# Patient Record
Sex: MALE | Race: WHITE | NOT HISPANIC OR LATINO | Employment: OTHER | URBAN - METROPOLITAN AREA
[De-identification: names, ages, dates, MRNs, and addresses within clinical notes are randomized per-mention and may not be internally consistent; named-entity substitution may affect disease eponyms.]

---

## 2018-08-15 ENCOUNTER — OFFICE VISIT (OUTPATIENT)
Dept: INTERNAL MEDICINE | Facility: CLINIC | Age: 63
End: 2018-08-15
Payer: COMMERCIAL

## 2018-08-15 ENCOUNTER — LAB VISIT (OUTPATIENT)
Dept: LAB | Facility: HOSPITAL | Age: 63
End: 2018-08-15
Payer: COMMERCIAL

## 2018-08-15 VITALS
BODY MASS INDEX: 27.23 KG/M2 | HEART RATE: 51 BPM | SYSTOLIC BLOOD PRESSURE: 138 MMHG | RESPIRATION RATE: 20 BRPM | HEIGHT: 77 IN | DIASTOLIC BLOOD PRESSURE: 76 MMHG | WEIGHT: 230.63 LBS | TEMPERATURE: 98 F | OXYGEN SATURATION: 98 %

## 2018-08-15 DIAGNOSIS — Z00.00 ANNUAL PHYSICAL EXAM: Primary | ICD-10-CM

## 2018-08-15 DIAGNOSIS — R00.1 BRADYCARDIA: ICD-10-CM

## 2018-08-15 DIAGNOSIS — Z11.59 NEED FOR HEPATITIS C SCREENING TEST: ICD-10-CM

## 2018-08-15 DIAGNOSIS — Z23 IMMUNIZATION DUE: ICD-10-CM

## 2018-08-15 DIAGNOSIS — L98.9 BACK SKIN LESION: ICD-10-CM

## 2018-08-15 DIAGNOSIS — Z00.00 ANNUAL PHYSICAL EXAM: ICD-10-CM

## 2018-08-15 LAB
ALBUMIN SERPL BCP-MCNC: 4.4 G/DL
ALP SERPL-CCNC: 79 U/L
ALT SERPL W/O P-5'-P-CCNC: 20 U/L
ANION GAP SERPL CALC-SCNC: 8 MMOL/L
AST SERPL-CCNC: 30 U/L
BASOPHILS # BLD AUTO: 0.06 K/UL
BASOPHILS NFR BLD: 0.8 %
BILIRUB SERPL-MCNC: 1.2 MG/DL
BUN SERPL-MCNC: 13 MG/DL
CALCIUM SERPL-MCNC: 10.1 MG/DL
CHLORIDE SERPL-SCNC: 106 MMOL/L
CHOLEST SERPL-MCNC: 185 MG/DL
CHOLEST/HDLC SERPL: 3.6 {RATIO}
CO2 SERPL-SCNC: 27 MMOL/L
CREAT SERPL-MCNC: 1.2 MG/DL
DIFFERENTIAL METHOD: ABNORMAL
EOSINOPHIL # BLD AUTO: 0.2 K/UL
EOSINOPHIL NFR BLD: 2.3 %
ERYTHROCYTE [DISTWIDTH] IN BLOOD BY AUTOMATED COUNT: 13 %
EST. GFR  (AFRICAN AMERICAN): >60 ML/MIN/1.73 M^2
EST. GFR  (NON AFRICAN AMERICAN): >60 ML/MIN/1.73 M^2
GLUCOSE SERPL-MCNC: 96 MG/DL
HCT VFR BLD AUTO: 46.2 %
HDLC SERPL-MCNC: 52 MG/DL
HDLC SERPL: 28.1 %
HGB BLD-MCNC: 15.1 G/DL
IMM GRANULOCYTES # BLD AUTO: 0.01 K/UL
IMM GRANULOCYTES NFR BLD AUTO: 0.1 %
LDLC SERPL CALC-MCNC: 111.4 MG/DL
LYMPHOCYTES # BLD AUTO: 2.4 K/UL
LYMPHOCYTES NFR BLD: 30.7 %
MCH RBC QN AUTO: 31.6 PG
MCHC RBC AUTO-ENTMCNC: 32.7 G/DL
MCV RBC AUTO: 97 FL
MONOCYTES # BLD AUTO: 0.5 K/UL
MONOCYTES NFR BLD: 6.3 %
NEUTROPHILS # BLD AUTO: 4.6 K/UL
NEUTROPHILS NFR BLD: 59.8 %
NONHDLC SERPL-MCNC: 133 MG/DL
NRBC BLD-RTO: 0 /100 WBC
PLATELET # BLD AUTO: 219 K/UL
PMV BLD AUTO: 11.2 FL
POTASSIUM SERPL-SCNC: 4.3 MMOL/L
PROT SERPL-MCNC: 7.2 G/DL
RBC # BLD AUTO: 4.78 M/UL
SODIUM SERPL-SCNC: 141 MMOL/L
TRIGL SERPL-MCNC: 108 MG/DL
WBC # BLD AUTO: 7.72 K/UL

## 2018-08-15 PROCEDURE — 36415 COLL VENOUS BLD VENIPUNCTURE: CPT

## 2018-08-15 PROCEDURE — 80061 LIPID PANEL: CPT

## 2018-08-15 PROCEDURE — 85025 COMPLETE CBC W/AUTO DIFF WBC: CPT

## 2018-08-15 PROCEDURE — 80053 COMPREHEN METABOLIC PANEL: CPT

## 2018-08-15 PROCEDURE — 86803 HEPATITIS C AB TEST: CPT

## 2018-08-15 PROCEDURE — 99999 PR PBB SHADOW E&M-EST. PATIENT-LVL IV: CPT | Mod: PBBFAC,,, | Performed by: FAMILY MEDICINE

## 2018-08-15 PROCEDURE — 99203 OFFICE O/P NEW LOW 30 MIN: CPT | Mod: S$GLB,,, | Performed by: FAMILY MEDICINE

## 2018-08-15 PROCEDURE — 3008F BODY MASS INDEX DOCD: CPT | Mod: CPTII,S$GLB,, | Performed by: FAMILY MEDICINE

## 2018-08-15 NOTE — PROGRESS NOTES
"Subjective:       Patient ID: Myles Pride is a 63 y.o. male.    Chief Complaint: Establish Care (annual)    HPI     62 yo M    PMH:  "bad knee" from an old high school injury  Previously was on statin.    Presents for annual exam    Recently changed insurance.    Has noted a mole on his back    Non smoker  Moderate alcohol - 2-3 a week  No drugs    Retired 2014 -     No medications.    Reports his BP is slightly elevated - but only in MD office  Checks at home and is controlled.    Last Colonoscopy 2009 - due next year.   Reports normal findings    Has Had a shingles vaccine - last year.   Feels it was the one time vaccine - suspect zostavax.    Feels has had tetanus         Review of Systems   Constitutional: Negative for activity change and unexpected weight change.   HENT: Negative for hearing loss, rhinorrhea and trouble swallowing.    Eyes: Negative for discharge and visual disturbance.   Respiratory: Negative for chest tightness and wheezing.    Cardiovascular: Negative for chest pain and palpitations.   Gastrointestinal: Negative for blood in stool, constipation, diarrhea and vomiting.   Endocrine: Negative for polydipsia and polyuria.   Genitourinary: Negative for difficulty urinating, hematuria and urgency.   Musculoskeletal: Negative for arthralgias, joint swelling and neck pain.   Neurological: Negative for weakness and headaches.   Psychiatric/Behavioral: Negative for confusion and dysphoric mood.       Objective:       Vitals:    08/15/18 1335   BP: 138/76   Pulse: (!) 51   Resp: 20   Temp: 98.3 °F (36.8 °C)       Physical Exam   Constitutional: He is oriented to person, place, and time. He appears well-developed and well-nourished. No distress.   HENT:   Head: Normocephalic and atraumatic.   Right Ear: Hearing, tympanic membrane, external ear and ear canal normal.   Left Ear: Hearing, tympanic membrane, external ear and ear canal normal.   Nose: Nose normal. Right sinus exhibits no maxillary " sinus tenderness and no frontal sinus tenderness. Left sinus exhibits no maxillary sinus tenderness and no frontal sinus tenderness.   Mouth/Throat: Uvula is midline, oropharynx is clear and moist and mucous membranes are normal.   Eyes: Conjunctivae are normal. Right eye exhibits no discharge. Left eye exhibits no discharge.   Neck: Trachea normal, normal range of motion and full passive range of motion without pain.   Cardiovascular: Normal rate, regular rhythm, normal heart sounds and intact distal pulses.   Pulmonary/Chest: Effort normal and breath sounds normal. No respiratory distress. He has no decreased breath sounds. He has no wheezes.   Abdominal: Soft. Normal appearance and bowel sounds are normal. He exhibits no distension and no mass. There is no tenderness. There is no guarding. No hernia.   Musculoskeletal: Normal range of motion. He exhibits no edema or deformity.   Lymphadenopathy:     He has no cervical adenopathy.   Neurological: He is alert and oriented to person, place, and time.   Skin: Skin is warm, dry and intact. No rash noted. No erythema. No pallor.   Raised dry skin lesion.   Some scaling appreciated.  Pink  See attached photo.   Psychiatric: He has a normal mood and affect. His speech is normal and behavior is normal. Thought content normal.       Assessment:       1. Annual physical exam    2. Immunization due    3. Need for hepatitis C screening test    4. Bradycardia        Plan:   Annual physical exam  -     Lipid panel; Future; Expected date: 08/15/2018  -     CBC auto differential; Future; Expected date: 08/15/2018  -     Comprehensive metabolic panel; Future; Expected date: 08/15/2018    Immunization due  Shingles vaccine  I suspect he has had zostavax      Printing out script for shingrix for him to get.  -     (In Office Administered) Zoster Recombinant Vaccine; Future; Expected date: 08/15/2018      Need for hepatitis C screening test  -     Hepatitis C antibody; Future;  Expected date: 08/15/2018      Bradycardia  Chronic  Has had cardiologist evaluate in past  Reports he just runs low.    Skin lesion  Consult Derm      No Follow-up on file.

## 2018-08-16 LAB — HCV AB SERPL QL IA: NEGATIVE

## 2018-08-17 ENCOUNTER — TELEPHONE (OUTPATIENT)
Dept: DERMATOLOGY | Facility: CLINIC | Age: 63
End: 2018-08-17

## 2018-08-17 NOTE — TELEPHONE ENCOUNTER
Spoke to pt in reference to scheduled appt with REID Del Angel for Monday 8/20/18.  Informed pt appt will need to be canceled and reschedule with our dermatologist.  Pt verbalized understanding of information given and had no further questions.

## 2018-08-20 ENCOUNTER — TELEPHONE (OUTPATIENT)
Dept: INTERNAL MEDICINE | Facility: CLINIC | Age: 63
End: 2018-08-20

## 2018-08-20 RX ORDER — ATORVASTATIN CALCIUM 20 MG/1
20 TABLET, FILM COATED ORAL DAILY
Qty: 90 TABLET | Refills: 3 | Status: SHIPPED | OUTPATIENT
Start: 2018-08-20 | End: 2019-02-21 | Stop reason: SDUPTHER

## 2018-08-20 NOTE — TELEPHONE ENCOUNTER
----- Message from Catalino Arias MD sent at 8/20/2018  4:31 PM CDT -----  Please call and set up 6 mo f/u

## 2018-09-07 ENCOUNTER — OFFICE VISIT (OUTPATIENT)
Dept: DERMATOLOGY | Facility: CLINIC | Age: 63
End: 2018-09-07
Payer: COMMERCIAL

## 2018-09-07 DIAGNOSIS — D18.00 ANGIOMA: ICD-10-CM

## 2018-09-07 DIAGNOSIS — L82.1 SEBORRHEIC KERATOSIS: Primary | ICD-10-CM

## 2018-09-07 PROCEDURE — 99999 PR PBB SHADOW E&M-EST. PATIENT-LVL III: CPT | Mod: PBBFAC,,, | Performed by: STUDENT IN AN ORGANIZED HEALTH CARE EDUCATION/TRAINING PROGRAM

## 2018-09-07 PROCEDURE — 99202 OFFICE O/P NEW SF 15 MIN: CPT | Mod: 25,S$GLB,, | Performed by: STUDENT IN AN ORGANIZED HEALTH CARE EDUCATION/TRAINING PROGRAM

## 2018-09-07 PROCEDURE — 17110 DESTRUCTION B9 LES UP TO 14: CPT | Mod: S$GLB,,, | Performed by: STUDENT IN AN ORGANIZED HEALTH CARE EDUCATION/TRAINING PROGRAM

## 2018-09-07 NOTE — PROGRESS NOTES
Subjective:       Patient ID:  Myles Pride is a 63 y.o. male who presents for   Chief Complaint   Patient presents with    Mole     c/o mole to back x2 months , itchy, size of mole changed    Lesion     c/o lesion to left shoulder x a few years , ruff texture,      History of Present Illness: The patient presents for follow up of skin check.    Patient complains of lesion(s)  Location: left upper shoulder and back  Duration: years  Symptoms: itching  Relieving factors/Previous treatments: none    No history of skin cancer. Lesions unchanged for years, but now irritated.           Review of Systems   Constitutional: Negative for fever and chills.   Skin: Positive for activity-related sunscreen use. Negative for daily sunscreen use and recent sunburn.   Hematologic/Lymphatic: Does not bruise/bleed easily.        Objective:    Physical Exam   Constitutional: He appears well-developed and well-nourished. No distress.   Neurological: He is alert and oriented to person, place, and time. He is not disoriented.   Psychiatric: He has a normal mood and affect.   Skin:   Areas Examined (abnormalities noted in diagram):   Scalp / Hair Palpated and Inspected  Head / Face Inspection Performed  Neck Inspection Performed  Chest / Axilla Inspection Performed  Abdomen Inspection Performed  Genitals / Buttocks / Groin Inspection Performed  Back Inspection Performed  RUE Inspected  LUE Inspection Performed  RLE Inspected  LLE Inspection Performed  Nails and Digits Inspection Performed              Diagram Legend     Erythematous scaling macule/papule c/w actinic keratosis       Vascular papule c/w angioma      Pigmented verrucoid papule/plaque c/w seborrheic keratosis      Yellow umbilicated papule c/w sebaceous hyperplasia      Irregularly shaped tan macule c/w lentigo     1-2 mm smooth white papules consistent with Milia      Movable subcutaneous cyst with punctum c/w epidermal inclusion cyst      Subcutaneous movable  cyst c/w pilar cyst      Firm pink to brown papule c/w dermatofibroma      Pedunculated fleshy papule(s) c/w skin tag(s)      Evenly pigmented macule c/w junctional nevus     Mildly variegated pigmented, slightly irregular-bordered macule c/w mildly atypical nevus      Flesh colored to evenly pigmented papule c/w intradermal nevus       Pink pearly papule/plaque c/w basal cell carcinoma      Erythematous hyperkeratotic cursted plaque c/w SCC      Surgical scar with no sign of skin cancer recurrence      Open and closed comedones      Inflammatory papules and pustules      Verrucoid papule consistent consistent with wart     Erythematous eczematous patches and plaques     Dystrophic onycholytic nail with subungual debris c/w onychomycosis     Umbilicated papule    Erythematous-base heme-crusted tan verrucoid plaque consistent with inflamed seborrheic keratosis     Erythematous Silvery Scaling Plaque c/w Psoriasis     See annotation      Assessment / Plan:        Seborrheic keratosis  These are benign inherited growths without a malignant potential. Reassurance given to patient.    Cryosurgery Procedure Note    Verbal consent from the patient is obtained including, but not limited to, risk of hypopigmentation/hyperpigmentation, scar, recurrence of lesion. The patient is aware of the precancerous quality and need for treatment of these lesions. Liquid nitrogen cryosurgery is applied to the 2 inflamed SK, as detailed in the physical exam, to produce a freeze injury. The patient is aware that blisters may form and is instructed on wound care with gentle cleansing and use of vaseline ointment to keep moist until healed. The patient is supplied a handout on cryosurgery and is instructed to call if lesions do not completely resolve.      Angioma  This is a benign vascular lesion. Reassurance given. No treatment required.           Follow-up in about 1 year (around 9/7/2019), or if symptoms worsen or fail to improve.

## 2018-09-07 NOTE — PATIENT INSTRUCTIONS

## 2019-02-21 ENCOUNTER — LAB VISIT (OUTPATIENT)
Dept: LAB | Facility: HOSPITAL | Age: 64
End: 2019-02-21
Attending: FAMILY MEDICINE
Payer: COMMERCIAL

## 2019-02-21 ENCOUNTER — OFFICE VISIT (OUTPATIENT)
Dept: INTERNAL MEDICINE | Facility: CLINIC | Age: 64
End: 2019-02-21
Payer: COMMERCIAL

## 2019-02-21 VITALS
WEIGHT: 238.13 LBS | SYSTOLIC BLOOD PRESSURE: 138 MMHG | TEMPERATURE: 97 F | HEART RATE: 46 BPM | DIASTOLIC BLOOD PRESSURE: 80 MMHG | BODY MASS INDEX: 28.23 KG/M2 | OXYGEN SATURATION: 98 % | RESPIRATION RATE: 18 BRPM

## 2019-02-21 DIAGNOSIS — Z12.5 SCREENING FOR PROSTATE CANCER: ICD-10-CM

## 2019-02-21 DIAGNOSIS — R00.1 BRADYCARDIA: ICD-10-CM

## 2019-02-21 DIAGNOSIS — Z12.11 SCREENING FOR COLON CANCER: ICD-10-CM

## 2019-02-21 DIAGNOSIS — Z79.899 ENCOUNTER FOR LONG-TERM CURRENT USE OF MEDICATION: ICD-10-CM

## 2019-02-21 DIAGNOSIS — Z91.89 RISK OF MYOCARDIAL INFARCTION OR STROKE 7.5% OR GREATER IN NEXT 10 YEARS: ICD-10-CM

## 2019-02-21 DIAGNOSIS — Z91.89 RISK OF MYOCARDIAL INFARCTION OR STROKE 7.5% OR GREATER IN NEXT 10 YEARS: Primary | ICD-10-CM

## 2019-02-21 LAB
25(OH)D3+25(OH)D2 SERPL-MCNC: 22 NG/ML
ALBUMIN SERPL BCP-MCNC: 4.1 G/DL
ALP SERPL-CCNC: 75 U/L
ALT SERPL W/O P-5'-P-CCNC: 36 U/L
ANION GAP SERPL CALC-SCNC: 9 MMOL/L
AST SERPL-CCNC: 35 U/L
BILIRUB SERPL-MCNC: 0.9 MG/DL
BUN SERPL-MCNC: 13 MG/DL
CALCIUM SERPL-MCNC: 9.8 MG/DL
CHLORIDE SERPL-SCNC: 107 MMOL/L
CHOLEST SERPL-MCNC: 143 MG/DL
CHOLEST/HDLC SERPL: 3 {RATIO}
CO2 SERPL-SCNC: 27 MMOL/L
COMPLEXED PSA SERPL-MCNC: 0.8 NG/ML
CREAT SERPL-MCNC: 1.2 MG/DL
EST. GFR  (AFRICAN AMERICAN): >60 ML/MIN/1.73 M^2
EST. GFR  (NON AFRICAN AMERICAN): >60 ML/MIN/1.73 M^2
GLUCOSE SERPL-MCNC: 92 MG/DL
HDLC SERPL-MCNC: 48 MG/DL
HDLC SERPL: 33.6 %
LDLC SERPL CALC-MCNC: 74.8 MG/DL
NONHDLC SERPL-MCNC: 95 MG/DL
POTASSIUM SERPL-SCNC: 4.6 MMOL/L
PROT SERPL-MCNC: 6.8 G/DL
SODIUM SERPL-SCNC: 143 MMOL/L
TRIGL SERPL-MCNC: 101 MG/DL
TSH SERPL DL<=0.005 MIU/L-ACNC: 2.37 UIU/ML

## 2019-02-21 PROCEDURE — 82306 VITAMIN D 25 HYDROXY: CPT

## 2019-02-21 PROCEDURE — 99999 PR PBB SHADOW E&M-EST. PATIENT-LVL III: CPT | Mod: PBBFAC,,, | Performed by: FAMILY MEDICINE

## 2019-02-21 PROCEDURE — 99214 PR OFFICE/OUTPT VISIT, EST, LEVL IV, 30-39 MIN: ICD-10-PCS | Mod: S$GLB,,, | Performed by: FAMILY MEDICINE

## 2019-02-21 PROCEDURE — 99999 PR PBB SHADOW E&M-EST. PATIENT-LVL III: ICD-10-PCS | Mod: PBBFAC,,, | Performed by: FAMILY MEDICINE

## 2019-02-21 PROCEDURE — 3008F BODY MASS INDEX DOCD: CPT | Mod: CPTII,S$GLB,, | Performed by: FAMILY MEDICINE

## 2019-02-21 PROCEDURE — 84153 ASSAY OF PSA TOTAL: CPT

## 2019-02-21 PROCEDURE — 80053 COMPREHEN METABOLIC PANEL: CPT

## 2019-02-21 PROCEDURE — 36415 COLL VENOUS BLD VENIPUNCTURE: CPT

## 2019-02-21 PROCEDURE — 3008F PR BODY MASS INDEX (BMI) DOCUMENTED: ICD-10-PCS | Mod: CPTII,S$GLB,, | Performed by: FAMILY MEDICINE

## 2019-02-21 PROCEDURE — 99214 OFFICE O/P EST MOD 30 MIN: CPT | Mod: S$GLB,,, | Performed by: FAMILY MEDICINE

## 2019-02-21 PROCEDURE — 80061 LIPID PANEL: CPT

## 2019-02-21 PROCEDURE — 84443 ASSAY THYROID STIM HORMONE: CPT

## 2019-02-21 RX ORDER — SODIUM, POTASSIUM,MAG SULFATES 17.5-3.13G
1 SOLUTION, RECONSTITUTED, ORAL ORAL DAILY
Qty: 1 KIT | Refills: 0 | Status: SHIPPED | OUTPATIENT
Start: 2019-02-21 | End: 2019-02-23

## 2019-02-21 RX ORDER — ATORVASTATIN CALCIUM 20 MG/1
20 TABLET, FILM COATED ORAL DAILY
Qty: 90 TABLET | Refills: 3 | Status: SHIPPED | OUTPATIENT
Start: 2019-02-21 | End: 2020-02-28 | Stop reason: SDUPTHER

## 2019-02-21 NOTE — PROGRESS NOTES
Subjective:       Patient ID: Myles Pride is a 63 y.o. male.    Chief Complaint: No chief complaint on file.    HPI  Review of Systems    Objective:      Physical Exam    Assessment:       No diagnosis found.    Plan:   There are no diagnoses linked to this encounter.    No Follow-up on file.

## 2019-02-21 NOTE — PROGRESS NOTES
"Subjective:       Patient ID: Myles Pride is a 63 y.o. male.    Chief Complaint: Follow-up    HPI     63       PMH:    "bad Knee"  ASCVD risk of 8.2 - 10.2      Tolerating medication w/out issue. -statin  Has taken in the past  No new aches/ pains  Some present - but pre dates Statin.      Colonoscopy done in [facility] per patient  Will confirm.    Is exercising - cardio and resistance.  Tennis/ cycling is his source of cardiovascular exercise.  Heart rate does respond appropriately.    Tolerates without any issue -   No CP or trouble breathing  No dizziness    Never smoked    Review of Systems   Constitutional: Negative for activity change.   HENT: Negative for trouble swallowing.    Eyes: Negative for discharge.   Respiratory: Negative for wheezing.    Cardiovascular: Negative for chest pain and palpitations.   Gastrointestinal: Negative for constipation, diarrhea and vomiting.   Genitourinary: Negative for difficulty urinating and hematuria.   Neurological: Negative for headaches.   Psychiatric/Behavioral: Negative for dysphoric mood.       Objective:       Vitals:    02/21/19 0717   BP: 138/80   Pulse: (!) 46   Resp: 18   Temp: 97.3 °F (36.3 °C)       Physical Exam   Constitutional: He is oriented to person, place, and time. He appears well-developed and well-nourished. No distress.   HENT:   Head: Normocephalic and atraumatic.   Right Ear: Hearing, tympanic membrane, external ear and ear canal normal.   Left Ear: Hearing, tympanic membrane, external ear and ear canal normal.   Nose: Nose normal. Right sinus exhibits no maxillary sinus tenderness and no frontal sinus tenderness. Left sinus exhibits no maxillary sinus tenderness and no frontal sinus tenderness.   Mouth/Throat: Uvula is midline, oropharynx is clear and moist and mucous membranes are normal.   Eyes: Conjunctivae are normal. Right eye exhibits no discharge. Left eye exhibits no discharge.   Neck: Trachea normal, normal range of " motion and full passive range of motion without pain.   Cardiovascular: Normal rate, regular rhythm, normal heart sounds and intact distal pulses.   Pulmonary/Chest: Effort normal and breath sounds normal. No respiratory distress. He has no decreased breath sounds. He has no wheezes.   Abdominal: Normal appearance.   Musculoskeletal: Normal range of motion. He exhibits no edema or deformity.   Lymphadenopathy:     He has no cervical adenopathy.   Neurological: He is alert and oriented to person, place, and time.   Skin: Skin is warm, dry and intact. No rash noted. No erythema. No pallor.   Psychiatric: He has a normal mood and affect. His speech is normal and behavior is normal. Thought content normal.       Assessment:       1. Risk of myocardial infarction or stroke 7.5% or greater in next 10 years    2. Encounter for long-term current use of medication    3. Screening for prostate cancer    4. Screening for colon cancer    5. Bradycardia        Plan:   Risk of myocardial infarction or stroke 7.5% or greater in next 10 years  -     Lipid panel; Future; Expected date: 02/21/2019  Reviewed Epic calculation and ACC calculation online with patient.    Reviewed labs with patient.  Refilled statin today.    Encounter for long-term current use of medication  -Statin      Discussed common adverse effects - myalgias - does not feel occurring.  -     Comprehensive metabolic panel; Future; Expected date: 02/21/2019  -     Vitamin D; Future; Expected date: 02/21/2019  -     TSH; Future; Expected date: 02/21/2019    Screening for prostate cancer  Has been screened in the past.  Discussed risk of screening vs benefit of test -  Given has had tested in past - is agreeable to testing again.  -     PSA, Screening; Future; Expected date: 02/21/2019    Screening for colon cancer  Ordered.  Will confirm he is due with [facility].    Bradycardia  Monitors with wrist watch.  Stable.  No clinical symptoms  Long standing - has had  evaluation by cardiologist - he cannot remember name of that MD         No Follow-up on file.

## 2019-02-25 ENCOUNTER — TELEPHONE (OUTPATIENT)
Dept: INTERNAL MEDICINE | Facility: CLINIC | Age: 64
End: 2019-02-25

## 2019-02-25 NOTE — TELEPHONE ENCOUNTER
----- Message from Catalino Arias MD sent at 2/22/2019  2:34 PM CST -----  Please call the patient regarding his labs  Cholesterol improved  Vit D - slightly low  1000 units OTC  May help with some of achiness if it is the statin doing so.  Otherwise no concerns  PSA normal

## 2019-03-12 ENCOUNTER — TELEPHONE (OUTPATIENT)
Dept: ENDOSCOPY | Facility: HOSPITAL | Age: 64
End: 2019-03-12

## 2019-03-20 ENCOUNTER — PATIENT MESSAGE (OUTPATIENT)
Dept: INTERNAL MEDICINE | Facility: CLINIC | Age: 64
End: 2019-03-20

## 2019-03-20 NOTE — TELEPHONE ENCOUNTER
Called and spoke with patient. Patient informed of recent lab results and advised of Dr Gay recommendations. Patient verbalized understanding and will follow up as needed.

## 2019-08-28 ENCOUNTER — OFFICE VISIT (OUTPATIENT)
Dept: INTERNAL MEDICINE | Facility: CLINIC | Age: 64
End: 2019-08-28
Payer: COMMERCIAL

## 2019-08-28 VITALS
OXYGEN SATURATION: 96 % | WEIGHT: 240.75 LBS | TEMPERATURE: 96 F | DIASTOLIC BLOOD PRESSURE: 70 MMHG | BODY MASS INDEX: 28.55 KG/M2 | RESPIRATION RATE: 16 BRPM | SYSTOLIC BLOOD PRESSURE: 120 MMHG | HEART RATE: 49 BPM

## 2019-08-28 DIAGNOSIS — Z00.00 ANNUAL PHYSICAL EXAM: ICD-10-CM

## 2019-08-28 DIAGNOSIS — Z12.11 SCREENING FOR COLON CANCER: Primary | ICD-10-CM

## 2019-08-28 PROCEDURE — 99999 PR PBB SHADOW E&M-EST. PATIENT-LVL III: ICD-10-PCS | Mod: PBBFAC,,, | Performed by: FAMILY MEDICINE

## 2019-08-28 PROCEDURE — 99396 PR PREVENTIVE VISIT,EST,40-64: ICD-10-PCS | Mod: S$GLB,,, | Performed by: FAMILY MEDICINE

## 2019-08-28 PROCEDURE — 99999 PR PBB SHADOW E&M-EST. PATIENT-LVL III: CPT | Mod: PBBFAC,,, | Performed by: FAMILY MEDICINE

## 2019-08-28 PROCEDURE — 99396 PREV VISIT EST AGE 40-64: CPT | Mod: S$GLB,,, | Performed by: FAMILY MEDICINE

## 2019-08-28 NOTE — PROGRESS NOTES
"Subjective:       Patient ID: Myles Pride is a 64 y.o. male.    Chief Complaint: Follow-up    HPI     65 yo M    PMH:  "bad Knee"   ASVCD risk over 8.2    Seen 2/19  Continued statin  Did labs  Colonoscopy ordered  Discussed long standing bradycardia - asymptomatic    Only lab abnormality  22 vit d  LDL 74    Non smoker  Moderate drinker   No drugs    Saw derm  Angioma  Reassurance provided    Bad knee  Generally not an issue  No pain  Sometimes swells with tennis  Requires no medicine    Tolerated lipitor  No aches    Review of Systems   Constitutional: Negative for activity change and unexpected weight change.   HENT: Negative for hearing loss, rhinorrhea and trouble swallowing.    Eyes: Negative for discharge and visual disturbance.   Respiratory: Negative for chest tightness and wheezing.    Cardiovascular: Negative for chest pain and palpitations.   Gastrointestinal: Negative for blood in stool, constipation, diarrhea and vomiting.   Endocrine: Negative for polydipsia and polyuria.   Genitourinary: Negative for difficulty urinating, hematuria and urgency.   Musculoskeletal: Negative for arthralgias, joint swelling and neck pain.   Neurological: Negative for weakness and headaches.   Psychiatric/Behavioral: Negative for confusion and dysphoric mood.       Objective:       Vitals:    08/28/19 0841   BP: 120/70   Pulse: (!) 49   Resp: 16   Temp: 96.1 °F (35.6 °C)       Physical Exam   Constitutional: He is oriented to person, place, and time. He appears well-developed.   HENT:   Head: Normocephalic.   Eyes: Conjunctivae are normal.   Cardiovascular: Normal rate and regular rhythm.   Pulmonary/Chest: Effort normal.   Abdominal: Soft.   Musculoskeletal: Normal range of motion. He exhibits no edema.   Lymphadenopathy:     He has no cervical adenopathy.   Neurological: He is alert and oriented to person, place, and time.   Skin: No pallor.   Psychiatric: He has a normal mood and affect. His behavior is normal. "       Assessment:       1. Screening for colon cancer    2. Annual physical exam        Plan:   Screening for colon cancer  -     Case request GI: COLONOSCOPY    Annual physical exam      Annual  No active concerns  Tolerating medications  Reviewed labs  Plan on labs at next visit    Mercedes Westbrooks had the 2nd shot  Feels had a reaction  [facility] [address] [facility] [address] - will try and get records      No follow-ups on file.

## 2019-08-29 ENCOUNTER — TELEPHONE (OUTPATIENT)
Dept: ENDOSCOPY | Facility: HOSPITAL | Age: 64
End: 2019-08-29

## 2019-09-13 ENCOUNTER — PATIENT MESSAGE (OUTPATIENT)
Dept: INTERNAL MEDICINE | Facility: CLINIC | Age: 64
End: 2019-09-13

## 2019-10-22 DIAGNOSIS — Z12.11 COLON CANCER SCREENING: ICD-10-CM

## 2019-10-31 ENCOUNTER — TELEPHONE (OUTPATIENT)
Dept: PREADMISSION TESTING | Facility: HOSPITAL | Age: 64
End: 2019-10-31

## 2019-11-01 ENCOUNTER — TELEPHONE (OUTPATIENT)
Dept: PREADMISSION TESTING | Facility: HOSPITAL | Age: 64
End: 2019-11-01

## 2019-12-02 ENCOUNTER — LAB VISIT (OUTPATIENT)
Dept: LAB | Facility: HOSPITAL | Age: 64
End: 2019-12-02
Attending: FAMILY MEDICINE
Payer: COMMERCIAL

## 2019-12-02 DIAGNOSIS — Z12.11 COLON CANCER SCREENING: ICD-10-CM

## 2019-12-02 PROCEDURE — 82274 ASSAY TEST FOR BLOOD FECAL: CPT

## 2019-12-06 NOTE — PRE-PROCEDURE INSTRUCTIONS
PAT call completed and patient educated on the bowel prep, clear liquid diet and procedure instructions. Patient has rec'd instructions from MD office for Miralax prep prior and will be purchasing his OTC medications. Pt has reviewed instructions and denies any questions or concerns. Medical history discussed and pt instructed to be NPO after 2nd bowel prep. Tentative arrival and 2nd prep times discussed. Patient will be accompanied by his wife and is informed of policy to remain inhouse during entire visit. All questions and concerns addressed. Callback number provided for any future questions. Will f/u with patient at 72 hours and 1 day before procedure for final arrival time.

## 2019-12-10 LAB — HEMOCCULT STL QL IA: NEGATIVE

## 2019-12-11 ENCOUNTER — TELEPHONE (OUTPATIENT)
Dept: INTERNAL MEDICINE | Facility: CLINIC | Age: 64
End: 2019-12-11

## 2019-12-11 NOTE — TELEPHONE ENCOUNTER
----- Message from Catalino Arias MD sent at 12/11/2019  9:47 AM CST -----  Please call the patient regarding his fitkit  Negative study  Repeat 1 year

## 2019-12-11 NOTE — TELEPHONE ENCOUNTER
----- Message from Marivel Kiran sent at 12/11/2019 11:47 AM CST -----  Contact: Pt  Type:  Patient Returning Call    Who Called:Myles Pride  Who Left Message for Patient:ADEBAYO MOY  Does the patient know what this is regarding?:  Would the patient rather a call back or a response via [facility]? Call Back  Best Call Back Number:[phone] (home)   Additional Information:

## 2019-12-16 NOTE — PRE-PROCEDURE INSTRUCTIONS
Attempted 72 hour f/u call with patient, but no answer. VM left to return call for any concerns or questions.

## 2019-12-18 ENCOUNTER — ANESTHESIA EVENT (OUTPATIENT)
Dept: ENDOSCOPY | Facility: HOSPITAL | Age: 64
End: 2019-12-18
Payer: COMMERCIAL

## 2019-12-18 PROBLEM — E55.9 VITAMIN D DEFICIENCY: Status: ACTIVE | Noted: 2019-12-18

## 2019-12-18 NOTE — ANESTHESIA PREPROCEDURE EVALUATION
12/18/2019  Myles Pride is a 64 y.o., male.    Anesthesia Evaluation    I have reviewed the Patient Summary Reports.    I have reviewed the Nursing Notes.   I have reviewed the Medications.     Review of Systems  Anesthesia Hx:  History of prior surgery of interest to airway management or planning: Personal Hx of Anesthesia complications, Post-Operative Nausea/Vomiting   Social:  Non-Smoker    Cardiovascular:   no hyperlipidemia ECG has been reviewed.    Pulmonary:   Denies Recent URI.    Hepatic/GI:   Denies GERD.        Physical Exam  General:  Well nourished, Large Beard    Airway/Jaw/Neck:  Airway Findings: General Airway Assessment: Adult           Mental Status:  Mental Status Findings:  Cooperative, Alert and Oriented         Anesthesia Plan  Type of Anesthesia, risks & benefits discussed:  Anesthesia Type:  general  Patient's Preference:   Intra-op Monitoring Plan: standard ASA monitors  Intra-op Monitoring Plan Comments:   Post Op Pain Control Plan:   Post Op Pain Control Plan Comments:   Induction:   IV  Beta Blocker:  Patient is not currently on a Beta-Blocker (No further documentation required).       Informed Consent: Patient understands risks and agrees with Anesthesia plan.  Questions answered. Anesthesia consent signed with patient.  ASA Score: 1     Day of Surgery Review of History & Physical:    H&P update referred to the surgeon.         Ready For Surgery From Anesthesia Perspective.

## 2019-12-19 ENCOUNTER — ANESTHESIA (OUTPATIENT)
Dept: ENDOSCOPY | Facility: HOSPITAL | Age: 64
End: 2019-12-19
Payer: COMMERCIAL

## 2019-12-19 ENCOUNTER — HOSPITAL ENCOUNTER (OUTPATIENT)
Facility: HOSPITAL | Age: 64
Discharge: HOME | End: 2019-12-19
Attending: SURGERY | Admitting: SURGERY
Payer: COMMERCIAL

## 2019-12-19 VITALS
BODY MASS INDEX: 27.59 KG/M2 | RESPIRATION RATE: 18 BRPM | SYSTOLIC BLOOD PRESSURE: 149 MMHG | TEMPERATURE: 98 F | HEART RATE: 46 BPM | HEIGHT: 77 IN | WEIGHT: 233.69 LBS | OXYGEN SATURATION: 97 % | DIASTOLIC BLOOD PRESSURE: 82 MMHG

## 2019-12-19 DIAGNOSIS — Z12.11 COLON CANCER SCREENING: ICD-10-CM

## 2019-12-19 PROBLEM — R00.1 BRADYCARDIA: Chronic | Status: ACTIVE | Noted: 2019-02-21

## 2019-12-19 PROCEDURE — D9220A PRA ANESTHESIA: Mod: 33,CRNA,, | Performed by: NURSE ANESTHETIST, CERTIFIED REGISTERED

## 2019-12-19 PROCEDURE — 63600175 PHARM REV CODE 636 W HCPCS: Performed by: NURSE ANESTHETIST, CERTIFIED REGISTERED

## 2019-12-19 PROCEDURE — 37000009 HC ANESTHESIA EA ADD 15 MINS: Performed by: SURGERY

## 2019-12-19 PROCEDURE — 45385 PR COLONOSCOPY,REMV LESN,SNARE: ICD-10-PCS | Mod: 33,,, | Performed by: SURGERY

## 2019-12-19 PROCEDURE — D9220A PRA ANESTHESIA: ICD-10-PCS | Mod: 33,CRNA,, | Performed by: NURSE ANESTHETIST, CERTIFIED REGISTERED

## 2019-12-19 PROCEDURE — 45385 COLONOSCOPY W/LESION REMOVAL: CPT | Mod: 33,,, | Performed by: SURGERY

## 2019-12-19 PROCEDURE — D9220A PRA ANESTHESIA: Mod: 33,ANES,, | Performed by: ANESTHESIOLOGY

## 2019-12-19 PROCEDURE — 45385 COLONOSCOPY W/LESION REMOVAL: CPT | Performed by: SURGERY

## 2019-12-19 PROCEDURE — D9220A PRA ANESTHESIA: ICD-10-PCS | Mod: 33,ANES,, | Performed by: ANESTHESIOLOGY

## 2019-12-19 PROCEDURE — 88305 TISSUE EXAM BY PATHOLOGIST: CPT | Mod: 26,,, | Performed by: PATHOLOGY

## 2019-12-19 PROCEDURE — 88305 TISSUE EXAM BY PATHOLOGIST: ICD-10-PCS | Mod: 26,,, | Performed by: PATHOLOGY

## 2019-12-19 PROCEDURE — 88305 TISSUE EXAM BY PATHOLOGIST: CPT | Performed by: PATHOLOGY

## 2019-12-19 PROCEDURE — 27201089 HC SNARE, DISP (ANY): Performed by: SURGERY

## 2019-12-19 PROCEDURE — 25000003 PHARM REV CODE 250: Performed by: NURSE ANESTHETIST, CERTIFIED REGISTERED

## 2019-12-19 PROCEDURE — 37000008 HC ANESTHESIA 1ST 15 MINUTES: Performed by: SURGERY

## 2019-12-19 RX ORDER — ERGOCALCIFEROL 1.25 MG/1
50000 CAPSULE ORAL
COMMUNITY
End: 2020-02-28

## 2019-12-19 RX ORDER — SODIUM CHLORIDE, SODIUM LACTATE, POTASSIUM CHLORIDE, CALCIUM CHLORIDE 600; 310; 30; 20 MG/100ML; MG/100ML; MG/100ML; MG/100ML
INJECTION, SOLUTION INTRAVENOUS CONTINUOUS PRN
Status: DISCONTINUED | OUTPATIENT
Start: 2019-12-19 | End: 2019-12-19

## 2019-12-19 RX ORDER — SODIUM CHLORIDE, SODIUM LACTATE, POTASSIUM CHLORIDE, CALCIUM CHLORIDE 600; 310; 30; 20 MG/100ML; MG/100ML; MG/100ML; MG/100ML
INJECTION, SOLUTION INTRAVENOUS CONTINUOUS
Status: DISCONTINUED | OUTPATIENT
Start: 2019-12-19 | End: 2019-12-19 | Stop reason: HOSPADM

## 2019-12-19 RX ORDER — LIDOCAINE HCL/PF 100 MG/5ML
SYRINGE (ML) INTRAVENOUS
Status: DISCONTINUED | OUTPATIENT
Start: 2019-12-19 | End: 2019-12-19

## 2019-12-19 RX ORDER — GLYCOPYRROLATE 0.2 MG/ML
INJECTION INTRAMUSCULAR; INTRAVENOUS
Status: DISCONTINUED | OUTPATIENT
Start: 2019-12-19 | End: 2019-12-19

## 2019-12-19 RX ORDER — SODIUM CHLORIDE 0.9 % (FLUSH) 0.9 %
3 SYRINGE (ML) INJECTION
Status: DISCONTINUED | OUTPATIENT
Start: 2019-12-19 | End: 2019-12-19 | Stop reason: HOSPADM

## 2019-12-19 RX ORDER — PROPOFOL 10 MG/ML
VIAL (ML) INTRAVENOUS
Status: DISCONTINUED | OUTPATIENT
Start: 2019-12-19 | End: 2019-12-19

## 2019-12-19 RX ORDER — LIDOCAINE HYDROCHLORIDE 10 MG/ML
0.5 INJECTION, SOLUTION EPIDURAL; INFILTRATION; INTRACAUDAL; PERINEURAL ONCE
Status: DISCONTINUED | OUTPATIENT
Start: 2019-12-19 | End: 2019-12-19 | Stop reason: HOSPADM

## 2019-12-19 RX ADMIN — PROPOFOL 50 MG: 10 INJECTION, EMULSION INTRAVENOUS at 09:12

## 2019-12-19 RX ADMIN — PROPOFOL 50 MG: 10 INJECTION, EMULSION INTRAVENOUS at 08:12

## 2019-12-19 RX ADMIN — PROPOFOL 80 MG: 10 INJECTION, EMULSION INTRAVENOUS at 08:12

## 2019-12-19 RX ADMIN — SODIUM CHLORIDE, SODIUM LACTATE, POTASSIUM CHLORIDE, AND CALCIUM CHLORIDE: .6; .31; .03; .02 INJECTION, SOLUTION INTRAVENOUS at 08:12

## 2019-12-19 RX ADMIN — GLYCOPYRROLATE 0.2 MG: 0.2 INJECTION INTRAMUSCULAR; INTRAVENOUS at 08:12

## 2019-12-19 RX ADMIN — LIDOCAINE HYDROCHLORIDE 80 MG: 20 INJECTION, SOLUTION INTRAVENOUS at 08:12

## 2019-12-19 NOTE — [employer] PATIENT INSTRUCTIONS
Discharge Summary/Instructions after an Endoscopic Procedure  Patient Name: Myles Pride  Patient MRN: [id number]  Patient YOB: 1955 Thursday, December 19, 2019  Bhupendra Wilkinson MD  RESTRICTIONS:  During your procedure today, you received medications for sedation.  These   medications may affect your judgment, balance and coordination.  Therefore,   for 24 hours, you have the following restrictions:   - DO NOT drive a car, operate machinery, make legal/financial decisions,   sign important papers or drink alcohol.    ACTIVITY:  Today: no heavy lifting, straining or running due to procedural   sedation/anesthesia.  The following day: return to full activity including work.  DIET:  Eat and drink normally unless instructed otherwise.     TREATMENT FOR COMMON SIDE EFFECTS:  - Mild abdominal pain, nausea, belching, bloating or excessive gas:  rest,   eat lightly and use a heating pad.  - Sore Throat: treat with throat lozenges and/or gargle with warm salt   water.  - Because air was used during the procedure, expelling large amounts of air   from your rectum or belching is normal.  - If a bowel prep was taken, you may not have a bowel movement for 1-3 days.    This is normal.  SYMPTOMS TO WATCH FOR AND REPORT TO YOUR PHYSICIAN:  1. Abdominal pain or bloating, other than gas cramps.  2. Chest pain.  3. Back pain.  4. Signs of infection such as: chills or fever occurring within 24 hours   after the procedure.  5. Rectal bleeding, which would show as bright red, maroon, or black stools.   (A tablespoon of blood from the rectum is not serious, especially if   hemorrhoids are present.)  6. Vomiting.  7. Weakness or dizziness.  GO DIRECTLY TO THE NEAREST EMERGENCY ROOM IF YOU HAVE ANY OF THE FOLLOWING:      Difficulty breathing              Chills and/or fever over 101 F   Persistent vomiting and/or vomiting blood   Severe abdominal pain   Severe chest pain   Black, tarry stools   Bleeding- more than one  tablespoon   Any other symptom or condition that you feel may need urgent attention  Your doctor recommends these additional instructions:  If any biopsies were taken, your doctors clinic will contact you in 1 to 2   weeks with any results.  - Patient has a contact number available for emergencies.  The signs and   symptoms of potential delayed complications were discussed with the   patient.  Return to normal activities tomorrow.  Written discharge   instructions were provided to the patient.   - Resume previous diet.   - Continue present medications.   - Await pathology results.   - Repeat colonoscopy in 5 years for surveillance.   - Return to referring physician as previously scheduled.   - Discharge patient to home.  For questions, problems or results please call your physician Bhupendra Wilkinson MD at Work:  [phone]  If you have any questions about the above instructions, call the GI   department at [phone] or call the endoscopy unit at [phone]   from 7am until 3 pm.  [facility] [place], EMERGENCY ROOM PHONE NUMBER:   [phone]  IF A COMPLICATION OR EMERGENCY SITUATION ARISES AND YOU ARE UNABLE TO REACH   YOUR PHYSICIAN - GO DIRECTLY TO THE EMERGENCY ROOM.  I have read or have had read to me these discharge instructions for my   procedure and have received a written copy.  I understand these   instructions and will follow-up with my physician if I have any questions.     __________________________________       _____________________________________  Nurse Signature                                          Patient/Designated   Responsible Party Signature  Bhupendra Wilkinson MD  12/19/2019 9:18:28 AM  This report has been verified and signed electronically.  [employer]

## 2019-12-19 NOTE — ANESTHESIA POSTPROCEDURE EVALUATION
Anesthesia Post Evaluation    Patient: Myles Pride    Procedure(s) Performed: Procedure(s) (LRB):  COLONOSCOPY (N/A)    Final Anesthesia Type: general    Patient location during evaluation: PACU  Patient participation: Yes- Able to Participate  Level of consciousness: awake and alert and oriented  Post-procedure vital signs: reviewed and stable  Pain management: adequate  Airway patency: patent    PONV status at discharge: No PONV  Anesthetic complications: no      Cardiovascular status: hemodynamically stable  Respiratory status: unassisted, spontaneous ventilation and room air  Hydration status: euvolemic  Follow-up not needed.          Vitals Value Taken Time   /82 12/19/2019  9:36 AM   Temp 36.7 °C (98.1 °F) 12/19/2019  9:18 AM   Pulse 46 12/19/2019  9:43 AM   Resp 17 12/19/2019  9:43 AM   SpO2 100 % 12/19/2019  9:43 AM   Vitals shown include unvalidated device data.      No case tracking events are documented in the log.      Pain/Karey Score: Karey Score: 10 (12/19/2019  9:34 AM)

## 2019-12-19 NOTE — DISCHARGE INSTRUCTIONS

## 2019-12-19 NOTE — PLAN OF CARE
Pt prepped for procedure, spouse at bedside visiting with pt. No complaints at this time will continue to monitor.

## 2019-12-19 NOTE — TRANSFER OF CARE
"Anesthesia Transfer of Care Note    Patient: Myles Pride    Procedure(s) Performed: Procedure(s) (LRB):  COLONOSCOPY (N/A)    Patient location: PACU    Anesthesia Type: general    Transport from OR: Transported from OR on room air with adequate spontaneous ventilation    Post pain: adequate analgesia    Post assessment: no apparent anesthetic complications    Post vital signs: stable    Level of consciousness: sedated    Nausea/Vomiting: no nausea/vomiting    Complications: none    Transfer of care protocol was followed      Last vitals:   Visit Vitals  BP (!) 146/78 (BP Location: Right arm, Patient Position: Sitting)   Pulse (!) 46   Temp 36.7 °C (98.1 °F) (Temporal)   Resp 18   Ht 6' 5" (1.956 m)   Wt 106 kg (233 lb 11 oz)   SpO2 99%   BMI 27.71 kg/m²     "

## 2019-12-19 NOTE — DISCHARGE SUMMARY
[facility]  Discharge Note  Short Stay    Procedure(s) (LRB):  COLONOSCOPY (N/A)    OUTCOME: Patient tolerated treatment/procedure well without complication and is now ready for discharge.    DISPOSITION: Home or Self Care    FINAL DIAGNOSIS:  Colon cancer screening    FOLLOWUP: None

## 2020-01-03 ENCOUNTER — PATIENT MESSAGE (OUTPATIENT)
Dept: ADMINISTRATIVE | Facility: OTHER | Age: 65
End: 2020-01-03

## 2020-01-07 LAB
FINAL PATHOLOGIC DIAGNOSIS: NORMAL
GROSS: NORMAL

## 2020-01-09 ENCOUNTER — PATIENT MESSAGE (OUTPATIENT)
Dept: SURGERY | Facility: HOSPITAL | Age: 65
End: 2020-01-09

## 2020-02-03 ENCOUNTER — PATIENT MESSAGE (OUTPATIENT)
Dept: ADMINISTRATIVE | Facility: OTHER | Age: 65
End: 2020-02-03

## 2020-02-28 ENCOUNTER — OFFICE VISIT (OUTPATIENT)
Dept: INTERNAL MEDICINE | Facility: CLINIC | Age: 65
End: 2020-02-28
Payer: COMMERCIAL

## 2020-02-28 ENCOUNTER — LAB VISIT (OUTPATIENT)
Dept: LAB | Facility: HOSPITAL | Age: 65
End: 2020-02-28
Payer: COMMERCIAL

## 2020-02-28 VITALS
WEIGHT: 240.31 LBS | DIASTOLIC BLOOD PRESSURE: 80 MMHG | SYSTOLIC BLOOD PRESSURE: 122 MMHG | OXYGEN SATURATION: 99 % | HEART RATE: 46 BPM | BODY MASS INDEX: 28.5 KG/M2 | TEMPERATURE: 96 F

## 2020-02-28 DIAGNOSIS — Z79.899 ENCOUNTER FOR LONG-TERM (CURRENT) USE OF MEDICATIONS: Primary | ICD-10-CM

## 2020-02-28 DIAGNOSIS — Z12.5 SCREENING FOR PROSTATE CANCER: ICD-10-CM

## 2020-02-28 DIAGNOSIS — Z11.4 SCREENING FOR HIV (HUMAN IMMUNODEFICIENCY VIRUS): ICD-10-CM

## 2020-02-28 DIAGNOSIS — Z79.899 ENCOUNTER FOR LONG-TERM (CURRENT) USE OF MEDICATIONS: ICD-10-CM

## 2020-02-28 DIAGNOSIS — Z00.00 ANNUAL PHYSICAL EXAM: ICD-10-CM

## 2020-02-28 LAB
ALBUMIN SERPL BCP-MCNC: 4.2 G/DL (ref 3.5–5.2)
ALP SERPL-CCNC: 87 U/L (ref 55–135)
ALT SERPL W/O P-5'-P-CCNC: 35 U/L (ref 10–44)
ANION GAP SERPL CALC-SCNC: 9 MMOL/L (ref 8–16)
AST SERPL-CCNC: 28 U/L (ref 10–40)
BASOPHILS # BLD AUTO: 0.05 K/UL (ref 0–0.2)
BASOPHILS NFR BLD: 0.7 % (ref 0–1.9)
BILIRUB SERPL-MCNC: 0.7 MG/DL (ref 0.1–1)
BUN SERPL-MCNC: 15 MG/DL (ref 8–23)
CALCIUM SERPL-MCNC: 9.3 MG/DL (ref 8.7–10.5)
CHLORIDE SERPL-SCNC: 107 MMOL/L (ref 95–110)
CHOLEST SERPL-MCNC: 137 MG/DL (ref 120–199)
CHOLEST/HDLC SERPL: 2.7 {RATIO} (ref 2–5)
CO2 SERPL-SCNC: 26 MMOL/L (ref 23–29)
COMPLEXED PSA SERPL-MCNC: 2 NG/ML (ref 0–4)
CREAT SERPL-MCNC: 1.2 MG/DL (ref 0.5–1.4)
DIFFERENTIAL METHOD: ABNORMAL
EOSINOPHIL # BLD AUTO: 0.2 K/UL (ref 0–0.5)
EOSINOPHIL NFR BLD: 2.8 % (ref 0–8)
ERYTHROCYTE [DISTWIDTH] IN BLOOD BY AUTOMATED COUNT: 12.7 % (ref 11.5–14.5)
EST. GFR  (AFRICAN AMERICAN): >60 ML/MIN/1.73 M^2
EST. GFR  (NON AFRICAN AMERICAN): >60 ML/MIN/1.73 M^2
GLUCOSE SERPL-MCNC: 92 MG/DL (ref 70–110)
HCT VFR BLD AUTO: 48 % (ref 40–54)
HDLC SERPL-MCNC: 50 MG/DL (ref 40–75)
HDLC SERPL: 36.5 % (ref 20–50)
HGB BLD-MCNC: 15.4 G/DL (ref 14–18)
IMM GRANULOCYTES # BLD AUTO: 0.01 K/UL (ref 0–0.04)
IMM GRANULOCYTES NFR BLD AUTO: 0.1 % (ref 0–0.5)
LDLC SERPL CALC-MCNC: 71 MG/DL (ref 63–159)
LYMPHOCYTES # BLD AUTO: 2.1 K/UL (ref 1–4.8)
LYMPHOCYTES NFR BLD: 30.5 % (ref 18–48)
MCH RBC QN AUTO: 31.3 PG (ref 27–31)
MCHC RBC AUTO-ENTMCNC: 32.1 G/DL (ref 32–36)
MCV RBC AUTO: 98 FL (ref 82–98)
MONOCYTES # BLD AUTO: 0.5 K/UL (ref 0.3–1)
MONOCYTES NFR BLD: 7 % (ref 4–15)
NEUTROPHILS # BLD AUTO: 4.1 K/UL (ref 1.8–7.7)
NEUTROPHILS NFR BLD: 58.9 % (ref 38–73)
NONHDLC SERPL-MCNC: 87 MG/DL
NRBC BLD-RTO: 0 /100 WBC
PLATELET # BLD AUTO: 205 K/UL (ref 150–350)
PMV BLD AUTO: 11.3 FL (ref 9.2–12.9)
POTASSIUM SERPL-SCNC: 4.2 MMOL/L (ref 3.5–5.1)
PROT SERPL-MCNC: 6.9 G/DL (ref 6–8.4)
RBC # BLD AUTO: 4.92 M/UL (ref 4.6–6.2)
SODIUM SERPL-SCNC: 142 MMOL/L (ref 136–145)
TRIGL SERPL-MCNC: 80 MG/DL (ref 30–150)
TSH SERPL DL<=0.005 MIU/L-ACNC: 1.75 UIU/ML (ref 0.4–4)
WBC # BLD AUTO: 6.88 K/UL (ref 3.9–12.7)

## 2020-02-28 PROCEDURE — 85025 COMPLETE CBC W/AUTO DIFF WBC: CPT

## 2020-02-28 PROCEDURE — 99999 PR PBB SHADOW E&M-EST. PATIENT-LVL III: CPT | Mod: PBBFAC,,, | Performed by: FAMILY MEDICINE

## 2020-02-28 PROCEDURE — 84443 ASSAY THYROID STIM HORMONE: CPT

## 2020-02-28 PROCEDURE — 80061 LIPID PANEL: CPT

## 2020-02-28 PROCEDURE — 36415 COLL VENOUS BLD VENIPUNCTURE: CPT

## 2020-02-28 PROCEDURE — 99396 PR PREVENTIVE VISIT,EST,40-64: ICD-10-PCS | Mod: 25,S$GLB,, | Performed by: FAMILY MEDICINE

## 2020-02-28 PROCEDURE — 90686 FLU VACCINE (QUAD) GREATER THAN OR EQUAL TO 3YO PRESERVATIVE FREE IM: ICD-10-PCS | Mod: S$GLB,,, | Performed by: FAMILY MEDICINE

## 2020-02-28 PROCEDURE — 99999 PR PBB SHADOW E&M-EST. PATIENT-LVL III: ICD-10-PCS | Mod: PBBFAC,,, | Performed by: FAMILY MEDICINE

## 2020-02-28 PROCEDURE — 90471 IMMUNIZATION ADMIN: CPT | Mod: S$GLB,,, | Performed by: FAMILY MEDICINE

## 2020-02-28 PROCEDURE — 90686 IIV4 VACC NO PRSV 0.5 ML IM: CPT | Mod: S$GLB,,, | Performed by: FAMILY MEDICINE

## 2020-02-28 PROCEDURE — 84153 ASSAY OF PSA TOTAL: CPT

## 2020-02-28 PROCEDURE — 90471 FLU VACCINE (QUAD) GREATER THAN OR EQUAL TO 3YO PRESERVATIVE FREE IM: ICD-10-PCS | Mod: S$GLB,,, | Performed by: FAMILY MEDICINE

## 2020-02-28 PROCEDURE — 80053 COMPREHEN METABOLIC PANEL: CPT

## 2020-02-28 PROCEDURE — 99396 PREV VISIT EST AGE 40-64: CPT | Mod: 25,S$GLB,, | Performed by: FAMILY MEDICINE

## 2020-02-28 RX ORDER — ATORVASTATIN CALCIUM 20 MG/1
20 TABLET, FILM COATED ORAL DAILY
Qty: 90 TABLET | Refills: 3 | Status: SHIPPED | OUTPATIENT
Start: 2020-02-28 | End: 2021-04-27

## 2020-02-28 NOTE — PROGRESS NOTES
Subjective:       Patient ID: Myles Pride is a 64 y.o. male.    Chief Complaint: Follow-up (6 months)    HPI     65 yo     PMH:  Knee issue  HLD - ASCVD risk over 7.5%    Past Surgical History:   Procedure Laterality Date    COLONOSCOPY      COLONOSCOPY N/A 12/19/2019    Procedure: COLONOSCOPY;  Surgeon: Bhupendra Wilkinson MD;  Location: [facility];  Service: Endoscopy;  Laterality: N/A;    KNEE ARTHROSCOPY Left     VASECTOMY  1995     Family History   Problem Relation Age of Onset    Cancer Mother         anal cancer    Diabetes Mother         PRE DIABETES    No Known Problems Sister     Gallbladder disease Brother     Hepatitis Brother         Hep C       Colon polyp    Exercise -  Picking it up.    Sleeping well    Normal PO and toileting.    Review of Systems   Constitutional: Negative for activity change and unexpected weight change.   HENT: Negative for hearing loss, rhinorrhea and trouble swallowing.    Eyes: Negative for discharge and visual disturbance.   Respiratory: Negative for chest tightness and wheezing.    Cardiovascular: Negative for chest pain and palpitations.   Gastrointestinal: Negative for blood in stool, constipation, diarrhea and vomiting.   Endocrine: Negative for polydipsia and polyuria.   Genitourinary: Negative for difficulty urinating, hematuria and urgency.   Musculoskeletal: Negative for arthralgias, joint swelling and neck pain.   Neurological: Negative for weakness and headaches.   Psychiatric/Behavioral: Negative for confusion and dysphoric mood.       Objective:       Vitals:    02/28/20 0816   BP: 122/80   Pulse: (!) 46   Temp: 96 °F (35.6 °C)       Physical Exam   Constitutional: He is oriented to person, place, and time. He appears well-developed and well-nourished. No distress.   HENT:   Head: Normocephalic and atraumatic.   Eyes: Conjunctivae are normal. Right eye exhibits no discharge. Left eye exhibits no discharge.   Neck: Trachea normal and full passive range of  motion without pain.   Cardiovascular: Normal rate, regular rhythm and normal heart sounds.   Pulmonary/Chest: Effort normal and breath sounds normal. No respiratory distress. He has no decreased breath sounds. He has no wheezes.   Abdominal: Soft. Normal appearance. There is no tenderness.   Musculoskeletal: He exhibits no edema or deformity.   Lymphadenopathy:     He has no cervical adenopathy.   Neurological: He is alert and oriented to person, place, and time.   Skin: Skin is intact. No pallor.   Psychiatric: He has a normal mood and affect. His speech is normal and behavior is normal. Thought content normal.       Assessment:       1. Encounter for long-term (current) use of medications    2. Screening for prostate cancer    3. Screening for HIV (human immunodeficiency virus)        Plan:   Encounter for long-term (current) use of medications  -     CBC auto differential; Future; Expected date: 02/28/2020  -     Comprehensive metabolic panel; Future; Expected date: 02/28/2020  -     Lipid panel; Future; Expected date: 02/28/2020  -     TSH; Future; Expected date: 02/28/2020    Screening for prostate cancer  -     PSA, Screening; Future; Expected date: 02/28/2020    Other orders  -     atorvastatin (LIPITOR) 20 MG tablet; Take 1 tablet (20 mg total) by mouth once daily.  Dispense: 90 tablet; Refill: 3    Flu shot

## 2020-03-06 ENCOUNTER — TELEPHONE (OUTPATIENT)
Dept: INTERNAL MEDICINE | Facility: CLINIC | Age: 65
End: 2020-03-06

## 2020-03-06 NOTE — TELEPHONE ENCOUNTER
----- Message from Catalino Arias MD sent at 3/4/2020 11:17 AM CST -----  Please call the patient regarding his labs  Stable and good findings  No active concerns

## 2020-08-28 ENCOUNTER — OFFICE VISIT (OUTPATIENT)
Dept: INTERNAL MEDICINE | Facility: CLINIC | Age: 65
End: 2020-08-28
Payer: COMMERCIAL

## 2020-08-28 VITALS
BODY MASS INDEX: 27.5 KG/M2 | WEIGHT: 231.94 LBS | RESPIRATION RATE: 18 BRPM | DIASTOLIC BLOOD PRESSURE: 85 MMHG | SYSTOLIC BLOOD PRESSURE: 139 MMHG | OXYGEN SATURATION: 99 % | TEMPERATURE: 97 F | HEART RATE: 45 BPM

## 2020-08-28 DIAGNOSIS — R00.1 CHRONIC SINUS BRADYCARDIA: ICD-10-CM

## 2020-08-28 DIAGNOSIS — I49.9 IRREGULAR HEART BEAT: Primary | ICD-10-CM

## 2020-08-28 DIAGNOSIS — E78.5 HYPERLIPIDEMIA, UNSPECIFIED HYPERLIPIDEMIA TYPE: ICD-10-CM

## 2020-08-28 PROCEDURE — 3008F PR BODY MASS INDEX (BMI) DOCUMENTED: ICD-10-PCS | Mod: CPTII,S$GLB,, | Performed by: FAMILY MEDICINE

## 2020-08-28 PROCEDURE — 93010 EKG 12-LEAD: ICD-10-PCS | Mod: S$GLB,,, | Performed by: INTERNAL MEDICINE

## 2020-08-28 PROCEDURE — 93010 ELECTROCARDIOGRAM REPORT: CPT | Mod: S$GLB,,, | Performed by: INTERNAL MEDICINE

## 2020-08-28 PROCEDURE — 93005 ELECTROCARDIOGRAM TRACING: CPT | Mod: S$GLB,,, | Performed by: FAMILY MEDICINE

## 2020-08-28 PROCEDURE — 93005 EKG 12-LEAD: ICD-10-PCS | Mod: S$GLB,,, | Performed by: FAMILY MEDICINE

## 2020-08-28 PROCEDURE — 99213 PR OFFICE/OUTPT VISIT, EST, LEVL III, 20-29 MIN: ICD-10-PCS | Mod: S$GLB,,, | Performed by: FAMILY MEDICINE

## 2020-08-28 PROCEDURE — 99999 PR PBB SHADOW E&M-EST. PATIENT-LVL III: ICD-10-PCS | Mod: PBBFAC,,, | Performed by: FAMILY MEDICINE

## 2020-08-28 PROCEDURE — 99213 OFFICE O/P EST LOW 20 MIN: CPT | Mod: S$GLB,,, | Performed by: FAMILY MEDICINE

## 2020-08-28 PROCEDURE — 99999 PR PBB SHADOW E&M-EST. PATIENT-LVL III: CPT | Mod: PBBFAC,,, | Performed by: FAMILY MEDICINE

## 2020-08-28 PROCEDURE — 3008F BODY MASS INDEX DOCD: CPT | Mod: CPTII,S$GLB,, | Performed by: FAMILY MEDICINE

## 2020-08-28 NOTE — PROGRESS NOTES
Subjective:       Patient ID: Myles Pride is a 65 y.o. male.    Chief Complaint: Follow-up    HPI     65    Past Medical History:   Diagnosis Date    PONV (postoperative nausea and vomiting) 1990    after knee arthroscopy    Vitamin D deficiency 12/18/2019   - HLD - ascvd over 7.5%        Past Surgical History:   Procedure Laterality Date    COLONOSCOPY      COLONOSCOPY N/A 12/19/2019    Procedure: COLONOSCOPY;  Surgeon: Bhupendra Wilkinson MD;  Location: [facility];  Service: Endoscopy;  Laterality: N/A;    KNEE ARTHROSCOPY Left     VASECTOMY  1995     Social History     Tobacco Use   Smoking Status Never Smoker     Family History   Problem Relation Age of Onset    Cancer Mother         anal cancer    Diabetes Mother         PRE DIABETES    No Known Problems Sister     Gallbladder disease Brother     Hepatitis Brother         Hep C       Review of patient's allergies indicates:  No Known Allergies      Sole med Lipitor.    Tolerating lipitor  No abnormal lipitor    Exercise  Doing bike riding.    Wt Readings from Last 5 Encounters:   08/28/20 105.2 kg (231 lb 14.8 oz)   02/28/20 109 kg (240 lb 4.8 oz)   12/19/19 106 kg (233 lb 11 oz)   08/28/19 109.2 kg (240 lb 11.9 oz)   02/21/19 108 kg (238 lb 1.6 oz)     Has lost some weight since last visit    Chronic demetrice    No symptoms.      Review of Systems   Constitutional: Negative for activity change and unexpected weight change.   HENT: Negative for hearing loss, rhinorrhea and trouble swallowing.    Eyes: Negative for discharge and visual disturbance.   Respiratory: Negative for chest tightness and wheezing.    Cardiovascular: Negative for chest pain and palpitations.   Gastrointestinal: Negative for blood in stool, constipation, diarrhea and vomiting.   Endocrine: Negative for polydipsia and polyuria.   Genitourinary: Negative for difficulty urinating, hematuria and urgency.   Musculoskeletal: Negative for arthralgias, joint swelling and neck pain.    Neurological: Negative for weakness and headaches.   Psychiatric/Behavioral: Negative for confusion and dysphoric mood.       Objective:         Vitals:    08/28/20 0834   BP: 139/85   Pulse: (!) 45   Resp: 18   Temp: 96.5 °F (35.8 °C)         Physical Exam  Constitutional:       General: He is not in acute distress.     Appearance: Normal appearance. He is well-developed.   HENT:      Head: Normocephalic and atraumatic.   Eyes:      General:         Right eye: No discharge.         Left eye: No discharge.      Conjunctiva/sclera: Conjunctivae normal.   Neck:      Musculoskeletal: Full passive range of motion without pain.      Trachea: Trachea normal.   Cardiovascular:      Rate and Rhythm: Regular rhythm. Bradycardia present.      Heart sounds: Normal heart sounds.   Pulmonary:      Effort: Pulmonary effort is normal. No respiratory distress.      Breath sounds: Normal breath sounds. No decreased breath sounds or wheezing.   Abdominal:      Palpations: Abdomen is soft.      Tenderness: There is no abdominal tenderness.   Musculoskeletal:         General: No deformity.   Lymphadenopathy:      Cervical: No cervical adenopathy.   Skin:     Coloration: Skin is not pale.   Neurological:      Mental Status: He is alert and oriented to person, place, and time.   Psychiatric:         Speech: Speech normal.         Behavior: Behavior normal.         Thought Content: Thought content normal.         Assessment:       1. Irregular heart beat        Plan:   Irregular heart beat  -     IN OFFICE EKG 12-LEAD (to [place])        HLD  Well controlled  On statin      Irregular rhythm on exam  No finding outside of EKG  EKG today.    Bradycardia  Chronic  Entirely asymptomatic  will continue to monitor  If he develops any symptoms we will consult Cardiology EP    6 m

## 2020-09-03 DIAGNOSIS — R00.1 BRADYCARDIA: Primary | ICD-10-CM

## 2020-09-12 ENCOUNTER — PATIENT OUTREACH (OUTPATIENT)
Dept: ADMINISTRATIVE | Facility: OTHER | Age: 65
End: 2020-09-12

## 2020-09-12 NOTE — PROGRESS NOTES
Health Maintenance Due   Topic Date Due    HIV Screening  08/11/1970    Influenza Vaccine (1) 08/01/2020     Updates were requested from care everywhere.  Chart was reviewed for overdue Proactive [facility] Encounters (MILVIA) topics (CRS, Breast Cancer Screening, Eye exam)  Health Maintenance has been updated.  LINKS immunization registry triggered.  Immunizations were reconciled.

## 2020-09-14 ENCOUNTER — OFFICE VISIT (OUTPATIENT)
Dept: CARDIOLOGY | Facility: CLINIC | Age: 65
End: 2020-09-14
Payer: COMMERCIAL

## 2020-09-14 VITALS
HEART RATE: 71 BPM | OXYGEN SATURATION: 100 % | DIASTOLIC BLOOD PRESSURE: 80 MMHG | BODY MASS INDEX: 28.19 KG/M2 | WEIGHT: 237.75 LBS | SYSTOLIC BLOOD PRESSURE: 142 MMHG

## 2020-09-14 DIAGNOSIS — R00.1 BRADYCARDIA: Primary | ICD-10-CM

## 2020-09-14 DIAGNOSIS — E78.5 HYPERLIPIDEMIA, UNSPECIFIED HYPERLIPIDEMIA TYPE: ICD-10-CM

## 2020-09-14 DIAGNOSIS — E55.9 VITAMIN D DEFICIENCY: ICD-10-CM

## 2020-09-14 DIAGNOSIS — Z91.89 RISK OF MYOCARDIAL INFARCTION OR STROKE 7.5% OR GREATER IN NEXT 10 YEARS: ICD-10-CM

## 2020-09-14 PROCEDURE — 99204 PR OFFICE/OUTPT VISIT, NEW, LEVL IV, 45-59 MIN: ICD-10-PCS | Mod: S$GLB,,, | Performed by: INTERNAL MEDICINE

## 2020-09-14 PROCEDURE — 99999 PR PBB SHADOW E&M-EST. PATIENT-LVL IV: ICD-10-PCS | Mod: PBBFAC,,, | Performed by: INTERNAL MEDICINE

## 2020-09-14 PROCEDURE — 99204 OFFICE O/P NEW MOD 45 MIN: CPT | Mod: S$GLB,,, | Performed by: INTERNAL MEDICINE

## 2020-09-14 PROCEDURE — 3008F BODY MASS INDEX DOCD: CPT | Mod: CPTII,S$GLB,, | Performed by: INTERNAL MEDICINE

## 2020-09-14 PROCEDURE — 3008F PR BODY MASS INDEX (BMI) DOCUMENTED: ICD-10-PCS | Mod: CPTII,S$GLB,, | Performed by: INTERNAL MEDICINE

## 2020-09-14 PROCEDURE — 99999 PR PBB SHADOW E&M-EST. PATIENT-LVL IV: CPT | Mod: PBBFAC,,, | Performed by: INTERNAL MEDICINE

## 2020-09-14 NOTE — PROGRESS NOTES
Subjective:   Patient ID:  Myles Pride is a 65 y.o. male who presents for evaluation of Bradycardia (New Patient Abnormal EKG)      HPI  A 66 yo male is here referred from DR NICOLE BECAUSE OF BRADYCARDIA. HE USED TO PLAY TENNIS  THREE TIMES A WEEK DOES CARDIO NOW HE RIDES A BICYCLE FOR EXERCISE  1-2 HOUR RIDES. HAS NO SYMPTOMS OF CHEST PAIN DIZZINESS LIGHTHEADEDNESS HE GETS HR UP  HE HAS NO TIA CLAUDICATION HAS NO SYNCOPE HAS NO LEG SWELLING. HAS NO HEADACHES. HAS NO FH CAD THAT HE KNOWS OFF.   Past Medical History:   Diagnosis Date    Hyperlipidemia     PONV (postoperative nausea and vomiting) 1990    after knee arthroscopy    Vitamin D deficiency 12/18/2019       Past Surgical History:   Procedure Laterality Date    COLONOSCOPY      COLONOSCOPY N/A 12/19/2019    Procedure: COLONOSCOPY;  Surgeon: Bhupendra Wilkinson MD;  Location: [facility];  Service: Endoscopy;  Laterality: N/A;    KNEE ARTHROSCOPY Left     VASECTOMY  1995       Social History     Tobacco Use    Smoking status: Never Smoker   Substance Use Topics    Alcohol use: Yes     Alcohol/week: 2.0 standard drinks     Types: 2 Glasses of wine per week     Frequency: 2-3 times a week     Drinks per session: 1 or 2     Binge frequency: Never    Drug use: Not on file       Family History   Problem Relation Age of Onset    Cancer Mother         anal cancer    Diabetes Mother         PRE DIABETES    No Known Problems Sister     Gallbladder disease Brother     Hepatitis Brother         Hep C       Current Outpatient Medications   Medication Sig    atorvastatin (LIPITOR) 20 MG tablet Take 1 tablet (20 mg total) by mouth once daily.     No current facility-administered medications for this visit.      Current Outpatient Medications on File Prior to Visit   Medication Sig    atorvastatin (LIPITOR) 20 MG tablet Take 1 tablet (20 mg total) by mouth once daily.     No current facility-administered medications on file prior to visit.         Review of patient's allergies indicates:  No Known Allergies    Review of Systems   Constitution: Negative for malaise/fatigue.   Eyes: Negative for blurred vision.   Cardiovascular: Negative for chest pain, claudication, cyanosis, dyspnea on exertion, irregular heartbeat, leg swelling, near-syncope, orthopnea, palpitations and paroxysmal nocturnal dyspnea.   Respiratory: Negative for cough, hemoptysis and shortness of breath.    Hematologic/Lymphatic: Negative for bleeding problem. Does not bruise/bleed easily.   Skin: Negative for dry skin and itching.   Musculoskeletal: Negative for falls, muscle weakness and myalgias.   Gastrointestinal: Negative for abdominal pain, diarrhea, heartburn, hematemesis, hematochezia and melena.   Genitourinary: Negative for flank pain and hematuria.   Neurological: Negative for dizziness, focal weakness, headaches, light-headedness, numbness, paresthesias, seizures and weakness.   Psychiatric/Behavioral: Negative for altered mental status and memory loss. The patient is not nervous/anxious.    Allergic/Immunologic: Negative for hives.       Objective:   Physical Exam   Constitutional: He is oriented to person, place, and time. He appears well-developed and well-nourished. No distress.   HENT:   Head: Normocephalic and atraumatic.   Eyes: Pupils are equal, round, and reactive to light. EOM are normal. Right eye exhibits no discharge. Left eye exhibits no discharge.   Neck: Neck supple. No JVD present. No thyromegaly present.   Cardiovascular: Normal rate, regular rhythm, normal heart sounds and intact distal pulses. Exam reveals no gallop and no friction rub.   No murmur heard.  Pulses:       Carotid pulses are 2+ on the right side and 2+ on the left side.       Radial pulses are 2+ on the right side and 2+ on the left side.        Femoral pulses are 2+ on the right side and 2+ on the left side.       Popliteal pulses are 2+ on the right side and 2+ on the left side.         Dorsalis pedis pulses are 2+ on the right side and 2+ on the left side.        Posterior tibial pulses are 2+ on the right side and 2+ on the left side.   Pulmonary/Chest: Effort normal and breath sounds normal. No respiratory distress. He has no wheezes. He has no rales. He exhibits no tenderness.   Abdominal: Soft. Bowel sounds are normal. He exhibits no distension. There is no abdominal tenderness.   Musculoskeletal: Normal range of motion.         General: No edema.   Neurological: He is alert and oriented to person, place, and time. No cranial nerve deficit.   Skin: Skin is warm and dry. No rash noted. He is not diaphoretic. No erythema.   Psychiatric: He has a normal mood and affect. His behavior is normal.   Nursing note and vitals reviewed.    Vitals:    09/14/20 1440 09/14/20 1441   BP: (!) 146/78 (!) 142/80   BP Location: Left arm Right arm   Patient Position: Sitting Sitting   BP Method: Medium (Manual) Medium (Manual)   Pulse: 71    SpO2: 100%    Weight: 107.9 kg (237 lb 12.3 oz)      Lab Results   Component Value Date    CHOL 137 02/28/2020    CHOL 143 02/21/2019    CHOL 185 08/15/2018     Lab Results   Component Value Date    HDL 50 02/28/2020    HDL 48 02/21/2019    HDL 52 08/15/2018     Lab Results   Component Value Date    LDLCALC 71.0 02/28/2020    LDLCALC 74.8 02/21/2019    LDLCALC 111.4 08/15/2018     Lab Results   Component Value Date    TRIG 80 02/28/2020    TRIG 101 02/21/2019    TRIG 108 08/15/2018     Lab Results   Component Value Date    CHOLHDL 36.5 02/28/2020    CHOLHDL 33.6 02/21/2019    CHOLHDL 28.1 08/15/2018       Chemistry        Component Value Date/Time     02/28/2020 0926    K 4.2 02/28/2020 0926     02/28/2020 0926    CO2 26 02/28/2020 0926    BUN 15 02/28/2020 0926    CREATININE 1.2 02/28/2020 0926    GLU 92 02/28/2020 0926        Component Value Date/Time    CALCIUM 9.3 02/28/2020 0926    ALKPHOS 87 02/28/2020 0926    AST 28 02/28/2020 0926    ALT 35 02/28/2020 0926     BILITOT 0.7 02/28/2020 0926    ESTGFRAFRICA >60.0 02/28/2020 0926    EGFRNONAA >60.0 02/28/2020 0926          Lab Results   Component Value Date    TSH 1.754 02/28/2020     No results found for: INR, PROTIME  Lab Results   Component Value Date    WBC 6.88 02/28/2020    HGB 15.4 02/28/2020    HCT 48.0 02/28/2020    MCV 98 02/28/2020     02/28/2020     BNP  @LABRCNTIP(BNP,BNPTRIAGEBLO)@  CrCl cannot be calculated (Patient's most recent lab result is older than the maximum 7 days allowed.).  Assessment:     1. Bradycardia    2. Risk of myocardial infarction or stroke 7.5% or greater in next 10 years    3. Vitamin D deficiency    4. Hyperlipidemia, unspecified hyperlipidemia type      LIPIDS ON TARGET   HAS PROBABLY HTN WILL GET HIM TO RECORD BP WITH HOME MACHINE AND BRING HERE WITH MACHINE TO REVIEW.  BRADYCARDIA EXCELLENT EXERCISE TOLERANCE NO SYMPTOMS .PROBABLY HIGH VAGAL TONE  Plan:   ECHO   HOLTER   ETT   BP REVIEW IN 2-3 WEEKS WITH MACHINE AND READINGS.   LOW SALT DIET ADVISED.

## 2020-09-14 NOTE — LETTER
September 14, 2020      Catalino Arias MD  [id number] [place] [id number]           [facility]  [address] [place] [id number]  Phone: [phone]  Fax: [phone]          Patient: Myles Pride   MR Number: [id number]   YOB: 1955   Date of Visit: 9/14/2020       Dear Dr. Catalino Arias:    Thank you for referring Myles Pride to me for evaluation. Attached you will find relevant portions of my assessment and plan of care.    If you have questions, please do not hesitate to call me. I look forward to following Myles Pride along with you.    Sincerely,    aMrcio Rivers MD    Enclosure  CC:  No Recipients    If you would like to receive this communication electronically, please contact [email or web address] or [phone] to request more information on [employer] access.    For providers and/or their staff who would like to refer a patient to [facility], please contact us through our one-stop-shop provider referral line, [facility] [place], at [phone].    If you feel you have received this communication in error or would no longer like to receive these types of communications, please e-mail [email or web address]

## 2020-09-29 ENCOUNTER — HOSPITAL ENCOUNTER (OUTPATIENT)
Dept: CARDIOLOGY | Facility: HOSPITAL | Age: 65
Discharge: HOME | End: 2020-09-29
Attending: INTERNAL MEDICINE
Payer: COMMERCIAL

## 2020-09-29 VITALS
HEIGHT: 77 IN | BODY MASS INDEX: 27.98 KG/M2 | SYSTOLIC BLOOD PRESSURE: 144 MMHG | HEIGHT: 77 IN | SYSTOLIC BLOOD PRESSURE: 142 MMHG | HEART RATE: 45 BPM | WEIGHT: 237 LBS | DIASTOLIC BLOOD PRESSURE: 80 MMHG | BODY MASS INDEX: 27.98 KG/M2 | WEIGHT: 237 LBS | DIASTOLIC BLOOD PRESSURE: 67 MMHG

## 2020-09-29 DIAGNOSIS — Z91.89 RISK OF MYOCARDIAL INFARCTION OR STROKE 7.5% OR GREATER IN NEXT 10 YEARS: ICD-10-CM

## 2020-09-29 DIAGNOSIS — E78.5 HYPERLIPIDEMIA, UNSPECIFIED HYPERLIPIDEMIA TYPE: ICD-10-CM

## 2020-09-29 DIAGNOSIS — R00.1 BRADYCARDIA: ICD-10-CM

## 2020-09-29 LAB
ASCENDING AORTA: 2.88 CM
AV INDEX (PROSTH): 0.76
AV MEAN GRADIENT: 6 MMHG
AV PEAK GRADIENT: 11 MMHG
AV VALVE AREA: 2.79 CM2
AV VELOCITY RATIO: 0.74
BSA FOR ECHO PROCEDURE: 2.42 M2
CV ECHO LV RWT: 0.44 CM
CV STRESS BASE HR: 49 BPM
DIASTOLIC BLOOD PRESSURE: 67 MMHG
DOP CALC AO PEAK VEL: 1.69 M/S
DOP CALC AO VTI: 36.51 CM
DOP CALC LVOT AREA: 3.7 CM2
DOP CALC LVOT DIAMETER: 2.17 CM
DOP CALC LVOT PEAK VEL: 1.25 M/S
DOP CALC LVOT STROKE VOLUME: 101.99 CM3
DOP CALC RVOT PEAK VEL: 0.95 M/S
DOP CALC RVOT VTI: 22.76 CM
DOP CALCLVOT PEAK VEL VTI: 27.59 CM
E WAVE DECELERATION TIME: 280.48 MSEC
E/A RATIO: 1.55
E/E' RATIO: 10 M/S
ECHO LV POSTERIOR WALL: 1.1 CM (ref 0.6–1.1)
FRACTIONAL SHORTENING: 31 % (ref 28–44)
INTERVENTRICULAR SEPTUM: 1.17 CM (ref 0.6–1.1)
IVRT: 68.51 MSEC
LA MAJOR: 5.27 CM
LA MINOR: 4.79 CM
LA WIDTH: 4.68 CM
LEFT ATRIUM SIZE: 3.64 CM
LEFT ATRIUM VOLUME INDEX: 30.2 ML/M2
LEFT ATRIUM VOLUME: 72.67 CM3
LEFT INTERNAL DIMENSION IN SYSTOLE: 3.48 CM (ref 2.1–4)
LEFT VENTRICLE DIASTOLIC VOLUME INDEX: 50.07 ML/M2
LEFT VENTRICLE DIASTOLIC VOLUME: 120.37 ML
LEFT VENTRICLE MASS INDEX: 91 G/M2
LEFT VENTRICLE SYSTOLIC VOLUME INDEX: 20.9 ML/M2
LEFT VENTRICLE SYSTOLIC VOLUME: 50.32 ML
LEFT VENTRICULAR INTERNAL DIMENSION IN DIASTOLE: 5.04 CM (ref 3.5–6)
LEFT VENTRICULAR MASS: 219.08 G
LV LATERAL E/E' RATIO: 9 M/S
LV SEPTAL E/E' RATIO: 11.25 M/S
MV PEAK A VEL: 0.58 M/S
MV PEAK E VEL: 0.9 M/S
MV STENOSIS PRESSURE HALF TIME: 81.34 MS
MV VALVE AREA P 1/2 METHOD: 2.7 CM2
OHS CV CPX 1 MINUTE RECOVERY HEART RATE: 141 BPM
OHS CV CPX 85 PERCENT MAX PREDICTED HEART RATE MALE: 133
OHS CV CPX ESTIMATED METS: 11.3
OHS CV CPX MAX PREDICTED HEART RATE: 156
OHS CV CPX PATIENT IS FEMALE: 0
OHS CV CPX PATIENT IS MALE: 1
OHS CV CPX PEAK DIASTOLIC BLOOD PRESSURE: 106 MMHG
OHS CV CPX PEAK HEAR RATE: 162 BPM
OHS CV CPX PEAK RATE PRESSURE PRODUCT: NORMAL
OHS CV CPX PEAK SYSTOLIC BLOOD PRESSURE: 202 MMHG
OHS CV CPX PERCENT MAX PREDICTED HEART RATE ACHIEVED: 104
OHS CV CPX RATE PRESSURE PRODUCT PRESENTING: 7056
PISA TR MAX VEL: 2.58 M/S
PULM VEIN S/D RATIO: 0.78
PV MEAN GRADIENT: 2.29 MMHG
PV PEAK D VEL: 0.81 M/S
PV PEAK S VEL: 0.63 M/S
PV PEAK VELOCITY: 1.28 CM/S
RA MAJOR: 4.94 CM
RA PRESSURE: 3 MMHG
RA WIDTH: 3.08 CM
RIGHT VENTRICULAR END-DIASTOLIC DIMENSION: 3.4 CM
SINUS: 2.78 CM
STJ: 2.34 CM
STRESS ECHO POST EXERCISE DUR MIN: 9 MINUTES
STRESS ECHO POST EXERCISE DUR SEC: 22 SECONDS
STRESS ST DEPRESSION: 0.5 MM
SYSTOLIC BLOOD PRESSURE: 144 MMHG
TDI LATERAL: 0.1 M/S
TDI SEPTAL: 0.08 M/S
TDI: 0.09 M/S
TR MAX PG: 27 MMHG
TV REST PULMONARY ARTERY PRESSURE: 30 MMHG

## 2020-09-29 PROCEDURE — 93016 EXERCISE STRESS - EKG (CUPID ONLY): ICD-10-PCS | Mod: ,,, | Performed by: INTERNAL MEDICINE

## 2020-09-29 PROCEDURE — 93016 CV STRESS TEST SUPVJ ONLY: CPT | Mod: ,,, | Performed by: INTERNAL MEDICINE

## 2020-09-29 PROCEDURE — 93306 TTE W/DOPPLER COMPLETE: CPT

## 2020-09-29 PROCEDURE — 93306 ECHO (CUPID ONLY): ICD-10-PCS | Mod: 26,,, | Performed by: INTERNAL MEDICINE

## 2020-09-29 PROCEDURE — 93018 CV STRESS TEST I&R ONLY: CPT | Mod: ,,, | Performed by: INTERNAL MEDICINE

## 2020-09-29 PROCEDURE — 93227 XTRNL ECG REC<48 HR R&I: CPT | Mod: ,,, | Performed by: INTERNAL MEDICINE

## 2020-09-29 PROCEDURE — 93226 XTRNL ECG REC<48 HR SCAN A/R: CPT

## 2020-09-29 PROCEDURE — 93017 CV STRESS TEST TRACING ONLY: CPT

## 2020-09-29 PROCEDURE — 93227 HOLTER MONITOR - 48 HOUR (CUPID ONLY): ICD-10-PCS | Mod: ,,, | Performed by: INTERNAL MEDICINE

## 2020-09-29 PROCEDURE — 93306 TTE W/DOPPLER COMPLETE: CPT | Mod: 26,,, | Performed by: INTERNAL MEDICINE

## 2020-09-29 PROCEDURE — 93018 EXERCISE STRESS - EKG (CUPID ONLY): ICD-10-PCS | Mod: ,,, | Performed by: INTERNAL MEDICINE

## 2020-09-30 ENCOUNTER — TELEPHONE (OUTPATIENT)
Dept: CARDIOLOGY | Facility: CLINIC | Age: 65
End: 2020-09-30

## 2020-09-30 NOTE — TELEPHONE ENCOUNTER
Pt was contacted about results:     Stress test is normal     Pt verbalized understanding with no questions or concerns.

## 2020-09-30 NOTE — TELEPHONE ENCOUNTER
Pt contacted, lvm for pt to call back about results.          ----- Message from Marcio Rivers MD sent at 9/29/2020 10:35 PM CDT -----  Heart function is normal.

## 2020-10-01 LAB
OHS CV EVENT MONITOR DAY: 2
OHS CV HOLTER LENGTH DECIMAL HOURS: 96
OHS CV HOLTER LENGTH HOURS: 48
OHS CV HOLTER LENGTH MINUTES: 0

## 2020-10-05 ENCOUNTER — TELEPHONE (OUTPATIENT)
Dept: CARDIOLOGY | Facility: CLINIC | Age: 65
End: 2020-10-05

## 2020-10-05 NOTE — TELEPHONE ENCOUNTER
LVM for patient to call office for test results and to make an appointment.    ----- Message from Marcio Rivers MD sent at 10/5/2020  9:04 AM CDT -----  NEEDS APPOINTMENT F/U AS PER MY LAST NOTE.

## 2021-03-10 ENCOUNTER — IMMUNIZATION (OUTPATIENT)
Dept: PRIMARY CARE CLINIC | Facility: CLINIC | Age: 66
End: 2021-03-10

## 2021-03-10 DIAGNOSIS — Z23 NEED FOR VACCINATION: Primary | ICD-10-CM

## 2021-03-10 PROCEDURE — 0031A PR IMMUNIZ ADMIN, SARS-COV-2 COVID-19 VACC, 5X10VP/0.5ML: ICD-10-PCS | Mod: CV19,S$GLB,, | Performed by: INTERNAL MEDICINE

## 2021-03-10 PROCEDURE — 91303 PR SARSCOV2 VAC AD26 .5ML IM: ICD-10-PCS | Mod: S$GLB,,, | Performed by: INTERNAL MEDICINE

## 2021-03-10 PROCEDURE — 91303 PR SARSCOV2 VAC AD26 .5ML IM: CPT | Mod: S$GLB,,, | Performed by: INTERNAL MEDICINE

## 2021-03-10 PROCEDURE — 0031A PR IMMUNIZ ADMIN, SARS-COV-2 COVID-19 VACC, 5X10VP/0.5ML: CPT | Mod: CV19,S$GLB,, | Performed by: INTERNAL MEDICINE

## 2021-04-24 DIAGNOSIS — E78.5 HYPERLIPIDEMIA, UNSPECIFIED HYPERLIPIDEMIA TYPE: Primary | ICD-10-CM

## 2021-04-27 RX ORDER — ATORVASTATIN CALCIUM 20 MG/1
TABLET, FILM COATED ORAL
Qty: 90 TABLET | Refills: 0 | Status: SHIPPED | OUTPATIENT
Start: 2021-04-27 | End: 2021-07-26

## 2021-07-25 DIAGNOSIS — E78.5 HYPERLIPIDEMIA, UNSPECIFIED HYPERLIPIDEMIA TYPE: Primary | ICD-10-CM

## 2021-07-26 RX ORDER — ATORVASTATIN CALCIUM 20 MG/1
20 TABLET, FILM COATED ORAL DAILY
Qty: 90 TABLET | Refills: 0 | Status: SHIPPED | OUTPATIENT
Start: 2021-07-26 | End: 2021-09-13

## 2021-09-13 ENCOUNTER — OFFICE VISIT (OUTPATIENT)
Dept: INTERNAL MEDICINE | Facility: CLINIC | Age: 66
End: 2021-09-13
Payer: MEDICARE

## 2021-09-13 ENCOUNTER — HOSPITAL ENCOUNTER (OUTPATIENT)
Dept: RADIOLOGY | Facility: HOSPITAL | Age: 66
Discharge: HOME | End: 2021-09-13
Attending: FAMILY MEDICINE
Payer: MEDICARE

## 2021-09-13 VITALS
HEART RATE: 78 BPM | BODY MASS INDEX: 27.2 KG/M2 | HEIGHT: 77 IN | SYSTOLIC BLOOD PRESSURE: 144 MMHG | DIASTOLIC BLOOD PRESSURE: 80 MMHG | WEIGHT: 230.38 LBS | OXYGEN SATURATION: 99 % | TEMPERATURE: 97 F

## 2021-09-13 DIAGNOSIS — M54.9 DORSALGIA, UNSPECIFIED: ICD-10-CM

## 2021-09-13 DIAGNOSIS — T46.6X5A MYALGIA DUE TO STATIN: ICD-10-CM

## 2021-09-13 DIAGNOSIS — I10 HYPERTENSION, UNSPECIFIED TYPE: ICD-10-CM

## 2021-09-13 DIAGNOSIS — E78.5 HYPERLIPIDEMIA, UNSPECIFIED HYPERLIPIDEMIA TYPE: ICD-10-CM

## 2021-09-13 DIAGNOSIS — M25.551 RIGHT HIP PAIN: ICD-10-CM

## 2021-09-13 DIAGNOSIS — M25.551 RIGHT HIP PAIN: Primary | ICD-10-CM

## 2021-09-13 DIAGNOSIS — M79.10 MYALGIA DUE TO STATIN: ICD-10-CM

## 2021-09-13 PROCEDURE — 99999 PR PBB SHADOW E&M-EST. PATIENT-LVL IV: ICD-10-PCS | Mod: PBBFAC,HCNC,, | Performed by: FAMILY MEDICINE

## 2021-09-13 PROCEDURE — 99214 PR OFFICE/OUTPT VISIT, EST, LEVL IV, 30-39 MIN: ICD-10-PCS | Mod: HCNC,S$GLB,, | Performed by: FAMILY MEDICINE

## 2021-09-13 PROCEDURE — 1159F PR MEDICATION LIST DOCUMENTED IN MEDICAL RECORD: ICD-10-PCS | Mod: HCNC,CPTII,S$GLB, | Performed by: FAMILY MEDICINE

## 2021-09-13 PROCEDURE — 72110 X-RAY EXAM L-2 SPINE 4/>VWS: CPT | Mod: 26,HCNC,, | Performed by: RADIOLOGY

## 2021-09-13 PROCEDURE — 1159F MED LIST DOCD IN RCRD: CPT | Mod: HCNC,CPTII,S$GLB, | Performed by: FAMILY MEDICINE

## 2021-09-13 PROCEDURE — 3077F SYST BP >= 140 MM HG: CPT | Mod: HCNC,CPTII,S$GLB, | Performed by: FAMILY MEDICINE

## 2021-09-13 PROCEDURE — 3288F PR FALLS RISK ASSESSMENT DOCUMENTED: ICD-10-PCS | Mod: HCNC,CPTII,S$GLB, | Performed by: FAMILY MEDICINE

## 2021-09-13 PROCEDURE — 73502 X-RAY EXAM HIP UNI 2-3 VIEWS: CPT | Mod: 26,HCNC,RT, | Performed by: RADIOLOGY

## 2021-09-13 PROCEDURE — 3008F BODY MASS INDEX DOCD: CPT | Mod: HCNC,CPTII,S$GLB, | Performed by: FAMILY MEDICINE

## 2021-09-13 PROCEDURE — 3077F PR MOST RECENT SYSTOLIC BLOOD PRESSURE >= 140 MM HG: ICD-10-PCS | Mod: HCNC,CPTII,S$GLB, | Performed by: FAMILY MEDICINE

## 2021-09-13 PROCEDURE — 1101F PR PT FALLS ASSESS DOC 0-1 FALLS W/OUT INJ PAST YR: ICD-10-PCS | Mod: HCNC,CPTII,S$GLB, | Performed by: FAMILY MEDICINE

## 2021-09-13 PROCEDURE — 3079F DIAST BP 80-89 MM HG: CPT | Mod: HCNC,CPTII,S$GLB, | Performed by: FAMILY MEDICINE

## 2021-09-13 PROCEDURE — 72110 XR LUMBAR SPINE COMPLETE 5 VIEW: ICD-10-PCS | Mod: 26,HCNC,, | Performed by: RADIOLOGY

## 2021-09-13 PROCEDURE — 3079F PR MOST RECENT DIASTOLIC BLOOD PRESSURE 80-89 MM HG: ICD-10-PCS | Mod: HCNC,CPTII,S$GLB, | Performed by: FAMILY MEDICINE

## 2021-09-13 PROCEDURE — 72110 X-RAY EXAM L-2 SPINE 4/>VWS: CPT | Mod: TC,HCNC

## 2021-09-13 PROCEDURE — 99214 OFFICE O/P EST MOD 30 MIN: CPT | Mod: HCNC,S$GLB,, | Performed by: FAMILY MEDICINE

## 2021-09-13 PROCEDURE — 3288F FALL RISK ASSESSMENT DOCD: CPT | Mod: HCNC,CPTII,S$GLB, | Performed by: FAMILY MEDICINE

## 2021-09-13 PROCEDURE — 73502 XR HIP WITH PELVIS WHEN PERFORMED, 2 OR 3  VIEWS RIGHT: ICD-10-PCS | Mod: 26,HCNC,RT, | Performed by: RADIOLOGY

## 2021-09-13 PROCEDURE — 99999 PR PBB SHADOW E&M-EST. PATIENT-LVL IV: CPT | Mod: PBBFAC,HCNC,, | Performed by: FAMILY MEDICINE

## 2021-09-13 PROCEDURE — 1101F PT FALLS ASSESS-DOCD LE1/YR: CPT | Mod: HCNC,CPTII,S$GLB, | Performed by: FAMILY MEDICINE

## 2021-09-13 PROCEDURE — 99499 RISK ADDL DX/OHS AUDIT: ICD-10-PCS | Mod: HCNC,S$GLB,, | Performed by: FAMILY MEDICINE

## 2021-09-13 PROCEDURE — 3008F PR BODY MASS INDEX (BMI) DOCUMENTED: ICD-10-PCS | Mod: HCNC,CPTII,S$GLB, | Performed by: FAMILY MEDICINE

## 2021-09-13 PROCEDURE — 73502 X-RAY EXAM HIP UNI 2-3 VIEWS: CPT | Mod: TC,HCNC,RT

## 2021-09-13 PROCEDURE — 99499 UNLISTED E&M SERVICE: CPT | Mod: HCNC,S$GLB,, | Performed by: FAMILY MEDICINE

## 2021-09-13 RX ORDER — PRAVASTATIN SODIUM 40 MG/1
40 TABLET ORAL DAILY
Qty: 90 TABLET | Refills: 1 | Status: SHIPPED | OUTPATIENT
Start: 2021-09-13 | End: 2022-03-14

## 2021-09-13 RX ORDER — GABAPENTIN 300 MG/1
300 CAPSULE ORAL 3 TIMES DAILY PRN
Qty: 90 CAPSULE | Refills: 0 | Status: SHIPPED | OUTPATIENT
Start: 2021-09-13 | End: 2021-10-21

## 2021-09-13 RX ORDER — MELOXICAM 15 MG/1
15 TABLET ORAL DAILY PRN
Qty: 30 TABLET | Refills: 0 | Status: SHIPPED | OUTPATIENT
Start: 2021-09-13 | End: 2021-10-01

## 2021-09-14 ENCOUNTER — PATIENT MESSAGE (OUTPATIENT)
Dept: INTERNAL MEDICINE | Facility: CLINIC | Age: 66
End: 2021-09-14

## 2021-09-22 ENCOUNTER — PATIENT OUTREACH (OUTPATIENT)
Dept: ADMINISTRATIVE | Facility: OTHER | Age: 66
End: 2021-09-22

## 2021-09-23 ENCOUNTER — OFFICE VISIT (OUTPATIENT)
Dept: ORTHOPEDICS | Facility: CLINIC | Age: 66
End: 2021-09-23
Payer: MEDICARE

## 2021-09-23 DIAGNOSIS — M25.551 RIGHT HIP PAIN: ICD-10-CM

## 2021-09-23 PROCEDURE — 3044F PR MOST RECENT HEMOGLOBIN A1C LEVEL <7.0%: ICD-10-PCS | Mod: HCNC,CPTII,S$GLB, | Performed by: ORTHOPAEDIC SURGERY

## 2021-09-23 PROCEDURE — 1101F PT FALLS ASSESS-DOCD LE1/YR: CPT | Mod: HCNC,CPTII,S$GLB, | Performed by: ORTHOPAEDIC SURGERY

## 2021-09-23 PROCEDURE — 3288F FALL RISK ASSESSMENT DOCD: CPT | Mod: HCNC,CPTII,S$GLB, | Performed by: ORTHOPAEDIC SURGERY

## 2021-09-23 PROCEDURE — 3288F PR FALLS RISK ASSESSMENT DOCUMENTED: ICD-10-PCS | Mod: HCNC,CPTII,S$GLB, | Performed by: ORTHOPAEDIC SURGERY

## 2021-09-23 PROCEDURE — 1101F PR PT FALLS ASSESS DOC 0-1 FALLS W/OUT INJ PAST YR: ICD-10-PCS | Mod: HCNC,CPTII,S$GLB, | Performed by: ORTHOPAEDIC SURGERY

## 2021-09-23 PROCEDURE — 99203 OFFICE O/P NEW LOW 30 MIN: CPT | Mod: HCNC,S$GLB,, | Performed by: ORTHOPAEDIC SURGERY

## 2021-09-23 PROCEDURE — 99203 PR OFFICE/OUTPT VISIT, NEW, LEVL III, 30-44 MIN: ICD-10-PCS | Mod: HCNC,S$GLB,, | Performed by: ORTHOPAEDIC SURGERY

## 2021-09-23 PROCEDURE — 99999 PR PBB SHADOW E&M-EST. PATIENT-LVL III: CPT | Mod: PBBFAC,HCNC,, | Performed by: ORTHOPAEDIC SURGERY

## 2021-09-23 PROCEDURE — 99999 PR PBB SHADOW E&M-EST. PATIENT-LVL III: ICD-10-PCS | Mod: PBBFAC,HCNC,, | Performed by: ORTHOPAEDIC SURGERY

## 2021-09-23 PROCEDURE — 3044F HG A1C LEVEL LT 7.0%: CPT | Mod: HCNC,CPTII,S$GLB, | Performed by: ORTHOPAEDIC SURGERY

## 2021-09-27 ENCOUNTER — IMMUNIZATION (OUTPATIENT)
Dept: INTERNAL MEDICINE | Facility: CLINIC | Age: 66
End: 2021-09-27
Payer: MEDICARE

## 2021-09-27 PROCEDURE — 90694 FLU VACCINE - QUADRIVALENT - ADJUVANTED: ICD-10-PCS | Mod: HCNC,S$GLB,, | Performed by: FAMILY MEDICINE

## 2021-09-27 PROCEDURE — G0008 ADMIN INFLUENZA VIRUS VAC: HCPCS | Mod: HCNC,S$GLB,, | Performed by: FAMILY MEDICINE

## 2021-09-27 PROCEDURE — G0008 FLU VACCINE - QUADRIVALENT - ADJUVANTED: ICD-10-PCS | Mod: HCNC,S$GLB,, | Performed by: FAMILY MEDICINE

## 2021-09-27 PROCEDURE — 90694 VACC AIIV4 NO PRSRV 0.5ML IM: CPT | Mod: HCNC,S$GLB,, | Performed by: FAMILY MEDICINE

## 2021-09-28 ENCOUNTER — CLINICAL SUPPORT (OUTPATIENT)
Dept: REHABILITATION | Facility: HOSPITAL | Age: 66
End: 2021-09-28
Payer: MEDICARE

## 2021-09-28 DIAGNOSIS — M25.551 RIGHT HIP PAIN: ICD-10-CM

## 2021-09-28 DIAGNOSIS — R29.898 WEAKNESS OF RIGHT HIP: ICD-10-CM

## 2021-09-28 DIAGNOSIS — M25.551 PAIN IN RIGHT HIP: ICD-10-CM

## 2021-09-28 PROCEDURE — 97161 PT EVAL LOW COMPLEX 20 MIN: CPT | Mod: HCNC

## 2021-10-01 ENCOUNTER — LAB VISIT (OUTPATIENT)
Dept: LAB | Facility: HOSPITAL | Age: 66
End: 2021-10-01
Attending: FAMILY MEDICINE
Payer: MEDICARE

## 2021-10-01 ENCOUNTER — OFFICE VISIT (OUTPATIENT)
Dept: INTERNAL MEDICINE | Facility: CLINIC | Age: 66
End: 2021-10-01
Payer: MEDICARE

## 2021-10-01 VITALS
DIASTOLIC BLOOD PRESSURE: 70 MMHG | OXYGEN SATURATION: 98 % | RESPIRATION RATE: 18 BRPM | SYSTOLIC BLOOD PRESSURE: 116 MMHG | HEART RATE: 50 BPM | BODY MASS INDEX: 27.93 KG/M2 | WEIGHT: 236.56 LBS | HEIGHT: 77 IN | TEMPERATURE: 99 F

## 2021-10-01 DIAGNOSIS — R03.0 WHITE COAT SYNDROME WITHOUT HYPERTENSION: Primary | ICD-10-CM

## 2021-10-01 DIAGNOSIS — Z12.5 ENCOUNTER FOR SCREENING FOR MALIGNANT NEOPLASM OF PROSTATE: ICD-10-CM

## 2021-10-01 DIAGNOSIS — Z00.00 ANNUAL PHYSICAL EXAM: ICD-10-CM

## 2021-10-01 DIAGNOSIS — Z79.899 ENCOUNTER FOR LONG-TERM (CURRENT) USE OF MEDICATIONS: ICD-10-CM

## 2021-10-01 LAB
ALBUMIN SERPL BCP-MCNC: 4.3 G/DL (ref 3.5–5.2)
ALP SERPL-CCNC: 75 U/L (ref 55–135)
ALT SERPL W/O P-5'-P-CCNC: 33 U/L (ref 10–44)
ANION GAP SERPL CALC-SCNC: 12 MMOL/L (ref 8–16)
AST SERPL-CCNC: 51 U/L (ref 10–40)
BASOPHILS # BLD AUTO: 0.05 K/UL (ref 0–0.2)
BASOPHILS NFR BLD: 0.9 % (ref 0–1.9)
BILIRUB SERPL-MCNC: 1.1 MG/DL (ref 0.1–1)
BUN SERPL-MCNC: 15 MG/DL (ref 8–23)
CALCIUM SERPL-MCNC: 9.4 MG/DL (ref 8.7–10.5)
CHLORIDE SERPL-SCNC: 106 MMOL/L (ref 95–110)
CHOLEST SERPL-MCNC: 154 MG/DL (ref 120–199)
CHOLEST/HDLC SERPL: 3.2 {RATIO} (ref 2–5)
CO2 SERPL-SCNC: 22 MMOL/L (ref 23–29)
CREAT SERPL-MCNC: 1.2 MG/DL (ref 0.5–1.4)
DIFFERENTIAL METHOD: ABNORMAL
EOSINOPHIL # BLD AUTO: 0.2 K/UL (ref 0–0.5)
EOSINOPHIL NFR BLD: 3 % (ref 0–8)
ERYTHROCYTE [DISTWIDTH] IN BLOOD BY AUTOMATED COUNT: 13 % (ref 11.5–14.5)
EST. GFR  (AFRICAN AMERICAN): >60 ML/MIN/1.73 M^2
EST. GFR  (NON AFRICAN AMERICAN): >60 ML/MIN/1.73 M^2
GLUCOSE SERPL-MCNC: 96 MG/DL (ref 70–110)
HCT VFR BLD AUTO: 44.4 % (ref 40–54)
HDLC SERPL-MCNC: 48 MG/DL (ref 40–75)
HDLC SERPL: 31.2 % (ref 20–50)
HGB BLD-MCNC: 14.8 G/DL (ref 14–18)
IMM GRANULOCYTES # BLD AUTO: 0.01 K/UL (ref 0–0.04)
IMM GRANULOCYTES NFR BLD AUTO: 0.2 % (ref 0–0.5)
LDLC SERPL CALC-MCNC: 87.6 MG/DL (ref 63–159)
LYMPHOCYTES # BLD AUTO: 2.1 K/UL (ref 1–4.8)
LYMPHOCYTES NFR BLD: 38.5 % (ref 18–48)
MCH RBC QN AUTO: 32.1 PG (ref 27–31)
MCHC RBC AUTO-ENTMCNC: 33.3 G/DL (ref 32–36)
MCV RBC AUTO: 96 FL (ref 82–98)
MONOCYTES # BLD AUTO: 0.4 K/UL (ref 0.3–1)
MONOCYTES NFR BLD: 8.2 % (ref 4–15)
NEUTROPHILS # BLD AUTO: 2.6 K/UL (ref 1.8–7.7)
NEUTROPHILS NFR BLD: 49.2 % (ref 38–73)
NONHDLC SERPL-MCNC: 106 MG/DL
NRBC BLD-RTO: 0 /100 WBC
PLATELET # BLD AUTO: 190 K/UL (ref 150–450)
PMV BLD AUTO: 11 FL (ref 9.2–12.9)
POTASSIUM SERPL-SCNC: 4.4 MMOL/L (ref 3.5–5.1)
PROT SERPL-MCNC: 7 G/DL (ref 6–8.4)
RBC # BLD AUTO: 4.61 M/UL (ref 4.6–6.2)
SODIUM SERPL-SCNC: 140 MMOL/L (ref 136–145)
TRIGL SERPL-MCNC: 92 MG/DL (ref 30–150)
WBC # BLD AUTO: 5.37 K/UL (ref 3.9–12.7)

## 2021-10-01 PROCEDURE — 99999 PR PBB SHADOW E&M-EST. PATIENT-LVL III: CPT | Mod: PBBFAC,HCNC,, | Performed by: FAMILY MEDICINE

## 2021-10-01 PROCEDURE — 85025 COMPLETE CBC W/AUTO DIFF WBC: CPT | Mod: HCNC | Performed by: FAMILY MEDICINE

## 2021-10-01 PROCEDURE — 87389 HIV-1 AG W/HIV-1&-2 AB AG IA: CPT | Mod: HCNC | Performed by: FAMILY MEDICINE

## 2021-10-01 PROCEDURE — 99999 PR PBB SHADOW E&M-EST. PATIENT-LVL III: ICD-10-PCS | Mod: PBBFAC,HCNC,, | Performed by: FAMILY MEDICINE

## 2021-10-01 PROCEDURE — 36415 COLL VENOUS BLD VENIPUNCTURE: CPT | Mod: HCNC | Performed by: FAMILY MEDICINE

## 2021-10-01 PROCEDURE — 90732 PPSV23 VACC 2 YRS+ SUBQ/IM: CPT | Mod: HCNC,S$GLB,, | Performed by: FAMILY MEDICINE

## 2021-10-01 PROCEDURE — 3008F BODY MASS INDEX DOCD: CPT | Mod: HCNC,CPTII,S$GLB, | Performed by: FAMILY MEDICINE

## 2021-10-01 PROCEDURE — 3288F PR FALLS RISK ASSESSMENT DOCUMENTED: ICD-10-PCS | Mod: HCNC,CPTII,S$GLB, | Performed by: FAMILY MEDICINE

## 2021-10-01 PROCEDURE — 1101F PR PT FALLS ASSESS DOC 0-1 FALLS W/OUT INJ PAST YR: ICD-10-PCS | Mod: HCNC,CPTII,S$GLB, | Performed by: FAMILY MEDICINE

## 2021-10-01 PROCEDURE — 3078F DIAST BP <80 MM HG: CPT | Mod: HCNC,CPTII,S$GLB, | Performed by: FAMILY MEDICINE

## 2021-10-01 PROCEDURE — G0009 PNEUMOCOCCAL POLYSACCHARIDE VACCINE 23-VALENT =>2YO SQ IM: ICD-10-PCS | Mod: HCNC,S$GLB,, | Performed by: FAMILY MEDICINE

## 2021-10-01 PROCEDURE — 3044F PR MOST RECENT HEMOGLOBIN A1C LEVEL <7.0%: ICD-10-PCS | Mod: HCNC,CPTII,S$GLB, | Performed by: FAMILY MEDICINE

## 2021-10-01 PROCEDURE — 99397 PR PREVENTIVE VISIT,EST,65 & OVER: ICD-10-PCS | Mod: HCNC,25,S$GLB, | Performed by: FAMILY MEDICINE

## 2021-10-01 PROCEDURE — 84443 ASSAY THYROID STIM HORMONE: CPT | Mod: HCNC | Performed by: FAMILY MEDICINE

## 2021-10-01 PROCEDURE — 84153 ASSAY OF PSA TOTAL: CPT | Mod: HCNC | Performed by: FAMILY MEDICINE

## 2021-10-01 PROCEDURE — 80053 COMPREHEN METABOLIC PANEL: CPT | Mod: HCNC | Performed by: FAMILY MEDICINE

## 2021-10-01 PROCEDURE — 80061 LIPID PANEL: CPT | Mod: HCNC | Performed by: FAMILY MEDICINE

## 2021-10-01 PROCEDURE — G0009 ADMIN PNEUMOCOCCAL VACCINE: HCPCS | Mod: HCNC,S$GLB,, | Performed by: FAMILY MEDICINE

## 2021-10-01 PROCEDURE — 99397 PER PM REEVAL EST PAT 65+ YR: CPT | Mod: HCNC,25,S$GLB, | Performed by: FAMILY MEDICINE

## 2021-10-01 PROCEDURE — 1101F PT FALLS ASSESS-DOCD LE1/YR: CPT | Mod: HCNC,CPTII,S$GLB, | Performed by: FAMILY MEDICINE

## 2021-10-01 PROCEDURE — 3008F PR BODY MASS INDEX (BMI) DOCUMENTED: ICD-10-PCS | Mod: HCNC,CPTII,S$GLB, | Performed by: FAMILY MEDICINE

## 2021-10-01 PROCEDURE — 3044F HG A1C LEVEL LT 7.0%: CPT | Mod: HCNC,CPTII,S$GLB, | Performed by: FAMILY MEDICINE

## 2021-10-01 PROCEDURE — 90732 PNEUMOCOCCAL POLYSACCHARIDE VACCINE 23-VALENT =>2YO SQ IM: ICD-10-PCS | Mod: HCNC,S$GLB,, | Performed by: FAMILY MEDICINE

## 2021-10-01 PROCEDURE — 83036 HEMOGLOBIN GLYCOSYLATED A1C: CPT | Mod: HCNC | Performed by: FAMILY MEDICINE

## 2021-10-01 PROCEDURE — 3288F FALL RISK ASSESSMENT DOCD: CPT | Mod: HCNC,CPTII,S$GLB, | Performed by: FAMILY MEDICINE

## 2021-10-01 PROCEDURE — 3074F PR MOST RECENT SYSTOLIC BLOOD PRESSURE < 130 MM HG: ICD-10-PCS | Mod: HCNC,CPTII,S$GLB, | Performed by: FAMILY MEDICINE

## 2021-10-01 PROCEDURE — 3078F PR MOST RECENT DIASTOLIC BLOOD PRESSURE < 80 MM HG: ICD-10-PCS | Mod: HCNC,CPTII,S$GLB, | Performed by: FAMILY MEDICINE

## 2021-10-01 PROCEDURE — 1159F MED LIST DOCD IN RCRD: CPT | Mod: HCNC,CPTII,S$GLB, | Performed by: FAMILY MEDICINE

## 2021-10-01 PROCEDURE — 1159F PR MEDICATION LIST DOCUMENTED IN MEDICAL RECORD: ICD-10-PCS | Mod: HCNC,CPTII,S$GLB, | Performed by: FAMILY MEDICINE

## 2021-10-01 PROCEDURE — 3074F SYST BP LT 130 MM HG: CPT | Mod: HCNC,CPTII,S$GLB, | Performed by: FAMILY MEDICINE

## 2021-10-02 LAB
COMPLEXED PSA SERPL-MCNC: 1.2 NG/ML (ref 0–4)
ESTIMATED AVG GLUCOSE: 108 MG/DL (ref 68–131)
HBA1C MFR BLD: 5.4 % (ref 4–5.6)
TSH SERPL DL<=0.005 MIU/L-ACNC: 1.47 UIU/ML (ref 0.4–4)

## 2021-10-04 LAB — HIV 1+2 AB+HIV1 P24 AG SERPL QL IA: NEGATIVE

## 2021-10-05 ENCOUNTER — CLINICAL SUPPORT (OUTPATIENT)
Dept: REHABILITATION | Facility: HOSPITAL | Age: 66
End: 2021-10-05
Payer: MEDICARE

## 2021-10-05 DIAGNOSIS — M25.551 PAIN IN RIGHT HIP: ICD-10-CM

## 2021-10-05 DIAGNOSIS — R29.898 WEAKNESS OF RIGHT HIP: ICD-10-CM

## 2021-10-05 PROCEDURE — 97110 THERAPEUTIC EXERCISES: CPT | Mod: HCNC,CQ

## 2021-10-05 PROCEDURE — 97140 MANUAL THERAPY 1/> REGIONS: CPT | Mod: HCNC,CQ

## 2021-10-07 ENCOUNTER — CLINICAL SUPPORT (OUTPATIENT)
Dept: REHABILITATION | Facility: HOSPITAL | Age: 66
End: 2021-10-07
Payer: MEDICARE

## 2021-10-07 DIAGNOSIS — R29.898 WEAKNESS OF RIGHT HIP: ICD-10-CM

## 2021-10-07 DIAGNOSIS — M25.551 PAIN IN RIGHT HIP: ICD-10-CM

## 2021-10-07 PROCEDURE — 97110 THERAPEUTIC EXERCISES: CPT | Mod: HCNC,CQ

## 2021-10-07 PROCEDURE — 97140 MANUAL THERAPY 1/> REGIONS: CPT | Mod: HCNC,CQ

## 2021-10-12 ENCOUNTER — CLINICAL SUPPORT (OUTPATIENT)
Dept: REHABILITATION | Facility: HOSPITAL | Age: 66
End: 2021-10-12
Payer: MEDICARE

## 2021-10-12 DIAGNOSIS — M25.551 PAIN IN RIGHT HIP: ICD-10-CM

## 2021-10-12 DIAGNOSIS — R29.898 WEAKNESS OF RIGHT HIP: ICD-10-CM

## 2021-10-12 PROCEDURE — 97110 THERAPEUTIC EXERCISES: CPT | Mod: HCNC

## 2021-10-12 PROCEDURE — 97140 MANUAL THERAPY 1/> REGIONS: CPT | Mod: HCNC

## 2021-10-14 ENCOUNTER — CLINICAL SUPPORT (OUTPATIENT)
Dept: REHABILITATION | Facility: HOSPITAL | Age: 66
End: 2021-10-14
Payer: MEDICARE

## 2021-10-14 DIAGNOSIS — M25.551 PAIN IN RIGHT HIP: ICD-10-CM

## 2021-10-14 DIAGNOSIS — R29.898 WEAKNESS OF RIGHT HIP: ICD-10-CM

## 2021-10-14 PROCEDURE — 97140 MANUAL THERAPY 1/> REGIONS: CPT | Mod: HCNC,CQ

## 2021-10-14 PROCEDURE — 97110 THERAPEUTIC EXERCISES: CPT | Mod: HCNC,CQ

## 2021-10-19 ENCOUNTER — CLINICAL SUPPORT (OUTPATIENT)
Dept: REHABILITATION | Facility: HOSPITAL | Age: 66
End: 2021-10-19
Payer: MEDICARE

## 2021-10-19 DIAGNOSIS — M25.551 PAIN IN RIGHT HIP: ICD-10-CM

## 2021-10-19 DIAGNOSIS — R29.898 WEAKNESS OF RIGHT HIP: ICD-10-CM

## 2021-10-19 PROCEDURE — 97110 THERAPEUTIC EXERCISES: CPT | Mod: HCNC

## 2021-10-20 DIAGNOSIS — E78.5 HYPERLIPIDEMIA, UNSPECIFIED HYPERLIPIDEMIA TYPE: ICD-10-CM

## 2021-10-20 RX ORDER — ATORVASTATIN CALCIUM 20 MG/1
TABLET, FILM COATED ORAL
Qty: 90 TABLET | Refills: 0 | OUTPATIENT
Start: 2021-10-20

## 2021-10-21 ENCOUNTER — CLINICAL SUPPORT (OUTPATIENT)
Dept: REHABILITATION | Facility: HOSPITAL | Age: 66
End: 2021-10-21
Payer: MEDICARE

## 2021-10-21 ENCOUNTER — OFFICE VISIT (OUTPATIENT)
Dept: ORTHOPEDICS | Facility: CLINIC | Age: 66
End: 2021-10-21
Payer: MEDICARE

## 2021-10-21 ENCOUNTER — HOSPITAL ENCOUNTER (OUTPATIENT)
Dept: RADIOLOGY | Facility: HOSPITAL | Age: 66
Discharge: HOME | End: 2021-10-21
Attending: PHYSICIAN ASSISTANT
Payer: MEDICARE

## 2021-10-21 VITALS — WEIGHT: 236.56 LBS | BODY MASS INDEX: 27.93 KG/M2 | HEIGHT: 77 IN

## 2021-10-21 DIAGNOSIS — R29.898 WEAKNESS OF RIGHT HIP: ICD-10-CM

## 2021-10-21 DIAGNOSIS — M25.562 LEFT KNEE PAIN, UNSPECIFIED CHRONICITY: ICD-10-CM

## 2021-10-21 DIAGNOSIS — M17.12 PRIMARY OSTEOARTHRITIS OF LEFT KNEE: ICD-10-CM

## 2021-10-21 DIAGNOSIS — M16.11 PRIMARY OSTEOARTHRITIS OF RIGHT HIP: Primary | ICD-10-CM

## 2021-10-21 DIAGNOSIS — M21.70 ACQUIRED LEG LENGTH DISCREPANCY: ICD-10-CM

## 2021-10-21 DIAGNOSIS — M25.562 LEFT KNEE PAIN, UNSPECIFIED CHRONICITY: Primary | ICD-10-CM

## 2021-10-21 DIAGNOSIS — M25.551 PAIN IN RIGHT HIP: ICD-10-CM

## 2021-10-21 PROCEDURE — 3044F HG A1C LEVEL LT 7.0%: CPT | Mod: HCNC,CPTII,S$GLB, | Performed by: ORTHOPAEDIC SURGERY

## 2021-10-21 PROCEDURE — 1159F PR MEDICATION LIST DOCUMENTED IN MEDICAL RECORD: ICD-10-PCS | Mod: HCNC,CPTII,S$GLB, | Performed by: ORTHOPAEDIC SURGERY

## 2021-10-21 PROCEDURE — 1101F PR PT FALLS ASSESS DOC 0-1 FALLS W/OUT INJ PAST YR: ICD-10-PCS | Mod: HCNC,CPTII,S$GLB, | Performed by: ORTHOPAEDIC SURGERY

## 2021-10-21 PROCEDURE — 73562 XR KNEE ORTHO LEFT WITH FLEXION: ICD-10-PCS | Mod: 26,59,HCNC,RT | Performed by: RADIOLOGY

## 2021-10-21 PROCEDURE — 3008F PR BODY MASS INDEX (BMI) DOCUMENTED: ICD-10-PCS | Mod: HCNC,CPTII,S$GLB, | Performed by: ORTHOPAEDIC SURGERY

## 2021-10-21 PROCEDURE — 1101F PT FALLS ASSESS-DOCD LE1/YR: CPT | Mod: HCNC,CPTII,S$GLB, | Performed by: ORTHOPAEDIC SURGERY

## 2021-10-21 PROCEDURE — 73562 X-RAY EXAM OF KNEE 3: CPT | Mod: 26,59,HCNC,RT | Performed by: RADIOLOGY

## 2021-10-21 PROCEDURE — 99214 OFFICE O/P EST MOD 30 MIN: CPT | Mod: HCNC,S$GLB,, | Performed by: ORTHOPAEDIC SURGERY

## 2021-10-21 PROCEDURE — 73564 X-RAY EXAM KNEE 4 OR MORE: CPT | Mod: TC,HCNC,LT

## 2021-10-21 PROCEDURE — 99214 PR OFFICE/OUTPT VISIT, EST, LEVL IV, 30-39 MIN: ICD-10-PCS | Mod: HCNC,S$GLB,, | Performed by: ORTHOPAEDIC SURGERY

## 2021-10-21 PROCEDURE — 73564 X-RAY EXAM KNEE 4 OR MORE: CPT | Mod: 26,59,HCNC,LT | Performed by: RADIOLOGY

## 2021-10-21 PROCEDURE — 3044F PR MOST RECENT HEMOGLOBIN A1C LEVEL <7.0%: ICD-10-PCS | Mod: HCNC,CPTII,S$GLB, | Performed by: ORTHOPAEDIC SURGERY

## 2021-10-21 PROCEDURE — 77073 BONE LENGTH STUDIES: CPT | Mod: TC,HCNC

## 2021-10-21 PROCEDURE — 99999 PR PBB SHADOW E&M-EST. PATIENT-LVL III: CPT | Mod: PBBFAC,HCNC,, | Performed by: ORTHOPAEDIC SURGERY

## 2021-10-21 PROCEDURE — 77073 XR HIP TO ANKLE: ICD-10-PCS | Mod: 26,HCNC,, | Performed by: RADIOLOGY

## 2021-10-21 PROCEDURE — 1160F PR REVIEW ALL MEDS BY PRESCRIBER/CLIN PHARMACIST DOCUMENTED: ICD-10-PCS | Mod: HCNC,CPTII,S$GLB, | Performed by: ORTHOPAEDIC SURGERY

## 2021-10-21 PROCEDURE — 3288F FALL RISK ASSESSMENT DOCD: CPT | Mod: HCNC,CPTII,S$GLB, | Performed by: ORTHOPAEDIC SURGERY

## 2021-10-21 PROCEDURE — 77073 BONE LENGTH STUDIES: CPT | Mod: 26,HCNC,, | Performed by: RADIOLOGY

## 2021-10-21 PROCEDURE — 1160F RVW MEDS BY RX/DR IN RCRD: CPT | Mod: HCNC,CPTII,S$GLB, | Performed by: ORTHOPAEDIC SURGERY

## 2021-10-21 PROCEDURE — 97110 THERAPEUTIC EXERCISES: CPT | Mod: HCNC,CQ

## 2021-10-21 PROCEDURE — 3288F PR FALLS RISK ASSESSMENT DOCUMENTED: ICD-10-PCS | Mod: HCNC,CPTII,S$GLB, | Performed by: ORTHOPAEDIC SURGERY

## 2021-10-21 PROCEDURE — 3008F BODY MASS INDEX DOCD: CPT | Mod: HCNC,CPTII,S$GLB, | Performed by: ORTHOPAEDIC SURGERY

## 2021-10-21 PROCEDURE — 99999 PR PBB SHADOW E&M-EST. PATIENT-LVL III: ICD-10-PCS | Mod: PBBFAC,HCNC,, | Performed by: ORTHOPAEDIC SURGERY

## 2021-10-21 PROCEDURE — 73564 XR KNEE ORTHO LEFT WITH FLEXION: ICD-10-PCS | Mod: 26,59,HCNC,LT | Performed by: RADIOLOGY

## 2021-10-21 PROCEDURE — 1159F MED LIST DOCD IN RCRD: CPT | Mod: HCNC,CPTII,S$GLB, | Performed by: ORTHOPAEDIC SURGERY

## 2021-10-26 ENCOUNTER — CLINICAL SUPPORT (OUTPATIENT)
Dept: REHABILITATION | Facility: HOSPITAL | Age: 66
End: 2021-10-26
Payer: MEDICARE

## 2021-10-26 DIAGNOSIS — R29.898 WEAKNESS OF RIGHT HIP: ICD-10-CM

## 2021-10-26 DIAGNOSIS — M25.551 PAIN IN RIGHT HIP: ICD-10-CM

## 2021-10-26 PROCEDURE — 97112 NEUROMUSCULAR REEDUCATION: CPT | Mod: HCNC

## 2021-10-26 PROCEDURE — 97110 THERAPEUTIC EXERCISES: CPT | Mod: HCNC

## 2021-10-28 ENCOUNTER — CLINICAL SUPPORT (OUTPATIENT)
Dept: REHABILITATION | Facility: HOSPITAL | Age: 66
End: 2021-10-28
Payer: MEDICARE

## 2021-10-28 DIAGNOSIS — R29.898 WEAKNESS OF RIGHT HIP: ICD-10-CM

## 2021-10-28 DIAGNOSIS — M25.551 PAIN IN RIGHT HIP: ICD-10-CM

## 2021-10-28 PROCEDURE — 97530 THERAPEUTIC ACTIVITIES: CPT | Mod: HCNC

## 2021-10-28 PROCEDURE — 97110 THERAPEUTIC EXERCISES: CPT | Mod: HCNC

## 2021-11-02 ENCOUNTER — CLINICAL SUPPORT (OUTPATIENT)
Dept: REHABILITATION | Facility: HOSPITAL | Age: 66
End: 2021-11-02
Payer: MEDICARE

## 2021-11-02 DIAGNOSIS — R29.898 WEAKNESS OF RIGHT HIP: ICD-10-CM

## 2021-11-02 DIAGNOSIS — M25.551 PAIN IN RIGHT HIP: ICD-10-CM

## 2021-11-02 PROCEDURE — 97530 THERAPEUTIC ACTIVITIES: CPT | Mod: HCNC

## 2021-11-02 PROCEDURE — 97110 THERAPEUTIC EXERCISES: CPT | Mod: HCNC

## 2021-11-03 ENCOUNTER — TELEPHONE (OUTPATIENT)
Dept: ORTHOPEDICS | Facility: CLINIC | Age: 66
End: 2021-11-03
Payer: MEDICARE

## 2021-11-04 ENCOUNTER — IMMUNIZATION (OUTPATIENT)
Dept: PHARMACY | Facility: CLINIC | Age: 66
End: 2021-11-04
Payer: MEDICARE

## 2021-11-04 ENCOUNTER — CLINICAL SUPPORT (OUTPATIENT)
Dept: REHABILITATION | Facility: HOSPITAL | Age: 66
End: 2021-11-04
Payer: MEDICARE

## 2021-11-04 DIAGNOSIS — R29.898 WEAKNESS OF RIGHT HIP: ICD-10-CM

## 2021-11-04 DIAGNOSIS — Z23 NEED FOR VACCINATION: Primary | ICD-10-CM

## 2021-11-04 DIAGNOSIS — M25.551 PAIN IN RIGHT HIP: ICD-10-CM

## 2021-11-04 PROCEDURE — 97530 THERAPEUTIC ACTIVITIES: CPT | Mod: KX,HCNC

## 2021-11-04 PROCEDURE — 97110 THERAPEUTIC EXERCISES: CPT | Mod: KX,HCNC

## 2021-11-16 ENCOUNTER — CLINICAL SUPPORT (OUTPATIENT)
Dept: REHABILITATION | Facility: HOSPITAL | Age: 66
End: 2021-11-16
Payer: MEDICARE

## 2021-11-16 DIAGNOSIS — M25.551 PAIN IN RIGHT HIP: ICD-10-CM

## 2021-11-16 DIAGNOSIS — R29.898 WEAKNESS OF RIGHT HIP: ICD-10-CM

## 2021-11-16 PROCEDURE — 97110 THERAPEUTIC EXERCISES: CPT | Mod: HCNC,CQ

## 2021-11-16 PROCEDURE — 97530 THERAPEUTIC ACTIVITIES: CPT | Mod: HCNC,CQ

## 2021-11-18 ENCOUNTER — CLINICAL SUPPORT (OUTPATIENT)
Dept: REHABILITATION | Facility: HOSPITAL | Age: 66
End: 2021-11-18
Payer: MEDICARE

## 2021-11-18 DIAGNOSIS — M25.551 PAIN IN RIGHT HIP: ICD-10-CM

## 2021-11-18 DIAGNOSIS — R29.898 WEAKNESS OF RIGHT HIP: ICD-10-CM

## 2021-11-18 PROCEDURE — 97110 THERAPEUTIC EXERCISES: CPT | Mod: HCNC,CQ

## 2021-11-18 PROCEDURE — 97530 THERAPEUTIC ACTIVITIES: CPT | Mod: HCNC,CQ

## 2021-11-22 ENCOUNTER — CLINICAL SUPPORT (OUTPATIENT)
Dept: REHABILITATION | Facility: HOSPITAL | Age: 66
End: 2021-11-22
Payer: MEDICARE

## 2021-11-22 DIAGNOSIS — M25.551 PAIN IN RIGHT HIP: ICD-10-CM

## 2021-11-22 DIAGNOSIS — R29.898 WEAKNESS OF RIGHT HIP: ICD-10-CM

## 2021-11-22 PROCEDURE — 97530 THERAPEUTIC ACTIVITIES: CPT | Mod: HCNC,CQ

## 2021-11-22 PROCEDURE — 97110 THERAPEUTIC EXERCISES: CPT | Mod: HCNC,CQ

## 2021-12-02 ENCOUNTER — CLINICAL SUPPORT (OUTPATIENT)
Dept: REHABILITATION | Facility: HOSPITAL | Age: 66
End: 2021-12-02
Payer: MEDICARE

## 2021-12-02 DIAGNOSIS — M25.551 PAIN IN RIGHT HIP: ICD-10-CM

## 2021-12-02 DIAGNOSIS — R29.898 WEAKNESS OF RIGHT HIP: ICD-10-CM

## 2021-12-02 PROCEDURE — 97110 THERAPEUTIC EXERCISES: CPT | Mod: HCNC,CQ

## 2021-12-07 ENCOUNTER — CLINICAL SUPPORT (OUTPATIENT)
Dept: REHABILITATION | Facility: HOSPITAL | Age: 66
End: 2021-12-07
Payer: MEDICARE

## 2021-12-07 DIAGNOSIS — R29.898 WEAKNESS OF RIGHT HIP: ICD-10-CM

## 2021-12-07 DIAGNOSIS — M25.551 PAIN IN RIGHT HIP: ICD-10-CM

## 2021-12-07 PROCEDURE — 97110 THERAPEUTIC EXERCISES: CPT | Mod: KX,HCNC

## 2021-12-07 PROCEDURE — 97112 NEUROMUSCULAR REEDUCATION: CPT | Mod: KX,HCNC

## 2021-12-09 ENCOUNTER — CLINICAL SUPPORT (OUTPATIENT)
Dept: REHABILITATION | Facility: HOSPITAL | Age: 66
End: 2021-12-09
Payer: MEDICARE

## 2021-12-09 DIAGNOSIS — R29.898 WEAKNESS OF RIGHT HIP: ICD-10-CM

## 2021-12-09 DIAGNOSIS — M25.551 PAIN IN RIGHT HIP: ICD-10-CM

## 2021-12-09 PROCEDURE — 97112 NEUROMUSCULAR REEDUCATION: CPT | Mod: HCNC

## 2021-12-09 PROCEDURE — 97530 THERAPEUTIC ACTIVITIES: CPT | Mod: HCNC

## 2021-12-09 PROCEDURE — 97110 THERAPEUTIC EXERCISES: CPT | Mod: HCNC

## 2021-12-14 ENCOUNTER — CLINICAL SUPPORT (OUTPATIENT)
Dept: REHABILITATION | Facility: HOSPITAL | Age: 66
End: 2021-12-14
Payer: MEDICARE

## 2021-12-14 DIAGNOSIS — R29.898 WEAKNESS OF RIGHT HIP: ICD-10-CM

## 2021-12-14 DIAGNOSIS — M25.551 PAIN IN RIGHT HIP: ICD-10-CM

## 2021-12-14 PROCEDURE — 97530 THERAPEUTIC ACTIVITIES: CPT | Mod: HCNC

## 2021-12-14 PROCEDURE — 97112 NEUROMUSCULAR REEDUCATION: CPT | Mod: HCNC

## 2021-12-14 PROCEDURE — 97110 THERAPEUTIC EXERCISES: CPT | Mod: HCNC

## 2021-12-16 ENCOUNTER — TELEPHONE (OUTPATIENT)
Dept: SPORTS MEDICINE | Facility: CLINIC | Age: 66
End: 2021-12-16
Payer: MEDICARE

## 2021-12-16 ENCOUNTER — CLINICAL SUPPORT (OUTPATIENT)
Dept: REHABILITATION | Facility: HOSPITAL | Age: 66
End: 2021-12-16
Payer: MEDICARE

## 2021-12-16 ENCOUNTER — OFFICE VISIT (OUTPATIENT)
Dept: ORTHOPEDICS | Facility: CLINIC | Age: 66
End: 2021-12-16
Payer: MEDICARE

## 2021-12-16 ENCOUNTER — TELEPHONE (OUTPATIENT)
Dept: PAIN MEDICINE | Facility: CLINIC | Age: 66
End: 2021-12-16
Payer: MEDICARE

## 2021-12-16 VITALS — BODY MASS INDEX: 27.87 KG/M2 | HEIGHT: 77 IN | WEIGHT: 236 LBS

## 2021-12-16 DIAGNOSIS — M25.551 PAIN IN RIGHT HIP: ICD-10-CM

## 2021-12-16 DIAGNOSIS — M54.42 LOW BACK PAIN DUE TO BILATERAL SCIATICA: Primary | ICD-10-CM

## 2021-12-16 DIAGNOSIS — M54.41 LOW BACK PAIN DUE TO BILATERAL SCIATICA: Primary | ICD-10-CM

## 2021-12-16 DIAGNOSIS — R29.898 WEAKNESS OF RIGHT HIP: ICD-10-CM

## 2021-12-16 DIAGNOSIS — M16.11 PRIMARY OSTEOARTHRITIS OF RIGHT HIP: ICD-10-CM

## 2021-12-16 DIAGNOSIS — M17.12 PRIMARY OSTEOARTHRITIS OF LEFT KNEE: ICD-10-CM

## 2021-12-16 PROCEDURE — 97140 MANUAL THERAPY 1/> REGIONS: CPT | Mod: HCNC,CQ

## 2021-12-16 PROCEDURE — 1101F PR PT FALLS ASSESS DOC 0-1 FALLS W/OUT INJ PAST YR: ICD-10-PCS | Mod: HCNC,CPTII,S$GLB, | Performed by: PHYSICIAN ASSISTANT

## 2021-12-16 PROCEDURE — 1101F PT FALLS ASSESS-DOCD LE1/YR: CPT | Mod: HCNC,CPTII,S$GLB, | Performed by: PHYSICIAN ASSISTANT

## 2021-12-16 PROCEDURE — 99999 PR PBB SHADOW E&M-EST. PATIENT-LVL IV: CPT | Mod: PBBFAC,HCNC,, | Performed by: PHYSICIAN ASSISTANT

## 2021-12-16 PROCEDURE — 1125F AMNT PAIN NOTED PAIN PRSNT: CPT | Mod: HCNC,CPTII,S$GLB, | Performed by: PHYSICIAN ASSISTANT

## 2021-12-16 PROCEDURE — 3008F PR BODY MASS INDEX (BMI) DOCUMENTED: ICD-10-PCS | Mod: HCNC,CPTII,S$GLB, | Performed by: PHYSICIAN ASSISTANT

## 2021-12-16 PROCEDURE — 1125F PR PAIN SEVERITY QUANTIFIED, PAIN PRESENT: ICD-10-PCS | Mod: HCNC,CPTII,S$GLB, | Performed by: PHYSICIAN ASSISTANT

## 2021-12-16 PROCEDURE — 97110 THERAPEUTIC EXERCISES: CPT | Mod: HCNC,CQ

## 2021-12-16 PROCEDURE — 99214 PR OFFICE/OUTPT VISIT, EST, LEVL IV, 30-39 MIN: ICD-10-PCS | Mod: HCNC,S$GLB,, | Performed by: PHYSICIAN ASSISTANT

## 2021-12-16 PROCEDURE — 99214 OFFICE O/P EST MOD 30 MIN: CPT | Mod: HCNC,S$GLB,, | Performed by: PHYSICIAN ASSISTANT

## 2021-12-16 PROCEDURE — 97112 NEUROMUSCULAR REEDUCATION: CPT | Mod: HCNC,CQ

## 2021-12-16 PROCEDURE — 3288F FALL RISK ASSESSMENT DOCD: CPT | Mod: HCNC,CPTII,S$GLB, | Performed by: PHYSICIAN ASSISTANT

## 2021-12-16 PROCEDURE — 3008F BODY MASS INDEX DOCD: CPT | Mod: HCNC,CPTII,S$GLB, | Performed by: PHYSICIAN ASSISTANT

## 2021-12-16 PROCEDURE — 99999 PR PBB SHADOW E&M-EST. PATIENT-LVL IV: ICD-10-PCS | Mod: PBBFAC,HCNC,, | Performed by: PHYSICIAN ASSISTANT

## 2021-12-16 PROCEDURE — 3288F PR FALLS RISK ASSESSMENT DOCUMENTED: ICD-10-PCS | Mod: HCNC,CPTII,S$GLB, | Performed by: PHYSICIAN ASSISTANT

## 2021-12-16 RX ORDER — NAPROXEN 500 MG/1
500 TABLET ORAL 2 TIMES DAILY WITH MEALS
Qty: 30 TABLET | Refills: 0 | Status: SHIPPED | OUTPATIENT
Start: 2021-12-16 | End: 2022-01-05

## 2021-12-16 RX ORDER — METHYLPREDNISOLONE 4 MG/1
TABLET ORAL
Qty: 21 EACH | Refills: 0 | Status: SHIPPED | OUTPATIENT
Start: 2021-12-16 | End: 2022-01-06

## 2021-12-16 NOTE — PROGRESS NOTES
Patient ID: Myles Pride  YOB: 1955  MRN: [id number]    Chief Complaint: Pain of the Right Hip    Referred By: Dr. Catalino Arias    History of Present Illness: Myles Pride is a 66 y.o. male   Data Unavailable with a chief complaint of Pain of the Right Hip  Symptom Onset:  His pain began mid July.  There was not a specific injury.  Continues to have left knee pain, right hip pain, and symptoms of back pain which has gotten progressively worse.   Prior Treatment:   He has previously been treated by Catalino Arias MD on 9/13/2021.  Treatment included physical therapy.    Current Treatment: He was initially seen in our office on 9/23/2021.  He he has been treated for right hip  with Therapy [facility] with good improvement.    Current Symptoms: Average level of pain is 2/10.  Pain is improving. Patient reports that the right hip is doing a lot better but is having more lower back pain.    Previous History of Present Illness 10/21/2021:   Myles Pride is a RHD 66 y.o. male  retired  with a chief complaint of Pain of the Right Hip     Presents today for recheck on right hip pain and left knee pain. Patient presents to clinic for a follow-up on his R Hip. Patient c/o 3/10 Right Hip Pain but states he has improved since his last visit. Patient is currently in physical therapy at [facility] and does at home stretching that seems to be offering relief of symptoms. Patient still has stiffness and aches in the morning however. He has been noticing improvement in the pain in the hip but still has complains of pain in the left knee and right hip. Denies fevers, chills, night sweats, numbness and tingling.      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 Woods  · Leg length may be caused at least partially by arthritis, varus deformity      Past Medical  History:   Past Medical History:   Diagnosis Date    Hyperlipidemia     PONV (postoperative nausea and vomiting) 1990    after knee arthroscopy    Vitamin D deficiency 12/18/2019     Past Surgical History:   Procedure Laterality Date    COLONOSCOPY      COLONOSCOPY N/A 12/19/2019    Procedure: COLONOSCOPY;  Surgeon: Bhupendra Wilkinson MD;  Location: [facility];  Service: Endoscopy;  Laterality: N/A;    KNEE ARTHROSCOPY Left     VASECTOMY  1995     Family History   Problem Relation Age of Onset    Cancer Mother         anal cancer    Diabetes Mother         PRE DIABETES    No Known Problems Sister     Gallbladder disease Brother     Hepatitis Brother         Hep C     Social History     Socioeconomic History    Marital status:    Tobacco Use    Smoking status: Never Smoker    Smokeless tobacco: Never Used   Substance and Sexual Activity    Alcohol use: Yes     Alcohol/week: 2.0 standard drinks     Types: 2 Glasses of wine per week     Social Determinants of Health     Financial Resource Strain: Low Risk     Difficulty of Paying Living Expenses: Not hard at all   Food Insecurity: No Food Insecurity    Worried About Running Out of Food in the Last Year: Never true    Ran Out of Food in the Last Year: Never true   Transportation Needs: No Transportation Needs    Lack of Transportation (Medical): No    Lack of Transportation (Non-Medical): No   Physical Activity: Sufficiently Active    Days of Exercise per Week: 3 days    Minutes of Exercise per Session: 60 min   Stress: No Stress Concern Present    Feeling of Stress : Not at all   Social Connections: Unknown    Frequency of Communication with Friends and Family: Once a week    Frequency of Social Gatherings with Friends and Family: Once a week    Active Member of Clubs or Organizations: Yes    Attends Club or Organization Meetings: More than 4 times per year    Marital Status:    Housing Stability: Low Risk     Unable to Pay for  Housing in the Last Year: No    Number of Places Lived in the Last Year: 1    Unstable Housing in the Last Year: No     Medication List with Changes/Refills   New Medications    GABAPENTIN (NEURONTIN) 300 MG CAPSULE    Take 1 capsule (300 mg total) by mouth every evening for 3 days, THEN 1 capsule (300 mg total) 2 (two) times daily for 3 days, THEN 1 capsule (300 mg total) 3 (three) times daily for 3 days, THEN 2 capsules (600 mg total) 2 (two) times daily for 3 days, THEN 2 capsules (600 mg total) 3 (three) times daily for 18 days.    METHYLPREDNISOLONE (MEDROL DOSEPACK) 4 MG TABLET    use as directed    NAPROXEN (EC NAPROSYN) 500 MG EC TABLET    Take 1 tablet (500 mg total) by mouth 2 (two) times daily with meals.   Current Medications    PRAVASTATIN (PRAVACHOL) 40 MG TABLET    Take 1 tablet (40 mg total) by mouth once daily.   Review of patient's allergies indicates:  No Known Allergies    Physical Exam:   Body mass index is 27.99 kg/m².  GENERAL: Well appearing, appropriate for stated age, no acute distress.  CARDIOVASCULAR: Pulses regular by peripheral palpation.  PULMONARY: Respirations are even and non-labored.  NEURO: Awake, alert, and oriented x 3.  PSYCH: Mood & affect are appropriate.  HEENT: Head is normocephalic and atraumatic.    General    Nursing note and vitals reviewed.          Right Knee Exam   Right knee exam is normal.    Inspection   Effusion: absent    Tenderness   The patient is experiencing no tenderness.     Range of Motion   Extension: 0   Flexion: 120     Tests   Ligament Examination Lachman: normal (-1 to 2mm) PCL-Posterior Drawer: normal (0 to 2mm)     MCL - Valgus: normal (0 to 2mm)  LCL - Varus: normal    Other   Sensation: normal    Comments:  Genu varus     Left Knee Exam     Inspection   Effusion: present  Deformity: present    Tenderness   The patient tender to palpation of the medial joint line and lateral joint line.    Range of Motion   Extension: 10   Flexion: 120     Tests    Stability Lachman: normal (-1 to 2mm) PCL-Posterior Drawer: normal (0 to 2mm)  MCL - Valgus: normal (0 to 2mm)  LCL - Varus: abnormal    Other   Sensation: normal    Comments:  Genu varus    Right Hip Exam     Range of Motion   Extension: 0   Flexion: 90   Right hip external rotation: 5.   Internal rotation: 30     Tests   Log Roll: positive    Other   Sensation: normal  Left Hip Exam     Other   Sensation: normal      Back (L-Spine & T-Spine) / Neck (C-Spine) Exam     Comments:  No bony tenderness   +SLR      Muscle Strength   Right Lower Extremity   Hip Abduction: 5/5   Hip Adduction: 5/5   Hip Flexion: 5/5   Hip Extensors: 5/5  Quadriceps:  5/5   Hamstrin/5   Ankle Dorsiflexion:  5/5   Anterior tibial:  5/5   Gastrocsoleus:  5/5   EHL:  5/5  Left Lower Extremity   Hip Abduction: 5/5   Hip Flexion: 5/5   Hip Extensors: 5/5  Quadriceps:  5/5   Hamstrin/5   Anterior tibial:  5/5   Gastrocsoleus:  5/5   EHL:  5/5    Vascular Exam     Right Pulses  Dorsalis Pedis:      2+  Posterior Tibial:      2+        Left Pulses  Dorsalis Pedis:      2+  Posterior Tibial:      2+            Neurovascular: Intact EHL, FHL, gastrocsoleus, and tibialis anterior. Sensation intact to light touch in superficial peroneal, deep peroneal, tibial, sural, and saphenous nerve distributions. Foot warm and well perfused with capillary refill of less than 2 seconds and palpable pedal pulses.     Imaging:  X-ray Knee Ortho Left with Flexion  Narrative: EXAMINATION:  XR KNEE ORTHO LEFT WITH FLEXION    CLINICAL HISTORY:  . Pain in left knee    TECHNIQUE:  AP standing of both knees, PA flexion standing views of both knees, and Merchant views of both knees were performed. A lateral of the left knee was also performed.    COMPARISON:  None    FINDINGS:  There is a small joint effusion present on the left.  No acute fractures or dislocations visualized.  There is tricompartmental osteoarthritis bilaterally with severe joint space loss in  the left medial compartment.Visualized osseous structures appear diffusely osteopenic.  Impression: As above.    Electronically signed by: Zak Castro MD  Date:    10/21/2021  Time:    10:40  X-Ray Hip to Ankle  Narrative: EXAMINATION:  XR HIP TO ANKLE    CLINICAL HISTORY:  Pain in left knee    TECHNIQUE:  AP views of the bilateral lower extremities were obtained from the levels of the hips to the ankles.    COMPARISON:  None    FINDINGS:  No acute fractures or dislocations visualized.  There are degenerative findings present at the bilateral hips and knees as well as the visualized lower lumbar spine.  No suspicious lytic or blastic osseous lesions demonstrated.  Leg length measurements to be performed by orthopedic surgery.  Visualized osseous structures appear diffusely osteopenic.  Impression: As above    Electronically signed by: Zak Castro MD  Date:    10/21/2021  Time:    10:38    Relevant imaging results reviewed and interpreted by me, discussed with the patient and / or family today.    Other Tests:   No other tests performed today.    Patient Instructions     Assessment:  Myles Pride is a 66 y.o. male   Data Unavailable with a chief complaint of Pain of the Right Hip  Presents also to discuss symptoms of back pain, left knee pain and right hip pain.     Encounter Diagnoses   Name Primary?    Low back pain due to bilateral sciatica Yes    Primary osteoarthritis of left knee     Primary osteoarthritis of right hip       Plan:   Medrol dose juan c   Naproxen   Continue physical therapy   Referral to back and spine   Referral to Dr. Regan to discuss TKA    Follow-up:  or sooner if there are any problems between now and then.    Thank you for choosing [facility] and Dr. Ruslan Burk for your orthopedic & sports medicine care. It is our goal to provide you with exceptional care that will help keep you healthy, active, and get you back in the game.    If you felt  that you received exemplary care today, please consider leaving us feedback on [employer] at [email or web address].     Please do not hesitate to reach out to us via email, phone, or MyChart with any questions, concerns, or feedback.    If you are experiencing pain/discomfort ,or have questions after 5pm and would like to be connected to the [facility] on-call team, please call this number and specify which Sports Medicine provider is treating you: [phone]         Provider Note/Medical Decision Making:   At this time will give the patient medication for symptomatic relief at this time. Discussed the benefits of possible knee replacement and consultation with Dr. Regan (joint replacement specialist). Will have the patient follow up with spine and back for further recommendations on back pain.      I discussed worrisome and red flag signs and symptoms with the patient. The patient expressed understanding and agreed to alert me immediately or to go to the emergency room if they experience any of these.    Treatment plan was developed with input from the patient/family, and they expressed understanding and agreement with the plan. All questions were answered today.    Coding According to 2021 CPT Guidelines; 2/3 MDM Elements or Time as Indicated  EST Total Minutes Spent by Physician or Non Physician QHP # Problems Addressed MDM Patient Management MDM TIME   10084 10-19 1 Minor Min/none Min Risk      28455 20-29 2 Minor, 1 chronic stable or 1 acute uncomplicated Rev/order 2 ext notes/tests Low Risk      72686 30-39 1 chronic with progression, or 1 new diagnosis or acute complicated inj Rev/order 3 ext notes/tests; or int of test performed by another QHP; or disc of management or test with external QHP Discussion of minor surg w risk factors, major surgery x     33808 40-54 1+ chronic with severe progression, or 1 inj that poses threat  to function 2/3: Rev/order 3 ext notes/tests; or int of test performed by another QHP; or disc of management or test with external QHP Discussed elec major surg w patient risk factors, emerg eber surgery, hospitalization      NEW           00763 15-29 1 Minor Min/none Min Risk      20426 30-44 2 Minor, 1 chronic stable or 1 acute uncomplicated Rev 2 ext notes, order or int 2 tests Low Risk      04079 45-59 1 chronic with progression, or 1 new diagnosis or acute complicated inj Rev/order 3 ext notes/tests;or int of test performed by another QHP; or disc of management or test with external QHP Discussion of minor surg w risk factors, major surgery       22753 60-74 1+ chronic with severe progression, or 1 inj that poses threat to function 2/3: Rev/order 3 ext notes/tests; or int of test performed by another QHP; or disc of management or test with external QHP Discussed elec major surg w patient risk factors, emerg eber surgery, hospitalization      PHONE            5-10         59829 11-20         42862 21-30             Disclaimer: This note was prepared using a voice recognition system and is likely to have sound alike errors within the text.

## 2021-12-21 ENCOUNTER — OFFICE VISIT (OUTPATIENT)
Dept: PAIN MEDICINE | Facility: CLINIC | Age: 66
End: 2021-12-21
Payer: MEDICARE

## 2021-12-21 VITALS
RESPIRATION RATE: 18 BRPM | SYSTOLIC BLOOD PRESSURE: 147 MMHG | WEIGHT: 230.38 LBS | BODY MASS INDEX: 27.2 KG/M2 | DIASTOLIC BLOOD PRESSURE: 92 MMHG | HEART RATE: 57 BPM | HEIGHT: 77 IN

## 2021-12-21 DIAGNOSIS — M21.70 LEG LENGTH DISCREPANCY: ICD-10-CM

## 2021-12-21 DIAGNOSIS — M54.41 LOW BACK PAIN DUE TO BILATERAL SCIATICA: ICD-10-CM

## 2021-12-21 DIAGNOSIS — M47.816 LUMBAR FACET ARTHROPATHY: Primary | ICD-10-CM

## 2021-12-21 DIAGNOSIS — M51.36 DDD (DEGENERATIVE DISC DISEASE), LUMBAR: ICD-10-CM

## 2021-12-21 DIAGNOSIS — M54.16 LUMBAR RADICULOPATHY: ICD-10-CM

## 2021-12-21 DIAGNOSIS — M54.9 DORSALGIA, UNSPECIFIED: ICD-10-CM

## 2021-12-21 DIAGNOSIS — M54.42 LOW BACK PAIN DUE TO BILATERAL SCIATICA: ICD-10-CM

## 2021-12-21 PROCEDURE — 99204 OFFICE O/P NEW MOD 45 MIN: CPT | Mod: HCNC,S$GLB,, | Performed by: ANESTHESIOLOGY

## 2021-12-21 PROCEDURE — 99999 PR PBB SHADOW E&M-EST. PATIENT-LVL IV: ICD-10-PCS | Mod: PBBFAC,HCNC,, | Performed by: ANESTHESIOLOGY

## 2021-12-21 PROCEDURE — 99204 PR OFFICE/OUTPT VISIT, NEW, LEVL IV, 45-59 MIN: ICD-10-PCS | Mod: HCNC,S$GLB,, | Performed by: ANESTHESIOLOGY

## 2021-12-21 PROCEDURE — 99999 PR PBB SHADOW E&M-EST. PATIENT-LVL IV: CPT | Mod: PBBFAC,HCNC,, | Performed by: ANESTHESIOLOGY

## 2021-12-21 RX ORDER — GABAPENTIN 300 MG/1
CAPSULE ORAL
Qty: 138 CAPSULE | Refills: 0 | Status: SHIPPED | OUTPATIENT
Start: 2021-12-21 | End: 2022-01-18

## 2021-12-23 ENCOUNTER — CLINICAL SUPPORT (OUTPATIENT)
Dept: REHABILITATION | Facility: HOSPITAL | Age: 66
End: 2021-12-23
Payer: MEDICARE

## 2021-12-23 DIAGNOSIS — R29.898 WEAKNESS OF RIGHT HIP: ICD-10-CM

## 2021-12-23 DIAGNOSIS — M25.551 PAIN IN RIGHT HIP: ICD-10-CM

## 2021-12-23 PROCEDURE — 97113 AQUATIC THERAPY/EXERCISES: CPT | Mod: HCNC,CQ

## 2021-12-30 ENCOUNTER — TELEPHONE (OUTPATIENT)
Dept: RADIOLOGY | Facility: HOSPITAL | Age: 66
End: 2021-12-30
Payer: MEDICARE

## 2021-12-31 ENCOUNTER — PATIENT MESSAGE (OUTPATIENT)
Dept: ORTHOPEDICS | Facility: CLINIC | Age: 66
End: 2021-12-31
Payer: MEDICARE

## 2022-01-02 NOTE — PATIENT INSTRUCTIONS
Assessment:  Myles Pride is a 66 y.o. male   Data Unavailable with a chief complaint of Pain of the Right Hip  Presents also to discuss symptoms of back pain, left knee pain and right hip pain.     Encounter Diagnoses   Name Primary?    Low back pain due to bilateral sciatica Yes    Primary osteoarthritis of left knee     Primary osteoarthritis of right hip       Plan:   Medrol dose juan c   Naproxen   Continue physical therapy   Referral to back and spine   Referral to Dr. Regan to discuss TKA    Follow-up:  or sooner if there are any problems between now and then.    Thank you for choosing [facility] and Dr. Ruslan Burk for your orthopedic & sports medicine care. It is our goal to provide you with exceptional care that will help keep you healthy, active, and get you back in the game.    If you felt that you received exemplary care today, please consider leaving us feedback on [employer] at [email or web address].     Please do not hesitate to reach out to us via email, phone, or MyChart with any questions, concerns, or feedback.    If you are experiencing pain/discomfort ,or have questions after 5pm and would like to be connected to the [facility] on-call team, please call this number and specify which Sports Medicine provider is treating you: [phone]

## 2022-01-05 RX ORDER — NAPROXEN 500 MG/1
500 TABLET ORAL 2 TIMES DAILY
Qty: 60 TABLET | Refills: 0 | Status: SHIPPED | OUTPATIENT
Start: 2022-01-05 | End: 2022-02-02

## 2022-01-05 RX ORDER — NAPROXEN 500 MG/1
500 TABLET ORAL 2 TIMES DAILY WITH MEALS
OUTPATIENT
Start: 2022-01-05

## 2022-01-06 ENCOUNTER — CLINICAL SUPPORT (OUTPATIENT)
Dept: REHABILITATION | Facility: HOSPITAL | Age: 67
End: 2022-01-06
Payer: MEDICARE

## 2022-01-06 DIAGNOSIS — M54.16 LUMBAR RADICULOPATHY: ICD-10-CM

## 2022-01-06 DIAGNOSIS — R29.898 WEAKNESS OF RIGHT HIP: ICD-10-CM

## 2022-01-06 DIAGNOSIS — M25.551 PAIN IN RIGHT HIP: ICD-10-CM

## 2022-01-06 DIAGNOSIS — M47.816 LUMBAR FACET ARTHROPATHY: ICD-10-CM

## 2022-01-06 DIAGNOSIS — M21.70 LEG LENGTH DISCREPANCY: ICD-10-CM

## 2022-01-06 PROCEDURE — 97113 AQUATIC THERAPY/EXERCISES: CPT | Mod: HCNC | Performed by: PHYSICAL THERAPIST

## 2022-01-06 NOTE — PROGRESS NOTES
[facility]  Physical Therapy Daily Treatment Note       Name: Myles Pride  Clinic Number: [id number]    Therapy Diagnosis:   Encounter Diagnoses   Name Primary?    Lumbar facet arthropathy     Lumbar radiculopathy     Leg length discrepancy     Pain in right hip     Weakness of right hip      Physician: Emelia Riddle PA-C    Visit Date: 1/6/2022    Physician Orders: PT Eval and Treat   Medical Diagnosis from Referral: M25.551 (ICD-10-CM) - Right hip pain  Evaluation Date: 9/28/2021  Authorization Period Expiration: 04/05/2022  Plan of Care Expiration: 2/30/2022                  Progress Note Due: 1/23/2021   Visit # / Visits authorized: 20/26 (+1 eval)  FOTO:2/3(5th visit)     Precautions: Standard    Time In: 3:19 pm  Time Out: 4:00 pm  Total Billable Time: 41 minutes    SUBJECTIVE     Pt reports: he feels better than he was a few weeks ago.  He took an oral steroid a few weeks ago that gave him immediate relief.  Currently the pain is more in the right side but it does change and go to the left at times.  Right now it's tightness in the front of the right leg.      Compliance with Hep: Daily  Response to previous treatment: no adverse reactions to treatment/updated HEP  Functional change: No Change    Pain: 3/10   Worst: 2/10  Location: Right hip  Pain Control: rest  Aggravating factors: Standing, Walking and Morning    OBJECTIVE     Objective Measures updated at progress report unless specified otherwise.      Treatment     Gym/Equipment Access: none noted  Time to Complete Exercises:  41 minutes    Myles received aquatic therapy with the use of water to decrease the pull of gravity and weightbearing forces on the body to increase tolerance to resisted therex for 41 minutes including the following exercise as well as stepping in and out of the pool with use of handrail and step-to pattern:     Exercise Performed Today  1/6/2022   Treadmill, forward, 1.3 mph x 3  minutes   Treadmill, side-stepping, 0.7 mph x 3 minutes   Treadmill, backwards, 0.9 mph x 3 minutes   Marching, alternating x 3 minutes   Hamstring curls, alternating  x 3 minutes   Hip 3-way, B lower extremity x 3 minutes each         Bicycle forward/backward   minutes each    Flutter kick   minutes    Scissor kicks   minutes         Quad stretch on step      Hamstring stretch on step  x 2 x 1 minute each   Gastroc stretch  at wall x 1 minute each   Heel/toe raises x 3 minutes   1/4 squat  x 3 minutes   Standing hip circles clockwise/counter clockwise   minutes each    Standing figure 8   10x each         UE alt flexion  paddles     UE alt abduction  paddles     UE alt hugs  paddles     UE internal rotation/external rotation  paddles     UE alt rows  paddles                         x = exercise details same as prior session        Myles received therapeutic exercises to develop strength, endurance, ROM and posture for (0) minutes including:     TherEx Today     Upright Bike for endurance  Level 5 5 minutes   Kneeling hip flexor stretch  3 x 30 seconds each bilateral   Prone quad stretch  1 minute each each bilateral   Seated Piriformis stretch   2 x 30 seconds Bilateral    Figure 4 stretch      Hamstring stretch  3x10s 3 way Bilateral    Straight leg raise  3 x 10 each bilateral 3#   Reverse clam  3x10 with ball between knees 3#   clamshells   2 x 10 each bilateral red   LONG ARC QUAD        Sidelying hip aDduction/aBduction  3 x 10 each bilateral 3#   Butterfly stretch  2 x 1 minute   Prone hip extension knee bent  3 x 10   Prone hamstring curls  3x10 2#        TKEs  Purple cook band 3x10 each   Prone lying with forearm pushup  5 minutes   Lumbar Ball Roll Outs  10s 10x 3 way   Lower Trunk Rotations  3 minutes   Planks  3 x 20s   Piriformis stretch  1 minute each   Single knee to chest stretch  1 minute each          Plan for Next Visit: practice core stabilization     Pt participated in neuromuscular  "re-education activities to improve: Coordination, Proprioception and Posture for (0) minutes. The following activities were included:    Neuro Re-Ed 1/6/2022     Pelvic tilt with TRANSVERS ABDOMINUS   3x10 with 3s hold   TRANSVERS ABDOMINUS contractions  3x10   Ball squeeze with TRANSVERS ABDOMINUS  3x10 with 3s hold   Trunk rotation with cables high>low  3x10 50#       Pt participated in dynamic functional therapeutic activities to improve functional performance for (0) minutes, including:  Box squats  3x10 green 24 inch box bilateral   Wall clamshells  Green 3x10 each     Monster walks forward/backward  Green 3 big mirror laps at ankle   Standing heel taps  3x10 Bilateral 2" step   Side stepping  Green 3 big mirror laps at ankle   Standing hip 3 way  Green 3x10 each bilateral standing on airex                 MANUAL THERAPY TECHNIQUES including Myofascial release and Soft tissue Mobilization were applied to right hip for 0 minutes.    Manual Intervention Performed Today    Soft Tissue Mobilization  Right hip, glute, IT band, quad and surrounding musculature with use of the stick   Manual Lumbar distraction     Long axis distraction  Right    Muscle Energy Technique     Functional Dry Needling       Plan for Next Visit:        Home Exercises and Patient Education Provided     Education provided:   · Patient educated on the impairments noted above and the effects of physical therapy intervention to improve overall condition and QOL.   · Patient was educated on all the above exercise prior/during/after for proper posture, positioning, and execution for safe performance with home exercise program.     Written Home Exercises Provided: Patient instructed to cont prior HEP.  Exercises were reviewed and Myles was able to demonstrate them prior to the end of the session.  Myles demonstrated good understanding of the education provided.      See EMR under Patient Instructions for exercises provided " 9/28/2021.    ASSESSMENT     Myles Pride tolerated session well with no increased pain.  Patient given cues for form with good response.      Myles is progressing well towards his goals.   Pt prognosis is Good.     Pt will continue to benefit from skilled outpatient physical therapy to address the deficits listed in the problem list box on initial evaluation, provide pt/family education and to maximize pt's level of independence in the home and community environment.     Pt's spiritual, cultural and educational needs considered and pt agreeable to plan of care and goals.    Anticipated barriers to physical therapy: none    GOALS:  SHORT TERM GOALS: 6 weeks     1. Recent signs and systems trend is improving in order to progress towards LTG's.  Met   2. Patient will be independent with HEP in order to further progress and return to maximal function. Met    3. Pain rating at Worst: 5/10 in order to progress towards increased independence with activity. Met    4. Patient will be able to correct postural deviations in sitting and standing, to decrease pain and promote postural awareness for injury prevention.  PC       LONG TERM GOALS: 12 weeks     1. Patient will return to normal ADL, recreational, and work related activities with less pain and limitation.  PC    2. Patient will improve AROM to stated goals in order to return to maximal functional potential.   PC   3. Patient will improve Strength to stated goals of appropriate musculature in order to improve functional independence.   PC   4. Pain Rating at Best: 1/10 to improve Quality of Life.      PC   5. Patient will meet predicted functional outcome (FOTO) score: 33% to increase self-worth & perceived functional ability.     PC        6. Patient will have met/partially met personal goal of: Patient reports being a  and going on long walks but is unable to participate lately.     PC         PM=Partially Met     PC=Progressing/Continued      M=Met    PLAN   Plan of care Certification: 9/28/2021 to 12/21/2021.     Continue Plan of Care (POC) and progress per patient tolerance. See Treatment section for exercise progression.    Jennie Gordon, PT

## 2022-01-10 ENCOUNTER — HOSPITAL ENCOUNTER (OUTPATIENT)
Dept: RADIOLOGY | Facility: HOSPITAL | Age: 67
Discharge: HOME | End: 2022-01-10
Attending: ANESTHESIOLOGY
Payer: MEDICARE

## 2022-01-10 DIAGNOSIS — M54.16 LUMBAR RADICULOPATHY: ICD-10-CM

## 2022-01-10 PROCEDURE — 72148 MRI LUMBAR SPINE W/O DYE: CPT | Mod: 26,HCNC,, | Performed by: RADIOLOGY

## 2022-01-10 PROCEDURE — 72148 MRI LUMBAR SPINE WITHOUT CONTRAST: ICD-10-PCS | Mod: 26,HCNC,, | Performed by: RADIOLOGY

## 2022-01-10 PROCEDURE — 72148 MRI LUMBAR SPINE W/O DYE: CPT | Mod: TC,HCNC,PO

## 2022-01-11 ENCOUNTER — CLINICAL SUPPORT (OUTPATIENT)
Dept: REHABILITATION | Facility: HOSPITAL | Age: 67
End: 2022-01-11
Payer: MEDICARE

## 2022-01-11 DIAGNOSIS — M25.551 PAIN IN RIGHT HIP: ICD-10-CM

## 2022-01-11 DIAGNOSIS — R29.898 WEAKNESS OF RIGHT HIP: ICD-10-CM

## 2022-01-11 PROCEDURE — 97113 AQUATIC THERAPY/EXERCISES: CPT | Mod: HCNC | Performed by: PHYSICAL THERAPIST

## 2022-01-11 NOTE — PROGRESS NOTES
[facility]  Physical Therapy Daily Treatment Note       Name: Myles Pride  Clinic Number: [id number]    Therapy Diagnosis:   Encounter Diagnoses   Name Primary?    Pain in right hip     Weakness of right hip      Physician: Emelia Riddle PA-C    Visit Date: 1/11/2022    Physician Orders: PT Eval and Treat   Medical Diagnosis from Referral: M25.551 (ICD-10-CM) - Right hip pain  Evaluation Date: 9/28/2021  Authorization Period Expiration: 04/05/2022  Plan of Care Expiration: 2/30/2022                  Progress Note Due: 1/23/2021   Visit # / Visits authorized: 20/26 (+1 eval)  FOTO:2/3(5th visit)     Precautions: Standard    Time In: 4:07 pm  Time Out: 4:49 pm  Total Billable Time: 42 minutes    SUBJECTIVE     Pt reports: he did more walking and stretching this morning with a little more fatigue noted.       Compliance with Hep: Daily  Response to previous treatment: no adverse reactions to treatment/updated HEP  Functional change: No Change    Pain: 2/10   Worst: 2/10  Location: Right hip  Pain Control: rest  Aggravating factors: Standing, Walking and Morning    OBJECTIVE     Objective Measures updated at progress report unless specified otherwise.      Treatment     Gym/Equipment Access: none noted  Time to Complete Exercises:  42 minutes    Myles received aquatic therapy with the use of water to decrease the pull of gravity and weightbearing forces on the body to increase tolerance to resisted therex for 41 minutes including the following exercise as well as stepping in and out of the pool with use of handrail and step-to pattern:     Exercise Performed Today  1/11/2022   Treadmill, forward, 1.3 mph x 3 minutes   Treadmill, side-stepping, 0.7 mph x 3 minutes   Treadmill, backwards, 0.9 mph x 3 minutes   Marching, alternating x 3 minutes   Hamstring curls, alternating  x 3 minutes   Hip 3-way, B lower extremity x 3 minutes each         Bicycle forward/backward   minutes  each    Flutter kick   minutes    Scissor kicks   minutes         Quad stretch on step      Hamstring stretch on step  x 2 x 1 minute each   Gastroc stretch  at wall x 1 minute each   Heel/toe raises x 3 minutes   1/4 squat  x 3 minutes   Standing hip circles clockwise/counter clockwise   minutes each    Standing figure 8   10x each         UE alt flexion  paddles     UE alt abduction  paddles     UE alt hugs  paddles     UE internal rotation/external rotation  paddles     UE alt rows  paddles                         x = exercise details same as prior session        Myles received therapeutic exercises to develop strength, endurance, ROM and posture for (0) minutes including:     TherEx Today     Upright Bike for endurance  Level 5 5 minutes   Kneeling hip flexor stretch  3 x 30 seconds each bilateral   Prone quad stretch  1 minute each each bilateral   Seated Piriformis stretch   2 x 30 seconds Bilateral    Figure 4 stretch      Hamstring stretch  3x10s 3 way Bilateral    Straight leg raise  3 x 10 each bilateral 3#   Reverse clam  3x10 with ball between knees 3#   clamshells   2 x 10 each bilateral red   LONG ARC QUAD        Sidelying hip aDduction/aBduction  3 x 10 each bilateral 3#   Butterfly stretch  2 x 1 minute   Prone hip extension knee bent  3 x 10   Prone hamstring curls  3x10 2#        TKEs  Purple cook band 3x10 each   Prone lying with forearm pushup  5 minutes   Lumbar Ball Roll Outs  10s 10x 3 way   Lower Trunk Rotations  3 minutes   Planks  3 x 20s   Piriformis stretch  1 minute each   Single knee to chest stretch  1 minute each          Plan for Next Visit: practice core stabilization     Pt participated in neuromuscular re-education activities to improve: Coordination, Proprioception and Posture for (0) minutes. The following activities were included:    Neuro Re-Ed 1/11/2022     Pelvic tilt with TRANSVERS ABDOMINUS   3x10 with 3s hold   TRANSVERS ABDOMINUS contractions  3x10   Ball squeeze with  "TRANSVERS ABDOMINUS  3x10 with 3s hold   Trunk rotation with cables high>low  3x10 50#       Pt participated in dynamic functional therapeutic activities to improve functional performance for (0) minutes, including:  Box squats  3x10 green 24 inch box bilateral   Wall clamshells  Green 3x10 each     Monster walks forward/backward  Green 3 big mirror laps at ankle   Standing heel taps  3x10 Bilateral 2" step   Side stepping  Green 3 big mirror laps at ankle   Standing hip 3 way  Green 3x10 each bilateral standing on airex                 MANUAL THERAPY TECHNIQUES including Myofascial release and Soft tissue Mobilization were applied to right hip for 0 minutes.    Manual Intervention Performed Today    Soft Tissue Mobilization  Right hip, glute, IT band, quad and surrounding musculature with use of the stick   Manual Lumbar distraction     Long axis distraction  Right    Muscle Energy Technique     Functional Dry Needling       Plan for Next Visit:        Home Exercises and Patient Education Provided     Education provided:   · Patient educated on the impairments noted above and the effects of physical therapy intervention to improve overall condition and QOL.   · Patient was educated on all the above exercise prior/during/after for proper posture, positioning, and execution for safe performance with home exercise program.     Written Home Exercises Provided: Patient instructed to cont prior HEP.  Exercises were reviewed and Myles was able to demonstrate them prior to the end of the session.  Myles demonstrated good understanding of the education provided.      See EMR under Patient Instructions for exercises provided 9/28/2021.    ASSESSMENT     Myles Pride tolerated session well with good response to cues for posture and form. Patient with cues to decrease range on hip motion to maintain upright posture.       Myles is progressing well towards his goals.   Pt prognosis is Good.     Pt will continue to " benefit from skilled outpatient physical therapy to address the deficits listed in the problem list box on initial evaluation, provide pt/family education and to maximize pt's level of independence in the home and community environment.     Pt's spiritual, cultural and educational needs considered and pt agreeable to plan of care and goals.    Anticipated barriers to physical therapy: none    GOALS:  SHORT TERM GOALS: 6 weeks     1. Recent signs and systems trend is improving in order to progress towards LTG's.  Met   2. Patient will be independent with HEP in order to further progress and return to maximal function. Met    3. Pain rating at Worst: 5/10 in order to progress towards increased independence with activity. Met    4. Patient will be able to correct postural deviations in sitting and standing, to decrease pain and promote postural awareness for injury prevention.  PC       LONG TERM GOALS: 12 weeks     1. Patient will return to normal ADL, recreational, and work related activities with less pain and limitation.  PC    2. Patient will improve AROM to stated goals in order to return to maximal functional potential.   PC   3. Patient will improve Strength to stated goals of appropriate musculature in order to improve functional independence.   PC   4. Pain Rating at Best: 1/10 to improve Quality of Life.      PC   5. Patient will meet predicted functional outcome (FOTO) score: 33% to increase self-worth & perceived functional ability.     PC        6. Patient will have met/partially met personal goal of: Patient reports being a  and going on long walks but is unable to participate lately.     PC         PM=Partially Met     PC=Progressing/Continued     M=Met    PLAN   Plan of care Certification: 9/28/2021 to 12/21/2021.     Continue Plan of Care (POC) and progress per patient tolerance. See Treatment section for exercise progression.    Jennie Gordon, PT

## 2022-01-14 ENCOUNTER — TELEPHONE (OUTPATIENT)
Dept: PAIN MEDICINE | Facility: CLINIC | Age: 67
End: 2022-01-14
Payer: MEDICARE

## 2022-01-14 ENCOUNTER — CLINICAL SUPPORT (OUTPATIENT)
Dept: REHABILITATION | Facility: HOSPITAL | Age: 67
End: 2022-01-14
Payer: MEDICARE

## 2022-01-14 DIAGNOSIS — M25.551 PAIN IN RIGHT HIP: ICD-10-CM

## 2022-01-14 DIAGNOSIS — R29.898 WEAKNESS OF RIGHT HIP: ICD-10-CM

## 2022-01-14 PROCEDURE — 97113 AQUATIC THERAPY/EXERCISES: CPT | Mod: HCNC,CQ

## 2022-01-14 NOTE — PROGRESS NOTES
[facility]  Physical Therapy Daily Treatment Note       Name: Myles Pride  Clinic Number: [id number]    Therapy Diagnosis:   Encounter Diagnoses   Name Primary?    Pain in right hip     Weakness of right hip      Physician: Emelia Riddle PA-C    Visit Date: 1/14/2022    Physician Orders: PT Eval and Treat   Medical Diagnosis from Referral: M25.551 (ICD-10-CM) - Right hip pain  Evaluation Date: 9/28/2021  Authorization Period Expiration: 04/05/2022  Plan of Care Expiration: 2/30/2022                  Progress Note Due: 1/23/2021   Visit # / Visits authorized: 21/26 (+1 eval)  FOTO:2/3(5th visit)     Precautions: Standard    Time In: 9:50 am ANKLE WEIGHTS ADDED  Time Out: 10:37am  Total Billable Time: 45 minutes    SUBJECTIVE     Pt reports: he had a great day yesterday and has been feeling good today. He has a little right sided groin pull feeling today. He reports only slight pain today to say that the pain is there, but it is not bad at all.      Compliance with Hep: Daily  Response to previous treatment: no adverse reactions to treatment/updated HEP  Functional change: No Change    Pain: 2/10   Worst: 2/10  Location: Right hip  Pain Control: rest  Aggravating factors: Standing, Walking and Morning    OBJECTIVE     Objective Measures updated at progress report unless specified otherwise.      Treatment     Gym/Equipment Access: none noted  Time to Complete Exercises:  45 minutes    Myles received aquatic therapy with the use of water to decrease the pull of gravity and weightbearing forces on the body to increase tolerance to resisted therex for 45 minutes including the following exercise as well as stepping in and out of the pool with use of handrail and step-to pattern:     ANKLE WEIGHTS TODAY!    Exercise Performed Today  1/14/2022   Treadmill, forward, 1.4 mph no weights x 3 minutes   Treadmill, side-stepping, 1.0 mph no weights x 3 minutes   Treadmill, backwards, 1.2  mph no weights x 3 minutes   Marching, alternating  x 3 minutes   Hamstring curls, alternating  x 3 minutes   Hip 3-way, B lower extremity x 3 minutes each         Bicycle forward/backward   minutes each    Flutter kick   minutes    Scissor kicks   minutes    Step ups x 2x10 each bilateral   Quad stretch on step      Hamstring stretch on step  x 2 x 1 minute each   Gastroc stretch  at wall x 1 minute each   Heel/toe raises x 3 minutes   1/4 squat  x 3 minutes   Standing hip circles clockwise/counter clockwise   minutes each    Standing figure 8   10x each         UE alt flexion  paddles     UE alt abduction  paddles     UE alt hugs  paddles     UE internal rotation/external rotation  paddles     UE alt rows  paddles                         x = exercise details same as prior session        Myles received therapeutic exercises to develop strength, endurance, ROM and posture for (0) minutes including:     TherEx Today     Upright Bike for endurance  Level 5 5 minutes   Kneeling hip flexor stretch  3 x 30 seconds each bilateral   Prone quad stretch  1 minute each each bilateral   Seated Piriformis stretch   2 x 30 seconds Bilateral    Figure 4 stretch      Hamstring stretch  3x10s 3 way Bilateral    Straight leg raise  3 x 10 each bilateral 3#   Reverse clam  3x10 with ball between knees 3#   clamshells   2 x 10 each bilateral red   LONG ARC QUAD        Sidelying hip aDduction/aBduction  3 x 10 each bilateral 3#   Butterfly stretch  2 x 1 minute   Prone hip extension knee bent  3 x 10   Prone hamstring curls  3x10 2#        TKEs  Purple cook band 3x10 each   Prone lying with forearm pushup  5 minutes   Lumbar Ball Roll Outs  10s 10x 3 way   Lower Trunk Rotations  3 minutes   Planks  3 x 20s   Piriformis stretch  1 minute each   Single knee to chest stretch  1 minute each          Plan for Next Visit: practice core stabilization     Pt participated in neuromuscular re-education activities to improve: Coordination,  "Proprioception and Posture for (0) minutes. The following activities were included:    Neuro Re-Ed 1/14/2022     Pelvic tilt with TRANSVERS ABDOMINUS   3x10 with 3s hold   TRANSVERS ABDOMINUS contractions  3x10   Ball squeeze with TRANSVERS ABDOMINUS  3x10 with 3s hold   Trunk rotation with cables high>low  3x10 50#       Pt participated in dynamic functional therapeutic activities to improve functional performance for (0) minutes, including:  Box squats  3x10 green 24 inch box bilateral   Wall clamshells  Green 3x10 each     Monster walks forward/backward  Green 3 big mirror laps at ankle   Standing heel taps  3x10 Bilateral 2" step   Side stepping  Green 3 big mirror laps at ankle   Standing hip 3 way  Green 3x10 each bilateral standing on airex                 MANUAL THERAPY TECHNIQUES including Myofascial release and Soft tissue Mobilization were applied to right hip for 0 minutes.    Manual Intervention Performed Today    Soft Tissue Mobilization  Right hip, glute, IT band, quad and surrounding musculature with use of the stick   Manual Lumbar distraction     Long axis distraction  Right    Muscle Energy Technique     Functional Dry Needling       Plan for Next Visit:        Home Exercises and Patient Education Provided     Education provided:   · Patient educated on the impairments noted above and the effects of physical therapy intervention to improve overall condition and QOL.   · Patient was educated on all the above exercise prior/during/after for proper posture, positioning, and execution for safe performance with home exercise program.     Written Home Exercises Provided: Patient instructed to cont prior HEP.  Exercises were reviewed and Myles was able to demonstrate them prior to the end of the session.  Myles demonstrated good understanding of the education provided.      See EMR under Patient Instructions for exercises provided 9/28/2021.    ASSESSMENT     Myles Pride did well with session " today with no complaints of discomfort. Treadmill speed increased, patient with good upright posture and good performance of walking in all 4 ways. Ankle weights added after treadmill activity, patient with good fatigue noted. Step ups added today, patient needing verbal cues for knee extension and proper performance, no pain associated. Patient left session with no reports of increased pain, expecting soreness.     Myles is progressing well towards his goals.   Pt prognosis is Good.     Pt will continue to benefit from skilled outpatient physical therapy to address the deficits listed in the problem list box on initial evaluation, provide pt/family education and to maximize pt's level of independence in the home and community environment.     Pt's spiritual, cultural and educational needs considered and pt agreeable to plan of care and goals.    Anticipated barriers to physical therapy: none    GOALS:  SHORT TERM GOALS: 6 weeks     1. Recent signs and systems trend is improving in order to progress towards LTG's.  Met   2. Patient will be independent with HEP in order to further progress and return to maximal function. Met    3. Pain rating at Worst: 5/10 in order to progress towards increased independence with activity. Met    4. Patient will be able to correct postural deviations in sitting and standing, to decrease pain and promote postural awareness for injury prevention.  PC       LONG TERM GOALS: 12 weeks     1. Patient will return to normal ADL, recreational, and work related activities with less pain and limitation.  PC    2. Patient will improve AROM to stated goals in order to return to maximal functional potential.   PC   3. Patient will improve Strength to stated goals of appropriate musculature in order to improve functional independence.   PC   4. Pain Rating at Best: 1/10 to improve Quality of Life.      PC   5. Patient will meet predicted functional outcome (FOTO) score: 33% to increase self-worth  & perceived functional ability.     PC        6. Patient will have met/partially met personal goal of: Patient reports being a  and going on long walks but is unable to participate lately.     PC         PM=Partially Met     PC=Progressing/Continued     M=Met    PLAN   Plan of care Certification: 2/30/2022      Continue Plan of Care (POC) and progress per patient tolerance. See Treatment section for exercise progression.    Kristi Norris, PTA

## 2022-01-17 NOTE — H&P (VIEW-ONLY)
"Established Patient Chronic Pain Note     Referring Physician: No ref. provider found    PCP: Catalino Arias MD    Chief Complaint:   Chief Complaint   Patient presents with    Low-back Pain        SUBJECTIVE:  Interval history 01/18/2022  Patient presents for MRI review.  Today patient again reports lower back pain which radiates down the anterior aspects of bilateral lower extremities in L3-4 distribution to the knee.  Today patient reports pain is a 4/10.  Patient has continued gabapentin titration and has reached 600 mg 3 times daily.  Patient denies any side effects from this medication.  Patient reports diminishing return and benefit from aqua therapy secondary to his symptoms.  He denies significant lower extremity weakness or bowel or bladder incontinence.  Patient is interested in pursuing intervention.    HPI 12/21/2021  Myles Pride is a 66 y.o. male with past medical history significant for hyperlipidemia who presents to the clinic for the evaluation of lower back and leg pain.  Patient reports pain began with treatment (physical therapy) of right hip pain, approximately 2 months prior.  Today patient reports lower back pain which presents in a bandlike distribution in his lower back and radiates down the anterior aspects of bilateral lower extremities in L3-4 distribution to the knee.  Pain is intermittent and described as 4/10.  Pain is described as a aching sensation in the back and a tight sensation in bilateral lower extremities.  Pain is exacerbated when moving from sitting to standing and with ambulation.  Patient reports he was able to ambulate "6/10th of a mile" in the past and now is only able to ambulate approximately 1-2 blocks before requiring rest.  Patient does report associated subjective weakness in bilateral lower extremities.  Pain is improved with heat, sitting, reclining, prior prescription for Medrol Dosepak from his primary care PA, Ms. Riddle.     Patient reports " significant motor weakness   Patient denies night fever/night sweats, urinary incontinence, bowel incontinence and significant weight loss and loss of sensations.    Pain Disability Index Review:     Last 3 PDI Scores 1/18/2022 12/21/2021   Pain Disability Index (PDI) 28 39       Non-Pharmacologic Treatments:  Physical Therapy/Home Exercise: yes  Ice/Heat:yes  TENS: no  Acupuncture: no  Massage: no  Chiropractic: no    Other: no      Pain Medications:  - Adjuvant Medications: Alleve (Naproxen) and Neurontin (Gabapentin)    Pain Procedures:   None    Past Medical History:   Diagnosis Date    Hyperlipidemia     PONV (postoperative nausea and vomiting) 1990    after knee arthroscopy    Vitamin D deficiency 12/18/2019     Past Surgical History:   Procedure Laterality Date    COLONOSCOPY      COLONOSCOPY N/A 12/19/2019    Procedure: COLONOSCOPY;  Surgeon: Bhupendra Wilkinson MD;  Location: [facility];  Service: Endoscopy;  Laterality: N/A;    KNEE ARTHROSCOPY Left     VASECTOMY  1995     Review of patient's allergies indicates:  No Known Allergies    Current Outpatient Medications   Medication Sig    ergocalciferol, vitamin D2, (VITAMIN D ORAL) Take by mouth once daily.    naproxen (NAPROSYN) 500 MG tablet Take 1 tablet (500 mg total) by mouth 2 (two) times daily.    pravastatin (PRAVACHOL) 40 MG tablet Take 1 tablet (40 mg total) by mouth once daily.    gabapentin (NEURONTIN) 600 MG tablet Take 1 tablet (600 mg total) by mouth 3 (three) times daily.     No current facility-administered medications for this visit.       Review of Systems     GENERAL:  No weight loss, malaise or fevers.  HEENT:   No recent changes in vision or hearing  NECK:  Negative for lumps, no difficulty with swallowing.  RESPIRATORY:  Negative for cough, wheezing or shortness of breath, patient denies any recent URI.  CARDIOVASCULAR:  Negative for chest pain, leg swelling or palpitations.  GI:  Negative for abdominal discomfort, blood in stools  "or black stools or change in bowel habits.  MUSCULOSKELETAL:  See HPI.  SKIN:  Negative for lesions, rash, and itching.  PSYCH:  No mood disorder or recent psychosocial stressors.   HEMATOLOGY/LYMPHOLOGY:  Negative for prolonged bleeding, bruising easily or swollen nodes.    NEURO:   No history of headaches, syncope, paralysis, seizures or tremors.  All other reviewed and negative other than HPI.    OBJECTIVE:    BP (!) 148/89   Pulse (!) 54   Resp 17   Ht 6' 5" (1.956 m)   Wt 108 kg (238 lb 3.3 oz)   BMI 28.25 kg/m²       Physical Exam    GENERAL: Well appearing, in no acute distress, alert and oriented x3.  PSYCH:  Mood and affect appropriate.  SKIN: Skin color, texture, turgor normal, no rashes or lesions.  HEAD/FACE:  Normocephalic, atraumatic. Cranial nerves grossly intact.    CV: RRR with palpation of the radial artery.  PULM: No evidence of respiratory difficulty, symmetric chest rise.  GI:  Soft and non-tender.    BACK: genu varus b/l. Straight leg raising in the sitting and supine positions is negative to radicular pain. No pain to palpation over the facet joints of the lumbar spine or spinous processes. Normal range of motion without pain reproduction.  EXTREMITIES: Peripheral joint ROM is full and pain free without obvious instability or laxity in all four extremities. No deformities, edema, or skin discoloration. Good capillary refill.  MUSCULOSKELETAL: Able to stand on heels & toes.  Right lower extremity, approximately half to 1 in longer than left lower extremity.  hip, and knee provocative maneuvers are negative.  There is no pain with palpation over the sacroiliac joints bilaterally.  FABERs test is negative.  FADIR test is negative bilaterally.   Bilateral upper and lower extremity strength is normal and symmetric.  No atrophy or tone abnormalities are noted.  RIGHT Lower extremity: Hip flexion 5/5, Hip Abduction 5/5, Hip Adduction 5/5, Knee extension 5/5, Knee flexion 5/5, Ankle " dorsiflexion5/5, Extensor hallucis longus 5/5, Ankle plantarflexion 5/5  LEFT Lower extremity:  Hip flexion 5/5, Hip Abduction 5/5,Hip Adduction 5/5, Knee extension 5/5, Knee flexion 5/5, Ankle dorsiflexion 5/5, Extensor hallucis longus 5/5, Ankle plantarflexion 5/5  -Normal testing knee (patellar) jerk and ankle (achilles) jerk    NEURO: Bilateral upper and lower extremity coordination and muscle stretch reflexes are physiologic and symmetric. No loss of sensation is noted.  GAIT: normal.    Imaging  MRI lumbar spine 12/21/2021  FINDINGS:  Vertebral body heights with upper lumbar and lower thoracic spine Schmorl node endplate changes.  No spondylolisthesis.  Mild Modic 1 endplate changes noted at L4-5.  Multilevel disc desiccation present with height loss most noted at L4-5.     Conus terminates normally.  Visualized intra-abdominal/pelvic structures are unremarkable.     L1-L2: No spinal canal stenosis or neural foraminal narrowing.  Facet arthropathy findings.     L2-L3: No significant spinal canal stenosis or neural foraminal narrowing.  Moderate to severe facet arthropathy.     L3-L4: Circumferential disc bulging present with suspected right paracentral small disc protrusion.  This in conjunction with congenitally short pedicles and prominent facet/ligamentum flavum degenerative hypertrophy findings causes severe spinal canal stenosis.  Small right facet joint effusion present with 4 mm right facet synovial cyst deep to the ligamentum flavum.  Left foraminal extraforaminal annular fissure.  Mild bilateral inferior neural foraminal narrowing.     L4-L5: Circumferential disc bulging in conjunction with congenitally short pedicles and prominent facet degenerative hypertrophy findings causes severe spinal canal stenosis.  Severe bilateral neural foraminal narrowing present with likely impingement of the exiting nerve roots.     L5-S1: No significant posterior disc bulge.  Prominent facet arthropathy noted with mild  left and moderate right neural foraminal narrowing.       09/13/21  X-Ray Lumbar Spine 5 View  FINDINGS:  There is minimal lumbar levocurvature.  No fracture or malalignment.  No pars defect.  There is mild loss of disc height at L4-5.  Severe multilevel facet arthropathy is present.    ASSESSMENT: 66 y.o. year old male with lower back and leg pain, consistent with     1. Lumbar radiculopathy  IR Epidural Transfor 1st Vert Lumbar Lawrence    Case Request-RAD/Other Procedure Area: Bilateral L4/5 TF LOVE with RN IV sedation   2. Leg length discrepancy     3. Lumbar facet arthropathy     4. Spinal stenosis of lumbar region without neurogenic claudication           PLAN:   - Interventions:  Schedule for bilateral L4-5 transforaminal epidural steroid injection to see if this helps with radicular symptoms.  Explained the risks and benefits of the procedure in detail with the patient today in clinic along with alternative treatment options.  Patient has elected to pursue this procedure.    - Anticoagulation use: no no anticoagulation     report:  Reviewed and consistent with medication use as prescribed.    - Medications:  --Continue gabapentin .  We have reviewed potential side effects of this medication including daytime somnolence, weight gain and peripheral edema  Gabapentin 600mg TID  Refill x2    - Therapy:   We discussed continuing physical therapy to help manage the patient/s painful condition. The patient was counseled that muscle strengthening will improve the long term prognosis in regards to pain and may also help increase range of motion and mobility.     -Consults/Referrals: HME : heel insert/fitting for leg length discrepancy.    - Imaging: Reviewed available imaging with patient and answered any questions they had regarding study    - Follow up visit: return to clinic in 4-6 weeks    The above plan and management options were discussed at length with patient. Patient is in agreement with the above and  verbalized understanding.    - I discussed the goals of interventional chronic pain management with the patient on today's visit. We discussed a multimodal and systematic approach to pain.  This includes diagnostic and therapeutic injections, adjuvant pharmacologic treatment, physical therapy, and at times psychiatry.  I emphasized the importance of regular exercise, core strengthening and stretching, diet and weight loss as a cornerstone of long-term pain management.    - This condition does not require this patient to take time off of work, and the primary goal of our Pain Management services is to improve the patient's functional capacity.  - Patient Questions: Answered all of the patient's questions regarding diagnoses, therapy, treatment and next steps        Ben Eddy MD  Interventional Pain Management  [facility]    Disclaimer:  This note was prepared using voice recognition system and is likely to have sound alike errors that may have been overlooked even after proof reading.  Please call me with any questions

## 2022-01-17 NOTE — PROGRESS NOTES
"Established Patient Chronic Pain Note     Referring Physician: No ref. provider found    PCP: Catalino Arias MD    Chief Complaint:   Chief Complaint   Patient presents with    Low-back Pain        SUBJECTIVE:  Interval history 01/18/2022  Patient presents for MRI review.  Today patient again reports lower back pain which radiates down the anterior aspects of bilateral lower extremities in L3-4 distribution to the knee.  Today patient reports pain is a 4/10.  Patient has continued gabapentin titration and has reached 600 mg 3 times daily.  Patient denies any side effects from this medication.  Patient reports diminishing return and benefit from aqua therapy secondary to his symptoms.  He denies significant lower extremity weakness or bowel or bladder incontinence.  Patient is interested in pursuing intervention.    HPI 12/21/2021  Myles Pride is a 66 y.o. male with past medical history significant for hyperlipidemia who presents to the clinic for the evaluation of lower back and leg pain.  Patient reports pain began with treatment (physical therapy) of right hip pain, approximately 2 months prior.  Today patient reports lower back pain which presents in a bandlike distribution in his lower back and radiates down the anterior aspects of bilateral lower extremities in L3-4 distribution to the knee.  Pain is intermittent and described as 4/10.  Pain is described as a aching sensation in the back and a tight sensation in bilateral lower extremities.  Pain is exacerbated when moving from sitting to standing and with ambulation.  Patient reports he was able to ambulate "6/10th of a mile" in the past and now is only able to ambulate approximately 1-2 blocks before requiring rest.  Patient does report associated subjective weakness in bilateral lower extremities.  Pain is improved with heat, sitting, reclining, prior prescription for Medrol Dosepak from his primary care PA, Ms. Riddle.     Patient reports " significant motor weakness   Patient denies night fever/night sweats, urinary incontinence, bowel incontinence and significant weight loss and loss of sensations.    Pain Disability Index Review:     Last 3 PDI Scores 1/18/2022 12/21/2021   Pain Disability Index (PDI) 28 39       Non-Pharmacologic Treatments:  Physical Therapy/Home Exercise: yes  Ice/Heat:yes  TENS: no  Acupuncture: no  Massage: no  Chiropractic: no    Other: no      Pain Medications:  - Adjuvant Medications: Alleve (Naproxen) and Neurontin (Gabapentin)    Pain Procedures:   None    Past Medical History:   Diagnosis Date    Hyperlipidemia     PONV (postoperative nausea and vomiting) 1990    after knee arthroscopy    Vitamin D deficiency 12/18/2019     Past Surgical History:   Procedure Laterality Date    COLONOSCOPY      COLONOSCOPY N/A 12/19/2019    Procedure: COLONOSCOPY;  Surgeon: Bhupendra Wilkinson MD;  Location: [facility];  Service: Endoscopy;  Laterality: N/A;    KNEE ARTHROSCOPY Left     VASECTOMY  1995     Review of patient's allergies indicates:  No Known Allergies    Current Outpatient Medications   Medication Sig    ergocalciferol, vitamin D2, (VITAMIN D ORAL) Take by mouth once daily.    naproxen (NAPROSYN) 500 MG tablet Take 1 tablet (500 mg total) by mouth 2 (two) times daily.    pravastatin (PRAVACHOL) 40 MG tablet Take 1 tablet (40 mg total) by mouth once daily.    gabapentin (NEURONTIN) 600 MG tablet Take 1 tablet (600 mg total) by mouth 3 (three) times daily.     No current facility-administered medications for this visit.       Review of Systems     GENERAL:  No weight loss, malaise or fevers.  HEENT:   No recent changes in vision or hearing  NECK:  Negative for lumps, no difficulty with swallowing.  RESPIRATORY:  Negative for cough, wheezing or shortness of breath, patient denies any recent URI.  CARDIOVASCULAR:  Negative for chest pain, leg swelling or palpitations.  GI:  Negative for abdominal discomfort, blood in stools  "or black stools or change in bowel habits.  MUSCULOSKELETAL:  See HPI.  SKIN:  Negative for lesions, rash, and itching.  PSYCH:  No mood disorder or recent psychosocial stressors.   HEMATOLOGY/LYMPHOLOGY:  Negative for prolonged bleeding, bruising easily or swollen nodes.    NEURO:   No history of headaches, syncope, paralysis, seizures or tremors.  All other reviewed and negative other than HPI.    OBJECTIVE:    BP (!) 148/89   Pulse (!) 54   Resp 17   Ht 6' 5" (1.956 m)   Wt 108 kg (238 lb 3.3 oz)   BMI 28.25 kg/m²       Physical Exam    GENERAL: Well appearing, in no acute distress, alert and oriented x3.  PSYCH:  Mood and affect appropriate.  SKIN: Skin color, texture, turgor normal, no rashes or lesions.  HEAD/FACE:  Normocephalic, atraumatic. Cranial nerves grossly intact.    CV: RRR with palpation of the radial artery.  PULM: No evidence of respiratory difficulty, symmetric chest rise.  GI:  Soft and non-tender.    BACK: genu varus b/l. Straight leg raising in the sitting and supine positions is negative to radicular pain. No pain to palpation over the facet joints of the lumbar spine or spinous processes. Normal range of motion without pain reproduction.  EXTREMITIES: Peripheral joint ROM is full and pain free without obvious instability or laxity in all four extremities. No deformities, edema, or skin discoloration. Good capillary refill.  MUSCULOSKELETAL: Able to stand on heels & toes.  Right lower extremity, approximately half to 1 in longer than left lower extremity.  hip, and knee provocative maneuvers are negative.  There is no pain with palpation over the sacroiliac joints bilaterally.  FABERs test is negative.  FADIR test is negative bilaterally.   Bilateral upper and lower extremity strength is normal and symmetric.  No atrophy or tone abnormalities are noted.  RIGHT Lower extremity: Hip flexion 5/5, Hip Abduction 5/5, Hip Adduction 5/5, Knee extension 5/5, Knee flexion 5/5, Ankle " dorsiflexion5/5, Extensor hallucis longus 5/5, Ankle plantarflexion 5/5  LEFT Lower extremity:  Hip flexion 5/5, Hip Abduction 5/5,Hip Adduction 5/5, Knee extension 5/5, Knee flexion 5/5, Ankle dorsiflexion 5/5, Extensor hallucis longus 5/5, Ankle plantarflexion 5/5  -Normal testing knee (patellar) jerk and ankle (achilles) jerk    NEURO: Bilateral upper and lower extremity coordination and muscle stretch reflexes are physiologic and symmetric. No loss of sensation is noted.  GAIT: normal.    Imaging  MRI lumbar spine 12/21/2021  FINDINGS:  Vertebral body heights with upper lumbar and lower thoracic spine Schmorl node endplate changes.  No spondylolisthesis.  Mild Modic 1 endplate changes noted at L4-5.  Multilevel disc desiccation present with height loss most noted at L4-5.     Conus terminates normally.  Visualized intra-abdominal/pelvic structures are unremarkable.     L1-L2: No spinal canal stenosis or neural foraminal narrowing.  Facet arthropathy findings.     L2-L3: No significant spinal canal stenosis or neural foraminal narrowing.  Moderate to severe facet arthropathy.     L3-L4: Circumferential disc bulging present with suspected right paracentral small disc protrusion.  This in conjunction with congenitally short pedicles and prominent facet/ligamentum flavum degenerative hypertrophy findings causes severe spinal canal stenosis.  Small right facet joint effusion present with 4 mm right facet synovial cyst deep to the ligamentum flavum.  Left foraminal extraforaminal annular fissure.  Mild bilateral inferior neural foraminal narrowing.     L4-L5: Circumferential disc bulging in conjunction with congenitally short pedicles and prominent facet degenerative hypertrophy findings causes severe spinal canal stenosis.  Severe bilateral neural foraminal narrowing present with likely impingement of the exiting nerve roots.     L5-S1: No significant posterior disc bulge.  Prominent facet arthropathy noted with mild  left and moderate right neural foraminal narrowing.       09/13/21  X-Ray Lumbar Spine 5 View  FINDINGS:  There is minimal lumbar levocurvature.  No fracture or malalignment.  No pars defect.  There is mild loss of disc height at L4-5.  Severe multilevel facet arthropathy is present.    ASSESSMENT: 66 y.o. year old male with lower back and leg pain, consistent with     1. Lumbar radiculopathy  IR Epidural Transfor 1st Vert Lumbar Lawrence    Case Request-RAD/Other Procedure Area: Bilateral L4/5 TF LOVE with RN IV sedation   2. Leg length discrepancy     3. Lumbar facet arthropathy     4. Spinal stenosis of lumbar region without neurogenic claudication           PLAN:   - Interventions:  Schedule for bilateral L4-5 transforaminal epidural steroid injection to see if this helps with radicular symptoms.  Explained the risks and benefits of the procedure in detail with the patient today in clinic along with alternative treatment options.  Patient has elected to pursue this procedure.    - Anticoagulation use: no no anticoagulation     report:  Reviewed and consistent with medication use as prescribed.    - Medications:  --Continue gabapentin .  We have reviewed potential side effects of this medication including daytime somnolence, weight gain and peripheral edema  Gabapentin 600mg TID  Refill x2    - Therapy:   We discussed continuing physical therapy to help manage the patient/s painful condition. The patient was counseled that muscle strengthening will improve the long term prognosis in regards to pain and may also help increase range of motion and mobility.     -Consults/Referrals: HME : heel insert/fitting for leg length discrepancy.    - Imaging: Reviewed available imaging with patient and answered any questions they had regarding study    - Follow up visit: return to clinic in 4-6 weeks    The above plan and management options were discussed at length with patient. Patient is in agreement with the above and  verbalized understanding.    - I discussed the goals of interventional chronic pain management with the patient on today's visit. We discussed a multimodal and systematic approach to pain.  This includes diagnostic and therapeutic injections, adjuvant pharmacologic treatment, physical therapy, and at times psychiatry.  I emphasized the importance of regular exercise, core strengthening and stretching, diet and weight loss as a cornerstone of long-term pain management.    - This condition does not require this patient to take time off of work, and the primary goal of our Pain Management services is to improve the patient's functional capacity.  - Patient Questions: Answered all of the patient's questions regarding diagnoses, therapy, treatment and next steps        Ben Eddy MD  Interventional Pain Management  [facility]    Disclaimer:  This note was prepared using voice recognition system and is likely to have sound alike errors that may have been overlooked even after proof reading.  Please call me with any questions

## 2022-01-18 ENCOUNTER — OFFICE VISIT (OUTPATIENT)
Dept: PAIN MEDICINE | Facility: CLINIC | Age: 67
End: 2022-01-18
Payer: MEDICARE

## 2022-01-18 VITALS
HEART RATE: 54 BPM | HEIGHT: 77 IN | RESPIRATION RATE: 17 BRPM | DIASTOLIC BLOOD PRESSURE: 89 MMHG | SYSTOLIC BLOOD PRESSURE: 148 MMHG | WEIGHT: 238.19 LBS | BODY MASS INDEX: 28.12 KG/M2

## 2022-01-18 DIAGNOSIS — M54.16 LUMBAR RADICULOPATHY: Primary | ICD-10-CM

## 2022-01-18 DIAGNOSIS — M47.816 LUMBAR FACET ARTHROPATHY: ICD-10-CM

## 2022-01-18 DIAGNOSIS — M48.061 SPINAL STENOSIS OF LUMBAR REGION WITHOUT NEUROGENIC CLAUDICATION: ICD-10-CM

## 2022-01-18 DIAGNOSIS — M21.70 LEG LENGTH DISCREPANCY: ICD-10-CM

## 2022-01-18 PROCEDURE — 3288F PR FALLS RISK ASSESSMENT DOCUMENTED: ICD-10-PCS | Mod: HCNC,CPTII,S$GLB, | Performed by: ANESTHESIOLOGY

## 2022-01-18 PROCEDURE — 1159F MED LIST DOCD IN RCRD: CPT | Mod: HCNC,CPTII,S$GLB, | Performed by: ANESTHESIOLOGY

## 2022-01-18 PROCEDURE — 3077F SYST BP >= 140 MM HG: CPT | Mod: HCNC,CPTII,S$GLB, | Performed by: ANESTHESIOLOGY

## 2022-01-18 PROCEDURE — 3079F PR MOST RECENT DIASTOLIC BLOOD PRESSURE 80-89 MM HG: ICD-10-PCS | Mod: HCNC,CPTII,S$GLB, | Performed by: ANESTHESIOLOGY

## 2022-01-18 PROCEDURE — 99999 PR PBB SHADOW E&M-EST. PATIENT-LVL III: ICD-10-PCS | Mod: PBBFAC,HCNC,, | Performed by: ANESTHESIOLOGY

## 2022-01-18 PROCEDURE — 3008F BODY MASS INDEX DOCD: CPT | Mod: HCNC,CPTII,S$GLB, | Performed by: ANESTHESIOLOGY

## 2022-01-18 PROCEDURE — 1159F PR MEDICATION LIST DOCUMENTED IN MEDICAL RECORD: ICD-10-PCS | Mod: HCNC,CPTII,S$GLB, | Performed by: ANESTHESIOLOGY

## 2022-01-18 PROCEDURE — 99999 PR PBB SHADOW E&M-EST. PATIENT-LVL III: CPT | Mod: PBBFAC,HCNC,, | Performed by: ANESTHESIOLOGY

## 2022-01-18 PROCEDURE — 1101F PT FALLS ASSESS-DOCD LE1/YR: CPT | Mod: HCNC,CPTII,S$GLB, | Performed by: ANESTHESIOLOGY

## 2022-01-18 PROCEDURE — 3008F PR BODY MASS INDEX (BMI) DOCUMENTED: ICD-10-PCS | Mod: HCNC,CPTII,S$GLB, | Performed by: ANESTHESIOLOGY

## 2022-01-18 PROCEDURE — 3079F DIAST BP 80-89 MM HG: CPT | Mod: HCNC,CPTII,S$GLB, | Performed by: ANESTHESIOLOGY

## 2022-01-18 PROCEDURE — 1125F AMNT PAIN NOTED PAIN PRSNT: CPT | Mod: HCNC,CPTII,S$GLB, | Performed by: ANESTHESIOLOGY

## 2022-01-18 PROCEDURE — 99214 PR OFFICE/OUTPT VISIT, EST, LEVL IV, 30-39 MIN: ICD-10-PCS | Mod: HCNC,S$GLB,, | Performed by: ANESTHESIOLOGY

## 2022-01-18 PROCEDURE — 1125F PR PAIN SEVERITY QUANTIFIED, PAIN PRESENT: ICD-10-PCS | Mod: HCNC,CPTII,S$GLB, | Performed by: ANESTHESIOLOGY

## 2022-01-18 PROCEDURE — 3288F FALL RISK ASSESSMENT DOCD: CPT | Mod: HCNC,CPTII,S$GLB, | Performed by: ANESTHESIOLOGY

## 2022-01-18 PROCEDURE — 99214 OFFICE O/P EST MOD 30 MIN: CPT | Mod: HCNC,S$GLB,, | Performed by: ANESTHESIOLOGY

## 2022-01-18 PROCEDURE — 3077F PR MOST RECENT SYSTOLIC BLOOD PRESSURE >= 140 MM HG: ICD-10-PCS | Mod: HCNC,CPTII,S$GLB, | Performed by: ANESTHESIOLOGY

## 2022-01-18 PROCEDURE — 1101F PR PT FALLS ASSESS DOC 0-1 FALLS W/OUT INJ PAST YR: ICD-10-PCS | Mod: HCNC,CPTII,S$GLB, | Performed by: ANESTHESIOLOGY

## 2022-01-18 RX ORDER — GABAPENTIN 600 MG/1
600 TABLET ORAL 3 TIMES DAILY
Qty: 90 TABLET | Refills: 2 | Status: SHIPPED | OUTPATIENT
Start: 2022-01-18 | End: 2022-03-30

## 2022-01-18 NOTE — PATIENT INSTRUCTIONS
General Neck and Back Pain    Both neck and back pain are usually caused by injury to the muscles or ligaments of the spine. Sometimes the disks that separate each bone of the spine may cause pain by pressing on a nearby nerve. Back and neck pain may appear after a sudden twisting or bending force (such as in a car accident), or sometimes after a simple awkward movement. In either case, muscle spasm is often present and adds to the pain.  Acute neck and back pain usually gets better in 1 to 2 weeks. Pain related to disk disease, arthritis in the spinal joints or spinal stenosis (narrowing of the spinal canal) can become chronic and last for months or years.  Back and neck pain are common problems. Most people feel better in 1 or 2 weeks, and most of the rest in 1 to 2 months. Most people can remain active.  People experience and describe pain differently.  · Pain can be sharp, stabbing, shooting, aching, cramping, or burning  · Movement, standing, bending, lifting, sitting, or walking may worsen the pain  · Pain can be localized to one spot or area, or it can be more generalized  · Pain can spread or radiate upwards, downwards, to the front, or go down your arms  · Muscle spasm may occur.  Most of the time mechanical problems with the muscles or spine cause the pain. it is usually caused by an injury, whether known or not, to the muscles or ligaments. While illnesses can cause back pain, it is usually not caused by a serious illness. Pain is usually related to physical activity, whether sports, exercise, work, or normal activity. Sometimes it can occur without an identifiable cause. This can happen simply by stretching or moving wrong, without noting pain at the time. Other causes include:  · Overexertion, lifting, pushing, pulling incorrectly or too aggressively.  · Sudden twisting, bending or stretching from an accident (car or fall), or accidental movement.  · Poor posture  · Poor conditioning, lack of regular  exercise  · Spinal disc disease or arthritis  · Stress  · Pregnancy, or illness like appendicitis, bladder or kidney infection, pelvic infections   Home care  · For neck pain: Use a comfortable pillow that supports the head and keeps the spine in a neutral position. The position of the head should not be tilted forward or backward.  · When in bed, try to find a position of comfort. A firm mattress is best. Try lying flat on your back with pillows under your knees. You can also try lying on your side with your knees bent up towards your chest and a pillow between your knees.  · At first, do not try to stretch out the sore spots. If there is a strain, it is not like the good soreness you get after exercising without an injury. In this case, stretching may make it worse.  · Avoid prolonged sitting, long car rides or travel. This puts more stress on the lower back than standing or walking.  · During the first 24 to 72 hours after an injury, apply an ice pack to the painful area for 20 minutes and then remove it for 20 minutes over a period of 60 to 90 minutes or several times a day.   · You can alternate ice and heat therapies. Talk with your healthcare provider about the best treatment for your back or neck pain. As a safety precaution, do not use a heating pad at bedtime. Sleeping with a heating pad can lead to skin burns or tissue damage.  · Therapeutic massage can help relax the back and neck muscles without stretching them.  · Be aware of safe lifting methods and do not lift anything over 15 pounds until all the pain is gone.  Medications  Talk to your healthcare provider before using medicine, especially if you have other medical problems or are taking other medicines.  · You may use over-the-counter medicine to control pain, unless another pain medicine was prescribed. If you have chronic conditions like diabetes, liver or kidney disease, stomach ulcers,  gastrointestinal bleeding, or are taking blood thinner  medicines.  · Be careful if you are given pain medicines, narcotics, or medicine for muscle spasm. They can cause drowsiness, and can affect your coordination, reflexes, and judgment. Do not drive or operate heavy machinery.  Follow-up care  Follow up with your healthcare provider, or as advised. Physical therapy or further tests may be needed.  If X-rays were taken, you will be notified of any new findings that may affect your care.  Call 911  Seek emergency medical care if any of the following occur:  · Trouble breathing  · Confusion  · Very drowsy or trouble awakening  · Fainting or loss of consciousness  · Rapid or very slow heart rate  · Loss of bowel or bladder control  When to seek medical advice  Call your healthcare provider right away if any of these occur:  · Pain becomes worse or spreads into your arms or legs  · Weakness, numbness or pain in one or both arms or legs  · Numbness in the groin area  · Difficulty walking  · Fever of 100.4ºF (38ºC) or higher, or as directed by your healthcare provider  Date Last Reviewed: 7/1/2016 © 2000-2017 [employer]. [address] [place] [address]. All rights reserved. This information is not intended as a substitute for professional medical care. Always follow your healthcare professional's instructions.          Understanding Lumbar Radiculopathy    Lumbar radiculopathy is irritation or inflammation of a nerve root in the low back. It causes symptoms that spread out from the back down one or both legs. To understand this condition, it helps to understand the parts of the spine:  · Vertebrae. These are bones that stack to form the spine. The lumbar spine contains the 5 bottom vertebrae.  · Disks. These are soft pads of tissue between the vertebrae. They act as shock absorbers for the spine.  · Spinal canal. This is a tunnel formed within the stacked vertebrae. In the lumbar spine, nerves run through this canal.  · Nerves. These branch off and  leave the spinal canal, traveling out to parts of the body. As they leave the spinal canal, nerves pass through openings between the vertebrae. The nerve root is the part of the nerve that is closest to the spinal canal.  · Sciatic nerve. This is a large nerve formed from several nerve roots in the low back. This nerve extends down the back of the leg to the foot.  With lumbar radiculopathy, nerve roots in the low back become irritated. This leads to pain and symptoms. The sciatic nerve is commonly involved, so the condition is often called sciatica.  What causes lumbar radiculopathy?  Aging, injury, poor posture, extra body weight, and other issues can lead to problems in the low back. These problems may then irritate nerve roots. They include:  · Damage to a disk in the lumbar spine. The damaged disk may then press on nearby nerve roots.  · Degeneration from wear and tear, and aging. This can lead to narrowing (stenosis) of the openings between the vertebrae. The narrowed openings press on nerve roots as they leave the spinal canal.  · Unstable spine. This is when a vertebra slips forward. It can then press on a nerve root.  Other, less common things can put pressure on nerves in the low back. These include diabetes, infection, or a tumor.  Symptoms of lumbar radiculopathy  These include:  · Pain in the low back  · Pain, numbness, tingling, or weakness that travels into the buttocks, hip, groin, or leg  · Muscle spasms  Treatment for lumbar radiculopathy  In most cases, your healthcare provider will first try treatments that help relieve symptoms. These may include:  · Prescription and over-the-counter pain medicines. These help relieve pain, swelling, and irritation.  · Limits on positions and activities that increase pain. But lying in bed or avoiding all movement is only recommended for a short period of time.  · Physical therapy, including exercises and stretches. This helps decrease pain and increase movement  and function.  · Steroid shots into the lower back. This may help relieve symptoms for a time.  · Weight-loss program. If you are overweight, losing extra pounds may help relieve symptoms.  In some cases, you may need surgery to fix the underlying problem. This depends on the cause, the symptoms, and how long the pain has lasted.  Possible complications  Over time, an irritated and inflamed nerve may become damaged. This may lead to long-lasting (permanent) numbness or weakness in your legs and feet. If symptoms change suddenly or get worse, be sure to let your healthcare provider know.  When to call your healthcare provider  Call your healthcare provider right away if you have any of these:  · New pain or pain that gets worse  · New or increasing weakness, tingling, or numbness in your leg or foot  · Problems controlling your bladder or bowel   Date Last Reviewed: 3/10/2016  © 9077-6039 [employer]. [address] [place] [address]. All rights reserved. This information is not intended as a substitute for professional medical care. Always follow your healthcare professional's instructions.          Exercises to Strengthen Your Lower Back  Strong lower back and abdominal muscles work together to support your spine. The exercises below will help strengthen the lower back. It is important that you begin exercising slowly and increase levels gradually.  Always begin any exercise program with stretching. If you feel pain while doing any of these exercises, stop and talk to your doctor about a more specific exercise program that better suits your condition.   Low back stretch  The point of stretching is to make you more flexible and increase your range of motion. Stretch only as much as you are able. Stretch slowly. Do not push your stretch to the limit. If at any point you feel pain while stretching, this is your (temporary) limit.  · Lie on your back with your knees bent and both feet on the  ground.  · Slowly raise your left knee to your chest as you flatten your lower back against the floor. Hold for 5 seconds.  · Relax and repeat the exercise with your right knee.  · Do 10 of these exercises for each leg.  · Repeat hugging both knees to your chest at the same time.  Building lower back strength  Start your exercise routine with 10 to 30 minutes a day, 1 to 3 times a day.  Initial exercises  Lying on your back:  1. Ankle pumps: Move your foot up and down, towards your head, and then away. Repeat 10 times with each foot.  2. Heel slides: Slowly bend your knee, drawing the heel of your foot towards you. Then slide your heel/foot from you, straightening your knee. Do not lift your foot off the floor (this is not a leg lift).  3. Abdominal contraction: Bend your knees and put your hands on your stomach. Tighten your stomach muscles. Hold for 5 seconds, then relax. Repeat 10 times.  4. Straight leg raise: Bend one leg at the knee and keep the other leg straight. Tighten your stomach muscles. Slowly lift your straight leg 6 to 12 inches off the floor and hold for up to 5 seconds. Repeat 10 times on each side.  Standin. Wall squats: Stand with your back against the wall. Move your feet about 12 inches away from the wall. Tighten your stomach muscles, and slowly bend your knees until they are at about a 45 degree angle. Do not go down too far. Hold about 5 seconds. Then slowly return to your starting position. Repeat 10 times.  2. Heel raises: Stand facing the wall. Slowly raise the heels of your feet up and down, while keeping your toes on the floor. If you have trouble balancing, you can touch the wall with your hands. Repeat 10 times.  More advanced exercises  When you feel comfortable enough, try these exercises.  1. Kneeling lumbar extension: Begin on your hands and knees. At the same time, raise and straighten your right arm and left leg until they are parallel to the ground. Hold for 2 seconds and  come back slowly to a starting position. Repeat with left arm and right leg, alternating 10 times.  2. Prone lumbar extension: Lie face down, arms extended overhead, palms on the floor. At the same time, raise your right arm and left leg as high as comfortably possible. Hold for 10 seconds and slowly return to start. Repeat with left arm and right leg, alternating 10 times. Gradually build up to 20 times. (Advanced: Repeat this exercise raising both arms and both legs a few inches off the floor at the same time. Hold for 5 seconds and release.)  3. Pelvic tilt: Lie on the floor on your back with your knees bent at 90 degrees. Your feet should be flat on the floor. Inhale, exhale, then slowly contract your abdominal muscles bringing your navel toward your spine. Let your pelvis rock back until your lower back is flat on the floor. Hold for 10 seconds while breathing smoothly.  4. Abdominal crunch: Perform a pelvic tilt (above) flattening your lower back against the floor. Holding the tension in your abdominal muscles, take another breath and raise your shoulder blades off the ground (this is not a full sit-up). Keep your head in line with your body (dont bend your neck forward). Hold for 2 seconds, then slowly lower.  Date Last Reviewed: 6/1/2016  © 5508-9588 [employer]. [address] [place] [address], [place] [address]. All rights reserved. This information is not intended as a substitute for professional medical care. Always follow your healthcare professional's instructions.

## 2022-01-19 ENCOUNTER — CLINICAL SUPPORT (OUTPATIENT)
Dept: REHABILITATION | Facility: HOSPITAL | Age: 67
End: 2022-01-19
Payer: MEDICARE

## 2022-01-19 DIAGNOSIS — R29.898 WEAKNESS OF RIGHT HIP: ICD-10-CM

## 2022-01-19 DIAGNOSIS — M25.551 PAIN IN RIGHT HIP: ICD-10-CM

## 2022-01-19 PROCEDURE — 97113 AQUATIC THERAPY/EXERCISES: CPT | Mod: HCNC,CQ

## 2022-01-19 NOTE — PROGRESS NOTES
[facility]  Physical Therapy Daily Treatment Note       Name: Myles Pride  Clinic Number: [id number]    Therapy Diagnosis:   Encounter Diagnoses   Name Primary?    Pain in right hip     Weakness of right hip      Physician: Emelia Riddle PA-C    Visit Date: 1/19/2022    Physician Orders: PT Eval and Treat   Medical Diagnosis from Referral: M25.551 (ICD-10-CM) - Right hip pain  Evaluation Date: 9/28/2021  Authorization Period Expiration: 04/05/2022  Plan of Care Expiration: 2/30/2022                  Progress Note Due: 1/23/2021   Visit # / Visits authorized: 22/26 (+1 eval)  FOTO:2/3(5th visit)     Precautions: Standard    Time In: 3:18 pm ANKLE WEIGHTS ADDED  Time Out: 4:00 pm  Total Billable Time: 40 minutes    SUBJECTIVE     Pt reports: he continues to feel pretty good each day. His pain is still present, but it is not bad, just enough to know it is there.       Compliance with Hep: Daily  Response to previous treatment: no adverse reactions to treatment/updated HEP  Functional change: No Change    Pain: 2/10   Worst: 2/10  Location: Right hip  Pain Control: rest  Aggravating factors: Standing, Walking and Morning    OBJECTIVE     Objective Measures updated at progress report unless specified otherwise.      Treatment     Gym/Equipment Access: none noted  Time to Complete Exercises:  40 minutes    Myles received aquatic therapy with the use of water to decrease the pull of gravity and weightbearing forces on the body to increase tolerance to resisted therex for 40 minutes including the following exercise as well as stepping in and out of the pool with use of handrail and step-to pattern:     ANKLE WEIGHTS TODAY!    Exercise Performed Today  1/19/2022   Treadmill, forward, 1.2 mph  x 3 minutes   Treadmill, side-stepping, 1.0 mph  x 3 minutes   Treadmill, backwards, 1.2 mph  x 3 minutes   Marching, alternating  x 3 minutes   Hamstring curls, alternating  x 3 minutes   Hip  3-way, B lower extremity x 3 minutes each         Bicycle forward/backward   minutes each    Flutter kick   minutes    Scissor kicks   minutes    Step ups x 2x10 each bilateral   Quad stretch on step      Hamstring stretch on step  x 2 x 1 minute each   Gastroc stretch  at wall  1 minute each   Heel/toe raises x 3 minutes   1/4 squat  x 3 minutes   Standing hip circles clockwise/counter clockwise   minutes each    Standing figure 8   10x each         UE alt flexion  paddles     UE alt abduction  paddles     UE alt hugs  paddles     UE internal rotation/external rotation  paddles     UE alt rows  paddles                         x = exercise details same as prior session        Myles received therapeutic exercises to develop strength, endurance, ROM and posture for (0) minutes including:     TherEx Today     Upright Bike for endurance  Level 5 5 minutes   Kneeling hip flexor stretch  3 x 30 seconds each bilateral   Prone quad stretch  1 minute each each bilateral   Seated Piriformis stretch   2 x 30 seconds Bilateral    Figure 4 stretch      Hamstring stretch  3x10s 3 way Bilateral    Straight leg raise  3 x 10 each bilateral 3#   Reverse clam  3x10 with ball between knees 3#   clamshells   2 x 10 each bilateral red   LONG ARC QUAD        Sidelying hip aDduction/aBduction  3 x 10 each bilateral 3#   Butterfly stretch  2 x 1 minute   Prone hip extension knee bent  3 x 10   Prone hamstring curls  3x10 2#        TKEs  Purple cook band 3x10 each   Prone lying with forearm pushup  5 minutes   Lumbar Ball Roll Outs  10s 10x 3 way   Lower Trunk Rotations  3 minutes   Planks  3 x 20s   Piriformis stretch  1 minute each   Single knee to chest stretch  1 minute each          Plan for Next Visit: practice core stabilization     Pt participated in neuromuscular re-education activities to improve: Coordination, Proprioception and Posture for (0) minutes. The following activities were included:    Neuro Re-Ed 1/19/2022     Pelvic  "tilt with TRANSVERS ABDOMINUS   3x10 with 3s hold   TRANSVERS ABDOMINUS contractions  3x10   Ball squeeze with TRANSVERS ABDOMINUS  3x10 with 3s hold   Trunk rotation with cables high>low  3x10 50#       Pt participated in dynamic functional therapeutic activities to improve functional performance for (0) minutes, including:  Box squats  3x10 green 24 inch box bilateral   Wall clamshells  Green 3x10 each     Monster walks forward/backward  Green 3 big mirror laps at ankle   Standing heel taps  3x10 Bilateral 2" step   Side stepping  Green 3 big mirror laps at ankle   Standing hip 3 way  Green 3x10 each bilateral standing on airex                 MANUAL THERAPY TECHNIQUES including Myofascial release and Soft tissue Mobilization were applied to right hip for 0 minutes.    Manual Intervention Performed Today    Soft Tissue Mobilization  Right hip, glute, IT band, quad and surrounding musculature with use of the stick   Manual Lumbar distraction     Long axis distraction  Right    Muscle Energy Technique     Functional Dry Needling       Plan for Next Visit:        Home Exercises and Patient Education Provided     Education provided:   · Patient educated on the impairments noted above and the effects of physical therapy intervention to improve overall condition and QOL.   · Patient was educated on all the above exercise prior/during/after for proper posture, positioning, and execution for safe performance with home exercise program.     Written Home Exercises Provided: Patient instructed to cont prior HEP.  Exercises were reviewed and Myles was able to demonstrate them prior to the end of the session.  Myles demonstrated good understanding of the education provided.      See EMR under Patient Instructions for exercises provided 9/28/2021.    ASSESSMENT     Myles Pride did well with session today with minimal complaints of discomfort. Patient with left low back pain after performing standing hip 3 way with " left leg as stability leg. Pain improved with stretching. Improved performance with step up activity today. Noted patient with improved knee extension on left side with gait today. Patient left session with reports of feeling good.     Myles is progressing well towards his goals.   Pt prognosis is Good.     Pt will continue to benefit from skilled outpatient physical therapy to address the deficits listed in the problem list box on initial evaluation, provide pt/family education and to maximize pt's level of independence in the home and community environment.     Pt's spiritual, cultural and educational needs considered and pt agreeable to plan of care and goals.    Anticipated barriers to physical therapy: none    GOALS:  SHORT TERM GOALS: 6 weeks     1. Recent signs and systems trend is improving in order to progress towards LTG's.  Met   2. Patient will be independent with HEP in order to further progress and return to maximal function. Met    3. Pain rating at Worst: 5/10 in order to progress towards increased independence with activity. Met    4. Patient will be able to correct postural deviations in sitting and standing, to decrease pain and promote postural awareness for injury prevention.  PC       LONG TERM GOALS: 12 weeks     1. Patient will return to normal ADL, recreational, and work related activities with less pain and limitation.  PC    2. Patient will improve AROM to stated goals in order to return to maximal functional potential.   PC   3. Patient will improve Strength to stated goals of appropriate musculature in order to improve functional independence.   PC   4. Pain Rating at Best: 1/10 to improve Quality of Life.      PC   5. Patient will meet predicted functional outcome (FOTO) score: 33% to increase self-worth & perceived functional ability.     PC        6. Patient will have met/partially met personal goal of: Patient reports being a  and going on long walks but is unable to  participate lately.     PC         PM=Partially Met     PC=Progressing/Continued     M=Met    PLAN   Plan of care Certification: 2/30/2022      Continue Plan of Care (POC) and progress per patient tolerance. See Treatment section for exercise progression.    Kristi Norris, PTA

## 2022-01-21 ENCOUNTER — HOSPITAL ENCOUNTER (OUTPATIENT)
Facility: HOSPITAL | Age: 67
Discharge: HOME | End: 2022-01-21
Attending: ANESTHESIOLOGY | Admitting: ANESTHESIOLOGY
Payer: MEDICARE

## 2022-01-21 VITALS
RESPIRATION RATE: 15 BRPM | TEMPERATURE: 98 F | DIASTOLIC BLOOD PRESSURE: 82 MMHG | BODY MASS INDEX: 28.05 KG/M2 | SYSTOLIC BLOOD PRESSURE: 123 MMHG | HEART RATE: 63 BPM | WEIGHT: 237.56 LBS | HEIGHT: 77 IN | OXYGEN SATURATION: 97 %

## 2022-01-21 DIAGNOSIS — M54.16 LUMBAR RADICULOPATHY, CHRONIC: ICD-10-CM

## 2022-01-21 PROCEDURE — 64483 NJX AA&/STRD TFRM EPI L/S 1: CPT | Mod: 50,HCNC,, | Performed by: ANESTHESIOLOGY

## 2022-01-21 PROCEDURE — 64483 NJX AA&/STRD TFRM EPI L/S 1: CPT | Mod: 50,HCNC | Performed by: ANESTHESIOLOGY

## 2022-01-21 PROCEDURE — 25500020 PHARM REV CODE 255: Mod: HCNC | Performed by: ANESTHESIOLOGY

## 2022-01-21 PROCEDURE — 63600175 PHARM REV CODE 636 W HCPCS: Mod: HCNC | Performed by: ANESTHESIOLOGY

## 2022-01-21 PROCEDURE — 25000003 PHARM REV CODE 250: Mod: HCNC | Performed by: ANESTHESIOLOGY

## 2022-01-21 PROCEDURE — 64483 PR EPIDURAL INJ, ANES/STEROID, TRANSFORAMINAL, LUMB/SACR, SNGL LEVL: ICD-10-PCS | Mod: 50,HCNC,, | Performed by: ANESTHESIOLOGY

## 2022-01-21 RX ORDER — DEXAMETHASONE SODIUM PHOSPHATE 10 MG/ML
INJECTION INTRAMUSCULAR; INTRAVENOUS
Status: DISCONTINUED | OUTPATIENT
Start: 2022-01-21 | End: 2022-01-21 | Stop reason: HOSPADM

## 2022-01-21 RX ORDER — INDOMETHACIN 25 MG/1
CAPSULE ORAL
Status: DISCONTINUED | OUTPATIENT
Start: 2022-01-21 | End: 2022-01-21 | Stop reason: HOSPADM

## 2022-01-21 RX ORDER — MIDAZOLAM HYDROCHLORIDE 1 MG/ML
INJECTION, SOLUTION INTRAMUSCULAR; INTRAVENOUS
Status: DISCONTINUED | OUTPATIENT
Start: 2022-01-21 | End: 2022-01-21 | Stop reason: HOSPADM

## 2022-01-21 RX ORDER — BUPIVACAINE HYDROCHLORIDE 2.5 MG/ML
INJECTION, SOLUTION EPIDURAL; INFILTRATION; INTRACAUDAL
Status: DISCONTINUED | OUTPATIENT
Start: 2022-01-21 | End: 2022-01-21 | Stop reason: HOSPADM

## 2022-01-21 RX ORDER — FENTANYL CITRATE 50 UG/ML
INJECTION, SOLUTION INTRAMUSCULAR; INTRAVENOUS
Status: DISCONTINUED | OUTPATIENT
Start: 2022-01-21 | End: 2022-01-21 | Stop reason: HOSPADM

## 2022-01-21 NOTE — OP NOTE
Myles Pride  66 y.o. male      Vitals:    01/21/22 0940   BP: 124/67   Pulse: (!) 47   Resp: 12   Temp:      Procedure Date:1/21/22      INFORMED CONSENT: The procedure, risks, benefits and options were discussed with patient. There are no contraindications to the procedure. The patient expressed understanding and agreed to proceed. The personnel performing the procedure was discussed. I verify that I personally obtained consent prior to the start of the procedure and the signed consent can be found on the patient's chart.       Anesthesia:   Conscious sedation provided by M.D    The patient was monitored with continuous pulse oximetry, EKG, and intermittent blood pressure monitors.  The patient was hemodynamically stable throughout the entire process was responsive to voice, and breathing spontaneously.  Supplemental O2 was provided at 2L/min via nasal cannula.  Patient was comfortable for the duration of the procedure. (See nurse documentation and case log for sedation time)    There was a total of 2mg IV Midazolam and 50mcg Fentanyl titrated for the procedure    Pre Procedure diagnosis: Lumbar radiculopathy [M54.16]  Post-Procedure diagnosis: SAME     Complications: None    Specimens: None      DESCRIPTION OF PROCEDURE: The patient was brought to the procedure room. IV access was obtained prior to the procedure. The patient was positioned prone on the fluoroscopy table. Continuous hemodynamic monitoring was initiated including blood pressure, EKG, and pulse oximetry. . The skin was prepped with chlorhexidine and draped in a sterile fashion. Skin anesthesia was achieved using a total of 10mL of lidocaine, 5mL over each respective injection site.     The  L4/5 transforaminal spaces were identified with fluoroscopy in the  AP, oblique, and lateral views.  A 22 gauge spinal quinke needle was then advanced into the area of the trans foraminal spaces bilateral with confirmation of proper needle position using  AP, oblique, and lateral fluoroscopic views. Once the needle tip was in the area of the transforaminal space, and there was no blood, CSF or paraesthesias,  1.5 mL of Omnipaque 300mg/ml was injected on bilateral for a total of 3mL.  Fluoroscopic imaging in the AP and lateral views revealed a clear outline of the spinal nerve with proximal spread of agent through the neural foramen into the epidural space. A total combination of 2 mL of Bupivicaine 0.25% and 10 mg dexamethasone was injected on each side for a total of 6 mL of injected medications with displacement of the contrast dye confirming that the medication went into the area of the transforaminal spaces bilateral. A sterile dressing was applied.   Patient tolerated the procedure well.    Patient was taken back to the recovery room for further observation.     The patient was discharged to home in stable condition

## 2022-01-21 NOTE — DISCHARGE SUMMARY
[facility] 1st Fl  Discharge Note  Short Stay    Procedure(s) (LRB):  Bilateral L4/5 TF LOVE with RN IV sedation (Bilateral)    OUTCOME: Patient tolerated treatment/procedure well without complication and is now ready for discharge.    DISPOSITION: Home or Self Care    FINAL DIAGNOSIS:  <principal problem not specified>    FOLLOWUP: In clinic    DISCHARGE INSTRUCTIONS:  No discharge procedures on file.     TIME SPENT ON DISCHARGE: 15 minutes

## 2022-01-21 NOTE — DISCHARGE INSTRUCTIONS
1. Side effects may include: headache, restlessness at night, weakness to upper or lower extremities (arms or legs), flushing of the face and/ or neck. None are life threatening and will subside within 2-3 days.   2. If you have SEVERE headache with nausea and extreme pain, please call office ([phone]). Unless you usually have this type of migraine headache.   3. If you develop fever (greater than 101 F) or have any redness, swelling, or drainage at the injection site(s), please call off ([phone]). This may be a sign of infection.   4. If a rash or whelps (like hives) occur, please call the office ([phone]).  5. If you are a heart patient, please watch for symptoms such as swelling in your hands and feet and shortness of breath. If any of these symptoms occur, please notify your primary care/ heart doctor and go to the nearest emergency room.  6. If you have high blood pressure, you may experience an increase in your blood pressure over the next two weeks. Please continue to monitor your blood pressure and contact your primary care provider if your blood pressure does not return to baseline.    7. If you are a diabetic or you monitor your blood sugar, you may have an increase in your blood sugar over the next two weeks. If your blood sugar does not return to your baseline, please contact your primary care provider.  8. NO HEAT TO THE INJECTION SITE for the next 24 hours including: bath or shower, heating pad, moist heat, and / or hot tubs.   9. May use ice pack to injection site for any pain or discomfort. Apply ice pack for 20 minutes then remove for 20 minutes before re- applying to site. (recipe for homemade frozen gel pack below)  10. DO NOT DRIVE OR OPERATE HEAVY MACHINERY UNTIL TOMORROW MORNING.   11. OK to resume all medications unless otherwise directed by your doctor.  12. Injection(s) may take up to two weeks until full effects are noted.  13. You may return to normal activity/ work the  following day.  14. Please do not receive a flu or pneumonia vaccine until one week after your procedure.  .  Homemade Frozen Gel Ice Pack:  1 bottle of rubbing alcohol  3 bottles of water (use rubbing alcohol bottle to measure)  2 large zip lock bags    Instructions:  1. Pour alcohol and water into zip lock bag. Make sure bag is large enough to allow for expansion.  2. Try to get as much air out of the freezer bag before sealing it shut.   3. Place the bag and its contents inside a second freezer bag to contain any leakage.   4. Leave the bag in the freezer for at least one hour.  5. When it's ready, place a towel between the gel pack and bare skin to avoid burning the skin.

## 2022-01-24 ENCOUNTER — CLINICAL SUPPORT (OUTPATIENT)
Dept: REHABILITATION | Facility: HOSPITAL | Age: 67
End: 2022-01-24
Payer: MEDICARE

## 2022-01-24 DIAGNOSIS — M25.551 PAIN IN RIGHT HIP: ICD-10-CM

## 2022-01-24 DIAGNOSIS — R29.898 WEAKNESS OF RIGHT HIP: ICD-10-CM

## 2022-01-24 PROCEDURE — 97110 THERAPEUTIC EXERCISES: CPT | Mod: HCNC,CQ

## 2022-01-24 PROCEDURE — 97530 THERAPEUTIC ACTIVITIES: CPT | Mod: HCNC,CQ

## 2022-01-24 NOTE — PROGRESS NOTES
[facility]  Physical Therapy Daily Treatment Note + Progress Note       Name: Myles Pride  Clinic Number: [id number]    Therapy Diagnosis:   Encounter Diagnoses   Name Primary?    Pain in right hip     Weakness of right hip      Physician: Emelia Riddle PA-C    Visit Date: 1/24/2022    Physician Orders: PT Eval and Treat   Medical Diagnosis from Referral: M25.551 (ICD-10-CM) - Right hip pain  Evaluation Date: 9/28/2021  Authorization Period Expiration: 04/05/2022  Plan of Care Expiration: 2/30/2022                  Progress Note Due: 2/24/2021   Visit # / Visits authorized: 23/26 (+1 eval)  FOTO:2/3(5th visit)     Precautions: Standard    Time In: 9:37am  Time Out: 10:17 am  Total Billable Time: 38 minutes    SUBJECTIVE     Pt reports: he got injections in his back and he noticed relief that evening. He is having some increased tightness and acheyness in his right hip/groin area.      Compliance with Hep: Daily  Response to previous treatment: no adverse reactions to treatment/updated HEP  Functional change: No Change    Pain: 1/10   Worst: 2/10  Location: Right hip  Pain Control: rest  Aggravating factors: Standing, Walking and Morning    OBJECTIVE     Objective Measures updated at progress report unless specified otherwise.    STRENGTH:     Hip/Knee MMT Right    Goal   Hip Flexion  4+/5 5/5 B    Hip Extension  5/5 5/5 B   Hip Abduction  5/5 5/5 B   Hip IR 5/5 5/5 B   Hip ER 5/5 5/5 B   Knee Flexion 5/5 5/5 B   Knee Extension 5/5 5/5 B      Hip/Knee MMT Left    Goal   Hip Flexion  5/5 5/5 B    Hip Extension  5/5 5/5 B   Hip Abduction  5/5 5/5 B   Hip IR 5/5 5/5 B   Hip ER 5/5 5/5 B   Knee Flexion 5/5 5/5 B   Knee Extension 5/5 5/5 B            Treatment     Gym/Equipment Access: none noted  Time to Complete Exercises:  38 minutes    Myles received aquatic therapy with the use of water to decrease the pull of gravity and weightbearing forces on the body to increase  tolerance to resisted therex for 0 minutes including the following exercise as well as stepping in and out of the pool with use of handrail and step-to pattern:     ANKLE WEIGHTS TODAY!    Exercise Performed Today  1/24/2022   Treadmill, forward, 1.2 mph   3 minutes   Treadmill, side-stepping, 1.0 mph   3 minutes   Treadmill, backwards, 1.2 mph   3 minutes   Marching, alternating   3 minutes   Hamstring curls, alternating   3 minutes   Hip 3-way, B lower extremity  3 minutes each         Bicycle forward/backward   minutes each    Flutter kick   minutes    Scissor kicks   minutes    Step ups  2x10 each bilateral   Quad stretch on step      Hamstring stretch on step   2 x 1 minute each   Gastroc stretch  at wall  1 minute each   Heel/toe raises  3 minutes   1/4 squat   3 minutes   Standing hip circles clockwise/counter clockwise   minutes each    Standing figure 8   10x each         UE alt flexion  paddles     UE alt abduction  paddles     UE alt hugs  paddles     UE internal rotation/external rotation  paddles     UE alt rows  paddles                         x = exercise details same as prior session    Myles received therapeutic exercises to develop strength, endurance, ROM and posture for (15) minutes including:     TherEx Today     Upright Bike for endurance x Level 5 5 minutes   Kneeling hip flexor stretch  3 x 30 seconds each bilateral   Prone quad stretch  1 minute each each bilateral   Seated Piriformis stretch   2 x 30 seconds Bilateral    Figure 4 stretch      Hamstring stretch  3x10s 3 way Bilateral    Straight leg raise  3 x 10 each bilateral 3#   Reverse clam  3x10 with ball between knees 3#   clamshells   2 x 10 each bilateral red   LONG ARC QUAD        Sidelying hip aDduction x 3 x 10 each bilateral    Butterfly stretch  2 x 1 minute   Prone hip extension knee bent  3 x 10   Prone hamstring curls  3x10 2#   Supine adductor stretch  x 3x30s right   TKEs  Purple cook band 3x10 each   Prone lying with  "forearm pushup  5 minutes   Lumbar Ball Roll Outs  10s 10x 3 way   Lower Trunk Rotations  3 minutes   Planks  3 x 20s   Piriformis stretch x 3x30s right   Single knee to chest stretch  1 minute each   Single Leg bridges x 2x10 each bilateral     Plan for Next Visit: practice core stabilization     Pt participated in neuromuscular re-education activities to improve: Coordination, Proprioception and Posture for (0) minutes. The following activities were included:    Neuro Re-Ed 1/24/2022     Pelvic tilt with TRANSVERS ABDOMINUS   3x10 with 3s hold   TRANSVERS ABDOMINUS contractions  3x10   Ball squeeze with TRANSVERS ABDOMINUS  3x10 with 3s hold   Trunk rotation with cables high>low  3x10 50#       Pt participated in dynamic functional therapeutic activities to improve functional performance for (23) minutes, including:  Box squats x 3x7 green chair   Wall clamshells x Green 3x10 each     Monster walks forward/backward x Green 5 big mirror laps at ankle   Standing heel taps  3x10 Bilateral 2" step   Side stepping x Green 5 big mirror laps at ankle   Standing hip 3 way  Green 3x10 each bilateral standing on airex   Quadruped bird dogs x 3x5             MANUAL THERAPY TECHNIQUES including Myofascial release and Soft tissue Mobilization were applied to right hip for 0 minutes.    Manual Intervention Performed Today    Soft Tissue Mobilization  Right hip, glute, IT band, quad and surrounding musculature with use of the stick   Manual Lumbar distraction     Long axis distraction  Right    Muscle Energy Technique     Functional Dry Needling       Plan for Next Visit:        Home Exercises and Patient Education Provided     Education provided:   · Patient educated on the impairments noted above and the effects of physical therapy intervention to improve overall condition and QOL.   · Patient was educated on all the above exercise prior/during/after for proper posture, positioning, and execution for safe performance with home " exercise program.     Written Home Exercises Provided: Patient instructed to cont prior HEP.  Exercises were reviewed and Mlyes was able to demonstrate them prior to the end of the session.  Myles demonstrated good understanding of the education provided.      See EMR under Patient Instructions for exercises provided 9/28/2021.    ASSESSMENT     Myles Pride participated in land therapy due to pool temperature being too low. Patient did well with session with minimal complaints of discomfort. Patient challenged with hip activities today with good fatigue noted. Verbal cues for core activation throughout session. Patient left session with soreness and no reports of increased pain.     Manual Muscle Testing assessed today. Patient exhibits increased lower extremity strength per measurements taken today. Patient with increased limitation status percentage per FOTO results, last survey taken in October. Patient will continue to benefit from physical therapy to address remaining limitations in strength, range of motion, flexibility, and stability.     Myles is progressing well towards his goals.   Pt prognosis is Good.     Pt will continue to benefit from skilled outpatient physical therapy to address the deficits listed in the problem list box on initial evaluation, provide pt/family education and to maximize pt's level of independence in the home and community environment.     Pt's spiritual, cultural and educational needs considered and pt agreeable to plan of care and goals.    Anticipated barriers to physical therapy: none    GOALS:  SHORT TERM GOALS: 6 weeks  1/24/2022   1. Recent signs and systems trend is improving in order to progress towards LTG's.  Met   2. Patient will be independent with HEP in order to further progress and return to maximal function. Met    3. Pain rating at Worst: 5/10 in order to progress towards increased independence with activity. Met    4. Patient will be able to correct  postural deviations in sitting and standing, to decrease pain and promote postural awareness for injury prevention.  PC       LONG TERM GOALS: 12 weeks  1/24/2022   1. Patient will return to normal ADL, recreational, and work related activities with less pain and limitation.  PC    2. Patient will improve AROM to stated goals in order to return to maximal functional potential.   PC   3. Patient will improve Strength to stated goals of appropriate musculature in order to improve functional independence.   Met   4. Pain Rating at Best: 1/10 to improve Quality of Life.      Met   5. Patient will meet predicted functional outcome (FOTO) score: 33% to increase self-worth & perceived functional ability.     PC        6. Patient will have met/partially met personal goal of: Patient reports being a  and going on long walks but is unable to participate lately.     PC         PM=Partially Met     PC=Progressing/Continued     M=Met    PLAN   Plan of care Certification: 2/30/2022      Continue Plan of Care (POC) and progress per patient tolerance. See Treatment section for exercise progression.    Kristi Norris, PTA

## 2022-01-28 ENCOUNTER — CLINICAL SUPPORT (OUTPATIENT)
Dept: REHABILITATION | Facility: HOSPITAL | Age: 67
End: 2022-01-28
Payer: MEDICARE

## 2022-01-28 DIAGNOSIS — R29.898 WEAKNESS OF RIGHT HIP: ICD-10-CM

## 2022-01-28 DIAGNOSIS — M25.551 PAIN IN RIGHT HIP: ICD-10-CM

## 2022-01-28 PROCEDURE — 97113 AQUATIC THERAPY/EXERCISES: CPT | Mod: KX,HCNC

## 2022-01-28 NOTE — PROGRESS NOTES
[facility]  Physical Therapy Daily Treatment Note       Name: Myles Pride  Clinic Number: [id number]    Therapy Diagnosis:   Encounter Diagnoses   Name Primary?    Pain in right hip     Weakness of right hip      Physician: Emelia Riddle PA-C    Visit Date: 1/28/2022    Physician Orders: PT Eval and Treat   Medical Diagnosis from Referral: M25.551 (ICD-10-CM) - Right hip pain  Evaluation Date: 9/28/2021  Authorization Period Expiration: 04/05/2022  Plan of Care Expiration: 2/30/2022                  Progress Note Due: 2/24/2021   Visit # / Visits authorized: 25/26 (+1 eval)  FOTO:2/3(5th visit)     Precautions: Standard    Time In: 3:00 pm  Time Out: 3:45 pm  Total Billable Time: 38 minutes    SUBJECTIVE     Pt reports: he got a steroid injection in his back last week and he just has some soreness in his low back and hips.  He is only able to walk 200 yards before he gets aching in his right hip which makes him stop.  He has been trying to get in some walking and stretching as well.      Compliance with Hep: Daily  Response to previous treatment:Patient had soreness after last visit.   Functional change: No Change    Pain: 2/10   Worst: 2/10  Location: Right hip  Pain Control: rest  Aggravating factors: Standing, Walking and Morning    OBJECTIVE     Objective Measures updated at progress report unless specified otherwise.      Treatment     Gym/Equipment Access: none noted  Time to Complete Exercises:  38 minutes    Myles received aquatic therapy with the use of water to decrease the pull of gravity and weightbearing forces on the body to increase tolerance to resisted therex for 38 minutes including the following exercise as well as stepping in and out of the pool with use of handrail and step-to pattern:       Exercise Performed Today  1/28/2022   Treadmill, forward, 1.2 mph  X 3 minutes   Treadmill, side-stepping, 1.0 mph  X 3 minutes, each side   Treadmill, backwards,  1.2 mph  X 3 minutes   Marching, alternating  X 3 minutes   Hamstring curls, alternating  X 3 minutes   Hip 3-way, B lower extremity X 3 minutes each         Bicycle forward/backward   minutes each    Flutter kick   minutes    Scissor kicks   minutes    Step ups X 3x10 each bilateral   Quad stretch on step      Hamstring stretch on step  X 1 minute each (due to time)   Gastroc stretch  at wall  1 minute each   Heel/toe raises X 3 minutes   1/4 squat  X 3 minutes   Standing hip circles clockwise/counter clockwise   minutes each    Standing figure 8   10x each         UE alt flexion  paddles     UE alt abduction  paddles     UE alt hugs  paddles     UE internal rotation/external rotation  paddles     UE alt rows  paddles                         x = exercise details same as prior session    Myles received therapeutic exercises to develop strength, endurance, ROM and posture for (0) minutes including:     TherEx Today     Upright Bike for endurance  Level 5 5 minutes   Kneeling hip flexor stretch  3 x 30 seconds each bilateral   Prone quad stretch  1 minute each each bilateral   Seated Piriformis stretch   2 x 30 seconds Bilateral    Figure 4 stretch      Hamstring stretch  3x10s 3 way Bilateral    Straight leg raise  3 x 10 each bilateral 3#   Reverse clam  3x10 with ball between knees 3#   clamshells   2 x 10 each bilateral red   LONG ARC QUAD        Sidelying hip aDduction  3 x 10 each bilateral    Butterfly stretch  2 x 1 minute   Prone hip extension knee bent  3 x 10   Prone hamstring curls  3x10 2#   Supine adductor stretch   3x30s right   TKEs  Purple cook band 3x10 each   Prone lying with forearm pushup  5 minutes   Lumbar Ball Roll Outs  10s 10x 3 way   Lower Trunk Rotations  3 minutes   Planks  3 x 20s   Piriformis stretch  3x30s right   Single knee to chest stretch  1 minute each   Single Leg bridges  2x10 each bilateral     Plan for Next Visit: practice core stabilization     Pt participated in  "neuromuscular re-education activities to improve: Coordination, Proprioception and Posture for (0) minutes. The following activities were included:    Neuro Re-Ed 1/28/2022     Pelvic tilt with TRANSVERS ABDOMINUS   3x10 with 3s hold   TRANSVERS ABDOMINUS contractions  3x10   Ball squeeze with TRANSVERS ABDOMINUS  3x10 with 3s hold   Trunk rotation with cables high>low  3x10 50#       Pt participated in dynamic functional therapeutic activities to improve functional performance for (0) minutes, including:  Box squats  3x7 green chair   Wall clamshells  Green 3x10 each     Monster walks forward/backward  Green 5 big mirror laps at ankle   Standing heel taps  3x10 Bilateral 2" step   Side stepping  Green 5 big mirror laps at ankle   Standing hip 3 way  Green 3x10 each bilateral standing on airex   Quadruped bird dogs  3x5             MANUAL THERAPY TECHNIQUES including Myofascial release and Soft tissue Mobilization were applied to right hip for 0 minutes.    Manual Intervention Performed Today    Soft Tissue Mobilization  Right hip, glute, IT band, quad and surrounding musculature with use of the stick   Manual Lumbar distraction     Long axis distraction  Right    Muscle Energy Technique     Functional Dry Needling       Plan for Next Visit:        Home Exercises and Patient Education Provided     Education provided:   · Patient educated on the impairments noted above and the effects of physical therapy intervention to improve overall condition and QOL.   · Patient was educated on all the above exercise prior/during/after for proper posture, positioning, and execution for safe performance with home exercise program.     Written Home Exercises Provided: Patient instructed to cont prior HEP.  Exercises were reviewed and Myles was able to demonstrate them prior to the end of the session.  Myles demonstrated good understanding of the education provided.      See EMR under Patient Instructions for exercises provided " 9/28/2021.    ASSESSMENT     Myles Pride would benefit from ankle weights again with aquatic therapy.  Patient could feel some discomfort or pulling in right groin with marches and 1/4 squats today. Overall patient tolerated aquatic therapy exercises well and was glad to be back into the pool for this visit.     Myles is progressing well towards his goals.   Pt prognosis is Good.     Pt will continue to benefit from skilled outpatient physical therapy to address the deficits listed in the problem list box on initial evaluation, provide pt/family education and to maximize pt's level of independence in the home and community environment.     Pt's spiritual, cultural and educational needs considered and pt agreeable to plan of care and goals.    Anticipated barriers to physical therapy: none    GOALS:  SHORT TERM GOALS: 6 weeks  1/24/2022   1. Recent signs and systems trend is improving in order to progress towards LTG's.  Met   2. Patient will be independent with HEP in order to further progress and return to maximal function. Met    3. Pain rating at Worst: 5/10 in order to progress towards increased independence with activity. Met    4. Patient will be able to correct postural deviations in sitting and standing, to decrease pain and promote postural awareness for injury prevention.  PC       LONG TERM GOALS: 12 weeks  1/24/2022   1. Patient will return to normal ADL, recreational, and work related activities with less pain and limitation.  PC    2. Patient will improve AROM to stated goals in order to return to maximal functional potential.   PC   3. Patient will improve Strength to stated goals of appropriate musculature in order to improve functional independence.   Met   4. Pain Rating at Best: 1/10 to improve Quality of Life.      Met   5. Patient will meet predicted functional outcome (FOTO) score: 33% to increase self-worth & perceived functional ability.     PC        6. Patient will have  met/partially met personal goal of: Patient reports being a  and going on long walks but is unable to participate lately.     PC         PM=Partially Met     PC=Progressing/Continued     M=Met    PLAN   Plan of care Certification: 2/30/2022      Continue Plan of Care (POC) and progress per patient tolerance. See Treatment section for exercise progression.    Nicole Gar, PT, DPT

## 2022-01-31 ENCOUNTER — CLINICAL SUPPORT (OUTPATIENT)
Dept: REHABILITATION | Facility: HOSPITAL | Age: 67
End: 2022-01-31
Payer: MEDICARE

## 2022-01-31 DIAGNOSIS — M25.551 PAIN IN RIGHT HIP: ICD-10-CM

## 2022-01-31 DIAGNOSIS — R29.898 WEAKNESS OF RIGHT HIP: ICD-10-CM

## 2022-01-31 PROCEDURE — 97113 AQUATIC THERAPY/EXERCISES: CPT | Mod: KX,HCNC

## 2022-01-31 NOTE — PROGRESS NOTES
[facility]  Physical Therapy Daily Treatment Note       Name: Myles Pride  Clinic Number: [id number]    Therapy Diagnosis:   Encounter Diagnoses   Name Primary?    Pain in right hip     Weakness of right hip      Physician: Emelia Riddle PA-C    Visit Date: 1/31/2022    Physician Orders: PT Eval and Treat   Medical Diagnosis from Referral: M25.551 (ICD-10-CM) - Right hip pain  Evaluation Date: 9/28/2021  Authorization Period Expiration: 04/05/2022  Plan of Care Expiration: 2/30/2022                  Progress Note Due: 2/24/2021   Visit # / Visits authorized: 25/26 (+1 eval)  FOTO:2/3(5th visit)     Precautions: Standard    Time In: 2:45 pm  Time Out: 3:30 pm  Total Billable Time: 40 minutes    SUBJECTIVE     Pt reports: he reports a lot of relief from his steroid shot last week rates his pain at 2/10 at worst, and with sitting to stand.  He reports that he was able to walk about 1/2 mile this weekend.     Compliance with Hep: Daily  Response to previous treatment:Patient had soreness after last visit.   Functional change: No Change    Pain: 2/10   Worst: 2/10  Location: Right hip  Pain Control: rest  Aggravating factors: Standing, Walking and Morning    OBJECTIVE     Objective Measures updated at progress report unless specified otherwise.    Treatment     Gym/Equipment Access: none noted  Time to Complete Exercises:  38 minutes    Myles received aquatic therapy with the use of water to decrease the pull of gravity and weightbearing forces on the body to increase tolerance to resisted therex for 40 minutes including the following exercise as well as stepping in and out of the pool with use of handrail and step-to pattern:     Exercise 1/31/2022 parameters   Treadmill, forward, 1.2 mph  x 3 minutes   Treadmill, side-stepping, 0.9 mph  x 3 minutes, each side   Treadmill, backwards, 1.1 mph  x 3 minutes   Marching, alternating  x 3 minutes   Hamstring curls, alternating  x 3  minutes   Hip 3-way, B lower extremity x 3 minutes each         Bicycle forward/backward   minutes each    Flutter kick   minutes    Scissor kicks   minutes    Step ups x 3x10 each bilateral   Quad stretch on step      Hamstring stretch on step  x 1 minute/2 sets each (due to time)   Gastroc stretch  at wall  1 minute each   Heel/toe raises x 3 minutes   1/4 squat  x 3 minutes   Standing hip circles clockwise/counter clockwise   minutes each    Standing figure 8   10x each         UE alt flexion  paddles     UE alt abduction  paddles     UE alt hugs  paddles     UE internal rotation/external rotation  paddles     UE alt rows  paddles                         x = exercise details same as prior session    Myles received therapeutic exercises to develop strength, endurance, ROM and posture for (0) minutes including:     TherEx Today     Upright Bike for endurance  Level 5 5 minutes   Kneeling hip flexor stretch  3 x 30 seconds each bilateral   Prone quad stretch  1 minute each each bilateral   Seated Piriformis stretch   2 x 30 seconds Bilateral    Figure 4 stretch      Hamstring stretch  3x10s 3 way Bilateral    Straight leg raise  3 x 10 each bilateral 3#   Reverse clam  3x10 with ball between knees 3#   clamshells   2 x 10 each bilateral red   LONG ARC QUAD        Sidelying hip aDduction  3 x 10 each bilateral    Butterfly stretch  2 x 1 minute   Prone hip extension knee bent  3 x 10   Prone hamstring curls  3x10 2#   Supine adductor stretch   3x30s right   TKEs  Purple cook band 3x10 each   Prone lying with forearm pushup  5 minutes   Lumbar Ball Roll Outs  10s 10x 3 way   Lower Trunk Rotations  3 minutes   Planks  3 x 20s   Piriformis stretch  3x30s right   Single knee to chest stretch  1 minute each   Single Leg bridges  2x10 each bilateral     Plan for Next Visit: practice core stabilization     Pt participated in neuromuscular re-education activities to improve: Coordination, Proprioception and Posture for (0)  "minutes. The following activities were included:    Neuro Re-Ed 1/31/2022     Pelvic tilt with TRANSVERS ABDOMINUS   3x10 with 3s hold   TRANSVERS ABDOMINUS contractions  3x10   Ball squeeze with TRANSVERS ABDOMINUS  3x10 with 3s hold   Trunk rotation with cables high>low  3x10 50#       Pt participated in dynamic functional therapeutic activities to improve functional performance for (0) minutes, including:  Box squats  3x7 green chair   Wall clamshells  Green 3x10 each     Monster walks forward/backward  Green 5 big mirror laps at ankle   Standing heel taps  3x10 Bilateral 2" step   Side stepping  Green 5 big mirror laps at ankle   Standing hip 3 way  Green 3x10 each bilateral standing on airex   Quadruped bird dogs  3x5             MANUAL THERAPY TECHNIQUES including Myofascial release and Soft tissue Mobilization were applied to right hip for 0 minutes.    Manual Intervention Performed Today    Soft Tissue Mobilization  Right hip, glute, IT band, quad and surrounding musculature with use of the stick   Manual Lumbar distraction     Long axis distraction  Right    Muscle Energy Technique     Functional Dry Needling       Plan for Next Visit:        Home Exercises and Patient Education Provided     Education provided:   · Patient educated on the impairments noted above and the effects of physical therapy intervention to improve overall condition and QOL.   · Patient was educated on all the above exercise prior/during/after for proper posture, positioning, and execution for safe performance with home exercise program.     Written Home Exercises Provided: Patient instructed to cont prior HEP.  Exercises were reviewed and Myles was able to demonstrate them prior to the end of the session.  Myles demonstrated good understanding of the education provided.      See EMR under Patient Instructions for exercises provided 9/28/2021.    ASSESSMENT         Myles is progressing well towards his goals.   Pt prognosis is " Good.     Pt will continue to benefit from skilled outpatient physical therapy to address the deficits listed in the problem list box on initial evaluation, provide pt/family education and to maximize pt's level of independence in the home and community environment.     Pt's spiritual, cultural and educational needs considered and pt agreeable to plan of care and goals.    Anticipated barriers to physical therapy: none    GOALS:  SHORT TERM GOALS: 6 weeks  1/24/2022   1. Recent signs and systems trend is improving in order to progress towards LTG's.  Met   2. Patient will be independent with HEP in order to further progress and return to maximal function. Met    3. Pain rating at Worst: 5/10 in order to progress towards increased independence with activity. Met    4. Patient will be able to correct postural deviations in sitting and standing, to decrease pain and promote postural awareness for injury prevention.  PC       LONG TERM GOALS: 12 weeks  1/24/2022   1. Patient will return to normal ADL, recreational, and work related activities with less pain and limitation.  PC    2. Patient will improve AROM to stated goals in order to return to maximal functional potential.   PC   3. Patient will improve Strength to stated goals of appropriate musculature in order to improve functional independence.   Met   4. Pain Rating at Best: 1/10 to improve Quality of Life.      Met   5. Patient will meet predicted functional outcome (FOTO) score: 33% to increase self-worth & perceived functional ability.     PC        6. Patient will have met/partially met personal goal of: Patient reports being a  and going on long walks but is unable to participate lately.     PC         PM=Partially Met     PC=Progressing/Continued     M=Met    PLAN   Plan of care Certification: 2/30/2022      Continue Plan of Care (POC) and progress per patient tolerance. See Treatment section for exercise progression.    Glenis Ghosh, PT

## 2022-02-04 ENCOUNTER — CLINICAL SUPPORT (OUTPATIENT)
Dept: REHABILITATION | Facility: HOSPITAL | Age: 67
End: 2022-02-04
Payer: MEDICARE

## 2022-02-04 DIAGNOSIS — R29.898 WEAKNESS OF RIGHT HIP: ICD-10-CM

## 2022-02-04 DIAGNOSIS — M25.551 PAIN IN RIGHT HIP: ICD-10-CM

## 2022-02-04 PROCEDURE — 97113 AQUATIC THERAPY/EXERCISES: CPT | Mod: KX,HCNC

## 2022-02-04 NOTE — PROGRESS NOTES
[facility]  Physical Therapy Daily Treatment Note       Name: Myles Pride  Clinic Number: [id number]    Therapy Diagnosis:   Encounter Diagnoses   Name Primary?    Pain in right hip     Weakness of right hip      Physician: Emelia Riddle PA-C    Visit Date: 2/4/2022    Physician Orders: PT Eval and Treat   Medical Diagnosis from Referral: M25.551 (ICD-10-CM) - Right hip pain  Evaluation Date: 9/28/2021  Authorization Period Expiration: 2/28/2022  Plan of Care Expiration: 2/30/2022                  Progress Note Due: 2/24/2021   Visit # / Visits authorized: 1/12 (+27 eval)  FOTO: 2/3(5th visit)     Precautions: Standard and [place]    Time In: 2:45 pm (add ankle weights)  Time Out: 3:30 pm  Total Billable Time: 40 minutes    SUBJECTIVE     Pt reports: he reports continued relief from shot. He reports that he was able to walk about 1/2 mile total a few times this past week, he reports he is walking about 1/4 mile, stretching, and then another 1/4 mile back to car. He reports that 1/4 mile is about his limit.     Compliance with Hep: Daily  Response to previous treatment:Patient had soreness after last visit.   Functional change: No Change    Pain: 2/10   Worst: 4/10  Location: Right hip  Pain Control: rest  Aggravating factors: Standing, Walking and Morning    OBJECTIVE     Objective Measures updated at progress report unless specified otherwise.    Treatment     Myles received aquatic therapy with the use of water to decrease the pull of gravity and weightbearing forces on the body to increase tolerance to resisted therex for 40 minutes including the following exercise as well as stepping in and out of the pool with use of handrail and step-to pattern:     Exercise 2/4/2022 Parameters   Treadmill, forward, 1.2 mph  x 3 minutes   Treadmill, side-stepping, 0.9 mph  x 3 minutes, each side   Treadmill, backwards, 1.1 mph  x 3 minutes   Marching, alternating (single arm support) x 3  minutes   Hamstring curls, alternating  x 3 minutes   Hip 3-way, B lower extremity (single arm support) x 3 minutes each         Bicycle forward/backward   minutes each    Flutter kick   minutes    Scissor kicks (add next visit)   minutes         Step ups x 3 min each bilateral   Quad stretch on step      Hamstring stretch on step  x 1 minute/2 sets each (due to time)   Gastroc stretch  at wall  1 minute each   Heel/toe raises x 3 minutes   1/4 squat  x 3 minutes   Standing hip circles clockwise/counter clockwise   minutes each    Standing figure 8   10x each         UE alt flexion  paddles     UE alt abduction  paddles     UE alt hugs  paddles     UE internal rotation/external rotation  paddles     UE alt rows  paddles                         x = exercise details same as prior session    Myles received therapeutic exercises to develop strength, endurance, ROM and posture for (0) minutes including:     TherEx Today     Upright Bike for endurance  Level 5 5 minutes   Kneeling hip flexor stretch  3 x 30 seconds each bilateral   Prone quad stretch  1 minute each each bilateral   Seated Piriformis stretch   2 x 30 seconds Bilateral    Figure 4 stretch      Hamstring stretch  3x10s 3 way Bilateral    Straight leg raise  3 x 10 each bilateral 3#   Reverse clam  3x10 with ball between knees 3#   clamshells   2 x 10 each bilateral red   LONG ARC QUAD        Sidelying hip aDduction  3 x 10 each bilateral    Butterfly stretch  2 x 1 minute   Prone hip extension knee bent  3 x 10   Prone hamstring curls  3x10 2#   Supine adductor stretch   3x30s right   TKEs  Purple cook band 3x10 each   Prone lying with forearm pushup  5 minutes   Lumbar Ball Roll Outs  10s 10x 3 way   Lower Trunk Rotations  3 minutes   Planks  3 x 20s   Piriformis stretch  3x30s right   Single knee to chest stretch  1 minute each   Single Leg bridges  2x10 each bilateral     Plan for Next Visit: practice core stabilization     Pt participated in  "neuromuscular re-education activities to improve: Coordination, Proprioception and Posture for (0) minutes. The following activities were included:    Neuro Re-Ed 2/4/2022     Pelvic tilt with TRANSVERS ABDOMINUS   3x10 with 3s hold   TRANSVERS ABDOMINUS contractions  3x10   Ball squeeze with TRANSVERS ABDOMINUS  3x10 with 3s hold   Trunk rotation with cables high>low  3x10 50#       Pt participated in dynamic functional therapeutic activities to improve functional performance for (0) minutes, including:  Box squats  3x7 green chair   Wall clamshells  Green 3x10 each     Monster walks forward/backward  Green 5 big mirror laps at ankle   Standing heel taps  3x10 Bilateral 2" step   Side stepping  Green 5 big mirror laps at ankle   Standing hip 3 way  Green 3x10 each bilateral standing on airex   Quadruped bird dogs  3x5             MANUAL THERAPY TECHNIQUES including Myofascial release and Soft tissue Mobilization were applied to right hip for 0 minutes.    Manual Intervention Performed Today    Soft Tissue Mobilization  Right hip, glute, IT band, quad and surrounding musculature with use of the stick   Manual Lumbar distraction     Long axis distraction  Right    Muscle Energy Technique     Functional Dry Needling       Plan for Next Visit:        Home Exercises and Patient Education Provided     Education provided:   · Patient educated on the impairments noted above and the effects of physical therapy intervention to improve overall condition and QOL.   · Patient was educated on all the above exercise prior/during/after for proper posture, positioning, and execution for safe performance with home exercise program.     Written Home Exercises Provided: Patient instructed to cont prior HEP.  Exercises were reviewed and Myles was able to demonstrate them prior to the end of the session.  Myles demonstrated good understanding of the education provided.      See EMR under Patient Instructions for exercises provided " 9/28/2021.    ASSESSMENT         Myles is progressing well towards his goals.   Pt prognosis is Good.     Pt will continue to benefit from skilled outpatient physical therapy to address the deficits listed in the problem list box on initial evaluation, provide pt/family education and to maximize pt's level of independence in the home and community environment.     Pt's spiritual, cultural and educational needs considered and pt agreeable to plan of care and goals.    Anticipated barriers to physical therapy: none    GOALS:  SHORT TERM GOALS: 6 weeks  1/24/2022   1. Recent signs and systems trend is improving in order to progress towards LTG's.  Met   2. Patient will be independent with HEP in order to further progress and return to maximal function. Met    3. Pain rating at Worst: 5/10 in order to progress towards increased independence with activity. Met    4. Patient will be able to correct postural deviations in sitting and standing, to decrease pain and promote postural awareness for injury prevention.  PC       LONG TERM GOALS: 12 weeks  1/24/2022   1. Patient will return to normal ADL, recreational, and work related activities with less pain and limitation.  PC    2. Patient will improve AROM to stated goals in order to return to maximal functional potential.   PC   3. Patient will improve Strength to stated goals of appropriate musculature in order to improve functional independence.   Met   4. Pain Rating at Best: 1/10 to improve Quality of Life.      Met   5. Patient will meet predicted functional outcome (FOTO) score: 33% to increase self-worth & perceived functional ability.     PC        6. Patient will have met/partially met personal goal of: Patient reports being a  and going on long walks but is unable to participate lately.     PC         PM=Partially Met     PC=Progressing/Continued     M=Met    PLAN   Plan of care Certification: 2/30/2022      Continue Plan of Care (POC) and progress  per patient tolerance. See Treatment section for exercise progression.    Glenis Ghosh, PT

## 2022-02-07 ENCOUNTER — CLINICAL SUPPORT (OUTPATIENT)
Dept: REHABILITATION | Facility: HOSPITAL | Age: 67
End: 2022-02-07
Payer: MEDICARE

## 2022-02-07 DIAGNOSIS — R29.898 WEAKNESS OF RIGHT HIP: ICD-10-CM

## 2022-02-07 DIAGNOSIS — M25.551 PAIN IN RIGHT HIP: ICD-10-CM

## 2022-02-07 PROCEDURE — 97113 AQUATIC THERAPY/EXERCISES: CPT | Mod: HCNC,CQ

## 2022-02-07 NOTE — PROGRESS NOTES
[facility]  Physical Therapy Daily Treatment Note       Name: Myles Pride  Clinic Number: [id number]    Therapy Diagnosis:   Encounter Diagnoses   Name Primary?    Pain in right hip     Weakness of right hip      Physician: Emelia Riddle PA-C    Visit Date: 2/7/2022    Physician Orders: PT Eval and Treat   Medical Diagnosis from Referral: M25.551 (ICD-10-CM) - Right hip pain  Evaluation Date: 9/28/2021  Authorization Period Expiration: 2/28/2022  Plan of Care Expiration: 2/30/2022                  Progress Note Due: 2/24/2021   Visit # / Visits authorized: 2/12 (+27 eval)  FOTO: 2/3(5th visit)     Precautions: Standard and [place]    Time In: 2:32 pm (ankle weights used today)  Time Out: 3:15 pm  Total Billable Time: 40 minutes    SUBJECTIVE     Pt reports: he had a pretty good weekend and did not do too much activity. He just has his chronic hip and groin pain, reporting his pain being at a level 2 today.      Compliance with Hep: Daily  Response to previous treatment:Patient had soreness after last visit.   Functional change: No Change    Pain: 2/10   Worst: 4/10  Location: Right hip  Pain Control: rest  Aggravating factors: Standing, Walking and Morning    OBJECTIVE     Objective Measures updated at progress report unless specified otherwise.    Treatment     Myles received aquatic therapy with the use of water to decrease the pull of gravity and weightbearing forces on the body to increase tolerance to resisted therex for 40 minutes including the following exercise as well as stepping in and out of the pool with use of handrail and step-to pattern:     Exercise 2/7/2022 Parameters   Treadmill, forward, 1.3 mph  x 3 minutes   Treadmill, side-stepping, 0.9 mph  x 3 minutes, each side   Treadmill, backwards, 1.1 mph  x 3 minutes   Marching, alternating no arm support x 3 minutes   Hamstring curls, alternating no arm support x 3 minutes   Hip 3-way, B lower extremity (single  arm support) x 3 minutes each         Bicycle forward/backward   minutes each    Flutter kick   minutes    Scissor kicks (add next visit)   minutes         Step ups x 20x each bilateral   Quad stretch on step      Hamstring stretch on step  x 1 minute each bilateral   Gastroc stretch at step x 1 minute each bilateral   Heel/toe raises x 3 minutes   1/4 squat  x 3 minutes   Standing hip circles clockwise/counter clockwise   minutes each    Standing figure 8   10x each         UE alt flexion  paddles     UE alt abduction  paddles     UE alt hugs  paddles     UE internal rotation/external rotation  paddles     UE alt rows  paddles                         x = exercise details same as prior session    Myles received therapeutic exercises to develop strength, endurance, ROM and posture for (0) minutes including:     TherEx Today     Upright Bike for endurance  Level 5 5 minutes   Kneeling hip flexor stretch  3 x 30 seconds each bilateral   Prone quad stretch  1 minute each each bilateral   Seated Piriformis stretch   2 x 30 seconds Bilateral    Figure 4 stretch      Hamstring stretch  3x10s 3 way Bilateral    Straight leg raise  3 x 10 each bilateral 3#   Reverse clam  3x10 with ball between knees 3#   clamshells   2 x 10 each bilateral red   LONG ARC QUAD        Sidelying hip aDduction  3 x 10 each bilateral    Butterfly stretch  2 x 1 minute   Prone hip extension knee bent  3 x 10   Prone hamstring curls  3x10 2#   Supine adductor stretch   3x30s right   TKEs  Purple cook band 3x10 each   Prone lying with forearm pushup  5 minutes   Lumbar Ball Roll Outs  10s 10x 3 way   Lower Trunk Rotations  3 minutes   Planks  3 x 20s   Piriformis stretch  3x30s right   Single knee to chest stretch  1 minute each   Single Leg bridges  2x10 each bilateral     Plan for Next Visit: practice core stabilization     Pt participated in neuromuscular re-education activities to improve: Coordination, Proprioception and Posture for (0)  "minutes. The following activities were included:    Neuro Re-Ed 2/7/2022     Pelvic tilt with TRANSVERS ABDOMINUS   3x10 with 3s hold   TRANSVERS ABDOMINUS contractions  3x10   Ball squeeze with TRANSVERS ABDOMINUS  3x10 with 3s hold   Trunk rotation with cables high>low  3x10 50#       Pt participated in dynamic functional therapeutic activities to improve functional performance for (0) minutes, including:  Box squats  3x7 green chair   Wall clamshells  Green 3x10 each     Monster walks forward/backward  Green 5 big mirror laps at ankle   Standing heel taps  3x10 Bilateral 2" step   Side stepping  Green 5 big mirror laps at ankle   Standing hip 3 way  Green 3x10 each bilateral standing on airex   Quadruped bird dogs  3x5             MANUAL THERAPY TECHNIQUES including Myofascial release and Soft tissue Mobilization were applied to right hip for 0 minutes.    Manual Intervention Performed Today    Soft Tissue Mobilization  Right hip, glute, IT band, quad and surrounding musculature with use of the stick   Manual Lumbar distraction     Long axis distraction  Right    Muscle Energy Technique     Functional Dry Needling       Plan for Next Visit:        Home Exercises and Patient Education Provided     Education provided:   · Patient educated on the impairments noted above and the effects of physical therapy intervention to improve overall condition and QOL.   · Patient was educated on all the above exercise prior/during/after for proper posture, positioning, and execution for safe performance with home exercise program.     Written Home Exercises Provided: Patient instructed to cont prior HEP.  Exercises were reviewed and Myles was able to demonstrate them prior to the end of the session.  Myles demonstrated good understanding of the education provided.      See EMR under Patient Instructions for exercises provided 9/28/2021.    ASSESSMENT     Patient did well with session today with no complaints of discomfort. " Ankle weights used today. Performed marching, hamstring curls, and hip 3 way with very little upper extremity assist for balance, good core activation noted. Slight increase in treadmill speed today, patient with no issues. Patient left session with no reports of increased pain.     Mlyes is progressing well towards his goals.   Pt prognosis is Good.     Pt will continue to benefit from skilled outpatient physical therapy to address the deficits listed in the problem list box on initial evaluation, provide pt/family education and to maximize pt's level of independence in the home and community environment.     Pt's spiritual, cultural and educational needs considered and pt agreeable to plan of care and goals.    Anticipated barriers to physical therapy: none    GOALS:  SHORT TERM GOALS: 6 weeks  1/24/2022   1. Recent signs and systems trend is improving in order to progress towards LTG's.  Met   2. Patient will be independent with HEP in order to further progress and return to maximal function. Met    3. Pain rating at Worst: 5/10 in order to progress towards increased independence with activity. Met    4. Patient will be able to correct postural deviations in sitting and standing, to decrease pain and promote postural awareness for injury prevention.  PC       LONG TERM GOALS: 12 weeks  1/24/2022   1. Patient will return to normal ADL, recreational, and work related activities with less pain and limitation.  PC    2. Patient will improve AROM to stated goals in order to return to maximal functional potential.   PC   3. Patient will improve Strength to stated goals of appropriate musculature in order to improve functional independence.   Met   4. Pain Rating at Best: 1/10 to improve Quality of Life.      Met   5. Patient will meet predicted functional outcome (FOTO) score: 33% to increase self-worth & perceived functional ability.     PC        6. Patient will have met/partially met personal goal of: Patient  reports being a  and going on long walks but is unable to participate lately.     PC         PM=Partially Met     PC=Progressing/Continued     M=Met    PLAN   Plan of care Certification: 2/30/2022      Continue Plan of Care (POC) and progress per patient tolerance. See Treatment section for exercise progression.    Kristi Norris, PTA

## 2022-02-08 ENCOUNTER — TELEPHONE (OUTPATIENT)
Dept: ORTHOPEDICS | Facility: CLINIC | Age: 67
End: 2022-02-08
Payer: MEDICARE

## 2022-02-11 ENCOUNTER — CLINICAL SUPPORT (OUTPATIENT)
Dept: REHABILITATION | Facility: HOSPITAL | Age: 67
End: 2022-02-11
Payer: MEDICARE

## 2022-02-11 DIAGNOSIS — M25.551 PAIN IN RIGHT HIP: ICD-10-CM

## 2022-02-11 DIAGNOSIS — R29.898 WEAKNESS OF RIGHT HIP: ICD-10-CM

## 2022-02-11 PROCEDURE — 97113 AQUATIC THERAPY/EXERCISES: CPT | Mod: HCNC

## 2022-02-11 NOTE — PROGRESS NOTES
[facility]  Physical Therapy Daily Treatment Note       Name: Myles Pride  Clinic Number: [id number]    Therapy Diagnosis:   Encounter Diagnoses   Name Primary?    Pain in right hip     Weakness of right hip      Physician: Emelia Riddle PA-C    Visit Date: 2/11/2022    Physician Orders: PT Eval and Treat   Medical Diagnosis from Referral: M25.551 (ICD-10-CM) - Right hip pain  Evaluation Date: 9/28/2021  Authorization Period Expiration: 2/28/2022  Plan of Care Expiration: 2/30/2022                  Progress Note Due: 2/24/2021   Visit # / Visits authorized: 2/12 (+27 eval)  FOTO: 2/3(5th visit)     Precautions: Standard and [place]    Time In: 1:20 pm (ankle weights used today)  Time Out: 2:08 pm  Total Billable Time: 40 minutes    SUBJECTIVE     Pt reports: he has been feeling pretty much the same as earlier this week - rates his pain at 2/10.  Achy in hips/LB      Compliance with Hep: Daily  Response to previous treatment:Patient had soreness after last visit.   Functional change: No Change    Pain: 2/10   Worst: 4/10  Location: Right hip  Pain Control: rest  Aggravating factors: Standing, Walking and Morning    OBJECTIVE     Objective Measures updated at progress report unless specified otherwise.    Treatment     Myles received aquatic therapy with the use of water to decrease the pull of gravity and weightbearing forces on the body to increase tolerance to resisted therex for 40 minutes including the following exercise as well as stepping in and out of the pool with use of handrail and step-to pattern:     Exercise 2/11/2022 Parameters   Treadmill, forward, 1.3 mph   no arm support x 3 minutes   Treadmill, side-stepping, 0.9 mph no arm support x 3 minutes, each side   Treadmill, backwards, 1.1-1.2 mph  no arm support x 3 minutes   Marching, alternating no arm support x 3 minutes   Hamstring curls, alternating no arm support x 3 minutes   Hip 3-way, B lower extremity   single arm support x 3 minutes each         Bicycle forward/backward   minutes each    Flutter kick   minutes    Scissor kicks (add next visit)   minutes         Step ups x 20x each bilateral   Quad stretch on step      Hamstring stretch on step  x 30s/2x each bilateral   Gastroc stretch at step x 30s/2x each bilateral   Heel/toe raises x 3 minutes   1/4 squat  x 3 minutes   Standing hip circles clockwise/counter clockwise   minutes each    Standing figure 8   10x each         UE alt flexion  paddles     UE alt abduction  paddles     UE alt hugs  paddles     UE internal rotation/external rotation  paddles     UE alt rows  paddles                         x = exercise details same as prior session    Myles received therapeutic exercises to develop strength, endurance, ROM and posture for (0) minutes including:     TherEx Today     Upright Bike for endurance  Level 5 5 minutes   Kneeling hip flexor stretch  3 x 30 seconds each bilateral   Prone quad stretch  1 minute each each bilateral   Seated Piriformis stretch   2 x 30 seconds Bilateral    Figure 4 stretch      Hamstring stretch  3x10s 3 way Bilateral    Straight leg raise  3 x 10 each bilateral 3#   Reverse clam  3x10 with ball between knees 3#   clamshells   2 x 10 each bilateral red   LONG ARC QUAD        Sidelying hip aDduction  3 x 10 each bilateral    Butterfly stretch  2 x 1 minute   Prone hip extension knee bent  3 x 10   Prone hamstring curls  3x10 2#   Supine adductor stretch   3x30s right   TKEs  Purple cook band 3x10 each   Prone lying with forearm pushup  5 minutes   Lumbar Ball Roll Outs  10s 10x 3 way   Lower Trunk Rotations  3 minutes   Planks  3 x 20s   Piriformis stretch  3x30s right   Single knee to chest stretch  1 minute each   Single Leg bridges  2x10 each bilateral     Plan for Next Visit: practice core stabilization     Pt participated in neuromuscular re-education activities to improve: Coordination, Proprioception and Posture for (0)  "minutes. The following activities were included:    Neuro Re-Ed 2/11/2022     Pelvic tilt with TRANSVERS ABDOMINUS   3x10 with 3s hold   TRANSVERS ABDOMINUS contractions  3x10   Ball squeeze with TRANSVERS ABDOMINUS  3x10 with 3s hold   Trunk rotation with cables high>low  3x10 50#       Pt participated in dynamic functional therapeutic activities to improve functional performance for (0) minutes, including:  Box squats  3x7 green chair   Wall clamshells  Green 3x10 each     Monster walks forward/backward  Green 5 big mirror laps at ankle   Standing heel taps  3x10 Bilateral 2" step   Side stepping  Green 5 big mirror laps at ankle   Standing hip 3 way  Green 3x10 each bilateral standing on airex   Quadruped bird dogs  3x5             MANUAL THERAPY TECHNIQUES including Myofascial release and Soft tissue Mobilization were applied to right hip for 0 minutes.    Manual Intervention Performed Today    Soft Tissue Mobilization  Right hip, glute, IT band, quad and surrounding musculature with use of the stick   Manual Lumbar distraction     Long axis distraction  Right    Muscle Energy Technique     Functional Dry Needling       Plan for Next Visit:        Home Exercises and Patient Education Provided     Education provided:   · Patient educated on the impairments noted above and the effects of physical therapy intervention to improve overall condition and QOL.   · Patient was educated on all the above exercise prior/during/after for proper posture, positioning, and execution for safe performance with home exercise program.     Written Home Exercises Provided: Patient instructed to cont prior HEP.  Exercises were reviewed and Myles was able to demonstrate them prior to the end of the session.  Myles demonstrated good understanding of the education provided.      See EMR under Patient Instructions for exercises provided 9/28/2021.    ASSESSMENT     Patient did well with session today with no complaints of discomfort. " He was able to tolerate all therapeutic exercise without incident and minimal verbal cues - he was able to tolerate walking without hand support but is complaining of increased LB discomfort today - he will monitor and let us know how it does and was advised that we may need to increase hand support due to leg length discrepancy and addition of weights on LE's.  Patient agreeable.    Myles is progressing well towards his goals.   Pt prognosis is Good.     Pt will continue to benefit from skilled outpatient physical therapy to address the deficits listed in the problem list box on initial evaluation, provide pt/family education and to maximize pt's level of independence in the home and community environment.     Pt's spiritual, cultural and educational needs considered and pt agreeable to plan of care and goals.    Anticipated barriers to physical therapy: none    GOALS:  SHORT TERM GOALS: 6 weeks  1/24/2022   1. Recent signs and systems trend is improving in order to progress towards LTG's.  Met   2. Patient will be independent with HEP in order to further progress and return to maximal function. Met    3. Pain rating at Worst: 5/10 in order to progress towards increased independence with activity. Met    4. Patient will be able to correct postural deviations in sitting and standing, to decrease pain and promote postural awareness for injury prevention.  PC       LONG TERM GOALS: 12 weeks  1/24/2022   1. Patient will return to normal ADL, recreational, and work related activities with less pain and limitation.  PC    2. Patient will improve AROM to stated goals in order to return to maximal functional potential.   PC   3. Patient will improve Strength to stated goals of appropriate musculature in order to improve functional independence.   Met   4. Pain Rating at Best: 1/10 to improve Quality of Life.      Met   5. Patient will meet predicted functional outcome (FOTO) score: 33% to increase self-worth & perceived  functional ability.     PC        6. Patient will have met/partially met personal goal of: Patient reports being a  and going on long walks but is unable to participate lately.     PC         PM=Partially Met     PC=Progressing/Continued     M=Met    PLAN   Plan of care Certification: 2/30/2022      Continue Plan of Care (POC) and progress per patient tolerance. See Treatment section for exercise progression.    Glenis Ghosh, PT

## 2022-02-17 ENCOUNTER — TELEPHONE (OUTPATIENT)
Dept: ADMINISTRATIVE | Facility: OTHER | Age: 67
End: 2022-02-17
Payer: MEDICARE

## 2022-02-21 ENCOUNTER — CLINICAL SUPPORT (OUTPATIENT)
Dept: REHABILITATION | Facility: HOSPITAL | Age: 67
End: 2022-02-21
Payer: MEDICARE

## 2022-02-21 DIAGNOSIS — M25.551 PAIN IN RIGHT HIP: Primary | ICD-10-CM

## 2022-02-21 DIAGNOSIS — R29.898 WEAKNESS OF RIGHT HIP: ICD-10-CM

## 2022-02-21 PROCEDURE — 97113 AQUATIC THERAPY/EXERCISES: CPT | Mod: HCNC

## 2022-02-21 NOTE — PROGRESS NOTES
[facility]   Physical Therapy Treatment Note     Name: Myles Pride  Clinic Number: [id number]    Therapy Diagnosis:   Encounter Diagnoses   Name Primary?    Pain in right hip Yes    Weakness of right hip      Physician: Emelia Riddle PA-C    Visit Date: 2/21/2022    Physician Orders: PT Eval and Treat   Medical Diagnosis from Referral: M25.551 (ICD-10-CM) - Right hip pain  Evaluation Date: 9/28/2021  Authorization Period Expiration: 2/28/2022  Plan of Care Expiration: 2/30/2022                  Progress Note Due: 2/24/2021   Visit # / Visits authorized: 4/12 (+27 eval)  FOTO: 2/3(5th visit)     Precautions: Standard and [place]    Time In: 1:20 pm (ankle weights used today)  Time Out: 2:08 pm  Total Billable Time: 40 minutes    SUBJECTIVE     Pt reports: he has been feeling pretty much the same as earlier this week - rates his pain at 2/10.  Achy in hips/LB      Compliance with Hep: Daily  Response to previous treatment:Patient had soreness after last visit.   Functional change: No Change    Pain: 2/10   Worst: 4/10  Location: Right hip  Pain Control: rest  Aggravating factors: Standing, Walking and Morning    OBJECTIVE     Objective Measures updated at progress report unless specified otherwise.    Treatment     Myles received aquatic therapy with the use of water to decrease the pull of gravity and weightbearing forces on the body to increase tolerance to resisted therex for 40 minutes including the following exercise as well as stepping in and out of the pool with use of handrail and step-to pattern:     Exercise 2/21/2022 Parameters   Treadmill, forward, 1.3 mph   no arm support x 3 minutes   Treadmill, side-stepping, 0.9 mph no arm support x 3 minutes, each side   Treadmill, backwards, 1.1-1.2 mph  no arm support x 3 minutes   Marching, alternating no arm support x 3 minutes   Hamstring curls, alternating no arm support x 3 minutes   Hip 3-way, B lower extremity  single  arm support x 3 minutes each         Bicycle forward/backward   minutes each    Flutter kick   minutes    Scissor kicks (add next visit)   minutes         Step ups x 20x each bilateral   Quad stretch on step      Hamstring stretch on step   30s/2x each bilateral   Gastroc stretch at step x 30s/2x each bilateral   Heel/toe raises x 3 minutes   1/4 squat  x 3 minutes   Standing hip circles clockwise/counter clockwise   minutes each    Standing figure 8   10x each         UE alt flexion  paddles     UE alt abduction  paddles     UE alt hugs  paddles     UE internal rotation/external rotation  paddles     UE alt rows  paddles                         x = exercise details same as prior session    Myles received therapeutic exercises to develop strength, endurance, ROM and posture for (0) minutes including:     TherEx Today     Upright Bike for endurance  Level 5 5 minutes   Kneeling hip flexor stretch  3 x 30 seconds each bilateral   Prone quad stretch  1 minute each each bilateral   Seated Piriformis stretch   2 x 30 seconds Bilateral    Figure 4 stretch      Hamstring stretch  3x10s 3 way Bilateral    Straight leg raise  3 x 10 each bilateral 3#   Reverse clam  3x10 with ball between knees 3#   clamshells   2 x 10 each bilateral red   LONG ARC QUAD        Sidelying hip aDduction  3 x 10 each bilateral    Butterfly stretch  2 x 1 minute   Prone hip extension knee bent  3 x 10   Prone hamstring curls  3x10 2#   Supine adductor stretch   3x30s right   TKEs  Purple cook band 3x10 each   Prone lying with forearm pushup  5 minutes   Lumbar Ball Roll Outs  10s 10x 3 way   Lower Trunk Rotations  3 minutes   Planks  3 x 20s   Piriformis stretch  3x30s right   Single knee to chest stretch  1 minute each   Single Leg bridges  2x10 each bilateral     Plan for Next Visit: practice core stabilization     Pt participated in neuromuscular re-education activities to improve: Coordination, Proprioception and Posture for (0) minutes.  "The following activities were included:    Neuro Re-Ed 2/21/2022     Pelvic tilt with TRANSVERS ABDOMINUS   3x10 with 3s hold   TRANSVERS ABDOMINUS contractions  3x10   Ball squeeze with TRANSVERS ABDOMINUS  3x10 with 3s hold   Trunk rotation with cables high>low  3x10 50#       Pt participated in dynamic functional therapeutic activities to improve functional performance for (0) minutes, including:  Box squats  3x7 green chair   Wall clamshells  Green 3x10 each     Monster walks forward/backward  Green 5 big mirror laps at ankle   Standing heel taps  3x10 Bilateral 2" step   Side stepping  Green 5 big mirror laps at ankle   Standing hip 3 way  Green 3x10 each bilateral standing on airex   Quadruped bird dogs  3x5             MANUAL THERAPY TECHNIQUES including Myofascial release and Soft tissue Mobilization were applied to right hip for 0 minutes.    Manual Intervention Performed Today    Soft Tissue Mobilization  Right hip, glute, IT band, quad and surrounding musculature with use of the stick   Manual Lumbar distraction     Long axis distraction  Right    Muscle Energy Technique     Functional Dry Needling       Plan for Next Visit:        Home Exercises and Patient Education Provided     Education provided:   · Patient educated on the impairments noted above and the effects of physical therapy intervention to improve overall condition and QOL.   · Patient was educated on all the above exercise prior/during/after for proper posture, positioning, and execution for safe performance with home exercise program.     Written Home Exercises Provided: Patient instructed to cont prior HEP.  Exercises were reviewed and Myles was able to demonstrate them prior to the end of the session.  Myles demonstrated good understanding of the education provided.      See EMR under Patient Instructions for exercises provided 9/28/2021.    ASSESSMENT     Patient did well with session today with no complaints of discomfort. He " reports that he did well after last few treatments and has not noted any increase in LBP, although he will monitor.  He requires minimal cues for therapeutic exercise today.  Increased LB motion noted with decreased hand support.    Myles is progressing well towards his goals.   Pt prognosis is Good.     Pt will continue to benefit from skilled outpatient physical therapy to address the deficits listed in the problem list box on initial evaluation, provide pt/family education and to maximize pt's level of independence in the home and community environment.     Pt's spiritual, cultural and educational needs considered and pt agreeable to plan of care and goals.    Anticipated barriers to physical therapy: none    GOALS:  SHORT TERM GOALS: 6 weeks  1/24/2022   1. Recent signs and systems trend is improving in order to progress towards LTG's.  Met   2. Patient will be independent with HEP in order to further progress and return to maximal function. Met    3. Pain rating at Worst: 5/10 in order to progress towards increased independence with activity. Met    4. Patient will be able to correct postural deviations in sitting and standing, to decrease pain and promote postural awareness for injury prevention.  PC       LONG TERM GOALS: 12 weeks  1/24/2022   1. Patient will return to normal ADL, recreational, and work related activities with less pain and limitation.  PC    2. Patient will improve AROM to stated goals in order to return to maximal functional potential.   PC   3. Patient will improve Strength to stated goals of appropriate musculature in order to improve functional independence.   Met   4. Pain Rating at Best: 1/10 to improve Quality of Life.      Met   5. Patient will meet predicted functional outcome (FOTO) score: 33% to increase self-worth & perceived functional ability.     PC        6. Patient will have met/partially met personal goal of: Patient reports being a  and going on long walks  but is unable to participate lately.     PC         PM=Partially Met     PC=Progressing/Continued     M=Met    PLAN   Plan of care Certification: 2/30/2022      Continue Plan of Care (POC) and progress per patient tolerance. See Treatment section for exercise progression.    Glenis Ghosh, PT

## 2022-02-23 ENCOUNTER — OFFICE VISIT (OUTPATIENT)
Dept: PAIN MEDICINE | Facility: CLINIC | Age: 67
End: 2022-02-23
Payer: MEDICARE

## 2022-02-23 DIAGNOSIS — M48.061 SPINAL STENOSIS OF LUMBAR REGION WITHOUT NEUROGENIC CLAUDICATION: ICD-10-CM

## 2022-02-23 DIAGNOSIS — M47.816 LUMBAR FACET ARTHROPATHY: ICD-10-CM

## 2022-02-23 DIAGNOSIS — M54.16 LUMBAR RADICULOPATHY, CHRONIC: Primary | ICD-10-CM

## 2022-02-23 DIAGNOSIS — M51.36 DDD (DEGENERATIVE DISC DISEASE), LUMBAR: ICD-10-CM

## 2022-02-23 PROCEDURE — 99213 PR OFFICE/OUTPT VISIT, EST, LEVL III, 20-29 MIN: ICD-10-PCS | Mod: 95,,, | Performed by: ANESTHESIOLOGY

## 2022-02-23 PROCEDURE — 99213 OFFICE O/P EST LOW 20 MIN: CPT | Mod: 95,,, | Performed by: ANESTHESIOLOGY

## 2022-02-23 NOTE — PATIENT INSTRUCTIONS
General Neck and Back Pain    Both neck and back pain are usually caused by injury to the muscles or ligaments of the spine. Sometimes the disks that separate each bone of the spine may cause pain by pressing on a nearby nerve. Back and neck pain may appear after a sudden twisting or bending force (such as in a car accident), or sometimes after a simple awkward movement. In either case, muscle spasm is often present and adds to the pain.  Acute neck and back pain usually gets better in 1 to 2 weeks. Pain related to disk disease, arthritis in the spinal joints or spinal stenosis (narrowing of the spinal canal) can become chronic and last for months or years.  Back and neck pain are common problems. Most people feel better in 1 or 2 weeks, and most of the rest in 1 to 2 months. Most people can remain active.  People experience and describe pain differently.  Pain can be sharp, stabbing, shooting, aching, cramping, or burning  Movement, standing, bending, lifting, sitting, or walking may worsen the pain  Pain can be localized to one spot or area, or it can be more generalized  Pain can spread or radiate upwards, downwards, to the front, or go down your arms  Muscle spasm may occur.  Most of the time mechanical problems with the muscles or spine cause the pain. it is usually caused by an injury, whether known or not, to the muscles or ligaments. While illnesses can cause back pain, it is usually not caused by a serious illness. Pain is usually related to physical activity, whether sports, exercise, work, or normal activity. Sometimes it can occur without an identifiable cause. This can happen simply by stretching or moving wrong, without noting pain at the time. Other causes include:  Overexertion, lifting, pushing, pulling incorrectly or too aggressively.  Sudden twisting, bending or stretching from an accident (car or fall), or accidental movement.  Poor posture  Poor conditioning, lack of regular exercise  Spinal  disc disease or arthritis  Stress  Pregnancy, or illness like appendicitis, bladder or kidney infection, pelvic infections   Home care  For neck pain: Use a comfortable pillow that supports the head and keeps the spine in a neutral position. The position of the head should not be tilted forward or backward.  When in bed, try to find a position of comfort. A firm mattress is best. Try lying flat on your back with pillows under your knees. You can also try lying on your side with your knees bent up towards your chest and a pillow between your knees.  At first, do not try to stretch out the sore spots. If there is a strain, it is not like the good soreness you get after exercising without an injury. In this case, stretching may make it worse.  Avoid prolonged sitting, long car rides or travel. This puts more stress on the lower back than standing or walking.  During the first 24 to 72 hours after an injury, apply an ice pack to the painful area for 20 minutes and then remove it for 20 minutes over a period of 60 to 90 minutes or several times a day.   You can alternate ice and heat therapies. Talk with your healthcare provider about the best treatment for your back or neck pain. As a safety precaution, do not use a heating pad at bedtime. Sleeping with a heating pad can lead to skin burns or tissue damage.  Therapeutic massage can help relax the back and neck muscles without stretching them.  Be aware of safe lifting methods and do not lift anything over 15 pounds until all the pain is gone.  Medications  Talk to your healthcare provider before using medicine, especially if you have other medical problems or are taking other medicines.  You may use over-the-counter medicine to control pain, unless another pain medicine was prescribed. If you have chronic conditions like diabetes, liver or kidney disease, stomach ulcers,  gastrointestinal bleeding, or are taking blood thinner medicines.  Be careful if you are given pain  medicines, narcotics, or medicine for muscle spasm. They can cause drowsiness, and can affect your coordination, reflexes, and judgment. Do not drive or operate heavy machinery.  Follow-up care  Follow up with your healthcare provider, or as advised. Physical therapy or further tests may be needed.  If X-rays were taken, you will be notified of any new findings that may affect your care.  Call 911  Seek emergency medical care if any of the following occur:  Trouble breathing  Confusion  Very drowsy or trouble awakening  Fainting or loss of consciousness  Rapid or very slow heart rate  Loss of bowel or bladder control  When to seek medical advice  Call your healthcare provider right away if any of these occur:  Pain becomes worse or spreads into your arms or legs  Weakness, numbness or pain in one or both arms or legs  Numbness in the groin area  Difficulty walking  Fever of 100.4ºF (38ºC) or higher, or as directed by your healthcare provider  Date Last Reviewed: 7/1/2016  © 2886-0167 [employer]. [address] [place] [address]. All rights reserved. This information is not intended as a substitute for professional medical care. Always follow your healthcare professional's instructions.       Exercises to Strengthen Your Lower Back  Strong lower back and abdominal muscles work together to support your spine. The exercises below will help strengthen the lower back. It is important that you begin exercising slowly and increase levels gradually.  Always begin any exercise program with stretching. If you feel pain while doing any of these exercises, stop and talk to your doctor about a more specific exercise program that better suits your condition.   Low back stretch  The point of stretching is to make you more flexible and increase your range of motion. Stretch only as much as you are able. Stretch slowly. Do not push your stretch to the limit. If at any point you feel pain while stretching,  this is your (temporary) limit.  Lie on your back with your knees bent and both feet on the ground.  Slowly raise your left knee to your chest as you flatten your lower back against the floor. Hold for 5 seconds.  Relax and repeat the exercise with your right knee.  Do 10 of these exercises for each leg.  Repeat hugging both knees to your chest at the same time.  Building lower back strength  Start your exercise routine with 10 to 30 minutes a day, 1 to 3 times a day.  Initial exercises  Lying on your back:  Ankle pumps: Move your foot up and down, towards your head, and then away. Repeat 10 times with each foot.  Heel slides: Slowly bend your knee, drawing the heel of your foot towards you. Then slide your heel/foot from you, straightening your knee. Do not lift your foot off the floor (this is not a leg lift).  Abdominal contraction: Bend your knees and put your hands on your stomach. Tighten your stomach muscles. Hold for 5 seconds, then relax. Repeat 10 times.  Straight leg raise: Bend one leg at the knee and keep the other leg straight. Tighten your stomach muscles. Slowly lift your straight leg 6 to 12 inches off the floor and hold for up to 5 seconds. Repeat 10 times on each side.  Standing:  Wall squats: Stand with your back against the wall. Move your feet about 12 inches away from the wall. Tighten your stomach muscles, and slowly bend your knees until they are at about a 45 degree angle. Do not go down too far. Hold about 5 seconds. Then slowly return to your starting position. Repeat 10 times.  Heel raises: Stand facing the wall. Slowly raise the heels of your feet up and down, while keeping your toes on the floor. If you have trouble balancing, you can touch the wall with your hands. Repeat 10 times.  More advanced exercises  When you feel comfortable enough, try these exercises.  Kneeling lumbar extension: Begin on your hands and knees. At the same time, raise and straighten your right arm and left leg  until they are parallel to the ground. Hold for 2 seconds and come back slowly to a starting position. Repeat with left arm and right leg, alternating 10 times.  Prone lumbar extension: Lie face down, arms extended overhead, palms on the floor. At the same time, raise your right arm and left leg as high as comfortably possible. Hold for 10 seconds and slowly return to start. Repeat with left arm and right leg, alternating 10 times. Gradually build up to 20 times. (Advanced: Repeat this exercise raising both arms and both legs a few inches off the floor at the same time. Hold for 5 seconds and release.)  Pelvic tilt: Lie on the floor on your back with your knees bent at 90 degrees. Your feet should be flat on the floor. Inhale, exhale, then slowly contract your abdominal muscles bringing your navel toward your spine. Let your pelvis rock back until your lower back is flat on the floor. Hold for 10 seconds while breathing smoothly.  Abdominal crunch: Perform a pelvic tilt (above) flattening your lower back against the floor. Holding the tension in your abdominal muscles, take another breath and raise your shoulder blades off the ground (this is not a full sit-up). Keep your head in line with your body (dont bend your neck forward). Hold for 2 seconds, then slowly lower.  Date Last Reviewed: 6/1/2016  © 2034-3000 [employer]. [address] [place] [address], [place] [address]. All rights reserved. This information is not intended as a substitute for professional medical care. Always follow your healthcare professional's instructions.

## 2022-02-24 ENCOUNTER — OFFICE VISIT (OUTPATIENT)
Dept: ORTHOPEDICS | Facility: CLINIC | Age: 67
End: 2022-02-24
Payer: MEDICARE

## 2022-02-24 ENCOUNTER — CLINICAL SUPPORT (OUTPATIENT)
Dept: REHABILITATION | Facility: HOSPITAL | Age: 67
End: 2022-02-24
Payer: MEDICARE

## 2022-02-24 VITALS — WEIGHT: 237 LBS | BODY MASS INDEX: 27.98 KG/M2 | HEIGHT: 77 IN

## 2022-02-24 DIAGNOSIS — M16.11 PRIMARY OSTEOARTHRITIS OF RIGHT HIP: ICD-10-CM

## 2022-02-24 DIAGNOSIS — R20.2 NUMBNESS AND TINGLING: Primary | ICD-10-CM

## 2022-02-24 DIAGNOSIS — M17.12 PRIMARY OSTEOARTHRITIS OF LEFT KNEE: ICD-10-CM

## 2022-02-24 DIAGNOSIS — R20.0 NUMBNESS AND TINGLING: Primary | ICD-10-CM

## 2022-02-24 DIAGNOSIS — M16.11 ARTHRITIS OF RIGHT HIP: ICD-10-CM

## 2022-02-24 DIAGNOSIS — M48.061 SPINAL STENOSIS OF LUMBAR REGION, UNSPECIFIED WHETHER NEUROGENIC CLAUDICATION PRESENT: ICD-10-CM

## 2022-02-24 DIAGNOSIS — R29.898 WEAKNESS OF RIGHT HIP: ICD-10-CM

## 2022-02-24 DIAGNOSIS — M25.551 PAIN IN RIGHT HIP: Primary | ICD-10-CM

## 2022-02-24 DIAGNOSIS — M21.162 ACQUIRED VARUS DEFORMITY KNEE, LEFT: ICD-10-CM

## 2022-02-24 DIAGNOSIS — M17.12 ARTHRITIS OF KNEE, LEFT: Primary | ICD-10-CM

## 2022-02-24 DIAGNOSIS — M16.12 ARTHRITIS OF LEFT HIP: ICD-10-CM

## 2022-02-24 PROCEDURE — 99499 NO LOS: ICD-10-PCS | Mod: HCNC,S$GLB,, | Performed by: ORTHOPAEDIC SURGERY

## 2022-02-24 PROCEDURE — 20610 DRAIN/INJ JOINT/BURSA W/O US: CPT | Mod: HCNC,LT,S$GLB, | Performed by: ORTHOPAEDIC SURGERY

## 2022-02-24 PROCEDURE — 99999 PR PBB SHADOW E&M-EST. PATIENT-LVL III: ICD-10-PCS | Mod: PBBFAC,HCNC,, | Performed by: ORTHOPAEDIC SURGERY

## 2022-02-24 PROCEDURE — 3288F FALL RISK ASSESSMENT DOCD: CPT | Mod: CPTII,S$GLB,, | Performed by: ORTHOPAEDIC SURGERY

## 2022-02-24 PROCEDURE — 97113 AQUATIC THERAPY/EXERCISES: CPT | Performed by: PHYSICAL THERAPIST

## 2022-02-24 PROCEDURE — 1159F MED LIST DOCD IN RCRD: CPT | Mod: CPTII,S$GLB,, | Performed by: ORTHOPAEDIC SURGERY

## 2022-02-24 PROCEDURE — 1160F PR REVIEW ALL MEDS BY PRESCRIBER/CLIN PHARMACIST DOCUMENTED: ICD-10-PCS | Mod: CPTII,S$GLB,, | Performed by: ORTHOPAEDIC SURGERY

## 2022-02-24 PROCEDURE — 1159F PR MEDICATION LIST DOCUMENTED IN MEDICAL RECORD: ICD-10-PCS | Mod: CPTII,S$GLB,, | Performed by: ORTHOPAEDIC SURGERY

## 2022-02-24 PROCEDURE — 1160F RVW MEDS BY RX/DR IN RCRD: CPT | Mod: CPTII,S$GLB,, | Performed by: ORTHOPAEDIC SURGERY

## 2022-02-24 PROCEDURE — 1101F PT FALLS ASSESS-DOCD LE1/YR: CPT | Mod: CPTII,S$GLB,, | Performed by: ORTHOPAEDIC SURGERY

## 2022-02-24 PROCEDURE — 20610 LARGE JOINT ASPIRATION/INJECTION: L KNEE: ICD-10-PCS | Mod: HCNC,LT,S$GLB, | Performed by: ORTHOPAEDIC SURGERY

## 2022-02-24 PROCEDURE — 99499 UNLISTED E&M SERVICE: CPT | Mod: HCNC,S$GLB,, | Performed by: ORTHOPAEDIC SURGERY

## 2022-02-24 PROCEDURE — 99999 PR PBB SHADOW E&M-EST. PATIENT-LVL III: CPT | Mod: PBBFAC,HCNC,, | Performed by: ORTHOPAEDIC SURGERY

## 2022-02-24 PROCEDURE — 3008F PR BODY MASS INDEX (BMI) DOCUMENTED: ICD-10-PCS | Mod: CPTII,S$GLB,, | Performed by: ORTHOPAEDIC SURGERY

## 2022-02-24 PROCEDURE — 1125F PR PAIN SEVERITY QUANTIFIED, PAIN PRESENT: ICD-10-PCS | Mod: CPTII,S$GLB,, | Performed by: ORTHOPAEDIC SURGERY

## 2022-02-24 PROCEDURE — 1101F PR PT FALLS ASSESS DOC 0-1 FALLS W/OUT INJ PAST YR: ICD-10-PCS | Mod: CPTII,S$GLB,, | Performed by: ORTHOPAEDIC SURGERY

## 2022-02-24 PROCEDURE — 1125F AMNT PAIN NOTED PAIN PRSNT: CPT | Mod: CPTII,S$GLB,, | Performed by: ORTHOPAEDIC SURGERY

## 2022-02-24 PROCEDURE — 3008F BODY MASS INDEX DOCD: CPT | Mod: CPTII,S$GLB,, | Performed by: ORTHOPAEDIC SURGERY

## 2022-02-24 PROCEDURE — 3288F PR FALLS RISK ASSESSMENT DOCUMENTED: ICD-10-PCS | Mod: CPTII,S$GLB,, | Performed by: ORTHOPAEDIC SURGERY

## 2022-02-24 RX ADMIN — METHYLPREDNISOLONE ACETATE 80 MG: 80 INJECTION, SUSPENSION INTRA-ARTICULAR; INTRALESIONAL; INTRAMUSCULAR; SOFT TISSUE at 09:02

## 2022-02-24 NOTE — PROCEDURES
Large Joint Aspiration/Injection: L knee    Date/Time: 2/24/2022 9:00 AM  Performed by: Ryan Woods MD  Authorized by: Ryan Woods MD     Consent Done?:  Yes (Verbal)  Site marked: the procedure site was marked    Timeout: prior to procedure the correct patient, procedure, and site was verified      Local anesthesia used?: Yes    Local anesthetic:  Lidocaine 1% without epinephrine    Details:  Needle Size:  22 G  Ultrasonic Guidance for needle placement?: No    Approach:  Anterolateral  Location:  Knee  Site:  L knee  Medications:  80 mg methylPREDNISolone acetate 80 mg/mL  Patient tolerance:  Patient tolerated the procedure well with no immediate complications

## 2022-02-24 NOTE — PROGRESS NOTES
[facility]   Physical Therapy Treatment Note     Name: Myles Pride  Clinic Number: [id number]    Therapy Diagnosis:   Encounter Diagnoses   Name Primary?    Pain in right hip Yes    Weakness of right hip      Physician: Emelia Riddle PA-C    Visit Date: 2/24/2022    Physician Orders: PT Eval and Treat   Medical Diagnosis from Referral: M25.551 (ICD-10-CM) - Right hip pain  Evaluation Date: 9/28/2021  Authorization Period Expiration: 2/28/2022  Plan of Care Expiration: 2/30/2022                  Progress Note Due: 2/24/2021   Visit # / Visits authorized: 4/12 (+27 eval)  FOTO: 2/3(5th visit)     Precautions: Standard and [facility]    Time In: 2:37 pm (ankle weights used today)  Time Out:  3:20 pm   Total Billable Time: 40 minutes    SUBJECTIVE     Pt reports: he has been noticing an improvement.  Patient has increased his walking with 4/10 of a mile, stopping at a park to do some exercises, then walks back.  Patient saw Dr. Woods today and he has ordered an EMG for the patient and states that MD recommended he continue with therapy for now.  He had an injection in the left knee this morning which patients mentions after being in the water for 20 minutes.        Compliance with Hep: Daily  Response to previous treatment:Patient had soreness after last visit.   Functional change: No Change    Pain: 1.5/10   Worst: 4/10  Location: Right hip  Pain Control: rest  Aggravating factors: Standing, Walking and Morning    OBJECTIVE     Objective Measures updated at progress report unless specified otherwise.    Treatment     Myles received aquatic therapy with the use of water to decrease the pull of gravity and weightbearing forces on the body to increase tolerance to resisted therex for 40 minutes including the following exercise as well as stepping in and out of the pool with use of handrail and step-to pattern:     Exercise 2/24/2022 Parameters   Treadmill, forward, 1.3 mph   no arm  support x 3 minutes   Treadmill, side-stepping, 0.9 mph no arm support x 3 minutes, each side   Treadmill, backwards, 1.1-1.2 mph  no arm support x 3 minutes   Marching, alternating no arm support x 3 minutes   Hamstring curls, alternating no arm support x 3 minutes   Hip 3-way, B lower extremity  single arm support x 3 minutes each         Bicycle forward x 3 minutes each    Flutter kick   minutes    Scissor kicks x 2 minutes         Step ups x 20x each bilateral   Quad stretch on step      Hamstring stretch on step   30s/2x each bilateral   Gastroc stretch at step  30s/2x each bilateral   Heel/toe raises  3 minutes   1/4 squat  x 3 minutes   Standing hip circles clockwise/counter clockwise   minutes each    Standing figure 8   10x each         UE alt flexion  paddles     UE alt abduction  paddles     UE alt hugs  paddles     UE internal rotation/external rotation  paddles     UE alt rows  paddles                         x = exercise details same as prior session    Myles received therapeutic exercises to develop strength, endurance, ROM and posture for (0) minutes including:     TherEx Today     Upright Bike for endurance  Level 5 5 minutes   Kneeling hip flexor stretch  3 x 30 seconds each bilateral   Prone quad stretch  1 minute each each bilateral   Seated Piriformis stretch   2 x 30 seconds Bilateral    Figure 4 stretch      Hamstring stretch  3x10s 3 way Bilateral    Straight leg raise  3 x 10 each bilateral 3#   Reverse clam  3x10 with ball between knees 3#   clamshells   2 x 10 each bilateral red   LONG ARC QUAD        Sidelying hip aDduction  3 x 10 each bilateral    Butterfly stretch  2 x 1 minute   Prone hip extension knee bent  3 x 10   Prone hamstring curls  3x10 2#   Supine adductor stretch   3x30s right   TKEs  Purple cook band 3x10 each   Prone lying with forearm pushup  5 minutes   Lumbar Ball Roll Outs  10s 10x 3 way   Lower Trunk Rotations  3 minutes   Planks  3 x 20s   Piriformis stretch   "3x30s right   Single knee to chest stretch  1 minute each   Single Leg bridges  2x10 each bilateral     Plan for Next Visit: practice core stabilization     Pt participated in neuromuscular re-education activities to improve: Coordination, Proprioception and Posture for (0) minutes. The following activities were included:    Neuro Re-Ed 2/24/2022     Pelvic tilt with TRANSVERS ABDOMINUS   3x10 with 3s hold   TRANSVERS ABDOMINUS contractions  3x10   Ball squeeze with TRANSVERS ABDOMINUS  3x10 with 3s hold   Trunk rotation with cables high>low  3x10 50#       Pt participated in dynamic functional therapeutic activities to improve functional performance for (0) minutes, including:  Box squats  3x7 green chair   Wall clamshells  Green 3x10 each     Monster walks forward/backward  Green 5 big mirror laps at ankle   Standing heel taps  3x10 Bilateral 2" step   Side stepping  Green 5 big mirror laps at ankle   Standing hip 3 way  Green 3x10 each bilateral standing on airex   Quadruped bird dogs  3x5             MANUAL THERAPY TECHNIQUES including Myofascial release and Soft tissue Mobilization were applied to right hip for 0 minutes.    Manual Intervention Performed Today    Soft Tissue Mobilization  Right hip, glute, IT band, quad and surrounding musculature with use of the stick   Manual Lumbar distraction     Long axis distraction  Right    Muscle Energy Technique     Functional Dry Needling       Plan for Next Visit:        Home Exercises and Patient Education Provided     Education provided:   · Patient educated on the impairments noted above and the effects of physical therapy intervention to improve overall condition and QOL.   · Patient was educated on all the above exercise prior/during/after for proper posture, positioning, and execution for safe performance with home exercise program.     Written Home Exercises Provided: Patient instructed to cont prior HEP.  Exercises were reviewed and Myles was able to " demonstrate them prior to the end of the session.  Myles demonstrated good understanding of the education provided.      See EMR under Patient Instructions for exercises provided 9/28/2021.    ASSESSMENT     Patient did well with session today with no complaints of discomfort. He reports that he has been doing well with increasing his activity but that he also still does have pain outside of the water at times.  Patient will discuss plan going forward with PT next session.      Myles is progressing well towards his goals.   Pt prognosis is Good.     Pt will continue to benefit from skilled outpatient physical therapy to address the deficits listed in the problem list box on initial evaluation, provide pt/family education and to maximize pt's level of independence in the home and community environment.     Pt's spiritual, cultural and educational needs considered and pt agreeable to plan of care and goals.    Anticipated barriers to physical therapy: none    GOALS:  SHORT TERM GOALS: 6 weeks  1/24/2022   1. Recent signs and systems trend is improving in order to progress towards LTG's.  Met   2. Patient will be independent with HEP in order to further progress and return to maximal function. Met    3. Pain rating at Worst: 5/10 in order to progress towards increased independence with activity. Met    4. Patient will be able to correct postural deviations in sitting and standing, to decrease pain and promote postural awareness for injury prevention.  PC       LONG TERM GOALS: 12 weeks  1/24/2022   1. Patient will return to normal ADL, recreational, and work related activities with less pain and limitation.  PC    2. Patient will improve AROM to stated goals in order to return to maximal functional potential.   PC   3. Patient will improve Strength to stated goals of appropriate musculature in order to improve functional independence.   Met   4. Pain Rating at Best: 1/10 to improve Quality of Life.      Met    5. Patient will meet predicted functional outcome (FOTO) score: 33% to increase self-worth & perceived functional ability.     PC        6. Patient will have met/partially met personal goal of: Patient reports being a  and going on long walks but is unable to participate lately.     PC         PM=Partially Met     PC=Progressing/Continued     M=Met    PLAN   Plan of care Certification: 2/30/2022      Continue Plan of Care (POC) and progress per patient tolerance. See Treatment section for exercise progression.    Jennie Gordon, PT

## 2022-02-24 NOTE — PATIENT INSTRUCTIONS
X-ray reviewed showing severe arthritis of the left knee with bowleggedness and complete loss of joint space as far as the right knee is considered you have mild to moderate arthritis  X-ray of her hips showing severe arthritis on the right hip and moderately severe on the left  X-ray of your lumbar spine showing multilevel facet arthritis and degenerative disc disease  MRI of your spine showing lumbar stenosis severe between L3, L4-L5  You are maintaining very active lifestyle with walking and exercising  You stop playing tennis but you really need to maintain range of motion I recommend stationary bicycle, elliptical, swimming/low-impact exercise  I recommend left knee injection of steroid since you never had any injections in you knee to help with the inflammation  You would need also eventually left total knee replacement which we will straighten her leg out for you and realign it  Right hip at this time will hold off on any injection in it you are taking Naprosyn 500 mg twice a day  You can supplement that with Tylenol 650 mg twice a day on top of the Naprosyn if needed  You stop taking the gabapentin which will help with the nerve pain.  You may go back on it if you feeling burning and numbness and tingling in the extremities  I need to obtain nerve conduction studies to both legs to make sure which nerve root is affected and that could give you pain in the legs also  Continue physical therapy at the [facility]   I will give you a brochure about total knee replacement just to have an idea.  Total knee replacements now are done as outpatient surgery under general anesthetic or spinal.  You will have you surgery you go home the same day will arrange for you to have home health for 2 weeks and then therapy after that.  It will take roughly 3-6 months for complete healing to occur

## 2022-02-28 ENCOUNTER — CLINICAL SUPPORT (OUTPATIENT)
Dept: REHABILITATION | Facility: HOSPITAL | Age: 67
End: 2022-02-28
Payer: MEDICARE

## 2022-02-28 DIAGNOSIS — R29.898 WEAKNESS OF RIGHT HIP: ICD-10-CM

## 2022-02-28 DIAGNOSIS — M25.551 PAIN IN RIGHT HIP: Primary | ICD-10-CM

## 2022-02-28 PROCEDURE — 97113 AQUATIC THERAPY/EXERCISES: CPT

## 2022-02-28 RX ORDER — METHYLPREDNISOLONE ACETATE 80 MG/ML
80 INJECTION, SUSPENSION INTRA-ARTICULAR; INTRALESIONAL; INTRAMUSCULAR; SOFT TISSUE
Status: DISCONTINUED | OUTPATIENT
Start: 2022-02-24 | End: 2022-02-28 | Stop reason: HOSPADM

## 2022-02-28 NOTE — PROGRESS NOTES
[facility]   Physical Therapy Treatment Note     Name: Myles Pride  Clinic Number: [id number]    Therapy Diagnosis:   Encounter Diagnoses   Name Primary?    Pain in right hip Yes    Weakness of right hip      Physician: Emelia Riddle PA-C    Visit Date: 2/28/2022    Physician Orders: PT Eval and Treat   Medical Diagnosis from Referral: M25.551 (ICD-10-CM) - Right hip pain  Evaluation Date: 9/28/2021  Authorization Period Expiration:5/1/22  Plan of Care Expiration: 2/30/2022                  Progress Note Due: 2/24/2021   Visit # / Visits authorized: 6/32 (+27 eval)  FOTO: 2/3(5th visit)     Precautions: Standard and [place]    Time In: 2:35 pm   Time Out: 3:20 pm  Total Billable Time: 40 minutes    SUBJECTIVE     Pt reports: he feels like he is doing better with overall decreased pain levels and continued improved function.  He does report access to a pool at his tennis club which will open next week.  He does not follow up with Dr Woods until June.  After discussion he does feel that he is ready to be DC and do pool exercise's on his own.      Compliance with Hep: Daily  Response to previous treatment:Patient had soreness after last visit.   Functional change: No Change    Pain: 1.5/10   Worst: 4/10  Location: Right hip  Pain Control: rest  Aggravating factors: Standing, Walking and Morning    OBJECTIVE     Objective Measures updated at progress report unless specified otherwise.    Treatment     Myles received aquatic therapy with the use of water to decrease the pull of gravity and weightbearing forces on the body to increase tolerance to resisted therex for 40 minutes including the following exercise as well as stepping in and out of the pool with use of handrail and step-to pattern and objective measurements:     Exercise 2/28/2022 Parameters   Treadmill, forward, 1.3 mph   no arm support x 3 minutes   Treadmill, side-stepping, 0.9 mph no arm support x 3 minutes, each  side   Treadmill, backwards, 1.1-1.2 mph  no arm support x 3 minutes   Marching, alternating no arm support x 3 minutes   Hamstring curls, alternating no arm support x 3 minutes   Hip 3-way, B lower extremity  single arm support x 3 minutes each         Bicycle forward x 2 minutes each    Flutter kick x 30 seconds   Scissor kicks (add next visit) x 30 seconds         Step ups x 10x each bilateral   Quad stretch on step      Hamstring stretch on step   30s/2x each bilateral   Gastroc stretch at step  30s/2x each bilateral   Heel/toe raises  3 minutes   1/4 squat   3 minutes   Standing hip circles clockwise/counter clockwise   minutes each    Standing figure 8   10x each         UE alt flexion  paddles     UE alt abduction  paddles     UE alt hugs  paddles     UE internal rotation/external rotation  paddles     UE alt rows  paddles                         x = exercise details same as prior session    Myles received therapeutic exercises to develop strength, endurance, ROM and posture for (0) minutes including:     TherEx Today     Upright Bike for endurance  Level 5 5 minutes   Kneeling hip flexor stretch  3 x 30 seconds each bilateral   Prone quad stretch  1 minute each each bilateral   Seated Piriformis stretch   2 x 30 seconds Bilateral    Figure 4 stretch      Hamstring stretch  3x10s 3 way Bilateral    Straight leg raise  3 x 10 each bilateral 3#   Reverse clam  3x10 with ball between knees 3#   clamshells   2 x 10 each bilateral red   LONG ARC QUAD        Sidelying hip aDduction  3 x 10 each bilateral    Butterfly stretch  2 x 1 minute   Prone hip extension knee bent  3 x 10   Prone hamstring curls  3x10 2#   Supine adductor stretch   3x30s right   TKEs  Purple cook band 3x10 each   Prone lying with forearm pushup  5 minutes   Lumbar Ball Roll Outs  10s 10x 3 way   Lower Trunk Rotations  3 minutes   Planks  3 x 20s   Piriformis stretch  3x30s right   Single knee to chest stretch  1 minute each   Single Leg  "bridges  2x10 each bilateral     Plan for Next Visit: practice core stabilization     Pt participated in neuromuscular re-education activities to improve: Coordination, Proprioception and Posture for (0) minutes. The following activities were included:    Neuro Re-Ed 2/28/2022     Pelvic tilt with TRANSVERS ABDOMINUS   3x10 with 3s hold   TRANSVERS ABDOMINUS contractions  3x10   Ball squeeze with TRANSVERS ABDOMINUS  3x10 with 3s hold   Trunk rotation with cables high>low  3x10 50#       Pt participated in dynamic functional therapeutic activities to improve functional performance for (0) minutes, including:  Box squats  3x7 green chair   Wall clamshells  Green 3x10 each     Monster walks forward/backward  Green 5 big mirror laps at ankle   Standing heel taps  3x10 Bilateral 2" step   Side stepping  Green 5 big mirror laps at ankle   Standing hip 3 way  Green 3x10 each bilateral standing on airex   Quadruped bird dogs  3x5             MANUAL THERAPY TECHNIQUES including Myofascial release and Soft tissue Mobilization were applied to right hip for 0 minutes.    Manual Intervention Performed Today    Soft Tissue Mobilization  Right hip, glute, IT band, quad and surrounding musculature with use of the stick   Manual Lumbar distraction     Long axis distraction  Right    Muscle Energy Technique     Functional Dry Needling       Plan for Next Visit:        Home Exercises and Patient Education Provided     Education provided:   · Patient educated on the impairments noted above and the effects of physical therapy intervention to improve overall condition and QOL.   · Patient was educated on all the above exercise prior/during/after for proper posture, positioning, and execution for safe performance with home exercise program.     Written Home Exercises Provided: yes. Exercises were reviewed and Myles was able to demonstrate them prior to the end of the session.  Myles demonstrated good  understanding of the education " provided. See EMR under Patient Instructions for exercises provided during therapy sessions.    ASSESSMENT     See D/C summary    Myles is progressing well towards his goals.   Pt prognosis is Good.     Pt will continue to benefit from skilled outpatient physical therapy to address the deficits listed in the problem list box on initial evaluation, provide pt/family education and to maximize pt's level of independence in the home and community environment.     Pt's spiritual, cultural and educational needs considered and pt agreeable to plan of care and goals.    Anticipated barriers to physical therapy: none    GOALS:  See D/C summary    PLAN      See D/C summary.    Glenis Ghosh, PT

## 2022-02-28 NOTE — PLAN OF CARE
[facility]  Physical Therapy Discharge Note    Name: Myles Pride  [facility] Number: [id number]    Therapy Diagnosis:   Encounter Diagnoses   Name Primary?    Pain in right hip Yes    Weakness of right hip      Physician: Emelia Riddle PA-C    Physician Orders: PT Eval and Treat   Medical Diagnosis from Referral: M25.551 (ICD-10-CM) - Right hip pain  Evaluation Date: 9/28/2021      Date of Last visit: 2/28/22  Total Visits Received: 33    ASSESSMENT      No pain in knee only pain he is having is in right buttocks and groin. Feels like he is walking better and having less pain. Has increased walking distance without increased pain, still no tennis per MD orders.  He does report access to a pool at his tennis club which will open next week.  He does not follow up with Dr Woods until June.  After discussion he does feel that he is ready to be D/C'd and do pool exercise's on his own.     AROM:  Full knee extension right, -2 left.  Hip flexion 115 in sitting, with knee to chest bilaterally    STRENGTH:     Hip/Knee MMT Right  2/28/22  Goal   Hip Flexion  5/5 5/5 B    Hip Extension  NT 5/5 B   Hip Abduction  NT 5/5 B   Hip IR 5/5 5/5 B   Hip ER 5/5 5/5 B   Knee Flexion 5/5 5/5 B   Knee Extension 5/5 5/5 B      Hip/Knee MMT Left  2/28/22  Goal   Hip Flexion  5/5 5/5 B    Hip Extension  NT 5/5 B   Hip Abduction  NT 5/5 B   Hip IR 5/5 5/5 B   Hip ER 5/5 5/5 B   Knee Flexion 5/5 5/5 B   Knee Extension 5/5 5/5 B        FOTO:        Discharge reason: Patient has reached the maximum rehab potential for the present time, and is ready for D/C to self care.    Discharge FOTO Score: 44%    Goals:     SHORT TERM GOALS: 6 weeks  1/24/2022 2/28/22   1. Recent signs and systems trend is improving in order to progress towards LTG's.  Met -   2. Patient will be independent with HEP in order to further progress and return to maximal function. Met  -   3. Pain rating at Worst: 5/10 in order to  progress towards increased independence with activity. Met  -   4. Patient will be able to correct postural deviations in sitting and standing, to decrease pain and promote postural awareness for injury prevention.  PC  PC      LONG TERM GOALS: 12 weeks  1/24/2022 2/28/22   1. Patient will return to normal ADL, recreational, and work related activities with less pain and limitation.  PC  PC   2. Patient will improve AROM to stated goals in order to return to maximal functional potential.   PC    3. Patient will improve Strength to stated goals of appropriate musculature in order to improve functional independence.   Met -   4. Pain Rating at Best: 1/10 to improve Quality of Life.      Met -   5. Patient will meet predicted functional outcome (FOTO) score: 33% to increase self-worth & perceived functional ability.     PC       PC   6. 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     PC    PC      PM=Partially Met     PC=Progressing/Continued     M=Met       PLAN   This patient is discharged from Physical Therapy      Glenis Ghosh PT

## 2022-03-09 NOTE — TELEPHONE ENCOUNTER
No new care gaps identified.  Powered by [employer]. Reference number: [id number].   3/09/2022 1:23:07 AM CST

## 2022-03-14 RX ORDER — PRAVASTATIN SODIUM 40 MG/1
TABLET ORAL
Qty: 90 TABLET | Refills: 1 | Status: SHIPPED | OUTPATIENT
Start: 2022-03-14 | End: 2022-09-08

## 2022-03-14 NOTE — TELEPHONE ENCOUNTER

## 2022-03-30 ENCOUNTER — OFFICE VISIT (OUTPATIENT)
Dept: PHYSICAL MEDICINE AND REHAB | Facility: CLINIC | Age: 67
End: 2022-03-30
Payer: MEDICARE

## 2022-03-30 VITALS
HEART RATE: 51 BPM | BODY MASS INDEX: 27.98 KG/M2 | WEIGHT: 237 LBS | DIASTOLIC BLOOD PRESSURE: 98 MMHG | SYSTOLIC BLOOD PRESSURE: 173 MMHG | HEIGHT: 77 IN | RESPIRATION RATE: 14 BRPM

## 2022-03-30 DIAGNOSIS — M54.16 LUMBAR RADICULOPATHY: ICD-10-CM

## 2022-03-30 PROCEDURE — 3008F PR BODY MASS INDEX (BMI) DOCUMENTED: ICD-10-PCS | Mod: CPTII,S$GLB,, | Performed by: PHYSICAL MEDICINE & REHABILITATION

## 2022-03-30 PROCEDURE — 99204 PR OFFICE/OUTPT VISIT, NEW, LEVL IV, 45-59 MIN: ICD-10-PCS | Mod: 25,S$GLB,, | Performed by: PHYSICAL MEDICINE & REHABILITATION

## 2022-03-30 PROCEDURE — 3077F SYST BP >= 140 MM HG: CPT | Mod: CPTII,S$GLB,, | Performed by: PHYSICAL MEDICINE & REHABILITATION

## 2022-03-30 PROCEDURE — 95886 MUSC TEST DONE W/N TEST COMP: CPT | Mod: S$GLB,,, | Performed by: PHYSICAL MEDICINE & REHABILITATION

## 2022-03-30 PROCEDURE — 1125F PR PAIN SEVERITY QUANTIFIED, PAIN PRESENT: ICD-10-PCS | Mod: CPTII,S$GLB,, | Performed by: PHYSICAL MEDICINE & REHABILITATION

## 2022-03-30 PROCEDURE — 1159F PR MEDICATION LIST DOCUMENTED IN MEDICAL RECORD: ICD-10-PCS | Mod: CPTII,S$GLB,, | Performed by: PHYSICAL MEDICINE & REHABILITATION

## 2022-03-30 PROCEDURE — 1125F AMNT PAIN NOTED PAIN PRSNT: CPT | Mod: CPTII,S$GLB,, | Performed by: PHYSICAL MEDICINE & REHABILITATION

## 2022-03-30 PROCEDURE — 1160F RVW MEDS BY RX/DR IN RCRD: CPT | Mod: CPTII,S$GLB,, | Performed by: PHYSICAL MEDICINE & REHABILITATION

## 2022-03-30 PROCEDURE — 3080F DIAST BP >= 90 MM HG: CPT | Mod: CPTII,S$GLB,, | Performed by: PHYSICAL MEDICINE & REHABILITATION

## 2022-03-30 PROCEDURE — 3080F PR MOST RECENT DIASTOLIC BLOOD PRESSURE >= 90 MM HG: ICD-10-PCS | Mod: CPTII,S$GLB,, | Performed by: PHYSICAL MEDICINE & REHABILITATION

## 2022-03-30 PROCEDURE — 99999 PR PBB SHADOW E&M-EST. PATIENT-LVL III: ICD-10-PCS | Mod: PBBFAC,,, | Performed by: PHYSICAL MEDICINE & REHABILITATION

## 2022-03-30 PROCEDURE — 95886 PR EMG COMPLETE, W/ NERVE CONDUCTION STUDIES, 5+ MUSCLES: ICD-10-PCS | Mod: S$GLB,,, | Performed by: PHYSICAL MEDICINE & REHABILITATION

## 2022-03-30 PROCEDURE — 99204 OFFICE O/P NEW MOD 45 MIN: CPT | Mod: 25,S$GLB,, | Performed by: PHYSICAL MEDICINE & REHABILITATION

## 2022-03-30 PROCEDURE — 1160F PR REVIEW ALL MEDS BY PRESCRIBER/CLIN PHARMACIST DOCUMENTED: ICD-10-PCS | Mod: CPTII,S$GLB,, | Performed by: PHYSICAL MEDICINE & REHABILITATION

## 2022-03-30 PROCEDURE — 1159F MED LIST DOCD IN RCRD: CPT | Mod: CPTII,S$GLB,, | Performed by: PHYSICAL MEDICINE & REHABILITATION

## 2022-03-30 PROCEDURE — 3008F BODY MASS INDEX DOCD: CPT | Mod: CPTII,S$GLB,, | Performed by: PHYSICAL MEDICINE & REHABILITATION

## 2022-03-30 PROCEDURE — 3077F PR MOST RECENT SYSTOLIC BLOOD PRESSURE >= 140 MM HG: ICD-10-PCS | Mod: CPTII,S$GLB,, | Performed by: PHYSICAL MEDICINE & REHABILITATION

## 2022-03-30 PROCEDURE — 95909 PR NERVE CONDUCTION STUDY; 5-6 STUDIES: ICD-10-PCS | Mod: S$GLB,,, | Performed by: PHYSICAL MEDICINE & REHABILITATION

## 2022-03-30 PROCEDURE — 99999 PR PBB SHADOW E&M-EST. PATIENT-LVL III: CPT | Mod: PBBFAC,,, | Performed by: PHYSICAL MEDICINE & REHABILITATION

## 2022-03-30 PROCEDURE — 95909 NRV CNDJ TST 5-6 STUDIES: CPT | Mod: S$GLB,,, | Performed by: PHYSICAL MEDICINE & REHABILITATION

## 2022-03-30 NOTE — PROGRESS NOTES
[facility]   [address] [place] [address]  Phone: [phone]        Full Name: Myles Pride YOB: 1955  Patient ID: [id number]      Visit Date: 3/30/2022 09:56  Age: 66 Years 7 Months Old  Examining Physician: Mary Tamayo M.D.  Referring Physician:   Reason for Referral: leg pain        Chief Complaint   Patient presents with    Back Pain     Into legs       HPI: This is a 66 y.o.  male being seen in clinic today for evaluation of chronic low back and hip/knee achy pain that comes/goes depending on activity.  He had a recent increase in right hip pain and weakness a few months ago while lifting furniture.  He feels this caused overcompensation starla other areas.  He has chronic left leg weakness compared the the right and has worse knee issues in the left.  Recent LOVE with Dr Eddy at L4-L5 improved some symptoms.    History obtained from patient    Past family, medical, social, and surgical history reviewed in chart    Review of Systems:     General- denies lethargy, weight change, fever, chills  Head/neck- denies swallowing difficulties  ENT- denies hearing changes  Cardiovascular-denies chest pain  Pulmonary- denies shortness of breath  GI- denies constipation or bowel incontinence  - denies bladder incontinence  Skin- denies wounds or rashes  Musculoskeletal- +weakness, +pain  Neurologic- +numbness and tingling  Psychiatric- denies depressive or psychotic features, denies anxiety  Lymphatic-denies swelling  Endocrine- denies hypoglycemic symptoms/DM history  All other pertinent systems negative     Physical Examination:  General: Well developed, well nourished male, NAD  HEENT:NCAT EOMI bilaterally   Pulmonary:Normal respirations    Spinal Examination: CERVICAL  Active ROM is within normal limits.  Inspection: No deformity of spinal alignment.  Palpation: No vertebral tenderness to percussion.      Spinal Examination: LUMBAR or THORACIC  Active ROM is  limited at endranges  Inspection: No deformity of spinal alignment.    Palpation: No vertebral tenderness to percussion.         Bilateral Upper and Lower Extremities:  Pulses are 2+ at radial bilaterally.  Shoulder/Elbow/Wrist/Hand ROM   Hip/Knee/Ankle ROM wnl, genu varus on left, leg length discrepancy  Bilateral Extremities show normal capillary refill.  No signs of cyanosis, rubor, edema, skin changes, or dysvascular changes of appendages.  Nails appear intact.    Neurological Exam:  Cranial Nerves:  II-XII grossly intact    Manual Muscle Testing: (Motor 5=normal)  5/5 strength bilateral lower extremities    No focal atrophy is noted of either lower extremity.    Bilateral Reflexes:  No clonus at knee or ankle.    Sensation: tested to light touch  - intact in legs  Gait: short stride, genu varus on left    Entire procedure explained to patient prior to proceeding.  Verbal consent obtained        SNC      Nerve / Sites Rec. Site Onset Lat Peak Lat Amp Segments Distance Velocity     ms ms µV  mm m/s   R Sural - Ankle (Calf)      Calf Ankle NR NR NR Calf - Ankle 140 NR   L Sural - Ankle (Calf)      Calf Ankle NR NR NR Calf - Ankle 140 NR   R Superficial peroneal - Ankle      1  3.2 4.3 12.2 1 - G1 140 44   L Superficial peroneal - Ankle      1  3.0 3.8 2.1 1 - G1 140 47       MNC      Nerve / Sites Muscle Latency Amplitude Duration Rel Amp Segments Distance Lat Diff Velocity     ms mV ms %  mm ms m/s   R Peroneal - EDB      Ankle EDB 4.4 4.1 7.1 100 Ankle - EDB 80        Fib head EDB 14.4 3.6 7.9 87.3 Fib head - Ankle 360 9.9 36      Pop fossa EDB 16.6 3.6 6.9 98.9 Pop fossa - Fib head 80 2.2 36         Pop fossa - Ankle  12.2    L Peroneal - EDB      Ankle EDB 4.9 3.3 6.1 100 Ankle - EDB 80        Fib head EDB 15.2 2.2 6.3 66.2 Fib head - Ankle 370 10.3 36      Pop fossa EDB 17.0 2.1 6.3 93.6 Pop fossa - Fib head 70 1.9 37         Pop fossa - Ankle  12.1    R Tibial - AH      Ankle AH 6.3 0.1 7.2 100 Ankle - AH 80         Pop fossa AH 23.4 0.2 3.9 167 Pop fossa - Ankle 500 17.1 29   L Tibial - AH      Ankle AH 6.5 0.5 4.3 100 Ankle - AH 80        Pop fossa AH 20.3 0.5 5.2 118 Pop fossa - Ankle 500 13.9 36       EMG            EMG Summary Table     Spontaneous MUAP Recruitment   Muscle IA Fib PSW Fasc Other Dur. Dur Amp Dur Polys Pattern Effort   R. Rectus femoris Incr None None None .   N N 1+ sl red Max   R. Vastus lateralis Incr None None None .   N Sl Incr 1+ sl red Max   R. Biceps femoris (short head) N None None None .   N N 1+ sl red Max   R. Tibialis anterior Incr 2+ 2+ None .   N N 1+ Reduced Max   R. Gastrocnemius (Medial head) Incr 1+ 3+ None .   N Sl Incr 1+ Reduced Max   R. Peroneus longus Incr None 1+ None .   N N 1+ sl red Max   R. Extensor digitorum brevis Incr 1+ 1+ None .   Incr Sl Incr 1+ Reduced Max   L. Vastus lateralis Incr None None None .   N N 1+ sl red Max   L. Extensor digitorum brevis Incr 1+ 2+ None .   N N 1+ Reduced Max   L. Gastrocnemius (Medial head) Incr 1+ 2+ None .   Sl Incr N 1+ sl red Max                                    INTERPRETATION  -Bilateral superficial peroneal sensory nerve conduction study showed prolonged peak latency on the right and dec amplitude on the left  -Bilateral sural sensory nerve conduction study showed no response to stimulation  -Bilateral peroneal motor nerve conduction study showed normal latency, amplitude, and dec conduction velocity  -Bilateral tibial motor nerve conduction study showed prolonged latency, dec amplitude, and dec conduction velocity  -Needle EMG examination performed to above mentioned muscles       IMPRESSION  1. ABNORMAL study  2. There is electrodiagnostic evidence of an acute on chronic radiculopathy of the bilateral L5 and S1 nerve roots-worse at S1. There is an acute on chronic radiculopathy of the bilateral L2-L4 nerve roots    PLAN  Discussed in detail for greater than 40 minutes about diagnosis and treatment plan    1. Follow up with  referring provider: Dr. Ryan Woods  2. Handouts on lumbar radic provided. Cont PT. F/u with Dr Eddy for repeat ESIs and various levels for relief.  3. This study is good for one year. If symptoms worsen or do not improve, please re-consult.    Mary Tamayo M.D.  Physical Medicine and Rehab

## 2022-04-01 ENCOUNTER — OFFICE VISIT (OUTPATIENT)
Dept: INTERNAL MEDICINE | Facility: CLINIC | Age: 67
End: 2022-04-01
Payer: MEDICARE

## 2022-04-01 ENCOUNTER — LAB VISIT (OUTPATIENT)
Dept: LAB | Facility: HOSPITAL | Age: 67
End: 2022-04-01
Attending: FAMILY MEDICINE
Payer: MEDICARE

## 2022-04-01 VITALS
OXYGEN SATURATION: 98 % | HEART RATE: 68 BPM | WEIGHT: 237.88 LBS | RESPIRATION RATE: 18 BRPM | TEMPERATURE: 97 F | SYSTOLIC BLOOD PRESSURE: 126 MMHG | BODY MASS INDEX: 28.21 KG/M2 | DIASTOLIC BLOOD PRESSURE: 78 MMHG

## 2022-04-01 DIAGNOSIS — Z91.89 RISK OF MYOCARDIAL INFARCTION OR STROKE 7.5% OR GREATER IN NEXT 10 YEARS: ICD-10-CM

## 2022-04-01 DIAGNOSIS — E78.5 HYPERLIPIDEMIA, UNSPECIFIED HYPERLIPIDEMIA TYPE: ICD-10-CM

## 2022-04-01 DIAGNOSIS — Z79.1 NSAID LONG-TERM USE: ICD-10-CM

## 2022-04-01 DIAGNOSIS — R79.89 ELEVATED LIVER FUNCTION TESTS: ICD-10-CM

## 2022-04-01 DIAGNOSIS — N40.0 BENIGN PROSTATIC HYPERPLASIA, UNSPECIFIED WHETHER LOWER URINARY TRACT SYMPTOMS PRESENT: ICD-10-CM

## 2022-04-01 DIAGNOSIS — R79.89 ELEVATED LIVER FUNCTION TESTS: Primary | ICD-10-CM

## 2022-04-01 LAB
ALBUMIN SERPL BCP-MCNC: 4.2 G/DL (ref 3.5–5.2)
ALP SERPL-CCNC: 76 U/L (ref 55–135)
ALT SERPL W/O P-5'-P-CCNC: 32 U/L (ref 10–44)
ANION GAP SERPL CALC-SCNC: 10 MMOL/L (ref 8–16)
AST SERPL-CCNC: 39 U/L (ref 10–40)
BILIRUB SERPL-MCNC: 0.8 MG/DL (ref 0.1–1)
BUN SERPL-MCNC: 17 MG/DL (ref 8–23)
CALCIUM SERPL-MCNC: 9.3 MG/DL (ref 8.7–10.5)
CHLORIDE SERPL-SCNC: 107 MMOL/L (ref 95–110)
CO2 SERPL-SCNC: 24 MMOL/L (ref 23–29)
CREAT SERPL-MCNC: 0.9 MG/DL (ref 0.5–1.4)
EST. GFR  (AFRICAN AMERICAN): >60 ML/MIN/1.73 M^2
EST. GFR  (NON AFRICAN AMERICAN): >60 ML/MIN/1.73 M^2
GLUCOSE SERPL-MCNC: 80 MG/DL (ref 70–110)
POTASSIUM SERPL-SCNC: 4.6 MMOL/L (ref 3.5–5.1)
PROT SERPL-MCNC: 6.7 G/DL (ref 6–8.4)
SODIUM SERPL-SCNC: 141 MMOL/L (ref 136–145)

## 2022-04-01 PROCEDURE — 3078F DIAST BP <80 MM HG: CPT | Mod: CPTII,S$GLB,, | Performed by: FAMILY MEDICINE

## 2022-04-01 PROCEDURE — 99999 PR PBB SHADOW E&M-EST. PATIENT-LVL III: CPT | Mod: PBBFAC,,, | Performed by: FAMILY MEDICINE

## 2022-04-01 PROCEDURE — 1159F PR MEDICATION LIST DOCUMENTED IN MEDICAL RECORD: ICD-10-PCS | Mod: CPTII,S$GLB,, | Performed by: FAMILY MEDICINE

## 2022-04-01 PROCEDURE — 80053 COMPREHEN METABOLIC PANEL: CPT | Performed by: FAMILY MEDICINE

## 2022-04-01 PROCEDURE — 1159F MED LIST DOCD IN RCRD: CPT | Mod: CPTII,S$GLB,, | Performed by: FAMILY MEDICINE

## 2022-04-01 PROCEDURE — 3074F PR MOST RECENT SYSTOLIC BLOOD PRESSURE < 130 MM HG: ICD-10-PCS | Mod: CPTII,S$GLB,, | Performed by: FAMILY MEDICINE

## 2022-04-01 PROCEDURE — 1126F AMNT PAIN NOTED NONE PRSNT: CPT | Mod: CPTII,S$GLB,, | Performed by: FAMILY MEDICINE

## 2022-04-01 PROCEDURE — 3078F PR MOST RECENT DIASTOLIC BLOOD PRESSURE < 80 MM HG: ICD-10-PCS | Mod: CPTII,S$GLB,, | Performed by: FAMILY MEDICINE

## 2022-04-01 PROCEDURE — 36415 COLL VENOUS BLD VENIPUNCTURE: CPT | Performed by: FAMILY MEDICINE

## 2022-04-01 PROCEDURE — 1126F PR PAIN SEVERITY QUANTIFIED, NO PAIN PRESENT: ICD-10-PCS | Mod: CPTII,S$GLB,, | Performed by: FAMILY MEDICINE

## 2022-04-01 PROCEDURE — 3008F BODY MASS INDEX DOCD: CPT | Mod: CPTII,S$GLB,, | Performed by: FAMILY MEDICINE

## 2022-04-01 PROCEDURE — 99214 OFFICE O/P EST MOD 30 MIN: CPT | Mod: S$GLB,,, | Performed by: FAMILY MEDICINE

## 2022-04-01 PROCEDURE — 99214 PR OFFICE/OUTPT VISIT, EST, LEVL IV, 30-39 MIN: ICD-10-PCS | Mod: S$GLB,,, | Performed by: FAMILY MEDICINE

## 2022-04-01 PROCEDURE — 3074F SYST BP LT 130 MM HG: CPT | Mod: CPTII,S$GLB,, | Performed by: FAMILY MEDICINE

## 2022-04-01 PROCEDURE — 99999 PR PBB SHADOW E&M-EST. PATIENT-LVL III: ICD-10-PCS | Mod: PBBFAC,,, | Performed by: FAMILY MEDICINE

## 2022-04-01 PROCEDURE — 3008F PR BODY MASS INDEX (BMI) DOCUMENTED: ICD-10-PCS | Mod: CPTII,S$GLB,, | Performed by: FAMILY MEDICINE

## 2022-04-01 RX ORDER — TAMSULOSIN HYDROCHLORIDE 0.4 MG/1
0.4 CAPSULE ORAL DAILY
Qty: 90 CAPSULE | Refills: 1 | Status: SHIPPED | OUTPATIENT
Start: 2022-04-01 | End: 2022-09-11

## 2022-04-01 NOTE — PROGRESS NOTES
Subjective:       Patient ID: Myles Pride is a 66 y.o. male.    Chief Complaint: Follow-up    HPI    Patient Active Problem List   Diagnosis    Bradycardia    Risk of myocardial infarction or stroke 7.5% or greater in next 10 years    Vitamin D deficiency    Colon cancer screening    Hyperlipidemia    Lumbar radiculopathy       Past Medical History:   Diagnosis Date    Hyperlipidemia     PONV (postoperative nausea and vomiting) 1990    after knee arthroscopy    Vitamin D deficiency 12/18/2019       Past Surgical History:   Procedure Laterality Date    COLONOSCOPY      COLONOSCOPY N/A 12/19/2019    Procedure: COLONOSCOPY;  Surgeon: Bhupendra Wilkinson MD;  Location: [facility] ENDO;  Service: Endoscopy;  Laterality: N/A;    KNEE ARTHROSCOPY Left     SELECTIVE INJECTION OF ANESTHETIC AGENT AROUND LUMBAR SPINAL NERVE ROOT BY TRANSFORAMINAL APPROACH Bilateral 1/21/2022    Procedure: Bilateral L4/5 TF LOVE with RN IV sedation;  Surgeon: Ben Eddy MD;  Location: [facility] PAIN MGT;  Service: Pain Management;  Laterality: Bilateral;    VASECTOMY  1995       Family History   Problem Relation Age of Onset    Cancer Mother         anal cancer    Diabetes Mother         PRE DIABETES    No Known Problems Sister     Gallbladder disease Brother     Hepatitis Brother         Hep C       Social History     Tobacco Use   Smoking Status Never Smoker   Smokeless Tobacco Never Used       Wt Readings from Last 5 Encounters:   04/01/22 107.9 kg (237 lb 14 oz)   03/30/22 107.5 kg (237 lb)   02/24/22 107.5 kg (237 lb)   01/21/22 107.7 kg (237 lb 8.7 oz)   01/18/22 108 kg (238 lb 3.3 oz)       For further HPI details, see assessment and plan.    Review of Systems    Objective:      Vitals:    04/01/22 1050   BP: 126/78   Pulse: 68   Resp: 18   Temp: 96.8 °F (36 °C)       Physical Exam  Constitutional:       General: He is not in acute distress.     Appearance: He is not ill-appearing.   Pulmonary:      Effort: Pulmonary effort  is normal. No respiratory distress.   Neurological:      General: No focal deficit present.      Mental Status: He is alert.   Psychiatric:         Mood and Affect: Mood normal.         Behavior: Behavior normal.         Assessment:       1. Elevated liver function tests    2. NSAID long-term use    3. Hyperlipidemia, unspecified hyperlipidemia type    4. Risk of myocardial infarction or stroke 7.5% or greater in next 10 years    5. Benign prostatic hyperplasia, unspecified whether lower urinary tract symptoms present        Plan:       Weaker stream  Some leakage right before he needs to go.  Not chronic for him - but we did discuss in October  Nocturia - lately 1x night - sometimes 2x/night.  2-3 am  Suspect BPH  Trial flomax    Back pain  S/p PT and intervention  Seeing improvement  Taking naproxen BID   Has been doing this for several months.  Will be checking his renal function  No abdominal complaints.    Frequent nsaid  I would advise he back of such consistent use of naproxen given renal and GI issues  Acetaminophen ok  If cannot get off nsaid consider addition of a PPI  Consider use of duloxetine.    HLD  The 10-year ASCVD risk score (Bladimir BOYD Jr., et al., 2013) is: 10.6%    Values used to calculate the score:      Age: 66 years      Sex: Male      Is Non- : No      Diabetic: No      Tobacco smoker: No      Systolic Blood Pressure: 126 mmHg      Is BP treated: No      HDL Cholesterol: 48 mg/dL      Total Cholesterol: 154 mg/dL  He is on a statin  Will continue  Lab Results   Component Value Date    LDLCALC 87.6 10/01/2021     Controlled.      Mild elevation in AST at last visit  will recheck today      6 m f/u

## 2022-04-25 NOTE — H&P (VIEW-ONLY)
Established Patient Chronic Pain Note     The patient location is: home  The chief complaint leading to consultation is: leg pain  Visit type: Virtual visit with synchronous audio and video  Total time spent with patient: 15m  Each patient to whom he or she provides medical services by telemedicine is: (1) informed of the relationship between the physician and patient and the respective role of any other health care provider with respect to management of the patient; and (2) notified that he or she may decline to receive medical services by telemedicine and may withdraw from such care at any time.    Referring Physician: No ref. provider found    PCP: Catalino Arias MD    Chief Complaint:   LBP + leg pain     SUBJECTIVE:  Interval history 04/26/2022  Patient presents for 2 month follow-up.  He reports insidious return of lower extremity radiculopathy.  Patient again reports pain which radiates down the posterior lateral aspects of bilateral lower extremities to the plantar aspect of the feet in L4-5 distribution.  Patient reports pain is significantly worse on the left than on the right.  Pain today is rated a 6/10.  Pain is described as tingling and numbness in nature.  Patient reports he has continued ibuprofen which has marginally helped with this pain but has been encouraged by his primary care provider to discontinue this medication daily.  Patient has discontinued gabapentin since our last clinic visit.  Patient denies significant lower extremity weakness, bowel or bladder incontinence or saddle anesthesia.  Patient is interested in pursuing repeat injection.      Interval history 02/23/2022  Patient presents status post bilateral L4/5 transforaminal epidural steroid injection 01/21/2022.  Patient reports 80% sustained relief in lower extremity radicular symptoms following epidural steroid injection.  Patient has continued physical therapy.  He notices incremental improvement in range of motion, ambulation  "daily.  Patient reports prior to the injection he was ambulating approximately 2500 steps daily.  Patient reports yesterday he ambulated over 5000 steps.  Patient has discontinued gabapentin secondary to superior pain relief.  Patient is enquiring on the safety of an inversion table.  He denies any new onset lower extremity weakness, bowel or bladder incontinence or saddle anesthesia.    Interval history 01/18/2022  Patient presents for MRI review.  Today patient again reports lower back pain which radiates down the anterior aspects of bilateral lower extremities in L3-4 distribution to the knee.  Today patient reports pain is a 4/10.  Patient has continued gabapentin titration and has reached 600 mg 3 times daily.  Patient denies any side effects from this medication.  Patient reports diminishing return and benefit from aqua therapy secondary to his symptoms.  He denies significant lower extremity weakness or bowel or bladder incontinence.  Patient is interested in pursuing intervention.    HPI 12/21/2021  Myles Pride is a 66 y.o. male with past medical history significant for hyperlipidemia who presents to the clinic for the evaluation of lower back and leg pain.  Patient reports pain began with treatment (physical therapy) of right hip pain, approximately 2 months prior.  Today patient reports lower back pain which presents in a bandlike distribution in his lower back and radiates down the anterior aspects of bilateral lower extremities in L3-4 distribution to the knee.  Pain is intermittent and described as 4/10.  Pain is described as a aching sensation in the back and a tight sensation in bilateral lower extremities.  Pain is exacerbated when moving from sitting to standing and with ambulation.  Patient reports he was able to ambulate "6/10th of a mile" in the past and now is only able to ambulate approximately 1-2 blocks before requiring rest.  Patient does report associated subjective weakness in " bilateral lower extremities.  Pain is improved with heat, sitting, reclining, prior prescription for Medrol Dosepak from his primary care PA, Ms. Riddle.     Patient reports significant motor weakness   Patient denies night fever/night sweats, urinary incontinence, bowel incontinence and significant weight loss and loss of sensations.    Pain Disability Index Review:     Last 3 PDI Scores 1/18/2022 12/21/2021   Pain Disability Index (PDI) 28 39       Non-Pharmacologic Treatments:  Physical Therapy/Home Exercise: yes  Ice/Heat:yes  TENS: no  Acupuncture: no  Massage: no  Chiropractic: no    Other: no      Pain Medications:  - Adjuvant Medications: Alleve (Naproxen) and Neurontin (Gabapentin)    Pain Procedures:   -01/21/2022:  Bilateral L4/5 transforaminal epidural steroid injection    Past Medical History:   Diagnosis Date    Hyperlipidemia     PONV (postoperative nausea and vomiting) 1990    after knee arthroscopy    Vitamin D deficiency 12/18/2019     Past Surgical History:   Procedure Laterality Date    COLONOSCOPY      COLONOSCOPY N/A 12/19/2019    Procedure: COLONOSCOPY;  Surgeon: Bhupendra Wilkinson MD;  Location: [facility] ENDO;  Service: Endoscopy;  Laterality: N/A;    KNEE ARTHROSCOPY Left     SELECTIVE INJECTION OF ANESTHETIC AGENT AROUND LUMBAR SPINAL NERVE ROOT BY TRANSFORAMINAL APPROACH Bilateral 1/21/2022    Procedure: Bilateral L4/5 TF LOVE with RN IV sedation;  Surgeon: Ben Eddy MD;  Location: [facility] PAIN T;  Service: Pain Management;  Laterality: Bilateral;    VASECTOMY  1995     Review of patient's allergies indicates:  No Known Allergies    Current Outpatient Medications   Medication Sig    ergocalciferol, vitamin D2, (VITAMIN D ORAL) Take by mouth once daily.    gabapentin (NEURONTIN) 300 MG capsule Take 1 capsule (300 mg total) by mouth every evening for 7 days, THEN 2 capsules (600 mg total) every evening for 7 days, THEN 3 capsules (900 mg total) every evening for 16 days.    naproxen  (NAPROSYN) 500 MG tablet TAKE 1 TABLET BY MOUTH TWICE A DAY    pravastatin (PRAVACHOL) 40 MG tablet TAKE 1 TABLET BY MOUTH EVERY DAY    tamsulosin (FLOMAX) 0.4 mg Cap Take 1 capsule (0.4 mg total) by mouth once daily.     No current facility-administered medications for this visit.       Review of Systems     GENERAL:  No weight loss, malaise or fevers.  HEENT:   No recent changes in vision or hearing  NECK:  Negative for lumps, no difficulty with swallowing.  RESPIRATORY:  Negative for cough, wheezing or shortness of breath, patient denies any recent URI.  CARDIOVASCULAR:  Negative for chest pain, leg swelling or palpitations.  GI:  Negative for abdominal discomfort, blood in stools or black stools or change in bowel habits.  MUSCULOSKELETAL:  See HPI.  SKIN:  Negative for lesions, rash, and itching.  PSYCH:  No mood disorder or recent psychosocial stressors.   HEMATOLOGY/LYMPHOLOGY:  Negative for prolonged bleeding, bruising easily or swollen nodes.    NEURO:   No history of headaches, syncope, paralysis, seizures or tremors.  All other reviewed and negative other than HPI.    OBJECTIVE:    There were no vitals taken for this visit.      Physical Exam  1/18/22  GENERAL: Well appearing, in no acute distress, alert and oriented x3.  PSYCH:  Mood and affect appropriate.  SKIN: Skin color, texture, turgor normal, no rashes or lesions.  HEAD/FACE:  Normocephalic, atraumatic. Cranial nerves grossly intact.    CV: RRR with palpation of the radial artery.  PULM: No evidence of respiratory difficulty, symmetric chest rise.  GI:  Soft and non-tender.    BACK: genu varus b/l. Straight leg raising in the sitting and supine positions is negative to radicular pain. No pain to palpation over the facet joints of the lumbar spine or spinous processes. Normal range of motion without pain reproduction.  EXTREMITIES: Peripheral joint ROM is full and pain free without obvious instability or laxity in all four extremities. No  deformities, edema, or skin discoloration. Good capillary refill.  MUSCULOSKELETAL: Able to stand on heels & toes.  Right lower extremity, approximately half to 1 in longer than left lower extremity.  hip, and knee provocative maneuvers are negative.  There is no pain with palpation over the sacroiliac joints bilaterally.  FABERs test is negative.  FADIR test is negative bilaterally.   Bilateral upper and lower extremity strength is normal and symmetric.  No atrophy or tone abnormalities are noted.  RIGHT Lower extremity: Hip flexion 5/5, Hip Abduction 5/5, Hip Adduction 5/5, Knee extension 5/5, Knee flexion 5/5, Ankle dorsiflexion5/5, Extensor hallucis longus 5/5, Ankle plantarflexion 5/5  LEFT Lower extremity:  Hip flexion 5/5, Hip Abduction 5/5,Hip Adduction 5/5, Knee extension 5/5, Knee flexion 5/5, Ankle dorsiflexion 5/5, Extensor hallucis longus 5/5, Ankle plantarflexion 5/5  -Normal testing knee (patellar) jerk and ankle (achilles) jerk    NEURO: Bilateral upper and lower extremity coordination and muscle stretch reflexes are physiologic and symmetric. No loss of sensation is noted.  GAIT: normal.    Imaging  MRI lumbar spine 12/21/2021  FINDINGS:  Vertebral body heights with upper lumbar and lower thoracic spine Schmorl node endplate changes.  No spondylolisthesis.  Mild Modic 1 endplate changes noted at L4-5.  Multilevel disc desiccation present with height loss most noted at L4-5.     Conus terminates normally.  Visualized intra-abdominal/pelvic structures are unremarkable.     L1-L2: No spinal canal stenosis or neural foraminal narrowing.  Facet arthropathy findings.     L2-L3: No significant spinal canal stenosis or neural foraminal narrowing.  Moderate to severe facet arthropathy.     L3-L4: Circumferential disc bulging present with suspected right paracentral small disc protrusion.  This in conjunction with congenitally short pedicles and prominent facet/ligamentum flavum degenerative hypertrophy  findings causes severe spinal canal stenosis.  Small right facet joint effusion present with 4 mm right facet synovial cyst deep to the ligamentum flavum.  Left foraminal extraforaminal annular fissure.  Mild bilateral inferior neural foraminal narrowing.     L4-L5: Circumferential disc bulging in conjunction with congenitally short pedicles and prominent facet degenerative hypertrophy findings causes severe spinal canal stenosis.  Severe bilateral neural foraminal narrowing present with likely impingement of the exiting nerve roots.     L5-S1: No significant posterior disc bulge.  Prominent facet arthropathy noted with mild left and moderate right neural foraminal narrowing.       09/13/21  X-Ray Lumbar Spine 5 View  FINDINGS:  There is minimal lumbar levocurvature.  No fracture or malalignment.  No pars defect.  There is mild loss of disc height at L4-5.  Severe multilevel facet arthropathy is present.    ASSESSMENT: 66 y.o. year old male with lower back and leg pain, consistent with     1. Lumbar radiculopathy, chronic  IR Epidural Transfor 1st Vert Lumbar Lawrence    Case Request-RAD/Other Procedure Area: Bilateral L4/5 TF LOVE with RN IV sedation   2. Lumbar facet arthropathy     3. Spinal stenosis of lumbar region without neurogenic claudication     4. DDD (degenerative disc disease), lumbar     5. Leg length discrepancy           PLAN:   - Interventions:  Schedule for bilateral L4/5 transforaminal epidural steroid injection as patient has significant sustained relief exceeding 3 months following his prior injection..  Explained the risks and benefits of the procedure in detail with the patient today in clinic along with alternative treatment options.  Patient has elected to pursue this procedure.    - Anticoagulation use: no no anticoagulation     report:  Reviewed and consistent with medication use as prescribed.    - Medications:  ---  We have discussed REstarting the patient on gabapentin.  Patient will  increase their medication according to the following algorithm.  We have reviewed potential side effects of this medication including daytime somnolence, weight gain and peripheral edema    Gabapentin Titration:  Week 1: 300 mg QHS  Week 2: 600 mg QHS  Week 3: 900 mg QHS    - Therapy:   We discussed continuing physical therapy to help manage the patient/s painful condition. The patient was counseled that muscle strengthening will improve the long term prognosis in regards to pain and may also help increase range of motion and mobility.     -Consults/Referrals: HME : continue heel insert/fitting for leg length discrepancy.    - Imaging: Reviewed available imaging with patient and answered any questions they had regarding study    - Follow up visit: return to clinic in  4-6 weeks    The above plan and management options were discussed at length with patient. Patient is in agreement with the above and verbalized understanding.    - I discussed the goals of interventional chronic pain management with the patient on today's visit. We discussed a multimodal and systematic approach to pain.  This includes diagnostic and therapeutic injections, adjuvant pharmacologic treatment, physical therapy, and at times psychiatry.  I emphasized the importance of regular exercise, core strengthening and stretching, diet and weight loss as a cornerstone of long-term pain management.    - This condition does not require this patient to take time off of work, and the primary goal of our Pain Management services is to improve the patient's functional capacity.  - Patient Questions: Answered all of the patient's questions regarding diagnoses, therapy, treatment and next steps        Ben Eddy MD  Interventional Pain Management  [facility]    Disclaimer:  This note was prepared using voice recognition system and is likely to have sound alike errors that may have been overlooked even after proof reading.  Please call me with any  questions

## 2022-04-25 NOTE — PROGRESS NOTES
Established Patient Chronic Pain Note     The patient location is: home  The chief complaint leading to consultation is: leg pain  Visit type: Virtual visit with synchronous audio and video  Total time spent with patient: 15m  Each patient to whom he or she provides medical services by telemedicine is: (1) informed of the relationship between the physician and patient and the respective role of any other health care provider with respect to management of the patient; and (2) notified that he or she may decline to receive medical services by telemedicine and may withdraw from such care at any time.    Referring Physician: No ref. provider found    PCP: Catalino Arias MD    Chief Complaint:   LBP + leg pain     SUBJECTIVE:  Interval history 04/26/2022  Patient presents for 2 month follow-up.  He reports insidious return of lower extremity radiculopathy.  Patient again reports pain which radiates down the posterior lateral aspects of bilateral lower extremities to the plantar aspect of the feet in L4-5 distribution.  Patient reports pain is significantly worse on the left than on the right.  Pain today is rated a 6/10.  Pain is described as tingling and numbness in nature.  Patient reports he has continued ibuprofen which has marginally helped with this pain but has been encouraged by his primary care provider to discontinue this medication daily.  Patient has discontinued gabapentin since our last clinic visit.  Patient denies significant lower extremity weakness, bowel or bladder incontinence or saddle anesthesia.  Patient is interested in pursuing repeat injection.      Interval history 02/23/2022  Patient presents status post bilateral L4/5 transforaminal epidural steroid injection 01/21/2022.  Patient reports 80% sustained relief in lower extremity radicular symptoms following epidural steroid injection.  Patient has continued physical therapy.  He notices incremental improvement in range of motion, ambulation  "daily.  Patient reports prior to the injection he was ambulating approximately 2500 steps daily.  Patient reports yesterday he ambulated over 5000 steps.  Patient has discontinued gabapentin secondary to superior pain relief.  Patient is enquiring on the safety of an inversion table.  He denies any new onset lower extremity weakness, bowel or bladder incontinence or saddle anesthesia.    Interval history 01/18/2022  Patient presents for MRI review.  Today patient again reports lower back pain which radiates down the anterior aspects of bilateral lower extremities in L3-4 distribution to the knee.  Today patient reports pain is a 4/10.  Patient has continued gabapentin titration and has reached 600 mg 3 times daily.  Patient denies any side effects from this medication.  Patient reports diminishing return and benefit from aqua therapy secondary to his symptoms.  He denies significant lower extremity weakness or bowel or bladder incontinence.  Patient is interested in pursuing intervention.    HPI 12/21/2021  Myles Pride is a 66 y.o. male with past medical history significant for hyperlipidemia who presents to the clinic for the evaluation of lower back and leg pain.  Patient reports pain began with treatment (physical therapy) of right hip pain, approximately 2 months prior.  Today patient reports lower back pain which presents in a bandlike distribution in his lower back and radiates down the anterior aspects of bilateral lower extremities in L3-4 distribution to the knee.  Pain is intermittent and described as 4/10.  Pain is described as a aching sensation in the back and a tight sensation in bilateral lower extremities.  Pain is exacerbated when moving from sitting to standing and with ambulation.  Patient reports he was able to ambulate "6/10th of a mile" in the past and now is only able to ambulate approximately 1-2 blocks before requiring rest.  Patient does report associated subjective weakness in " bilateral lower extremities.  Pain is improved with heat, sitting, reclining, prior prescription for Medrol Dosepak from his primary care PA, Ms. Riddle.     Patient reports significant motor weakness   Patient denies night fever/night sweats, urinary incontinence, bowel incontinence and significant weight loss and loss of sensations.    Pain Disability Index Review:     Last 3 PDI Scores 1/18/2022 12/21/2021   Pain Disability Index (PDI) 28 39       Non-Pharmacologic Treatments:  Physical Therapy/Home Exercise: yes  Ice/Heat:yes  TENS: no  Acupuncture: no  Massage: no  Chiropractic: no    Other: no      Pain Medications:  - Adjuvant Medications: Alleve (Naproxen) and Neurontin (Gabapentin)    Pain Procedures:   -01/21/2022:  Bilateral L4/5 transforaminal epidural steroid injection    Past Medical History:   Diagnosis Date    Hyperlipidemia     PONV (postoperative nausea and vomiting) 1990    after knee arthroscopy    Vitamin D deficiency 12/18/2019     Past Surgical History:   Procedure Laterality Date    COLONOSCOPY      COLONOSCOPY N/A 12/19/2019    Procedure: COLONOSCOPY;  Surgeon: Bhupendra Wilkinson MD;  Location: [facility] ENDO;  Service: Endoscopy;  Laterality: N/A;    KNEE ARTHROSCOPY Left     SELECTIVE INJECTION OF ANESTHETIC AGENT AROUND LUMBAR SPINAL NERVE ROOT BY TRANSFORAMINAL APPROACH Bilateral 1/21/2022    Procedure: Bilateral L4/5 TF LOVE with RN IV sedation;  Surgeon: Ben Eddy MD;  Location: [facility] PAIN T;  Service: Pain Management;  Laterality: Bilateral;    VASECTOMY  1995     Review of patient's allergies indicates:  No Known Allergies    Current Outpatient Medications   Medication Sig    ergocalciferol, vitamin D2, (VITAMIN D ORAL) Take by mouth once daily.    gabapentin (NEURONTIN) 300 MG capsule Take 1 capsule (300 mg total) by mouth every evening for 7 days, THEN 2 capsules (600 mg total) every evening for 7 days, THEN 3 capsules (900 mg total) every evening for 16 days.    naproxen  (NAPROSYN) 500 MG tablet TAKE 1 TABLET BY MOUTH TWICE A DAY    pravastatin (PRAVACHOL) 40 MG tablet TAKE 1 TABLET BY MOUTH EVERY DAY    tamsulosin (FLOMAX) 0.4 mg Cap Take 1 capsule (0.4 mg total) by mouth once daily.     No current facility-administered medications for this visit.       Review of Systems     GENERAL:  No weight loss, malaise or fevers.  HEENT:   No recent changes in vision or hearing  NECK:  Negative for lumps, no difficulty with swallowing.  RESPIRATORY:  Negative for cough, wheezing or shortness of breath, patient denies any recent URI.  CARDIOVASCULAR:  Negative for chest pain, leg swelling or palpitations.  GI:  Negative for abdominal discomfort, blood in stools or black stools or change in bowel habits.  MUSCULOSKELETAL:  See HPI.  SKIN:  Negative for lesions, rash, and itching.  PSYCH:  No mood disorder or recent psychosocial stressors.   HEMATOLOGY/LYMPHOLOGY:  Negative for prolonged bleeding, bruising easily or swollen nodes.    NEURO:   No history of headaches, syncope, paralysis, seizures or tremors.  All other reviewed and negative other than HPI.    OBJECTIVE:    There were no vitals taken for this visit.      Physical Exam  1/18/22  GENERAL: Well appearing, in no acute distress, alert and oriented x3.  PSYCH:  Mood and affect appropriate.  SKIN: Skin color, texture, turgor normal, no rashes or lesions.  HEAD/FACE:  Normocephalic, atraumatic. Cranial nerves grossly intact.    CV: RRR with palpation of the radial artery.  PULM: No evidence of respiratory difficulty, symmetric chest rise.  GI:  Soft and non-tender.    BACK: genu varus b/l. Straight leg raising in the sitting and supine positions is negative to radicular pain. No pain to palpation over the facet joints of the lumbar spine or spinous processes. Normal range of motion without pain reproduction.  EXTREMITIES: Peripheral joint ROM is full and pain free without obvious instability or laxity in all four extremities. No  deformities, edema, or skin discoloration. Good capillary refill.  MUSCULOSKELETAL: Able to stand on heels & toes.  Right lower extremity, approximately half to 1 in longer than left lower extremity.  hip, and knee provocative maneuvers are negative.  There is no pain with palpation over the sacroiliac joints bilaterally.  FABERs test is negative.  FADIR test is negative bilaterally.   Bilateral upper and lower extremity strength is normal and symmetric.  No atrophy or tone abnormalities are noted.  RIGHT Lower extremity: Hip flexion 5/5, Hip Abduction 5/5, Hip Adduction 5/5, Knee extension 5/5, Knee flexion 5/5, Ankle dorsiflexion5/5, Extensor hallucis longus 5/5, Ankle plantarflexion 5/5  LEFT Lower extremity:  Hip flexion 5/5, Hip Abduction 5/5,Hip Adduction 5/5, Knee extension 5/5, Knee flexion 5/5, Ankle dorsiflexion 5/5, Extensor hallucis longus 5/5, Ankle plantarflexion 5/5  -Normal testing knee (patellar) jerk and ankle (achilles) jerk    NEURO: Bilateral upper and lower extremity coordination and muscle stretch reflexes are physiologic and symmetric. No loss of sensation is noted.  GAIT: normal.    Imaging  MRI lumbar spine 12/21/2021  FINDINGS:  Vertebral body heights with upper lumbar and lower thoracic spine Schmorl node endplate changes.  No spondylolisthesis.  Mild Modic 1 endplate changes noted at L4-5.  Multilevel disc desiccation present with height loss most noted at L4-5.     Conus terminates normally.  Visualized intra-abdominal/pelvic structures are unremarkable.     L1-L2: No spinal canal stenosis or neural foraminal narrowing.  Facet arthropathy findings.     L2-L3: No significant spinal canal stenosis or neural foraminal narrowing.  Moderate to severe facet arthropathy.     L3-L4: Circumferential disc bulging present with suspected right paracentral small disc protrusion.  This in conjunction with congenitally short pedicles and prominent facet/ligamentum flavum degenerative hypertrophy  findings causes severe spinal canal stenosis.  Small right facet joint effusion present with 4 mm right facet synovial cyst deep to the ligamentum flavum.  Left foraminal extraforaminal annular fissure.  Mild bilateral inferior neural foraminal narrowing.     L4-L5: Circumferential disc bulging in conjunction with congenitally short pedicles and prominent facet degenerative hypertrophy findings causes severe spinal canal stenosis.  Severe bilateral neural foraminal narrowing present with likely impingement of the exiting nerve roots.     L5-S1: No significant posterior disc bulge.  Prominent facet arthropathy noted with mild left and moderate right neural foraminal narrowing.       09/13/21  X-Ray Lumbar Spine 5 View  FINDINGS:  There is minimal lumbar levocurvature.  No fracture or malalignment.  No pars defect.  There is mild loss of disc height at L4-5.  Severe multilevel facet arthropathy is present.    ASSESSMENT: 66 y.o. year old male with lower back and leg pain, consistent with     1. Lumbar radiculopathy, chronic  IR Epidural Transfor 1st Vert Lumbar [facility]    Case Request-RAD/Other Procedure Area: Bilateral L4/5 TF LOVE with RN IV sedation   2. Lumbar facet arthropathy     3. Spinal stenosis of lumbar region without neurogenic claudication     4. DDD (degenerative disc disease), lumbar     5. Leg length discrepancy           PLAN:   - Interventions:  Schedule for bilateral L4/5 transforaminal epidural steroid injection as patient has significant sustained relief exceeding 3 months following his prior injection..  Explained the risks and benefits of the procedure in detail with the patient today in clinic along with alternative treatment options.  Patient has elected to pursue this procedure.    - Anticoagulation use: no no anticoagulation     report:  Reviewed and consistent with medication use as prescribed.    - Medications:  ---  We have discussed REstarting the patient on gabapentin.  Patient will  increase their medication according to the following algorithm.  We have reviewed potential side effects of this medication including daytime somnolence, weight gain and peripheral edema    Gabapentin Titration:  Week 1: 300 mg QHS  Week 2: 600 mg QHS  Week 3: 900 mg QHS    - Therapy:   We discussed continuing physical therapy to help manage the patient/s painful condition. The patient was counseled that muscle strengthening will improve the long term prognosis in regards to pain and may also help increase range of motion and mobility.     -Consults/Referrals: HME : continue heel insert/fitting for leg length discrepancy.    - Imaging: Reviewed available imaging with patient and answered any questions they had regarding study    - Follow up visit: return to clinic in  4-6 weeks    The above plan and management options were discussed at length with patient. Patient is in agreement with the above and verbalized understanding.    - I discussed the goals of interventional chronic pain management with the patient on today's visit. We discussed a multimodal and systematic approach to pain.  This includes diagnostic and therapeutic injections, adjuvant pharmacologic treatment, physical therapy, and at times psychiatry.  I emphasized the importance of regular exercise, core strengthening and stretching, diet and weight loss as a cornerstone of long-term pain management.    - This condition does not require this patient to take time off of work, and the primary goal of our Pain Management services is to improve the patient's functional capacity.  - Patient Questions: Answered all of the patient's questions regarding diagnoses, therapy, treatment and next steps        Ben Eddy MD  Interventional Pain Management  [facility]    Disclaimer:  This note was prepared using voice recognition system and is likely to have sound alike errors that may have been overlooked even after proof reading.  Please call me with any  questions

## 2022-04-26 ENCOUNTER — TELEPHONE (OUTPATIENT)
Dept: PAIN MEDICINE | Facility: CLINIC | Age: 67
End: 2022-04-26

## 2022-04-26 ENCOUNTER — OFFICE VISIT (OUTPATIENT)
Dept: PAIN MEDICINE | Facility: CLINIC | Age: 67
End: 2022-04-26
Payer: MEDICARE

## 2022-04-26 DIAGNOSIS — M21.70 LEG LENGTH DISCREPANCY: ICD-10-CM

## 2022-04-26 DIAGNOSIS — M51.36 DDD (DEGENERATIVE DISC DISEASE), LUMBAR: ICD-10-CM

## 2022-04-26 DIAGNOSIS — M54.16 LUMBAR RADICULOPATHY, CHRONIC: Primary | ICD-10-CM

## 2022-04-26 DIAGNOSIS — M47.816 LUMBAR FACET ARTHROPATHY: ICD-10-CM

## 2022-04-26 DIAGNOSIS — M48.061 SPINAL STENOSIS OF LUMBAR REGION WITHOUT NEUROGENIC CLAUDICATION: ICD-10-CM

## 2022-04-26 PROCEDURE — 1160F PR REVIEW ALL MEDS BY PRESCRIBER/CLIN PHARMACIST DOCUMENTED: ICD-10-PCS | Mod: CPTII,95,, | Performed by: ANESTHESIOLOGY

## 2022-04-26 PROCEDURE — 99214 PR OFFICE/OUTPT VISIT, EST, LEVL IV, 30-39 MIN: ICD-10-PCS | Mod: 95,,, | Performed by: ANESTHESIOLOGY

## 2022-04-26 PROCEDURE — 1159F MED LIST DOCD IN RCRD: CPT | Mod: CPTII,95,, | Performed by: ANESTHESIOLOGY

## 2022-04-26 PROCEDURE — 1159F PR MEDICATION LIST DOCUMENTED IN MEDICAL RECORD: ICD-10-PCS | Mod: CPTII,95,, | Performed by: ANESTHESIOLOGY

## 2022-04-26 PROCEDURE — 1160F RVW MEDS BY RX/DR IN RCRD: CPT | Mod: CPTII,95,, | Performed by: ANESTHESIOLOGY

## 2022-04-26 PROCEDURE — 99214 OFFICE O/P EST MOD 30 MIN: CPT | Mod: 95,,, | Performed by: ANESTHESIOLOGY

## 2022-04-26 RX ORDER — GABAPENTIN 300 MG/1
CAPSULE ORAL
Qty: 69 CAPSULE | Refills: 0 | Status: SHIPPED | OUTPATIENT
Start: 2022-04-26 | End: 2022-06-16 | Stop reason: SDUPTHER

## 2022-04-26 NOTE — TELEPHONE ENCOUNTER
----- Message from Barbara Myers sent at 4/26/2022  3:14 PM CDT -----  Contact: self/[phone]  Patient is returning your call please call him back at [phone]

## 2022-04-26 NOTE — PATIENT INSTRUCTIONS
General Neck and Back Pain    Both neck and back pain are usually caused by injury to the muscles or ligaments of the spine. Sometimes the disks that separate each bone of the spine may cause pain by pressing on a nearby nerve. Back and neck pain may appear after a sudden twisting or bending force (such as in a car accident), or sometimes after a simple awkward movement. In either case, muscle spasm is often present and adds to the pain.  Acute neck and back pain usually gets better in 1 to 2 weeks. Pain related to disk disease, arthritis in the spinal joints or spinal stenosis (narrowing of the spinal canal) can become chronic and last for months or years.  Back and neck pain are common problems. Most people feel better in 1 or 2 weeks, and most of the rest in 1 to 2 months. Most people can remain active.  People experience and describe pain differently.  Pain can be sharp, stabbing, shooting, aching, cramping, or burning  Movement, standing, bending, lifting, sitting, or walking may worsen the pain  Pain can be localized to one spot or area, or it can be more generalized  Pain can spread or radiate upwards, downwards, to the front, or go down your arms  Muscle spasm may occur.  Most of the time mechanical problems with the muscles or spine cause the pain. it is usually caused by an injury, whether known or not, to the muscles or ligaments. While illnesses can cause back pain, it is usually not caused by a serious illness. Pain is usually related to physical activity, whether sports, exercise, work, or normal activity. Sometimes it can occur without an identifiable cause. This can happen simply by stretching or moving wrong, without noting pain at the time. Other causes include:  Overexertion, lifting, pushing, pulling incorrectly or too aggressively.  Sudden twisting, bending or stretching from an accident (car or fall), or accidental movement.  Poor posture  Poor conditioning, lack of regular exercise  Spinal  disc disease or arthritis  Stress  Pregnancy, or illness like appendicitis, bladder or kidney infection, pelvic infections   Home care  For neck pain: Use a comfortable pillow that supports the head and keeps the spine in a neutral position. The position of the head should not be tilted forward or backward.  When in bed, try to find a position of comfort. A firm mattress is best. Try lying flat on your back with pillows under your knees. You can also try lying on your side with your knees bent up towards your chest and a pillow between your knees.  At first, do not try to stretch out the sore spots. If there is a strain, it is not like the good soreness you get after exercising without an injury. In this case, stretching may make it worse.  Avoid prolonged sitting, long car rides or travel. This puts more stress on the lower back than standing or walking.  During the first 24 to 72 hours after an injury, apply an ice pack to the painful area for 20 minutes and then remove it for 20 minutes over a period of 60 to 90 minutes or several times a day.   You can alternate ice and heat therapies. Talk with your healthcare provider about the best treatment for your back or neck pain. As a safety precaution, do not use a heating pad at bedtime. Sleeping with a heating pad can lead to skin burns or tissue damage.  Therapeutic massage can help relax the back and neck muscles without stretching them.  Be aware of safe lifting methods and do not lift anything over 15 pounds until all the pain is gone.  Medications  Talk to your healthcare provider before using medicine, especially if you have other medical problems or are taking other medicines.  You may use over-the-counter medicine to control pain, unless another pain medicine was prescribed. If you have chronic conditions like diabetes, liver or kidney disease, stomach ulcers,  gastrointestinal bleeding, or are taking blood thinner medicines.  Be careful if you are given pain  medicines, narcotics, or medicine for muscle spasm. They can cause drowsiness, and can affect your coordination, reflexes, and judgment. Do not drive or operate heavy machinery.  Follow-up care  Follow up with your healthcare provider, or as advised. Physical therapy or further tests may be needed.  If X-rays were taken, you will be notified of any new findings that may affect your care.  Call 911  Seek emergency medical care if any of the following occur:  Trouble breathing  Confusion  Very drowsy or trouble awakening  Fainting or loss of consciousness  Rapid or very slow heart rate  Loss of bowel or bladder control  When to seek medical advice  Call your healthcare provider right away if any of these occur:  Pain becomes worse or spreads into your arms or legs  Weakness, numbness or pain in one or both arms or legs  Numbness in the groin area  Difficulty walking  Fever of 100.4ºF (38ºC) or higher, or as directed by your healthcare provider  Date Last Reviewed: 7/1/2016  © 0976-7646 [employer]. [address] [place] [address]. All rights reserved. This information is not intended as a substitute for professional medical care. Always follow your healthcare professional's instructions.       Understanding Lumbar Radiculopathy    Lumbar radiculopathy is irritation or inflammation of a nerve root in the low back. It causes symptoms that spread out from the back down one or both legs. To understand this condition, it helps to understand the parts of the spine:  Vertebrae. These are bones that stack to form the spine. The lumbar spine contains the 5 bottom vertebrae.  Disks. These are soft pads of tissue between the vertebrae. They act as shock absorbers for the spine.  Spinal canal. This is a tunnel formed within the stacked vertebrae. In the lumbar spine, nerves run through this canal.  Nerves. These branch off and leave the spinal canal, traveling out to parts of the body. As they leave the  spinal canal, nerves pass through openings between the vertebrae. The nerve root is the part of the nerve that is closest to the spinal canal.  Sciatic nerve. This is a large nerve formed from several nerve roots in the low back. This nerve extends down the back of the leg to the foot.  With lumbar radiculopathy, nerve roots in the low back become irritated. This leads to pain and symptoms. The sciatic nerve is commonly involved, so the condition is often called sciatica.  What causes lumbar radiculopathy?  Aging, injury, poor posture, extra body weight, and other issues can lead to problems in the low back. These problems may then irritate nerve roots. They include:  Damage to a disk in the lumbar spine. The damaged disk may then press on nearby nerve roots.  Degeneration from wear and tear, and aging. This can lead to narrowing (stenosis) of the openings between the vertebrae. The narrowed openings press on nerve roots as they leave the spinal canal.  Unstable spine. This is when a vertebra slips forward. It can then press on a nerve root.  Other, less common things can put pressure on nerves in the low back. These include diabetes, infection, or a tumor.  Symptoms of lumbar radiculopathy  These include:  Pain in the low back  Pain, numbness, tingling, or weakness that travels into the buttocks, hip, groin, or leg  Muscle spasms  Treatment for lumbar radiculopathy  In most cases, your healthcare provider will first try treatments that help relieve symptoms. These may include:  Prescription and over-the-counter pain medicines. These help relieve pain, swelling, and irritation.  Limits on positions and activities that increase pain. But lying in bed or avoiding all movement is only recommended for a short period of time.  Physical therapy, including exercises and stretches. This helps decrease pain and increase movement and function.  Steroid shots into the lower back. This may help relieve symptoms for a  time.  Weight-loss program. If you are overweight, losing extra pounds may help relieve symptoms.  In some cases, you may need surgery to fix the underlying problem. This depends on the cause, the symptoms, and how long the pain has lasted.  Possible complications  Over time, an irritated and inflamed nerve may become damaged. This may lead to long-lasting (permanent) numbness or weakness in your legs and feet. If symptoms change suddenly or get worse, be sure to let your healthcare provider know.  When to call your healthcare provider  Call your healthcare provider right away if you have any of these:  New pain or pain that gets worse  New or increasing weakness, tingling, or numbness in your leg or foot  Problems controlling your bladder or bowel   Date Last Reviewed: 3/10/2016  © 8170-4448 [employer]. [address] [place] [address], [place] [address]. All rights reserved. This information is not intended as a substitute for professional medical care. Always follow your healthcare professional's instructions.       Exercises to Strengthen Your Lower Back  Strong lower back and abdominal muscles work together to support your spine. The exercises below will help strengthen the lower back. It is important that you begin exercising slowly and increase levels gradually.  Always begin any exercise program with stretching. If you feel pain while doing any of these exercises, stop and talk to your doctor about a more specific exercise program that better suits your condition.   Low back stretch  The point of stretching is to make you more flexible and increase your range of motion. Stretch only as much as you are able. Stretch slowly. Do not push your stretch to the limit. If at any point you feel pain while stretching, this is your (temporary) limit.  Lie on your back with your knees bent and both feet on the ground.  Slowly raise your left knee to your chest as you flatten your lower back against the floor. Hold  for 5 seconds.  Relax and repeat the exercise with your right knee.  Do 10 of these exercises for each leg.  Repeat hugging both knees to your chest at the same time.  Building lower back strength  Start your exercise routine with 10 to 30 minutes a day, 1 to 3 times a day.  Initial exercises  Lying on your back:  Ankle pumps: Move your foot up and down, towards your head, and then away. Repeat 10 times with each foot.  Heel slides: Slowly bend your knee, drawing the heel of your foot towards you. Then slide your heel/foot from you, straightening your knee. Do not lift your foot off the floor (this is not a leg lift).  Abdominal contraction: Bend your knees and put your hands on your stomach. Tighten your stomach muscles. Hold for 5 seconds, then relax. Repeat 10 times.  Straight leg raise: Bend one leg at the knee and keep the other leg straight. Tighten your stomach muscles. Slowly lift your straight leg 6 to 12 inches off the floor and hold for up to 5 seconds. Repeat 10 times on each side.  Standing:  Wall squats: Stand with your back against the wall. Move your feet about 12 inches away from the wall. Tighten your stomach muscles, and slowly bend your knees until they are at about a 45 degree angle. Do not go down too far. Hold about 5 seconds. Then slowly return to your starting position. Repeat 10 times.  Heel raises: Stand facing the wall. Slowly raise the heels of your feet up and down, while keeping your toes on the floor. If you have trouble balancing, you can touch the wall with your hands. Repeat 10 times.  More advanced exercises  When you feel comfortable enough, try these exercises.  Kneeling lumbar extension: Begin on your hands and knees. At the same time, raise and straighten your right arm and left leg until they are parallel to the ground. Hold for 2 seconds and come back slowly to a starting position. Repeat with left arm and right leg, alternating 10 times.  Prone lumbar extension: Lie face  down, arms extended overhead, palms on the floor. At the same time, raise your right arm and left leg as high as comfortably possible. Hold for 10 seconds and slowly return to start. Repeat with left arm and right leg, alternating 10 times. Gradually build up to 20 times. (Advanced: Repeat this exercise raising both arms and both legs a few inches off the floor at the same time. Hold for 5 seconds and release.)  Pelvic tilt: Lie on the floor on your back with your knees bent at 90 degrees. Your feet should be flat on the floor. Inhale, exhale, then slowly contract your abdominal muscles bringing your navel toward your spine. Let your pelvis rock back until your lower back is flat on the floor. Hold for 10 seconds while breathing smoothly.  Abdominal crunch: Perform a pelvic tilt (above) flattening your lower back against the floor. Holding the tension in your abdominal muscles, take another breath and raise your shoulder blades off the ground (this is not a full sit-up). Keep your head in line with your body (dont bend your neck forward). Hold for 2 seconds, then slowly lower.  Date Last Reviewed: 6/1/2016  © 2613-6352 [employer]. [address] [place] [address], [place] [address]. All rights reserved. This information is not intended as a substitute for professional medical care. Always follow your healthcare professional's instructions.

## 2022-04-26 NOTE — TELEPHONE ENCOUNTER
Attempt to reach patent to schedule appointment for his procedure. Patient did not answer. Left message on patients voice mail to call back at earliest convenience to schedule apt.   Russell Caceres  Medical Assistant

## 2022-04-27 ENCOUNTER — PATIENT MESSAGE (OUTPATIENT)
Dept: PAIN MEDICINE | Facility: CLINIC | Age: 67
End: 2022-04-27
Payer: MEDICARE

## 2022-04-27 ENCOUNTER — TELEPHONE (OUTPATIENT)
Dept: PAIN MEDICINE | Facility: CLINIC | Age: 67
End: 2022-04-27
Payer: MEDICARE

## 2022-04-27 NOTE — TELEPHONE ENCOUNTER
Looks like you are trying to reach the patient to schedule procedure.  Aiyana Gilman said to forward this to you and have you call him to schedule.    Thanks,  Marika

## 2022-04-27 NOTE — TELEPHONE ENCOUNTER
----- Message from Luda Yeung sent at 4/27/2022  2:24 PM CDT -----  Contact: pt  Who Called: PT  Regarding: The pt is calling to schedule his appointment for an injection and is requesting a call for scheduling.   Would the patient rather a call back or a response via [facility]? Call back  Best Call Back Number: [phone]  Additional Information:

## 2022-04-27 NOTE — TELEPHONE ENCOUNTER
Ben Eddy MD  You 20 minutes ago (1:35 PM)     SD    Can we tell the patient that waiting to start is fine.       TY    Message text

## 2022-04-27 NOTE — TELEPHONE ENCOUNTER
Duplicate message:  Patient is scheduled for 5/6 for procedure and 6/16 for a virtual visit with Dr. Eddy.  Patient verbalized understanding of both and advised that these appointments are on his my chart.

## 2022-04-28 ENCOUNTER — TELEPHONE (OUTPATIENT)
Dept: PAIN MEDICINE | Facility: CLINIC | Age: 67
End: 2022-04-28
Payer: MEDICARE

## 2022-04-28 NOTE — TELEPHONE ENCOUNTER
Called pt to set up their procedure. Pt answered and procedure has been made. Inform pt on the procedure instruction. Pt  does not need to stop any bloodthiners. Pt understood and all question answered.     Russell Caceres   Medical Assistant

## 2022-04-29 NOTE — PRE-PROCEDURE INSTRUCTIONS
Spoke with patient regarding procedure scheduled on 5.6    Arrival time 0640    Has patient been sick with fever or on antibiotics within the last 7 days? No    Does the patient have any open wounds, sores or rashes? No    Does the patient have any recent fractures? no    Has patient received a vaccination within the last 7 days? No    Received the COVID vaccination? yes    Has the patient stopped all medications as directed? Na    Does patient have a pacemaker and or defibrillator? no    Does the patient have a ride to and from procedure and someone reliable to remain with patient? Wife kristal    Is the patient diabetic? no    Does the patient have sleep apnea? Or use O2 at home? No and no     Is the patient receiving sedation? yes    Is the patient instructed to remain NPO beginning at midnight the night before their procedure? yes    Procedure location confirmed with patient? Yes    Covid- Denies signs/symptoms. Instructed to notify PAT/MD if any changes.

## 2022-05-06 ENCOUNTER — HOSPITAL ENCOUNTER (OUTPATIENT)
Facility: HOSPITAL | Age: 67
Discharge: HOME | End: 2022-05-06
Attending: ANESTHESIOLOGY | Admitting: ANESTHESIOLOGY
Payer: MEDICARE

## 2022-05-06 VITALS
RESPIRATION RATE: 20 BRPM | TEMPERATURE: 98 F | OXYGEN SATURATION: 98 % | SYSTOLIC BLOOD PRESSURE: 134 MMHG | WEIGHT: 233.69 LBS | DIASTOLIC BLOOD PRESSURE: 71 MMHG | BODY MASS INDEX: 27.59 KG/M2 | HEART RATE: 55 BPM | HEIGHT: 77 IN

## 2022-05-06 DIAGNOSIS — M54.16 LUMBAR RADICULOPATHY, CHRONIC: ICD-10-CM

## 2022-05-06 PROCEDURE — 64483 NJX AA&/STRD TFRM EPI L/S 1: CPT | Mod: 50,,, | Performed by: ANESTHESIOLOGY

## 2022-05-06 PROCEDURE — 63600175 PHARM REV CODE 636 W HCPCS: Performed by: ANESTHESIOLOGY

## 2022-05-06 PROCEDURE — 64483 NJX AA&/STRD TFRM EPI L/S 1: CPT | Mod: 50 | Performed by: ANESTHESIOLOGY

## 2022-05-06 PROCEDURE — 25500020 PHARM REV CODE 255: Performed by: ANESTHESIOLOGY

## 2022-05-06 PROCEDURE — 64483 PR EPIDURAL INJ, ANES/STEROID, TRANSFORAMINAL, LUMB/SACR, SNGL LEVL: ICD-10-PCS | Mod: 50,,, | Performed by: ANESTHESIOLOGY

## 2022-05-06 PROCEDURE — 25000003 PHARM REV CODE 250: Performed by: ANESTHESIOLOGY

## 2022-05-06 RX ORDER — INDOMETHACIN 25 MG/1
CAPSULE ORAL
Status: DISCONTINUED | OUTPATIENT
Start: 2022-05-06 | End: 2022-05-06 | Stop reason: HOSPADM

## 2022-05-06 RX ORDER — BUPIVACAINE HYDROCHLORIDE 2.5 MG/ML
INJECTION, SOLUTION EPIDURAL; INFILTRATION; INTRACAUDAL
Status: DISCONTINUED | OUTPATIENT
Start: 2022-05-06 | End: 2022-05-06 | Stop reason: HOSPADM

## 2022-05-06 RX ORDER — FENTANYL CITRATE 50 UG/ML
INJECTION, SOLUTION INTRAMUSCULAR; INTRAVENOUS
Status: DISCONTINUED | OUTPATIENT
Start: 2022-05-06 | End: 2022-05-06 | Stop reason: HOSPADM

## 2022-05-06 RX ORDER — DEXAMETHASONE SODIUM PHOSPHATE 10 MG/ML
INJECTION INTRAMUSCULAR; INTRAVENOUS
Status: DISCONTINUED | OUTPATIENT
Start: 2022-05-06 | End: 2022-05-06 | Stop reason: HOSPADM

## 2022-05-06 RX ORDER — MIDAZOLAM HYDROCHLORIDE 1 MG/ML
INJECTION, SOLUTION INTRAMUSCULAR; INTRAVENOUS
Status: DISCONTINUED | OUTPATIENT
Start: 2022-05-06 | End: 2022-05-06 | Stop reason: HOSPADM

## 2022-05-06 NOTE — DISCHARGE INSTRUCTIONS

## 2022-05-06 NOTE — OP NOTE
Myles Pride  66 y.o. male      Vitals:    05/06/22 0735   BP: 138/71   Pulse: (!) 39   Resp: 11   Temp:      Procedure Date:5/6/22      INFORMED CONSENT: The procedure, risks, benefits and options were discussed with patient. There are no contraindications to the procedure. The patient expressed understanding and agreed to proceed. The personnel performing the procedure was discussed. I verify that I personally obtained consent prior to the start of the procedure and the signed consent can be found on the patient's chart.       Anesthesia:   Conscious sedation provided by M.D    The patient was monitored with continuous pulse oximetry, EKG, and intermittent blood pressure monitors.  The patient was hemodynamically stable throughout the entire process was responsive to voice, and breathing spontaneously.  Supplemental O2 was provided at 2L/min via nasal cannula.  Patient was comfortable for the duration of the procedure. (See nurse documentation and case log for sedation time)    There was a total of 1mg IV Midazolam and 75mcg Fentanyl titrated for the procedure    Pre Procedure diagnosis: Lumbar radiculopathy, chronic [M54.16]  Post-Procedure diagnosis: SAME     Complications: None    Specimens: None      DESCRIPTION OF PROCEDURE: The patient was brought to the procedure room. IV access was obtained prior to the procedure. The patient was positioned prone on the fluoroscopy table. Continuous hemodynamic monitoring was initiated including blood pressure, EKG, and pulse oximetry. . The skin was prepped with chlorhexidine and draped in a sterile fashion. Skin anesthesia was achieved using a total of 10mL of lidocaine, 5mL over each respective injection site.     The  L4/5 transforaminal spaces were identified with fluoroscopy in the  AP, oblique, and lateral views.  A 22 gauge spinal quinke needle was then advanced into the area of the trans foraminal spaces bilateral with confirmation of proper needle position  using AP, oblique, and lateral fluoroscopic views. Once the needle tip was in the area of the transforaminal space, and there was no blood, CSF or paraesthesias,  1.5 mL of Omnipaque 300mg/ml was injected on bilateral for a total of 3mL.  Fluoroscopic imaging in the AP and lateral views revealed a clear outline of the spinal nerve with proximal spread of agent through the neural foramen into the epidural space. A total combination of 2 mL of Bupivicaine 0.25% and 10 mg dexamethasone was injected on each side for a total of 6 mL of injected medications with displacement of the contrast dye confirming that the medication went into the area of the transforaminal spaces bilateral. A sterile dressing was applied.   Patient tolerated the procedure well.    Patient was taken back to the recovery room for further observation.     The patient was discharged to home in stable condition

## 2022-05-06 NOTE — DISCHARGE SUMMARY
[facility] - Pain Mg[facility] 1st Fl  Discharge Note  Short Stay    Procedure(s) (LRB):  Bilateral L4/5 TF LOVE with RN IV sedation (Bilateral)    OUTCOME: Patient tolerated treatment/procedure well without complication and is now ready for discharge.    DISPOSITION: Home or Self Care    FINAL DIAGNOSIS:  <principal problem not specified>    FOLLOWUP: In clinic    DISCHARGE INSTRUCTIONS:  No discharge procedures on file.     TIME SPENT ON DISCHARGE: 15 minutes

## 2022-06-01 ENCOUNTER — IMMUNIZATION (OUTPATIENT)
Dept: PHARMACY | Facility: CLINIC | Age: 67
End: 2022-06-01
Payer: MEDICARE

## 2022-06-01 DIAGNOSIS — Z23 NEED FOR VACCINATION: Primary | ICD-10-CM

## 2022-06-02 ENCOUNTER — OFFICE VISIT (OUTPATIENT)
Dept: ORTHOPEDICS | Facility: CLINIC | Age: 67
End: 2022-06-02
Payer: MEDICARE

## 2022-06-02 VITALS — HEIGHT: 77 IN | BODY MASS INDEX: 27.51 KG/M2 | WEIGHT: 233 LBS

## 2022-06-02 DIAGNOSIS — M48.061 SPINAL STENOSIS OF LUMBAR REGION, UNSPECIFIED WHETHER NEUROGENIC CLAUDICATION PRESENT: ICD-10-CM

## 2022-06-02 DIAGNOSIS — M54.17 LUMBOSACRAL RADICULOPATHY AT L4: ICD-10-CM

## 2022-06-02 DIAGNOSIS — M16.11 ARTHRITIS OF RIGHT HIP: ICD-10-CM

## 2022-06-02 DIAGNOSIS — M54.17 LUMBOSACRAL RADICULOPATHY AT L5: Primary | ICD-10-CM

## 2022-06-02 DIAGNOSIS — M54.17 LUMBOSACRAL RADICULOPATHY AT S1: ICD-10-CM

## 2022-06-02 DIAGNOSIS — M17.11 ARTHRITIS OF KNEE, RIGHT: ICD-10-CM

## 2022-06-02 DIAGNOSIS — M54.17 LUMBOSACRAL RADICULOPATHY AT L2: ICD-10-CM

## 2022-06-02 DIAGNOSIS — M17.12 ARTHRITIS OF KNEE, LEFT: ICD-10-CM

## 2022-06-02 DIAGNOSIS — M21.162 ACQUIRED VARUS DEFORMITY KNEE, LEFT: ICD-10-CM

## 2022-06-02 PROCEDURE — 1101F PR PT FALLS ASSESS DOC 0-1 FALLS W/OUT INJ PAST YR: ICD-10-PCS | Mod: CPTII,S$GLB,, | Performed by: ORTHOPAEDIC SURGERY

## 2022-06-02 PROCEDURE — 1125F PR PAIN SEVERITY QUANTIFIED, PAIN PRESENT: ICD-10-PCS | Mod: CPTII,S$GLB,, | Performed by: ORTHOPAEDIC SURGERY

## 2022-06-02 PROCEDURE — 1101F PT FALLS ASSESS-DOCD LE1/YR: CPT | Mod: CPTII,S$GLB,, | Performed by: ORTHOPAEDIC SURGERY

## 2022-06-02 PROCEDURE — 3008F PR BODY MASS INDEX (BMI) DOCUMENTED: ICD-10-PCS | Mod: CPTII,S$GLB,, | Performed by: ORTHOPAEDIC SURGERY

## 2022-06-02 PROCEDURE — 3288F PR FALLS RISK ASSESSMENT DOCUMENTED: ICD-10-PCS | Mod: CPTII,S$GLB,, | Performed by: ORTHOPAEDIC SURGERY

## 2022-06-02 PROCEDURE — 99999 PR PBB SHADOW E&M-EST. PATIENT-LVL III: ICD-10-PCS | Mod: PBBFAC,,, | Performed by: ORTHOPAEDIC SURGERY

## 2022-06-02 PROCEDURE — 1159F MED LIST DOCD IN RCRD: CPT | Mod: CPTII,S$GLB,, | Performed by: ORTHOPAEDIC SURGERY

## 2022-06-02 PROCEDURE — 3008F BODY MASS INDEX DOCD: CPT | Mod: CPTII,S$GLB,, | Performed by: ORTHOPAEDIC SURGERY

## 2022-06-02 PROCEDURE — 1125F AMNT PAIN NOTED PAIN PRSNT: CPT | Mod: CPTII,S$GLB,, | Performed by: ORTHOPAEDIC SURGERY

## 2022-06-02 PROCEDURE — 99214 OFFICE O/P EST MOD 30 MIN: CPT | Mod: S$GLB,,, | Performed by: ORTHOPAEDIC SURGERY

## 2022-06-02 PROCEDURE — 1159F PR MEDICATION LIST DOCUMENTED IN MEDICAL RECORD: ICD-10-PCS | Mod: CPTII,S$GLB,, | Performed by: ORTHOPAEDIC SURGERY

## 2022-06-02 PROCEDURE — 99214 PR OFFICE/OUTPT VISIT, EST, LEVL IV, 30-39 MIN: ICD-10-PCS | Mod: S$GLB,,, | Performed by: ORTHOPAEDIC SURGERY

## 2022-06-02 PROCEDURE — 99999 PR PBB SHADOW E&M-EST. PATIENT-LVL III: CPT | Mod: PBBFAC,,, | Performed by: ORTHOPAEDIC SURGERY

## 2022-06-02 PROCEDURE — 3288F FALL RISK ASSESSMENT DOCD: CPT | Mod: CPTII,S$GLB,, | Performed by: ORTHOPAEDIC SURGERY

## 2022-06-02 NOTE — PROGRESS NOTES
Subjective:     Patient ID: Myles Pride is a 66 y.o. male.    Chief Complaint: Pain and Swelling of the Left Knee    HPI:  06/02/2022  Patient with left knee severe pain today is total complaining also of the right hip.  He had previous x-rays in the electronic records.  He feels the right hip when he abducts his hip and try to get out of the car is seems to be very painful in the groin.  His knee on the left side does not bother him as much.  He does have severe back issues and we did order EMG and nerve conduction studies that show multilevel radiculopathy from L2, L4-L5 and S1 bilaterally.  He states he does get thigh anterior thigh pain.  I did tell him a could be from pinched nerves in his back.  He does see pain management for his back and injection seems to have helped a little bit.  He tries to maintain activities however now he said he cannot even hike 2 miles.  One mildly can walk but now is knee gives out on him on the left and his hip hurts on the right.  He did know if the injection given to him in February in the left knee helped or not because also received injections in his back.  No loss of bowel bladder control blurry vision double vision or loss sense smell or taste or fever or chills    As far as the gabapentin we discussed that in detail today he does not take it as much bit I did tell him for it to work he needs to be on it constantly.  He was prescribed that by pain management to take 300 mg at night for we can go up to 600 mg at night after that and a for a week and then go up to 900 at night.  I did tell him I will probably stop at 600 if you doing well with that.  Will take roughly 3 months for the gabapentin to work well and if he does not take it constantly there is no help for it.  If he decides to do surgery afterwards he needs to be on gabapentin to help with nerve pain  Past Medical History:   Diagnosis Date    Hyperlipidemia     PONV (postoperative nausea and vomiting) 1990     after knee arthroscopy    Vitamin D deficiency 12/18/2019     Past Surgical History:   Procedure Laterality Date    COLONOSCOPY      COLONOSCOPY N/A 12/19/2019    Procedure: COLONOSCOPY;  Surgeon: Bhupendra Wilkinson MD;  Location: [facility] ENDO;  Service: Endoscopy;  Laterality: N/A;    KNEE ARTHROSCOPY Left     SELECTIVE INJECTION OF ANESTHETIC AGENT AROUND LUMBAR SPINAL NERVE ROOT BY TRANSFORAMINAL APPROACH Bilateral 1/21/2022    Procedure: Bilateral L4/5 TF LOVE with RN IV sedation;  Surgeon: Ben Eddy MD;  Location: [facility] PAIN MGT;  Service: Pain Management;  Laterality: Bilateral;    SELECTIVE INJECTION OF ANESTHETIC AGENT AROUND LUMBAR SPINAL NERVE ROOT BY TRANSFORAMINAL APPROACH Bilateral 5/6/2022    Procedure: Bilateral L4/5 TF LOVE with RN IV sedation;  Surgeon: Ben Eddy MD;  Location: [facility] PAIN MGT;  Service: Pain Management;  Laterality: Bilateral;    VASECTOMY  1995     Family History   Problem Relation Age of Onset    Cancer Mother         anal cancer    Diabetes Mother         PRE DIABETES    No Known Problems Sister     Gallbladder disease Brother     Hepatitis Brother         Hep C     Social History     Socioeconomic History    Marital status:    Tobacco Use    Smoking status: Never Smoker    Smokeless tobacco: Never Used   Substance and Sexual Activity    Alcohol use: Yes     Alcohol/week: 2.0 standard drinks     Types: 2 Glasses of wine per week    Drug use: Never     Social Determinants of Health     Financial Resource Strain: Low Risk     Difficulty of Paying Living Expenses: Not hard at all   Food Insecurity: No Food Insecurity    Worried About Running Out of Food in the Last Year: Never true    Ran Out of Food in the Last Year: Never true   Transportation Needs: No Transportation Needs    Lack of Transportation (Medical): No    Lack of Transportation (Non-Medical): No   Physical Activity: Sufficiently Active    Days of Exercise per Week: 5 days    Minutes of  Exercise per Session: 60 min   Stress: No Stress Concern Present    Feeling of Stress : Not at all   Social Connections: Unknown    Frequency of Communication with Friends and Family: Once a week    Frequency of Social Gatherings with Friends and Family: Once a week    Active Member of Clubs or Organizations: Yes    Attends Club or Organization Meetings: More than 4 times per year    Marital Status:    Housing Stability: Low Risk     Unable to Pay for Housing in the Last Year: No    Number of Places Lived in the Last Year: 1    Unstable Housing in the Last Year: No     Medication List with Changes/Refills   Current Medications    ERGOCALCIFEROL, VITAMIN D2, (VITAMIN D ORAL)    Take by mouth once daily.    GABAPENTIN (NEURONTIN) 300 MG CAPSULE    Take 1 capsule (300 mg total) by mouth every evening for 7 days, THEN 2 capsules (600 mg total) every evening for 7 days, THEN 3 capsules (900 mg total) every evening for 16 days.    NAPROXEN (NAPROSYN) 500 MG TABLET    TAKE 1 TABLET BY MOUTH TWICE A DAY    PRAVASTATIN (PRAVACHOL) 40 MG TABLET    TAKE 1 TABLET BY MOUTH EVERY DAY    SARS-COV-2, COVID-19, (MODERNA COVID-19) 50 MCG/0.25 ML INJECTION (BOOSTER)    Inject 0.25 mLs into the muscle once. for 1 dose    TAMSULOSIN (FLOMAX) 0.4 MG CAP    Take 1 capsule (0.4 mg total) by mouth once daily.     Review of patient's allergies indicates:  No Known Allergies  Review of Systems   Constitutional: Negative for decreased appetite.   HENT: Negative for tinnitus.    Eyes: Negative for double vision.   Cardiovascular: Negative for chest pain.   Respiratory: Negative for wheezing.    Hematologic/Lymphatic: Negative for bleeding problem.   Skin: Negative for dry skin.   Musculoskeletal: Positive for arthritis, back pain, joint pain and stiffness. Negative for gout and neck pain.   Gastrointestinal: Negative for abdominal pain.   Genitourinary: Negative for bladder incontinence.   Neurological: Negative for numbness,  paresthesias and sensory change.   Psychiatric/Behavioral: Negative for altered mental status.       Objective:   Body mass index is 27.63 kg/m².  There were no vitals filed for this visit.       General    Constitutional: He is oriented to person, place, and time. He appears well-developed.   HENT:   Head: Atraumatic.   Eyes: EOM are normal.   Pulmonary/Chest: Effort normal.   Neurological: He is alert and oriented to person, place, and time.   Psychiatric: Judgment normal.           Ambulating without any assistive devices  Lumbar without true deformity.  Negative straight leg raising bilaterally.  Pelvis is level  Right hip internal rotation 15° external rotation 20° at 90° of flexion.  Abduction of the hip to 30° and internal rotation yet severe pain in the groin.  No flexion contracture.  Right hip flexors and abductors are 5/5 however he has quad atrophy  Left hip internal rotation 15° external rotation 30° at 90° of flexion.  Abduction to 30° and internal rotation with very mild pain in the groin.  No flexion contractures.  Hip flexors and abductors are 5/5 however is also he has quad atrophy  Right knee range of motion 0-120 degrees.  Very mild crepitus to compression on the patella.  No swelling no defect in the patella tendon or quadriceps tendon.  There is definitely quadriceps atrophy  Left knee with severe varus deformity.  There is quite a bit of loosening lateral collateral.  Range of motion is 5-100°.  No defect in the patella or quadriceps tendon.  There is quad atrophy also.  There is crepitus to compression on the patella as well as pain medially.  Calves are soft nontender  Ankle motion intact  Skin is warm to touch no obvious lesions  Capillary refill less than 2 seconds    Relevant imaging results reviewed and interpreted by me, discussed with the patient and / or family today   X-rays in the electronic records reviewed today in details  X-ray of the pelvis showing right hip with marginal  osteophytic changes on the acetabulum with loss of joint space consistent with arthritis.  Left hip has some marginal osteophytes on the acetabulum and the hip joint also consistent with mild arthritis  X-ray of the left knee with complete loss of medial joint space.  Marginal osteophytic changes severe varus deformity.  Radiculopathy  EMG and nerve conduction study showing L2, L4, L5 and S1 radiculopathy  Assessment:     Encounter Diagnoses   Name Primary?    Lumbosacral radiculopathy at L5 Yes    Lumbosacral radiculopathy at S1     Lumbosacral radiculopathy at L2     Lumbosacral radiculopathy at L4     Arthritis of knee, left     Acquired varus deformity knee, left     Spinal stenosis of lumbar region, unspecified whether neurogenic claudication present     Arthritis of knee, right     Arthritis of right hip         Plan:   Lumbosacral radiculopathy at L5    Lumbosacral radiculopathy at S1    Lumbosacral radiculopathy at L2    Lumbosacral radiculopathy at L4    Arthritis of knee, left    Acquired varus deformity knee, left    Spinal stenosis of lumbar region, unspecified whether neurogenic claudication present    Arthritis of knee, right    Arthritis of right hip         Patient Instructions   X-ray showing complete loss of medial joint space with severe bowleggedness  You doing extensive exercise program and low-impact sports like water exercise and bicycling to keep your muscles strong  Your nerve conduction study showed that you have L2, L4, L5 and S1 radiculopathy from her back  You received injections in the back twice and that seems to have helped  Thigh pain could be from the L2 nerve root  You could benefit from left total knee replacement if you having severe pain and instability in the knee  Total knee replacement is approximately 2 our surgery done under general anesthesia or spinal.  It is considered outpatient surgery that means you have you surgery go home the same day.  We will arrange for  home health for 2 weeks as well as therapy.  After 2 weeks you will go for outpatient physical therapy.  The success is how much therapy you do an exercise you due to achieve motion.  It takes usually 3-6 months for it to heal.  It is 80% successful in decreasing pain and increasing function.  Will last on the average 15 years and that is because the plastic inserts wear out.  It is designed for you to bring her back your activities within limits.  High impact activities and sports are not recommended.  However hiking and walking and swimming and bicycling is recommended and allowed.  Kneeling on the knee might be a little difficult after total knee replacement due to pain and instability that could occur  Will give you a brochure about total knee replacement  I will see you back in 6 months      We discussed total knee replacement in details with him.  I did tell him also right total hip replacement if that hip is bother him a lot needed to be done.  We did tell him the same story that the total knee replacement compared to the total hip replacement.  The only difference is that total hip replacement is 90% successful in decreasing pain increasing function compared to total knee which is 80%.  Brochures about both with pictures given.  We did show him x-rays of total knee replacement, total knee revisions and total hip replacement.  A lot of time spent with him explaining everything in details.  Him and his wife are there is a lot of questions asked and answered.  I definitely discussed with him that he can be more active after surgery however he should avoid high impact activities.  He likes to hike and do water exercises and walk and that is acceptable after joint replacement.  The pros and cons we did go into details with him at this time because he wants to think about it.  I did tell him as long as he can function sometimes you do not need to proceed with total joint replacement as low till the pain is  intolerable.  He states about the right hip is more painful than the left knee however the left knee is more unstable due to the severe varus deformity.  I did tell him we can go either way do the right total hip 1st or left total knee 1st depending on what bothers him the most.  I discussed his x-rays with him in details also    Disclaimer: This note was prepared using a voice recognition system and is likely to have sound alike errors within the text.

## 2022-06-02 NOTE — PATIENT INSTRUCTIONS
X-ray showing complete loss of medial joint space with severe bowleggedness  You doing extensive exercise program and low-impact sports like water exercise and bicycling to keep your muscles strong  Your nerve conduction study showed that you have L2, L4, L5 and S1 radiculopathy from her back  You received injections in the back twice and that seems to have helped  Thigh pain could be from the L2 nerve root  You could benefit from left total knee replacement if you having severe pain and instability in the knee  Total knee replacement is approximately 2 our surgery done under general anesthesia or spinal.  It is considered outpatient surgery that means you have you surgery go home the same day.  We will arrange for home health for 2 weeks as well as therapy.  After 2 weeks you will go for outpatient physical therapy.  The success is how much therapy you do an exercise you due to achieve motion.  It takes usually 3-6 months for it to heal.  It is 80% successful in decreasing pain and increasing function.  Will last on the average 15 years and that is because the plastic inserts wear out.  It is designed for you to bring her back your activities within limits.  High impact activities and sports are not recommended.  However hiking and walking and swimming and bicycling is recommended and allowed.  Kneeling on the knee might be a little difficult after total knee replacement due to pain and instability that could occur  Will give you a brochure about total knee replacement  I will see you back in 6 months

## 2022-06-14 NOTE — PROGRESS NOTES
Established Patient Chronic Pain Note     The patient location is: home  The chief complaint leading to consultation is: leg pain  Visit type: Virtual visit with synchronous audio and video  Total time spent with patient: 15m  Each patient to whom he or she provides medical services by telemedicine is: (1) informed of the relationship between the physician and patient and the respective role of any other health care provider with respect to management of the patient; and (2) notified that he or she may decline to receive medical services by telemedicine and may withdraw from such care at any time.    Referring Physician: No ref. provider found    PCP: Catalino Arias MD    Chief Complaint:   LBP + leg pain     SUBJECTIVE:    Interval history 06/16/2022  Patient presents status post bilateral L4/5 transforaminal epidural steroid injection 05/06/2022.  Patient reports 85% sustained relief in bilateral lower extremity radiculopathy following transforaminal epidural steroid injection.  Patient reports improved mobility.  He reports he has been doing exercises in the water and biking.  He is reported significant improved functionality.  Patient questions whether he should restart gabapentin and reports following up with Dr. Woods, with recommendation to reinitiate gabapentin at 600 mg in the evening.  Patient is requesting a refill for this medication.  Patient denies significant lower extremity weakness, bowel or bladder incontinence or saddle anesthesia.    Interval history 04/26/2022  Patient presents for 2 month follow-up.  He reports insidious return of lower extremity radiculopathy.  Patient again reports pain which radiates down the posterior lateral aspects of bilateral lower extremities to the plantar aspect of the feet in L4-5 distribution.  Patient reports pain is significantly worse on the left than on the right.  Pain today is rated a 6/10.  Pain is described as tingling and numbness in nature.  Patient  reports he has continued ibuprofen which has marginally helped with this pain but has been encouraged by his primary care provider to discontinue this medication daily.  Patient has discontinued gabapentin since our last clinic visit.  Patient denies significant lower extremity weakness, bowel or bladder incontinence or saddle anesthesia.  Patient is interested in pursuing repeat injection.      Interval history 02/23/2022  Patient presents status post bilateral L4/5 transforaminal epidural steroid injection 01/21/2022.  Patient reports 80% sustained relief in lower extremity radicular symptoms following epidural steroid injection.  Patient has continued physical therapy.  He notices incremental improvement in range of motion, ambulation daily.  Patient reports prior to the injection he was ambulating approximately 2500 steps daily.  Patient reports yesterday he ambulated over 5000 steps.  Patient has discontinued gabapentin secondary to superior pain relief.  Patient is enquiring on the safety of an inversion table.  He denies any new onset lower extremity weakness, bowel or bladder incontinence or saddle anesthesia.    Interval history 01/18/2022  Patient presents for MRI review.  Today patient again reports lower back pain which radiates down the anterior aspects of bilateral lower extremities in L3-4 distribution to the knee.  Today patient reports pain is a 4/10.  Patient has continued gabapentin titration and has reached 600 mg 3 times daily.  Patient denies any side effects from this medication.  Patient reports diminishing return and benefit from aqua therapy secondary to his symptoms.  He denies significant lower extremity weakness or bowel or bladder incontinence.  Patient is interested in pursuing intervention.    HPI 12/21/2021  Myles Pride is a 66 y.o. male with past medical history significant for hyperlipidemia who presents to the clinic for the evaluation of lower back and leg pain.  Patient  "reports pain began with treatment (physical therapy) of right hip pain, approximately 2 months prior.  Today patient reports lower back pain which presents in a bandlike distribution in his lower back and radiates down the anterior aspects of bilateral lower extremities in L3-4 distribution to the knee.  Pain is intermittent and described as 4/10.  Pain is described as a aching sensation in the back and a tight sensation in bilateral lower extremities.  Pain is exacerbated when moving from sitting to standing and with ambulation.  Patient reports he was able to ambulate "6/10th of a mile" in the past and now is only able to ambulate approximately 1-2 blocks before requiring rest.  Patient does report associated subjective weakness in bilateral lower extremities.  Pain is improved with heat, sitting, reclining, prior prescription for Medrol Dosepak from his primary care PA, Ms. Riddle.     Patient reports significant motor weakness   Patient denies night fever/night sweats, urinary incontinence, bowel incontinence and significant weight loss and loss of sensations.    Pain Disability Index Review:     Last 3 PDI Scores 1/18/2022 12/21/2021   Pain Disability Index (PDI) 28 39       Non-Pharmacologic Treatments:  Physical Therapy/Home Exercise: yes  Ice/Heat:yes  TENS: no  Acupuncture: no  Massage: no  Chiropractic: no    Other: no      Pain Medications:  - Adjuvant Medications: Alleve (Naproxen) and Neurontin (Gabapentin)    Pain Procedures:   -05/06/2022:  Bilateral L4/5 transforaminal epidural steroid injection  -01/21/2022:  Bilateral L4/5 transforaminal epidural steroid injection    Past Medical History:   Diagnosis Date    Hyperlipidemia     PONV (postoperative nausea and vomiting) 1990    after knee arthroscopy    Vitamin D deficiency 12/18/2019     Past Surgical History:   Procedure Laterality Date    COLONOSCOPY      COLONOSCOPY N/A 12/19/2019    Procedure: COLONOSCOPY;  Surgeon: Bhupendra Wilkinson MD;  " Location: [facility] ENDO;  Service: Endoscopy;  Laterality: N/A;    KNEE ARTHROSCOPY Left     SELECTIVE INJECTION OF ANESTHETIC AGENT AROUND LUMBAR SPINAL NERVE ROOT BY TRANSFORAMINAL APPROACH Bilateral 1/21/2022    Procedure: Bilateral L4/5 TF LOVE with RN IV sedation;  Surgeon: Ben Eddy MD;  Location: [facility] PAIN MGT;  Service: Pain Management;  Laterality: Bilateral;    SELECTIVE INJECTION OF ANESTHETIC AGENT AROUND LUMBAR SPINAL NERVE ROOT BY TRANSFORAMINAL APPROACH Bilateral 5/6/2022    Procedure: Bilateral L4/5 TF LOVE with RN IV sedation;  Surgeon: Ben Eddy MD;  Location: [facility] PAIN MGT;  Service: Pain Management;  Laterality: Bilateral;    VASECTOMY  1995     Review of patient's allergies indicates:  No Known Allergies    Current Outpatient Medications   Medication Sig    ergocalciferol, vitamin D2, (VITAMIN D ORAL) Take by mouth once daily.    gabapentin (NEURONTIN) 300 MG capsule Take 1 capsule (300 mg total) by mouth every evening for 7 days, THEN 2 capsules (600 mg total) every evening for 7 days, THEN 3 capsules (900 mg total) every evening for 16 days.    pravastatin (PRAVACHOL) 40 MG tablet TAKE 1 TABLET BY MOUTH EVERY DAY    tamsulosin (FLOMAX) 0.4 mg Cap Take 1 capsule (0.4 mg total) by mouth once daily.     No current facility-administered medications for this visit.       Review of Systems     GENERAL:  No weight loss, malaise or fevers.  HEENT:   No recent changes in vision or hearing  NECK:  Negative for lumps, no difficulty with swallowing.  RESPIRATORY:  Negative for cough, wheezing or shortness of breath, patient denies any recent URI.  CARDIOVASCULAR:  Negative for chest pain, leg swelling or palpitations.  GI:  Negative for abdominal discomfort, blood in stools or black stools or change in bowel habits.  MUSCULOSKELETAL:  See HPI.  SKIN:  Negative for lesions, rash, and itching.  PSYCH:  No mood disorder or recent psychosocial stressors.   HEMATOLOGY/LYMPHOLOGY:  Negative for  prolonged bleeding, bruising easily or swollen nodes.    NEURO:   No history of headaches, syncope, paralysis, seizures or tremors.  All other reviewed and negative other than HPI.    OBJECTIVE:    There were no vitals taken for this visit.      Physical Exam  1/18/22  GENERAL: Well appearing, in no acute distress, alert and oriented x3.  PSYCH:  Mood and affect appropriate.  SKIN: Skin color, texture, turgor normal, no rashes or lesions.  HEAD/FACE:  Normocephalic, atraumatic. Cranial nerves grossly intact.    CV: RRR with palpation of the radial artery.  PULM: No evidence of respiratory difficulty, symmetric chest rise.  GI:  Soft and non-tender.    BACK: genu varus b/l. Straight leg raising in the sitting and supine positions is negative to radicular pain. No pain to palpation over the facet joints of the lumbar spine or spinous processes. Normal range of motion without pain reproduction.  EXTREMITIES: Peripheral joint ROM is full and pain free without obvious instability or laxity in all four extremities. No deformities, edema, or skin discoloration. Good capillary refill.  MUSCULOSKELETAL: Able to stand on heels & toes.  Right lower extremity, approximately half to 1 in longer than left lower extremity.  hip, and knee provocative maneuvers are negative.  There is no pain with palpation over the sacroiliac joints bilaterally.  FABERs test is negative.  FADIR test is negative bilaterally.   Bilateral upper and lower extremity strength is normal and symmetric.  No atrophy or tone abnormalities are noted.  RIGHT Lower extremity: Hip flexion 5/5, Hip Abduction 5/5, Hip Adduction 5/5, Knee extension 5/5, Knee flexion 5/5, Ankle dorsiflexion5/5, Extensor hallucis longus 5/5, Ankle plantarflexion 5/5  LEFT Lower extremity:  Hip flexion 5/5, Hip Abduction 5/5,Hip Adduction 5/5, Knee extension 5/5, Knee flexion 5/5, Ankle dorsiflexion 5/5, Extensor hallucis longus 5/5, Ankle plantarflexion 5/5  -Normal testing knee  (patellar) jerk and ankle (achilles) jerk    NEURO: Bilateral upper and lower extremity coordination and muscle stretch reflexes are physiologic and symmetric. No loss of sensation is noted.  GAIT: normal.    Imaging  MRI lumbar spine 12/21/2021  FINDINGS:  Vertebral body heights with upper lumbar and lower thoracic spine Schmorl node endplate changes.  No spondylolisthesis.  Mild Modic 1 endplate changes noted at L4-5.  Multilevel disc desiccation present with height loss most noted at L4-5.     Conus terminates normally.  Visualized intra-abdominal/pelvic structures are unremarkable.     L1-L2: No spinal canal stenosis or neural foraminal narrowing.  Facet arthropathy findings.     L2-L3: No significant spinal canal stenosis or neural foraminal narrowing.  Moderate to severe facet arthropathy.     L3-L4: Circumferential disc bulging present with suspected right paracentral small disc protrusion.  This in conjunction with congenitally short pedicles and prominent facet/ligamentum flavum degenerative hypertrophy findings causes severe spinal canal stenosis.  Small right facet joint effusion present with 4 mm right facet synovial cyst deep to the ligamentum flavum.  Left foraminal extraforaminal annular fissure.  Mild bilateral inferior neural foraminal narrowing.     L4-L5: Circumferential disc bulging in conjunction with congenitally short pedicles and prominent facet degenerative hypertrophy findings causes severe spinal canal stenosis.  Severe bilateral neural foraminal narrowing present with likely impingement of the exiting nerve roots.     L5-S1: No significant posterior disc bulge.  Prominent facet arthropathy noted with mild left and moderate right neural foraminal narrowing.       09/13/21  X-Ray Lumbar Spine 5 View  FINDINGS:  There is minimal lumbar levocurvature.  No fracture or malalignment.  No pars defect.  There is mild loss of disc height at L4-5.  Severe multilevel facet arthropathy is  present.    ASSESSMENT: 66 y.o. year old male with lower back and leg pain, consistent with     1. Lumbar radiculopathy, chronic     2. Lumbar facet arthropathy     3. Spinal stenosis of lumbar region without neurogenic claudication     4. DDD (degenerative disc disease), lumbar     5. Leg length discrepancy           PLAN:   - Interventions:  Patient has sustained 85% relief following bilateral L4/5 transforaminal epidural steroid injection.  We have discussed repeating this injection in the future should symptoms exacerbate..Explained the risks and benefits of the procedure in detail with the patient today in clinic along with alternative treatment options.     - Anticoagulation use: no no anticoagulation     report:  Reviewed and consistent with medication use as prescribed.    - Medications:  ---  We have discussed REstarting the patient on gabapentin.  Patient will increase their medication according to the following algorithm.  We have reviewed potential side effects of this medication including daytime somnolence, weight gain and peripheral edema    Gabapentin Titration:  Week 1: 300 mg QHS  Week 2: 600 mg QHS  Week 3: 900 mg QHS    - Therapy:   We discussed continuing physical therapy to help manage the patient/s painful condition. The patient was counseled that muscle strengthening will improve the long term prognosis in regards to pain and may also help increase range of motion and mobility.     -Consults/Referrals: HME : continue heel insert/fitting for leg length discrepancy.    - Imaging: Reviewed available imaging with patient and answered any questions they had regarding study    - Follow up visit: return to clinic in  3 months    The above plan and management options were discussed at length with patient. Patient is in agreement with the above and verbalized understanding.    - I discussed the goals of interventional chronic pain management with the patient on today's visit. We discussed a  multimodal and systematic approach to pain.  This includes diagnostic and therapeutic injections, adjuvant pharmacologic treatment, physical therapy, and at times psychiatry.  I emphasized the importance of regular exercise, core strengthening and stretching, diet and weight loss as a cornerstone of long-term pain management.    - This condition does not require this patient to take time off of work, and the primary goal of our Pain Management services is to improve the patient's functional capacity.  - Patient Questions: Answered all of the patient's questions regarding diagnoses, therapy, treatment and next steps        Ben Eddy MD  Interventional Pain Management  [facility]    Disclaimer:  This note was prepared using voice recognition system and is likely to have sound alike errors that may have been overlooked even after proof reading.  Please call me with any questions

## 2022-06-16 ENCOUNTER — OFFICE VISIT (OUTPATIENT)
Dept: PAIN MEDICINE | Facility: CLINIC | Age: 67
End: 2022-06-16
Payer: MEDICARE

## 2022-06-16 ENCOUNTER — PATIENT MESSAGE (OUTPATIENT)
Dept: PAIN MEDICINE | Facility: CLINIC | Age: 67
End: 2022-06-16

## 2022-06-16 DIAGNOSIS — M54.16 LUMBAR RADICULOPATHY, CHRONIC: Primary | ICD-10-CM

## 2022-06-16 DIAGNOSIS — M21.70 LEG LENGTH DISCREPANCY: ICD-10-CM

## 2022-06-16 DIAGNOSIS — M48.061 SPINAL STENOSIS OF LUMBAR REGION WITHOUT NEUROGENIC CLAUDICATION: ICD-10-CM

## 2022-06-16 DIAGNOSIS — M47.816 LUMBAR FACET ARTHROPATHY: ICD-10-CM

## 2022-06-16 DIAGNOSIS — M51.36 DDD (DEGENERATIVE DISC DISEASE), LUMBAR: ICD-10-CM

## 2022-06-16 PROCEDURE — 99214 PR OFFICE/OUTPT VISIT, EST, LEVL IV, 30-39 MIN: ICD-10-PCS | Mod: 95,,, | Performed by: ANESTHESIOLOGY

## 2022-06-16 PROCEDURE — 1159F PR MEDICATION LIST DOCUMENTED IN MEDICAL RECORD: ICD-10-PCS | Mod: CPTII,95,, | Performed by: ANESTHESIOLOGY

## 2022-06-16 PROCEDURE — 1160F PR REVIEW ALL MEDS BY PRESCRIBER/CLIN PHARMACIST DOCUMENTED: ICD-10-PCS | Mod: CPTII,95,, | Performed by: ANESTHESIOLOGY

## 2022-06-16 PROCEDURE — 1160F RVW MEDS BY RX/DR IN RCRD: CPT | Mod: CPTII,95,, | Performed by: ANESTHESIOLOGY

## 2022-06-16 PROCEDURE — 99214 OFFICE O/P EST MOD 30 MIN: CPT | Mod: 95,,, | Performed by: ANESTHESIOLOGY

## 2022-06-16 PROCEDURE — 1159F MED LIST DOCD IN RCRD: CPT | Mod: CPTII,95,, | Performed by: ANESTHESIOLOGY

## 2022-06-16 RX ORDER — GABAPENTIN 300 MG/1
CAPSULE ORAL
Qty: 69 CAPSULE | Refills: 2 | Status: SHIPPED | OUTPATIENT
Start: 2022-06-16 | End: 2022-09-19

## 2022-08-01 ENCOUNTER — PATIENT MESSAGE (OUTPATIENT)
Dept: INTERNAL MEDICINE | Facility: CLINIC | Age: 67
End: 2022-08-01
Payer: MEDICARE

## 2022-08-01 ENCOUNTER — OFFICE VISIT (OUTPATIENT)
Dept: OTOLARYNGOLOGY | Facility: CLINIC | Age: 67
End: 2022-08-01
Payer: MEDICARE

## 2022-08-01 VITALS — WEIGHT: 243.19 LBS | BODY MASS INDEX: 28.84 KG/M2

## 2022-08-01 DIAGNOSIS — T16.2XXA FB EAR, LEFT, INITIAL ENCOUNTER: Primary | ICD-10-CM

## 2022-08-01 PROCEDURE — 1101F PR PT FALLS ASSESS DOC 0-1 FALLS W/OUT INJ PAST YR: ICD-10-PCS | Mod: CPTII,S$GLB,, | Performed by: PHYSICIAN ASSISTANT

## 2022-08-01 PROCEDURE — 1101F PT FALLS ASSESS-DOCD LE1/YR: CPT | Mod: CPTII,S$GLB,, | Performed by: PHYSICIAN ASSISTANT

## 2022-08-01 PROCEDURE — 99999 PR PBB SHADOW E&M-EST. PATIENT-LVL II: CPT | Mod: PBBFAC,,, | Performed by: PHYSICIAN ASSISTANT

## 2022-08-01 PROCEDURE — 1126F PR PAIN SEVERITY QUANTIFIED, NO PAIN PRESENT: ICD-10-PCS | Mod: CPTII,S$GLB,, | Performed by: PHYSICIAN ASSISTANT

## 2022-08-01 PROCEDURE — 3288F PR FALLS RISK ASSESSMENT DOCUMENTED: ICD-10-PCS | Mod: CPTII,S$GLB,, | Performed by: PHYSICIAN ASSISTANT

## 2022-08-01 PROCEDURE — 69200 CLEAR OUTER EAR CANAL: CPT | Mod: LT,S$GLB,, | Performed by: PHYSICIAN ASSISTANT

## 2022-08-01 PROCEDURE — 99203 PR OFFICE/OUTPT VISIT, NEW, LEVL III, 30-44 MIN: ICD-10-PCS | Mod: 25,S$GLB,, | Performed by: PHYSICIAN ASSISTANT

## 2022-08-01 PROCEDURE — 1159F PR MEDICATION LIST DOCUMENTED IN MEDICAL RECORD: ICD-10-PCS | Mod: CPTII,S$GLB,, | Performed by: PHYSICIAN ASSISTANT

## 2022-08-01 PROCEDURE — 3008F BODY MASS INDEX DOCD: CPT | Mod: CPTII,S$GLB,, | Performed by: PHYSICIAN ASSISTANT

## 2022-08-01 PROCEDURE — 69200 PR REMV EXT CANAL FOREIGN BODY: ICD-10-PCS | Mod: LT,S$GLB,, | Performed by: PHYSICIAN ASSISTANT

## 2022-08-01 PROCEDURE — 1159F MED LIST DOCD IN RCRD: CPT | Mod: CPTII,S$GLB,, | Performed by: PHYSICIAN ASSISTANT

## 2022-08-01 PROCEDURE — 99999 PR PBB SHADOW E&M-EST. PATIENT-LVL II: ICD-10-PCS | Mod: PBBFAC,,, | Performed by: PHYSICIAN ASSISTANT

## 2022-08-01 PROCEDURE — 1126F AMNT PAIN NOTED NONE PRSNT: CPT | Mod: CPTII,S$GLB,, | Performed by: PHYSICIAN ASSISTANT

## 2022-08-01 PROCEDURE — 99203 OFFICE O/P NEW LOW 30 MIN: CPT | Mod: 25,S$GLB,, | Performed by: PHYSICIAN ASSISTANT

## 2022-08-01 PROCEDURE — 3288F FALL RISK ASSESSMENT DOCD: CPT | Mod: CPTII,S$GLB,, | Performed by: PHYSICIAN ASSISTANT

## 2022-08-01 PROCEDURE — 3008F PR BODY MASS INDEX (BMI) DOCUMENTED: ICD-10-PCS | Mod: CPTII,S$GLB,, | Performed by: PHYSICIAN ASSISTANT

## 2022-08-01 NOTE — PROGRESS NOTES
Subjective:   Patient ID: Myles Pride is a 66 y.o. male.    Chief Complaint: Foreign Body in Ear (Pt states dome from hearing aid embedded in left ear x2 weeks. Decreased hearing left ear)    67 yo male here to see me for FB in left ear.     Review of patient's allergies indicates:  No Known Allergies        Review of Systems   Constitutional: Negative.    HENT: Positive for hearing loss.    Eyes: Negative.    Respiratory: Negative.    Cardiovascular: Negative.    Gastrointestinal: Negative.    Endocrine: Negative.    Genitourinary: Negative.    Musculoskeletal: Positive for back pain.   Skin: Negative.    Allergic/Immunologic: Negative.    Neurological: Negative.    Hematological: Negative.    Psychiatric/Behavioral: Negative.          Objective:   Wt 110.3 kg (243 lb 2.7 oz)   BMI 28.84 kg/m²     Physical Exam  Constitutional:       General: He is not in acute distress.     Appearance: He is well-developed.   HENT:      Head: Normocephalic and atraumatic.      Jaw: No trismus.      Right Ear: Hearing, tympanic membrane, ear canal and external ear normal.      Left Ear: Hearing, tympanic membrane and external ear normal. A foreign body (clear HA dome) is present.      Nose: Nose normal. No nasal deformity, septal deviation, mucosal edema or rhinorrhea.      Mouth/Throat:      Dentition: Normal dentition.      Pharynx: Uvula midline. No oropharyngeal exudate or uvula swelling.   Eyes:      General: No scleral icterus.     Conjunctiva/sclera: Conjunctivae normal.      Right eye: Right conjunctiva is not injected. No chemosis.     Left eye: Left conjunctiva is not injected. No chemosis.     Pupils: Pupils are equal, round, and reactive to light.   Neck:      Thyroid: No thyroid mass or thyromegaly.      Trachea: Trachea and phonation normal. No tracheal tenderness or tracheal deviation.   Pulmonary:      Effort: Pulmonary effort is normal. No accessory muscle usage or respiratory distress.      Breath sounds:  No stridor.   Lymphadenopathy:      Head:      Right side of head: No submental, submandibular, preauricular or posterior auricular adenopathy.      Left side of head: No submental, submandibular, preauricular or posterior auricular adenopathy.      Cervical: No cervical adenopathy.      Right cervical: No superficial or deep cervical adenopathy.     Left cervical: No superficial or deep cervical adenopathy.   Skin:     General: Skin is warm and dry.      Findings: No erythema or rash.   Neurological:      Mental Status: He is alert and oriented to person, place, and time.      Cranial Nerves: No cranial nerve deficit.   Psychiatric:         Behavior: Behavior normal.         Thought Content: Thought content normal.          PROCEDURE NOTE:  Removal of foreign body from ear canal  Preprocedure diagnosis:  Foreign body ear canal  Postprocedure diangosis:  Same    Procedure in detail:  After verbal consent was obtained, the binocular operating microscope was used to visualize the foreign body and ear canal.  The object was carefully grasped using an alligator forcep as well as a curette as necessary.  It was removed from the ear canal without difficulty.  The canal and tympanic membrane were then inspected, and were found to be intact with no abrasions or lacerations. The patient tolerated the procedure well, and there were no significant complications.      Assessment:     1. FB ear, left, initial encounter        Plan:     FB ear, left, initial encounter      Removed easily. Follow up with ENT as needed.

## 2022-09-08 ENCOUNTER — PES CALL (OUTPATIENT)
Dept: ADMINISTRATIVE | Facility: CLINIC | Age: 67
End: 2022-09-08
Payer: MEDICARE

## 2022-09-11 RX ORDER — TAMSULOSIN HYDROCHLORIDE 0.4 MG/1
CAPSULE ORAL
Qty: 90 CAPSULE | Refills: 2 | Status: SHIPPED | OUTPATIENT
Start: 2022-09-11 | End: 2023-06-05

## 2022-09-11 NOTE — TELEPHONE ENCOUNTER
No new care gaps identified.  [facility] Embedded Care Gaps. Reference number: [id number]. 9/11/2022   10:11:49 AM SRIKANTHT

## 2022-09-11 NOTE — TELEPHONE ENCOUNTER
Refill Decision Note   Myles Pride  is requesting a refill authorization.  Brief Assessment and Rationale for Refill:  Approve     Medication Therapy Plan:       Medication Reconciliation Completed: No   Comments:     No Care Gaps recommended.     Note composed:10:29 AM 09/11/2022

## 2022-09-15 ENCOUNTER — TELEPHONE (OUTPATIENT)
Dept: PAIN MEDICINE | Facility: CLINIC | Age: 67
End: 2022-09-15
Payer: MEDICARE

## 2022-09-15 NOTE — TELEPHONE ENCOUNTER
Reached out to patient to reschedule appointment because the provider will be out of clinic. No answer. Left message on patients voice mail to call back at earliest convenience to schedule jonathan Caceres  Medical

## 2022-09-19 ENCOUNTER — OFFICE VISIT (OUTPATIENT)
Dept: PAIN MEDICINE | Facility: CLINIC | Age: 67
End: 2022-09-19
Payer: MEDICARE

## 2022-09-19 DIAGNOSIS — M47.816 LUMBAR FACET ARTHROPATHY: ICD-10-CM

## 2022-09-19 DIAGNOSIS — M51.36 DDD (DEGENERATIVE DISC DISEASE), LUMBAR: ICD-10-CM

## 2022-09-19 DIAGNOSIS — M54.16 LUMBAR RADICULOPATHY, CHRONIC: Primary | ICD-10-CM

## 2022-09-19 DIAGNOSIS — M48.061 SPINAL STENOSIS OF LUMBAR REGION WITHOUT NEUROGENIC CLAUDICATION: ICD-10-CM

## 2022-09-19 PROCEDURE — 1160F RVW MEDS BY RX/DR IN RCRD: CPT | Mod: CPTII,95,, | Performed by: ANESTHESIOLOGY

## 2022-09-19 PROCEDURE — 1159F PR MEDICATION LIST DOCUMENTED IN MEDICAL RECORD: ICD-10-PCS | Mod: CPTII,95,, | Performed by: ANESTHESIOLOGY

## 2022-09-19 PROCEDURE — 99214 OFFICE O/P EST MOD 30 MIN: CPT | Mod: 95,,, | Performed by: ANESTHESIOLOGY

## 2022-09-19 PROCEDURE — 99214 PR OFFICE/OUTPT VISIT, EST, LEVL IV, 30-39 MIN: ICD-10-PCS | Mod: 95,,, | Performed by: ANESTHESIOLOGY

## 2022-09-19 PROCEDURE — 1160F PR REVIEW ALL MEDS BY PRESCRIBER/CLIN PHARMACIST DOCUMENTED: ICD-10-PCS | Mod: CPTII,95,, | Performed by: ANESTHESIOLOGY

## 2022-09-19 PROCEDURE — 1159F MED LIST DOCD IN RCRD: CPT | Mod: CPTII,95,, | Performed by: ANESTHESIOLOGY

## 2022-09-19 RX ORDER — GABAPENTIN 600 MG/1
900 TABLET ORAL NIGHTLY
Qty: 135 TABLET | Refills: 0 | Status: SHIPPED | OUTPATIENT
Start: 2022-09-19 | End: 2022-11-22 | Stop reason: SDUPTHER

## 2022-09-19 NOTE — PROGRESS NOTES
Established Patient Chronic Pain Note     The patient location is: home  The chief complaint leading to consultation is: leg pain  Visit type: Virtual visit with synchronous audio and video  Total time spent with patient: 15m  Each patient to whom he or she provides medical services by telemedicine is: (1) informed of the relationship between the physician and patient and the respective role of any other health care provider with respect to management of the patient; and (2) notified that he or she may decline to receive medical services by telemedicine and may withdraw from such care at any time.    Referring Physician: No ref. provider found    PCP: Catalino Arias MD    Chief Complaint:   LBP + leg pain     SUBJECTIVE:  Interval Hx: 9/19/22  Patient presents for 3 month follow-up.  He continues to report relief in the lower extremities following prior transforaminal epidural steroid injection which is slowly dwindling.  Pain today is rated a 6/10.  Patient reports he has been participating in aquatic physical therapy and aquatic sports.  Patient does report it has become difficulty standing or ambulating for long periods.  Patient is interested in repeating transforaminal epidural steroid injection.  Patient also reports needing a refill for gabapentin.  Patient did reach 900 mg titration.  Patient denies any side effects from this medication.  Patient denies any significant lower extremity weakness, bowel or bladder incontinence or saddle anesthesia.      Interval history 06/16/2022  Patient presents status post bilateral L4/5 transforaminal epidural steroid injection 05/06/2022.  Patient reports 85% sustained relief in bilateral lower extremity radiculopathy following transforaminal epidural steroid injection.  Patient reports improved mobility.  He reports he has been doing exercises in the water and biking.  He is reported significant improved functionality.  Patient questions whether he should restart  gabapentin and reports following up with Dr. Woods, with recommendation to reinitiate gabapentin at 600 mg in the evening.  Patient is requesting a refill for this medication.  Patient denies significant lower extremity weakness, bowel or bladder incontinence or saddle anesthesia.    Interval history 04/26/2022  Patient presents for 2 month follow-up.  He reports insidious return of lower extremity radiculopathy.  Patient again reports pain which radiates down the posterior lateral aspects of bilateral lower extremities to the plantar aspect of the feet in L4-5 distribution.  Patient reports pain is significantly worse on the left than on the right.  Pain today is rated a 6/10.  Pain is described as tingling and numbness in nature.  Patient reports he has continued ibuprofen which has marginally helped with this pain but has been encouraged by his primary care provider to discontinue this medication daily.  Patient has discontinued gabapentin since our last clinic visit.  Patient denies significant lower extremity weakness, bowel or bladder incontinence or saddle anesthesia.  Patient is interested in pursuing repeat injection.      Interval history 02/23/2022  Patient presents status post bilateral L4/5 transforaminal epidural steroid injection 01/21/2022.  Patient reports 80% sustained relief in lower extremity radicular symptoms following epidural steroid injection.  Patient has continued physical therapy.  He notices incremental improvement in range of motion, ambulation daily.  Patient reports prior to the injection he was ambulating approximately 2500 steps daily.  Patient reports yesterday he ambulated over 5000 steps.  Patient has discontinued gabapentin secondary to superior pain relief.  Patient is enquiring on the safety of an inversion table.  He denies any new onset lower extremity weakness, bowel or bladder incontinence or saddle anesthesia.    Interval history 01/18/2022  Patient presents for MRI  "review.  Today patient again reports lower back pain which radiates down the anterior aspects of bilateral lower extremities in L3-4 distribution to the knee.  Today patient reports pain is a 4/10.  Patient has continued gabapentin titration and has reached 600 mg 3 times daily.  Patient denies any side effects from this medication.  Patient reports diminishing return and benefit from aqua therapy secondary to his symptoms.  He denies significant lower extremity weakness or bowel or bladder incontinence.  Patient is interested in pursuing intervention.    HPI 12/21/2021  Myles Pride is a 67 y.o. male with past medical history significant for hyperlipidemia who presents to the clinic for the evaluation of lower back and leg pain.  Patient reports pain began with treatment (physical therapy) of right hip pain, approximately 2 months prior.  Today patient reports lower back pain which presents in a bandlike distribution in his lower back and radiates down the anterior aspects of bilateral lower extremities in L3-4 distribution to the knee.  Pain is intermittent and described as 4/10.  Pain is described as a aching sensation in the back and a tight sensation in bilateral lower extremities.  Pain is exacerbated when moving from sitting to standing and with ambulation.  Patient reports he was able to ambulate "6/10th of a mile" in the past and now is only able to ambulate approximately 1-2 blocks before requiring rest.  Patient does report associated subjective weakness in bilateral lower extremities.  Pain is improved with heat, sitting, reclining, prior prescription for Medrol Dosepak from his primary care PA, Ms. Riddle.     Patient reports significant motor weakness   Patient denies night fever/night sweats, urinary incontinence, bowel incontinence and significant weight loss and loss of sensations.    Pain Disability Index Review:     Last 3 PDI Scores 1/18/2022 12/21/2021   Pain Disability Index (PDI) 28 39 "       Non-Pharmacologic Treatments:  Physical Therapy/Home Exercise: yes  Ice/Heat:yes  TENS: no  Acupuncture: no  Massage: no  Chiropractic: no    Other: no      Pain Medications:  - Adjuvant Medications: Alleve (Naproxen) and Neurontin (Gabapentin)    Pain Procedures:   -05/06/2022:  Bilateral L4/5 transforaminal epidural steroid injection  -01/21/2022:  Bilateral L4/5 transforaminal epidural steroid injection    Past Medical History:   Diagnosis Date    Hyperlipidemia     PONV (postoperative nausea and vomiting) 1990    after knee arthroscopy    Vitamin D deficiency 12/18/2019     Past Surgical History:   Procedure Laterality Date    COLONOSCOPY      COLONOSCOPY N/A 12/19/2019    Procedure: COLONOSCOPY;  Surgeon: Bhupendra Wilkinson MD;  Location: [facility] ENDO;  Service: Endoscopy;  Laterality: N/A;    KNEE ARTHROSCOPY Left     SELECTIVE INJECTION OF ANESTHETIC AGENT AROUND LUMBAR SPINAL NERVE ROOT BY TRANSFORAMINAL APPROACH Bilateral 1/21/2022    Procedure: Bilateral L4/5 TF LOVE with RN IV sedation;  Surgeon: Ben Eddy MD;  Location: [facility] PAIN MGT;  Service: Pain Management;  Laterality: Bilateral;    SELECTIVE INJECTION OF ANESTHETIC AGENT AROUND LUMBAR SPINAL NERVE ROOT BY TRANSFORAMINAL APPROACH Bilateral 5/6/2022    Procedure: Bilateral L4/5 TF LOVE with RN IV sedation;  Surgeon: Ben Eddy MD;  Location: [facility] PAIN MGT;  Service: Pain Management;  Laterality: Bilateral;    VASECTOMY  1995     Review of patient's allergies indicates:  No Known Allergies    Current Outpatient Medications   Medication Sig    ergocalciferol, vitamin D2, (VITAMIN D ORAL) Take by mouth once daily.    gabapentin (NEURONTIN) 600 MG tablet Take 1.5 tablets (900 mg total) by mouth every evening.    pravastatin (PRAVACHOL) 40 MG tablet TAKE 1 TABLET BY MOUTH EVERY DAY    tamsulosin (FLOMAX) 0.4 mg Cap TAKE 1 CAPSULE BY MOUTH EVERY DAY     No current facility-administered medications for this visit.       Review of Systems     GENERAL:   No weight loss, malaise or fevers.  HEENT:   No recent changes in vision or hearing  NECK:  Negative for lumps, no difficulty with swallowing.  RESPIRATORY:  Negative for cough, wheezing or shortness of breath, patient denies any recent URI.  CARDIOVASCULAR:  Negative for chest pain, leg swelling or palpitations.  GI:  Negative for abdominal discomfort, blood in stools or black stools or change in bowel habits.  MUSCULOSKELETAL:  See HPI.  SKIN:  Negative for lesions, rash, and itching.  PSYCH:  No mood disorder or recent psychosocial stressors.   HEMATOLOGY/LYMPHOLOGY:  Negative for prolonged bleeding, bruising easily or swollen nodes.    NEURO:   No history of headaches, syncope, paralysis, seizures or tremors.  All other reviewed and negative other than HPI.    OBJECTIVE:    There were no vitals taken for this visit.      Physical Exam  1/18/22  GENERAL: Well appearing, in no acute distress, alert and oriented x3.  PSYCH:  Mood and affect appropriate.  SKIN: Skin color, texture, turgor normal, no rashes or lesions.  HEAD/FACE:  Normocephalic, atraumatic. Cranial nerves grossly intact.    CV: RRR with palpation of the radial artery.  PULM: No evidence of respiratory difficulty, symmetric chest rise.  GI:  Soft and non-tender.    BACK: genu varus b/l. Straight leg raising in the sitting and supine positions is negative to radicular pain. No pain to palpation over the facet joints of the lumbar spine or spinous processes. Normal range of motion without pain reproduction.  EXTREMITIES: Peripheral joint ROM is full and pain free without obvious instability or laxity in all four extremities. No deformities, edema, or skin discoloration. Good capillary refill.  MUSCULOSKELETAL: Able to stand on heels & toes.  Right lower extremity, approximately half to 1 in longer than left lower extremity.  hip, and knee provocative maneuvers are negative.  There is no pain with palpation over the sacroiliac joints bilaterally.   FABERs test is negative.  FADIR test is negative bilaterally.   Bilateral upper and lower extremity strength is normal and symmetric.  No atrophy or tone abnormalities are noted.  RIGHT Lower extremity: Hip flexion 5/5, Hip Abduction 5/5, Hip Adduction 5/5, Knee extension 5/5, Knee flexion 5/5, Ankle dorsiflexion5/5, Extensor hallucis longus 5/5, Ankle plantarflexion 5/5  LEFT Lower extremity:  Hip flexion 5/5, Hip Abduction 5/5,Hip Adduction 5/5, Knee extension 5/5, Knee flexion 5/5, Ankle dorsiflexion 5/5, Extensor hallucis longus 5/5, Ankle plantarflexion 5/5  -Normal testing knee (patellar) jerk and ankle (achilles) jerk    NEURO: Bilateral upper and lower extremity coordination and muscle stretch reflexes are physiologic and symmetric. No loss of sensation is noted.  GAIT: normal.    Imaging  MRI lumbar spine 12/21/2021  FINDINGS:  Vertebral body heights with upper lumbar and lower thoracic spine Schmorl node endplate changes.  No spondylolisthesis.  Mild Modic 1 endplate changes noted at L4-5.  Multilevel disc desiccation present with height loss most noted at L4-5.     Conus terminates normally.  Visualized intra-abdominal/pelvic structures are unremarkable.     L1-L2: No spinal canal stenosis or neural foraminal narrowing.  Facet arthropathy findings.     L2-L3: No significant spinal canal stenosis or neural foraminal narrowing.  Moderate to severe facet arthropathy.     L3-L4: Circumferential disc bulging present with suspected right paracentral small disc protrusion.  This in conjunction with congenitally short pedicles and prominent facet/ligamentum flavum degenerative hypertrophy findings causes severe spinal canal stenosis.  Small right facet joint effusion present with 4 mm right facet synovial cyst deep to the ligamentum flavum.  Left foraminal extraforaminal annular fissure.  Mild bilateral inferior neural foraminal narrowing.     L4-L5: Circumferential disc bulging in conjunction with congenitally  short pedicles and prominent facet degenerative hypertrophy findings causes severe spinal canal stenosis.  Severe bilateral neural foraminal narrowing present with likely impingement of the exiting nerve roots.     L5-S1: No significant posterior disc bulge.  Prominent facet arthropathy noted with mild left and moderate right neural foraminal narrowing.       09/13/21  X-Ray Lumbar Spine 5 View  FINDINGS:  There is minimal lumbar levocurvature.  No fracture or malalignment.  No pars defect.  There is mild loss of disc height at L4-5.  Severe multilevel facet arthropathy is present.    ASSESSMENT: 67 y.o. year old male with lower back and leg pain, consistent with     1. Lumbar radiculopathy, chronic  IR Epidural Transfor 1st Vert Lumbar Lawrence    Case Request-RAD/Other Procedure Area: Bilateral L4/5 TF LOVE with RN IV sedation      2. Spinal stenosis of lumbar region without neurogenic claudication        3. Lumbar facet arthropathy        4. DDD (degenerative disc disease), lumbar                PLAN:   - Interventions:  Schedule for bilateral L4/5 transforaminal epidural steroid injection as he has obtained greater than 80% relief for over 3 months which is now dissipating..Explained the risks and benefits of the procedure in detail with the patient today in clinic along with alternative treatment options.  Patient has elected to pursue this procedure.    - Anticoagulation use: no no anticoagulation     report:  Reviewed and consistent with medication use as prescribed.      - Medications:  Continue gabapentin.  We have reviewed potential side effects of this medication including daytime somnolence, weight gain and peripheral edema    Gabapentin : 900 mg QHS  Ninety day supply given    - Therapy:   We discussed continuing physical therapy to help manage the patient/s painful condition. The patient was counseled that muscle strengthening will improve the long term prognosis in regards to pain and may also help  increase range of motion and mobility.     -Consults/Referrals: HME : continue heel insert/fitting for leg length discrepancy.    - Imaging: Reviewed available imaging with patient and answered any questions they had regarding study    - Follow up visit: return to clinic in  4-6 weeks post injection    The above plan and management options were discussed at length with patient. Patient is in agreement with the above and verbalized understanding.    - I discussed the goals of interventional chronic pain management with the patient on today's visit. We discussed a multimodal and systematic approach to pain.  This includes diagnostic and therapeutic injections, adjuvant pharmacologic treatment, physical therapy, and at times psychiatry.  I emphasized the importance of regular exercise, core strengthening and stretching, diet and weight loss as a cornerstone of long-term pain management.    - This condition does not require this patient to take time off of work, and the primary goal of our Pain Management services is to improve the patient's functional capacity.  - Patient Questions: Answered all of the patient's questions regarding diagnoses, therapy, treatment and next steps        Ben Eddy MD  Interventional Pain Management  [facility]    Disclaimer:  This note was prepared using voice recognition system and is likely to have sound alike errors that may have been overlooked even after proof reading.  Please call me with any questions

## 2022-09-19 NOTE — PATIENT INSTRUCTIONS
General Neck and Back Pain    Both neck and back pain are usually caused by injury to the muscles or ligaments of the spine. Sometimes the disks that separate each bone of the spine may cause pain by pressing on a nearby nerve. Back and neck pain may appear after a sudden twisting or bending force (such as in a car accident), or sometimes after a simple awkward movement. In either case, muscle spasm is often present and adds to the pain.  Acute neck and back pain usually gets better in 1 to 2 weeks. Pain related to disk disease, arthritis in the spinal joints or spinal stenosis (narrowing of the spinal canal) can become chronic and last for months or years.  Back and neck pain are common problems. Most people feel better in 1 or 2 weeks, and most of the rest in 1 to 2 months. Most people can remain active.  People experience and describe pain differently.  Pain can be sharp, stabbing, shooting, aching, cramping, or burning  Movement, standing, bending, lifting, sitting, or walking may worsen the pain  Pain can be localized to one spot or area, or it can be more generalized  Pain can spread or radiate upwards, downwards, to the front, or go down your arms  Muscle spasm may occur.  Most of the time mechanical problems with the muscles or spine cause the pain. it is usually caused by an injury, whether known or not, to the muscles or ligaments. While illnesses can cause back pain, it is usually not caused by a serious illness. Pain is usually related to physical activity, whether sports, exercise, work, or normal activity. Sometimes it can occur without an identifiable cause. This can happen simply by stretching or moving wrong, without noting pain at the time. Other causes include:  Overexertion, lifting, pushing, pulling incorrectly or too aggressively.  Sudden twisting, bending or stretching from an accident (car or fall), or accidental movement.  Poor posture  Poor conditioning, lack of regular exercise  Spinal  disc disease or arthritis  Stress  Pregnancy, or illness like appendicitis, bladder or kidney infection, pelvic infections   Home care  For neck pain: Use a comfortable pillow that supports the head and keeps the spine in a neutral position. The position of the head should not be tilted forward or backward.  When in bed, try to find a position of comfort. A firm mattress is best. Try lying flat on your back with pillows under your knees. You can also try lying on your side with your knees bent up towards your chest and a pillow between your knees.  At first, do not try to stretch out the sore spots. If there is a strain, it is not like the good soreness you get after exercising without an injury. In this case, stretching may make it worse.  Avoid prolonged sitting, long car rides or travel. This puts more stress on the lower back than standing or walking.  During the first 24 to 72 hours after an injury, apply an ice pack to the painful area for 20 minutes and then remove it for 20 minutes over a period of 60 to 90 minutes or several times a day.   You can alternate ice and heat therapies. Talk with your healthcare provider about the best treatment for your back or neck pain. As a safety precaution, do not use a heating pad at bedtime. Sleeping with a heating pad can lead to skin burns or tissue damage.  Therapeutic massage can help relax the back and neck muscles without stretching them.  Be aware of safe lifting methods and do not lift anything over 15 pounds until all the pain is gone.  Medications  Talk to your healthcare provider before using medicine, especially if you have other medical problems or are taking other medicines.  You may use over-the-counter medicine to control pain, unless another pain medicine was prescribed. If you have chronic conditions like diabetes, liver or kidney disease, stomach ulcers,  gastrointestinal bleeding, or are taking blood thinner medicines.  Be careful if you are given pain  medicines, narcotics, or medicine for muscle spasm. They can cause drowsiness, and can affect your coordination, reflexes, and judgment. Do not drive or operate heavy machinery.  Follow-up care  Follow up with your healthcare provider, or as advised. Physical therapy or further tests may be needed.  If X-rays were taken, you will be notified of any new findings that may affect your care.  Call 911  Seek emergency medical care if any of the following occur:  Trouble breathing  Confusion  Very drowsy or trouble awakening  Fainting or loss of consciousness  Rapid or very slow heart rate  Loss of bowel or bladder control  When to seek medical advice  Call your healthcare provider right away if any of these occur:  Pain becomes worse or spreads into your arms or legs  Weakness, numbness or pain in one or both arms or legs  Numbness in the groin area  Difficulty walking  Fever of 100.4ºF (38ºC) or higher, or as directed by your healthcare provider  Date Last Reviewed: 7/1/2016  © 3308-1486 [employer]. [address] [place] [address]. All rights reserved. This information is not intended as a substitute for professional medical care. Always follow your healthcare professional's instructions.       Understanding Lumbar Radiculopathy    Lumbar radiculopathy is irritation or inflammation of a nerve root in the low back. It causes symptoms that spread out from the back down one or both legs. To understand this condition, it helps to understand the parts of the spine:  Vertebrae. These are bones that stack to form the spine. The lumbar spine contains the 5 bottom vertebrae.  Disks. These are soft pads of tissue between the vertebrae. They act as shock absorbers for the spine.  Spinal canal. This is a tunnel formed within the stacked vertebrae. In the lumbar spine, nerves run through this canal.  Nerves. These branch off and leave the spinal canal, traveling out to parts of the body. As they leave the  spinal canal, nerves pass through openings between the vertebrae. The nerve root is the part of the nerve that is closest to the spinal canal.  Sciatic nerve. This is a large nerve formed from several nerve roots in the low back. This nerve extends down the back of the leg to the foot.  With lumbar radiculopathy, nerve roots in the low back become irritated. This leads to pain and symptoms. The sciatic nerve is commonly involved, so the condition is often called sciatica.  What causes lumbar radiculopathy?  Aging, injury, poor posture, extra body weight, and other issues can lead to problems in the low back. These problems may then irritate nerve roots. They include:  Damage to a disk in the lumbar spine. The damaged disk may then press on nearby nerve roots.  Degeneration from wear and tear, and aging. This can lead to narrowing (stenosis) of the openings between the vertebrae. The narrowed openings press on nerve roots as they leave the spinal canal.  Unstable spine. This is when a vertebra slips forward. It can then press on a nerve root.  Other, less common things can put pressure on nerves in the low back. These include diabetes, infection, or a tumor.  Symptoms of lumbar radiculopathy  These include:  Pain in the low back  Pain, numbness, tingling, or weakness that travels into the buttocks, hip, groin, or leg  Muscle spasms  Treatment for lumbar radiculopathy  In most cases, your healthcare provider will first try treatments that help relieve symptoms. These may include:  Prescription and over-the-counter pain medicines. These help relieve pain, swelling, and irritation.  Limits on positions and activities that increase pain. But lying in bed or avoiding all movement is only recommended for a short period of time.  Physical therapy, including exercises and stretches. This helps decrease pain and increase movement and function.  Steroid shots into the lower back. This may help relieve symptoms for a  time.  Weight-loss program. If you are overweight, losing extra pounds may help relieve symptoms.  In some cases, you may need surgery to fix the underlying problem. This depends on the cause, the symptoms, and how long the pain has lasted.  Possible complications  Over time, an irritated and inflamed nerve may become damaged. This may lead to long-lasting (permanent) numbness or weakness in your legs and feet. If symptoms change suddenly or get worse, be sure to let your healthcare provider know.  When to call your healthcare provider  Call your healthcare provider right away if you have any of these:  New pain or pain that gets worse  New or increasing weakness, tingling, or numbness in your leg or foot  Problems controlling your bladder or bowel   Date Last Reviewed: 3/10/2016  © 8003-7305 [employer]. [address] [place] [address], [place] [address]. All rights reserved. This information is not intended as a substitute for professional medical care. Always follow your healthcare professional's instructions.       Exercises to Strengthen Your Lower Back  Strong lower back and abdominal muscles work together to support your spine. The exercises below will help strengthen the lower back. It is important that you begin exercising slowly and increase levels gradually.  Always begin any exercise program with stretching. If you feel pain while doing any of these exercises, stop and talk to your doctor about a more specific exercise program that better suits your condition.   Low back stretch  The point of stretching is to make you more flexible and increase your range of motion. Stretch only as much as you are able. Stretch slowly. Do not push your stretch to the limit. If at any point you feel pain while stretching, this is your (temporary) limit.  Lie on your back with your knees bent and both feet on the ground.  Slowly raise your left knee to your chest as you flatten your lower back against the floor. Hold  for 5 seconds.  Relax and repeat the exercise with your right knee.  Do 10 of these exercises for each leg.  Repeat hugging both knees to your chest at the same time.  Building lower back strength  Start your exercise routine with 10 to 30 minutes a day, 1 to 3 times a day.  Initial exercises  Lying on your back:  Ankle pumps: Move your foot up and down, towards your head, and then away. Repeat 10 times with each foot.  Heel slides: Slowly bend your knee, drawing the heel of your foot towards you. Then slide your heel/foot from you, straightening your knee. Do not lift your foot off the floor (this is not a leg lift).  Abdominal contraction: Bend your knees and put your hands on your stomach. Tighten your stomach muscles. Hold for 5 seconds, then relax. Repeat 10 times.  Straight leg raise: Bend one leg at the knee and keep the other leg straight. Tighten your stomach muscles. Slowly lift your straight leg 6 to 12 inches off the floor and hold for up to 5 seconds. Repeat 10 times on each side.  Standing:  Wall squats: Stand with your back against the wall. Move your feet about 12 inches away from the wall. Tighten your stomach muscles, and slowly bend your knees until they are at about a 45 degree angle. Do not go down too far. Hold about 5 seconds. Then slowly return to your starting position. Repeat 10 times.  Heel raises: Stand facing the wall. Slowly raise the heels of your feet up and down, while keeping your toes on the floor. If you have trouble balancing, you can touch the wall with your hands. Repeat 10 times.  More advanced exercises  When you feel comfortable enough, try these exercises.  Kneeling lumbar extension: Begin on your hands and knees. At the same time, raise and straighten your right arm and left leg until they are parallel to the ground. Hold for 2 seconds and come back slowly to a starting position. Repeat with left arm and right leg, alternating 10 times.  Prone lumbar extension: Lie face  down, arms extended overhead, palms on the floor. At the same time, raise your right arm and left leg as high as comfortably possible. Hold for 10 seconds and slowly return to start. Repeat with left arm and right leg, alternating 10 times. Gradually build up to 20 times. (Advanced: Repeat this exercise raising both arms and both legs a few inches off the floor at the same time. Hold for 5 seconds and release.)  Pelvic tilt: Lie on the floor on your back with your knees bent at 90 degrees. Your feet should be flat on the floor. Inhale, exhale, then slowly contract your abdominal muscles bringing your navel toward your spine. Let your pelvis rock back until your lower back is flat on the floor. Hold for 10 seconds while breathing smoothly.  Abdominal crunch: Perform a pelvic tilt (above) flattening your lower back against the floor. Holding the tension in your abdominal muscles, take another breath and raise your shoulder blades off the ground (this is not a full sit-up). Keep your head in line with your body (dont bend your neck forward). Hold for 2 seconds, then slowly lower.  Date Last Reviewed: 6/1/2016  © 7710-2617 [employer]. [address] [place] [address], [place] [address]. All rights reserved. This information is not intended as a substitute for professional medical care. Always follow your healthcare professional's instructions.

## 2022-09-22 ENCOUNTER — PATIENT MESSAGE (OUTPATIENT)
Dept: PAIN MEDICINE | Facility: CLINIC | Age: 67
End: 2022-09-22
Payer: MEDICARE

## 2022-10-03 ENCOUNTER — LAB VISIT (OUTPATIENT)
Dept: LAB | Facility: HOSPITAL | Age: 67
End: 2022-10-03
Attending: FAMILY MEDICINE
Payer: MEDICARE

## 2022-10-03 ENCOUNTER — OFFICE VISIT (OUTPATIENT)
Dept: INTERNAL MEDICINE | Facility: CLINIC | Age: 67
End: 2022-10-03
Payer: MEDICARE

## 2022-10-03 VITALS
TEMPERATURE: 97 F | HEIGHT: 77 IN | OXYGEN SATURATION: 98 % | SYSTOLIC BLOOD PRESSURE: 124 MMHG | HEART RATE: 54 BPM | DIASTOLIC BLOOD PRESSURE: 70 MMHG | RESPIRATION RATE: 17 BRPM | WEIGHT: 243.81 LBS | BODY MASS INDEX: 28.79 KG/M2

## 2022-10-03 DIAGNOSIS — E78.5 HYPERLIPIDEMIA, UNSPECIFIED HYPERLIPIDEMIA TYPE: Primary | ICD-10-CM

## 2022-10-03 DIAGNOSIS — Z79.899 ENCOUNTER FOR LONG-TERM (CURRENT) USE OF MEDICATIONS: ICD-10-CM

## 2022-10-03 DIAGNOSIS — N40.0 BENIGN PROSTATIC HYPERPLASIA, UNSPECIFIED WHETHER LOWER URINARY TRACT SYMPTOMS PRESENT: ICD-10-CM

## 2022-10-03 DIAGNOSIS — M54.16 LUMBAR RADICULOPATHY, CHRONIC: ICD-10-CM

## 2022-10-03 DIAGNOSIS — Z12.5 SCREENING FOR PROSTATE CANCER: ICD-10-CM

## 2022-10-03 DIAGNOSIS — E78.5 HYPERLIPIDEMIA, UNSPECIFIED HYPERLIPIDEMIA TYPE: ICD-10-CM

## 2022-10-03 LAB
ALBUMIN SERPL BCP-MCNC: 4.2 G/DL (ref 3.5–5.2)
ALP SERPL-CCNC: 72 U/L (ref 55–135)
ALT SERPL W/O P-5'-P-CCNC: 37 U/L (ref 10–44)
ANION GAP SERPL CALC-SCNC: 12 MMOL/L (ref 8–16)
AST SERPL-CCNC: 46 U/L (ref 10–40)
BASOPHILS # BLD AUTO: 0.06 K/UL (ref 0–0.2)
BASOPHILS NFR BLD: 1 % (ref 0–1.9)
BILIRUB SERPL-MCNC: 1.3 MG/DL (ref 0.1–1)
BUN SERPL-MCNC: 14 MG/DL (ref 8–23)
CALCIUM SERPL-MCNC: 9.5 MG/DL (ref 8.7–10.5)
CHLORIDE SERPL-SCNC: 108 MMOL/L (ref 95–110)
CHOLEST SERPL-MCNC: 180 MG/DL (ref 120–199)
CHOLEST/HDLC SERPL: 4.2 {RATIO} (ref 2–5)
CO2 SERPL-SCNC: 21 MMOL/L (ref 23–29)
COMPLEXED PSA SERPL-MCNC: 1.3 NG/ML (ref 0–4)
CREAT SERPL-MCNC: 1.2 MG/DL (ref 0.5–1.4)
DIFFERENTIAL METHOD: ABNORMAL
EOSINOPHIL # BLD AUTO: 0.3 K/UL (ref 0–0.5)
EOSINOPHIL NFR BLD: 5.7 % (ref 0–8)
ERYTHROCYTE [DISTWIDTH] IN BLOOD BY AUTOMATED COUNT: 13.1 % (ref 11.5–14.5)
EST. GFR  (NO RACE VARIABLE): >60 ML/MIN/1.73 M^2
ESTIMATED AVG GLUCOSE: 111 MG/DL (ref 68–131)
GLUCOSE SERPL-MCNC: 87 MG/DL (ref 70–110)
HBA1C MFR BLD: 5.5 % (ref 4–5.6)
HCT VFR BLD AUTO: 45.1 % (ref 40–54)
HDLC SERPL-MCNC: 43 MG/DL (ref 40–75)
HDLC SERPL: 23.9 % (ref 20–50)
HGB BLD-MCNC: 14.7 G/DL (ref 14–18)
IMM GRANULOCYTES # BLD AUTO: 0.01 K/UL (ref 0–0.04)
IMM GRANULOCYTES NFR BLD AUTO: 0.2 % (ref 0–0.5)
LDLC SERPL CALC-MCNC: 104 MG/DL (ref 63–159)
LYMPHOCYTES # BLD AUTO: 2.5 K/UL (ref 1–4.8)
LYMPHOCYTES NFR BLD: 42.9 % (ref 18–48)
MCH RBC QN AUTO: 31.1 PG (ref 27–31)
MCHC RBC AUTO-ENTMCNC: 32.6 G/DL (ref 32–36)
MCV RBC AUTO: 96 FL (ref 82–98)
MONOCYTES # BLD AUTO: 0.5 K/UL (ref 0.3–1)
MONOCYTES NFR BLD: 8.1 % (ref 4–15)
NEUTROPHILS # BLD AUTO: 2.4 K/UL (ref 1.8–7.7)
NEUTROPHILS NFR BLD: 42.1 % (ref 38–73)
NONHDLC SERPL-MCNC: 137 MG/DL
NRBC BLD-RTO: 0 /100 WBC
PLATELET # BLD AUTO: 184 K/UL (ref 150–450)
PMV BLD AUTO: 11.1 FL (ref 9.2–12.9)
POTASSIUM SERPL-SCNC: 4.3 MMOL/L (ref 3.5–5.1)
PROT SERPL-MCNC: 6.4 G/DL (ref 6–8.4)
RBC # BLD AUTO: 4.72 M/UL (ref 4.6–6.2)
SODIUM SERPL-SCNC: 141 MMOL/L (ref 136–145)
TRIGL SERPL-MCNC: 165 MG/DL (ref 30–150)
TSH SERPL DL<=0.005 MIU/L-ACNC: 2.41 UIU/ML (ref 0.4–4)
WBC # BLD AUTO: 5.8 K/UL (ref 3.9–12.7)

## 2022-10-03 PROCEDURE — 99999 PR PBB SHADOW E&M-EST. PATIENT-LVL III: CPT | Mod: PBBFAC,,, | Performed by: FAMILY MEDICINE

## 2022-10-03 PROCEDURE — 80053 COMPREHEN METABOLIC PANEL: CPT | Performed by: FAMILY MEDICINE

## 2022-10-03 PROCEDURE — 1101F PR PT FALLS ASSESS DOC 0-1 FALLS W/OUT INJ PAST YR: ICD-10-PCS | Mod: CPTII,S$GLB,, | Performed by: FAMILY MEDICINE

## 2022-10-03 PROCEDURE — 3074F SYST BP LT 130 MM HG: CPT | Mod: CPTII,S$GLB,, | Performed by: FAMILY MEDICINE

## 2022-10-03 PROCEDURE — 3078F PR MOST RECENT DIASTOLIC BLOOD PRESSURE < 80 MM HG: ICD-10-PCS | Mod: CPTII,S$GLB,, | Performed by: FAMILY MEDICINE

## 2022-10-03 PROCEDURE — 36415 COLL VENOUS BLD VENIPUNCTURE: CPT | Performed by: FAMILY MEDICINE

## 2022-10-03 PROCEDURE — 1126F PR PAIN SEVERITY QUANTIFIED, NO PAIN PRESENT: ICD-10-PCS | Mod: CPTII,S$GLB,, | Performed by: FAMILY MEDICINE

## 2022-10-03 PROCEDURE — 99214 OFFICE O/P EST MOD 30 MIN: CPT | Mod: 25,S$GLB,, | Performed by: FAMILY MEDICINE

## 2022-10-03 PROCEDURE — 90694 VACC AIIV4 NO PRSRV 0.5ML IM: CPT | Mod: S$GLB,,, | Performed by: FAMILY MEDICINE

## 2022-10-03 PROCEDURE — 1126F AMNT PAIN NOTED NONE PRSNT: CPT | Mod: CPTII,S$GLB,, | Performed by: FAMILY MEDICINE

## 2022-10-03 PROCEDURE — 1159F PR MEDICATION LIST DOCUMENTED IN MEDICAL RECORD: ICD-10-PCS | Mod: CPTII,S$GLB,, | Performed by: FAMILY MEDICINE

## 2022-10-03 PROCEDURE — G0008 FLU VACCINE - QUADRIVALENT - ADJUVANTED: ICD-10-PCS | Mod: S$GLB,,, | Performed by: FAMILY MEDICINE

## 2022-10-03 PROCEDURE — 99214 PR OFFICE/OUTPT VISIT, EST, LEVL IV, 30-39 MIN: ICD-10-PCS | Mod: 25,S$GLB,, | Performed by: FAMILY MEDICINE

## 2022-10-03 PROCEDURE — 3008F BODY MASS INDEX DOCD: CPT | Mod: CPTII,S$GLB,, | Performed by: FAMILY MEDICINE

## 2022-10-03 PROCEDURE — 3074F PR MOST RECENT SYSTOLIC BLOOD PRESSURE < 130 MM HG: ICD-10-PCS | Mod: CPTII,S$GLB,, | Performed by: FAMILY MEDICINE

## 2022-10-03 PROCEDURE — G0008 ADMIN INFLUENZA VIRUS VAC: HCPCS | Mod: S$GLB,,, | Performed by: FAMILY MEDICINE

## 2022-10-03 PROCEDURE — 80061 LIPID PANEL: CPT | Performed by: FAMILY MEDICINE

## 2022-10-03 PROCEDURE — 3288F PR FALLS RISK ASSESSMENT DOCUMENTED: ICD-10-PCS | Mod: CPTII,S$GLB,, | Performed by: FAMILY MEDICINE

## 2022-10-03 PROCEDURE — 99999 PR PBB SHADOW E&M-EST. PATIENT-LVL III: ICD-10-PCS | Mod: PBBFAC,,, | Performed by: FAMILY MEDICINE

## 2022-10-03 PROCEDURE — 3078F DIAST BP <80 MM HG: CPT | Mod: CPTII,S$GLB,, | Performed by: FAMILY MEDICINE

## 2022-10-03 PROCEDURE — 84153 ASSAY OF PSA TOTAL: CPT | Performed by: FAMILY MEDICINE

## 2022-10-03 PROCEDURE — 83036 HEMOGLOBIN GLYCOSYLATED A1C: CPT | Performed by: FAMILY MEDICINE

## 2022-10-03 PROCEDURE — 84443 ASSAY THYROID STIM HORMONE: CPT | Performed by: FAMILY MEDICINE

## 2022-10-03 PROCEDURE — 85025 COMPLETE CBC W/AUTO DIFF WBC: CPT | Performed by: FAMILY MEDICINE

## 2022-10-03 PROCEDURE — 1159F MED LIST DOCD IN RCRD: CPT | Mod: CPTII,S$GLB,, | Performed by: FAMILY MEDICINE

## 2022-10-03 PROCEDURE — 1101F PT FALLS ASSESS-DOCD LE1/YR: CPT | Mod: CPTII,S$GLB,, | Performed by: FAMILY MEDICINE

## 2022-10-03 PROCEDURE — 3288F FALL RISK ASSESSMENT DOCD: CPT | Mod: CPTII,S$GLB,, | Performed by: FAMILY MEDICINE

## 2022-10-03 PROCEDURE — 90694 FLU VACCINE - QUADRIVALENT - ADJUVANTED: ICD-10-PCS | Mod: S$GLB,,, | Performed by: FAMILY MEDICINE

## 2022-10-03 PROCEDURE — 3008F PR BODY MASS INDEX (BMI) DOCUMENTED: ICD-10-PCS | Mod: CPTII,S$GLB,, | Performed by: FAMILY MEDICINE

## 2022-10-03 NOTE — PROGRESS NOTES
Subjective:       Patient ID: Myles Pride is a 67 y.o. male.    Chief Complaint: Follow-up    Follow-up  Pertinent negatives include no chest pain, chills or fever.     Patient Active Problem List   Diagnosis    Bradycardia    Risk of myocardial infarction or stroke 7.5% or greater in next 10 years    Vitamin D deficiency    Colon cancer screening    Hyperlipidemia    Lumbar radiculopathy, chronic       Past Medical History:   Diagnosis Date    Hyperlipidemia     PONV (postoperative nausea and vomiting) 1990    after knee arthroscopy    Vitamin D deficiency 12/18/2019       Past Surgical History:   Procedure Laterality Date    COLONOSCOPY      COLONOSCOPY N/A 12/19/2019    Procedure: COLONOSCOPY;  Surgeon: Bhupendra Wilkinson MD;  Location: [facility] ENDO;  Service: Endoscopy;  Laterality: N/A;    KNEE ARTHROSCOPY Left     SELECTIVE INJECTION OF ANESTHETIC AGENT AROUND LUMBAR SPINAL NERVE ROOT BY TRANSFORAMINAL APPROACH Bilateral 1/21/2022    Procedure: Bilateral L4/5 TF LOVE with RN IV sedation;  Surgeon: Ben Eddy MD;  Location: [facility] PAIN MGT;  Service: Pain Management;  Laterality: Bilateral;    SELECTIVE INJECTION OF ANESTHETIC AGENT AROUND LUMBAR SPINAL NERVE ROOT BY TRANSFORAMINAL APPROACH Bilateral 5/6/2022    Procedure: Bilateral L4/5 TF LOVE with RN IV sedation;  Surgeon: Ben Eddy MD;  Location: [facility] PAIN MGT;  Service: Pain Management;  Laterality: Bilateral;    VASECTOMY  1995       Family History   Problem Relation Age of Onset    Cancer Mother         anal cancer    Diabetes Mother         PRE DIABETES    No Known Problems Sister     Gallbladder disease Brother     Hepatitis Brother         Hep C       Social History     Tobacco Use   Smoking Status Never   Smokeless Tobacco Never       Wt Readings from Last 5 Encounters:   10/03/22 110.6 kg (243 lb 13.3 oz)   08/01/22 110.3 kg (243 lb 2.7 oz)   06/02/22 105.7 kg (233 lb)   05/06/22 106 kg (233 lb 11 oz)   04/01/22 107.9 kg (237 lb 14 oz)        For further HPI details, see assessment and plan.    Review of Systems   Constitutional:  Negative for chills and fever.   Respiratory:  Negative for shortness of breath.    Cardiovascular:  Negative for chest pain.   Neurological:  Negative for dizziness.     Objective:      Vitals:    10/03/22 0745   BP: 124/70   Pulse: (!) 54   Resp: 17   Temp: 97.3 °F (36.3 °C)       Physical Exam  Constitutional:       General: He is not in acute distress.     Appearance: Normal appearance. He is well-developed.   Neck:      Trachea: Trachea normal.   Cardiovascular:      Rate and Rhythm: Normal rate and regular rhythm.      Heart sounds: Normal heart sounds.   Pulmonary:      Effort: Pulmonary effort is normal. No respiratory distress.      Breath sounds: Normal breath sounds. No decreased breath sounds.   Abdominal:      Palpations: Abdomen is soft.   Musculoskeletal:      Cervical back: Full passive range of motion without pain.   Neurological:      Mental Status: He is alert and oriented to person, place, and time.   Psychiatric:         Speech: Speech normal.         Behavior: Behavior normal.         Thought Content: Thought content normal.       Assessment:       1. Hyperlipidemia, unspecified hyperlipidemia type    2. Encounter for long-term (current) use of medications    3. Screening for prostate cancer    4. Benign prostatic hyperplasia, unspecified whether lower urinary tract symptoms present    5. Lumbar radiculopathy, chronic        Plan:   Hyperlipidemia, unspecified hyperlipidemia type  -     Lipid Panel; Future; Expected date: 10/03/2022  Still on pravastatin  Labs  Tolerates well  Previously took lipitor - this seems better.      Encounter for long-term (current) use of medications  -     CBC Auto Differential; Future; Expected date: 10/03/2022  -     Comprehensive Metabolic Panel; Future; Expected date: 10/03/2022  -     Hemoglobin A1C; Future; Expected date: 10/03/2022  -     TSH; Future; Expected  date: 10/03/2022    Screening for prostate cancer  -     PSA, Screening; Future; Expected date: 10/03/2022    Lumbar radiculopathy  Working w pain mngt  Doing water work in pool  Has further injection  Taking gabapentin  Hard to say if helps pain but does help him sleep.  still using OTC nsaid/acetaminophen 3-4 days a week.  Discussed risk of nsaid  Hopefully we will be able to get away from such use.      BPH  Not urinating at night as much  During the day he has less urgency.  Has no side effects.      Bradycardia  Chronic  Has had evaluation in past w cards  Consider in future again - - if becomes symptomatic or lowers.    6m onth f/u    Flu shot  Covid shot

## 2022-10-07 ENCOUNTER — IMMUNIZATION (OUTPATIENT)
Dept: PHARMACY | Facility: CLINIC | Age: 67
End: 2022-10-07
Payer: MEDICARE

## 2022-10-07 DIAGNOSIS — Z23 NEED FOR VACCINATION: Primary | ICD-10-CM

## 2022-10-12 DIAGNOSIS — R79.89 ELEVATED LFTS: Primary | ICD-10-CM

## 2022-10-18 NOTE — PRE-PROCEDURE INSTRUCTIONS
Spoke with patient regarding procedure scheduled on 10.25     Arrival time 0700     Has patient been sick with fever or on antibiotics within the last 7 days? No     Does the patient have any open wounds, sores or rashes? No     Does the patient have any recent fractures? no     Has patient received a vaccination within the last 7 days? No     Received the COVID vaccination?      Has the patient stopped all medications as directed? na     Does patient have a pacemaker and or defibrillator? no     Does the patient have a ride to and from procedure and someone reliable to remain with patient? wife      Is the patient diabetic? no     Does the patient have sleep apnea? Or use O2 at home? No and no      Is the patient receiving sedation? yes     Is the patient instructed to remain NPO beginning at midnight the night before their procedure? yes     Procedure location confirmed with patient? Yes     Covid- Denies signs/symptoms. Instructed to notify PAT/MD if any changes.

## 2022-10-25 ENCOUNTER — HOSPITAL ENCOUNTER (OUTPATIENT)
Facility: HOSPITAL | Age: 67
Discharge: HOME | End: 2022-10-25
Attending: ANESTHESIOLOGY | Admitting: ANESTHESIOLOGY
Payer: MEDICARE

## 2022-10-25 VITALS
RESPIRATION RATE: 17 BRPM | OXYGEN SATURATION: 97 % | BODY MASS INDEX: 28.27 KG/M2 | TEMPERATURE: 98 F | HEIGHT: 77 IN | DIASTOLIC BLOOD PRESSURE: 71 MMHG | SYSTOLIC BLOOD PRESSURE: 136 MMHG | HEART RATE: 45 BPM | WEIGHT: 239.44 LBS

## 2022-10-25 DIAGNOSIS — M54.16 LUMBAR RADICULOPATHY: ICD-10-CM

## 2022-10-25 PROCEDURE — 25500020 PHARM REV CODE 255: Performed by: ANESTHESIOLOGY

## 2022-10-25 PROCEDURE — 64483 NJX AA&/STRD TFRM EPI L/S 1: CPT | Mod: 50,,, | Performed by: ANESTHESIOLOGY

## 2022-10-25 PROCEDURE — 64483 NJX AA&/STRD TFRM EPI L/S 1: CPT | Mod: 50 | Performed by: ANESTHESIOLOGY

## 2022-10-25 PROCEDURE — 25000003 PHARM REV CODE 250: Performed by: ANESTHESIOLOGY

## 2022-10-25 PROCEDURE — 99152 MOD SED SAME PHYS/QHP 5/>YRS: CPT | Performed by: ANESTHESIOLOGY

## 2022-10-25 PROCEDURE — 63600175 PHARM REV CODE 636 W HCPCS: Performed by: ANESTHESIOLOGY

## 2022-10-25 PROCEDURE — 64483 PR EPIDURAL INJ, ANES/STEROID, TRANSFORAMINAL, LUMB/SACR, SNGL LEVL: ICD-10-PCS | Mod: 50,,, | Performed by: ANESTHESIOLOGY

## 2022-10-25 RX ORDER — FENTANYL CITRATE 50 UG/ML
INJECTION, SOLUTION INTRAMUSCULAR; INTRAVENOUS
Status: DISCONTINUED | OUTPATIENT
Start: 2022-10-25 | End: 2022-10-25 | Stop reason: HOSPADM

## 2022-10-25 RX ORDER — MIDAZOLAM HYDROCHLORIDE 1 MG/ML
INJECTION, SOLUTION INTRAMUSCULAR; INTRAVENOUS
Status: DISCONTINUED | OUTPATIENT
Start: 2022-10-25 | End: 2022-10-25 | Stop reason: HOSPADM

## 2022-10-25 RX ORDER — BUPIVACAINE HYDROCHLORIDE 2.5 MG/ML
INJECTION, SOLUTION EPIDURAL; INFILTRATION; INTRACAUDAL
Status: DISCONTINUED | OUTPATIENT
Start: 2022-10-25 | End: 2022-10-25 | Stop reason: HOSPADM

## 2022-10-25 RX ORDER — INDOMETHACIN 25 MG/1
CAPSULE ORAL
Status: DISCONTINUED | OUTPATIENT
Start: 2022-10-25 | End: 2022-10-25 | Stop reason: HOSPADM

## 2022-10-25 RX ORDER — DEXAMETHASONE SODIUM PHOSPHATE 100 MG/10ML
INJECTION INTRAMUSCULAR; INTRAVENOUS
Status: DISCONTINUED | OUTPATIENT
Start: 2022-10-25 | End: 2022-10-25 | Stop reason: HOSPADM

## 2022-10-25 NOTE — DISCHARGE INSTRUCTIONS

## 2022-10-25 NOTE — OP NOTE
Myles Pride  67 y.o. male      Vitals:    10/25/22 0810   BP: 131/67   Pulse: (!) 40   Resp: 14   Temp:      Procedure Date: 10/25/22      INFORMED CONSENT: The procedure, risks, benefits and options were discussed with patient. There are no contraindications to the procedure. The patient expressed understanding and agreed to proceed. The personnel performing the procedure was discussed. I verify that I personally obtained consent prior to the start of the procedure and the signed consent can be found on the patient's chart.       Anesthesia:   Conscious sedation provided by M.D    The patient was monitored with continuous pulse oximetry, EKG, and intermittent blood pressure monitors.  The patient was hemodynamically stable throughout the entire process was responsive to voice, and breathing spontaneously.  Supplemental O2 was provided at 2L/min via nasal cannula.  Patient was comfortable for the duration of the procedure. (See nurse documentation and case log for sedation time)    There was a total of 1mg IV Midazolam and 50mcg Fentanyl titrated for the procedure    Pre Procedure diagnosis: Lumbar radiculopathy, chronic [M54.16]  Post-Procedure diagnosis: SAME     Complications: None    Specimens: None      DESCRIPTION OF PROCEDURE: The patient was brought to the procedure room. IV access was obtained prior to the procedure. The patient was positioned prone on the fluoroscopy table. Continuous hemodynamic monitoring was initiated including blood pressure, EKG, and pulse oximetry. . The skin was prepped with chlorhexidine and draped in a sterile fashion. Skin anesthesia was achieved using a total of 10mL of lidocaine, 5mL over each respective injection site.     The  L4/5 transforaminal spaces were identified with fluoroscopy in the  AP, oblique, and lateral views.  A 22 gauge spinal quinke needle was then advanced into the area of the trans foraminal spaces bilateral with confirmation of proper needle  position using AP, oblique, and lateral fluoroscopic views. Once the needle tip was in the area of the transforaminal space, and there was no blood, CSF or paraesthesias,  1.5 mL of Omnipaque 300mg/ml was injected on bilateral for a total of 3mL.  Fluoroscopic imaging in the AP and lateral views revealed a clear outline of the spinal nerve with proximal spread of agent through the neural foramen into the epidural space. A total combination of 2 mL of Bupivicaine 0.25% and 10 mg dexamethasone was injected on each side for a total of 6 mL of injected medications with displacement of the contrast dye confirming that the medication went into the area of the transforaminal spaces bilateral. A sterile dressing was applied.   Patient tolerated the procedure well.    Patient was taken back to the recovery room for further observation.     The patient was discharged to home in stable condition

## 2022-10-25 NOTE — H&P
HPI  Patient presenting for Procedure(s) (LRB):  Bilateral L4/5 TF LOVE with RN IV sedation (Bilateral)     Patient on Anti-coagulation No    No health changes since previous encounter    Past Medical History:   Diagnosis Date    Hyperlipidemia     PONV (postoperative nausea and vomiting) 1990    after knee arthroscopy    Vitamin D deficiency 12/18/2019     Past Surgical History:   Procedure Laterality Date    COLONOSCOPY      COLONOSCOPY N/A 12/19/2019    Procedure: COLONOSCOPY;  Surgeon: Bhupendra Wilkinson MD;  Location: [facility];  Service: Endoscopy;  Laterality: N/A;    KNEE ARTHROSCOPY Left     SELECTIVE INJECTION OF ANESTHETIC AGENT AROUND LUMBAR SPINAL NERVE ROOT BY TRANSFORAMINAL APPROACH Bilateral 1/21/2022    Procedure: Bilateral L4/5 TF LOVE with RN IV sedation;  Surgeon: Ben Eddy MD;  Location: [facility];  Service: Pain Management;  Laterality: Bilateral;    SELECTIVE INJECTION OF ANESTHETIC AGENT AROUND LUMBAR SPINAL NERVE ROOT BY TRANSFORAMINAL APPROACH Bilateral 5/6/2022    Procedure: Bilateral L4/5 TF LOVE with RN IV sedation;  Surgeon: Ben Eddy MD;  Location: [facility];  Service: Pain Management;  Laterality: Bilateral;    VASECTOMY  1995     Review of patient's allergies indicates:  No Known Allergies     No current facility-administered medications on file prior to encounter.     Current Outpatient Medications on File Prior to Encounter   Medication Sig Dispense Refill    ergocalciferol, vitamin D2, (VITAMIN D ORAL) Take by mouth once daily.      gabapentin (NEURONTIN) 600 MG tablet Take 1.5 tablets (900 mg total) by mouth every evening. 135 tablet 0    pravastatin (PRAVACHOL) 40 MG tablet TAKE 1 TABLET BY MOUTH EVERY DAY 90 tablet 0    tamsulosin (FLOMAX) 0.4 mg Cap TAKE 1 CAPSULE BY MOUTH EVERY DAY 90 capsule 2        PMHx, PSHx, Allergies, Medications reviewed in epic    ROS negative except pain complaints in HPI    OBJECTIVE:    There were no vitals taken for this  visit.    PHYSICAL EXAMINATION:    GENERAL: Well appearing, in no acute distress, alert and oriented x3.  PSYCH:  Mood and affect appropriate.  SKIN: Skin color, texture, turgor normal, no rashes or lesions which will impact the procedure.  CV: RRR with palpation of the radial artery.  PULM: No evidence of respiratory difficulty, symmetric chest rise. Clear to auscultation.  NEURO: Cranial nerves grossly intact.    Plan:    Proceed with procedure as planned Procedure(s) (LRB):  Bilateral L4/5 TF LOVE with RN IV sedation (Bilateral)    Ben Eddy MD  10/25/2022

## 2022-10-25 NOTE — DISCHARGE SUMMARY
Discharge Note  Short Stay      SUMMARY     Admit Date: 10/25/2022    Attending Physician: Ben Eddy MD        Discharge Physician: Ben Eddy MD        Discharge Date: 10/25/2022 8:14 AM    Procedure(s) (LRB):  Bilateral L4/5 TF LOVE with RN IV sedation (Bilateral)    Final Diagnosis: Lumbar radiculopathy, chronic [M54.16]    Disposition: Home or self care    Patient Instructions:   Current Discharge Medication List        CONTINUE these medications which have NOT CHANGED    Details   ergocalciferol, vitamin D2, (VITAMIN D ORAL) Take by mouth once daily.      gabapentin (NEURONTIN) 600 MG tablet Take 1.5 tablets (900 mg total) by mouth every evening.  Qty: 135 tablet, Refills: 0      pravastatin (PRAVACHOL) 40 MG tablet TAKE 1 TABLET BY MOUTH EVERY DAY  Qty: 90 tablet, Refills: 0      tamsulosin (FLOMAX) 0.4 mg Cap TAKE 1 CAPSULE BY MOUTH EVERY DAY  Qty: 90 capsule, Refills: 2                 Discharge Diagnosis: Lumbar radiculopathy, chronic [M54.16]  Condition on Discharge: Stable with no complications to procedure   Diet on Discharge: Same as before.  Activity: as per instruction sheet.  Discharge to: Home with a responsible adult.  Follow up: 2-4 weeks       Please call the office at [phone] if you experience any weakness or loss of sensation, fever > 101.5, pain uncontrolled with oral medications, persistent nausea/vomiting/or diarrhea, redness or drainage from the incisions, or any other worrisome concerns. If physician on call was not reached or could not communicate with our office for any reason please go to the nearest emergency department

## 2022-11-21 ENCOUNTER — TELEPHONE (OUTPATIENT)
Dept: PAIN MEDICINE | Facility: CLINIC | Age: 67
End: 2022-11-21
Payer: MEDICARE

## 2022-11-21 NOTE — H&P (VIEW-ONLY)
Established Patient Interventional Pain Clinic Note     The patient location is: home  The chief complaint leading to consultation is: leg pain  Visit type: Virtual visit with synchronous audio and video  Total time spent with patient: 15m  Each patient to whom he or she provides medical services by telemedicine is: (1) informed of the relationship between the physician and patient and the respective role of any other health care provider with respect to management of the patient; and (2) notified that he or she may decline to receive medical services by telemedicine and may withdraw from such care at any time.    Referring Physician: No ref. provider found    PCP: Catalino Arias MD    Chief Complaint:   LBP + leg pain     SUBJECTIVE:  Interval history 11/22/2022  Patient presents status post bilateral L4/5 transforaminal epidural steroid injection 10/25/22.  Patient reports 85% relief in bilateral lower extremity radiculopathy for at least 2 weeks following his injection.  Patient reports insidious return primarily on the left in L3-4 distribution from the groin to the knee.  Pain today is rated a 4/10.  Patient reports after standing or ambulating for 15-20 minutes significant pain which inhibits activities of daily living.  Patient reports recently going on a trip to [place] unable to even ambulate a mi secondary to the pain.  Patient has continued gabapentin 900 mg nightly and is requesting a refill.  Patient continues to report significant relief and right lower extremity radicular pain.  He denies significant lower extremity weakness, saddle anesthesia.      Interval Hx: 9/19/22  Patient presents for 3 month follow-up.  He continues to report relief in the lower extremities following prior transforaminal epidural steroid injection which is slowly dwindling.  Pain today is rated a 6/10.  Patient reports he has been participating in aquatic physical therapy and aquatic sports.  Patient does report it has  become difficulty standing or ambulating for long periods.  Patient is interested in repeating transforaminal epidural steroid injection.  Patient also reports needing a refill for gabapentin.  Patient did reach 900 mg titration.  Patient denies any side effects from this medication.  Patient denies any significant lower extremity weakness, bowel or bladder incontinence or saddle anesthesia.      Interval history 06/16/2022  Patient presents status post bilateral L4/5 transforaminal epidural steroid injection 05/06/2022.  Patient reports 85% sustained relief in bilateral lower extremity radiculopathy following transforaminal epidural steroid injection.  Patient reports improved mobility.  He reports he has been doing exercises in the water and biking.  He is reported significant improved functionality.  Patient questions whether he should restart gabapentin and reports following up with Dr. Woods, with recommendation to reinitiate gabapentin at 600 mg in the evening.  Patient is requesting a refill for this medication.  Patient denies significant lower extremity weakness, bowel or bladder incontinence or saddle anesthesia.    Interval history 04/26/2022  Patient presents for 2 month follow-up.  He reports insidious return of lower extremity radiculopathy.  Patient again reports pain which radiates down the posterior lateral aspects of bilateral lower extremities to the plantar aspect of the feet in L4-5 distribution.  Patient reports pain is significantly worse on the left than on the right.  Pain today is rated a 6/10.  Pain is described as tingling and numbness in nature.  Patient reports he has continued ibuprofen which has marginally helped with this pain but has been encouraged by his primary care provider to discontinue this medication daily.  Patient has discontinued gabapentin since our last clinic visit.  Patient denies significant lower extremity weakness, bowel or bladder incontinence or saddle  anesthesia.  Patient is interested in pursuing repeat injection.      Interval history 02/23/2022  Patient presents status post bilateral L4/5 transforaminal epidural steroid injection 01/21/2022.  Patient reports 80% sustained relief in lower extremity radicular symptoms following epidural steroid injection.  Patient has continued physical therapy.  He notices incremental improvement in range of motion, ambulation daily.  Patient reports prior to the injection he was ambulating approximately 2500 steps daily.  Patient reports yesterday he ambulated over 5000 steps.  Patient has discontinued gabapentin secondary to superior pain relief.  Patient is enquiring on the safety of an inversion table.  He denies any new onset lower extremity weakness, bowel or bladder incontinence or saddle anesthesia.    Interval history 01/18/2022  Patient presents for MRI review.  Today patient again reports lower back pain which radiates down the anterior aspects of bilateral lower extremities in L3-4 distribution to the knee.  Today patient reports pain is a 4/10.  Patient has continued gabapentin titration and has reached 600 mg 3 times daily.  Patient denies any side effects from this medication.  Patient reports diminishing return and benefit from aqua therapy secondary to his symptoms.  He denies significant lower extremity weakness or bowel or bladder incontinence.  Patient is interested in pursuing intervention.    HPI 12/21/2021  Myles Pride is a 67 y.o. male with past medical history significant for hyperlipidemia who presents to the clinic for the evaluation of lower back and leg pain.  Patient reports pain began with treatment (physical therapy) of right hip pain, approximately 2 months prior.  Today patient reports lower back pain which presents in a bandlike distribution in his lower back and radiates down the anterior aspects of bilateral lower extremities in L3-4 distribution to the knee.  Pain is intermittent  "and described as 4/10.  Pain is described as a aching sensation in the back and a tight sensation in bilateral lower extremities.  Pain is exacerbated when moving from sitting to standing and with ambulation.  Patient reports he was able to ambulate "6/10th of a mile" in the past and now is only able to ambulate approximately 1-2 blocks before requiring rest.  Patient does report associated subjective weakness in bilateral lower extremities.  Pain is improved with heat, sitting, reclining, prior prescription for Medrol Dosepak from his primary care PA, Ms. Riddle.     Patient reports significant motor weakness   Patient denies night fever/night sweats, urinary incontinence, bowel incontinence and significant weight loss and loss of sensations.    Pain Disability Index Review:     Last 3 PDI Scores 11/22/2022 1/18/2022 12/21/2021   Pain Disability Index (PDI) 24 28 39       Non-Pharmacologic Treatments:  Physical Therapy/Home Exercise: yes  Ice/Heat:yes  TENS: no  Acupuncture: no  Massage: no  Chiropractic: no    Other: no      Pain Medications:  - Adjuvant Medications: Alleve (Naproxen) and Neurontin (Gabapentin)    Pain Procedures:   -10/25/2022: Bilateral L4/5 transforaminal epidural steroid injection  -05/06/2022:  Bilateral L4/5 transforaminal epidural steroid injection  -01/21/2022:  Bilateral L4/5 transforaminal epidural steroid injection    Past Medical History:   Diagnosis Date    Hyperlipidemia     PONV (postoperative nausea and vomiting) 1990    after knee arthroscopy    Vitamin D deficiency 12/18/2019     Past Surgical History:   Procedure Laterality Date    COLONOSCOPY      COLONOSCOPY N/A 12/19/2019    Procedure: COLONOSCOPY;  Surgeon: Bhupendra Wilkinson MD;  Location: [facility];  Service: Endoscopy;  Laterality: N/A;    KNEE ARTHROSCOPY Left     SELECTIVE INJECTION OF ANESTHETIC AGENT AROUND LUMBAR SPINAL NERVE ROOT BY TRANSFORAMINAL APPROACH Bilateral 1/21/2022    Procedure: Bilateral L4/5 TF LOVE with RN " IV sedation;  Surgeon: Ben Eddy MD;  Location: [facility];  Service: Pain Management;  Laterality: Bilateral;    SELECTIVE INJECTION OF ANESTHETIC AGENT AROUND LUMBAR SPINAL NERVE ROOT BY TRANSFORAMINAL APPROACH Bilateral 5/6/2022    Procedure: Bilateral L4/5 TF LOVE with RN IV sedation;  Surgeon: Ben Eddy MD;  Location: [facility];  Service: Pain Management;  Laterality: Bilateral;    SELECTIVE INJECTION OF ANESTHETIC AGENT AROUND LUMBAR SPINAL NERVE ROOT BY TRANSFORAMINAL APPROACH Bilateral 10/25/2022    Procedure: Bilateral L4/5 TF LOVE with RN IV sedation;  Surgeon: Ben Eddy MD;  Location: [facility];  Service: Pain Management;  Laterality: Bilateral;    VASECTOMY  1995     Review of patient's allergies indicates:  No Known Allergies    Current Outpatient Medications   Medication Sig    ergocalciferol, vitamin D2, (VITAMIN D ORAL) Take by mouth once daily.    pravastatin (PRAVACHOL) 40 MG tablet TAKE 1 TABLET BY MOUTH EVERY DAY    tamsulosin (FLOMAX) 0.4 mg Cap TAKE 1 CAPSULE BY MOUTH EVERY DAY    gabapentin (NEURONTIN) 600 MG tablet Take 1.5 tablets (900 mg total) by mouth every evening.     No current facility-administered medications for this visit.       Review of Systems     GENERAL:  No weight loss, malaise or fevers.  HEENT:   No recent changes in vision or hearing  NECK:  Negative for lumps, no difficulty with swallowing.  RESPIRATORY:  Negative for cough, wheezing or shortness of breath, patient denies any recent URI.  CARDIOVASCULAR:  Negative for chest pain, leg swelling or palpitations.  GI:  Negative for abdominal discomfort, blood in stools or black stools or change in bowel habits.  MUSCULOSKELETAL:  See HPI.  SKIN:  Negative for lesions, rash, and itching.  PSYCH:  No mood disorder or recent psychosocial stressors.   HEMATOLOGY/LYMPHOLOGY:  Negative for prolonged bleeding, bruising easily or swollen nodes.    NEURO:   No history of headaches, syncope, paralysis, seizures  "or tremors.  All other reviewed and negative other than HPI.    OBJECTIVE:    Resp 17   Ht 6' 5" (1.956 m)   Wt 109 kg (240 lb 4.8 oz)   BMI 28.50 kg/m²       Physical Exam  1/18/22  GENERAL: Well appearing, in no acute distress, alert and oriented x3.  PSYCH:  Mood and affect appropriate.  SKIN: Skin color, texture, turgor normal, no rashes or lesions.  HEAD/FACE:  Normocephalic, atraumatic. Cranial nerves grossly intact.    CV: RRR with palpation of the radial artery.  PULM: No evidence of respiratory difficulty, symmetric chest rise.  GI:  Soft and non-tender.    BACK: genu varus b/l. Straight leg raising in the sitting and supine positions is negative to radicular pain. No pain to palpation over the facet joints of the lumbar spine or spinous processes. Normal range of motion without pain reproduction.  EXTREMITIES: Peripheral joint ROM is full and pain free without obvious instability or laxity in all four extremities. No deformities, edema, or skin discoloration. Good capillary refill.  MUSCULOSKELETAL: Able to stand on heels & toes.  Right lower extremity, approximately half to 1 in longer than left lower extremity.  hip, and knee provocative maneuvers are negative.  There is no pain with palpation over the sacroiliac joints bilaterally.  FABERs test is negative.  FADIR test is negative bilaterally.   Bilateral upper and lower extremity strength is normal and symmetric.  No atrophy or tone abnormalities are noted.  RIGHT Lower extremity: Hip flexion 5/5, Hip Abduction 5/5, Hip Adduction 5/5, Knee extension 5/5, Knee flexion 5/5, Ankle dorsiflexion5/5, Extensor hallucis longus 5/5, Ankle plantarflexion 5/5  LEFT Lower extremity:  Hip flexion 5/5, Hip Abduction 5/5,Hip Adduction 5/5, Knee extension 5/5, Knee flexion 5/5, Ankle dorsiflexion 5/5, Extensor hallucis longus 5/5, Ankle plantarflexion 5/5  -Normal testing knee (patellar) jerk and ankle (achilles) jerk    NEURO: Bilateral upper and lower extremity " coordination and muscle stretch reflexes are physiologic and symmetric. No loss of sensation is noted.  GAIT: normal.    Imaging  MRI lumbar spine 12/21/2021  FINDINGS:  Vertebral body heights with upper lumbar and lower thoracic spine Schmorl node endplate changes.  No spondylolisthesis.  Mild Modic 1 endplate changes noted at L4-5.  Multilevel disc desiccation present with height loss most noted at L4-5.     Conus terminates normally.  Visualized intra-abdominal/pelvic structures are unremarkable.     L1-L2: No spinal canal stenosis or neural foraminal narrowing.  Facet arthropathy findings.     L2-L3: No significant spinal canal stenosis or neural foraminal narrowing.  Moderate to severe facet arthropathy.     L3-L4: Circumferential disc bulging present with suspected right paracentral small disc protrusion.  This in conjunction with congenitally short pedicles and prominent facet/ligamentum flavum degenerative hypertrophy findings causes severe spinal canal stenosis.  Small right facet joint effusion present with 4 mm right facet synovial cyst deep to the ligamentum flavum.  Left foraminal extraforaminal annular fissure.  Mild bilateral inferior neural foraminal narrowing.     L4-L5: Circumferential disc bulging in conjunction with congenitally short pedicles and prominent facet degenerative hypertrophy findings causes severe spinal canal stenosis.  Severe bilateral neural foraminal narrowing present with likely impingement of the exiting nerve roots.     L5-S1: No significant posterior disc bulge.  Prominent facet arthropathy noted with mild left and moderate right neural foraminal narrowing.       09/13/21  X-Ray Lumbar Spine 5 View  FINDINGS:  There is minimal lumbar levocurvature.  No fracture or malalignment.  No pars defect.  There is mild loss of disc height at L4-5.  Severe multilevel facet arthropathy is present.    ASSESSMENT: 67 y.o. year old male with lower back and leg pain, consistent with     1.  Lumbar radiculopathy, chronic  IR LOVE Lumbar w/ Img    Case Request-RAD/Other Procedure Area: L4-5 intralaminar epidural steroid injection with left paramedian approach      2. Spinal stenosis of lumbar region without neurogenic claudication        3. Lumbar facet arthropathy        4. DDD (degenerative disc disease), lumbar        5. Leg length discrepancy                  PLAN:   - Interventions:  Schedule for L4-5 intralaminar epidural steroid injection with left paramedian approach see if this confers more significant sustained relief and left lower extremity radicular pain...Explained the risks and benefits of the procedure in detail with the patient today in clinic along with alternative treatment options.  Patient has elected to pursue this procedure.    - Anticoagulation use: no no anticoagulation    - Patient  may be candidate for spinal cord stimulation device secondary to chronic pain syndrome and multiple failed attempts of other interventions and medical management. Explained the risks and benefits of the procedure in detail with the patient today in clinic along with alternative treatment options  -patient has been given educational booklets on dorsal column stimulation to discuss at our next clinic visit pending his response to epidural injection #4.        report:  Reviewed and consistent with medication use as prescribed.     - Medications:  Continue gabapentin.  We have reviewed potential side effects of this medication including daytime somnolence, weight gain and peripheral edema    Gabapentin : 900 mg QHS  Ninety day supply given    - Therapy:   We discussed continuing physical therapy to help manage the patient/s painful condition. The patient was counseled that muscle strengthening will improve the long term prognosis in regards to pain and may also help increase range of motion and mobility.     -Consults/Referrals: HME : continue heel insert/fitting for leg length discrepancy.    -  Imaging: Reviewed available imaging with patient and answered any questions they had regarding study    - Follow up visit: return to clinic in  4-6 weeks post injection    The above plan and management options were discussed at length with patient. Patient is in agreement with the above and verbalized understanding.    - I discussed the goals of interventional chronic pain management with the patient on today's visit. We discussed a multimodal and systematic approach to pain.  This includes diagnostic and therapeutic injections, adjuvant pharmacologic treatment, physical therapy, and at times psychiatry.  I emphasized the importance of regular exercise, core strengthening and stretching, diet and weight loss as a cornerstone of long-term pain management.    - This condition does not require this patient to take time off of work, and the primary goal of our Pain Management services is to improve the patient's functional capacity.  - Patient Questions: Answered all of the patient's questions regarding diagnoses, therapy, treatment and next steps        Ben Eddy MD  Interventional Pain Management  [facility]    Disclaimer:  This note was prepared using voice recognition system and is likely to have sound alike errors that may have been overlooked even after proof reading.  Please call me with any questions

## 2022-11-21 NOTE — TELEPHONE ENCOUNTER
Attempt to call patient to confirm appointment. Patent did not answer, Left message on patients voice mail to call back at earliest convenience to confirm or reschedule p.t apt.     Russell Caceres  Medical Assistant

## 2022-11-21 NOTE — PROGRESS NOTES
Established Patient Interventional Pain Clinic Note     The patient location is: home  The chief complaint leading to consultation is: leg pain  Visit type: Virtual visit with synchronous audio and video  Total time spent with patient: 15m  Each patient to whom he or she provides medical services by telemedicine is: (1) informed of the relationship between the physician and patient and the respective role of any other health care provider with respect to management of the patient; and (2) notified that he or she may decline to receive medical services by telemedicine and may withdraw from such care at any time.    Referring Physician: No ref. provider found    PCP: Catalino Arias MD    Chief Complaint:   LBP + leg pain     SUBJECTIVE:  Interval history 11/22/2022  Patient presents status post bilateral L4/5 transforaminal epidural steroid injection 10/25/22.  Patient reports 85% relief in bilateral lower extremity radiculopathy for at least 2 weeks following his injection.  Patient reports insidious return primarily on the left in L3-4 distribution from the groin to the knee.  Pain today is rated a 4/10.  Patient reports after standing or ambulating for 15-20 minutes significant pain which inhibits activities of daily living.  Patient reports recently going on a trip to [place] unable to even ambulate a mi secondary to the pain.  Patient has continued gabapentin 900 mg nightly and is requesting a refill.  Patient continues to report significant relief and right lower extremity radicular pain.  He denies significant lower extremity weakness, saddle anesthesia.      Interval Hx: 9/19/22  Patient presents for 3 month follow-up.  He continues to report relief in the lower extremities following prior transforaminal epidural steroid injection which is slowly dwindling.  Pain today is rated a 6/10.  Patient reports he has been participating in aquatic physical therapy and aquatic sports.  Patient does report it has  become difficulty standing or ambulating for long periods.  Patient is interested in repeating transforaminal epidural steroid injection.  Patient also reports needing a refill for gabapentin.  Patient did reach 900 mg titration.  Patient denies any side effects from this medication.  Patient denies any significant lower extremity weakness, bowel or bladder incontinence or saddle anesthesia.      Interval history 06/16/2022  Patient presents status post bilateral L4/5 transforaminal epidural steroid injection 05/06/2022.  Patient reports 85% sustained relief in bilateral lower extremity radiculopathy following transforaminal epidural steroid injection.  Patient reports improved mobility.  He reports he has been doing exercises in the water and biking.  He is reported significant improved functionality.  Patient questions whether he should restart gabapentin and reports following up with Dr. Woods, with recommendation to reinitiate gabapentin at 600 mg in the evening.  Patient is requesting a refill for this medication.  Patient denies significant lower extremity weakness, bowel or bladder incontinence or saddle anesthesia.    Interval history 04/26/2022  Patient presents for 2 month follow-up.  He reports insidious return of lower extremity radiculopathy.  Patient again reports pain which radiates down the posterior lateral aspects of bilateral lower extremities to the plantar aspect of the feet in L4-5 distribution.  Patient reports pain is significantly worse on the left than on the right.  Pain today is rated a 6/10.  Pain is described as tingling and numbness in nature.  Patient reports he has continued ibuprofen which has marginally helped with this pain but has been encouraged by his primary care provider to discontinue this medication daily.  Patient has discontinued gabapentin since our last clinic visit.  Patient denies significant lower extremity weakness, bowel or bladder incontinence or saddle  anesthesia.  Patient is interested in pursuing repeat injection.      Interval history 02/23/2022  Patient presents status post bilateral L4/5 transforaminal epidural steroid injection 01/21/2022.  Patient reports 80% sustained relief in lower extremity radicular symptoms following epidural steroid injection.  Patient has continued physical therapy.  He notices incremental improvement in range of motion, ambulation daily.  Patient reports prior to the injection he was ambulating approximately 2500 steps daily.  Patient reports yesterday he ambulated over 5000 steps.  Patient has discontinued gabapentin secondary to superior pain relief.  Patient is enquiring on the safety of an inversion table.  He denies any new onset lower extremity weakness, bowel or bladder incontinence or saddle anesthesia.    Interval history 01/18/2022  Patient presents for MRI review.  Today patient again reports lower back pain which radiates down the anterior aspects of bilateral lower extremities in L3-4 distribution to the knee.  Today patient reports pain is a 4/10.  Patient has continued gabapentin titration and has reached 600 mg 3 times daily.  Patient denies any side effects from this medication.  Patient reports diminishing return and benefit from aqua therapy secondary to his symptoms.  He denies significant lower extremity weakness or bowel or bladder incontinence.  Patient is interested in pursuing intervention.    HPI 12/21/2021  Myles Pride is a 67 y.o. male with past medical history significant for hyperlipidemia who presents to the clinic for the evaluation of lower back and leg pain.  Patient reports pain began with treatment (physical therapy) of right hip pain, approximately 2 months prior.  Today patient reports lower back pain which presents in a bandlike distribution in his lower back and radiates down the anterior aspects of bilateral lower extremities in L3-4 distribution to the knee.  Pain is intermittent  "and described as 4/10.  Pain is described as a aching sensation in the back and a tight sensation in bilateral lower extremities.  Pain is exacerbated when moving from sitting to standing and with ambulation.  Patient reports he was able to ambulate "6/10th of a mile" in the past and now is only able to ambulate approximately 1-2 blocks before requiring rest.  Patient does report associated subjective weakness in bilateral lower extremities.  Pain is improved with heat, sitting, reclining, prior prescription for Medrol Dosepak from his primary care PA, Ms. Riddle.     Patient reports significant motor weakness   Patient denies night fever/night sweats, urinary incontinence, bowel incontinence and significant weight loss and loss of sensations.    Pain Disability Index Review:     Last 3 PDI Scores 11/22/2022 1/18/2022 12/21/2021   Pain Disability Index (PDI) 24 28 39       Non-Pharmacologic Treatments:  Physical Therapy/Home Exercise: yes  Ice/Heat:yes  TENS: no  Acupuncture: no  Massage: no  Chiropractic: no    Other: no      Pain Medications:  - Adjuvant Medications: Alleve (Naproxen) and Neurontin (Gabapentin)    Pain Procedures:   -10/25/2022: Bilateral L4/5 transforaminal epidural steroid injection  -05/06/2022:  Bilateral L4/5 transforaminal epidural steroid injection  -01/21/2022:  Bilateral L4/5 transforaminal epidural steroid injection    Past Medical History:   Diagnosis Date    Hyperlipidemia     PONV (postoperative nausea and vomiting) 1990    after knee arthroscopy    Vitamin D deficiency 12/18/2019     Past Surgical History:   Procedure Laterality Date    COLONOSCOPY      COLONOSCOPY N/A 12/19/2019    Procedure: COLONOSCOPY;  Surgeon: Bhupendra Wilkinson MD;  Location: [facility];  Service: Endoscopy;  Laterality: N/A;    KNEE ARTHROSCOPY Left     SELECTIVE INJECTION OF ANESTHETIC AGENT AROUND LUMBAR SPINAL NERVE ROOT BY TRANSFORAMINAL APPROACH Bilateral 1/21/2022    Procedure: Bilateral L4/5 TF LOVE with RN " IV sedation;  Surgeon: Ben Eddy MD;  Location: [facility];  Service: Pain Management;  Laterality: Bilateral;    SELECTIVE INJECTION OF ANESTHETIC AGENT AROUND LUMBAR SPINAL NERVE ROOT BY TRANSFORAMINAL APPROACH Bilateral 5/6/2022    Procedure: Bilateral L4/5 TF LOVE with RN IV sedation;  Surgeon: Ben Eddy MD;  Location: [facility];  Service: Pain Management;  Laterality: Bilateral;    SELECTIVE INJECTION OF ANESTHETIC AGENT AROUND LUMBAR SPINAL NERVE ROOT BY TRANSFORAMINAL APPROACH Bilateral 10/25/2022    Procedure: Bilateral L4/5 TF LOVE with RN IV sedation;  Surgeon: Ben Eddy MD;  Location: [facility];  Service: Pain Management;  Laterality: Bilateral;    VASECTOMY  1995     Review of patient's allergies indicates:  No Known Allergies    Current Outpatient Medications   Medication Sig    ergocalciferol, vitamin D2, (VITAMIN D ORAL) Take by mouth once daily.    pravastatin (PRAVACHOL) 40 MG tablet TAKE 1 TABLET BY MOUTH EVERY DAY    tamsulosin (FLOMAX) 0.4 mg Cap TAKE 1 CAPSULE BY MOUTH EVERY DAY    gabapentin (NEURONTIN) 600 MG tablet Take 1.5 tablets (900 mg total) by mouth every evening.     No current facility-administered medications for this visit.       Review of Systems     GENERAL:  No weight loss, malaise or fevers.  HEENT:   No recent changes in vision or hearing  NECK:  Negative for lumps, no difficulty with swallowing.  RESPIRATORY:  Negative for cough, wheezing or shortness of breath, patient denies any recent URI.  CARDIOVASCULAR:  Negative for chest pain, leg swelling or palpitations.  GI:  Negative for abdominal discomfort, blood in stools or black stools or change in bowel habits.  MUSCULOSKELETAL:  See HPI.  SKIN:  Negative for lesions, rash, and itching.  PSYCH:  No mood disorder or recent psychosocial stressors.   HEMATOLOGY/LYMPHOLOGY:  Negative for prolonged bleeding, bruising easily or swollen nodes.    NEURO:   No history of headaches, syncope, paralysis, seizures  "or tremors.  All other reviewed and negative other than HPI.    OBJECTIVE:    Resp 17   Ht 6' 5" (1.956 m)   Wt 109 kg (240 lb 4.8 oz)   BMI 28.50 kg/m²       Physical Exam  1/18/22  GENERAL: Well appearing, in no acute distress, alert and oriented x3.  PSYCH:  Mood and affect appropriate.  SKIN: Skin color, texture, turgor normal, no rashes or lesions.  HEAD/FACE:  Normocephalic, atraumatic. Cranial nerves grossly intact.    CV: RRR with palpation of the radial artery.  PULM: No evidence of respiratory difficulty, symmetric chest rise.  GI:  Soft and non-tender.    BACK: genu varus b/l. Straight leg raising in the sitting and supine positions is negative to radicular pain. No pain to palpation over the facet joints of the lumbar spine or spinous processes. Normal range of motion without pain reproduction.  EXTREMITIES: Peripheral joint ROM is full and pain free without obvious instability or laxity in all four extremities. No deformities, edema, or skin discoloration. Good capillary refill.  MUSCULOSKELETAL: Able to stand on heels & toes.  Right lower extremity, approximately half to 1 in longer than left lower extremity.  hip, and knee provocative maneuvers are negative.  There is no pain with palpation over the sacroiliac joints bilaterally.  FABERs test is negative.  FADIR test is negative bilaterally.   Bilateral upper and lower extremity strength is normal and symmetric.  No atrophy or tone abnormalities are noted.  RIGHT Lower extremity: Hip flexion 5/5, Hip Abduction 5/5, Hip Adduction 5/5, Knee extension 5/5, Knee flexion 5/5, Ankle dorsiflexion5/5, Extensor hallucis longus 5/5, Ankle plantarflexion 5/5  LEFT Lower extremity:  Hip flexion 5/5, Hip Abduction 5/5,Hip Adduction 5/5, Knee extension 5/5, Knee flexion 5/5, Ankle dorsiflexion 5/5, Extensor hallucis longus 5/5, Ankle plantarflexion 5/5  -Normal testing knee (patellar) jerk and ankle (achilles) jerk    NEURO: Bilateral upper and lower extremity " coordination and muscle stretch reflexes are physiologic and symmetric. No loss of sensation is noted.  GAIT: normal.    Imaging  MRI lumbar spine 12/21/2021  FINDINGS:  Vertebral body heights with upper lumbar and lower thoracic spine Schmorl node endplate changes.  No spondylolisthesis.  Mild Modic 1 endplate changes noted at L4-5.  Multilevel disc desiccation present with height loss most noted at L4-5.     Conus terminates normally.  Visualized intra-abdominal/pelvic structures are unremarkable.     L1-L2: No spinal canal stenosis or neural foraminal narrowing.  Facet arthropathy findings.     L2-L3: No significant spinal canal stenosis or neural foraminal narrowing.  Moderate to severe facet arthropathy.     L3-L4: Circumferential disc bulging present with suspected right paracentral small disc protrusion.  This in conjunction with congenitally short pedicles and prominent facet/ligamentum flavum degenerative hypertrophy findings causes severe spinal canal stenosis.  Small right facet joint effusion present with 4 mm right facet synovial cyst deep to the ligamentum flavum.  Left foraminal extraforaminal annular fissure.  Mild bilateral inferior neural foraminal narrowing.     L4-L5: Circumferential disc bulging in conjunction with congenitally short pedicles and prominent facet degenerative hypertrophy findings causes severe spinal canal stenosis.  Severe bilateral neural foraminal narrowing present with likely impingement of the exiting nerve roots.     L5-S1: No significant posterior disc bulge.  Prominent facet arthropathy noted with mild left and moderate right neural foraminal narrowing.       09/13/21  X-Ray Lumbar Spine 5 View  FINDINGS:  There is minimal lumbar levocurvature.  No fracture or malalignment.  No pars defect.  There is mild loss of disc height at L4-5.  Severe multilevel facet arthropathy is present.    ASSESSMENT: 67 y.o. year old male with lower back and leg pain, consistent with     1.  Lumbar radiculopathy, chronic  IR LOVE Lumbar w/ Img    Case Request-RAD/Other Procedure Area: L4-5 intralaminar epidural steroid injection with left paramedian approach      2. Spinal stenosis of lumbar region without neurogenic claudication        3. Lumbar facet arthropathy        4. DDD (degenerative disc disease), lumbar        5. Leg length discrepancy                  PLAN:   - Interventions:  Schedule for L4-5 intralaminar epidural steroid injection with left paramedian approach see if this confers more significant sustained relief and left lower extremity radicular pain...Explained the risks and benefits of the procedure in detail with the patient today in clinic along with alternative treatment options.  Patient has elected to pursue this procedure.    - Anticoagulation use: no no anticoagulation    - Patient  may be candidate for spinal cord stimulation device secondary to chronic pain syndrome and multiple failed attempts of other interventions and medical management. Explained the risks and benefits of the procedure in detail with the patient today in clinic along with alternative treatment options  -patient has been given educational booklets on dorsal column stimulation to discuss at our next clinic visit pending his response to epidural injection #4.        report:  Reviewed and consistent with medication use as prescribed.     - Medications:  Continue gabapentin.  We have reviewed potential side effects of this medication including daytime somnolence, weight gain and peripheral edema    Gabapentin : 900 mg QHS  Ninety day supply given    - Therapy:   We discussed continuing physical therapy to help manage the patient/s painful condition. The patient was counseled that muscle strengthening will improve the long term prognosis in regards to pain and may also help increase range of motion and mobility.     -Consults/Referrals: HME : continue heel insert/fitting for leg length discrepancy.    -  Imaging: Reviewed available imaging with patient and answered any questions they had regarding study    - Follow up visit: return to clinic in  4-6 weeks post injection    The above plan and management options were discussed at length with patient. Patient is in agreement with the above and verbalized understanding.    - I discussed the goals of interventional chronic pain management with the patient on today's visit. We discussed a multimodal and systematic approach to pain.  This includes diagnostic and therapeutic injections, adjuvant pharmacologic treatment, physical therapy, and at times psychiatry.  I emphasized the importance of regular exercise, core strengthening and stretching, diet and weight loss as a cornerstone of long-term pain management.    - This condition does not require this patient to take time off of work, and the primary goal of our Pain Management services is to improve the patient's functional capacity.  - Patient Questions: Answered all of the patient's questions regarding diagnoses, therapy, treatment and next steps        Ben Eddy MD  Interventional Pain Management  [facility]    Disclaimer:  This note was prepared using voice recognition system and is likely to have sound alike errors that may have been overlooked even after proof reading.  Please call me with any questions

## 2022-11-22 ENCOUNTER — OFFICE VISIT (OUTPATIENT)
Dept: PAIN MEDICINE | Facility: CLINIC | Age: 67
End: 2022-11-22
Payer: MEDICARE

## 2022-11-22 VITALS — RESPIRATION RATE: 17 BRPM | WEIGHT: 240.31 LBS | BODY MASS INDEX: 28.37 KG/M2 | HEIGHT: 77 IN

## 2022-11-22 DIAGNOSIS — M47.816 LUMBAR FACET ARTHROPATHY: ICD-10-CM

## 2022-11-22 DIAGNOSIS — M54.16 LUMBAR RADICULOPATHY, CHRONIC: Primary | ICD-10-CM

## 2022-11-22 DIAGNOSIS — M21.70 LEG LENGTH DISCREPANCY: ICD-10-CM

## 2022-11-22 DIAGNOSIS — M51.36 DDD (DEGENERATIVE DISC DISEASE), LUMBAR: ICD-10-CM

## 2022-11-22 DIAGNOSIS — M48.061 SPINAL STENOSIS OF LUMBAR REGION WITHOUT NEUROGENIC CLAUDICATION: ICD-10-CM

## 2022-11-22 PROCEDURE — 99214 PR OFFICE/OUTPT VISIT, EST, LEVL IV, 30-39 MIN: ICD-10-PCS | Mod: S$GLB,,, | Performed by: ANESTHESIOLOGY

## 2022-11-22 PROCEDURE — 3044F HG A1C LEVEL LT 7.0%: CPT | Mod: CPTII,S$GLB,, | Performed by: ANESTHESIOLOGY

## 2022-11-22 PROCEDURE — 3008F BODY MASS INDEX DOCD: CPT | Mod: CPTII,S$GLB,, | Performed by: ANESTHESIOLOGY

## 2022-11-22 PROCEDURE — 1159F PR MEDICATION LIST DOCUMENTED IN MEDICAL RECORD: ICD-10-PCS | Mod: CPTII,S$GLB,, | Performed by: ANESTHESIOLOGY

## 2022-11-22 PROCEDURE — 1125F PR PAIN SEVERITY QUANTIFIED, PAIN PRESENT: ICD-10-PCS | Mod: CPTII,S$GLB,, | Performed by: ANESTHESIOLOGY

## 2022-11-22 PROCEDURE — 1125F AMNT PAIN NOTED PAIN PRSNT: CPT | Mod: CPTII,S$GLB,, | Performed by: ANESTHESIOLOGY

## 2022-11-22 PROCEDURE — 1160F PR REVIEW ALL MEDS BY PRESCRIBER/CLIN PHARMACIST DOCUMENTED: ICD-10-PCS | Mod: CPTII,S$GLB,, | Performed by: ANESTHESIOLOGY

## 2022-11-22 PROCEDURE — 1160F RVW MEDS BY RX/DR IN RCRD: CPT | Mod: CPTII,S$GLB,, | Performed by: ANESTHESIOLOGY

## 2022-11-22 PROCEDURE — 99214 OFFICE O/P EST MOD 30 MIN: CPT | Mod: S$GLB,,, | Performed by: ANESTHESIOLOGY

## 2022-11-22 PROCEDURE — 99999 PR PBB SHADOW E&M-EST. PATIENT-LVL III: ICD-10-PCS | Mod: PBBFAC,,, | Performed by: ANESTHESIOLOGY

## 2022-11-22 PROCEDURE — 3044F PR MOST RECENT HEMOGLOBIN A1C LEVEL <7.0%: ICD-10-PCS | Mod: CPTII,S$GLB,, | Performed by: ANESTHESIOLOGY

## 2022-11-22 PROCEDURE — 99999 PR PBB SHADOW E&M-EST. PATIENT-LVL III: CPT | Mod: PBBFAC,,, | Performed by: ANESTHESIOLOGY

## 2022-11-22 PROCEDURE — 1159F MED LIST DOCD IN RCRD: CPT | Mod: CPTII,S$GLB,, | Performed by: ANESTHESIOLOGY

## 2022-11-22 PROCEDURE — 3008F PR BODY MASS INDEX (BMI) DOCUMENTED: ICD-10-PCS | Mod: CPTII,S$GLB,, | Performed by: ANESTHESIOLOGY

## 2022-11-22 RX ORDER — GABAPENTIN 600 MG/1
900 TABLET ORAL NIGHTLY
Qty: 135 TABLET | Refills: 0 | Status: SHIPPED | OUTPATIENT
Start: 2022-11-22 | End: 2023-06-08

## 2022-11-23 DIAGNOSIS — M16.11 ARTHRITIS OF RIGHT HIP: ICD-10-CM

## 2022-11-23 DIAGNOSIS — M17.11 ARTHRITIS OF KNEE, RIGHT: ICD-10-CM

## 2022-11-23 DIAGNOSIS — M17.12 ARTHRITIS OF KNEE, LEFT: Primary | ICD-10-CM

## 2022-12-01 ENCOUNTER — HOSPITAL ENCOUNTER (OUTPATIENT)
Dept: RADIOLOGY | Facility: HOSPITAL | Age: 67
Discharge: HOME | End: 2022-12-01
Attending: ORTHOPAEDIC SURGERY
Payer: MEDICARE

## 2022-12-01 ENCOUNTER — OFFICE VISIT (OUTPATIENT)
Dept: ORTHOPEDICS | Facility: CLINIC | Age: 67
End: 2022-12-01
Payer: MEDICARE

## 2022-12-01 VITALS — WEIGHT: 244.25 LBS | HEIGHT: 77 IN | BODY MASS INDEX: 28.84 KG/M2

## 2022-12-01 DIAGNOSIS — M17.12 ARTHRITIS OF KNEE, LEFT: ICD-10-CM

## 2022-12-01 DIAGNOSIS — M17.12 ARTHRITIS OF KNEE, LEFT: Primary | ICD-10-CM

## 2022-12-01 DIAGNOSIS — M16.11 ARTHRITIS OF RIGHT HIP: ICD-10-CM

## 2022-12-01 DIAGNOSIS — M48.061 SPINAL STENOSIS OF LUMBAR REGION, UNSPECIFIED WHETHER NEUROGENIC CLAUDICATION PRESENT: ICD-10-CM

## 2022-12-01 DIAGNOSIS — M54.17 LUMBOSACRAL RADICULOPATHY AT L2: ICD-10-CM

## 2022-12-01 DIAGNOSIS — M17.11 ARTHRITIS OF KNEE, RIGHT: ICD-10-CM

## 2022-12-01 DIAGNOSIS — M54.17 LUMBOSACRAL RADICULOPATHY AT L5: ICD-10-CM

## 2022-12-01 DIAGNOSIS — M21.162 ACQUIRED VARUS DEFORMITY KNEE, LEFT: ICD-10-CM

## 2022-12-01 DIAGNOSIS — M16.12 ARTHRITIS OF LEFT HIP: ICD-10-CM

## 2022-12-01 DIAGNOSIS — M54.17 LUMBOSACRAL RADICULOPATHY AT S1: ICD-10-CM

## 2022-12-01 DIAGNOSIS — M54.17 LUMBOSACRAL RADICULOPATHY AT L4: ICD-10-CM

## 2022-12-01 PROCEDURE — 1159F PR MEDICATION LIST DOCUMENTED IN MEDICAL RECORD: ICD-10-PCS | Mod: CPTII,S$GLB,, | Performed by: ORTHOPAEDIC SURGERY

## 2022-12-01 PROCEDURE — 73521 X-RAY EXAM HIPS BI 2 VIEWS: CPT | Mod: TC

## 2022-12-01 PROCEDURE — 1101F PT FALLS ASSESS-DOCD LE1/YR: CPT | Mod: CPTII,S$GLB,, | Performed by: ORTHOPAEDIC SURGERY

## 2022-12-01 PROCEDURE — 1160F PR REVIEW ALL MEDS BY PRESCRIBER/CLIN PHARMACIST DOCUMENTED: ICD-10-PCS | Mod: CPTII,S$GLB,, | Performed by: ORTHOPAEDIC SURGERY

## 2022-12-01 PROCEDURE — 99999 PR PBB SHADOW E&M-EST. PATIENT-LVL III: ICD-10-PCS | Mod: PBBFAC,,, | Performed by: ORTHOPAEDIC SURGERY

## 2022-12-01 PROCEDURE — 3008F BODY MASS INDEX DOCD: CPT | Mod: CPTII,S$GLB,, | Performed by: ORTHOPAEDIC SURGERY

## 2022-12-01 PROCEDURE — 1159F MED LIST DOCD IN RCRD: CPT | Mod: CPTII,S$GLB,, | Performed by: ORTHOPAEDIC SURGERY

## 2022-12-01 PROCEDURE — 73521 X-RAY EXAM HIPS BI 2 VIEWS: CPT | Mod: 26,,, | Performed by: RADIOLOGY

## 2022-12-01 PROCEDURE — 1160F RVW MEDS BY RX/DR IN RCRD: CPT | Mod: CPTII,S$GLB,, | Performed by: ORTHOPAEDIC SURGERY

## 2022-12-01 PROCEDURE — 73564 XR KNEE ORTHO BILAT WITH FLEXION: ICD-10-PCS | Mod: 26,50,, | Performed by: RADIOLOGY

## 2022-12-01 PROCEDURE — 1101F PR PT FALLS ASSESS DOC 0-1 FALLS W/OUT INJ PAST YR: ICD-10-PCS | Mod: CPTII,S$GLB,, | Performed by: ORTHOPAEDIC SURGERY

## 2022-12-01 PROCEDURE — 73521 XR HIPS BILATERAL 2 VIEW INCL AP PELVIS: ICD-10-PCS | Mod: 26,,, | Performed by: RADIOLOGY

## 2022-12-01 PROCEDURE — 3008F PR BODY MASS INDEX (BMI) DOCUMENTED: ICD-10-PCS | Mod: CPTII,S$GLB,, | Performed by: ORTHOPAEDIC SURGERY

## 2022-12-01 PROCEDURE — 73564 X-RAY EXAM KNEE 4 OR MORE: CPT | Mod: TC,50

## 2022-12-01 PROCEDURE — 1125F AMNT PAIN NOTED PAIN PRSNT: CPT | Mod: CPTII,S$GLB,, | Performed by: ORTHOPAEDIC SURGERY

## 2022-12-01 PROCEDURE — 3044F PR MOST RECENT HEMOGLOBIN A1C LEVEL <7.0%: ICD-10-PCS | Mod: CPTII,S$GLB,, | Performed by: ORTHOPAEDIC SURGERY

## 2022-12-01 PROCEDURE — 3288F FALL RISK ASSESSMENT DOCD: CPT | Mod: CPTII,S$GLB,, | Performed by: ORTHOPAEDIC SURGERY

## 2022-12-01 PROCEDURE — 1125F PR PAIN SEVERITY QUANTIFIED, PAIN PRESENT: ICD-10-PCS | Mod: CPTII,S$GLB,, | Performed by: ORTHOPAEDIC SURGERY

## 2022-12-01 PROCEDURE — 73564 X-RAY EXAM KNEE 4 OR MORE: CPT | Mod: 26,50,, | Performed by: RADIOLOGY

## 2022-12-01 PROCEDURE — 3044F HG A1C LEVEL LT 7.0%: CPT | Mod: CPTII,S$GLB,, | Performed by: ORTHOPAEDIC SURGERY

## 2022-12-01 PROCEDURE — 99999 PR PBB SHADOW E&M-EST. PATIENT-LVL III: CPT | Mod: PBBFAC,,, | Performed by: ORTHOPAEDIC SURGERY

## 2022-12-01 PROCEDURE — 99214 OFFICE O/P EST MOD 30 MIN: CPT | Mod: S$GLB,,, | Performed by: ORTHOPAEDIC SURGERY

## 2022-12-01 PROCEDURE — 3288F PR FALLS RISK ASSESSMENT DOCUMENTED: ICD-10-PCS | Mod: CPTII,S$GLB,, | Performed by: ORTHOPAEDIC SURGERY

## 2022-12-01 PROCEDURE — 99214 PR OFFICE/OUTPT VISIT, EST, LEVL IV, 30-39 MIN: ICD-10-PCS | Mod: S$GLB,,, | Performed by: ORTHOPAEDIC SURGERY

## 2022-12-01 NOTE — PROGRESS NOTES
Subjective:     Patient ID: Myles Pride is a 67 y.o. male.    Chief Complaint: Pain of the Left Knee    HPI:  06/02/2022  Patient with left knee severe pain today is total complaining also of the right hip.  He had previous x-rays in the electronic records.  He feels the right hip when he abducts his hip and try to get out of the car is seems to be very painful in the groin.  His knee on the left side does not bother him as much.  He does have severe back issues and we did order EMG and nerve conduction studies that show multilevel radiculopathy from L2, L4-L5 and S1 bilaterally.  He states he does get thigh anterior thigh pain.  I did tell him a could be from pinched nerves in his back.  He does see pain management for his back and injection seems to have helped a little bit.  He tries to maintain activities however now he said he cannot even hike 2 miles.  One mildly can walk but now is knee gives out on him on the left and his hip hurts on the right.  He did know if the injection given to him in February in the left knee helped or not because also received injections in his back.  No loss of bowel bladder control blurry vision double vision or loss sense smell or taste or fever or chills    As far as the gabapentin we discussed that in detail today he does not take it as much bit I did tell him for it to work he needs to be on it constantly.  He was prescribed that by pain management to take 300 mg at night for we can go up to 600 mg at night after that and a for a week and then go up to 900 at night.  I did tell him I will probably stop at 600 if you doing well with that.  Will take roughly 3 months for the gabapentin to work well and if he does not take it constantly there is no help for it.  If he decides to do surgery afterwards he needs to be on gabapentin to help with nerve pain    12/01/2022   X-rays obtained today of his hips and the knees.  He is scheduled to receive injections in his back in a  week with pain management Dr. Eddy  .  He said his right hip seems to be constantly aching him.  Last time we saw him we discussed that usually we do hip surgery 1st and then knee surgery after that in joint replacement.  However he does have quite a bit of range of motion left in his hips and the knee is the 1 that is concerning him on specially on the left side.  His pain level is 7/10 sometimes.  He is able to manage in trying to be very active he does water exercise programs.  He did have a lumbar injection around 5 weeks ago but now he is going for a 2nd injection.  He feels the right hip is the 1 that aches the most of both hips and his left knee.  He knows he has arthritis in both knees and both hips.  Also has issues in his back and stenosis.    No fever no chills no shortness of breath or difficulty with chewing swallowing loss of bowel bladder control blurry vision vision or double vision loss sense smell or taste  Past Medical History:   Diagnosis Date    Hyperlipidemia     PONV (postoperative nausea and vomiting) 1990    after knee arthroscopy    Vitamin D deficiency 12/18/2019     Past Surgical History:   Procedure Laterality Date    COLONOSCOPY      COLONOSCOPY N/A 12/19/2019    Procedure: COLONOSCOPY;  Surgeon: Bhupendra Wilkinson MD;  Location: [facility];  Service: Endoscopy;  Laterality: N/A;    KNEE ARTHROSCOPY Left     SELECTIVE INJECTION OF ANESTHETIC AGENT AROUND LUMBAR SPINAL NERVE ROOT BY TRANSFORAMINAL APPROACH Bilateral 1/21/2022    Procedure: Bilateral L4/5 TF LOVE with RN IV sedation;  Surgeon: Ben Eddy MD;  [facility];  Service: Pain Management;  Laterality: Bilateral;    SELECTIVE INJECTION OF ANESTHETIC AGENT AROUND LUMBAR SPINAL NERVE ROOT BY TRANSFORAMINAL APPROACH Bilateral 5/6/2022    Procedure: Bilateral L4/5 TF LOVE with RN IV sedation;  Surgeon: Ben Eddy MD;  Location: [facility];  Service: Pain Management;  Laterality: Bilateral;    SELECTIVE INJECTION OF  ANESTHETIC AGENT AROUND LUMBAR SPINAL NERVE ROOT BY TRANSFORAMINAL APPROACH Bilateral 10/25/2022    Procedure: Bilateral L4/5 TF LOVE with RN IV sedation;  Surgeon: Ben Eddy MD;  Location: [facility];  Service: Pain Management;  Laterality: Bilateral;    VASECTOMY  1995     Family History   Problem Relation Age of Onset    Cancer Mother         anal cancer    Diabetes Mother         PRE DIABETES    No Known Problems Sister     Gallbladder disease Brother     Hepatitis Brother         Hep C     Social History     Socioeconomic History    Marital status:    Tobacco Use    Smoking status: Never    Smokeless tobacco: Never   Substance and Sexual Activity    Alcohol use: Yes     Alcohol/week: 2.0 standard drinks     Types: 2 Glasses of wine per week    Drug use: Never     Social Determinants of Health     Financial Resource Strain: Low Risk     Difficulty of Paying Living Expenses: Not hard at all   Food Insecurity: No Food Insecurity    Worried About Running Out of Food in the Last Year: Never true    Ran Out of Food in the Last Year: Never true   Transportation Needs: No Transportation Needs    Lack of Transportation (Medical): No    Lack of Transportation (Non-Medical): No   Physical Activity: Sufficiently Active    Days of Exercise per Week: 4 days    Minutes of Exercise per Session: 50 min   Stress: No Stress Concern Present    Feeling of Stress : Not at all   Social Connections: Unknown    Frequency of Communication with Friends and Family: Twice a week    Frequency of Social Gatherings with Friends and Family: Once a week    Active Member of Clubs or Organizations: Yes    Attends Club or Organization Meetings: 1 to 4 times per year    Marital Status:    Housing Stability: Low Risk     Unable to Pay for Housing in the Last Year: No    Number of Places Lived in the Last Year: 1    Unstable Housing in the Last Year: No     Medication List with Changes/Refills   Current Medications     ERGOCALCIFEROL, VITAMIN D2, (VITAMIN D ORAL)    Take by mouth once daily.    GABAPENTIN (NEURONTIN) 600 MG TABLET    Take 1.5 tablets (900 mg total) by mouth every evening.    PRAVASTATIN (PRAVACHOL) 40 MG TABLET    TAKE 1 TABLET BY MOUTH EVERY DAY    TAMSULOSIN (FLOMAX) 0.4 MG CAP    TAKE 1 CAPSULE BY MOUTH EVERY DAY     Review of patient's allergies indicates:  No Known Allergies  Review of Systems   Constitutional: Negative for decreased appetite.   HENT:  Negative for tinnitus.    Eyes:  Negative for double vision.   Cardiovascular:  Negative for chest pain.   Respiratory:  Negative for wheezing.    Hematologic/Lymphatic: Negative for bleeding problem.   Skin:  Negative for dry skin.   Musculoskeletal:  Positive for arthritis, back pain, joint pain and stiffness. Negative for gout and neck pain.   Gastrointestinal:  Negative for abdominal pain.   Genitourinary:  Negative for bladder incontinence.   Neurological:  Negative for numbness, paresthesias and sensory change.   Psychiatric/Behavioral:  Negative for altered mental status.      Objective:   Body mass index is 28.97 kg/m².  There were no vitals filed for this visit.       General    Constitutional: He is oriented to person, place, and time. He appears well-developed.   HENT:   Head: Atraumatic.   Eyes: EOM are normal.   Pulmonary/Chest: Effort normal.   Neurological: He is alert and oriented to person, place, and time.   Psychiatric: Judgment normal.         Ambulating without any assistive devices  Lumbar without true deformity.  Negative straight leg raising bilaterally.  Pelvis is level  Right hip internal rotation 15° external rotation 20° at 90° of flexion.  Abduction of the hip to 30° and internal rotation yet severe pain in the groin.  No flexion contracture.  Right hip flexors and abductors are 5/5 however he has quad atrophy  Left hip internal rotation 15° external rotation 30° at 90° of flexion.  Abduction to 30° and internal rotation with very  mild pain in the groin.  No flexion contractures.  Hip flexors and abductors are 5/5 however is also he has quad atrophy  Right knee range of motion 0-120 degrees.  Very mild crepitus to compression on the patella.  No swelling no defect in the patella tendon or quadriceps tendon.  There is definitely quadriceps atrophy  Left knee with severe varus deformity.  There is quite a bit of loosening lateral collateral.  Range of motion is 5-100°.  No defect in the patella or quadriceps tendon.  There is quad atrophy also.  There is crepitus to compression on the patella as well as pain medially.  Calves are soft nontender  Ankle motion intact  Skin is warm to touch no obvious lesions  Capillary refill less than 2 seconds    Relevant imaging results reviewed and interpreted by me, discussed with the patient and / or family today     X-ray 12/01/2022 of the pelvis showing right hip with loss of joint space on the lateral aspect with large osteophytes of the acetabulum and superiorly and inferiorly with a small osteophyte on the femoral heads bilaterally.  Same findings on the left hip   X-ray of both knees showing left knee with complete loss of medial joint space with marginal osteophytic changes and severe varus deformity.  The right knee has moderately severe varus deformity and loss of medial joint space and marginal osteophytic changes  X-rays in the electronic records reviewed today in details  X-ray of the pelvis showing right hip with marginal osteophytic changes on the acetabulum with loss of joint space consistent with arthritis.  Left hip has some marginal osteophytes on the acetabulum and the hip joint also consistent with mild arthritis  X-ray of the left knee with complete loss of medial joint space.  Marginal osteophytic changes severe varus deformity.  Radiculopathy  EMG and nerve conduction study showing L2, L4, L5 and S1 radiculopathy  Assessment:     Encounter Diagnoses   Name Primary?    Arthritis of  knee, left Yes    Acquired varus deformity knee, left     Arthritis of knee, right     Arthritis of right hip     Lumbosacral radiculopathy at L5     Lumbosacral radiculopathy at S1     Lumbosacral radiculopathy at L2     Lumbosacral radiculopathy at L4     Spinal stenosis of lumbar region, unspecified whether neurogenic claudication present     Arthritis of left hip         Plan:   Arthritis of knee, left    Acquired varus deformity knee, left    Arthritis of knee, right    Arthritis of right hip    Lumbosacral radiculopathy at L5    Lumbosacral radiculopathy at S1    Lumbosacral radiculopathy at L2    Lumbosacral radiculopathy at L4    Spinal stenosis of lumbar region, unspecified whether neurogenic claudication present    Arthritis of left hip       Patient Instructions   X-ray today showing both hips with severe arthritis   The right hip is bothering her more than the left  X-ray today of both of her knees showing severe arthritis of the left knee with limited motion as well as moderately severe on the right knee   You having injection in you back in a week I will have the nurse call Dr. Covarrubias office maybe she can inject the right hip under fluoroscopy while she is doing her back  As far as if she can not inject her hips I will schedule you 1st part of January to inject both hips and the left knee under fluoroscopic guidance in the operating room    Surgery wise you would definitely need the left knee replaced when time comes in both of her hips if does bother you a lot.  Ideally we do hip replacement 1st and then knee replacement    Total knee replacement is considered outpatient surgery by the government.  That means you have you surgery go home the same day.  We will arrange for home health to come in to her house as well as home physical therapy for 2 weeks.  After 2 weeks you will go for outpatient physical therapy.  Will take roughly 3-6 months for complete healing to occur.  It will require a lot of  physical therapy on the knee to achieve good motion and good results.  Surgeries roughly an hour and half to do done under spinal or general anesthetic.  We will get you up in recovery room get you walking with the therapist before we send you home  I will give you a brochure about total knee replacement you also can look it up on you tube    We discussed total knee replacement in details with him.  I did tell him also right total hip replacement if that hip is bother him a lot needed to be done.  We did tell him the same story that the total knee replacement compared to the total hip replacement.  The only difference is that total hip replacement is 90% successful in decreasing pain increasing function compared to total knee which is 80%.  Brochures about both with pictures given.  We did show him x-rays of total knee replacement, total knee revisions and total hip replacement.  A lot of time spent with him explaining everything in details.  Him and his wife are there is a lot of questions asked and answered.  I definitely discussed with him that he can be more active after surgery however he should avoid high impact activities.  He likes to hike and do water exercises and walk and that is acceptable after joint replacement.  The pros and cons we did go into details with him at this time because he wants to think about it.  I did tell him as long as he can function sometimes you do not need to proceed with total joint replacement as low till the pain is intolerable.  He states about the right hip is more painful than the left knee however the left knee is more unstable due to the severe varus deformity.  I did tell him we can go either way do the right total hip 1st or left total knee 1st depending on what bothers him the most.  I discussed his x-rays with him in details also  We did discuss total knee replacement could be performed separately since he has good range of motion in the hip on the left side.  He would  like to have the knee done despite having arthritic changes in the hip despite the fact that he has good motion I do not see any reason why we can not do it.  He wants to wait till sometime in June or July.    I did discuss injections of both of his hips under fluoroscopy as well as his left knee if needed.  Will get in touch with his pain management to see if they can do it next week while they do his back  Disclaimer: This note was prepared using a voice recognition system and is likely to have sound alike errors within the text.

## 2022-12-01 NOTE — PATIENT INSTRUCTIONS
X-ray today showing both hips with severe arthritis   The right hip is bothering her more than the left  X-ray today of both of her knees showing severe arthritis of the left knee with limited motion as well as moderately severe on the right knee   You having injection in you back in a week I will have the nurse call Dr. Covarrubias office maybe she can inject the right hip under fluoroscopy while she is doing her back  As far as if she can not inject her hips I will schedule you 1st part of January to inject both hips and the left knee under fluoroscopic guidance in the operating room    Surgery wise you would definitely need the left knee replaced when time comes in both of her hips if does bother you a lot.  Ideally we do hip replacement 1st and then knee replacement    Total knee replacement is considered outpatient surgery by the government.  That means you have you surgery go home the same day.  We will arrange for home health to come in to her house as well as home physical therapy for 2 weeks.  After 2 weeks you will go for outpatient physical therapy.  Will take roughly 3-6 months for complete healing to occur.  It will require a lot of physical therapy on the knee to achieve good motion and good results.  Surgeries roughly an hour and half to do done under spinal or general anesthetic.  We will get you up in recovery room get you walking with the therapist before we send you home  I will give you a brochure about total knee replacement you also can look it up on you tube

## 2022-12-02 NOTE — PRE-PROCEDURE INSTRUCTIONS
Spoke with patient regarding procedure scheduled on 12.9    Arrival time 0715    Has patient been sick with fever or on antibiotics within the last 7 days? No    Does the patient have any open wounds, sores or rashes? No    Does the patient have any recent fractures? no    Has patient received a vaccination within the last 7 days? No    Received the COVID vaccination? yes    Has the patient stopped all medications as directed? NA    Does patient have a pacemaker and or defibrillator? no    Does the patient have a ride to and from procedure and someone reliable to remain with patient? wife    Is the patient diabetic? no    Does the patient have sleep apnea? Or use O2 at home? No and no     Is the patient receiving sedation? yes    Is the patient instructed to remain NPO beginning at midnight the night before their procedure? yes    Procedure location confirmed with patient? Yes    Covid- Denies signs/symptoms. Instructed to notify PAT/MD if any changes.

## 2022-12-02 NOTE — PRE-PROCEDURE INSTRUCTIONS
Attempted to PAT patient for procedure on 12.9 with Dr. lopez. No answer. LVM with return phone number. No return call at this time.

## 2022-12-05 ENCOUNTER — LAB VISIT (OUTPATIENT)
Dept: LAB | Facility: HOSPITAL | Age: 67
End: 2022-12-05
Attending: FAMILY MEDICINE
Payer: MEDICARE

## 2022-12-05 DIAGNOSIS — R79.89 ELEVATED LFTS: ICD-10-CM

## 2022-12-05 PROCEDURE — 80053 COMPREHEN METABOLIC PANEL: CPT | Performed by: FAMILY MEDICINE

## 2022-12-05 PROCEDURE — 36415 COLL VENOUS BLD VENIPUNCTURE: CPT | Performed by: FAMILY MEDICINE

## 2022-12-06 ENCOUNTER — TELEPHONE (OUTPATIENT)
Dept: INTERNAL MEDICINE | Facility: CLINIC | Age: 67
End: 2022-12-06
Payer: MEDICARE

## 2022-12-06 LAB
ALBUMIN SERPL BCP-MCNC: 4.2 G/DL (ref 3.5–5.2)
ALP SERPL-CCNC: 75 U/L (ref 55–135)
ALT SERPL W/O P-5'-P-CCNC: 33 U/L (ref 10–44)
ANION GAP SERPL CALC-SCNC: 9 MMOL/L (ref 8–16)
AST SERPL-CCNC: 27 U/L (ref 10–40)
BILIRUB SERPL-MCNC: 1 MG/DL (ref 0.1–1)
BUN SERPL-MCNC: 13 MG/DL (ref 8–23)
CALCIUM SERPL-MCNC: 9.4 MG/DL (ref 8.7–10.5)
CHLORIDE SERPL-SCNC: 108 MMOL/L (ref 95–110)
CO2 SERPL-SCNC: 25 MMOL/L (ref 23–29)
CREAT SERPL-MCNC: 1 MG/DL (ref 0.5–1.4)
EST. GFR  (NO RACE VARIABLE): >60 ML/MIN/1.73 M^2
GLUCOSE SERPL-MCNC: 88 MG/DL (ref 70–110)
POTASSIUM SERPL-SCNC: 4.4 MMOL/L (ref 3.5–5.1)
PROT SERPL-MCNC: 6.8 G/DL (ref 6–8.4)
SODIUM SERPL-SCNC: 142 MMOL/L (ref 136–145)

## 2022-12-06 NOTE — TELEPHONE ENCOUNTER
----- Message from Catalino Arias MD sent at 12/6/2022  7:08 AM CST -----  LFT normalized. Lab fine

## 2022-12-09 ENCOUNTER — HOSPITAL ENCOUNTER (OUTPATIENT)
Facility: HOSPITAL | Age: 67
Discharge: HOME | End: 2022-12-09
Attending: ANESTHESIOLOGY | Admitting: ANESTHESIOLOGY
Payer: MEDICARE

## 2022-12-09 VITALS
HEIGHT: 77 IN | WEIGHT: 241.19 LBS | BODY MASS INDEX: 28.48 KG/M2 | DIASTOLIC BLOOD PRESSURE: 70 MMHG | OXYGEN SATURATION: 98 % | HEART RATE: 44 BPM | TEMPERATURE: 98 F | SYSTOLIC BLOOD PRESSURE: 122 MMHG | RESPIRATION RATE: 20 BRPM

## 2022-12-09 DIAGNOSIS — M54.16 LUMBAR RADICULOPATHY: ICD-10-CM

## 2022-12-09 PROCEDURE — 25000003 PHARM REV CODE 250: Performed by: ANESTHESIOLOGY

## 2022-12-09 PROCEDURE — 25500020 PHARM REV CODE 255: Performed by: ANESTHESIOLOGY

## 2022-12-09 PROCEDURE — 62323 NJX INTERLAMINAR LMBR/SAC: CPT | Performed by: ANESTHESIOLOGY

## 2022-12-09 PROCEDURE — 63600175 PHARM REV CODE 636 W HCPCS: Performed by: ANESTHESIOLOGY

## 2022-12-09 PROCEDURE — 62323 PR INJ LUMBAR/SACRAL, W/IMAGING GUIDANCE: ICD-10-PCS | Mod: ,,, | Performed by: ANESTHESIOLOGY

## 2022-12-09 PROCEDURE — 62323 NJX INTERLAMINAR LMBR/SAC: CPT | Mod: ,,, | Performed by: ANESTHESIOLOGY

## 2022-12-09 RX ORDER — FENTANYL CITRATE 50 UG/ML
INJECTION, SOLUTION INTRAMUSCULAR; INTRAVENOUS
Status: DISCONTINUED | OUTPATIENT
Start: 2022-12-09 | End: 2022-12-09 | Stop reason: HOSPADM

## 2022-12-09 RX ORDER — MIDAZOLAM HYDROCHLORIDE 1 MG/ML
INJECTION, SOLUTION INTRAMUSCULAR; INTRAVENOUS
Status: DISCONTINUED | OUTPATIENT
Start: 2022-12-09 | End: 2022-12-09 | Stop reason: HOSPADM

## 2022-12-09 RX ORDER — BUPIVACAINE HYDROCHLORIDE 2.5 MG/ML
INJECTION, SOLUTION EPIDURAL; INFILTRATION; INTRACAUDAL
Status: DISCONTINUED | OUTPATIENT
Start: 2022-12-09 | End: 2022-12-09 | Stop reason: HOSPADM

## 2022-12-09 RX ORDER — METHYLPREDNISOLONE ACETATE 80 MG/ML
INJECTION, SUSPENSION INTRA-ARTICULAR; INTRALESIONAL; INTRAMUSCULAR; SOFT TISSUE
Status: DISCONTINUED | OUTPATIENT
Start: 2022-12-09 | End: 2022-12-09 | Stop reason: HOSPADM

## 2022-12-09 RX ORDER — INDOMETHACIN 25 MG/1
CAPSULE ORAL
Status: DISCONTINUED | OUTPATIENT
Start: 2022-12-09 | End: 2022-12-09 | Stop reason: HOSPADM

## 2022-12-09 NOTE — OP NOTE
Lumbar Interlaminar Epidural Steroid Injection under Fluoroscopic Guidance.   Time-out taken to identify patient and procedure prior to starting the procedure.     12/09/2022    PROCEDURE: Interlaminar epidural steroid injection under fluoroscopic guidance.     Pre-Op diagnosis: Lumbar radiculopathy, chronic [M54.16]    Post-Op diagnosis: Lumbar radiculopathy, chronic [M54.16]    PHYSICIAN: Ben Eddy MD    ASSISTANTS: None     ESTIMATED BLOOD LOSS: none.     COMPLICATIONS: none.     SPECIMENS: none    TECHNIQUE: With the patient laying in a prone position, the area was prepped and draped in the usual sterile fashion using ChloraPrep and a fenestrated drape. 1% lidocaine was given using a 27-gauge needle by raising a wheal and going down to the hub of the needle over the L4/5 interlaminar space.  The interlaminar space was then approached with a 3.5 inch 18-gauge Touhy needle was introduced under fluoroscopic guidance in the AP and Lateral view. Once the Ligamentum flavum was encountered loss of resistance to saline was used to enter the epidural space. With positive loss of resistance and negative CSF or Blood, 3mL contrast dye Omnipaque (300mg/ml) was injected to confirm placement and there was no vascular runoff. Then 1ml 80mg/ml Depomedrol + 5 mL 0.25% Bupivicaine  was injected slowly. Displacement of the radio opaque contrast after injection of the medication confirmed that the medication went into the area of the epidural space.  The patient tolerated the procedure well.     Conscious sedation provided by M.D    The patient was monitored with continuous pulse oximetry, EKG, and intermittent blood pressure monitors.  The patient was hemodynamically stable throughout the entire process was responsive to voice, and breathing spontaneously.  Supplemental O2 was provided at 2L/min via nasal cannula.  Patient was comfortable for the duration of the procedure. (See nurse documentation and case log for sedation  time)    There was a total of 1mg IV Midazolam and 50mcg Fentanyl titrated for the procedure      The patient was monitored after the procedure.   They were given post-procedure and discharge instructions to follow at home.  The patient was discharged in a stable condition.

## 2022-12-09 NOTE — DISCHARGE SUMMARY
Discharge Note  Short Stay      SUMMARY     Admit Date: 12/9/2022    Attending Physician: Ben Eddy MD        Discharge Physician: Ben Eddy MD        Discharge Date: 12/9/2022 8:30 AM    Procedure(s) (LRB):  L4-5 intralaminar epidural steroid injection with left paramedian approach (N/A)    Final Diagnosis: Lumbar radiculopathy, chronic [M54.16]    Disposition: Home or self care    Patient Instructions:   Current Discharge Medication List        CONTINUE these medications which have NOT CHANGED    Details   ergocalciferol, vitamin D2, (VITAMIN D ORAL) Take by mouth once daily.      gabapentin (NEURONTIN) 600 MG tablet Take 1.5 tablets (900 mg total) by mouth every evening.  Qty: 135 tablet, Refills: 0      pravastatin (PRAVACHOL) 40 MG tablet TAKE 1 TABLET BY MOUTH EVERY DAY  Qty: 90 tablet, Refills: 3      tamsulosin (FLOMAX) 0.4 mg Cap TAKE 1 CAPSULE BY MOUTH EVERY DAY  Qty: 90 capsule, Refills: 2                 Discharge Diagnosis: Lumbar radiculopathy, chronic [M54.16]  Condition on Discharge: Stable with no complications to procedure   Diet on Discharge: Same as before.  Activity: as per instruction sheet.  Discharge to: Home with a responsible adult.  Follow up: 2-4 weeks       Please call the office at [phone] if you experience any weakness or loss of sensation, fever > 101.5, pain uncontrolled with oral medications, persistent nausea/vomiting/or diarrhea, redness or drainage from the incisions, or any other worrisome concerns. If physician on call was not reached or could not communicate with our office for any reason please go to the nearest emergency department

## 2022-12-09 NOTE — DISCHARGE INSTRUCTIONS

## 2023-01-09 NOTE — PROGRESS NOTES
Established Patient Interventional Pain Clinic Note     The patient location is: home  The chief complaint leading to consultation is: leg pain  Visit type: Virtual visit with synchronous audio and video  Total time spent with patient: 15m  Each patient to whom he or she provides medical services by telemedicine is: (1) informed of the relationship between the physician and patient and the respective role of any other health care provider with respect to management of the patient; and (2) notified that he or she may decline to receive medical services by telemedicine and may withdraw from such care at any time.    Referring Physician: No ref. provider found    PCP: Catalino Arias MD    Chief Complaint:   LBP + leg pain     SUBJECTIVE:  Interval Hx: 1/10/2023  Patient presents status post L4-5 interlaminar epidural steroid injection 12/09/2022.  Today patient reports meaningful improvement in lower extremity radicular pain, exceeding 50% lasting approximately 2 weeks.  Patient reports this pain relief has now dissipated.  Pain today is rated a 7/10.  Patient again reports pain in bilateral L3-4 distribution down bilateral lower extremities from the groin to the knee.  Pain is exacerbated with lumbar extension, standing and with ambulation.  Patient reports he is unable to even ambulate 300 yd without requiring rest.  Patient has continued gabapentin 900 mg in the evening without meaningful relief.      Interval history 11/22/2022  Patient presents status post bilateral L4/5 transforaminal epidural steroid injection 10/25/22.  Patient reports 85% relief in bilateral lower extremity radiculopathy for at least 2 weeks following his injection.  Patient reports insidious return primarily on the left in L3-4 distribution from the groin to the knee.  Pain today is rated a 4/10.  Patient reports after standing or ambulating for 15-20 minutes significant pain which inhibits activities of daily living.  Patient reports  recently going on a trip to [place] unable to even ambulate a mi secondary to the pain.  Patient has continued gabapentin 900 mg nightly and is requesting a refill.  Patient continues to report significant relief and right lower extremity radicular pain.  He denies significant lower extremity weakness, saddle anesthesia.      Interval Hx: 9/19/22  Patient presents for 3 month follow-up.  He continues to report relief in the lower extremities following prior transforaminal epidural steroid injection which is slowly dwindling.  Pain today is rated a 6/10.  Patient reports he has been participating in aquatic physical therapy and aquatic sports.  Patient does report it has become difficulty standing or ambulating for long periods.  Patient is interested in repeating transforaminal epidural steroid injection.  Patient also reports needing a refill for gabapentin.  Patient did reach 900 mg titration.  Patient denies any side effects from this medication.  Patient denies any significant lower extremity weakness, bowel or bladder incontinence or saddle anesthesia.      Interval history 06/16/2022  Patient presents status post bilateral L4/5 transforaminal epidural steroid injection 05/06/2022.  Patient reports 85% sustained relief in bilateral lower extremity radiculopathy following transforaminal epidural steroid injection.  Patient reports improved mobility.  He reports he has been doing exercises in the water and biking.  He is reported significant improved functionality.  Patient questions whether he should restart gabapentin and reports following up with Dr. Woods, with recommendation to reinitiate gabapentin at 600 mg in the evening.  Patient is requesting a refill for this medication.  Patient denies significant lower extremity weakness, bowel or bladder incontinence or saddle anesthesia.    Interval history 04/26/2022  Patient presents for 2 month follow-up.  He reports insidious return of lower extremity  radiculopathy.  Patient again reports pain which radiates down the posterior lateral aspects of bilateral lower extremities to the plantar aspect of the feet in L4-5 distribution.  Patient reports pain is significantly worse on the left than on the right.  Pain today is rated a 6/10.  Pain is described as tingling and numbness in nature.  Patient reports he has continued ibuprofen which has marginally helped with this pain but has been encouraged by his primary care provider to discontinue this medication daily.  Patient has discontinued gabapentin since our last clinic visit.  Patient denies significant lower extremity weakness, bowel or bladder incontinence or saddle anesthesia.  Patient is interested in pursuing repeat injection.      Interval history 02/23/2022  Patient presents status post bilateral L4/5 transforaminal epidural steroid injection 01/21/2022.  Patient reports 80% sustained relief in lower extremity radicular symptoms following epidural steroid injection.  Patient has continued physical therapy.  He notices incremental improvement in range of motion, ambulation daily.  Patient reports prior to the injection he was ambulating approximately 2500 steps daily.  Patient reports yesterday he ambulated over 5000 steps.  Patient has discontinued gabapentin secondary to superior pain relief.  Patient is enquiring on the safety of an inversion table.  He denies any new onset lower extremity weakness, bowel or bladder incontinence or saddle anesthesia.    Interval history 01/18/2022  Patient presents for MRI review.  Today patient again reports lower back pain which radiates down the anterior aspects of bilateral lower extremities in L3-4 distribution to the knee.  Today patient reports pain is a 4/10.  Patient has continued gabapentin titration and has reached 600 mg 3 times daily.  Patient denies any side effects from this medication.  Patient reports diminishing return and benefit from aqua therapy  "secondary to his symptoms.  He denies significant lower extremity weakness or bowel or bladder incontinence.  Patient is interested in pursuing intervention.    HPI 12/21/2021  Myles Pride is a 67 y.o. male with past medical history significant for hyperlipidemia who presents to the clinic for the evaluation of lower back and leg pain.  Patient reports pain began with treatment (physical therapy) of right hip pain, approximately 2 months prior.  Today patient reports lower back pain which presents in a bandlike distribution in his lower back and radiates down the anterior aspects of bilateral lower extremities in L3-4 distribution to the knee.  Pain is intermittent and described as 4/10.  Pain is described as a aching sensation in the back and a tight sensation in bilateral lower extremities.  Pain is exacerbated when moving from sitting to standing and with ambulation.  Patient reports he was able to ambulate "6/10th of a mile" in the past and now is only able to ambulate approximately 1-2 blocks before requiring rest.  Patient does report associated subjective weakness in bilateral lower extremities.  Pain is improved with heat, sitting, reclining, prior prescription for Medrol Dosepak from his primary care PA, Ms. Riddle.     Patient reports significant motor weakness   Patient denies night fever/night sweats, urinary incontinence, bowel incontinence and significant weight loss and loss of sensations.    Pain Disability Index Review:     Last 3 PDI Scores 11/22/2022 1/18/2022 12/21/2021   Pain Disability Index (PDI) 24 28 39       Non-Pharmacologic Treatments:  Physical Therapy/Home Exercise: yes  Ice/Heat:yes  TENS: no  Acupuncture: no  Massage: no  Chiropractic: no    Other: no      Pain Medications:  - Adjuvant Medications: Alleve (Naproxen) and Neurontin (Gabapentin)    Pain Procedures:   -12/09/2022: L4-5 interlaminar epidural steroid injection  -10/25/2022: Bilateral L4/5 transforaminal epidural " steroid injection  -05/06/2022:  Bilateral L4/5 transforaminal epidural steroid injection  -01/21/2022:  Bilateral L4/5 transforaminal epidural steroid injection    Past Medical History:   Diagnosis Date    Hyperlipidemia     PONV (postoperative nausea and vomiting) 1990    after knee arthroscopy    Vitamin D deficiency 12/18/2019     Past Surgical History:   Procedure Laterality Date    COLONOSCOPY      COLONOSCOPY N/A 12/19/2019    Procedure: COLONOSCOPY;  Surgeon: Bhupendra Wilkinson MD;  Location: [facility];  Service: Endoscopy;  Laterality: N/A;    EPIDURAL STEROID INJECTION N/A 12/9/2022    Procedure: L4-5 intralaminar epidural steroid injection with left paramedian approach;  Surgeon: Ben Eddy MD;  Location: [facility];  Service: Pain Management;  Laterality: N/A;    KNEE ARTHROSCOPY Left     SELECTIVE INJECTION OF ANESTHETIC AGENT AROUND LUMBAR SPINAL NERVE ROOT BY TRANSFORAMINAL APPROACH Bilateral 1/21/2022    Procedure: Bilateral L4/5 TF LOVE with RN IV sedation;  Surgeon: Ben Eddy MD;  Location: [facility];  Service: Pain Management;  Laterality: Bilateral;    SELECTIVE INJECTION OF ANESTHETIC AGENT AROUND LUMBAR SPINAL NERVE ROOT BY TRANSFORAMINAL APPROACH Bilateral 5/6/2022    Procedure: Bilateral L4/5 TF LOVE with RN IV sedation;  Surgeon: Ben Eddy MD;  Location: [facility];  Service: Pain Management;  Laterality: Bilateral;    SELECTIVE INJECTION OF ANESTHETIC AGENT AROUND LUMBAR SPINAL NERVE ROOT BY TRANSFORAMINAL APPROACH Bilateral 10/25/2022    Procedure: Bilateral L4/5 TF LOVE with RN IV sedation;  Surgeon: Ben Eddy MD;  Location: [facility];  Service: Pain Management;  Laterality: Bilateral;    VASECTOMY  1995     Review of patient's allergies indicates:  No Known Allergies    Current Outpatient Medications   Medication Sig    ergocalciferol, vitamin D2, (VITAMIN D ORAL) Take by mouth once daily.    gabapentin (NEURONTIN) 600 MG tablet Take 1.5 tablets (900 mg total) by  mouth every evening.    pravastatin (PRAVACHOL) 40 MG tablet TAKE 1 TABLET BY MOUTH EVERY DAY    tamsulosin (FLOMAX) 0.4 mg Cap TAKE 1 CAPSULE BY MOUTH EVERY DAY     No current facility-administered medications for this visit.       Review of Systems     GENERAL:  No weight loss, malaise or fevers.  HEENT:   No recent changes in vision or hearing  NECK:  Negative for lumps, no difficulty with swallowing.  RESPIRATORY:  Negative for cough, wheezing or shortness of breath, patient denies any recent URI.  CARDIOVASCULAR:  Negative for chest pain, leg swelling or palpitations.  GI:  Negative for abdominal discomfort, blood in stools or black stools or change in bowel habits.  MUSCULOSKELETAL:  See HPI.  SKIN:  Negative for lesions, rash, and itching.  PSYCH:  No mood disorder or recent psychosocial stressors.   HEMATOLOGY/LYMPHOLOGY:  Negative for prolonged bleeding, bruising easily or swollen nodes.    NEURO:   No history of headaches, syncope, paralysis, seizures or tremors.  All other reviewed and negative other than HPI.    OBJECTIVE:    There were no vitals taken for this visit.      Physical Exam  1/18/22  GENERAL: Well appearing, in no acute distress, alert and oriented x3.  PSYCH:  Mood and affect appropriate.  SKIN: Skin color, texture, turgor normal, no rashes or lesions.  HEAD/FACE:  Normocephalic, atraumatic. Cranial nerves grossly intact.    CV: RRR with palpation of the radial artery.  PULM: No evidence of respiratory difficulty, symmetric chest rise.  GI:  Soft and non-tender.    BACK: genu varus b/l. Straight leg raising in the sitting and supine positions is negative to radicular pain. No pain to palpation over the facet joints of the lumbar spine or spinous processes. Normal range of motion without pain reproduction.  EXTREMITIES: Peripheral joint ROM is full and pain free without obvious instability or laxity in all four extremities. No deformities, edema, or skin discoloration. Good capillary  refill.  MUSCULOSKELETAL: Able to stand on heels & toes.  Right lower extremity, approximately half to 1 in longer than left lower extremity.  hip, and knee provocative maneuvers are negative.  There is no pain with palpation over the sacroiliac joints bilaterally.  FABERs test is negative.  FADIR test is negative bilaterally.   Bilateral upper and lower extremity strength is normal and symmetric.  No atrophy or tone abnormalities are noted.  RIGHT Lower extremity: Hip flexion 5/5, Hip Abduction 5/5, Hip Adduction 5/5, Knee extension 5/5, Knee flexion 5/5, Ankle dorsiflexion5/5, Extensor hallucis longus 5/5, Ankle plantarflexion 5/5  LEFT Lower extremity:  Hip flexion 5/5, Hip Abduction 5/5,Hip Adduction 5/5, Knee extension 5/5, Knee flexion 5/5, Ankle dorsiflexion 5/5, Extensor hallucis longus 5/5, Ankle plantarflexion 5/5  -Normal testing knee (patellar) jerk and ankle (achilles) jerk    NEURO: Bilateral upper and lower extremity coordination and muscle stretch reflexes are physiologic and symmetric. No loss of sensation is noted.  GAIT: normal.    Imaging  MRI lumbar spine 12/21/2021  FINDINGS:  Vertebral body heights with upper lumbar and lower thoracic spine Schmorl node endplate changes.  No spondylolisthesis.  Mild Modic 1 endplate changes noted at L4-5.  Multilevel disc desiccation present with height loss most noted at L4-5.     Conus terminates normally.  Visualized intra-abdominal/pelvic structures are unremarkable.     L1-L2: No spinal canal stenosis or neural foraminal narrowing.  Facet arthropathy findings.     L2-L3: No significant spinal canal stenosis or neural foraminal narrowing.  Moderate to severe facet arthropathy.     L3-L4: Circumferential disc bulging present with suspected right paracentral small disc protrusion.  This in conjunction with congenitally short pedicles and prominent facet/ligamentum flavum degenerative hypertrophy findings causes severe spinal canal stenosis.  Small right  facet joint effusion present with 4 mm right facet synovial cyst deep to the ligamentum flavum.  Left foraminal extraforaminal annular fissure.  Mild bilateral inferior neural foraminal narrowing.     L4-L5: Circumferential disc bulging in conjunction with congenitally short pedicles and prominent facet degenerative hypertrophy findings causes severe spinal canal stenosis.  Severe bilateral neural foraminal narrowing present with likely impingement of the exiting nerve roots.     L5-S1: No significant posterior disc bulge.  Prominent facet arthropathy noted with mild left and moderate right neural foraminal narrowing.       09/13/21  X-Ray Lumbar Spine 5 View  FINDINGS:  There is minimal lumbar levocurvature.  No fracture or malalignment.  No pars defect.  There is mild loss of disc height at L4-5.  Severe multilevel facet arthropathy is present.    ASSESSMENT: 67 y.o. year old male with lower back and leg pain, consistent with     1. Lumbar radiculopathy  Ambulatory referral/consult to Psychiatry      2. Spinal stenosis of lumbar region without neurogenic claudication  Ambulatory referral/consult to Psychiatry    Ambulatory referral/consult to Neurosurgery      3. Lumbar facet arthropathy        4. DDD (degenerative disc disease), lumbar        5. Causalgia of lower extremity, unspecified laterality  MRI Thoracic Spine Without Contrast    Ambulatory referral/consult to Psychiatry    Ambulatory referral/consult to Neurosurgery                  PLAN:   - Interventions:  - Interventions: Patient is a candidate for spinal cord stimulation device secondary to chronic pain syndrome and multiple failed attempts of other interventions and medical management. Explained the risks and benefits of the procedure in detail with the patient today in clinic along with alternative treatment options, and the patient elected to pursue the intervention at this time.    -Will initiate SCS protocol:  -Neurophys evaluation needs to be  scheduled  -Will FU on MRI thoracic spine  -CMP: 12/05/22  -CBC: 10/3/22  -Will contact [employer] [facility] representative to discuss trial dates/reach out to patient with patient ambassador  -When checklist is complete, will schedule patient for [employer] SCS trial     - Anticoagulation use: no no anticoagulation       report:  Reviewed and consistent with medication use as prescribed.    - Medications:  Continue gabapentin.  We have reviewed potential side effects of this medication including daytime somnolence, weight gain and peripheral edema  Gabapentin : 900 mg QHS  Ninety day supply given    - Therapy:   We discussed continuing physical therapy to help manage the patient/s painful condition. The patient was counseled that muscle strengthening will improve the long term prognosis in regards to pain and may also help increase range of motion and mobility.     -Consults/Referrals:   -HME : continue heel insert/fitting for leg length discrepancy  -Neurosurgery; Dr. Lobato for surgical evaluation and treatment recommendations    - Imaging: Reviewed available imaging with patient and answered any questions they had regarding study; MRI thoracic spine to better evaluate pain and weakness    - Follow up visit: return to clinic in  2 weeks     The above plan and management options were discussed at length with patient. Patient is in agreement with the above and verbalized understanding.    - I discussed the goals of interventional chronic pain management with the patient on today's visit. We discussed a multimodal and systematic approach to pain.  This includes diagnostic and therapeutic injections, adjuvant pharmacologic treatment, physical therapy, and at times psychiatry.  I emphasized the importance of regular exercise, core strengthening and stretching, diet and weight loss as a cornerstone of long-term pain management.    - This condition does not require this patient to take time off of work, and the primary goal of our  Pain Management services is to improve the patient's functional capacity.  - Patient Questions: Answered all of the patient's questions regarding diagnoses, therapy, treatment and next steps        Ben Eddy MD  Interventional Pain Management  [facility]    Disclaimer:  This note was prepared using voice recognition system and is likely to have sound alike errors that may have been overlooked even after proof reading.  Please call me with any questions

## 2023-01-10 ENCOUNTER — OFFICE VISIT (OUTPATIENT)
Dept: PAIN MEDICINE | Facility: CLINIC | Age: 68
End: 2023-01-10
Payer: MEDICARE

## 2023-01-10 ENCOUNTER — TELEPHONE (OUTPATIENT)
Dept: NEUROSURGERY | Facility: CLINIC | Age: 68
End: 2023-01-10
Payer: MEDICARE

## 2023-01-10 DIAGNOSIS — M48.061 SPINAL STENOSIS OF LUMBAR REGION WITHOUT NEUROGENIC CLAUDICATION: ICD-10-CM

## 2023-01-10 DIAGNOSIS — G57.70 CAUSALGIA OF LOWER EXTREMITY, UNSPECIFIED LATERALITY: ICD-10-CM

## 2023-01-10 DIAGNOSIS — M51.36 DDD (DEGENERATIVE DISC DISEASE), LUMBAR: ICD-10-CM

## 2023-01-10 DIAGNOSIS — M54.16 LUMBAR RADICULOPATHY: Primary | ICD-10-CM

## 2023-01-10 DIAGNOSIS — M47.816 LUMBAR FACET ARTHROPATHY: ICD-10-CM

## 2023-01-10 PROCEDURE — 1160F PR REVIEW ALL MEDS BY PRESCRIBER/CLIN PHARMACIST DOCUMENTED: ICD-10-PCS | Mod: CPTII,95,, | Performed by: ANESTHESIOLOGY

## 2023-01-10 PROCEDURE — 1159F MED LIST DOCD IN RCRD: CPT | Mod: CPTII,95,, | Performed by: ANESTHESIOLOGY

## 2023-01-10 PROCEDURE — 1160F RVW MEDS BY RX/DR IN RCRD: CPT | Mod: CPTII,95,, | Performed by: ANESTHESIOLOGY

## 2023-01-10 PROCEDURE — 99214 OFFICE O/P EST MOD 30 MIN: CPT | Mod: 95,,, | Performed by: ANESTHESIOLOGY

## 2023-01-10 PROCEDURE — 99214 PR OFFICE/OUTPT VISIT, EST, LEVL IV, 30-39 MIN: ICD-10-PCS | Mod: 95,,, | Performed by: ANESTHESIOLOGY

## 2023-01-10 PROCEDURE — 1159F PR MEDICATION LIST DOCUMENTED IN MEDICAL RECORD: ICD-10-PCS | Mod: CPTII,95,, | Performed by: ANESTHESIOLOGY

## 2023-01-13 ENCOUNTER — HOSPITAL ENCOUNTER (OUTPATIENT)
Dept: RADIOLOGY | Facility: HOSPITAL | Age: 68
Discharge: HOME | End: 2023-01-13
Attending: ANESTHESIOLOGY
Payer: MEDICARE

## 2023-01-13 DIAGNOSIS — G57.70 CAUSALGIA OF LOWER EXTREMITY, UNSPECIFIED LATERALITY: ICD-10-CM

## 2023-01-13 PROCEDURE — 72146 MRI CHEST SPINE W/O DYE: CPT | Mod: 26,,, | Performed by: RADIOLOGY

## 2023-01-13 PROCEDURE — 72146 MRI THORACIC SPINE WITHOUT CONTRAST: ICD-10-PCS | Mod: 26,,, | Performed by: RADIOLOGY

## 2023-01-13 PROCEDURE — 72146 MRI CHEST SPINE W/O DYE: CPT | Mod: TC,PN

## 2023-01-16 ENCOUNTER — PATIENT MESSAGE (OUTPATIENT)
Dept: PSYCHIATRY | Facility: CLINIC | Age: 68
End: 2023-01-16
Payer: MEDICARE

## 2023-01-19 ENCOUNTER — OFFICE VISIT (OUTPATIENT)
Dept: NEUROSURGERY | Facility: CLINIC | Age: 68
End: 2023-01-19
Payer: MEDICARE

## 2023-01-19 VITALS
RESPIRATION RATE: 18 BRPM | DIASTOLIC BLOOD PRESSURE: 85 MMHG | HEIGHT: 77 IN | HEART RATE: 48 BPM | BODY MASS INDEX: 28.46 KG/M2 | SYSTOLIC BLOOD PRESSURE: 159 MMHG | WEIGHT: 241 LBS

## 2023-01-19 DIAGNOSIS — M48.061 SPINAL STENOSIS OF LUMBAR REGION WITHOUT NEUROGENIC CLAUDICATION: ICD-10-CM

## 2023-01-19 DIAGNOSIS — M48.07 SPINAL STENOSIS, LUMBOSACRAL REGION: Primary | ICD-10-CM

## 2023-01-19 DIAGNOSIS — G57.70 CAUSALGIA OF LOWER EXTREMITY, UNSPECIFIED LATERALITY: ICD-10-CM

## 2023-01-19 PROCEDURE — 99999 PR PBB SHADOW E&M-EST. PATIENT-LVL IV: CPT | Mod: PBBFAC,HCNC,, | Performed by: PHYSICIAN ASSISTANT

## 2023-01-19 PROCEDURE — 3077F PR MOST RECENT SYSTOLIC BLOOD PRESSURE >= 140 MM HG: ICD-10-PCS | Mod: HCNC,CPTII,S$GLB, | Performed by: PHYSICIAN ASSISTANT

## 2023-01-19 PROCEDURE — 99204 PR OFFICE/OUTPT VISIT, NEW, LEVL IV, 45-59 MIN: ICD-10-PCS | Mod: HCNC,S$GLB,, | Performed by: PHYSICIAN ASSISTANT

## 2023-01-19 PROCEDURE — 99999 PR PBB SHADOW E&M-EST. PATIENT-LVL IV: ICD-10-PCS | Mod: PBBFAC,HCNC,, | Performed by: PHYSICIAN ASSISTANT

## 2023-01-19 PROCEDURE — 3077F SYST BP >= 140 MM HG: CPT | Mod: HCNC,CPTII,S$GLB, | Performed by: PHYSICIAN ASSISTANT

## 2023-01-19 PROCEDURE — 3008F PR BODY MASS INDEX (BMI) DOCUMENTED: ICD-10-PCS | Mod: HCNC,CPTII,S$GLB, | Performed by: PHYSICIAN ASSISTANT

## 2023-01-19 PROCEDURE — 3288F PR FALLS RISK ASSESSMENT DOCUMENTED: ICD-10-PCS | Mod: HCNC,CPTII,S$GLB, | Performed by: PHYSICIAN ASSISTANT

## 2023-01-19 PROCEDURE — 1125F AMNT PAIN NOTED PAIN PRSNT: CPT | Mod: HCNC,CPTII,S$GLB, | Performed by: PHYSICIAN ASSISTANT

## 2023-01-19 PROCEDURE — 1125F PR PAIN SEVERITY QUANTIFIED, PAIN PRESENT: ICD-10-PCS | Mod: HCNC,CPTII,S$GLB, | Performed by: PHYSICIAN ASSISTANT

## 2023-01-19 PROCEDURE — 1101F PT FALLS ASSESS-DOCD LE1/YR: CPT | Mod: HCNC,CPTII,S$GLB, | Performed by: PHYSICIAN ASSISTANT

## 2023-01-19 PROCEDURE — 3288F FALL RISK ASSESSMENT DOCD: CPT | Mod: HCNC,CPTII,S$GLB, | Performed by: PHYSICIAN ASSISTANT

## 2023-01-19 PROCEDURE — 1101F PR PT FALLS ASSESS DOC 0-1 FALLS W/OUT INJ PAST YR: ICD-10-PCS | Mod: HCNC,CPTII,S$GLB, | Performed by: PHYSICIAN ASSISTANT

## 2023-01-19 PROCEDURE — 3079F DIAST BP 80-89 MM HG: CPT | Mod: HCNC,CPTII,S$GLB, | Performed by: PHYSICIAN ASSISTANT

## 2023-01-19 PROCEDURE — 99204 OFFICE O/P NEW MOD 45 MIN: CPT | Mod: HCNC,S$GLB,, | Performed by: PHYSICIAN ASSISTANT

## 2023-01-19 PROCEDURE — 3008F BODY MASS INDEX DOCD: CPT | Mod: HCNC,CPTII,S$GLB, | Performed by: PHYSICIAN ASSISTANT

## 2023-01-19 PROCEDURE — 3079F PR MOST RECENT DIASTOLIC BLOOD PRESSURE 80-89 MM HG: ICD-10-PCS | Mod: HCNC,CPTII,S$GLB, | Performed by: PHYSICIAN ASSISTANT

## 2023-01-19 NOTE — PROGRESS NOTES
Myles Pride is a 66 yo male that presents today with aching lumbar back pain that radiates down the left leg. This pain stops at the knee. On rare occasion he has numbness and tingling in foot. Sometimes the right leg is affected too.  Exacerbated by walking upright.  Denies weakness, BB changes outside of prostate issues, or saddle anesthesia. Denies neck pain.  Referred by Dr. Eddy. She recommended SCS.  Tried  gabapentin not sure if it is helping, tylenol provides some relief  4 injections. The last injection was not providing the same relief.    Is able to ambulate unassisted  Has chronic hip pain.   In 2021 after lifting heavy furniture he was unable to lift the right hip. He went to PT for it and while in PT he started noticing back pain. (Went to PT from oct to march and water aerobics) feels like the pt helped his hip pain

## 2023-01-19 NOTE — PROGRESS NOTES
Subjective:      Patient ID: Myles Pride is a 67 y.o. male.    HPI  The patient is here today for evaluation of back and leg pain.  He is referred to our clinic by Dr. Eddy.  Patient started having issues with these areas in 2021.  Pain radiates down both legs, worse on LLE.  Pain does not radiate beyond the knee.  Also numbness/tingling.  Has tightness in quad and hamstring area.  Today he feels tight in hamstring-left.  Patient has felt these symptoms with RLE.    In 2021 he was experiencing severe hip pain- R hip.  He was told his pain was arthritic derived.  At times he has this but it is mild cecil in the morning and putting his shoes on.  He denies pain above his abdomen - no neck or upper back pain.    No previous spine surgery.  Pain Procedures:   -12/09/2022: L4-5 interlaminar epidural steroid injection  -10/25/2022: Bilateral L4/5 transforaminal epidural steroid injection  -05/06/2022:  Bilateral L4/5 transforaminal epidural steroid injection  -01/21/2022:  Bilateral L4/5 transforaminal epidural steroid injection    Ambulates unassisted.  Takes Gabapentin but unsure if it's truly effective.  No acute bladder/bowel changes. Takes FLomax for BPH.  Denies recent falls, injuries.    Objective:     Body mass index is 28.58 kg/m².  Vitals:    01/19/23 1435   BP: (!) 159/85   Pulse: (!) 48   Resp: 18        Back:  None  Paraspinal muscle spasms   None  Pain with flexion and extention   WNL  Range of motion    Neg  Straight leg raise     Motor   Right Right Left Left  Level Group   5  5  L2 Hip flexor (Psoas)   5  5  L3 Leg extension (Quads)   5  5  L4 Dorsiflexion & foot inversion (Tibialis Anterior)   5  5  L5 Great toe extension ( EHL)   5  5  S1 Foot eversion (Gastroc, PL & PB)     Sensation  NL Decreased (R/L/BL) Level Sensation    X  L2 Anterio-medial thigh   X  L3 Medial thigh around knee   X  L4 Medial foot   X  L5 Dorsum foot   X  S1 Lateral foot     Reflex  2+  Patellar tendon (L4)   2+  Achilles  tendon (S1)       Results for orders placed during the hospital encounter of 01/10/22    MRI Lumbar Spine Without Contrast    Narrative  EXAMINATION:  MRI LUMBAR SPINE WITHOUT CONTRAST    CLINICAL HISTORY:  Lumbar radiculopathy, no red flags, no prior management; Radiculopathy, lumbar region    TECHNIQUE:  Multiplanar, multisequence MR images were acquired from the thoracolumbar junction to the sacrum without the administration of contrast.    COMPARISON:  Prior radiograph    FINDINGS:  Vertebral body heights with upper lumbar and lower thoracic spine Schmorl node endplate changes.  No spondylolisthesis.  Mild Modic 1 endplate changes noted at L4-5.  Multilevel disc desiccation present with height loss most noted at L4-5.    Conus terminates normally.  Visualized intra-abdominal/pelvic structures are unremarkable.    L1-L2: No spinal canal stenosis or neural foraminal narrowing.  Facet arthropathy findings.    L2-L3: No significant spinal canal stenosis or neural foraminal narrowing.  Moderate to severe facet arthropathy.    L3-L4: Circumferential disc bulging present with suspected right paracentral small disc protrusion.  This in conjunction with congenitally short pedicles and prominent facet/ligamentum flavum degenerative hypertrophy findings causes severe spinal canal stenosis.  Small right facet joint effusion present with 4 mm right facet synovial cyst deep to the ligamentum flavum.  Left foraminal extraforaminal annular fissure.  Mild bilateral inferior neural foraminal narrowing.    L4-L5: Circumferential disc bulging in conjunction with congenitally short pedicles and prominent facet degenerative hypertrophy findings causes severe spinal canal stenosis.  Severe bilateral neural foraminal narrowing present with likely impingement of the exiting nerve roots.    L5-S1: No significant posterior disc bulge.  Prominent facet arthropathy noted with mild left and moderate right neural foraminal  narrowing.    Impression  Multilevel degenerative findings, most severe at L4-5 as above.     Lab Results   Component Value Date    WBC 5.80 10/03/2022    HCT 45.1 10/03/2022     INDEPENDENT INTERPRETATION OF TEST:  Relevant imaging results reviewed and interpreted by me, discussed with the patient and / or family today.  Assessment:     1. Spinal stenosis, lumbosacral region    2. Spinal stenosis of lumbar region without neurogenic claudication    3. Causalgia of lower extremity, unspecified laterality      Plan:     Spinal stenosis, lumbosacral region  -     X-Ray Lumbar Spine Flexion And Extension Only; Future; Expected date: 01/19/2023  -     CT Lumbar Spine Without Contrast; Future; Expected date: 01/19/2023    Spinal stenosis of lumbar region without neurogenic claudication  -     Ambulatory referral/consult to Neurosurgery  -     X-Ray Lumbar Spine Flexion And Extension Only; Future; Expected date: 01/19/2023    Causalgia of lower extremity, unspecified laterality  -     Ambulatory referral/consult to Neurosurgery    Discussed recent MRI of lumbar spine, including pathology and treatment recommendations.   He was made aware of degen findings, worse at L3/4 and 4/5.   Will order CT of lumbar spine + flex/ext x-rays for evaluation of bony anatomy and instability.  He will follow-up with MD afterwards for preop discussion.  Please reach out with any changes.      Dara Alonzo PA-C  [facility]

## 2023-01-20 ENCOUNTER — OFFICE VISIT (OUTPATIENT)
Dept: PSYCHIATRY | Facility: CLINIC | Age: 68
End: 2023-01-20
Payer: MEDICARE

## 2023-01-20 DIAGNOSIS — M54.16 LUMBAR RADICULOPATHY: ICD-10-CM

## 2023-01-20 DIAGNOSIS — G57.70 CAUSALGIA OF LOWER EXTREMITY, UNSPECIFIED LATERALITY: ICD-10-CM

## 2023-01-20 DIAGNOSIS — M48.061 SPINAL STENOSIS OF LUMBAR REGION WITHOUT NEUROGENIC CLAUDICATION: ICD-10-CM

## 2023-01-20 PROCEDURE — 99999 PR PBB SHADOW E&M-EST. PATIENT-LVL I: CPT | Mod: PBBFAC,HCNC,, | Performed by: SOCIAL WORKER

## 2023-01-20 PROCEDURE — 90791 PSYCH DIAGNOSTIC EVALUATION: CPT | Mod: HCNC,S$GLB,, | Performed by: SOCIAL WORKER

## 2023-01-20 PROCEDURE — 90791 PR PSYCHIATRIC DIAGNOSTIC EVALUATION: ICD-10-PCS | Mod: HCNC,S$GLB,, | Performed by: SOCIAL WORKER

## 2023-01-20 PROCEDURE — 99999 PR PBB SHADOW E&M-EST. PATIENT-LVL I: ICD-10-PCS | Mod: PBBFAC,HCNC,, | Performed by: SOCIAL WORKER

## 2023-01-20 NOTE — LETTER
January 25, 2023        Ben Eddy MD  [address]  [place] [address]             [facility]  [address]  [place] [address]  Phone: [phone]  Fax: [phone]   Patient: Myles Pride   MR Number: [id number]   YOB: 1955   Date of Visit: 1/20/2023       Dear Dr. Eddy:    Thank you for referring Myles Pride to me for evaluation. Below are the relevant portions of my assessment and plan of care.    If you have questions, please do not hesitate to call me. I look forward to following Myles along with you.    Sincerely,      Gordon Benavides, KALYN           CC    No Recipients

## 2023-01-20 NOTE — PROGRESS NOTES
Psychiatry Initial Visit (PhD/LCSW)  Diagnostic Interview - CPT 13915    Date: 1/20/2023    Site: [facility]    Referral source: Ben Eddy MD    Clinical status of patient: Outpatient    Myles Pride, a 67 y.o. male, for initial evaluation visit.  Met with patient.    Chief complaint/reason for encounter: Psychiatric Evaluation prior to surgical implantation of a spinal cord stimulator    History of present illness: He started having lower back problems in July 2021.  He was preparing for a visit with his children.  He was moving furniture from upstairs to downstairs.  He felt a twinge in his right hip.  He could not lift his right leg.  They took x rays.  They diagnosed arthritis of the hip.  He went to physical therapy for his hip.  He did physical therapy weekly for about six months.  [place] through, he started developing back pain in November 2021,  His hip was beginning to feel better.  He started seeing Dr. Eddy in January 2022.  She has done four spinal injections.  The first injection provided immediate relief.  He had some relief from the second.  He had a declining curve.  He did not feel any different on the fourth shot.  He saw Dr. Eddy a couple of weeks ago.  She told the patient about the spinal cord stimulator.  He had not heard of it before.  He got two pamphlets.  He will also see a neurosurgeon for his spine.  He saw a neurosurgery PA yesterday.  He has severe stenosis in the lumbar region.  She downplayed getting the SCS.  She was worried it might not be effective.  He will see the neurosurgeon Tuesday. He might need a minor scope to carve out tissue to relieve pressure on the nerve.  He plans to wait to see what the neurosurgeon says on Tuesday.  He will meet with Dr. Eddy on Thursday.  [place].  They will travel to [place] in April.  Both trips are for the birth of his grandchildren.  He was excited to find out about grand kids.       Pain: He has pain in his lower leg that radiates down his left leg predominantly.  It will hurt if he is on his feet.  It will range between a 5-7.  The pain wants him to bend over.      Symptoms:   Mood: fatigue  Anxiety: denied  Substance abuse: denied  Cognitive functioning: denied  Health behaviors:  lumbar radiculopathy    Psychiatric history: He has not been in individual counseling.      Medical history: He had a left knee scope in .  They cleaned cartilage.  He had broken bones in high school due to playing football.    Family history of psychiatric illness: He had two paternal uncles who were alcoholics.    Social history (marriage, employment, etc.): Patient's mother, Siddhartha Merino, lives in [place].  She was a .  Patient's father, Dread,  in  at the age of 61.  He was in a tractor accident.  He was crushed.  He was by himself.  His father was in the boby industry.  He owned several trucks.  His parents were  about forty years.  It was a good marriage in their later years.  They had a short period of separation when he was [age] and in high school.  He is the oldest of three children.  His brother, Nima, 65, lives in [place].  He is retired.  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  He is  with one child.  His sister, Quin, 57, lives in [place].  She is  with one child.  She is a .  She helps companies integrated accounting packages.  He has a good relationship with his siblings.  [place]  He reports a happy childhood.  He denies any abuse.  He graduated from [employer] in .  He was in baseball and football.  He went to [employer].  He has a bachelor's in accounting in .  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  He started working for [employer].  He did revenue accounting.  He was with them for a year and nine months.  He worked for [employer].  He was  with them a year and nine months.  He worked for [employer] in 1982.  It was acquired by [employer] then [employer] then [employer] then [employer].  He retired from the final company in 2014.  He did mainly accounting work till 1993.  In 1992, he was transferred to [place].  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  He was a  when he retired.  He had three different offices.  He has been doing travelling since he retired.  [place].  He is  to Monica Tolentino, for 36 years.  He reports a good marriage.  They met at [facility].  They have three children.  They are:  Jennie, 34, lives in [place], [place].  She has not been effected by the [employer].  She is about to have her first child.  She is .  She does not work.  Maribell Evans, 32, lives in [place].  She is  with her first child on the way.  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  Philipmiguel, 30, lives in [place].  He is single with no children.  He is a  for a tech firm.  He is close to his children.  He was raised Religious.  He converted to Catholicism.  He is not going to Religious.  They were going to [employer].  He believes in God and Elijah.  He was playing a lot of tennis.  He likes to read both fiction and non fiction.  He likes history.  He is doing some light exercise.  He was doing some physical therapy.  He has a bike he rides through the neighborhood.  His oldest daughter has struggled with endometriosis.      Substance use:   Alcohol:  He will have a beer or a glass of wine twice a week.   Drugs: none   Tobacco: none   Caffeine: He drinks two cups of coffee per day.    Current medications and drug reactions (include OTC, herbal): see medication list   He denies ever taking any antidepressants.    Strengths and liabilities:  Strength: Patient accepts guidance/feedback, Strength: Patient is expressive/articulate., Strength: Patient is intelligent., Strength: Patient is motivated for change., Strength: Patient has positive support network., Strength: Patient has reasonable judgment., Strength: Patient is stable., Liability: Patient has poor health.    Current Evaluation:     Mental Status Exam:  General Appearance:  age appropriate, casually dressed, neatly groomed   Speech: normal tone, normal rate, normal pitch, normal volume      Level of Cooperation: cooperative      Thought Processes: normal and logical   Mood: steady      Thought Content: normal, no suicidality, no homicidality, delusions, or paranoia   Affect: congruent and appropriate   Orientation: Oriented x3   Memory: recent >  intact, remote >  intact   Attention Span & Concentration: intact   Fund of General Knowledge: intact and appropriate to age and level of education   Abstract Reasoning:    Judgment & Insight: good     Language  intact     Diagnostic Impression - Plan:       ICD-10-CM ICD-9-CM   1. Lumbar radiculopathy  M54.16 724.4   2. Spinal stenosis of lumbar region without neurogenic claudication  M48.061 724.02   3. Causalgia of lower extremity, unspecified laterality  G57.70 355.71   4.      Preoperative assessment    Plan:   Patient does not exhibit or report any significant psychiatric symptoms which would prohibit spinal cord stimulator placement.  Patient acknowledges basic function and purpose of SCS placement.     Return to Clinic: as needed    Length of Service (minutes): 45

## 2023-01-24 ENCOUNTER — OFFICE VISIT (OUTPATIENT)
Dept: NEUROSURGERY | Facility: CLINIC | Age: 68
End: 2023-01-24
Payer: MEDICARE

## 2023-01-24 ENCOUNTER — HOSPITAL ENCOUNTER (OUTPATIENT)
Dept: RADIOLOGY | Facility: HOSPITAL | Age: 68
Discharge: HOME | End: 2023-01-24
Attending: PHYSICIAN ASSISTANT
Payer: MEDICARE

## 2023-01-24 VITALS
SYSTOLIC BLOOD PRESSURE: 150 MMHG | WEIGHT: 241 LBS | HEIGHT: 77 IN | DIASTOLIC BLOOD PRESSURE: 78 MMHG | RESPIRATION RATE: 18 BRPM | BODY MASS INDEX: 28.46 KG/M2 | HEART RATE: 43 BPM

## 2023-01-24 DIAGNOSIS — M48.061 SPINAL STENOSIS OF LUMBAR REGION WITHOUT NEUROGENIC CLAUDICATION: ICD-10-CM

## 2023-01-24 DIAGNOSIS — M48.07 SPINAL STENOSIS, LUMBOSACRAL REGION: ICD-10-CM

## 2023-01-24 DIAGNOSIS — M48.061 SPINAL STENOSIS OF LUMBAR REGION WITHOUT NEUROGENIC CLAUDICATION: Primary | ICD-10-CM

## 2023-01-24 DIAGNOSIS — Z01.818 PRE-OP TESTING: ICD-10-CM

## 2023-01-24 DIAGNOSIS — M51.36 DEGENERATIVE DISC DISEASE, LUMBAR: ICD-10-CM

## 2023-01-24 DIAGNOSIS — M48.062 LUMBAR STENOSIS WITH NEUROGENIC CLAUDICATION: ICD-10-CM

## 2023-01-24 DIAGNOSIS — D69.9 BLEEDING DISORDER: ICD-10-CM

## 2023-01-24 DIAGNOSIS — M54.16 LUMBAR RADICULOPATHY: ICD-10-CM

## 2023-01-24 PROCEDURE — 1125F AMNT PAIN NOTED PAIN PRSNT: CPT | Mod: HCNC,CPTII,S$GLB, | Performed by: NEUROLOGICAL SURGERY

## 2023-01-24 PROCEDURE — 3008F BODY MASS INDEX DOCD: CPT | Mod: HCNC,CPTII,S$GLB, | Performed by: NEUROLOGICAL SURGERY

## 2023-01-24 PROCEDURE — 3288F PR FALLS RISK ASSESSMENT DOCUMENTED: ICD-10-PCS | Mod: HCNC,CPTII,S$GLB, | Performed by: NEUROLOGICAL SURGERY

## 2023-01-24 PROCEDURE — 72120 X-RAY BEND ONLY L-S SPINE: CPT | Mod: 26,HCNC,, | Performed by: RADIOLOGY

## 2023-01-24 PROCEDURE — 72131 CT LUMBAR SPINE W/O DYE: CPT | Mod: TC,HCNC

## 2023-01-24 PROCEDURE — 72120 XR LUMBAR SPINE FLEXION AND EXTENSION ONLY: ICD-10-PCS | Mod: 26,HCNC,, | Performed by: RADIOLOGY

## 2023-01-24 PROCEDURE — 3077F PR MOST RECENT SYSTOLIC BLOOD PRESSURE >= 140 MM HG: ICD-10-PCS | Mod: HCNC,CPTII,S$GLB, | Performed by: NEUROLOGICAL SURGERY

## 2023-01-24 PROCEDURE — 99213 OFFICE O/P EST LOW 20 MIN: CPT | Mod: HCNC,S$GLB,, | Performed by: NEUROLOGICAL SURGERY

## 2023-01-24 PROCEDURE — 3078F PR MOST RECENT DIASTOLIC BLOOD PRESSURE < 80 MM HG: ICD-10-PCS | Mod: HCNC,CPTII,S$GLB, | Performed by: NEUROLOGICAL SURGERY

## 2023-01-24 PROCEDURE — 72131 CT LUMBAR SPINE WITHOUT CONTRAST: ICD-10-PCS | Mod: 26,HCNC,, | Performed by: RADIOLOGY

## 2023-01-24 PROCEDURE — 99999 PR PBB SHADOW E&M-EST. PATIENT-LVL III: ICD-10-PCS | Mod: PBBFAC,HCNC,, | Performed by: NEUROLOGICAL SURGERY

## 2023-01-24 PROCEDURE — 99999 PR PBB SHADOW E&M-EST. PATIENT-LVL III: CPT | Mod: PBBFAC,HCNC,, | Performed by: NEUROLOGICAL SURGERY

## 2023-01-24 PROCEDURE — 3078F DIAST BP <80 MM HG: CPT | Mod: HCNC,CPTII,S$GLB, | Performed by: NEUROLOGICAL SURGERY

## 2023-01-24 PROCEDURE — 1125F PR PAIN SEVERITY QUANTIFIED, PAIN PRESENT: ICD-10-PCS | Mod: HCNC,CPTII,S$GLB, | Performed by: NEUROLOGICAL SURGERY

## 2023-01-24 PROCEDURE — 99213 PR OFFICE/OUTPT VISIT, EST, LEVL III, 20-29 MIN: ICD-10-PCS | Mod: HCNC,S$GLB,, | Performed by: NEUROLOGICAL SURGERY

## 2023-01-24 PROCEDURE — 1101F PT FALLS ASSESS-DOCD LE1/YR: CPT | Mod: HCNC,CPTII,S$GLB, | Performed by: NEUROLOGICAL SURGERY

## 2023-01-24 PROCEDURE — 3008F PR BODY MASS INDEX (BMI) DOCUMENTED: ICD-10-PCS | Mod: HCNC,CPTII,S$GLB, | Performed by: NEUROLOGICAL SURGERY

## 2023-01-24 PROCEDURE — 1101F PR PT FALLS ASSESS DOC 0-1 FALLS W/OUT INJ PAST YR: ICD-10-PCS | Mod: HCNC,CPTII,S$GLB, | Performed by: NEUROLOGICAL SURGERY

## 2023-01-24 PROCEDURE — 72131 CT LUMBAR SPINE W/O DYE: CPT | Mod: 26,HCNC,, | Performed by: RADIOLOGY

## 2023-01-24 PROCEDURE — 72120 X-RAY BEND ONLY L-S SPINE: CPT | Mod: TC,HCNC

## 2023-01-24 PROCEDURE — 3077F SYST BP >= 140 MM HG: CPT | Mod: HCNC,CPTII,S$GLB, | Performed by: NEUROLOGICAL SURGERY

## 2023-01-24 PROCEDURE — 3288F FALL RISK ASSESSMENT DOCD: CPT | Mod: HCNC,CPTII,S$GLB, | Performed by: NEUROLOGICAL SURGERY

## 2023-01-24 NOTE — PROGRESS NOTES
Subjective:      Patient ID: Myles Pride is a 67 y.o. male.    HPI  Patient here for follow up back and LE symptoms   Bilateral with the left being worse than the right   Ambulates unassisted   Multiple injections with PM in the past without lasting relief     MR lumbar report    L3-L4: Circumferential disc bulging present with suspected right paracentral small disc protrusion.  This in conjunction with congenitally short pedicles and prominent facet/ligamentum flavum degenerative hypertrophy findings causes severe spinal canal stenosis.  Small right facet joint effusion present with 4 mm right facet synovial cyst deep to the ligamentum flavum.  Left foraminal extraforaminal annular fissure.  Mild bilateral inferior neural foraminal narrowing.     L4-L5: Circumferential disc bulging in conjunction with congenitally short pedicles and prominent facet degenerative hypertrophy findings causes severe spinal canal stenosis.  Severe bilateral neural foraminal narrowing present with likely impingement of the exiting nerve roots.    CT lumbar      Severe multilevel multifactorial degenerative changes are present greatest at L3-L4 and L4-L5 with high-grade neural foraminal narrowing and spinal canal stenosis.     Vacuum phenomenon at the L3-L4 facets on the left extending into the canal.    FLEX/EX lumbar   IMPRESSION:  Multilevel degenerative changes of the lumbar spine without significant listhesis on flexion or extension    PRIOR NOTE  The patient is here today for evaluation  of back and leg pain.  He is referred to our clinic by Dr. Eddy.  Patient started having issues with these areas in 2021.  Pain radiates down both legs, worse on LLE.  Pain does not radiate beyond the knee.  Also numbness/tingling.  Has tightness in quad and hamstring area.  Today he feels tight in hamstring-left.  Patient has felt these symptoms with RLE.    In 2021 he was experiencing severe hip pain- R hip.  He was told his pain was  arthritic derived.  At times he has this but it is mild cecil in the morning and putting his shoes on.    He denies pain above his abdomen - no neck or upper back pain.    No previous spine surgery.  Pain Procedures:   -12/09/2022: L4-5 interlaminar epidural steroid injection  -10/25/2022: Bilateral L4/5 transforaminal epidural steroid injection  -05/06/2022:  Bilateral L4/5 transforaminal epidural steroid injection  -01/21/2022:  Bilateral L4/5 transforaminal epidural steroid injection    Ambulates unassisted.  Takes Gabapentin but unsure if it's truly effective.  No acute bladder/bowel changes. Takes FLomax for BPH.  Denies recent falls, injuries.    Objective:     Body mass index is 28.58 kg/m².  Vitals:    01/24/23 1110   BP: (!) 150/78   Pulse: (!) 43   Resp: 18        Back:  None  Paraspinal muscle spasms   None  Pain with flexion and extention   WNL  Range of motion    Neg  Straight leg raise     Motor   Right Right Left Left  Level Group   5  5  L2 Hip flexor (Psoas)   5  5  L3 Leg extension (Quads)   5  5  L4 Dorsiflexion & foot inversion (Tibialis Anterior)   5  5  L5 Great toe extension ( EHL)   5  5  S1 Foot eversion (Gastroc, PL & PB)     Sensation  NL Decreased (R/L/BL) Level Sensation    X  L2 Anterio-medial thigh   X  L3 Medial thigh around knee   X  L4 Medial foot   X  L5 Dorsum foot   X  S1 Lateral foot     Reflex  2+  Patellar tendon (L4)   2+  Achilles tendon (S1)              Lab Results   Component Value Date    WBC 6.76 05/29/2023    HCT 45.8 05/29/2023      MRI reviewed patient has mild levoscoliosis throughout the lumbar spine there is severe facet disease bilaterally at L3-4 with small synovial cyst causing stenosis at this level there is severe degenerative changes with stenosis in the lumbar region at L4-5 as well     Movement is seen on lateral  dynamic imaging        Assessment:     1. Spinal stenosis of lumbar region without neurogenic claudication    2. Pre-op testing    3. Bleeding  disorder    4. Degenerative disc disease, lumbar    5. Lumbar radiculopathy    6. Lumbar stenosis with neurogenic claudication      Plan:     Spinal stenosis of lumbar region without neurogenic claudication  -     Back/Cervical Brace For Home Use    Pre-op testing  -     CBC Auto Differential; Future; Expected date: 01/24/2023  -     Comprehensive Metabolic Panel; Future; Expected date: 01/24/2023  -     URINALYSIS; Future; Expected date: 01/24/2023  -     SCHEDULED EKG 12-LEAD (to Muse); Future  -     X-Ray Chest PA And Lateral Pre-OP; Future; Expected date: 01/24/2023  -     TYPE AND SCREEN PREOP; Future; Expected date: 01/24/2023    Bleeding disorder  -     APTT; Future; Expected date: 01/24/2023  -     PROTIME-INR; Future; Expected date: 01/24/2023    Degenerative disc disease, lumbar    Lumbar radiculopathy    Lumbar stenosis with neurogenic claudication       Based on the patient's current symptoms he has severe stenosis likely contributing to his current symptoms worse at L3-4 and L4-5   There does not appear to be any instability at this time we discussed potentially performing posterior lumbar decompression     The patient was informed of all benefits and potential risk of the operation including but not limited to:  Pain, infection, bleeding, coma, paralysis, death.  Cerebrospinal fluid leak, failure of any instrumentation, the need for additional procedures in the future. No guarantee was given that this procedure would alleviate all of the symptoms.    Consents signed   He will continue with pain management in the interim and schedule surgical date in the near future     Thank you for the referral   Please call with any questions    Lyle Lobato MD  Neurosurgery     Disclaimer: This note was prepared using a voice recognition system and is likely to have sound alike errors within the text.

## 2023-01-30 NOTE — PRE-PROCEDURE INSTRUCTIONS
Spoke with patient regarding procedure scheduled on 1.21    Arrival time 0930    Has patient been sick with fever or on antibiotics within the last 7 days? No    Does the patient have any open wounds, sores or rashes? No    Does the patient have any recent fractures? no    Has patient received a vaccination within the last 7 days? No    Received the COVID vaccination? yes    Has the patient stopped all medications as directed? Na    Does patient have a pacemaker and or defibrillator? no    Does the patient have a ride to and from procedure and someone reliable to remain with patient? Wife facundo    Is the patient diabetic? no    Does the patient have sleep apnea? Or use O2 at home? No and no     Is the patient receiving sedation? yes    Is the patient instructed to remain NPO beginning at midnight the night before their procedure? yes    Procedure location confirmed with patient? Yes    Covid- Denies signs/symptoms. Instructed to notify PAT/MD if any changes.     170.18

## 2023-02-07 DIAGNOSIS — Z00.00 ENCOUNTER FOR MEDICARE ANNUAL WELLNESS EXAM: ICD-10-CM

## 2023-04-07 ENCOUNTER — PATIENT MESSAGE (OUTPATIENT)
Dept: NEUROSURGERY | Facility: CLINIC | Age: 68
End: 2023-04-07
Payer: MEDICARE

## 2023-05-22 ENCOUNTER — PES CALL (OUTPATIENT)
Dept: ADMINISTRATIVE | Facility: CLINIC | Age: 68
End: 2023-05-22
Payer: MEDICARE

## 2023-05-29 ENCOUNTER — HOSPITAL ENCOUNTER (OUTPATIENT)
Dept: CARDIOLOGY | Facility: HOSPITAL | Age: 68
Discharge: HOME | End: 2023-05-29
Attending: NEUROLOGICAL SURGERY
Payer: MEDICARE

## 2023-05-29 ENCOUNTER — HOSPITAL ENCOUNTER (OUTPATIENT)
Dept: RADIOLOGY | Facility: HOSPITAL | Age: 68
Discharge: HOME | End: 2023-05-29
Attending: NEUROLOGICAL SURGERY
Payer: MEDICARE

## 2023-05-29 DIAGNOSIS — Z01.818 PRE-OP TESTING: ICD-10-CM

## 2023-05-29 PROCEDURE — 93010 ELECTROCARDIOGRAM REPORT: CPT | Mod: HCNC,,, | Performed by: INTERNAL MEDICINE

## 2023-05-29 PROCEDURE — 71046 X-RAY EXAM CHEST 2 VIEWS: CPT | Mod: 26,HCNC,, | Performed by: RADIOLOGY

## 2023-05-29 PROCEDURE — 71046 XR CHEST PA AND LATERAL PRE-OP: ICD-10-PCS | Mod: 26,HCNC,, | Performed by: RADIOLOGY

## 2023-05-29 PROCEDURE — 71046 X-RAY EXAM CHEST 2 VIEWS: CPT | Mod: TC,HCNC

## 2023-05-29 PROCEDURE — 93005 ELECTROCARDIOGRAM TRACING: CPT | Mod: HCNC

## 2023-05-29 PROCEDURE — 93010 EKG 12-LEAD: ICD-10-PCS | Mod: HCNC,,, | Performed by: INTERNAL MEDICINE

## 2023-06-05 ENCOUNTER — OFFICE VISIT (OUTPATIENT)
Dept: INTERNAL MEDICINE | Facility: CLINIC | Age: 68
End: 2023-06-05
Payer: MEDICARE

## 2023-06-05 ENCOUNTER — PATIENT MESSAGE (OUTPATIENT)
Dept: NEUROSURGERY | Facility: CLINIC | Age: 68
End: 2023-06-05
Payer: MEDICARE

## 2023-06-05 VITALS
HEART RATE: 56 BPM | DIASTOLIC BLOOD PRESSURE: 72 MMHG | RESPIRATION RATE: 18 BRPM | WEIGHT: 242.5 LBS | TEMPERATURE: 98 F | BODY MASS INDEX: 28.76 KG/M2 | OXYGEN SATURATION: 98 % | SYSTOLIC BLOOD PRESSURE: 136 MMHG

## 2023-06-05 DIAGNOSIS — Z79.899 ENCOUNTER FOR LONG-TERM (CURRENT) USE OF MEDICATIONS: ICD-10-CM

## 2023-06-05 DIAGNOSIS — E78.5 HYPERLIPIDEMIA, UNSPECIFIED HYPERLIPIDEMIA TYPE: ICD-10-CM

## 2023-06-05 DIAGNOSIS — Z12.5 SCREENING FOR PROSTATE CANCER: ICD-10-CM

## 2023-06-05 DIAGNOSIS — I49.9 CARDIAC ARRHYTHMIA, UNSPECIFIED CARDIAC ARRHYTHMIA TYPE: Primary | ICD-10-CM

## 2023-06-05 DIAGNOSIS — M54.16 LUMBAR RADICULOPATHY, CHRONIC: ICD-10-CM

## 2023-06-05 DIAGNOSIS — R79.89 ELEVATED LFTS: ICD-10-CM

## 2023-06-05 DIAGNOSIS — N40.0 BENIGN PROSTATIC HYPERPLASIA, UNSPECIFIED WHETHER LOWER URINARY TRACT SYMPTOMS PRESENT: ICD-10-CM

## 2023-06-05 PROCEDURE — 1101F PT FALLS ASSESS-DOCD LE1/YR: CPT | Mod: HCNC,CPTII,S$GLB, | Performed by: FAMILY MEDICINE

## 2023-06-05 PROCEDURE — 3008F BODY MASS INDEX DOCD: CPT | Mod: HCNC,CPTII,S$GLB, | Performed by: FAMILY MEDICINE

## 2023-06-05 PROCEDURE — 3078F DIAST BP <80 MM HG: CPT | Mod: HCNC,CPTII,S$GLB, | Performed by: FAMILY MEDICINE

## 2023-06-05 PROCEDURE — 99999 PR PBB SHADOW E&M-EST. PATIENT-LVL III: ICD-10-PCS | Mod: PBBFAC,HCNC,, | Performed by: FAMILY MEDICINE

## 2023-06-05 PROCEDURE — 3008F PR BODY MASS INDEX (BMI) DOCUMENTED: ICD-10-PCS | Mod: HCNC,CPTII,S$GLB, | Performed by: FAMILY MEDICINE

## 2023-06-05 PROCEDURE — 99999 PR PBB SHADOW E&M-EST. PATIENT-LVL III: CPT | Mod: PBBFAC,HCNC,, | Performed by: FAMILY MEDICINE

## 2023-06-05 PROCEDURE — 3288F PR FALLS RISK ASSESSMENT DOCUMENTED: ICD-10-PCS | Mod: HCNC,CPTII,S$GLB, | Performed by: FAMILY MEDICINE

## 2023-06-05 PROCEDURE — 1159F MED LIST DOCD IN RCRD: CPT | Mod: HCNC,CPTII,S$GLB, | Performed by: FAMILY MEDICINE

## 2023-06-05 PROCEDURE — 1101F PR PT FALLS ASSESS DOC 0-1 FALLS W/OUT INJ PAST YR: ICD-10-PCS | Mod: HCNC,CPTII,S$GLB, | Performed by: FAMILY MEDICINE

## 2023-06-05 PROCEDURE — 99214 OFFICE O/P EST MOD 30 MIN: CPT | Mod: HCNC,S$GLB,, | Performed by: FAMILY MEDICINE

## 2023-06-05 PROCEDURE — 1125F AMNT PAIN NOTED PAIN PRSNT: CPT | Mod: HCNC,CPTII,S$GLB, | Performed by: FAMILY MEDICINE

## 2023-06-05 PROCEDURE — 3075F PR MOST RECENT SYSTOLIC BLOOD PRESS GE 130-139MM HG: ICD-10-PCS | Mod: HCNC,CPTII,S$GLB, | Performed by: FAMILY MEDICINE

## 2023-06-05 PROCEDURE — 1159F PR MEDICATION LIST DOCUMENTED IN MEDICAL RECORD: ICD-10-PCS | Mod: HCNC,CPTII,S$GLB, | Performed by: FAMILY MEDICINE

## 2023-06-05 PROCEDURE — 3075F SYST BP GE 130 - 139MM HG: CPT | Mod: HCNC,CPTII,S$GLB, | Performed by: FAMILY MEDICINE

## 2023-06-05 PROCEDURE — 3078F PR MOST RECENT DIASTOLIC BLOOD PRESSURE < 80 MM HG: ICD-10-PCS | Mod: HCNC,CPTII,S$GLB, | Performed by: FAMILY MEDICINE

## 2023-06-05 PROCEDURE — 3288F FALL RISK ASSESSMENT DOCD: CPT | Mod: HCNC,CPTII,S$GLB, | Performed by: FAMILY MEDICINE

## 2023-06-05 PROCEDURE — 99214 PR OFFICE/OUTPT VISIT, EST, LEVL IV, 30-39 MIN: ICD-10-PCS | Mod: HCNC,S$GLB,, | Performed by: FAMILY MEDICINE

## 2023-06-05 PROCEDURE — 1125F PR PAIN SEVERITY QUANTIFIED, PAIN PRESENT: ICD-10-PCS | Mod: HCNC,CPTII,S$GLB, | Performed by: FAMILY MEDICINE

## 2023-06-05 NOTE — PROGRESS NOTES
Subjective:       Patient ID: Myles Pride is a 67 y.o. male.    Chief Complaint: Follow-up (6M F/U - patient is due to have surgery on back next week with Dr. Lobato. )    Follow-up  Pertinent negatives include no chest pain, chills or fever.     Patient Active Problem List   Diagnosis    Bradycardia    Risk of myocardial infarction or stroke 7.5% or greater in next 10 years    Vitamin D deficiency    Colon cancer screening    Hyperlipidemia    Lumbar radiculopathy, chronic       Past Medical History:   Diagnosis Date    Hyperlipidemia     PONV (postoperative nausea and vomiting) 1990    after knee arthroscopy    Vitamin D deficiency 12/18/2019       Past Surgical History:   Procedure Laterality Date    COLONOSCOPY      COLONOSCOPY N/A 12/19/2019    Procedure: COLONOSCOPY;  Surgeon: Bhupendra Wilkinson MD;  Location: [facility] ENDO;  Service: Endoscopy;  Laterality: N/A;    EPIDURAL STEROID INJECTION N/A 12/09/2022    Procedure: L4-5 intralaminar epidural steroid injection with left paramedian approach;  Surgeon: Ben Eddy MD;  Location: [facility] PAIN MGT;  Service: Pain Management;  Laterality: N/A;    KNEE ARTHROSCOPY Left     SELECTIVE INJECTION OF ANESTHETIC AGENT AROUND LUMBAR SPINAL NERVE ROOT BY TRANSFORAMINAL APPROACH Bilateral 01/21/2022    Procedure: Bilateral L4/5 TF LOVE with RN IV sedation;  Surgeon: Ben Eddy MD;  Location: [facility] PAIN [facility]GT;  Service: Pain Management;  Laterality: Bilateral;    SELECTIVE INJECTION OF ANESTHETIC AGENT AROUND LUMBAR SPINAL NERVE ROOT BY TRANSFORAMINAL APPROACH Bilateral 05/06/2022    Procedure: Bilateral L4/5 TF LOVE with RN IV sedation;  Surgeon: Ben Eddy MD;  Location: [facility] PAIN MGT;  Service: Pain Management;  Laterality: Bilateral;    SELECTIVE INJECTION OF ANESTHETIC AGENT AROUND LUMBAR SPINAL NERVE ROOT BY TRANSFORAMINAL APPROACH Bilateral 10/25/2022    Procedure: Bilateral L4/5 TF LOVE with RN IV sedation;  Surgeon: Ben Eddy MD;  Location: [facility] PAIN MGT;   Service: Pain Management;  Laterality: Bilateral;    VASECTOMY  1995       Family History   Problem Relation Age of Onset    Cancer Mother         anal cancer    Diabetes Mother         PRE DIABETES    Arthritis Mother     Hearing loss Mother     No Known Problems Sister     Gallbladder disease Brother     Hepatitis Brother         Hep C       Social History     Tobacco Use   Smoking Status Never    Passive exposure: Never   Smokeless Tobacco Never       Wt Readings from Last 5 Encounters:   06/05/23 110 kg (242 lb 8 oz)   01/24/23 109.3 kg (241 lb)   01/19/23 109.3 kg (241 lb)   12/09/22 109.4 kg (241 lb 2.9 oz)   12/01/22 110.8 kg (244 lb 4.3 oz)       For further HPI details, see assessment and plan.    Review of Systems   Constitutional:  Negative for chills and fever.   Respiratory:  Negative for shortness of breath.    Cardiovascular:  Negative for chest pain.   Neurological:  Negative for dizziness.     Objective:      Vitals:    06/05/23 1022   BP: 136/72   Pulse: (!) 56   Resp: 18   Temp: 97.8 °F (36.6 °C)       Physical Exam  Constitutional:       General: He is not in acute distress.     Appearance: Normal appearance. He is well-developed.   Neck:      Trachea: Trachea normal.   Cardiovascular:      Rate and Rhythm: Normal rate and regular rhythm.      Heart sounds: Normal heart sounds.   Pulmonary:      Effort: Pulmonary effort is normal. No respiratory distress.      Breath sounds: Normal breath sounds. No decreased breath sounds.   Abdominal:      Palpations: Abdomen is soft.   Musculoskeletal:      Cervical back: Full passive range of motion without pain.   Neurological:      Mental Status: He is alert and oriented to person, place, and time.   Psychiatric:         Speech: Speech normal.         Behavior: Behavior normal.         Thought Content: Thought content normal.       Assessment:       1. Cardiac arrhythmia, unspecified cardiac arrhythmia type    2. Hyperlipidemia, unspecified hyperlipidemia  type    3. Benign prostatic hyperplasia, unspecified whether lower urinary tract symptoms present    4. Lumbar radiculopathy, chronic    5. Elevated LFTs        Plan:   Cardiac arrhythmia, unspecified cardiac arrhythmia type  -     Ambulatory referral/consult to Cardiology; Future; Expected date: 06/12/2023    Hyperlipidemia, unspecified hyperlipidemia type    Benign prostatic hyperplasia, unspecified whether lower urinary tract symptoms present    Lumbar radiculopathy, chronic    Elevated LFTs        Hyperlipidemia   He is on pravastatin still.  He is tolerating it.  He had issues with Lipitor but not this medication.  Control at his last check.  His last LDL was 8 months ago and was 104.  We will monitor this in roughly 4-5 months    Benign prostatic hyperplasia   Patient was taking Flomax.  He discontinued it a few weeks ago in preparation for his surgery.  He is tolerated it is absence without issue.  No substantial change in urination.  Will discontinue the medication, remove it from his chart, and if issues return we will returned to its use  PSA, Screen (ng/mL)   Date Value   10/03/2022 1.3     Degenerative Disease   Patient has multilevel degenerative findings most severe at L4-L5.  He is working with the Neurosurgery Department and surgical intervention is planned.      In preparation for surgery I can see his Neurosurgery ordered lab work.  He would a very mild elevation in AST.  This has been up and down in the past.  We will continue to monitor it going forward.  Advise limit alcohol and acetaminophen use.  He could also lose some weight which could be beneficial as well.    1 week prior to the encounter  Irregular EKG  Patient is entirely asymptomatic   Noted recent EKG with sinus bradycardia and occasional PVCs.  Repeated again today and irregularity continued.  Prior to proceeding with surgical intervention would recommend Cardiology evaluation.  Asking staff    defers vaccine    Will update  pneumococcal at next encounter.    Plan a follow-up visit with me in 6 months.  I am ordering lab work that I would like to be drawn 1 week prior to our encounter so we can review results together in person  This note was verbally dictated, please excuse any type errors.

## 2023-06-08 ENCOUNTER — HOSPITAL ENCOUNTER (OUTPATIENT)
Dept: CARDIOLOGY | Facility: HOSPITAL | Age: 68
Discharge: HOME | End: 2023-06-08
Payer: MEDICARE

## 2023-06-08 ENCOUNTER — OFFICE VISIT (OUTPATIENT)
Dept: CARDIOLOGY | Facility: CLINIC | Age: 68
End: 2023-06-08
Payer: MEDICARE

## 2023-06-08 VITALS
SYSTOLIC BLOOD PRESSURE: 138 MMHG | RESPIRATION RATE: 16 BRPM | DIASTOLIC BLOOD PRESSURE: 80 MMHG | BODY MASS INDEX: 29.05 KG/M2 | HEIGHT: 77 IN | HEART RATE: 93 BPM | OXYGEN SATURATION: 96 % | WEIGHT: 246.06 LBS

## 2023-06-08 DIAGNOSIS — E78.2 MIXED HYPERLIPIDEMIA: ICD-10-CM

## 2023-06-08 DIAGNOSIS — I49.9 CARDIAC ARRHYTHMIA, UNSPECIFIED CARDIAC ARRHYTHMIA TYPE: ICD-10-CM

## 2023-06-08 DIAGNOSIS — R00.1 BRADYCARDIA: Primary | Chronic | ICD-10-CM

## 2023-06-08 DIAGNOSIS — I49.3 FREQUENT PVCS: ICD-10-CM

## 2023-06-08 DIAGNOSIS — R07.89 CHEST DISCOMFORT: ICD-10-CM

## 2023-06-08 DIAGNOSIS — R00.1 BRADYCARDIA: Chronic | ICD-10-CM

## 2023-06-08 PROCEDURE — 3079F DIAST BP 80-89 MM HG: CPT | Mod: CPTII,S$GLB,, | Performed by: NURSE PRACTITIONER

## 2023-06-08 PROCEDURE — 99999 PR PBB SHADOW E&M-EST. PATIENT-LVL III: CPT | Mod: PBBFAC,HCNC,, | Performed by: NURSE PRACTITIONER

## 2023-06-08 PROCEDURE — 1159F MED LIST DOCD IN RCRD: CPT | Mod: CPTII,S$GLB,, | Performed by: NURSE PRACTITIONER

## 2023-06-08 PROCEDURE — 3008F BODY MASS INDEX DOCD: CPT | Mod: CPTII,S$GLB,, | Performed by: NURSE PRACTITIONER

## 2023-06-08 PROCEDURE — 99214 PR OFFICE/OUTPT VISIT, EST, LEVL IV, 30-39 MIN: ICD-10-PCS | Mod: S$GLB,,, | Performed by: NURSE PRACTITIONER

## 2023-06-08 PROCEDURE — 3079F PR MOST RECENT DIASTOLIC BLOOD PRESSURE 80-89 MM HG: ICD-10-PCS | Mod: CPTII,S$GLB,, | Performed by: NURSE PRACTITIONER

## 2023-06-08 PROCEDURE — 93010 EKG 12-LEAD: ICD-10-PCS | Mod: HCNC,,, | Performed by: INTERNAL MEDICINE

## 2023-06-08 PROCEDURE — 3075F SYST BP GE 130 - 139MM HG: CPT | Mod: CPTII,S$GLB,, | Performed by: NURSE PRACTITIONER

## 2023-06-08 PROCEDURE — 93005 ELECTROCARDIOGRAM TRACING: CPT | Mod: HCNC

## 2023-06-08 PROCEDURE — 3075F PR MOST RECENT SYSTOLIC BLOOD PRESS GE 130-139MM HG: ICD-10-PCS | Mod: CPTII,S$GLB,, | Performed by: NURSE PRACTITIONER

## 2023-06-08 PROCEDURE — 99214 OFFICE O/P EST MOD 30 MIN: CPT | Mod: S$GLB,,, | Performed by: NURSE PRACTITIONER

## 2023-06-08 PROCEDURE — 3008F PR BODY MASS INDEX (BMI) DOCUMENTED: ICD-10-PCS | Mod: CPTII,S$GLB,, | Performed by: NURSE PRACTITIONER

## 2023-06-08 PROCEDURE — 93010 ELECTROCARDIOGRAM REPORT: CPT | Mod: HCNC,,, | Performed by: INTERNAL MEDICINE

## 2023-06-08 PROCEDURE — 1159F PR MEDICATION LIST DOCUMENTED IN MEDICAL RECORD: ICD-10-PCS | Mod: CPTII,S$GLB,, | Performed by: NURSE PRACTITIONER

## 2023-06-08 PROCEDURE — 99999 PR PBB SHADOW E&M-EST. PATIENT-LVL III: ICD-10-PCS | Mod: PBBFAC,HCNC,, | Performed by: NURSE PRACTITIONER

## 2023-06-08 NOTE — PROGRESS NOTES
Subjective:   Patient ID:  Myles Pride is a 67 y.o. male who presents for evaluation of Irregular Heart Beat      HPI    Myles Pride is a 67 year old male who presents to clinic for follow up.     He was seen by PCP earlier in week and found to have SB with Frequent PVCs.     He was previously seen in cards clinic due to bradycardia but found to be benign and had normal stress test, echo and holter.     He needs holter and ETT prior to decision regarding preop risk stratification for back surgery.     He has been traveling for the previous 4 months and has not been exercising or adhering to any regular schedule. Prior to traveling, he was exercising with water therapy and denies any cardiac complaints.     Denies chest pain or anginal equvialents. No shortness of breath, AVALOS or palpitations. No leg swelling or claudications. BP well controlled today in office. Denies dizziness, LH, syncope or near syncopal events.       Past Medical History:   Diagnosis Date    Hyperlipidemia     PONV (postoperative nausea and vomiting) 1990    after knee arthroscopy    Vitamin D deficiency 12/18/2019       Past Surgical History:   Procedure Laterality Date    COLONOSCOPY      COLONOSCOPY N/A 12/19/2019    Procedure: COLONOSCOPY;  Surgeon: Bhupendra Wilkinson MD;  Location: [facility];  Service: Endoscopy;  Laterality: N/A;    EPIDURAL STEROID INJECTION N/A 12/09/2022    Procedure: L4-5 intralaminar epidural steroid injection with left paramedian approach;  Surgeon: Ben Eddy MD;  Location: [facility];  Service: Pain Management;  Laterality: N/A;    KNEE ARTHROSCOPY Left     SELECTIVE INJECTION OF ANESTHETIC AGENT AROUND LUMBAR SPINAL NERVE ROOT BY TRANSFORAMINAL APPROACH Bilateral 01/21/2022    Procedure: Bilateral L4/5 TF LOVE with RN IV sedation;  Surgeon: Ben Eddy MD;  Location: [facility];  Service: Pain Management;  Laterality: Bilateral;    SELECTIVE INJECTION OF ANESTHETIC AGENT AROUND LUMBAR SPINAL  NERVE ROOT BY TRANSFORAMINAL APPROACH Bilateral 05/06/2022    Procedure: Bilateral L4/5 TF LOVE with RN IV sedation;  Surgeon: Ben Eddy MD;  Location: [facility] PAIN MGT;  Service: Pain Management;  Laterality: Bilateral;    SELECTIVE INJECTION OF ANESTHETIC AGENT AROUND LUMBAR SPINAL NERVE ROOT BY TRANSFORAMINAL APPROACH Bilateral 10/25/2022    Procedure: Bilateral L4/5 TF LOVE with RN IV sedation;  Surgeon: Ben Eddy MD;  Location: [facility] PAIN MGT;  Service: Pain Management;  Laterality: Bilateral;    VASECTOMY  1995       Social History     Tobacco Use    Smoking status: Never     Passive exposure: Never    Smokeless tobacco: Never   Substance Use Topics    Alcohol use: Yes     Alcohol/week: 3.0 standard drinks     Types: 3 Glasses of wine per week    Drug use: Never       Family History   Problem Relation Age of Onset    Cancer Mother         anal cancer    Diabetes Mother         PRE DIABETES    Arthritis Mother     Hearing loss Mother     No Known Problems Sister     Gallbladder disease Brother     Hepatitis Brother         Hep C       Wt Readings from Last 3 Encounters:   06/08/23 111.6 kg (246 lb 0.5 oz)   06/05/23 110 kg (242 lb 8 oz)   01/24/23 109.3 kg (241 lb)     Temp Readings from Last 3 Encounters:   06/05/23 97.8 °F (36.6 °C)   12/09/22 97.7 °F (36.5 °C)   10/25/22 97.9 °F (36.6 °C) (Temporal)     BP Readings from Last 3 Encounters:   06/08/23 138/80   06/05/23 136/72   01/24/23 (!) 150/78     Pulse Readings from Last 3 Encounters:   06/08/23 93   06/05/23 (!) 56   01/24/23 (!) 43       Current Outpatient Medications on File Prior to Visit   Medication Sig Dispense Refill    ergocalciferol, vitamin D2, (VITAMIN D ORAL) Take by mouth once daily.      pravastatin (PRAVACHOL) 40 MG tablet TAKE 1 TABLET BY MOUTH EVERY DAY 90 tablet 3    [DISCONTINUED] gabapentin (NEURONTIN) 600 MG tablet Take 1.5 tablets (900 mg total) by mouth every evening. 135 tablet 0     No current facility-administered  medications on file prior to visit.       No cardiac monitor results found for the past 12 months    Results for orders placed during the hospital encounter of 09/29/20    Echo Color Flow Doppler? Yes    Interpretation Summary  · The left ventricle is normal in size with normal systolic function. The estimated ejection fraction is 60%.  · Normal left ventricular diastolic function.  · Normal right ventricular systolic function.    Results for orders placed during the hospital encounter of 09/29/20    Exercise Stress - EKG    Interpretation Summary    The EKG portion of this study is negative for ischemia.    The patient reported no chest pain during the stress test.    Arrhythmias during stress: occasional PVCs.    The exercise capacity was normal.    There was hypertensive blood pressure response with stress.        Results for orders placed or performed during the hospital encounter of 06/08/23   SCHEDULED EKG 12-LEAD (to Muse)    Collection Time: 06/08/23  2:39 PM    Narrative    Test Reason : R00.1,    Vent. Rate : 065 BPM     Atrial Rate : 065 BPM     P-R Int : 180 ms          QRS Dur : 064 ms      QT Int : 396 ms       P-R-T Axes : 021 -06 -33 degrees     QTc Int : 411 ms    Sinus rhythm with frequent Premature ventricular complexes  Low voltage QRS  Possible Inferior infarct (cited on or before 05-JUN-2023)  Abnormal ECG  When compared with ECG of 05-JUN-2023 10:39,  Premature ventricular complexes are now Present  Aberrant conduction is no longer Present  Questionable change in QRS duration    Referred By: ABDIRIZAK VILLALOBOS           Confirmed By:          Review of Systems   Constitutional: Positive for malaise/fatigue.   HENT:  Negative for hearing loss and hoarse voice.    Eyes:  Negative for visual disturbance.   Cardiovascular:  Negative for chest pain, claudication, dyspnea on exertion, irregular heartbeat, leg swelling, near-syncope, orthopnea, palpitations, paroxysmal nocturnal dyspnea and syncope.  "  Respiratory:  Negative for cough, hemoptysis, shortness of breath, sleep disturbances due to breathing, snoring and wheezing.    Endocrine: Negative for cold intolerance and heat intolerance.   Hematologic/Lymphatic: Does not bruise/bleed easily.   Skin:  Negative for color change, dry skin and nail changes.   Musculoskeletal:  Positive for arthritis and back pain. Negative for joint pain and myalgias.   Gastrointestinal:  Negative for bloating, abdominal pain, constipation, nausea and vomiting.   Genitourinary:  Negative for dysuria, flank pain, hematuria and hesitancy.   Neurological:  Negative for headaches, light-headedness, loss of balance, numbness, paresthesias and weakness.   Psychiatric/Behavioral:  Negative for altered mental status.    Allergic/Immunologic: Negative for environmental allergies.       Objective:/80   Pulse 93   Resp 16   Ht 6' 5" (1.956 m)   Wt 111.6 kg (246 lb 0.5 oz)   SpO2 96%   BMI 29.18 kg/m²      Physical Exam  Vitals and nursing note reviewed.   Constitutional:       General: He is not in acute distress.     Appearance: Normal appearance. He is well-developed. He is not ill-appearing.   HENT:      Head: Normocephalic and atraumatic.      Nose: Nose normal.      Mouth/Throat:      Mouth: Mucous membranes are moist.   Eyes:      Pupils: Pupils are equal, round, and reactive to light.   Neck:      Thyroid: No thyromegaly.      Vascular: No JVD.      Trachea: No tracheal deviation.   Cardiovascular:      Rate and Rhythm: Regular rhythm. Bradycardia present. FrequentExtrasystoles are present.     Chest Wall: PMI is not displaced.      Pulses: Intact distal pulses.           Radial pulses are 2+ on the right side and 2+ on the left side.        Dorsalis pedis pulses are 2+ on the right side and 2+ on the left side.      Heart sounds: S1 normal and S2 normal. Heart sounds not distant. No murmur heard.  Pulmonary:      Effort: Pulmonary effort is normal. No respiratory " distress.      Breath sounds: Normal breath sounds. No wheezing.   Abdominal:      General: Bowel sounds are normal. There is no distension.      Palpations: Abdomen is soft.      Tenderness: There is no abdominal tenderness.   Musculoskeletal:         General: No swelling. Normal range of motion.      Cervical back: Full passive range of motion without pain, normal range of motion and neck supple.      Right lower leg: No edema.      Left lower leg: No edema.      Right ankle: No swelling.      Left ankle: No swelling.   Skin:     General: Skin is warm and dry.      Capillary Refill: Capillary refill takes less than 2 seconds.      Nails: There is no clubbing.   Neurological:      General: No focal deficit present.      Mental Status: He is alert and oriented to person, place, and time.   Psychiatric:         Speech: Speech normal.         Behavior: Behavior normal.         Thought Content: Thought content normal.         Judgment: Judgment normal.       Lab Results   Component Value Date    CHOL 180 10/03/2022    CHOL 154 10/01/2021    CHOL 137 02/28/2020     Lab Results   Component Value Date    HDL 43 10/03/2022    HDL 48 10/01/2021    HDL 50 02/28/2020     Lab Results   Component Value Date    LDLCALC 104.0 10/03/2022    LDLCALC 87.6 10/01/2021    LDLCALC 71.0 02/28/2020     Lab Results   Component Value Date    TRIG 165 (H) 10/03/2022    TRIG 92 10/01/2021    TRIG 80 02/28/2020     Lab Results   Component Value Date    CHOLHDL 23.9 10/03/2022    CHOLHDL 31.2 10/01/2021    CHOLHDL 36.5 02/28/2020       Chemistry        Component Value Date/Time     05/29/2023 0858    K 4.5 05/29/2023 0858     05/29/2023 0858    CO2 24 05/29/2023 0858    BUN 14 05/29/2023 0858    CREATININE 1.2 05/29/2023 0858     05/29/2023 0858        Component Value Date/Time    CALCIUM 9.5 05/29/2023 0858    ALKPHOS 78 05/29/2023 0858    AST 42 (H) 05/29/2023 0858    ALT 39 05/29/2023 0858    BILITOT 0.8 05/29/2023 0858     ESTGFRAFRICA >60.0 04/01/2022 1123    EGFRNONAA >60.0 04/01/2022 1123          Lab Results   Component Value Date    TSH 2.406 10/03/2022     Lab Results   Component Value Date    INR 1.0 05/29/2023     Lab Results   Component Value Date    WBC 6.76 05/29/2023    HGB 15.2 05/29/2023    HCT 45.8 05/29/2023    MCV 94 05/29/2023     05/29/2023        Assessment:      1. Bradycardia    2. Cardiac arrhythmia, unspecified cardiac arrhythmia type    3. Frequent PVCs    4. Chest discomfort    5. Mixed hyperlipidemia        Plan:     Chronic bradycardia- asymptomatic  Given frequent PVCs- ETT and 48 hour holter monitor  Review tests and PREV to discuss next steps    Nicole May, FNP-C [facility]

## 2023-06-21 ENCOUNTER — TELEPHONE (OUTPATIENT)
Dept: CARDIOLOGY | Facility: CLINIC | Age: 68
End: 2023-06-21
Payer: MEDICARE

## 2023-06-21 ENCOUNTER — HOSPITAL ENCOUNTER (OUTPATIENT)
Dept: CARDIOLOGY | Facility: HOSPITAL | Age: 68
Discharge: HOME | End: 2023-06-21
Attending: NURSE PRACTITIONER
Payer: MEDICARE

## 2023-06-21 VITALS
BODY MASS INDEX: 29.05 KG/M2 | HEART RATE: 62 BPM | HEIGHT: 77 IN | WEIGHT: 246 LBS | DIASTOLIC BLOOD PRESSURE: 98 MMHG | SYSTOLIC BLOOD PRESSURE: 145 MMHG

## 2023-06-21 DIAGNOSIS — R07.89 CHEST DISCOMFORT: ICD-10-CM

## 2023-06-21 DIAGNOSIS — I49.3 FREQUENT PVCS: ICD-10-CM

## 2023-06-21 DIAGNOSIS — R00.1 BRADYCARDIA: Chronic | ICD-10-CM

## 2023-06-21 DIAGNOSIS — R00.1 BRADYCARDIA: ICD-10-CM

## 2023-06-21 LAB
CV STRESS BASE HR: 66 BPM
DIASTOLIC BLOOD PRESSURE: 98 MMHG
OHS CV CPX 1 MINUTE RECOVERY HEART RATE: 142 BPM
OHS CV CPX 85 PERCENT MAX PREDICTED HEART RATE MALE: 131
OHS CV CPX ESTIMATED METS: 10
OHS CV CPX MAX PREDICTED HEART RATE: 154
OHS CV CPX PATIENT IS FEMALE: 0
OHS CV CPX PATIENT IS MALE: 1
OHS CV CPX PEAK DIASTOLIC BLOOD PRESSURE: 71 MMHG
OHS CV CPX PEAK HEAR RATE: 164 BPM
OHS CV CPX PEAK RATE PRESSURE PRODUCT: NORMAL
OHS CV CPX PEAK SYSTOLIC BLOOD PRESSURE: 180 MMHG
OHS CV CPX PERCENT MAX PREDICTED HEART RATE ACHIEVED: 106
OHS CV CPX RATE PRESSURE PRODUCT PRESENTING: 9570
STRESS ECHO POST EXERCISE DUR MIN: 7 MINUTES
STRESS ECHO POST EXERCISE DUR SEC: 1 SECONDS
SYSTOLIC BLOOD PRESSURE: 145 MMHG

## 2023-06-21 PROCEDURE — 93227 XTRNL ECG REC<48 HR R&I: CPT | Mod: ,,, | Performed by: INTERNAL MEDICINE

## 2023-06-21 PROCEDURE — 93018 CV STRESS TEST I&R ONLY: CPT | Mod: ,,, | Performed by: INTERNAL MEDICINE

## 2023-06-21 PROCEDURE — 93018 EXERCISE STRESS - EKG (CUPID ONLY): ICD-10-PCS | Mod: ,,, | Performed by: INTERNAL MEDICINE

## 2023-06-21 PROCEDURE — 93017 CV STRESS TEST TRACING ONLY: CPT

## 2023-06-21 PROCEDURE — 93227 HOLTER MONITOR - 48 HOUR (CUPID ONLY): ICD-10-PCS | Mod: ,,, | Performed by: INTERNAL MEDICINE

## 2023-06-21 PROCEDURE — 93225 XTRNL ECG REC<48 HRS REC: CPT

## 2023-06-21 PROCEDURE — 93016 EXERCISE STRESS - EKG (CUPID ONLY): ICD-10-PCS | Mod: ,,, | Performed by: INTERNAL MEDICINE

## 2023-06-21 PROCEDURE — 93016 CV STRESS TEST SUPVJ ONLY: CPT | Mod: ,,, | Performed by: INTERNAL MEDICINE

## 2023-06-21 NOTE — TELEPHONE ENCOUNTER
Notified the patient.            Stress test negative for ischemia   Heart rate achieved target   Await holter monitor

## 2023-06-26 LAB
OHS CV EVENT MONITOR DAY: 0
OHS CV HOLTER LENGTH DECIMAL HOURS: 48
OHS CV HOLTER LENGTH HOURS: 48
OHS CV HOLTER LENGTH MINUTES: 0
OHS CV HOLTER SINUS AVERAGE HR: 55
OHS CV HOLTER SINUS MAX HR: 129
OHS CV HOLTER SINUS MIN HR: 34

## 2023-06-27 ENCOUNTER — PATIENT MESSAGE (OUTPATIENT)
Dept: CARDIOLOGY | Facility: CLINIC | Age: 68
End: 2023-06-27
Payer: MEDICARE

## 2023-06-27 ENCOUNTER — PATIENT MESSAGE (OUTPATIENT)
Dept: NEUROSURGERY | Facility: CLINIC | Age: 68
End: 2023-06-27
Payer: MEDICARE

## 2023-06-28 ENCOUNTER — TELEPHONE (OUTPATIENT)
Dept: CARDIOLOGY | Facility: CLINIC | Age: 68
End: 2023-06-28
Payer: MEDICARE

## 2023-06-28 NOTE — TELEPHONE ENCOUNTER
Attempted to reach the patient, lvm. Forwarded the message to Dr Lobato              He can proceed with surgery at moderate CV risk     Please forward to Dr Lobato's office     Full preop risk stratification to follow at next visit

## 2023-06-28 NOTE — TELEPHONE ENCOUNTER
Notified the patient                  He can proceed with surgery at moderate CV risk     Please forward to Dr Lobato's office     Full preop risk stratification to follow at next visit

## 2023-07-05 ENCOUNTER — OFFICE VISIT (OUTPATIENT)
Dept: CARDIOLOGY | Facility: CLINIC | Age: 68
End: 2023-07-05
Payer: MEDICARE

## 2023-07-05 VITALS
BODY MASS INDEX: 29.26 KG/M2 | OXYGEN SATURATION: 98 % | HEART RATE: 47 BPM | SYSTOLIC BLOOD PRESSURE: 134 MMHG | HEIGHT: 77 IN | DIASTOLIC BLOOD PRESSURE: 79 MMHG | RESPIRATION RATE: 16 BRPM | WEIGHT: 247.81 LBS

## 2023-07-05 DIAGNOSIS — Z91.89 RISK OF MYOCARDIAL INFARCTION OR STROKE 7.5% OR GREATER IN NEXT 10 YEARS: Primary | ICD-10-CM

## 2023-07-05 DIAGNOSIS — E78.2 MIXED HYPERLIPIDEMIA: ICD-10-CM

## 2023-07-05 DIAGNOSIS — R00.1 BRADYCARDIA: Chronic | ICD-10-CM

## 2023-07-05 DIAGNOSIS — I49.3 FREQUENT PVCS: ICD-10-CM

## 2023-07-05 PROCEDURE — 3075F PR MOST RECENT SYSTOLIC BLOOD PRESS GE 130-139MM HG: ICD-10-PCS | Mod: HCNC,CPTII,S$GLB, | Performed by: NURSE PRACTITIONER

## 2023-07-05 PROCEDURE — 3008F BODY MASS INDEX DOCD: CPT | Mod: HCNC,CPTII,S$GLB, | Performed by: NURSE PRACTITIONER

## 2023-07-05 PROCEDURE — 3008F PR BODY MASS INDEX (BMI) DOCUMENTED: ICD-10-PCS | Mod: HCNC,CPTII,S$GLB, | Performed by: NURSE PRACTITIONER

## 2023-07-05 PROCEDURE — 99999 PR PBB SHADOW E&M-EST. PATIENT-LVL III: ICD-10-PCS | Mod: PBBFAC,HCNC,, | Performed by: NURSE PRACTITIONER

## 2023-07-05 PROCEDURE — 1159F PR MEDICATION LIST DOCUMENTED IN MEDICAL RECORD: ICD-10-PCS | Mod: HCNC,CPTII,S$GLB, | Performed by: NURSE PRACTITIONER

## 2023-07-05 PROCEDURE — 3075F SYST BP GE 130 - 139MM HG: CPT | Mod: HCNC,CPTII,S$GLB, | Performed by: NURSE PRACTITIONER

## 2023-07-05 PROCEDURE — 99215 PR OFFICE/OUTPT VISIT, EST, LEVL V, 40-54 MIN: ICD-10-PCS | Mod: HCNC,S$GLB,, | Performed by: NURSE PRACTITIONER

## 2023-07-05 PROCEDURE — 1159F MED LIST DOCD IN RCRD: CPT | Mod: HCNC,CPTII,S$GLB, | Performed by: NURSE PRACTITIONER

## 2023-07-05 PROCEDURE — 3078F DIAST BP <80 MM HG: CPT | Mod: HCNC,CPTII,S$GLB, | Performed by: NURSE PRACTITIONER

## 2023-07-05 PROCEDURE — 99215 OFFICE O/P EST HI 40 MIN: CPT | Mod: HCNC,S$GLB,, | Performed by: NURSE PRACTITIONER

## 2023-07-05 PROCEDURE — 3078F PR MOST RECENT DIASTOLIC BLOOD PRESSURE < 80 MM HG: ICD-10-PCS | Mod: HCNC,CPTII,S$GLB, | Performed by: NURSE PRACTITIONER

## 2023-07-05 PROCEDURE — 99999 PR PBB SHADOW E&M-EST. PATIENT-LVL III: CPT | Mod: PBBFAC,HCNC,, | Performed by: NURSE PRACTITIONER

## 2023-07-05 NOTE — Clinical Note
Can proceed with surgery at low CV risk. Please assist patient with obtaining surgery date asap.  Thanks Marika

## 2023-07-05 NOTE — PROGRESS NOTES
Subjective:   Patient ID:  Myles Pride is a 67 y.o. male who presents for evaluation of Bradycardia      HPI    Myles Pride is a 67 year old male who presents to clinic for follow up.     He was seen by PCP earlier in week and found to have SB with Frequent PVCs.     He was previously seen in cards clinic due to bradycardia but found to be benign and had normal stress test, echo and holter.     He needs holter and ETT prior to decision regarding preop risk stratification for back surgery.     He has been traveling for the previous 4 months and has not been exercising or adhering to any regular schedule. Prior to traveling, he was exercising with water therapy and denies any cardiac complaints.     Denies chest pain or anginal equvialents. No shortness of breath, AVALOS or palpitations. No leg swelling or claudications. BP well controlled today in office. Denies dizziness, LH, syncope or near syncopal events.     7/5/2023 update    Myles Pride returns for follow up.     Reviewed recent cardiac testing with patient and wife in clinic.     Pseudo bradycardia due to frequent pvcs noted. BP well controlled today.     Denies any symptoms related to frequent pvcs.     Denies chest pain or anginal equivalents. No shortness of breath, AVALOS or palpitations. Denies orthopnea, PND or abdominal bloating. Reports regular walking without any issues lately. NO leg swelling or claudications. No recent falls, syncope or near syncopal events. Reports compliance with medications and dietary restrictions. NO CNS complaints to suggest TIA or CVA today. No signs of abnormal bleeding.    Can proceed with surgery at low CV risk.     Past Medical History:   Diagnosis Date    Hyperlipidemia     PONV (postoperative nausea and vomiting) 1990    after knee arthroscopy    Vitamin D deficiency 12/18/2019       Past Surgical History:   Procedure Laterality Date    COLONOSCOPY      COLONOSCOPY N/A 12/19/2019    Procedure:  COLONOSCOPY;  Surgeon: Bhupendra Wilkinson MD;  Location: [facility] ENDO;  Service: Endoscopy;  Laterality: N/A;    EPIDURAL STEROID INJECTION N/A 12/09/2022    Procedure: L4-5 intralaminar epidural steroid injection with left paramedian approach;  Surgeon: Ben Eddy MD;  Location: [facility] PAIN MGT;  Service: Pain Management;  Laterality: N/A;    KNEE ARTHROSCOPY Left     SELECTIVE INJECTION OF ANESTHETIC AGENT AROUND LUMBAR SPINAL NERVE ROOT BY TRANSFORAMINAL APPROACH Bilateral 01/21/2022    Procedure: Bilateral L4/5 TF LOVE with RN IV sedation;  Surgeon: Ben Eddy MD;  Location: [facility] PAIN MGT;  Service: Pain Management;  Laterality: Bilateral;    SELECTIVE INJECTION OF ANESTHETIC AGENT AROUND LUMBAR SPINAL NERVE ROOT BY TRANSFORAMINAL APPROACH Bilateral 05/06/2022    Procedure: Bilateral L4/5 TF LOVE with RN IV sedation;  Surgeon: Ben Eddy MD;  Location: [facility] PAIN MGT;  Service: Pain Management;  Laterality: Bilateral;    SELECTIVE INJECTION OF ANESTHETIC AGENT AROUND LUMBAR SPINAL NERVE ROOT BY TRANSFORAMINAL APPROACH Bilateral 10/25/2022    Procedure: Bilateral L4/5 TF LOVE with RN IV sedation;  Surgeon: Ben Eddy MD;  Location: [facility] PAIN MGT;  Service: Pain Management;  Laterality: Bilateral;    VASECTOMY  1995       Social History     Tobacco Use    Smoking status: Never     Passive exposure: Never    Smokeless tobacco: Never   Substance Use Topics    Alcohol use: Yes     Alcohol/week: 3.0 standard drinks     Types: 3 Glasses of wine per week    Drug use: Never       Family History   Problem Relation Age of Onset    Cancer Mother         anal cancer    Diabetes Mother         PRE DIABETES    Arthritis Mother     Hearing loss Mother     No Known Problems Sister     Gallbladder disease Brother     Hepatitis Brother         Hep C       Wt Readings from Last 3 Encounters:   07/05/23 112.4 kg (247 lb 12.8 oz)   06/21/23 111.6 kg (246 lb)   06/08/23 111.6 kg (246 lb 0.5 oz)     Temp Readings from Last 3  Encounters:   06/05/23 97.8 °F (36.6 °C)   12/09/22 97.7 °F (36.5 °C)   10/25/22 97.9 °F (36.6 °C) (Temporal)     BP Readings from Last 3 Encounters:   07/05/23 134/79   06/21/23 (!) 145/98   06/08/23 138/80     Pulse Readings from Last 3 Encounters:   07/05/23 (!) 47   06/21/23 62   06/08/23 93       Current Outpatient Medications on File Prior to Visit   Medication Sig Dispense Refill    ergocalciferol, vitamin D2, (VITAMIN D ORAL) Take by mouth once daily.      pravastatin (PRAVACHOL) 40 MG tablet TAKE 1 TABLET BY MOUTH EVERY DAY 90 tablet 3     No current facility-administered medications on file prior to visit.       Holter monitor - 48 hour    Result Date: 6/26/2023  · Sinus rhythm with heart rates varying between 34 and 129 BPM with an   average of 55BPM.  · There were very frequent PVCs totalling 82919 and averaging 380.85 per   hour. The ventricular arrhythmias percentage was 11.6.  · There were 14 runs of non-sustained monomorphic ventricular tachycardia   and lasting to 16 beats.  · There were very frequent PACs totalling 5393 and averaging 112.35 per   hour.          Results for orders placed during the hospital encounter of 09/29/20    Echo Color Flow Doppler? Yes    Interpretation Summary  · The left ventricle is normal in size with normal systolic function. The estimated ejection fraction is 60%.  · Normal left ventricular diastolic function.  · Normal right ventricular systolic function.    Results for orders placed during the hospital encounter of 09/29/20    Exercise Stress - EKG    Interpretation Summary    The EKG portion of this study is negative for ischemia.    The patient reported no chest pain during the stress test.    Arrhythmias during stress: occasional PVCs.    The exercise capacity was normal.    There was hypertensive blood pressure response with stress.        Results for orders placed or performed during the hospital encounter of 06/08/23   SCHEDULED EKG 12-LEAD (to Muse)     Collection Time: 06/08/23  2:39 PM    Narrative    Test Reason : R00.1,    Vent. Rate : 065 BPM     Atrial Rate : 065 BPM     P-R Int : 180 ms          QRS Dur : 064 ms      QT Int : 396 ms       P-R-T Axes : 021 -06 -33 degrees     QTc Int : 411 ms    Sinus rhythm with frequent Premature ventricular complexes  Low voltage QRS  Possible Inferior infarct (cited on or before 05-JUN-2023)  Abnormal ECG  When compared with ECG of 05-JUN-2023 10:39,  Premature ventricular complexes are now Present  Aberrant conduction is no longer Present  Questionable change in QRS duration  Confirmed by IZZY KINGSLEY, GILSON ISAACS ([id number]) on 6/8/2023 3:42:29 PM    Referred By: ABDIRIZAK VILLALOBOS           Confirmed By:GILSON MAGANA MD         Review of Systems   Constitutional: Positive for malaise/fatigue.   HENT:  Negative for hearing loss and hoarse voice.    Eyes:  Negative for visual disturbance.   Cardiovascular:  Negative for chest pain, claudication, dyspnea on exertion, irregular heartbeat, leg swelling, near-syncope, orthopnea, palpitations, paroxysmal nocturnal dyspnea and syncope.   Respiratory:  Negative for cough, hemoptysis, shortness of breath, sleep disturbances due to breathing, snoring and wheezing.    Endocrine: Negative for cold intolerance and heat intolerance.   Hematologic/Lymphatic: Does not bruise/bleed easily.   Skin:  Negative for color change, dry skin and nail changes.   Musculoskeletal:  Positive for arthritis and back pain. Negative for joint pain and myalgias.   Gastrointestinal:  Negative for bloating, abdominal pain, constipation, nausea and vomiting.   Genitourinary:  Negative for dysuria, flank pain, hematuria and hesitancy.   Neurological:  Negative for headaches, light-headedness, loss of balance, numbness, paresthesias and weakness.   Psychiatric/Behavioral:  Negative for altered mental status.    Allergic/Immunologic: Negative for environmental allergies.       Objective:/79   Pulse (!) 47   Resp  "16   Ht 6' 5" (1.956 m)   Wt 112.4 kg (247 lb 12.8 oz)   SpO2 98%   BMI 29.38 kg/m²      Physical Exam  Vitals and nursing note reviewed.   Constitutional:       General: He is not in acute distress.     Appearance: Normal appearance. He is well-developed. He is not ill-appearing.   HENT:      Head: Normocephalic and atraumatic.      Nose: Nose normal.      Mouth/Throat:      Mouth: Mucous membranes are moist.   Eyes:      Pupils: Pupils are equal, round, and reactive to light.   Neck:      Thyroid: No thyromegaly.      Vascular: No JVD.      Trachea: No tracheal deviation.   Cardiovascular:      Rate and Rhythm: Regular rhythm. Bradycardia present. FrequentExtrasystoles are present.     Chest Wall: PMI is not displaced.      Pulses: Intact distal pulses.           Radial pulses are 2+ on the right side and 2+ on the left side.        Dorsalis pedis pulses are 2+ on the right side and 2+ on the left side.      Heart sounds: S1 normal and S2 normal. Heart sounds not distant. No murmur heard.  Pulmonary:      Effort: Pulmonary effort is normal. No respiratory distress.      Breath sounds: Normal breath sounds. No wheezing.   Abdominal:      General: Bowel sounds are normal. There is no distension.      Palpations: Abdomen is soft.      Tenderness: There is no abdominal tenderness.   Musculoskeletal:         General: No swelling. Normal range of motion.      Cervical back: Full passive range of motion without pain, normal range of motion and neck supple.      Right lower leg: No edema.      Left lower leg: No edema.      Right ankle: No swelling.      Left ankle: No swelling.   Skin:     General: Skin is warm and dry.      Capillary Refill: Capillary refill takes less than 2 seconds.      Nails: There is no clubbing.   Neurological:      General: No focal deficit present.      Mental Status: He is alert and oriented to person, place, and time.   Psychiatric:         Speech: Speech normal.         Behavior: Behavior " normal.         Thought Content: Thought content normal.         Judgment: Judgment normal.       Lab Results   Component Value Date    CHOL 180 10/03/2022    CHOL 154 10/01/2021    CHOL 137 02/28/2020     Lab Results   Component Value Date    HDL 43 10/03/2022    HDL 48 10/01/2021    HDL 50 02/28/2020     Lab Results   Component Value Date    LDLCALC 104.0 10/03/2022    LDLCALC 87.6 10/01/2021    LDLCALC 71.0 02/28/2020     Lab Results   Component Value Date    TRIG 165 (H) 10/03/2022    TRIG 92 10/01/2021    TRIG 80 02/28/2020     Lab Results   Component Value Date    CHOLHDL 23.9 10/03/2022    CHOLHDL 31.2 10/01/2021    CHOLHDL 36.5 02/28/2020       Chemistry        Component Value Date/Time     05/29/2023 0858    K 4.5 05/29/2023 0858     05/29/2023 0858    CO2 24 05/29/2023 0858    BUN 14 05/29/2023 0858    CREATININE 1.2 05/29/2023 0858     05/29/2023 0858        Component Value Date/Time    CALCIUM 9.5 05/29/2023 0858    ALKPHOS 78 05/29/2023 0858    AST 42 (H) 05/29/2023 0858    ALT 39 05/29/2023 0858    BILITOT 0.8 05/29/2023 0858    ESTGFRAFRICA >60.0 04/01/2022 1123    EGFRNONAA >60.0 04/01/2022 1123          Lab Results   Component Value Date    TSH 2.406 10/03/2022     Lab Results   Component Value Date    INR 1.0 05/29/2023     Lab Results   Component Value Date    WBC 6.76 05/29/2023    HGB 15.2 05/29/2023    HCT 45.8 05/29/2023    MCV 94 05/29/2023     05/29/2023        Assessment:      1. Risk of myocardial infarction or stroke 7.5% or greater in next 10 years    2. Frequent PVCs    3. Bradycardia    4. Mixed hyperlipidemia          Plan:     Chronic bradycardia- asymptomatic  Can proceed with surgery at low CV risk  RTC PRN  Discussed warning signs of frequent pvcs and caution of when to return to clinic.       Nicole May, FNP-C [facility]

## 2023-07-11 ENCOUNTER — PATIENT MESSAGE (OUTPATIENT)
Dept: NEUROSURGERY | Facility: CLINIC | Age: 68
End: 2023-07-11
Payer: MEDICARE

## 2023-07-13 ENCOUNTER — OFFICE VISIT (OUTPATIENT)
Dept: NEUROSURGERY | Facility: CLINIC | Age: 68
End: 2023-07-13
Payer: MEDICARE

## 2023-07-13 VITALS
BODY MASS INDEX: 29.12 KG/M2 | DIASTOLIC BLOOD PRESSURE: 81 MMHG | SYSTOLIC BLOOD PRESSURE: 138 MMHG | WEIGHT: 245.56 LBS

## 2023-07-13 DIAGNOSIS — M51.36 DEGENERATIVE DISC DISEASE, LUMBAR: ICD-10-CM

## 2023-07-13 DIAGNOSIS — M54.16 LUMBAR RADICULOPATHY: Primary | ICD-10-CM

## 2023-07-13 DIAGNOSIS — M48.062 LUMBAR STENOSIS WITH NEUROGENIC CLAUDICATION: ICD-10-CM

## 2023-07-13 PROCEDURE — 99499 NO LOS: ICD-10-PCS | Mod: HCNC,S$GLB,, | Performed by: PHYSICIAN ASSISTANT

## 2023-07-13 PROCEDURE — 99499 UNLISTED E&M SERVICE: CPT | Mod: HCNC,S$GLB,, | Performed by: PHYSICIAN ASSISTANT

## 2023-07-13 PROCEDURE — 99999 PR PBB SHADOW E&M-EST. PATIENT-LVL I: CPT | Mod: PBBFAC,HCNC,, | Performed by: PHYSICIAN ASSISTANT

## 2023-07-13 PROCEDURE — 99999 PR PBB SHADOW E&M-EST. PATIENT-LVL I: ICD-10-PCS | Mod: PBBFAC,HCNC,, | Performed by: PHYSICIAN ASSISTANT

## 2023-07-13 NOTE — PROGRESS NOTES
Please note this visit was just to discuss a surgery date.   Therefore no LOS is being charged to the patient.    He is tentatively scheduled for 8/16/23. If anything changes we will update the patient.

## 2023-07-27 ENCOUNTER — PATIENT MESSAGE (OUTPATIENT)
Dept: NEUROSURGERY | Facility: CLINIC | Age: 68
End: 2023-07-27
Payer: MEDICARE

## 2023-08-09 DIAGNOSIS — Z01.818 PRE-OP TESTING: Primary | ICD-10-CM

## 2023-08-18 ENCOUNTER — LAB VISIT (OUTPATIENT)
Dept: LAB | Facility: HOSPITAL | Age: 68
End: 2023-08-18
Attending: NEUROLOGICAL SURGERY
Payer: MEDICARE

## 2023-08-18 DIAGNOSIS — Z01.818 PRE-OP TESTING: ICD-10-CM

## 2023-08-18 LAB
ABO + RH BLD: NORMAL
BLD GP AB SCN CELLS X3 SERPL QL: NORMAL

## 2023-08-18 PROCEDURE — 86901 BLOOD TYPING SEROLOGIC RH(D): CPT | Mod: HCNC | Performed by: NEUROLOGICAL SURGERY

## 2023-08-18 PROCEDURE — 36415 COLL VENOUS BLD VENIPUNCTURE: CPT | Mod: HCNC | Performed by: NEUROLOGICAL SURGERY

## 2023-08-21 ENCOUNTER — TELEPHONE (OUTPATIENT)
Dept: NEUROSURGERY | Facility: CLINIC | Age: 68
End: 2023-08-21
Payer: MEDICARE

## 2023-08-28 DIAGNOSIS — M51.36 DEGENERATIVE DISC DISEASE, LUMBAR: ICD-10-CM

## 2023-08-28 DIAGNOSIS — M48.062 LUMBAR STENOSIS WITH NEUROGENIC CLAUDICATION: Primary | ICD-10-CM

## 2023-08-28 DIAGNOSIS — M54.16 LUMBAR RADICULOPATHY: ICD-10-CM

## 2023-08-30 ENCOUNTER — LAB VISIT (OUTPATIENT)
Dept: LAB | Facility: HOSPITAL | Age: 68
End: 2023-08-30
Attending: NURSE PRACTITIONER
Payer: MEDICARE

## 2023-08-30 ENCOUNTER — OFFICE VISIT (OUTPATIENT)
Dept: INTERNAL MEDICINE | Facility: CLINIC | Age: 68
End: 2023-08-30
Payer: MEDICARE

## 2023-08-30 ENCOUNTER — TELEPHONE (OUTPATIENT)
Dept: NEUROSURGERY | Facility: CLINIC | Age: 68
End: 2023-08-30
Payer: MEDICARE

## 2023-08-30 VITALS
SYSTOLIC BLOOD PRESSURE: 172 MMHG | DIASTOLIC BLOOD PRESSURE: 93 MMHG | TEMPERATURE: 98 F | HEART RATE: 44 BPM | OXYGEN SATURATION: 94 %

## 2023-08-30 DIAGNOSIS — Z01.818 PRE-OP TESTING: Primary | ICD-10-CM

## 2023-08-30 DIAGNOSIS — Z01.818 PRE-OP TESTING: ICD-10-CM

## 2023-08-30 DIAGNOSIS — R00.1 BRADYCARDIA: Chronic | ICD-10-CM

## 2023-08-30 DIAGNOSIS — E78.2 MIXED HYPERLIPIDEMIA: ICD-10-CM

## 2023-08-30 DIAGNOSIS — M54.16 LUMBAR RADICULOPATHY, CHRONIC: ICD-10-CM

## 2023-08-30 LAB
ALBUMIN SERPL BCP-MCNC: 4.2 G/DL (ref 3.5–5.2)
ALP SERPL-CCNC: 72 U/L (ref 55–135)
ALT SERPL W/O P-5'-P-CCNC: 33 U/L (ref 10–44)
ANION GAP SERPL CALC-SCNC: 14 MMOL/L (ref 8–16)
AST SERPL-CCNC: 36 U/L (ref 10–40)
BASOPHILS # BLD AUTO: 0.07 K/UL (ref 0–0.2)
BASOPHILS NFR BLD: 0.9 % (ref 0–1.9)
BILIRUB SERPL-MCNC: 0.5 MG/DL (ref 0.1–1)
BILIRUB UR QL STRIP: NEGATIVE
BUN SERPL-MCNC: 13 MG/DL (ref 8–23)
CALCIUM SERPL-MCNC: 9.5 MG/DL (ref 8.7–10.5)
CHLORIDE SERPL-SCNC: 107 MMOL/L (ref 95–110)
CLARITY UR REFRACT.AUTO: CLEAR
CO2 SERPL-SCNC: 21 MMOL/L (ref 23–29)
COLOR UR AUTO: YELLOW
CREAT SERPL-MCNC: 1.1 MG/DL (ref 0.5–1.4)
DIFFERENTIAL METHOD: ABNORMAL
EOSINOPHIL # BLD AUTO: 0.3 K/UL (ref 0–0.5)
EOSINOPHIL NFR BLD: 4 % (ref 0–8)
ERYTHROCYTE [DISTWIDTH] IN BLOOD BY AUTOMATED COUNT: 13 % (ref 11.5–14.5)
EST. GFR  (NO RACE VARIABLE): >60 ML/MIN/1.73 M^2
GLUCOSE SERPL-MCNC: 82 MG/DL (ref 70–110)
GLUCOSE UR QL STRIP: NEGATIVE
HCT VFR BLD AUTO: 48.3 % (ref 40–54)
HGB BLD-MCNC: 15.2 G/DL (ref 14–18)
HGB UR QL STRIP: NEGATIVE
IMM GRANULOCYTES # BLD AUTO: 0.02 K/UL (ref 0–0.04)
IMM GRANULOCYTES NFR BLD AUTO: 0.3 % (ref 0–0.5)
KETONES UR QL STRIP: NEGATIVE
LEUKOCYTE ESTERASE UR QL STRIP: NEGATIVE
LYMPHOCYTES # BLD AUTO: 3.2 K/UL (ref 1–4.8)
LYMPHOCYTES NFR BLD: 42.1 % (ref 18–48)
MCH RBC QN AUTO: 31 PG (ref 27–31)
MCHC RBC AUTO-ENTMCNC: 31.5 G/DL (ref 32–36)
MCV RBC AUTO: 98 FL (ref 82–98)
MONOCYTES # BLD AUTO: 0.7 K/UL (ref 0.3–1)
MONOCYTES NFR BLD: 8.7 % (ref 4–15)
NEUTROPHILS # BLD AUTO: 3.3 K/UL (ref 1.8–7.7)
NEUTROPHILS NFR BLD: 44 % (ref 38–73)
NITRITE UR QL STRIP: NEGATIVE
NRBC BLD-RTO: 0 /100 WBC
PH UR STRIP: 5 [PH] (ref 5–8)
PLATELET # BLD AUTO: 224 K/UL (ref 150–450)
PMV BLD AUTO: 11.1 FL (ref 9.2–12.9)
POTASSIUM SERPL-SCNC: 4.7 MMOL/L (ref 3.5–5.1)
PROT SERPL-MCNC: 7 G/DL (ref 6–8.4)
PROT UR QL STRIP: NEGATIVE
RBC # BLD AUTO: 4.91 M/UL (ref 4.6–6.2)
SODIUM SERPL-SCNC: 142 MMOL/L (ref 136–145)
SP GR UR STRIP: 1.02 (ref 1–1.03)
URN SPEC COLLECT METH UR: NORMAL
WBC # BLD AUTO: 7.51 K/UL (ref 3.9–12.7)

## 2023-08-30 PROCEDURE — 85025 COMPLETE CBC W/AUTO DIFF WBC: CPT | Mod: HCNC | Performed by: NURSE PRACTITIONER

## 2023-08-30 PROCEDURE — 80053 COMPREHEN METABOLIC PANEL: CPT | Mod: HCNC | Performed by: NURSE PRACTITIONER

## 2023-08-30 PROCEDURE — 99999 PR PBB SHADOW E&M-EST. PATIENT-LVL II: ICD-10-PCS | Mod: PBBFAC,HCNC,,

## 2023-08-30 PROCEDURE — 36415 COLL VENOUS BLD VENIPUNCTURE: CPT | Mod: HCNC | Performed by: NURSE PRACTITIONER

## 2023-08-30 PROCEDURE — 81003 URINALYSIS AUTO W/O SCOPE: CPT | Mod: HCNC | Performed by: NURSE PRACTITIONER

## 2023-08-30 PROCEDURE — 99999 PR PBB SHADOW E&M-EST. PATIENT-LVL II: CPT | Mod: PBBFAC,HCNC,,

## 2023-08-30 NOTE — ASSESSMENT & PLAN NOTE
- follows with Dr. Lobato, planning on thoracic/lumbar fusion 9/6      Known risk factors for perioperative complications: none   Difficulty with intubation is not anticipated.    Cardiac Risk Estimation: Based on the Revised Cardiac Risk index, patient is a Class I risk with a 3.9 % risk of a major cardiac event in a moderate risk procedure.    Cardiology eval: 7/2023 Can proceed with surgery at low CV risk      1.) Preoperative workup as follows: ECG, hemoglobin, hematocrit, electrolytes, creatinine, glucose.  2.) Change in medication regimen before surgery: Hold OTC supplements week prior to procedure; is not currently taking any NSAIDs/ASA  3.) Prophylaxis for cardiac events with perioperative beta-blockers: not indicated.  4.) Invasive hemodynamic monitoring perioperatively: at the discretion of anesthesiologist.  5.) Deep vein thrombosis prophylaxis postoperatively: intermittent pneumatic compression boots and regimen to be chosen by surgical team.  6.) Current medications which may produce withdrawal symptoms if withheld perioperatively: none  8.) Other measures: Postoperative incentive spirometry to prevent pneumonia.   Consider consultation with hospital medicine for post op medical mgt while hospitalized

## 2023-08-30 NOTE — DISCHARGE INSTRUCTIONS
Pre op instructions reviewed with patient during Clinic Visit with Provider, verbalized understanding.    To confirm, Surgery is scheduled on 9/06/23. We will call you late afternoon the business day prior to surgery with your arrival time.    *Please report to the [facility] (1st Floor) located off of [facility] (2nd Entrance/Building on the left, in front of the flag pole).  Address: [address] [place] [address] [place]. [id number]        INSTRUCTIONS IMPORTANT!!!  Do Not Eat, Drink, or Smoke after 12 midnight unless instructed otherwise by your Surgeon. OK to brush teeth, no gum, candy or mints!    *MEDICATION INSTRUCTIONS as instructed by Alyx Montejo NP: Morning of Surgery, please ONLY take:  -None      Diabetic Patients: If you take diabetic or weight loss medication, Do NOT take morning of surgery unless instructed by Doctor. Metformin to be stopped 24 hrs prior to surgery. Ozempic/ Mounjaro/ Wegovy or any weight loss injections to be stopped 7 days prior to surgery. DO NOT take long-acting insulin the evening before surgery. Blood sugars will be checked in pre-op by Nurse.    *Patients should HOLD all vitamins, herbal supplements, weight loss medication, aspirin products & NSAIDS 7 days prior to surgery, as these can thin the blood. Ok to take Tylenol.    ____  Avoid Alcoholic beverages 3 days prior to surgery, as it can thin the blood.  ____  NO Acrylic/fake nails or nail polish worn day of surgery (specifically hand/arm & foot surgeries).  ____  NO powder, lotions, deodorants, oils or cream on body.  ____  Remove all jewelry, piercings, & foreign objects prior to arrival and leave at home.  ____  Remove Dentures, Hearing Aids & Contact Lens prior to surgery.  ____  Bring photo ID and insurance information to hospital (Leave Valuables at Home).  ____  If going home the same day, arrange for a ride home. You will not be able to drive for 24 hrs if Anesthesia was used.   ____  Females (ages  11-60): may need to give a urine sample the morning of surgery; please see Pre op Nurse prior to using the restroom.  ____  Males: Stop ED medications (Viagra, Cialis) 24 hrs prior to surgery.  ____  Wear clean, loose fitting clothing to allow for dressings/ bandages.      Bathing Instructions:    -Shower with anti-bacterial Soap (Hibiclens or Dial) the night before surgery and the morning of.   -Do not use Hibiclens on your face or genitals.   -Apply clean clothes after shower.  -Do not shave your face or body 2 days prior to surgery unless instructed otherwise by your Surgeon.  -Do not shave pubic hair 7 days prior to surgery (gyn pt's).    [facility]/Ride Policy:  Only 2 adults allowed in pre op/recovery area during your procedure. You MUST HAVE A RIDE HOME from a responsible adult that you know and trust. Medical Transport, Uber or Lyft can ONLY be used if patient has a responsible adult to accompany them during ride home.    Discharge Instructions: You will receive Post-op/Discharge instructions by your Discharge Nurse prior to going home.   *Prevention of surgical site infections:   -Keep incisions clean and dry.   -Do not soak/submerge incisions in water until completely healed.   -Do not apply lotions, powders, creams, or deodorants to site.   -Always make sure hands are cleaned with antibacterial soap/ alcohol-based  prior to touching the surgical site.        *Signs and symptoms of Infection:               -Redness and pain around the area where you had surgery               -Drainage of cloudy fluid from your surgical wound               -Fever, chills or any flu-like symptoms     >>>Call Surgeon office/on-call Surgeon if you experience any of these signs & symptoms post-surgery @ [phone]<<<       *If you are running late day of surgery, please call the Surgery Dept @ [phone].       *Billing question, please call  [phone]       Thank you,  -[facility] Pre  Admit Dept.  [phone] or [phone]  M-F 7:30 am-4:00 pm (Closed Major Holidays)    Additional Tests Scheduled Today:  Labs (1st Floor) Check in at the !

## 2023-08-30 NOTE — ASSESSMENT & PLAN NOTE
- follows with cardiology:  - chronic, asymptomatic   - per chart review :Pseudo bradycardia due to frequent pvcs noted

## 2023-08-30 NOTE — PROGRESS NOTES
Preoperative History and Physical                                                                 Chief Complaint: Preoperative evaluation     History of Present Illness:      Myles Pride is a 68 y.o. male with a history of bradycardia and HLD who presents to the office today for a preoperative consultation at the request of Dr. Lobato who plans on performing lumbar fusion on September 6.     Functional Status:      The patient is able to climb a flight of stairs. The patient is able to ambulate without difficulty. The patient's functional status is affected by the surgical problem. The patient's functional status is not affected by shortness of breath, chest pain, dyspnea on exertion and fatigue.    MET score greater than 4    Past Medical History:      Past Medical History:   Diagnosis Date    Hyperlipidemia     PONV (postoperative nausea and vomiting) 1990    after knee arthroscopy    Vitamin D deficiency 12/18/2019        Past Surgical History:      Past Surgical History:   Procedure Laterality Date    COLONOSCOPY      COLONOSCOPY N/A 12/19/2019    Procedure: COLONOSCOPY;  Surgeon: hBupendra Wilkinson MD;  Location: [facility];  Service: Endoscopy;  Laterality: N/A;    EPIDURAL STEROID INJECTION N/A 12/09/2022    Procedure: L4-5 intralaminar epidural steroid injection with left paramedian approach;  Surgeon: Ben Eddy MD;  Location: [facility];  Service: Pain Management;  Laterality: N/A;    KNEE ARTHROSCOPY Left     SELECTIVE INJECTION OF ANESTHETIC AGENT AROUND LUMBAR SPINAL NERVE ROOT BY TRANSFORAMINAL APPROACH Bilateral 01/21/2022    Procedure: Bilateral L4/5 TF LOVE with RN IV sedation;  Surgeon: Ben Eddy MD;  Location: [facility];  Service: Pain Management;  Laterality: Bilateral;    SELECTIVE INJECTION OF ANESTHETIC AGENT AROUND LUMBAR SPINAL NERVE ROOT BY TRANSFORAMINAL APPROACH Bilateral 05/06/2022    Procedure: Bilateral L4/5 TF LOVE with  RN IV sedation;  Surgeon: Ben Eddy MD;  Location: [facility];  Service: Pain Management;  Laterality: Bilateral;    SELECTIVE INJECTION OF ANESTHETIC AGENT AROUND LUMBAR SPINAL NERVE ROOT BY TRANSFORAMINAL APPROACH Bilateral 10/25/2022    Procedure: Bilateral L4/5 TF LOVE with RN IV sedation;  Surgeon: Ben Eddy MD;  Location: [facility];  Service: Pain Management;  Laterality: Bilateral;    VASECTOMY  1995        Social History:      Social History     Socioeconomic History    Marital status:    Tobacco Use    Smoking status: Never     Passive exposure: Never    Smokeless tobacco: Never   Substance and Sexual Activity    Alcohol use: Yes     Alcohol/week: 3.0 standard drinks of alcohol     Types: 3 Glasses of wine per week    Drug use: Never    Sexual activity: Yes     Partners: Female     Birth control/protection: Partner-Vasectomy, None     Social Determinants of Health     Financial Resource Strain: Low Risk  (7/3/2023)    Overall Financial Resource Strain (CARDIA)     Difficulty of Paying Living Expenses: Not hard at all   Food Insecurity: No Food Insecurity (7/3/2023)    Hunger Vital Sign     Worried About Running Out of Food in the Last Year: Never true     Ran Out of Food in the Last Year: Never true   Transportation Needs: No Transportation Needs (7/3/2023)    PRAPARE - Transportation     Lack of Transportation (Medical): No     Lack of Transportation (Non-Medical): No   Physical Activity: Insufficiently Active (7/3/2023)    Exercise Vital Sign     Days of Exercise per Week: 4 days     Minutes of Exercise per Session: 30 min   Stress: No Stress Concern Present (7/3/2023)    [employer] - Occupational Stress Questionnaire     Feeling of Stress : Not at all   Social Connections: Unknown (7/3/2023)    Social Connection and Isolation Panel [NHANES]     Frequency of Communication with Friends and Family: Once a week     Frequency of Social Gatherings with  Friends and Family: Once a week     Active Member of Clubs or Organizations: Yes     Attends Club or Organization Meetings: More than 4 times per year     Marital Status:    Housing Stability: Low Risk  (7/3/2023)    Housing Stability Vital Sign     Unable to Pay for Housing in the Last Year: No     Number of Places Lived in the Last Year: 1     Unstable Housing in the Last Year: No        Family History:      Family History   Problem Relation Age of Onset    Cancer Mother         anal cancer    Diabetes Mother         PRE DIABETES    Arthritis Mother     Hearing loss Mother     No Known Problems Sister     Gallbladder disease Brother     Hepatitis Brother         Hep C       Allergies:      Review of patient's allergies indicates:  No Known Allergies    Medications:      Current Outpatient Medications   Medication Sig    ergocalciferol, vitamin D2, (VITAMIN D ORAL) Take by mouth once daily.    pravastatin (PRAVACHOL) 40 MG tablet TAKE 1 TABLET BY MOUTH EVERY DAY     No current facility-administered medications for this visit.       Vitals:      Vitals:    08/30/23 1323   BP: (!) 172/93   Pulse: (!) 44   Temp: 97.8 °F (36.6 °C)       Review of Systems:        Constitutional: Negative for fever, chills, weight loss, malaise/fatigue and diaphoresis.   HENT: +head of hearing Negative for ear pain, nosebleeds, congestion, sore throat, neck pain, tinnitus and ear discharge.    Eyes: Negative for blurred vision, double vision, photophobia, pain, discharge and redness.   Respiratory: Negative for cough, hemoptysis, sputum production, shortness of breath, wheezing and stridor.    Cardiovascular: Negative for chest pain, palpitations, orthopnea, claudication, leg swelling and PND.   Gastrointestinal: Negative for heartburn, nausea, vomiting, abdominal pain, diarrhea, constipation, blood in stool and melena.   Genitourinary: Negative for dysuria, urgency, frequency, hematuria and flank pain.   Musculoskeletal: +chronic  lower back pain Negative for myalgias, back pain, joint pain and falls.   Skin: Negative for itching and rash.   Neurological: Negative for dizziness, tingling, tremors, sensory change, speech change, focal weakness, seizures, loss of consciousness, weakness and headaches.   Endo/Heme/Allergies: Negative for environmental allergies and polydipsia. Does not bruise/bleed easily.   Psychiatric/Behavioral: Negative for depression, suicidal ideas, hallucinations, memory loss and substance abuse. The patient is not nervous/anxious and does not have insomnia.    All 14 systems reviewed and negative except as noted above.    Physical Exam:      Constitutional: hearing aids in place Appears well-developed, well-nourished and in no acute distress.  Patient is oriented to person, place, and time.   Head: Normocephalic and atraumatic. Mucous membranes moist.  Neck: Neck supple no mass.   Cardiovascular: Normal rate and bradycardia  S1 S2 appreciated by ascultation.  Pulmonary/Chest: Effort normal and clear to auscultation bilaterally. No respiratory distress.   Abdomen: Soft. Non-tender and non-distended. Bowel sounds are normal.   Neurological: Patient is alert and oriented to person, place and time. Moves all extremities.  Skin: Warm and dry. No lesions.  Extremities: No clubbing, cyanosis or edema.    Laboratory data:      Reviewed and noted in plan where applicable. Please see chart for full laboratory data.    Lab Results   Component Value Date    WBC 7.51 08/30/2023    HGB 15.2 08/30/2023    HCT 48.3 08/30/2023    MCV 98 08/30/2023     08/30/2023     Sodium   Date Value Ref Range Status   08/30/2023 142 136 - 145 mmol/L Final     Chloride   Date Value Ref Range Status   08/30/2023 107 95 - 110 mmol/L Final     CO2   Date Value Ref Range Status   08/30/2023 21 (L) 23 - 29 mmol/L Final     Glucose   Date Value Ref Range Status   08/30/2023 82 70 - 110 mg/dL Final     BUN   Date Value Ref Range Status   08/30/2023 13  8 - 23 mg/dL Final     Creatinine   Date Value Ref Range Status   08/30/2023 1.1 0.5 - 1.4 mg/dL Final     Calcium   Date Value Ref Range Status   08/30/2023 9.5 8.7 - 10.5 mg/dL Final     Total Protein   Date Value Ref Range Status   08/30/2023 7.0 6.0 - 8.4 g/dL Final     Albumin   Date Value Ref Range Status   08/30/2023 4.2 3.5 - 5.2 g/dL Final     Total Bilirubin   Date Value Ref Range Status   08/30/2023 0.5 0.1 - 1.0 mg/dL Final     Comment:     For infants and newborns, interpretation of results should be based  on gestational age, weight and in agreement with clinical  observations.    Premature Infant recommended reference ranges:  Up to 24 hours.............<8.0 mg/dL  Up to 48 hours............<12.0 mg/dL  3-5 days..................<15.0 mg/dL  6-29 days.................<15.0 mg/dL       Alkaline Phosphatase   Date Value Ref Range Status   08/30/2023 72 55 - 135 U/L Final     AST   Date Value Ref Range Status   08/30/2023 36 10 - 40 U/L Final     ALT   Date Value Ref Range Status   08/30/2023 33 10 - 44 U/L Final     Anion Gap   Date Value Ref Range Status   08/30/2023 14 8 - 16 mmol/L Final     eGFR   Date Value Ref Range Status   08/30/2023 >60.0 >60 mL/min/1.73 m^2 Final           Predictors of intubation difficulty:       Morbid obesity? no   Anatomically abnormal facies? no   Prominent incisors? no   Receding mandible? no   Short, thick neck? no   Neck range of motion: normal   Dentition: No chipped, loose, or missing teeth.  Based on the Modified Mallampati, patient is a mallampati score: II (hard and soft palate, upper portion of tonsils anduvula visible)    Cardiographics:      ECG: SB with PACs    Holter Monitor in 6/2023: Sinus rhythm with heart rates varying between 34 and 129 BPM with an average of 55 BPM.    Exercise Stress Test: 6/2023   The ECG portion of the study is negative for ischemia.    The patient reported no chest pain during the stress test.    The blood pressure response to  stress was normal.    During stress, rare PVCs are noted.    The exercise capacity was normal.    The patient exercised for 7 minutes 1 seconds on a Doyle protocol, corresponding to a functional capacity of 10 METS, achieving a peak heart rate of 164 bpm, which is 106 % of the age predicted maximum heart rate. The patient experienced no angina during the test. Their exercise capacity was normal.       Imaging:      Chest x-ray: not indicated    Assessment and Plan:      Lumbar radiculopathy, chronic  - follows with Dr. Lobato, planning on thoracic/lumbar fusion 9/6      Known risk factors for perioperative complications: none   Difficulty with intubation is not anticipated.    Cardiac Risk Estimation: Based on the Revised Cardiac Risk index, patient is a Class I risk with a 3.9 % risk of a major cardiac event in a moderate risk procedure.    Cardiology eval: 7/2023 Can proceed with surgery at low CV risk      1.) Preoperative workup as follows: ECG, hemoglobin, hematocrit, electrolytes, creatinine, glucose.  2.) Change in medication regimen before surgery: Hold OTC supplements week prior to procedure; is not currently taking any NSAIDs/ASA  3.) Prophylaxis for cardiac events with perioperative beta-blockers: not indicated.  4.) Invasive hemodynamic monitoring perioperatively: at the discretion of anesthesiologist.  5.) Deep vein thrombosis prophylaxis postoperatively: intermittent pneumatic compression boots and regimen to be chosen by surgical team.  6.) Current medications which may produce withdrawal symptoms if withheld perioperatively: none  8.) Other measures: Postoperative incentive spirometry to prevent pneumonia.   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

## 2023-09-05 ENCOUNTER — ANESTHESIA EVENT (OUTPATIENT)
Dept: SURGERY | Facility: HOSPITAL | Age: 68
DRG: 454 | End: 2023-09-05
Payer: MEDICARE

## 2023-09-05 ENCOUNTER — TELEPHONE (OUTPATIENT)
Dept: PREADMISSION TESTING | Facility: HOSPITAL | Age: 68
End: 2023-09-05
Payer: MEDICARE

## 2023-09-05 DIAGNOSIS — Z01.818 PREOP EXAMINATION: Primary | ICD-10-CM

## 2023-09-05 NOTE — TELEPHONE ENCOUNTER
Called and spoke with Pt about the following:     Please arrive to [facility] ([facility] at 5:30 am on 9/06/23 for your scheduled procedure.  Address: [address] [place] [address] [place] [id number] ([address] Building on left, 1st Floor Lobby)  >>>NO eating or drinking after midnight unless instructed otherwise by your Surgeon<<<

## 2023-09-05 NOTE — ANESTHESIA PREPROCEDURE EVALUATION
09/05/2023  Myles Pride is a 68 y.o., male     Patient Active Problem List   Diagnosis    Bradycardia    Risk of myocardial infarction or stroke 7.5% or greater in next 10 years    Vitamin D deficiency    Colon cancer screening    Hyperlipidemia    Lumbar radiculopathy, chronic    Frequent PVCs     Past Medical History:   Diagnosis Date    Hyperlipidemia     PONV (postoperative nausea and vomiting) 1990    after knee arthroscopy    Vitamin D deficiency 12/18/2019     Past Surgical History:   Procedure Laterality Date    COLONOSCOPY      COLONOSCOPY N/A 12/19/2019    Procedure: COLONOSCOPY;  Surgeon: Bhupendra Wilkinson MD;  Location: [facility];  Service: Endoscopy;  Laterality: N/A;    EPIDURAL STEROID INJECTION N/A 12/09/2022    Procedure: L4-5 intralaminar epidural steroid injection with left paramedian approach;  Surgeon: Ben Eddy MD;  Location: [facility];  Service: Pain Management;  Laterality: N/A;    KNEE ARTHROSCOPY Left     SELECTIVE INJECTION OF ANESTHETIC AGENT AROUND LUMBAR SPINAL NERVE ROOT BY TRANSFORAMINAL APPROACH Bilateral 01/21/2022    Procedure: Bilateral L4/5 TF LOVE with RN IV sedation;  Surgeon: Ben Eddy MD;  Location: [facility];  Service: Pain Management;  Laterality: Bilateral;    SELECTIVE INJECTION OF ANESTHETIC AGENT AROUND LUMBAR SPINAL NERVE ROOT BY TRANSFORAMINAL APPROACH Bilateral 05/06/2022    Procedure: Bilateral L4/5 TF LOVE with RN IV sedation;  Surgeon: Ben Eddy MD;  Location: [facility];  Service: Pain Management;  Laterality: Bilateral;    SELECTIVE INJECTION OF ANESTHETIC AGENT AROUND LUMBAR SPINAL NERVE ROOT BY TRANSFORAMINAL APPROACH Bilateral 10/25/2022    Procedure: Bilateral L4/5 TF LOVE with RN IV sedation;  Surgeon: Ben Eddy MD;  Location: [facility];  Service: Pain Management;  Laterality: Bilateral;     VASECTOMY  1995     EKG: (6/8/23)  Sinus rhythm with frequent Premature ventricular complexes   Low voltage QRS   Possible Inferior infarct (cited on or before 05-JUN-2023)   Abnormal ECG   When compared with ECG of 05-JUN-2023 10:39,   Premature ventricular complexes are now Present   Aberrant conduction is no longer Present   Questionable change in QRS duration   Confirmed by GILSON MAGANA MD ([id number]) on 6/8/2023 3:42:29 PM     Ex Stress: (6/8/23)  Conclusion         The ECG portion of the study is negative for ischemia.    The patient reported no chest pain during the stress test.    The blood pressure response to stress was normal.    During stress, rare PVCs are noted.    The exercise capacity was normal.    The patient exercised for 7 minutes 1 seconds on a Doyle protocol, corresponding to a functional capacity of 10 METS, achieving a peak heart rate of 164 bpm, which is 106 % of the age predicted maximum heart rate. The patient experienced no angina during the test. Their exercise capacity was normal.      Pre-op Assessment    I have reviewed the Patient Summary Reports.     I have reviewed the Nursing Notes. I have reviewed the NPO Status.   I have reviewed the Medications.     Review of Systems  Anesthesia Hx:  No problems with previous Anesthesia  History of prior surgery of interest to airway management or planning: Previous anesthesia: General Denies Family Hx of Anesthesia complications.  Personal Hx of Anesthesia complications, Post-Operative Nausea/Vomiting (after knee arthroplasty)   Social:  Non-Smoker    Hematology/Oncology:  Hematology Normal   Oncology Normal     Cardiovascular:   Dysrhythmias ECG has been reviewed. Cleared by cardiologist with negative stress test   Pulmonary:  Pulmonary Normal    Renal/:  Renal/ Normal     Musculoskeletal:  Spine Disorders: lumbar Chronic Pain    Endocrine:  Endocrine Normal        Physical Exam  General: Alert and Oriented    Airway:  Mallampati:  II   Mouth Opening: Normal  TM Distance: Normal  Tongue: Normal  Neck ROM: Normal ROM        Anesthesia Plan  Type of Anesthesia, risks & benefits discussed:    Anesthesia Type: Gen ETT  Intra-op Monitoring Plan: Standard ASA Monitors  Post Op Pain Control Plan: multimodal analgesia and IV/PO Opioids PRN  Induction:  IV  Airway Plan: Direct  Informed Consent: Informed consent signed with the Patient and all parties understand the risks and agree with anesthesia plan.  All questions answered.   ASA Score: 2  Day of Surgery Review of History & Physical: H&P Update referred to the surgeon/provider.I have interviewed and examined the patient. I have reviewed the patient's H&P dated:     Ready For Surgery From Anesthesia Perspective.     .

## 2023-09-06 ENCOUNTER — ANESTHESIA (OUTPATIENT)
Dept: SURGERY | Facility: HOSPITAL | Age: 68
DRG: 454 | End: 2023-09-06
Payer: MEDICARE

## 2023-09-06 ENCOUNTER — HOSPITAL ENCOUNTER (INPATIENT)
Facility: HOSPITAL | Age: 68
LOS: 2 days | Discharge: HOME HEALTH | DRG: 454 | End: 2023-09-08
Attending: NEUROLOGICAL SURGERY | Admitting: NEUROLOGICAL SURGERY
Payer: MEDICARE

## 2023-09-06 DIAGNOSIS — M48.062 LUMBAR STENOSIS WITH NEUROGENIC CLAUDICATION: Primary | Chronic | ICD-10-CM

## 2023-09-06 DIAGNOSIS — M54.16 LUMBAR RADICULOPATHY, CHRONIC: ICD-10-CM

## 2023-09-06 DIAGNOSIS — M51.36 DDD (DEGENERATIVE DISC DISEASE), LUMBAR: ICD-10-CM

## 2023-09-06 LAB — POCT GLUCOSE: 167 MG/DL (ref 70–110)

## 2023-09-06 PROCEDURE — 25000003 PHARM REV CODE 250: Mod: HCNC | Performed by: PHYSICIAN ASSISTANT

## 2023-09-06 PROCEDURE — 22634 PR ARTHRODESIS, COMBINED TECHN, SNGL INTERSPACE, EA ADDTL: ICD-10-PCS | Mod: HCNC,,, | Performed by: NEUROLOGICAL SURGERY

## 2023-09-06 PROCEDURE — 36000712 HC OR TIME LEV V 1ST 15 MIN: Mod: HCNC | Performed by: NEUROLOGICAL SURGERY

## 2023-09-06 PROCEDURE — 20930 PR ALLOGRAFT FOR SPINE SURGERY ONLY MORSELIZED: ICD-10-PCS | Mod: HCNC,,, | Performed by: NEUROLOGICAL SURGERY

## 2023-09-06 PROCEDURE — 20930 SP BONE ALGRFT MORSEL ADD-ON: CPT | Mod: HCNC,,, | Performed by: NEUROLOGICAL SURGERY

## 2023-09-06 PROCEDURE — 22853 INSJ BIOMECHANICAL DEVICE: CPT | Mod: HCNC,,, | Performed by: NEUROLOGICAL SURGERY

## 2023-09-06 PROCEDURE — 27000221 HC OXYGEN, UP TO 24 HOURS: Mod: HCNC

## 2023-09-06 PROCEDURE — 61783 SCAN PROC SPINAL: CPT | Mod: HCNC,,, | Performed by: NEUROLOGICAL SURGERY

## 2023-09-06 PROCEDURE — 61783 PR STEREOTACTIC COMP ASSIST PROC,SPINAL: ICD-10-PCS | Mod: HCNC,,, | Performed by: NEUROLOGICAL SURGERY

## 2023-09-06 PROCEDURE — 63052 LAM FACETC/FRMT ARTHRD LUM 1: CPT | Mod: 59,HCNC,, | Performed by: NEUROLOGICAL SURGERY

## 2023-09-06 PROCEDURE — 63267 PR EXCIS INTRASP LESN,XDURAL,LUMBAR: ICD-10-PCS | Mod: 59,HCNC,, | Performed by: NEUROLOGICAL SURGERY

## 2023-09-06 PROCEDURE — 20936 SP BONE AGRFT LOCAL ADD-ON: CPT | Mod: HCNC,,, | Performed by: NEUROLOGICAL SURGERY

## 2023-09-06 PROCEDURE — 63052 PR LAMINECT/FACETECT/FORAMINOT, ARTHRODESIS, 1 VERTEBR SEGM: ICD-10-PCS | Mod: 59,HCNC,, | Performed by: NEUROLOGICAL SURGERY

## 2023-09-06 PROCEDURE — 88304 PR  SURG PATH,LEVEL III: ICD-10-PCS | Mod: 26,HCNC,, | Performed by: STUDENT IN AN ORGANIZED HEALTH CARE EDUCATION/TRAINING PROGRAM

## 2023-09-06 PROCEDURE — 36000713 HC OR TIME LEV V EA ADD 15 MIN: Mod: HCNC | Performed by: NEUROLOGICAL SURGERY

## 2023-09-06 PROCEDURE — 71000039 HC RECOVERY, EACH ADD'L HOUR: Mod: HCNC | Performed by: NEUROLOGICAL SURGERY

## 2023-09-06 PROCEDURE — 63600175 PHARM REV CODE 636 W HCPCS: Mod: HCNC | Performed by: ANESTHESIOLOGY

## 2023-09-06 PROCEDURE — C9290 INJ, BUPIVACAINE LIPOSOME: HCPCS | Mod: HCNC | Performed by: NEUROLOGICAL SURGERY

## 2023-09-06 PROCEDURE — 25000003 PHARM REV CODE 250: Mod: HCNC | Performed by: NURSE ANESTHETIST, CERTIFIED REGISTERED

## 2023-09-06 PROCEDURE — 11000001 HC ACUTE MED/SURG PRIVATE ROOM: Mod: HCNC

## 2023-09-06 PROCEDURE — 63600175 PHARM REV CODE 636 W HCPCS: Mod: HCNC | Performed by: PHYSICIAN ASSISTANT

## 2023-09-06 PROCEDURE — 27800903 OPTIME MED/SURG SUP & DEVICES OTHER IMPLANTS: Mod: HCNC | Performed by: NEUROLOGICAL SURGERY

## 2023-09-06 PROCEDURE — 63267 EXCISE INTRSPINL LESION LMBR: CPT | Mod: 59,HCNC,, | Performed by: NEUROLOGICAL SURGERY

## 2023-09-06 PROCEDURE — 37000009 HC ANESTHESIA EA ADD 15 MINS: Mod: HCNC | Performed by: NEUROLOGICAL SURGERY

## 2023-09-06 PROCEDURE — 20936 PR AUTOGRAFT SPINE SURGERY LOCAL FROM SAME INCISION: ICD-10-PCS | Mod: HCNC,,, | Performed by: NEUROLOGICAL SURGERY

## 2023-09-06 PROCEDURE — 22842 PR POSTERIOR SEGMENTAL INSTRUMENTATION 3-6 VRT SEG: ICD-10-PCS | Mod: HCNC,,, | Performed by: NEUROLOGICAL SURGERY

## 2023-09-06 PROCEDURE — 94799 UNLISTED PULMONARY SVC/PX: CPT | Mod: HCNC

## 2023-09-06 PROCEDURE — 27201423 OPTIME MED/SURG SUP & DEVICES STERILE SUPPLY: Mod: HCNC | Performed by: NEUROLOGICAL SURGERY

## 2023-09-06 PROCEDURE — 63600175 PHARM REV CODE 636 W HCPCS: Mod: HCNC | Performed by: NURSE ANESTHETIST, CERTIFIED REGISTERED

## 2023-09-06 PROCEDURE — 88304 TISSUE EXAM BY PATHOLOGIST: CPT | Mod: 26,HCNC,, | Performed by: STUDENT IN AN ORGANIZED HEALTH CARE EDUCATION/TRAINING PROGRAM

## 2023-09-06 PROCEDURE — 71000033 HC RECOVERY, INTIAL HOUR: Mod: HCNC | Performed by: NEUROLOGICAL SURGERY

## 2023-09-06 PROCEDURE — 88304 TISSUE EXAM BY PATHOLOGIST: CPT | Mod: HCNC | Performed by: STUDENT IN AN ORGANIZED HEALTH CARE EDUCATION/TRAINING PROGRAM

## 2023-09-06 PROCEDURE — 22842 INSERT SPINE FIXATION DEVICE: CPT | Mod: HCNC,,, | Performed by: NEUROLOGICAL SURGERY

## 2023-09-06 PROCEDURE — 22634 ARTHRD CMBN 1NTRSPC EA ADDL: CPT | Mod: HCNC,,, | Performed by: NEUROLOGICAL SURGERY

## 2023-09-06 PROCEDURE — C1713 ANCHOR/SCREW BN/BN,TIS/BN: HCPCS | Mod: HCNC | Performed by: NEUROLOGICAL SURGERY

## 2023-09-06 PROCEDURE — 22853 PR INSERT BIOMECH DEV W/INTERBODY ARTHRODESIS, EA CONTIGUOUS DEFECT: ICD-10-PCS | Mod: HCNC,,, | Performed by: NEUROLOGICAL SURGERY

## 2023-09-06 PROCEDURE — 37000008 HC ANESTHESIA 1ST 15 MINUTES: Mod: HCNC | Performed by: NEUROLOGICAL SURGERY

## 2023-09-06 PROCEDURE — 22633 ARTHRD CMBN 1NTRSPC LUMBAR: CPT | Mod: HCNC,,, | Performed by: NEUROLOGICAL SURGERY

## 2023-09-06 PROCEDURE — 22633 PR ARTHRODESIS, COMBINED TECHN, SNGL INTERSPACE, LUMBAR: ICD-10-PCS | Mod: HCNC,,, | Performed by: NEUROLOGICAL SURGERY

## 2023-09-06 PROCEDURE — 99900035 HC TECH TIME PER 15 MIN (STAT): Mod: HCNC

## 2023-09-06 PROCEDURE — 63600175 PHARM REV CODE 636 W HCPCS: Mod: HCNC | Performed by: NEUROLOGICAL SURGERY

## 2023-09-06 DEVICE — ROD HORIZON CURV 5.5CCM 70MM: Type: IMPLANTABLE DEVICE | Site: SPINE LUMBAR | Status: FUNCTIONAL

## 2023-09-06 DEVICE — KIT BONE GRFT BMP SM: Type: IMPLANTABLE DEVICE | Site: SPINE LUMBAR | Status: FUNCTIONAL

## 2023-09-06 DEVICE — SPACER SHORT CATALYFT PL 7MM: Type: IMPLANTABLE DEVICE | Site: SPINE LUMBAR | Status: FUNCTIONAL

## 2023-09-06 DEVICE — SCREW HORIZON SOLERA 6.5X50MM: Type: IMPLANTABLE DEVICE | Site: SPINE LUMBAR | Status: FUNCTIONAL

## 2023-09-06 DEVICE — SET SCREW HORIZON SOLERA 5.5-6: Type: IMPLANTABLE DEVICE | Site: SPINE LUMBAR | Status: FUNCTIONAL

## 2023-09-06 RX ORDER — DIPHENHYDRAMINE HYDROCHLORIDE 50 MG/ML
25 INJECTION INTRAMUSCULAR; INTRAVENOUS EVERY 6 HOURS PRN
Status: DISCONTINUED | OUTPATIENT
Start: 2023-09-06 | End: 2023-09-06 | Stop reason: HOSPADM

## 2023-09-06 RX ORDER — EPHEDRINE SULFATE 50 MG/ML
INJECTION, SOLUTION INTRAVENOUS
Status: DISCONTINUED | OUTPATIENT
Start: 2023-09-06 | End: 2023-09-06

## 2023-09-06 RX ORDER — DEXTROSE MONOHYDRATE AND SODIUM CHLORIDE 5; .9 G/100ML; G/100ML
INJECTION, SOLUTION INTRAVENOUS CONTINUOUS
Status: DISCONTINUED | OUTPATIENT
Start: 2023-09-06 | End: 2023-09-06

## 2023-09-06 RX ORDER — HYDROMORPHONE HYDROCHLORIDE 2 MG/ML
0.2 INJECTION, SOLUTION INTRAMUSCULAR; INTRAVENOUS; SUBCUTANEOUS EVERY 5 MIN PRN
Status: DISCONTINUED | OUTPATIENT
Start: 2023-09-06 | End: 2023-09-06 | Stop reason: HOSPADM

## 2023-09-06 RX ORDER — SCOLOPAMINE TRANSDERMAL SYSTEM 1 MG/1
PATCH, EXTENDED RELEASE TRANSDERMAL
Status: DISCONTINUED | OUTPATIENT
Start: 2023-09-06 | End: 2023-09-06

## 2023-09-06 RX ORDER — VANCOMYCIN HYDROCHLORIDE 1 G/20ML
INJECTION, POWDER, LYOPHILIZED, FOR SOLUTION INTRAVENOUS
Status: DISCONTINUED | OUTPATIENT
Start: 2023-09-06 | End: 2023-09-06 | Stop reason: HOSPADM

## 2023-09-06 RX ORDER — LIDOCAINE HYDROCHLORIDE 20 MG/ML
INJECTION INTRAVENOUS
Status: DISCONTINUED | OUTPATIENT
Start: 2023-09-06 | End: 2023-09-06

## 2023-09-06 RX ORDER — ONDANSETRON 2 MG/ML
INJECTION INTRAMUSCULAR; INTRAVENOUS
Status: DISCONTINUED | OUTPATIENT
Start: 2023-09-06 | End: 2023-09-06

## 2023-09-06 RX ORDER — DOCUSATE SODIUM 100 MG/1
100 CAPSULE, LIQUID FILLED ORAL DAILY
Status: DISCONTINUED | OUTPATIENT
Start: 2023-09-06 | End: 2023-09-08 | Stop reason: HOSPADM

## 2023-09-06 RX ORDER — METHOCARBAMOL 750 MG/1
750 TABLET, FILM COATED ORAL EVERY 8 HOURS PRN
Status: DISCONTINUED | OUTPATIENT
Start: 2023-09-06 | End: 2023-09-08 | Stop reason: HOSPADM

## 2023-09-06 RX ORDER — NALOXONE HCL 0.4 MG/ML
0.02 VIAL (ML) INJECTION
Status: DISCONTINUED | OUTPATIENT
Start: 2023-09-06 | End: 2023-09-08 | Stop reason: HOSPADM

## 2023-09-06 RX ORDER — DEXAMETHASONE SODIUM PHOSPHATE 4 MG/ML
INJECTION, SOLUTION INTRA-ARTICULAR; INTRALESIONAL; INTRAMUSCULAR; INTRAVENOUS; SOFT TISSUE
Status: DISCONTINUED | OUTPATIENT
Start: 2023-09-06 | End: 2023-09-06

## 2023-09-06 RX ORDER — MEPERIDINE HYDROCHLORIDE 25 MG/ML
12.5 INJECTION INTRAMUSCULAR; INTRAVENOUS; SUBCUTANEOUS ONCE
Status: DISCONTINUED | OUTPATIENT
Start: 2023-09-06 | End: 2023-09-06 | Stop reason: HOSPADM

## 2023-09-06 RX ORDER — ACETAMINOPHEN 325 MG/1
650 TABLET ORAL EVERY 6 HOURS PRN
Status: DISCONTINUED | OUTPATIENT
Start: 2023-09-06 | End: 2023-09-08 | Stop reason: HOSPADM

## 2023-09-06 RX ORDER — ACETAMINOPHEN 325 MG/1
650 TABLET ORAL EVERY 4 HOURS PRN
Status: DISCONTINUED | OUTPATIENT
Start: 2023-09-06 | End: 2023-09-08 | Stop reason: HOSPADM

## 2023-09-06 RX ORDER — OXYCODONE HYDROCHLORIDE 5 MG/1
5 TABLET ORAL
Status: DISCONTINUED | OUTPATIENT
Start: 2023-09-06 | End: 2023-09-06 | Stop reason: HOSPADM

## 2023-09-06 RX ORDER — PRAVASTATIN SODIUM 20 MG/1
40 TABLET ORAL DAILY
Status: DISCONTINUED | OUTPATIENT
Start: 2023-09-07 | End: 2023-09-08 | Stop reason: HOSPADM

## 2023-09-06 RX ORDER — MAG HYDROX/ALUMINUM HYD/SIMETH 200-200-20
30 SUSPENSION, ORAL (FINAL DOSE FORM) ORAL EVERY 4 HOURS PRN
Status: DISCONTINUED | OUTPATIENT
Start: 2023-09-06 | End: 2023-09-08 | Stop reason: HOSPADM

## 2023-09-06 RX ORDER — ALBUTEROL SULFATE 0.83 MG/ML
2.5 SOLUTION RESPIRATORY (INHALATION) EVERY 4 HOURS PRN
Status: DISCONTINUED | OUTPATIENT
Start: 2023-09-06 | End: 2023-09-06 | Stop reason: HOSPADM

## 2023-09-06 RX ORDER — KETOROLAC TROMETHAMINE 30 MG/ML
15 INJECTION, SOLUTION INTRAMUSCULAR; INTRAVENOUS EVERY 8 HOURS PRN
Status: DISCONTINUED | OUTPATIENT
Start: 2023-09-06 | End: 2023-09-06 | Stop reason: HOSPADM

## 2023-09-06 RX ORDER — SODIUM CHLORIDE 9 MG/ML
INJECTION, SOLUTION INTRAVENOUS CONTINUOUS
Status: DISCONTINUED | OUTPATIENT
Start: 2023-09-06 | End: 2023-09-07

## 2023-09-06 RX ORDER — PROPOFOL 10 MG/ML
VIAL (ML) INTRAVENOUS
Status: DISCONTINUED | OUTPATIENT
Start: 2023-09-06 | End: 2023-09-06

## 2023-09-06 RX ORDER — ONDANSETRON 8 MG/1
8 TABLET, ORALLY DISINTEGRATING ORAL EVERY 6 HOURS PRN
Status: DISCONTINUED | OUTPATIENT
Start: 2023-09-06 | End: 2023-09-08 | Stop reason: HOSPADM

## 2023-09-06 RX ORDER — PROCHLORPERAZINE EDISYLATE 5 MG/ML
5 INJECTION INTRAMUSCULAR; INTRAVENOUS EVERY 6 HOURS PRN
Status: DISCONTINUED | OUTPATIENT
Start: 2023-09-06 | End: 2023-09-08 | Stop reason: HOSPADM

## 2023-09-06 RX ORDER — HYDROMORPHONE HCL IN 0.9% NACL 6 MG/30 ML
PATIENT CONTROLLED ANALGESIA SYRINGE INTRAVENOUS CONTINUOUS
Status: DISCONTINUED | OUTPATIENT
Start: 2023-09-06 | End: 2023-09-07

## 2023-09-06 RX ORDER — PROCHLORPERAZINE EDISYLATE 5 MG/ML
5 INJECTION INTRAMUSCULAR; INTRAVENOUS EVERY 30 MIN PRN
Status: DISCONTINUED | OUTPATIENT
Start: 2023-09-06 | End: 2023-09-06 | Stop reason: HOSPADM

## 2023-09-06 RX ORDER — AMOXICILLIN 250 MG
2 CAPSULE ORAL NIGHTLY PRN
Status: DISCONTINUED | OUTPATIENT
Start: 2023-09-06 | End: 2023-09-08 | Stop reason: HOSPADM

## 2023-09-06 RX ORDER — ROCURONIUM BROMIDE 10 MG/ML
INJECTION, SOLUTION INTRAVENOUS
Status: DISCONTINUED | OUTPATIENT
Start: 2023-09-06 | End: 2023-09-06

## 2023-09-06 RX ORDER — MIDAZOLAM HYDROCHLORIDE 1 MG/ML
INJECTION, SOLUTION INTRAMUSCULAR; INTRAVENOUS
Status: DISCONTINUED | OUTPATIENT
Start: 2023-09-06 | End: 2023-09-06

## 2023-09-06 RX ORDER — FENTANYL CITRATE 50 UG/ML
INJECTION, SOLUTION INTRAMUSCULAR; INTRAVENOUS
Status: DISCONTINUED | OUTPATIENT
Start: 2023-09-06 | End: 2023-09-06

## 2023-09-06 RX ORDER — SODIUM CHLORIDE 0.9 % (FLUSH) 0.9 %
3 SYRINGE (ML) INJECTION
Status: DISCONTINUED | OUTPATIENT
Start: 2023-09-06 | End: 2023-09-08 | Stop reason: HOSPADM

## 2023-09-06 RX ORDER — ONDANSETRON 2 MG/ML
4 INJECTION INTRAMUSCULAR; INTRAVENOUS DAILY PRN
Status: DISCONTINUED | OUTPATIENT
Start: 2023-09-06 | End: 2023-09-06 | Stop reason: HOSPADM

## 2023-09-06 RX ORDER — BUPIVACAINE HYDROCHLORIDE 2.5 MG/ML
INJECTION, SOLUTION EPIDURAL; INFILTRATION; INTRACAUDAL
Status: DISCONTINUED | OUTPATIENT
Start: 2023-09-06 | End: 2023-09-06 | Stop reason: HOSPADM

## 2023-09-06 RX ORDER — DIPHENHYDRAMINE HCL 25 MG
50 CAPSULE ORAL EVERY 6 HOURS PRN
Status: DISCONTINUED | OUTPATIENT
Start: 2023-09-06 | End: 2023-09-08 | Stop reason: HOSPADM

## 2023-09-06 RX ADMIN — VANCOMYCIN HYDROCHLORIDE 1500 MG: 1.5 INJECTION, POWDER, LYOPHILIZED, FOR SOLUTION INTRAVENOUS at 06:09

## 2023-09-06 RX ADMIN — VANCOMYCIN HYDROCHLORIDE 1500 MG: 1.5 INJECTION, POWDER, LYOPHILIZED, FOR SOLUTION INTRAVENOUS at 05:09

## 2023-09-06 RX ADMIN — HYDROMORPHONE HYDROCHLORIDE 0.2 MG: 2 INJECTION INTRAMUSCULAR; INTRAVENOUS; SUBCUTANEOUS at 02:09

## 2023-09-06 RX ADMIN — EPHEDRINE SULFATE 10 MG: 50 INJECTION INTRAVENOUS at 12:09

## 2023-09-06 RX ADMIN — ROCURONIUM BROMIDE 50 MG: 10 INJECTION, SOLUTION INTRAVENOUS at 07:09

## 2023-09-06 RX ADMIN — SODIUM CHLORIDE: 9 INJECTION, SOLUTION INTRAVENOUS at 02:09

## 2023-09-06 RX ADMIN — EPHEDRINE SULFATE 30 MG: 50 INJECTION INTRAVENOUS at 08:09

## 2023-09-06 RX ADMIN — GLYCOPYRROLATE 0.4 MG: 0.2 INJECTION, SOLUTION INTRAMUSCULAR; INTRAVENOUS at 07:09

## 2023-09-06 RX ADMIN — FENTANYL CITRATE 100 MCG: 50 INJECTION, SOLUTION INTRAMUSCULAR; INTRAVENOUS at 07:09

## 2023-09-06 RX ADMIN — SCOPOLAMINE 1 PATCH: 1 SYSTEM TRANSDERMAL at 07:09

## 2023-09-06 RX ADMIN — MIDAZOLAM 2 MG: 1 INJECTION INTRAMUSCULAR; INTRAVENOUS at 07:09

## 2023-09-06 RX ADMIN — ONDANSETRON 4 MG: 2 INJECTION INTRAMUSCULAR; INTRAVENOUS at 12:09

## 2023-09-06 RX ADMIN — HYDROMORPHONE HYDROCHLORIDE: 2 INJECTION INTRAMUSCULAR; INTRAVENOUS; SUBCUTANEOUS at 02:09

## 2023-09-06 RX ADMIN — SODIUM CHLORIDE, SODIUM LACTATE, POTASSIUM CHLORIDE, AND CALCIUM CHLORIDE: .6; .31; .03; .02 INJECTION, SOLUTION INTRAVENOUS at 08:09

## 2023-09-06 RX ADMIN — SODIUM CHLORIDE, SODIUM LACTATE, POTASSIUM CHLORIDE, AND CALCIUM CHLORIDE: .6; .31; .03; .02 INJECTION, SOLUTION INTRAVENOUS at 07:09

## 2023-09-06 RX ADMIN — DEXAMETHASONE SODIUM PHOSPHATE 8 MG: 4 INJECTION, SOLUTION INTRA-ARTICULAR; INTRALESIONAL; INTRAMUSCULAR; INTRAVENOUS; SOFT TISSUE at 07:09

## 2023-09-06 RX ADMIN — PROPOFOL 130 MG: 10 INJECTION, EMULSION INTRAVENOUS at 07:09

## 2023-09-06 RX ADMIN — LIDOCAINE HYDROCHLORIDE 100 MG: 20 INJECTION INTRAVENOUS at 07:09

## 2023-09-06 NOTE — TRANSFER OF CARE
"Anesthesia Transfer of Care Note    Patient: Myles Pride    Procedure(s) Performed: Procedure(s) (LRB):  FUSION, SPINE, LUMBAR, TLIF, USING COMPUTER-ASSISTED NAVIGATION (Bilateral)    Patient location: PACU    Anesthesia Type: general    Transport from OR: Transported from OR on room air with adequate spontaneous ventilation    Post pain: adequate analgesia    Post assessment: no apparent anesthetic complications    Post vital signs: stable    Level of consciousness: awake    Nausea/Vomiting: no nausea/vomiting    Complications: none    Transfer of care protocol was followed      Last vitals:   Visit Vitals  BP (!) 174/91 (BP Location: Right arm, Patient Position: Sitting)   Pulse (!) 55   Temp 36.7 °C (98.1 °F) (Temporal)   Resp 18   Ht 6' 5" (1.956 m)   Wt 109.7 kg (241 lb 13.5 oz)   SpO2 98%   BMI 28.68 kg/m²     "

## 2023-09-06 NOTE — ANESTHESIA POSTPROCEDURE EVALUATION
Anesthesia Post Evaluation    Patient: Myles Pride    Procedure(s) Performed: Procedure(s) (LRB):  FUSION, SPINE, LUMBAR, TLIF, USING COMPUTER-ASSISTED NAVIGATION (Bilateral)    Final Anesthesia Type: general      Patient location during evaluation: PACU  Patient participation: Yes- Able to Participate  Level of consciousness: awake  Post-procedure vital signs: reviewed and stable  Pain management: adequate  Airway patency: patent    PONV status at discharge: No PONV  Anesthetic complications: no      Cardiovascular status: hemodynamically stable  Respiratory status: unassisted  Hydration status: euvolemic  Follow-up not needed.          Vitals Value Taken Time   /75 09/06/23 1359   Temp  09/06/23 1401   Pulse 67 09/06/23 1401   Resp 15 09/06/23 1401   SpO2 92 % 09/06/23 1401   Vitals shown include unvalidated device data.      No case tracking events are documented in the log.      Pain/Karey Score: Karey Score: 8 (9/6/2023  1:45 PM)

## 2023-09-06 NOTE — CARE UPDATE
Discussed imaging with the patient along with his symptoms over the phone this afternoon.  Patient with persistent lower extremity symptoms with the left side being worse than the right along with lower back pain.  He failed conservative therapies including physical therapy and multiple epidural steroid injections since 2021 without any lasting relief.  When MRI has very severe stenosis from hypertrophic facets L4-5 worse than L3-4.  At L4-5 there is evidence of congenitally short pedicle along with severe bilateral foraminal narrowing causing impingement of both nerve roots  At L3-4 there is a facet synovial cyst along with again facet hypertrophy causing severe stenosis    On the CT scan at L3-4 there is evidence of vacuum phenomenon coming from the facet extending into the canal severe stenosis L3-4 and L4-5 with high-grade central as well as foraminal narrowing bilaterally    On flexion-extension x-ray lumbar I do not see any significant listhesis    Patient has degenerative changes throughout.  His pain generators are likely mostly consistent with the L3-4 and L4-5 level.  We discussed simple decompression however when the extent of the facet disease spoke with him that we may need to take the entire facet down in order to decompress the thecal sac and nerve root.  In that case I have recommended decompression with posterior stabilization interbody cage placement to restore the foraminal height as well as to decompress and stabilize the lumbar spine to sufficiently help with his leg and lower extremity symptoms.    Patient understands and is scheduled for surgery tomorrow morning all questions were answered  Patient has preop for surgical procedure in a.m.    Thank you for the referral     Lyle Lobato MD  Neurosurgery     Disclaimer: This note was prepared using a voice recognition system and is likely to have sound alike errors within the text.

## 2023-09-06 NOTE — CONSULTS
[facility]  Consult Note    Patient Name: Myles Pride  MRN: [id number]  Admission Date: 9/6/2023  Hospital Length of Stay: 0 days  Attending Physician: Duane Soriano MD   Primary Care Provider: Catalino Arias MD           Patient information was obtained from patient, past medical records, and ER records.     Inpatient consult to Hospitalist  Consult performed by: Duane Soriano MD  Consult ordered by: Dara Alonzo PA-C        Subjective:     Principal Problem: Lumbar stenosis with neurogenic claudication    Chief Complaint: No chief complaint on file.       HPI: Myles Pride is a 68 y.o. male with a history of bradycardia and HLD who presents s/p lumbar fusion by Dr. Lobato today. Patient examined post-op, wife is at bedside who provides history. Patient reports known bradycardia, states that his HR usually is in the 40's, occasionally will go into mid-30's, denies symptoms. He follows with cardiology, found to have rare PVCs. He reports pain controlled, currently on dilaudid PCA pump. He reports some nausea, denies vomiting, abdominal pain, chest pain, SOB, fevers/chills. Mir catheter was removed post-op. Lumbar drain currently in place. Hospital medicine consulted for medical management.    Past Medical History:   Diagnosis Date    Hyperlipidemia     PONV (postoperative nausea and vomiting) 1990    after knee arthroscopy    Vitamin D deficiency 12/18/2019       Past Surgical History:   Procedure Laterality Date    COLONOSCOPY      COLONOSCOPY N/A 12/19/2019    Procedure: COLONOSCOPY;  Surgeon: Bhupendra Wilkinson MD;  Location: [facility] ENDO;  Service: Endoscopy;  Laterality: N/A;    EPIDURAL STEROID INJECTION N/A 12/09/2022    Procedure: L4-5 intralaminar epidural steroid injection with left paramedian approach;  Surgeon: Ben Eddy MD;  Location: [facility] PAIN MGT;  Service: Pain Management;  Laterality: N/A;    KNEE ARTHROSCOPY Left     SELECTIVE INJECTION OF  ANESTHETIC AGENT AROUND LUMBAR SPINAL NERVE ROOT BY TRANSFORAMINAL APPROACH Bilateral 01/21/2022    Procedure: Bilateral L4/5 TF LOVE with RN IV sedation;  Surgeon: Ben Eddy MD;  Location: [facility] PAIN MGT;  Service: Pain Management;  Laterality: Bilateral;    SELECTIVE INJECTION OF ANESTHETIC AGENT AROUND LUMBAR SPINAL NERVE ROOT BY TRANSFORAMINAL APPROACH Bilateral 05/06/2022    Procedure: Bilateral L4/5 TF LOVE with RN IV sedation;  Surgeon: Ben Eddy MD;  Location: [facility] PAIN MGT;  Service: Pain Management;  Laterality: Bilateral;    SELECTIVE INJECTION OF ANESTHETIC AGENT AROUND LUMBAR SPINAL NERVE ROOT BY TRANSFORAMINAL APPROACH Bilateral 10/25/2022    Procedure: Bilateral L4/5 TF LOVE with RN IV sedation;  Surgeon: Ben Eddy MD;  Location: [facility] PAIN MGT;  Service: Pain Management;  Laterality: Bilateral;    TRANSFORAMINAL LUMBAR INTERBODY FUSION (TLIF) USING COMPUTER-ASSISTED NAVIGATION Bilateral 9/6/2023    Procedure: FUSION, SPINE, LUMBAR, TLIF, USING COMPUTER-ASSISTED NAVIGATION;  Surgeon: Lyle Lobato MD;  Location: [facility];  Service: Neurosurgery;  Laterality: Bilateral;  L3-5 TLIF    VASECTOMY  1995       Review of patient's allergies indicates:  No Known Allergies    No current facility-administered medications on file prior to encounter.     Current Outpatient Medications on File Prior to Encounter   Medication Sig    ergocalciferol, vitamin D2, (VITAMIN D ORAL) Take by mouth once daily.    pravastatin (PRAVACHOL) 40 MG tablet TAKE 1 TABLET BY MOUTH EVERY DAY     Family History       Problem Relation (Age of Onset)    Arthritis Mother    Cancer Mother    Diabetes Mother    Gallbladder disease Brother    Hearing loss Mother    Hepatitis Brother    No Known Problems Sister          Tobacco Use    Smoking status: Never     Passive exposure: Never    Smokeless tobacco: Never   Substance and Sexual Activity    Alcohol use: Yes     Alcohol/week: 3.0 standard drinks of alcohol     Types: 3  Glasses of wine per week    Drug use: Never    Sexual activity: Yes     Partners: Female     Birth control/protection: Partner-Vasectomy, None     Review of Systems   All other systems reviewed and are negative.    Objective:     Vital Signs (Most Recent):  Temp: 98.3 °F (36.8 °C) (09/07/23 0803)  Pulse: (!) 52 (09/07/23 0803)  Resp: 18 (09/07/23 0803)  BP: 132/68 (09/07/23 0803)  SpO2: 98 % (09/07/23 0803) Vital Signs (24h Range):  Temp:  [97.5 °F (36.4 °C)-98.4 °F (36.9 °C)] 98.3 °F (36.8 °C)  Pulse:  [52-86] 52  Resp:  [11-20] 18  SpO2:  [91 %-98 %] 98 %  BP: (110-132)/(59-83) 132/68     Weight: 109.7 kg (241 lb 13.5 oz)  Body mass index is 28.68 kg/m².     Physical Exam  Vitals and nursing note reviewed.          Constitutional:       General: He is not in acute distress.     Appearance: Normal appearance. He is normal weight.   HENT:      Head: Normocephalic and atraumatic.      Nose: Nose normal.      Mouth/Throat:      Pharynx: Oropharynx is clear.   Eyes:      Extraocular Movements: Extraocular movements intact.      Pupils: Pupils are equal, round, and reactive to light.   Cardiovascular:      Pulses: Normal pulses.      Heart sounds: Normal heart sounds.   Pulmonary:      Effort: Pulmonary effort is normal. No respiratory distress.      Breath sounds: Normal breath sounds. No wheezing, rhonchi or rales.   Abdominal:      General: Abdomen is flat. Bowel sounds are normal. There is no distension.      Palpations: Abdomen is soft.      Tenderness: There is no abdominal tenderness. There is no guarding.   Musculoskeletal:      Comments: Lumbar drain in place   Neurological:      General: No focal deficit present.      Mental Status: He is alert and oriented to person, place, and time.   Psychiatric:         Mood and Affect: Mood normal.         Behavior: Behavior normal.     Significant Labs: All pertinent labs within the past 24 hours have been reviewed.  Recent Lab Results         09/07/23  8125    09/06/23 2038        Anion Gap 9         Baso # 0.01         Basophil % 0.1         BUN 16         Calcium 8.1         Chloride 105         CO2 23         Creatinine 1.0         Differential Method Automated         eGFR >60         Eos # 0.0         Eosinophil % 0.0         Glucose 149         Gran # (ANC) 11.9         Gran % 80.5         Hematocrit 35.4         Hemoglobin 11.7         Immature Grans (Abs) 0.07  Comment: Mild elevation in immature granulocytes is non specific and   can be seen in a variety of conditions including stress response,   acute inflammation, trauma and pregnancy. Correlation with other   laboratory and clinical findings is essential.           Immature Granulocytes 0.5         Lymph # 1.8         Lymph % 12.3         MCH 31.0         MCHC 33.1         MCV 94         Mono # 1.0         Mono % 6.6         MPV 10.0         nRBC 0         Platelets 179         POCT Glucose   167       Potassium 4.9         RBC 3.78         RDW 12.7         Sodium 137         WBC 14.78                 Significant Imaging: I have reviewed all pertinent imaging results/findings within the past 24 hours.    SURG FL Surgery Fluoro Usage   Final Result      X-Ray Lumbar Spine Ap And Lateral   Final Result      Fluoroscopic guidance utilized for lower lumbar procedure.  Please see procedural dictation for further details.         Electronically signed by: Tomasz Morrison   Date:    09/06/2023   Time:    14:55      X-Ray Lumbar Spine Ap And Lateral    (Results Pending)         Assessment/Plan:     * Lumbar stenosis with neurogenic claudication  S/p lumbar fusion by Dr. Lobato 9/6/23  Multimodal pain control, PT/OT, LSO brace  Lumbar drain in place, monitor I/O's  Periop abx with vancomycin  On decadron   Management per Neurosurgery    Bradycardia  Known frequent PVCs  EKG, troponin if develops chest pain or SOB    Hyperlipidemia  Continue home statin      Lumbar radiculopathy, chronic          VTE Risk Mitigation  (From admission, onward)           Ordered     Place sequential compression device  Until discontinued         09/06/23 0541                        Thank you for your consult. I will follow-up with patient. Please contact us if you have any additional questions.    Duane Soriano MD  Department of Hospital Medicine   [facility]

## 2023-09-06 NOTE — ANESTHESIA RELEASE NOTE
"Anesthesia Release from PACU Note    Patient: Myles Pride    Procedure(s) Performed: Procedure(s) (LRB):  FUSION, SPINE, LUMBAR, TLIF, USING COMPUTER-ASSISTED NAVIGATION (Bilateral)    Anesthesia type: general    Post pain: Adequate analgesia    Post assessment: no apparent anesthetic complications    Last Vitals:   Visit Vitals  BP (!) 174/91 (BP Location: Right arm, Patient Position: Sitting)   Pulse (!) 55   Temp 36.7 °C (98.1 °F) (Temporal)   Resp 18   Ht 6' 5" (1.956 m)   Wt 109.7 kg (241 lb 13.5 oz)   SpO2 98%   BMI 28.68 kg/m²       Post vital signs: stable    Level of consciousness: awake    Nausea/Vomiting: no nausea/no vomiting    Complications: none    Airway Patency: patent    Respiratory: unassisted    Cardiovascular: stable and blood pressure at baseline    Hydration: euvolemic     "

## 2023-09-06 NOTE — ANESTHESIA PROCEDURE NOTES
Intubation    Date/Time: 9/6/2023 7:13 AM    Performed by: Ryan Metz CRNA  Authorized by: Lavelle Soliz MD    Intubation:     Induction:  Intravenous    Intubated:  Postinduction    Mask Ventilation:  Easy with oral airway    Attempts:  2    Attempted By:  Student    Method of Intubation:  Direct    Blade:  Dumas 3    Laryngeal View Grade: Grade IIb - only the arytenoids and epiglottis seen      Attempted By (2nd Attempt):  CRNA    Method of Intubation (2nd Attempt):  Direct    Blade (2nd Attempt):  Cuca 4    Laryngeal View Grade (2nd Attempt): Grade IIb - only the arytenoids and epiglottis seen      Difficult Airway Encountered?: No      Complications:  None    Airway Device:  Oral endotracheal tube    Airway Device Size:  7.5    Style/Cuff Inflation:  Cuffed    Inflation Amount (mL):  8    Tube secured:  23    Placement Verified By:  Capnometry    Complicating Factors:  None    Findings Post-Intubation:  BS equal bilateral

## 2023-09-06 NOTE — H&P
Interval History:   Patient here for preop decompression fusion  History of back pain and lower extremity symptoms not controlled with physical therapy and multiple injections over the past several years  Symptoms worse than the left greater than right  Hypertrophic facets with synovial cyst L3-4 as well as severe stenosis centrally L4-5  Degenerative disc with foraminal stenosis bilaterally at each of these levels    Discussed treatment options including simple decompression and diskectomy versus decompression diskectomy with fusion interbody cage placement    Consents signed     Thank you for the referral   Please call with any questions    Lyle Lobato MD  Neurosurgery     Disclaimer: This note was prepared using a voice recognition system and is likely to have sound alike errors within the text.            Medications:  Continuous Infusions:   dextrose 5 % and 0.9 % NaCl       Scheduled Meds:  PRN Meds:vancomycin (VANCOCIN) IV (PEDS and ADULTS)     Review of Systems   Constitutional:  Negative for activity change, appetite change and chills.   HENT:  Negative for hearing loss, sore throat and tinnitus.    Eyes:  Negative for pain, discharge and itching.   Cardiovascular:  Negative for chest pain.   Gastrointestinal:  Negative for abdominal pain.   Endocrine: Negative for cold intolerance and heat intolerance.   Genitourinary:  Negative for difficulty urinating and dysuria.   Musculoskeletal:  Positive for back pain and gait problem.   Allergic/Immunologic: Negative for environmental allergies.   Neurological:  Positive for weakness. Negative for dizziness, tremors, light-headedness and headaches.   Hematological:  Negative for adenopathy.   Psychiatric/Behavioral:  Negative for agitation, behavioral problems and confusion.      Objective:     Weight: 109.7 kg (241 lb 13.5 oz)  Body mass index is 28.68 kg/m².  Vital Signs (Most Recent):  Temp: 98.1 °F (36.7 °C) (09/06/23 0557)  Pulse: (!) 55 (09/06/23  "0557)  Resp: 18 (09/06/23 0557)  BP: (!) 174/91 (09/06/23 0557)  SpO2: 98 % (09/06/23 0557) Vital Signs (24h Range):  Temp:  [98.1 °F (36.7 °C)] 98.1 °F (36.7 °C)  Pulse:  [55] 55  Resp:  [18] 18  SpO2:  [98 %] 98 %  BP: (174)/(91) 174/91                                 Physical Exam  Vitals and nursing note reviewed.   Constitutional:       General: He is not in acute distress.     Appearance: He is well-nourished. He is not diaphoretic.   Eyes:      Extraocular Movements: EOM normal.      Pupils: Pupils are equal, round, and reactive to light.   Cardiovascular:      Rate and Rhythm: Normal rate and regular rhythm.   Neurological:      Mental Status: He is alert and oriented to person, place, and time.   Psychiatric:         Mood and Affect: Mood and affect normal.              Physical Exam:  Nursing note and vitals reviewed.    Constitutional: He appears well-nourished. He is not diaphoretic. No distress.     Eyes: Pupils are equal, round, and reactive to light. EOM are normal.     Cardiovascular: Normal rate and regular rhythm.     Psych/Behavior: He is alert. He is oriented to person, place, and time. He has a normal mood and affect.     Musculoskeletal:        Back: Range of motion is limited. There is tenderness. Muscle strength is 5/5.       Right Lower Extremities: Range of motion is full. There is no tenderness. Muscle strength is 5/5. Tone is normal.        Left Lower Extremities: There is no tenderness. Muscle strength is 5/5. Tone is normal.     Neurological:        Sensory: There is no sensory deficit in the trunk. There is no sensory deficit in the extremities.        Cranial nerves: Cranial nerve(s) II, III, IV, V, VI, VII, VIII, IX, X, XI and XII are intact.       Significant Labs:  No results for input(s): "GLU", "NA", "K", "CL", "CO2", "BUN", "CREATININE", "CALCIUM", "MG" in the last 48 hours.  No results for input(s): "WBC", "HGB", "HCT", "PLT" in the last 48 hours.  No results for input(s): " ""LABPT", "INR", "APTT" in the last 48 hours.  Microbiology Results (last 7 days)       ** No results found for the last 168 hours. **          All pertinent labs from the last 24 hours have been reviewed.    Significant Diagnostics:  I have reviewed all pertinent imaging results/findings within the past 24 hours.      "

## 2023-09-06 NOTE — BRIEF OP NOTE
[facility] - Surgery (Hospital)  Brief Operative Note    SUMMARY     Surgery Date: 9/6/2023     Surgeon(s) and Role:     * Lyle Lobato MD - Primary    Assisting Surgeon: None    Pre-op Diagnosis:  Lumbar radiculopathy [M54.16]  Lumbar stenosis with neurogenic claudication [M48.062]  Degenerative disc disease, lumbar [M51.36]    Post-op Diagnosis:  Post-Op Diagnosis Codes:     * Lumbar radiculopathy [M54.16]     * Lumbar stenosis with neurogenic claudication [M48.062]     * Degenerative disc disease, lumbar [M51.36]    Procedure(s) (LRB):  FUSION, SPINE, LUMBAR, TLIF, USING COMPUTER-ASSISTED NAVIGATION (Bilateral)    Anesthesia: General    Operative Findings:  Severe facet disease with severe stenosis L4-5 and L3-4 evidence of synovial cyst L3-4 left side causing foraminal stenosis  Laminectomy Medial facetectomy and foraminotomy  bilateral with TLIF left-sided L3-4 L4-5    Estimated Blood Loss: * No values recorded between 9/6/2023  7:56 AM and 9/6/2023  1:18 PM *    Estimated Blood Loss has been documented.         Specimens:   Specimen (24h ago, onward)       Start     Ordered    09/06/23 1154  Specimen to Pathology, Surgery Neurosurgery  Once        Comments: Pre-op Diagnosis: Lumbar radiculopathy [M54.16]Lumbar stenosis with neurogenic claudication [M48.062]Degenerative disc disease, lumbar [M51.36]Procedure(s):FUSION, SPINE, LUMBAR, TLIF, USING COMPUTER-ASSISTED NAVIGATION Number of specimens: 1.Name of specimens: 1. Synovial cyst L 3-4-perm     References:    Click here for ordering Quick Tip   Question Answer Comment   Procedure Type: Neurosurgery    Specimen Class: Routine/Screening    Which provider would you like to cc? LYLE LOBATO    Release to patient Immediate        09/06/23 1249                    [id number]

## 2023-09-07 PROBLEM — D62 ABLA (ACUTE BLOOD LOSS ANEMIA): Status: ACTIVE | Noted: 2023-09-07

## 2023-09-07 LAB
ANION GAP SERPL CALC-SCNC: 9 MMOL/L (ref 8–16)
BASOPHILS # BLD AUTO: 0.01 K/UL (ref 0–0.2)
BASOPHILS NFR BLD: 0.1 % (ref 0–1.9)
BUN SERPL-MCNC: 16 MG/DL (ref 8–23)
CALCIUM SERPL-MCNC: 8.1 MG/DL (ref 8.7–10.5)
CHLORIDE SERPL-SCNC: 105 MMOL/L (ref 95–110)
CO2 SERPL-SCNC: 23 MMOL/L (ref 23–29)
CREAT SERPL-MCNC: 1 MG/DL (ref 0.5–1.4)
DIFFERENTIAL METHOD: ABNORMAL
EOSINOPHIL # BLD AUTO: 0 K/UL (ref 0–0.5)
EOSINOPHIL NFR BLD: 0 % (ref 0–8)
ERYTHROCYTE [DISTWIDTH] IN BLOOD BY AUTOMATED COUNT: 12.7 % (ref 11.5–14.5)
EST. GFR  (NO RACE VARIABLE): >60 ML/MIN/1.73 M^2
GLUCOSE SERPL-MCNC: 149 MG/DL (ref 70–110)
HCT VFR BLD AUTO: 35.4 % (ref 40–54)
HGB BLD-MCNC: 11.7 G/DL (ref 14–18)
IMM GRANULOCYTES # BLD AUTO: 0.07 K/UL (ref 0–0.04)
IMM GRANULOCYTES NFR BLD AUTO: 0.5 % (ref 0–0.5)
LYMPHOCYTES # BLD AUTO: 1.8 K/UL (ref 1–4.8)
LYMPHOCYTES NFR BLD: 12.3 % (ref 18–48)
MCH RBC QN AUTO: 31 PG (ref 27–31)
MCHC RBC AUTO-ENTMCNC: 33.1 G/DL (ref 32–36)
MCV RBC AUTO: 94 FL (ref 82–98)
MONOCYTES # BLD AUTO: 1 K/UL (ref 0.3–1)
MONOCYTES NFR BLD: 6.6 % (ref 4–15)
NEUTROPHILS # BLD AUTO: 11.9 K/UL (ref 1.8–7.7)
NEUTROPHILS NFR BLD: 80.5 % (ref 38–73)
NRBC BLD-RTO: 0 /100 WBC
PLATELET # BLD AUTO: 179 K/UL (ref 150–450)
PMV BLD AUTO: 10 FL (ref 9.2–12.9)
POTASSIUM SERPL-SCNC: 4.9 MMOL/L (ref 3.5–5.1)
RBC # BLD AUTO: 3.78 M/UL (ref 4.6–6.2)
SODIUM SERPL-SCNC: 137 MMOL/L (ref 136–145)
WBC # BLD AUTO: 14.78 K/UL (ref 3.9–12.7)

## 2023-09-07 PROCEDURE — 11000001 HC ACUTE MED/SURG PRIVATE ROOM: Mod: HCNC

## 2023-09-07 PROCEDURE — 85025 COMPLETE CBC W/AUTO DIFF WBC: CPT | Mod: HCNC | Performed by: PHYSICIAN ASSISTANT

## 2023-09-07 PROCEDURE — 97162 PT EVAL MOD COMPLEX 30 MIN: CPT | Mod: HCNC

## 2023-09-07 PROCEDURE — 97166 OT EVAL MOD COMPLEX 45 MIN: CPT | Mod: HCNC

## 2023-09-07 PROCEDURE — 25000003 PHARM REV CODE 250: Mod: HCNC | Performed by: PHYSICIAN ASSISTANT

## 2023-09-07 PROCEDURE — 36415 COLL VENOUS BLD VENIPUNCTURE: CPT | Mod: HCNC | Performed by: PHYSICIAN ASSISTANT

## 2023-09-07 PROCEDURE — 97530 THERAPEUTIC ACTIVITIES: CPT | Mod: HCNC

## 2023-09-07 PROCEDURE — 80048 BASIC METABOLIC PNL TOTAL CA: CPT | Mod: HCNC | Performed by: PHYSICIAN ASSISTANT

## 2023-09-07 PROCEDURE — 63600175 PHARM REV CODE 636 W HCPCS: Mod: HCNC | Performed by: PHYSICIAN ASSISTANT

## 2023-09-07 PROCEDURE — 99900035 HC TECH TIME PER 15 MIN (STAT): Mod: HCNC

## 2023-09-07 PROCEDURE — 27000221 HC OXYGEN, UP TO 24 HOURS: Mod: HCNC

## 2023-09-07 PROCEDURE — 94761 N-INVAS EAR/PLS OXIMETRY MLT: CPT | Mod: HCNC

## 2023-09-07 PROCEDURE — 94799 UNLISTED PULMONARY SVC/PX: CPT | Mod: HCNC

## 2023-09-07 RX ORDER — OXYCODONE AND ACETAMINOPHEN 7.5; 325 MG/1; MG/1
1 TABLET ORAL EVERY 4 HOURS PRN
Status: DISCONTINUED | OUTPATIENT
Start: 2023-09-07 | End: 2023-09-08 | Stop reason: HOSPADM

## 2023-09-07 RX ORDER — OXYCODONE AND ACETAMINOPHEN 10; 325 MG/1; MG/1
1 TABLET ORAL EVERY 4 HOURS PRN
Status: DISCONTINUED | OUTPATIENT
Start: 2023-09-07 | End: 2023-09-08 | Stop reason: HOSPADM

## 2023-09-07 RX ORDER — HYDROMORPHONE HYDROCHLORIDE 2 MG/ML
1 INJECTION, SOLUTION INTRAMUSCULAR; INTRAVENOUS; SUBCUTANEOUS
Status: DISCONTINUED | OUTPATIENT
Start: 2023-09-07 | End: 2023-09-08 | Stop reason: HOSPADM

## 2023-09-07 RX ADMIN — VANCOMYCIN HYDROCHLORIDE 1500 MG: 1.5 INJECTION, POWDER, LYOPHILIZED, FOR SOLUTION INTRAVENOUS at 05:09

## 2023-09-07 RX ADMIN — SODIUM CHLORIDE: 9 INJECTION, SOLUTION INTRAVENOUS at 05:09

## 2023-09-07 RX ADMIN — THERA TABS 1 TABLET: TAB at 09:09

## 2023-09-07 RX ADMIN — DOCUSATE SODIUM 100 MG: 100 CAPSULE, LIQUID FILLED ORAL at 09:09

## 2023-09-07 RX ADMIN — OXYCODONE AND ACETAMINOPHEN 1 TABLET: 7.5; 325 TABLET ORAL at 02:09

## 2023-09-07 RX ADMIN — PRAVASTATIN SODIUM 40 MG: 20 TABLET ORAL at 09:09

## 2023-09-07 RX ADMIN — OXYCODONE AND ACETAMINOPHEN 1 TABLET: 7.5; 325 TABLET ORAL at 11:09

## 2023-09-07 RX ADMIN — VANCOMYCIN HYDROCHLORIDE 1500 MG: 1.5 INJECTION, POWDER, LYOPHILIZED, FOR SOLUTION INTRAVENOUS at 06:09

## 2023-09-07 RX ADMIN — ACETAMINOPHEN 650 MG: 325 TABLET ORAL at 04:09

## 2023-09-07 NOTE — PROGRESS NOTES
[facility]  [facility]uro[facility]  Progress Note    Subjective:     Interval History:   The patient was seen earlier this AM.  No acute events overnight.  He reports R hip pain has resolved.  Denies radiating leg pain, numbness, weakness, chest pain, shortness of breath.  Urinating independently.   + flatus  LSO delivered to bedside yesterday.  HV drain 700 ml reported.    History of Present Illness: See H&P.    Post-Op Info:  Procedure(s) (LRB):  FUSION, SPINE, LUMBAR, TLIF, USING COMPUTER-ASSISTED NAVIGATION (Bilateral)   1 Day Post-Op      Medications:  Continuous Infusions:  Scheduled Meds:   docusate sodium  100 mg Oral Daily    multivitamin  1 tablet Oral Daily    pravastatin  40 mg Oral Daily    vancomycin (VANCOCIN) IV (PEDS and ADULTS)  1,500 mg Intravenous Q12H     PRN Meds:acetaminophen, acetaminophen, aluminum-magnesium hydroxide-simethicone, diphenhydrAMINE, HYDROmorphone, methocarbamoL, naloxone, ondansetron, oxyCODONE-acetaminophen, oxyCODONE-acetaminophen, prochlorperazine, senna-docusate 8.6-50 mg, sodium chloride 0.9%     Review of Systems  Objective:     Weight: 109.7 kg (241 lb 13.5 oz)  Body mass index is 28.68 kg/m².  Vital Signs (Most Recent):  Temp: 97.8 °F (36.6 °C) (09/07/23 1247)  Pulse: (!) 54 (09/07/23 1247)  Resp: 18 (09/07/23 1247)  BP: 138/71 (09/07/23 1247)  SpO2: 98 % (09/07/23 1247) Vital Signs (24h Range):  Temp:  [97.5 °F (36.4 °C)-98.4 °F (36.9 °C)] 97.8 °F (36.6 °C)  Pulse:  [52-77] 54  Resp:  [11-20] 18  SpO2:  [91 %-98 %] 98 %  BP: (110-138)/(59-83) 138/71     Date 09/07/23 0700 - 09/08/23 0659   Shift 6762-3840 4374-1678 7334-4672 24 Hour Total   INTAKE   I.V.(mL/kg) 13(0.1)   13(0.1)   Shift Total(mL/kg) 13(0.1)   13(0.1)   OUTPUT   Shift Total(mL/kg)       Weight (kg) 109.7 109.7 109.7 109.7            Oxygen Concentration (%):  [28] 28         Closed/Suction Drain 09/06/23 1239 Inferior;Medial Back Accordion (Active)   Site Description Unable to view 09/07/23 9430  "  Dressing Type CHG impregnated dressing/sponge;Transparent (Tegaderm) 09/07/23 0315   Dressing Status Clean;Dry;Intact 09/07/23 0315   Dressing Intervention Integrity maintained 09/07/23 0315   Drainage Serosanguineous 09/07/23 0315   Status Other (Comment) 09/06/23 1430   Output (mL) 250 mL 09/07/23 0616       Neurosurgery Physical Exam  Vitals and labs reviewed  MAEW  Incision/Dressing CDI  HV intact  There is no swelling or fluctuance present    Significant Labs:  Recent Labs   Lab 09/07/23 0715   *      K 4.9      CO2 23   BUN 16   CREATININE 1.0   CALCIUM 8.1*     Recent Labs   Lab 09/07/23 0715   WBC 14.78*   HGB 11.7*   HCT 35.4*        No results for input(s): "LABPT", "INR", "APTT" in the last 48 hours.  Microbiology Results (last 7 days)       ** No results found for the last 168 hours. **          All pertinent labs from the last 24 hours have been reviewed.  Significant Diagnostics:  I have reviewed all pertinent imaging results/findings within the past 24 hours.    Assessment/Plan:     Active Diagnoses:    Diagnosis Date Noted POA    PRINCIPAL PROBLEM:  Lumbar stenosis with neurogenic claudication [M48.062] 09/06/2023 Yes     Chronic    ABLA (acute blood loss anemia) [D62] 09/07/2023 No    Frequent PVCs [I49.3] 07/05/2023 Yes    Lumbar radiculopathy, chronic [M54.16] 01/21/2022 Yes     Chronic    Hyperlipidemia [E78.5] 09/14/2020 Yes    Bradycardia [R00.1] 02/21/2019 Yes     Chronic      Problems Resolved During this Admission:   POD #1    - Ambulate several times daily  - LSO when OOB  - PT/OT  - Encouraged use of IS    PCA discontinued. Will transition to PO pain medication and Dilaudid IV for break through pain relief.  Plan to remove HV drain and change dressing in AM.  Will continue to monitor. Please call with any changes.    Dara Alonzo PA-C  Neurosurgery  [facility]    "

## 2023-09-07 NOTE — PT/OT/SLP EVAL
Occupational Therapy Evaluation and Treatment    Name: Myles Pride  MRN: [id number]  Admitting Diagnosis: Lumbar stenosis with neurogenic claudication  Recent Surgery: Procedure(s) (LRB):  FUSION, SPINE, LUMBAR, TLIF, USING COMPUTER-ASSISTED NAVIGATION (Bilateral) 1 Day Post-Op    Recommendations:     Discharge Recommendations: outpatient OT  Level of Assistance Recommended: Intermittent assistance  Discharge Equipment Recommendations: none  Barriers to discharge:      Assessment:     Myles Pride is a 68 y.o. male with a medical diagnosis of Lumbar stenosis with neurogenic claudication. He presents with performance deficits affecting function including weakness, impaired balance, decreased safety awareness, impaired endurance, impaired self care skills, gait instability, impaired functional mobility.     Rehab Prognosis: Fair; patient would benefit from acute OT services to address these deficits and reach maximum level of function.    Plan:     Patient to be seen 2 x/week to address the above listed problems via self-care/home management, therapeutic activities, therapeutic exercises  Plan of Care Expires: 09/20/23  Plan of Care Reviewed with: patient    Subjective     Chief Complaint: DEBILITY  AND GENERALIZED WEAKNESS  Patient Comments/Goals:   Pain/Comfort:  Pain Rating 1: 2/10  Location - Orientation 1: generalized  Location 1: back    Patients cultural, spiritual, Lutheran conflicts given the current situation:      Social History:  Living Environment: Patient lives with their spouse in a 2 story home with number of outside stair(s): 12  Prior Level of Function: Prior to admission, patient was modified independent  Roles and Routines: Patient was driving and not working prior to admission.  Equipment Used at Home: bath bench, grab bar  DME owned (not currently used): none  Assistance Upon Discharge: family    Objective:     Communicated with NURSE AND Epic CHART REVIEW prior to session. Patient  found HOB elevated with peripheral IV upon OT entry to room.    General Precautions: Standard, fall   Orthopedic Precautions: spinal precautions   Braces: LSO        Occupational Performance    Gait belt applied - Yes    Bed Mobility:   Rolling/Turning to Right with contact guard assistance  Scooting anteriorly to EOB to have both feet planted on floor: contact guard assistance  Supine to sit from right side of bed with contact guard assistance    Functional Mobility/Transfers:  Sit <> Stand Transfer with contact guard assistance with hand-held assist  Bed <> Chair Transfer using Step Transfer technique with contact guard assistance with hand-held assist  Functional Mobility: CGA WITH 175 FEET WITH NO AE  Activities of Daily Living:  Upper Body Dressing: minimum assistance  Lower Body Dressing: maximal assistance    Cognitive/Visual Perceptual:  Cognitive/Psychosocial Skills:    -     Oriented to: Person, Place, Time, Situation  -     Follows Commands/attention: Follows multistep  commands  -     Communication: clear/fluent  -     Memory: No Deficits noted  -     Safety awareness/insight to disability: impaired    Physical Exam:  Balance:    -     Sitting: modified independence  Upper Extremity Range of Motion:     -       Right Upper Extremity: WFL  -       Left Upper Extremity: WFL    AMPAC 6 Click ADL:  AMPAC Total Score: 18    Treatment & Education:  Therapist provided facilitation and instruction of proper body mechanics, energy conservation, and fall prevention strategies during tasks listed above  Patient educated on role of OT, POC, and goals for therapy  Patient educated on importance of OOB activities with staff member assistance and sitting OOB majority of the day      Patient clear to stand pivot transfer with RN/PCT, assist xCGA WITH  NO AE .    Patient left up in chair with all lines intact, call button in reach, RN notified, and spouse present.    GOALS:   Multidisciplinary Problems       Occupational  Therapy Goals          Problem: Occupational Therapy    Goal Priority Disciplines Outcome Interventions   Occupational Therapy Goal     OT, PT/OT     Description: O.T. GOALS TO BE MET BY 9-20-23  S WITH UE DRESSING  S WITH TOILET TRANSFER  S WITH LE DRESSING                         History:     Past Medical History:   Diagnosis Date    Hyperlipidemia     PONV (postoperative nausea and vomiting) 1990    after knee arthroscopy    Vitamin D deficiency 12/18/2019         Past Surgical History:   Procedure Laterality Date    COLONOSCOPY      COLONOSCOPY N/A 12/19/2019    Procedure: COLONOSCOPY;  Surgeon: Bhupendra Wilkinson MD;  Location: [facility] ENDO;  Service: Endoscopy;  Laterality: N/A;    EPIDURAL STEROID INJECTION N/A 12/09/2022    Procedure: L4-5 intralaminar epidural steroid injection with left paramedian approach;  Surgeon: Ben Eddy MD;  Location: [facility] PAIN MGT;  Service: Pain Management;  Laterality: N/A;    KNEE ARTHROSCOPY Left     SELECTIVE INJECTION OF ANESTHETIC AGENT AROUND LUMBAR SPINAL NERVE ROOT BY TRANSFORAMINAL APPROACH Bilateral 01/21/2022    Procedure: Bilateral L4/5 TF LOVE with RN IV sedation;  Surgeon: Ben Eddy MD;  Location: [facility] PAIN MGT;  Service: Pain Management;  Laterality: Bilateral;    SELECTIVE INJECTION OF ANESTHETIC AGENT AROUND LUMBAR SPINAL NERVE ROOT BY TRANSFORAMINAL APPROACH Bilateral 05/06/2022    Procedure: Bilateral L4/5 TF LOVE with RN IV sedation;  Surgeon: Ben Eddy MD;  Location: [facility] PAIN MGT;  Service: Pain Management;  Laterality: Bilateral;    SELECTIVE INJECTION OF ANESTHETIC AGENT AROUND LUMBAR SPINAL NERVE ROOT BY TRANSFORAMINAL APPROACH Bilateral 10/25/2022    Procedure: Bilateral L4/5 TF LOVE with RN IV sedation;  Surgeon: Ben Eddy MD;  Location: [facility] PAIN MGT;  Service: Pain Management;  Laterality: Bilateral;    TRANSFORAMINAL LUMBAR INTERBODY FUSION (TLIF) USING COMPUTER-ASSISTED NAVIGATION Bilateral 9/6/2023    Procedure: FUSION, SPINE, LUMBAR,  TLIF, USING COMPUTER-ASSISTED NAVIGATION;  Surgeon: Lyle Lobato MD;  Location: [facility];  Service: Neurosurgery;  Laterality: Bilateral;  L3-5 TLIF    VASECTOMY  1995       Time Tracking:     OT Date of Treatment: 09/07/23  OT Start Time: 0905  OT Stop Time: 0930  OT Total Time (min): 25 min    Billable Minutes: Evaluation 15 MINUTES and Therapeutic Activity 10 MINUTES    9/7/2023

## 2023-09-07 NOTE — ASSESSMENT & PLAN NOTE
S/p lumbar fusion by Dr. Lobato 9/6/23  Multimodal pain control, PT/OT, LSO brace  Lumbar drain in place, monitor I/O's  Periop abx with vancomycin  On decadron   Management per Neurosurgery

## 2023-09-07 NOTE — PROGRESS NOTES
[facility] Medicine  Progress Note    Patient Name: Myles Pride  MRN: [id number]  Patient Class: IP- Inpatient   Admission Date: 9/6/2023  Length of Stay: 0 days  Attending Physician: Duane Soriano MD  Primary Care Provider: Catalino Arias MD        Subjective:     Principal Problem:Lumbar stenosis with neurogenic claudication        HPI:  Myles Pride is a 68 y.o. male with a history of bradycardia and HLD who presents s/p lumbar fusion by Dr. Lobato today. Patient examined post-op, wife is at bedside who provides history. Patient reports known bradycardia, states that his HR usually is in the 40's, occasionally will go into mid-30's, denies symptoms. He follows with cardiology, found to have rare PVCs. He reports pain controlled, currently on dilaudid PCA pump. He reports some nausea, denies vomiting, abdominal pain, chest pain, SOB, fevers/chills. Mir catheter was removed post-op. Lumbar drain currently in place. Hospital medicine consulted for medical management.      Overview/Hospital Course:  No notes on file    Past Medical History:   Diagnosis Date    Hyperlipidemia     PONV (postoperative nausea and vomiting) 1990    after knee arthroscopy    Vitamin D deficiency 12/18/2019       Past Surgical History:   Procedure Laterality Date    COLONOSCOPY      COLONOSCOPY N/A 12/19/2019    Procedure: COLONOSCOPY;  Surgeon: Bhupendra Wilkinson MD;  Location: [facility];  Service: Endoscopy;  Laterality: N/A;    EPIDURAL STEROID INJECTION N/A 12/09/2022    Procedure: L4-5 intralaminar epidural steroid injection with left paramedian approach;  Surgeon: Ben Eddy MD;  Location: [facility];  Service: Pain Management;  Laterality: N/A;    KNEE ARTHROSCOPY Left     SELECTIVE INJECTION OF ANESTHETIC AGENT AROUND LUMBAR SPINAL NERVE ROOT BY TRANSFORAMINAL APPROACH Bilateral 01/21/2022    Procedure: Bilateral L4/5 TF LOVE with RN IV sedation;  Surgeon: Ben Eddy MD;  Location:  [facility] PAIN MGT;  Service: Pain Management;  Laterality: Bilateral;    SELECTIVE INJECTION OF ANESTHETIC AGENT AROUND LUMBAR SPINAL NERVE ROOT BY TRANSFORAMINAL APPROACH Bilateral 05/06/2022    Procedure: Bilateral L4/5 TF LOVE with RN IV sedation;  Surgeon: Ben Eddy MD;  Location: [facility] PAIN MGT;  Service: Pain Management;  Laterality: Bilateral;    SELECTIVE INJECTION OF ANESTHETIC AGENT AROUND LUMBAR SPINAL NERVE ROOT BY TRANSFORAMINAL APPROACH Bilateral 10/25/2022    Procedure: Bilateral L4/5 TF LOVE with RN IV sedation;  Surgeon: Ben Eddy MD;  Location: [facility] PAIN MGT;  Service: Pain Management;  Laterality: Bilateral;    VASECTOMY  1995       Review of patient's allergies indicates:  No Known Allergies    No current facility-administered medications on file prior to encounter.     Current Outpatient Medications on File Prior to Encounter   Medication Sig    ergocalciferol, vitamin D2, (VITAMIN D ORAL) Take by mouth once daily.    pravastatin (PRAVACHOL) 40 MG tablet TAKE 1 TABLET BY MOUTH EVERY DAY     Family History       Problem Relation (Age of Onset)    Arthritis Mother    Cancer Mother    Diabetes Mother    Gallbladder disease Brother    Hearing loss Mother    Hepatitis Brother    No Known Problems Sister          Tobacco Use    Smoking status: Never     Passive exposure: Never    Smokeless tobacco: Never   Substance and Sexual Activity    Alcohol use: Yes     Alcohol/week: 3.0 standard drinks of alcohol     Types: 3 Glasses of wine per week    Drug use: Never    Sexual activity: Yes     Partners: Female     Birth control/protection: Partner-Vasectomy, None     Review of Systems   All other systems reviewed and are negative.    Objective:     Vital Signs (Most Recent):  Temp: 98 °F (36.7 °C) (09/06/23 1520)  Pulse: 72 (09/06/23 1520)  Resp: 15 (09/06/23 1925)  BP: 124/73 (09/06/23 1520)  SpO2: (!) 94 % (09/06/23 2054) Vital Signs (24h Range):  Temp:  [97.5 °F (36.4 °C)-98.1 °F (36.7 °C)]  98 °F (36.7 °C)  Pulse:  [55-86] 72  Resp:  [11-19] 15  SpO2:  [91 %-98 %] 94 %  BP: (110-174)/(63-91) 124/73     Weight: 109.7 kg (241 lb 13.5 oz)  Body mass index is 28.68 kg/m².     Physical Exam  Vitals and nursing note reviewed.   Constitutional:       General: He is not in acute distress.     Appearance: Normal appearance. He is normal weight.   HENT:      Head: Normocephalic and atraumatic.      Nose: Nose normal.      Mouth/Throat:      Pharynx: Oropharynx is clear.   Eyes:      Extraocular Movements: Extraocular movements intact.      Pupils: Pupils are equal, round, and reactive to light.   Cardiovascular:      Pulses: Normal pulses.      Heart sounds: Normal heart sounds.   Pulmonary:      Effort: Pulmonary effort is normal. No respiratory distress.      Breath sounds: Normal breath sounds. No wheezing, rhonchi or rales.   Abdominal:      General: Abdomen is flat. Bowel sounds are normal. There is no distension.      Palpations: Abdomen is soft.      Tenderness: There is no abdominal tenderness. There is no guarding.   Musculoskeletal:      Comments: Lumbar drain in place   Neurological:      General: No focal deficit present.      Mental Status: He is alert and oriented to person, place, and time.   Psychiatric:         Mood and Affect: Mood normal.         Behavior: Behavior normal.          Significant Labs: All pertinent labs within the past 24 hours have been reviewed.  Recent Lab Results         09/06/23 2038        POCT Glucose 167               Significant Imaging: I have reviewed all pertinent imaging results/findings within the past 24 hours.    SURG FL Surgery Fluoro Usage   Final Result      X-Ray Lumbar Spine Ap And Lateral   Final Result      Fluoroscopic guidance utilized for lower lumbar procedure.  Please see procedural dictation for further details.         Electronically signed by: Tomasz Morrison   Date:    09/06/2023   Time:    14:55              Assessment/Plan:      * Lumbar  stenosis with neurogenic claudication  S/p lumbar fusion by Dr. Lobato 9/6/23  Multimodal pain control, PT/OT, LSO brace  Lumbar drain in place, monitor I/O's  Periop abx with vancomycin  On decadron   Management per Neurosurgery    Bradycardia  Known frequent PVCs  EKG, troponin if develops chest pain or SOB    Hyperlipidemia  Continue home statin      Lumbar radiculopathy, chronic          VTE Risk Mitigation (From admission, onward)    None          Discharge Planning   ARY:      Code Status: Full Code   Is the patient medically ready for discharge?:     Reason for patient still in hospital (select all that apply): Patient trending condition, Laboratory test, Imaging, PT / OT recommendations and Pending disposition                     Duane Soriano MD  Department of Hospital Medicine   [facility]

## 2023-09-07 NOTE — PLAN OF CARE
[facility] - Med Surg  Initial Discharge Assessment       Primary Care Provider: Catalino Arias MD    Admission Diagnosis: Lumbar radiculopathy [M54.16]  Lumbar stenosis with neurogenic claudication [M48.062]  Degenerative disc disease, lumbar [M51.36]  DDD (degenerative disc disease), lumbar [M51.36]    Admission Date: 9/6/2023  Expected Discharge Date: Per Attending     Transition of Care Barriers: None    Payor: HU[employer] MANAGED MEDICARE / Plan: HU[employer]NA TOTAL CARE ADVANTAGE / Product Type: Medicare Advantage /     Extended Emergency Contact Information  Primary Emergency Contact: Randa Pride  Address: [address] [place] [address] [place]  Mobile Phone: [phone]  Relation: Spouse    Discharge Plan A: Home Health         [id number] [place] - [address] [facility]  [address]  [place] [address]  Phone: [phone] Fax: [phone]      Initial Assessment (most recent)       Adult Discharge Assessment - 09/07/23 1050          Discharge Assessment    Assessment Type Discharge Planning Assessment     Confirmed/corrected address, phone number and insurance Yes     Confirmed Demographics Correct on Facesheet     Source of Information patient;family     When was your last doctors appointment? --   May    Communicated ARY with patient/caregiver Yes     Reason For Admission lumbar stenosis with neurogenic claudication     People in Home spouse     Do you expect to return to your current living situation? Yes     Do you have help at home or someone to help you manage your care at home? Yes     Who are your caregiver(s) and their phone number(s)? Spouse     Prior to hospitilization cognitive status: Alert/Oriented     Current cognitive status: Alert/Oriented     Home Layout Able to live on 1st floor     Equipment Currently Used at Home none     Readmission within 30 days? No     Patient currently being followed by outpatient case  management? No     Do you currently have service(s) that help you manage your care at home? No     Do you take prescription medications? Yes     Do you have prescription coverage? Yes     Coverage [employer]     Do you have any problems affording any of your prescribed medications? No     Is the patient taking medications as prescribed? yes     Who is going to help you get home at discharge? Spouse     How do you get to doctors appointments? car, drives self     Are you on dialysis? No     Do you take coumadin? No     DME Needed Upon Discharge  none     Discharge Plan discussed with: Patient;Spouse/sig other     Transition of Care Barriers None     Discharge Plan A Home Health                   Sw met with patient to complete assessment and to discuss d/c planning needs. Patient has no d/c needs at this time. Sw to follow up, as needed, for d/c planning purposes.

## 2023-09-07 NOTE — SUBJECTIVE & OBJECTIVE
Review of Systems   All other systems reviewed and are negative.    Objective:     Vital Signs (Most Recent):  Temp: 97.8 °F (36.6 °C) (09/07/23 1247)  Pulse: (!) 54 (09/07/23 1247)  Resp: 18 (09/07/23 1410)  BP: 138/71 (09/07/23 1247)  SpO2: 98 % (09/07/23 1247) Vital Signs (24h Range):  Temp:  [97.6 °F (36.4 °C)-98.4 °F (36.9 °C)] 97.8 °F (36.6 °C)  Pulse:  [52-74] 54  Resp:  [14-20] 18  SpO2:  [94 %-98 %] 98 %  BP: (116-138)/(59-73) 138/71     Weight: 109.7 kg (241 lb 13.5 oz)  Body mass index is 28.68 kg/m².    Intake/Output Summary (Last 24 hours) at 9/7/2023 1447  Last data filed at 9/7/2023 1143  Gross per 24 hour   Intake 1626.72 ml   Output 1700 ml   Net -73.28 ml         Physical Exam  Constitutional:       General: He is not in acute distress.     Appearance: Normal appearance.   Cardiovascular:      Rate and Rhythm: Regular rhythm. Bradycardia present.      Heart sounds: No murmur heard.  Pulmonary:      Effort: Pulmonary effort is normal. No respiratory distress.      Breath sounds: Normal breath sounds. No wheezing.   Abdominal:      General: There is no distension.      Palpations: Abdomen is soft.      Tenderness: There is no abdominal tenderness.   Musculoskeletal:      Comments: Lumbar drain in place   Neurological:      Mental Status: He is alert.             Significant Labs: All pertinent labs within the past 24 hours have been reviewed.  Recent Lab Results         09/07/23  0715   09/06/23 2038        Anion Gap 9         Baso # 0.01         Basophil % 0.1         BUN 16         Calcium 8.1         Chloride 105         CO2 23         Creatinine 1.0         Differential Method Automated         eGFR >60         Eos # 0.0         Eosinophil % 0.0         Glucose 149         Gran # (ANC) 11.9         Gran % 80.5         Hematocrit 35.4         Hemoglobin 11.7         Immature Grans (Abs) 0.07  Comment: Mild elevation in immature granulocytes is non specific and   can be seen in a variety of  conditions including stress response,   acute inflammation, trauma and pregnancy. Correlation with other   laboratory and clinical findings is essential.           Immature Granulocytes 0.5         Lymph # 1.8         Lymph % 12.3         MCH 31.0         MCHC 33.1         MCV 94         Mono # 1.0         Mono % 6.6         MPV 10.0         nRBC 0         Platelets 179         POCT Glucose   167       Potassium 4.9         RBC 3.78         RDW 12.7         Sodium 137         WBC 14.78                 Significant Imaging: I have reviewed all pertinent imaging results/findings within the past 24 hours.    X-Ray Lumbar Spine Ap And Lateral   Final Result      Postsurgical changes without evidence of complication.         Electronically signed by: Tomasz Morrison   Date:    09/07/2023   Time:    09:17      SURG FL Surgery Fluoro Usage   Final Result      X-Ray Lumbar Spine Ap And Lateral   Final Result      Fluoroscopic guidance utilized for lower lumbar procedure.  Please see procedural dictation for further details.         Electronically signed by: Tomasz Morrison   Date:    09/06/2023   Time:    14:55

## 2023-09-07 NOTE — HOSPITAL COURSE
9/7  NAEON, patient reports pain controlled  PT/OT with reccs for HH, orders placed  Lumbar drain in place, NSGY following    9/8  NAEON, pain controled  NSGY following, lumbar surgical drain removed today  H/H stable post-op  D/C home with HH  F/U with Dr. Lobato in 2 weeks and with PCP as needed

## 2023-09-07 NOTE — SUBJECTIVE & OBJECTIVE
Past Medical History:   Diagnosis Date    Hyperlipidemia     PONV (postoperative nausea and vomiting) 1990    after knee arthroscopy    Vitamin D deficiency 12/18/2019       Past Surgical History:   Procedure Laterality Date    COLONOSCOPY      COLONOSCOPY N/A 12/19/2019    Procedure: COLONOSCOPY;  Surgeon: Bhupendra Wilkinson MD;  Location: [facility];  Service: Endoscopy;  Laterality: N/A;    EPIDURAL STEROID INJECTION N/A 12/09/2022    Procedure: L4-5 intralaminar epidural steroid injection with left paramedian approach;  Surgeon: Ben Eddy MD;  Location: [facility];  Service: Pain Management;  Laterality: N/A;    KNEE ARTHROSCOPY Left     SELECTIVE INJECTION OF ANESTHETIC AGENT AROUND LUMBAR SPINAL NERVE ROOT BY TRANSFORAMINAL APPROACH Bilateral 01/21/2022    Procedure: Bilateral L4/5 TF LOVE with RN IV sedation;  Surgeon: Ben Eddy MD;  Location: [facility];  Service: Pain Management;  Laterality: Bilateral;    SELECTIVE INJECTION OF ANESTHETIC AGENT AROUND LUMBAR SPINAL NERVE ROOT BY TRANSFORAMINAL APPROACH Bilateral 05/06/2022    Procedure: Bilateral L4/5 TF LOVE with RN IV sedation;  Surgeon: Ben Eddy MD;  Location: [facility];  Service: Pain Management;  Laterality: Bilateral;    SELECTIVE INJECTION OF ANESTHETIC AGENT AROUND LUMBAR SPINAL NERVE ROOT BY TRANSFORAMINAL APPROACH Bilateral 10/25/2022    Procedure: Bilateral L4/5 TF LOVE with RN IV sedation;  Surgeon: Ben Eddy MD;  Location: [facility];  Service: Pain Management;  Laterality: Bilateral;    TRANSFORAMINAL LUMBAR INTERBODY FUSION (TLIF) USING COMPUTER-ASSISTED NAVIGATION Bilateral 9/6/2023    Procedure: FUSION, SPINE, LUMBAR, TLIF, USING COMPUTER-ASSISTED NAVIGATION;  Surgeon: Lyle Lobato MD;  Location: [facility];  Service: Neurosurgery;  Laterality: Bilateral;  L3-5 TLIF    VASECTOMY  1995       Review of patient's allergies indicates:  No Known Allergies    No current facility-administered medications on file prior  to encounter.     Current Outpatient Medications on File Prior to Encounter   Medication Sig    ergocalciferol, vitamin D2, (VITAMIN D ORAL) Take by mouth once daily.    pravastatin (PRAVACHOL) 40 MG tablet TAKE 1 TABLET BY MOUTH EVERY DAY     Family History       Problem Relation (Age of Onset)    Arthritis Mother    Cancer Mother    Diabetes Mother    Gallbladder disease Brother    Hearing loss Mother    Hepatitis Brother    No Known Problems Sister          Tobacco Use    Smoking status: Never     Passive exposure: Never    Smokeless tobacco: Never   Substance and Sexual Activity    Alcohol use: Yes     Alcohol/week: 3.0 standard drinks of alcohol     Types: 3 Glasses of wine per week    Drug use: Never    Sexual activity: Yes     Partners: Female     Birth control/protection: Partner-Vasectomy, None     Review of Systems   All other systems reviewed and are negative.    Objective:     Vital Signs (Most Recent):  Temp: 98.3 °F (36.8 °C) (09/07/23 0803)  Pulse: (!) 52 (09/07/23 0803)  Resp: 18 (09/07/23 0803)  BP: 132/68 (09/07/23 0803)  SpO2: 98 % (09/07/23 0803) Vital Signs (24h Range):  Temp:  [97.5 °F (36.4 °C)-98.4 °F (36.9 °C)] 98.3 °F (36.8 °C)  Pulse:  [52-86] 52  Resp:  [11-20] 18  SpO2:  [91 %-98 %] 98 %  BP: (110-132)/(59-83) 132/68     Weight: 109.7 kg (241 lb 13.5 oz)  Body mass index is 28.68 kg/m².     Physical Exam  Vitals and nursing note reviewed.          Constitutional:       General: He is not in acute distress.     Appearance: Normal appearance. He is normal weight.   HENT:      Head: Normocephalic and atraumatic.      Nose: Nose normal.      Mouth/Throat:      Pharynx: Oropharynx is clear.   Eyes:      Extraocular Movements: Extraocular movements intact.      Pupils: Pupils are equal, round, and reactive to light.   Cardiovascular:      Pulses: Normal pulses.      Heart sounds: Normal heart sounds.   Pulmonary:      Effort: Pulmonary effort is normal. No respiratory distress.      Breath  sounds: Normal breath sounds. No wheezing, rhonchi or rales.   Abdominal:      General: Abdomen is flat. Bowel sounds are normal. There is no distension.      Palpations: Abdomen is soft.      Tenderness: There is no abdominal tenderness. There is no guarding.   Musculoskeletal:      Comments: Lumbar drain in place   Neurological:      General: No focal deficit present.      Mental Status: He is alert and oriented to person, place, and time.   Psychiatric:         Mood and Affect: Mood normal.         Behavior: Behavior normal.     Significant Labs: All pertinent labs within the past 24 hours have been reviewed.  Recent Lab Results         09/07/23 0715 09/06/23 2038        Anion Gap 9         Baso # 0.01         Basophil % 0.1         BUN 16         Calcium 8.1         Chloride 105         CO2 23         Creatinine 1.0         Differential Method Automated         eGFR >60         Eos # 0.0         Eosinophil % 0.0         Glucose 149         Gran # (ANC) 11.9         Gran % 80.5         Hematocrit 35.4         Hemoglobin 11.7         Immature Grans (Abs) 0.07  Comment: Mild elevation in immature granulocytes is non specific and   can be seen in a variety of conditions including stress response,   acute inflammation, trauma and pregnancy. Correlation with other   laboratory and clinical findings is essential.           Immature Granulocytes 0.5         Lymph # 1.8         Lymph % 12.3         MCH 31.0         MCHC 33.1         MCV 94         Mono # 1.0         Mono % 6.6         MPV 10.0         nRBC 0         Platelets 179         POCT Glucose   167       Potassium 4.9         RBC 3.78         RDW 12.7         Sodium 137         WBC 14.78                 Significant Imaging: I have reviewed all pertinent imaging results/findings within the past 24 hours.

## 2023-09-07 NOTE — HPI
Myles Pride is a 68 y.o. male with a history of bradycardia and HLD who presents s/p lumbar fusion by Dr. Lobato today. Patient examined post-op, wife is at bedside who provides history. Patient reports known bradycardia, states that his HR usually is in the 40's, occasionally will go into mid-30's, denies symptoms. He follows with cardiology, found to have rare PVCs. He reports pain controlled, currently on dilaudid PCA pump. He reports some nausea, denies vomiting, abdominal pain, chest pain, SOB, fevers/chills. Mir catheter was removed post-op. Lumbar drain currently in place. Hospital medicine consulted for medical management.

## 2023-09-07 NOTE — PLAN OF CARE
See eval for details. Pt displayed deficits with functional mobility/ transfers, deficits with adl's skills also decrease b ue strength/endurance. Recommendation: OUT PT O.T.

## 2023-09-07 NOTE — PROGRESS NOTES
[facility] Medicine  Progress Note    Patient Name: Myles Pride  MRN: [id number]  Patient Class: IP- Inpatient   Admission Date: 9/6/2023  Length of Stay: 1 days  Attending Physician: Duane Soriano MD  Primary Care Provider: Catalino Arias MD        Subjective:     Principal Problem:Lumbar stenosis with neurogenic claudication        HPI:  Myles Pride is a 68 y.o. male with a history of bradycardia and HLD who presents s/p lumbar fusion by Dr. Lobato today. Patient examined post-op, wife is at bedside who provides history. Patient reports known bradycardia, states that his HR usually is in the 40's, occasionally will go into mid-30's, denies symptoms. He follows with cardiology, found to have rare PVCs. He reports pain controlled, currently on dilaudid PCA pump. He reports some nausea, denies vomiting, abdominal pain, chest pain, SOB, fevers/chills. Mir catheter was removed post-op. Lumbar drain currently in place. Hospital medicine consulted for medical management.      Overview/Hospital Course:    9/7  KENJIEON, patient reports pain controlled  PT/OT with reccs for HH, orders placed  Lumbar drain in place, NSGY following        Review of Systems   All other systems reviewed and are negative.    Objective:     Vital Signs (Most Recent):  Temp: 97.8 °F (36.6 °C) (09/07/23 1247)  Pulse: (!) 54 (09/07/23 1247)  Resp: 18 (09/07/23 1410)  BP: 138/71 (09/07/23 1247)  SpO2: 98 % (09/07/23 1247) Vital Signs (24h Range):  Temp:  [97.6 °F (36.4 °C)-98.4 °F (36.9 °C)] 97.8 °F (36.6 °C)  Pulse:  [52-74] 54  Resp:  [14-20] 18  SpO2:  [94 %-98 %] 98 %  BP: (116-138)/(59-73) 138/71     Weight: 109.7 kg (241 lb 13.5 oz)  Body mass index is 28.68 kg/m².    Intake/Output Summary (Last 24 hours) at 9/7/2023 1447  Last data filed at 9/7/2023 1143  Gross per 24 hour   Intake 1626.72 ml   Output 1700 ml   Net -73.28 ml         Physical Exam  Constitutional:       General: He is not in acute distress.      Appearance: Normal appearance.   Cardiovascular:      Rate and Rhythm: Regular rhythm. Bradycardia present.      Heart sounds: No murmur heard.  Pulmonary:      Effort: Pulmonary effort is normal. No respiratory distress.      Breath sounds: Normal breath sounds. No wheezing.   Abdominal:      General: There is no distension.      Palpations: Abdomen is soft.      Tenderness: There is no abdominal tenderness.   Musculoskeletal:      Comments: Lumbar drain in place   Neurological:      Mental Status: He is alert.             Significant Labs: All pertinent labs within the past 24 hours have been reviewed.  Recent Lab Results         09/07/23 0715 09/06/23 2038        Anion Gap 9         Baso # 0.01         Basophil % 0.1         BUN 16         Calcium 8.1         Chloride 105         CO2 23         Creatinine 1.0         Differential Method Automated         eGFR >60         Eos # 0.0         Eosinophil % 0.0         Glucose 149         Gran # (ANC) 11.9         Gran % 80.5         Hematocrit 35.4         Hemoglobin 11.7         Immature Grans (Abs) 0.07  Comment: Mild elevation in immature granulocytes is non specific and   can be seen in a variety of conditions including stress response,   acute inflammation, trauma and pregnancy. Correlation with other   laboratory and clinical findings is essential.           Immature Granulocytes 0.5         Lymph # 1.8         Lymph % 12.3         MCH 31.0         MCHC 33.1         MCV 94         Mono # 1.0         Mono % 6.6         MPV 10.0         nRBC 0         Platelets 179         POCT Glucose   167       Potassium 4.9         RBC 3.78         RDW 12.7         Sodium 137         WBC 14.78                 Significant Imaging: I have reviewed all pertinent imaging results/findings within the past 24 hours.    X-Ray Lumbar Spine Ap And Lateral   Final Result      Postsurgical changes without evidence of complication.         Electronically signed by: Tomasz Morrison    Date:    09/07/2023   Time:    09:17      SURG FL Surgery Fluoro Usage   Final Result      X-Ray Lumbar Spine Ap And Lateral   Final Result      Fluoroscopic guidance utilized for lower lumbar procedure.  Please see procedural dictation for further details.         Electronically signed by: Tomasz Morrison   Date:    09/06/2023   Time:    14:55              Assessment/Plan:      * Lumbar stenosis with neurogenic claudication  S/p lumbar fusion by Dr. Lobato 9/6/23  Multimodal pain control, PT/OT, LSO brace  Lumbar drain in place, monitor I/O's  Periop abx with vancomycin  On decadron   Management per Neurosurgery    Bradycardia  Known frequent PVCs  EKG, troponin if develops chest pain or SOB    Hyperlipidemia  Continue home statin      Lumbar radiculopathy, chronic          VTE Risk Mitigation (From admission, onward)    None          Discharge Planning   ARY:      Code Status: Full Code   Is the patient medically ready for discharge?:     Reason for patient still in hospital (select all that apply): Patient trending condition, Laboratory test, Treatment and Consult recommendations  Discharge Plan A: Home Health                  Duane Soriano MD  Department of Hospital Medicine   [facility] - Med Surg

## 2023-09-07 NOTE — SUBJECTIVE & OBJECTIVE
Past Medical History:   Diagnosis Date    Hyperlipidemia     PONV (postoperative nausea and vomiting) 1990    after knee arthroscopy    Vitamin D deficiency 12/18/2019       Past Surgical History:   Procedure Laterality Date    COLONOSCOPY      COLONOSCOPY N/A 12/19/2019    Procedure: COLONOSCOPY;  Surgeon: Bhupendra Wilkinson MD;  Location: [facility];  Service: Endoscopy;  Laterality: N/A;    EPIDURAL STEROID INJECTION N/A 12/09/2022    Procedure: L4-5 intralaminar epidural steroid injection with left paramedian approach;  Surgeon: Ben Eddy MD;  Location: [facility];  Service: Pain Management;  Laterality: N/A;    KNEE ARTHROSCOPY Left     SELECTIVE INJECTION OF ANESTHETIC AGENT AROUND LUMBAR SPINAL NERVE ROOT BY TRANSFORAMINAL APPROACH Bilateral 01/21/2022    Procedure: Bilateral L4/5 TF LOVE with RN IV sedation;  Surgeon: Ben Eddy MD;  Location: [facility];  Service: Pain Management;  Laterality: Bilateral;    SELECTIVE INJECTION OF ANESTHETIC AGENT AROUND LUMBAR SPINAL NERVE ROOT BY TRANSFORAMINAL APPROACH Bilateral 05/06/2022    Procedure: Bilateral L4/5 TF LOVE with RN IV sedation;  Surgeon: Ben Eddy MD;  Location: [facility];  Service: Pain Management;  Laterality: Bilateral;    SELECTIVE INJECTION OF ANESTHETIC AGENT AROUND LUMBAR SPINAL NERVE ROOT BY TRANSFORAMINAL APPROACH Bilateral 10/25/2022    Procedure: Bilateral L4/5 TF LOVE with RN IV sedation;  Surgeon: Ben Eddy MD;  Location: [facility];  Service: Pain Management;  Laterality: Bilateral;    VASECTOMY  1995       Review of patient's allergies indicates:  No Known Allergies    No current facility-administered medications on file prior to encounter.     Current Outpatient Medications on File Prior to Encounter   Medication Sig    ergocalciferol, vitamin D2, (VITAMIN D ORAL) Take by mouth once daily.    pravastatin (PRAVACHOL) 40 MG tablet TAKE 1 TABLET BY MOUTH EVERY DAY     Family History       Problem Relation (Age of  Onset)    Arthritis Mother    Cancer Mother    Diabetes Mother    Gallbladder disease Brother    Hearing loss Mother    Hepatitis Brother    No Known Problems Sister          Tobacco Use    Smoking status: Never     Passive exposure: Never    Smokeless tobacco: Never   Substance and Sexual Activity    Alcohol use: Yes     Alcohol/week: 3.0 standard drinks of alcohol     Types: 3 Glasses of wine per week    Drug use: Never    Sexual activity: Yes     Partners: Female     Birth control/protection: Partner-Vasectomy, None     Review of Systems   All other systems reviewed and are negative.    Objective:     Vital Signs (Most Recent):  Temp: 98 °F (36.7 °C) (09/06/23 1520)  Pulse: 72 (09/06/23 1520)  Resp: 15 (09/06/23 1925)  BP: 124/73 (09/06/23 1520)  SpO2: (!) 94 % (09/06/23 2054) Vital Signs (24h Range):  Temp:  [97.5 °F (36.4 °C)-98.1 °F (36.7 °C)] 98 °F (36.7 °C)  Pulse:  [55-86] 72  Resp:  [11-19] 15  SpO2:  [91 %-98 %] 94 %  BP: (110-174)/(63-91) 124/73     Weight: 109.7 kg (241 lb 13.5 oz)  Body mass index is 28.68 kg/m².     Physical Exam  Vitals and nursing note reviewed.   Constitutional:       General: He is not in acute distress.     Appearance: Normal appearance. He is normal weight.   HENT:      Head: Normocephalic and atraumatic.      Nose: Nose normal.      Mouth/Throat:      Pharynx: Oropharynx is clear.   Eyes:      Extraocular Movements: Extraocular movements intact.      Pupils: Pupils are equal, round, and reactive to light.   Cardiovascular:      Pulses: Normal pulses.      Heart sounds: Normal heart sounds.   Pulmonary:      Effort: Pulmonary effort is normal. No respiratory distress.      Breath sounds: Normal breath sounds. No wheezing, rhonchi or rales.   Abdominal:      General: Abdomen is flat. Bowel sounds are normal. There is no distension.      Palpations: Abdomen is soft.      Tenderness: There is no abdominal tenderness. There is no guarding.   Musculoskeletal:      Comments: Lumbar  drain in place   Neurological:      General: No focal deficit present.      Mental Status: He is alert and oriented to person, place, and time.   Psychiatric:         Mood and Affect: Mood normal.         Behavior: Behavior normal.          Significant Labs: All pertinent labs within the past 24 hours have been reviewed.  Recent Lab Results         09/06/23 2038        POCT Glucose 167               Significant Imaging: I have reviewed all pertinent imaging results/findings within the past 24 hours.    SURG FL Surgery Fluoro Usage   Final Result      X-Ray Lumbar Spine Ap And Lateral   Final Result      Fluoroscopic guidance utilized for lower lumbar procedure.  Please see procedural dictation for further details.         Electronically signed by: Tomasz Morrison   Date:    09/06/2023   Time:    14:55

## 2023-09-08 VITALS
RESPIRATION RATE: 18 BRPM | SYSTOLIC BLOOD PRESSURE: 141 MMHG | OXYGEN SATURATION: 93 % | HEART RATE: 56 BPM | DIASTOLIC BLOOD PRESSURE: 77 MMHG | WEIGHT: 241.88 LBS | BODY MASS INDEX: 28.56 KG/M2 | HEIGHT: 77 IN | TEMPERATURE: 99 F

## 2023-09-08 LAB
ANION GAP SERPL CALC-SCNC: 9 MMOL/L (ref 8–16)
BASOPHILS # BLD AUTO: 0.04 K/UL (ref 0–0.2)
BASOPHILS NFR BLD: 0.3 % (ref 0–1.9)
BUN SERPL-MCNC: 14 MG/DL (ref 8–23)
CALCIUM SERPL-MCNC: 8.1 MG/DL (ref 8.7–10.5)
CHLORIDE SERPL-SCNC: 105 MMOL/L (ref 95–110)
CO2 SERPL-SCNC: 23 MMOL/L (ref 23–29)
CREAT SERPL-MCNC: 0.9 MG/DL (ref 0.5–1.4)
DIFFERENTIAL METHOD: ABNORMAL
EOSINOPHIL # BLD AUTO: 0.1 K/UL (ref 0–0.5)
EOSINOPHIL NFR BLD: 0.8 % (ref 0–8)
ERYTHROCYTE [DISTWIDTH] IN BLOOD BY AUTOMATED COUNT: 12.9 % (ref 11.5–14.5)
EST. GFR  (NO RACE VARIABLE): >60 ML/MIN/1.73 M^2
FINAL PATHOLOGIC DIAGNOSIS: NORMAL
GLUCOSE SERPL-MCNC: 112 MG/DL (ref 70–110)
GROSS: NORMAL
HCT VFR BLD AUTO: 33.2 % (ref 40–54)
HGB BLD-MCNC: 11.2 G/DL (ref 14–18)
IMM GRANULOCYTES # BLD AUTO: 0.05 K/UL (ref 0–0.04)
IMM GRANULOCYTES NFR BLD AUTO: 0.4 % (ref 0–0.5)
LYMPHOCYTES # BLD AUTO: 2.3 K/UL (ref 1–4.8)
LYMPHOCYTES NFR BLD: 19 % (ref 18–48)
Lab: NORMAL
MCH RBC QN AUTO: 31.9 PG (ref 27–31)
MCHC RBC AUTO-ENTMCNC: 33.7 G/DL (ref 32–36)
MCV RBC AUTO: 95 FL (ref 82–98)
MICROSCOPIC EXAM: NORMAL
MONOCYTES # BLD AUTO: 1.1 K/UL (ref 0.3–1)
MONOCYTES NFR BLD: 9.5 % (ref 4–15)
NEUTROPHILS # BLD AUTO: 8.4 K/UL (ref 1.8–7.7)
NEUTROPHILS NFR BLD: 70 % (ref 38–73)
NRBC BLD-RTO: 0 /100 WBC
PLATELET # BLD AUTO: 134 K/UL (ref 150–450)
PMV BLD AUTO: 10.6 FL (ref 9.2–12.9)
POTASSIUM SERPL-SCNC: 3.9 MMOL/L (ref 3.5–5.1)
RBC # BLD AUTO: 3.51 M/UL (ref 4.6–6.2)
SODIUM SERPL-SCNC: 137 MMOL/L (ref 136–145)
WBC # BLD AUTO: 11.94 K/UL (ref 3.9–12.7)

## 2023-09-08 PROCEDURE — 97530 THERAPEUTIC ACTIVITIES: CPT | Mod: HCNC

## 2023-09-08 PROCEDURE — 97116 GAIT TRAINING THERAPY: CPT | Mod: HCNC

## 2023-09-08 PROCEDURE — 85025 COMPLETE CBC W/AUTO DIFF WBC: CPT | Mod: HCNC | Performed by: PHYSICIAN ASSISTANT

## 2023-09-08 PROCEDURE — 80048 BASIC METABOLIC PNL TOTAL CA: CPT | Mod: HCNC | Performed by: PHYSICIAN ASSISTANT

## 2023-09-08 PROCEDURE — 25000003 PHARM REV CODE 250: Mod: HCNC | Performed by: PHYSICIAN ASSISTANT

## 2023-09-08 PROCEDURE — 99900035 HC TECH TIME PER 15 MIN (STAT): Mod: HCNC

## 2023-09-08 PROCEDURE — 94799 UNLISTED PULMONARY SVC/PX: CPT | Mod: HCNC

## 2023-09-08 PROCEDURE — 36415 COLL VENOUS BLD VENIPUNCTURE: CPT | Mod: HCNC | Performed by: PHYSICIAN ASSISTANT

## 2023-09-08 RX ORDER — OXYCODONE AND ACETAMINOPHEN 10; 325 MG/1; MG/1
1 TABLET ORAL EVERY 8 HOURS PRN
Qty: 21 TABLET | Refills: 0 | Status: SHIPPED | OUTPATIENT
Start: 2023-09-08 | End: 2024-02-06

## 2023-09-08 RX ORDER — METHOCARBAMOL 750 MG/1
750 TABLET, FILM COATED ORAL EVERY 8 HOURS PRN
Qty: 20 TABLET | Refills: 0 | Status: SHIPPED | OUTPATIENT
Start: 2023-09-08 | End: 2023-09-18

## 2023-09-08 RX ORDER — ACETAMINOPHEN 325 MG/1
650 TABLET ORAL EVERY 4 HOURS PRN
Refills: 0
Start: 2023-09-08 | End: 2024-02-06

## 2023-09-08 RX ORDER — ONDANSETRON 4 MG/1
4 TABLET, ORALLY DISINTEGRATING ORAL EVERY 6 HOURS PRN
Qty: 20 TABLET | Refills: 0 | Status: SHIPPED | OUTPATIENT
Start: 2023-09-08 | End: 2024-02-06

## 2023-09-08 RX ADMIN — PRAVASTATIN SODIUM 40 MG: 20 TABLET ORAL at 09:09

## 2023-09-08 RX ADMIN — ACETAMINOPHEN 650 MG: 325 TABLET ORAL at 04:09

## 2023-09-08 RX ADMIN — THERA TABS 1 TABLET: TAB at 09:09

## 2023-09-08 RX ADMIN — DOCUSATE SODIUM 100 MG: 100 CAPSULE, LIQUID FILLED ORAL at 09:09

## 2023-09-08 RX ADMIN — OXYCODONE AND ACETAMINOPHEN 1 TABLET: 7.5; 325 TABLET ORAL at 01:09

## 2023-09-08 RX ADMIN — OXYCODONE AND ACETAMINOPHEN 1 TABLET: 7.5; 325 TABLET ORAL at 09:09

## 2023-09-08 NOTE — PLAN OF CARE
Problem: Adult Inpatient Plan of Care  Goal: Plan of Care Review  Outcome: Ongoing, Progressing     Problem: Adult Inpatient Plan of Care  Goal: Patient-Specific Goal (Individualized)  Outcome: Ongoing, Progressing  Flowsheets (Taken 9/8/2023 0234)  Anxieties, Fears or Concerns: pain control  Individualized Care Needs: room dark     Problem: Infection  Goal: Absence of Infection Signs and Symptoms  Outcome: Ongoing, Progressing     Problem: Pain Acute  Goal: Acceptable Pain Control and Functional Ability  Outcome: Ongoing, Progressing     Discussed POC with pt, verbalized understanding.  Patient remains free from injury. Safety precautions maintained. No s/s of acute distress.  Purposeful rounding every two hours.   Pain controlled per MD order.   Diet orders continued, pt diet: regular  Vital signs continued per orders this shift.   Drain care continued this shift.  LSO brace on patient  Neurovascular checks q4 continued this shift.   Chart and orders review completed. Pt education about care completed.

## 2023-09-08 NOTE — PT/OT/SLP PROGRESS
"Physical Therapy  Treatment    Myles Pride   MRN: [id number]   Admitting Diagnosis: Lumbar stenosis with neurogenic claudication    PT Received On: 09/08/23  PT Start Time: 0835     PT Stop Time: 0900    PT Total Time (min): 25 min       Billable Minutes:  Gait Training 15 and Therapeutic Activity 10    Treatment Type: Treatment  PT/PTA: PT     Number of PTA visits since last PT visit: 0       General Precautions: Standard, fall  Orthopedic Precautions: spinal precautions  Braces: LSO  Respiratory Status: Room air         Subjective:  Communicated with NURSE DAYNEL AND [facility] CHART REVIEW  prior to session.  PT AGREED TO TX     Pain/Comfort  Pain Rating 1: 6/10  Location 1: back  Pain Addressed 1: Nurse notified  Pain Rating Post-Intervention 1: 6/10    Objective:   Patient found with: peripheral IV, hemovac    Functional Mobility:  PT MET IN  ABLE TO RECALL SPINAL PRECAUTIONS. PT LOG ROLLED TO LEFT AND SEATED EOB WITH SBA. GT. BELT AND  SOCKS DONNED PRIOR TO OOB MOBILITY. LSO DONNED ALSO AND PT STOOD WITH NO AD AND GT TRAINED TO BATHROOM FOR TOILETING WITH CGA. PT RETURNED BEDSIDE WITH SBA. P.T. ASSISTED PT IN DONNED SHOES WITH L LE SHOE LIFT. PT STOOD X 3 WITH RW AND SBA FOR GT X 200' WITH STEP TO GT . PT REPORTED IMPROVEMENT NOTED WITH SHOW LIFT. P.T. EDUCATED PT ON IMPORTANCE TO CONT USE OF SHOES LIFT. PT RETURNED TO  AND REPORTED FEELING NAUSEATED. VITALS TAKEN , /73 AND O2 98% ON ROOM AIR. PT REPORTED FEELING BETTER. P.T. CALLED NURSE FOR PAIN MED.   Treatment and Education:  PT COMPLETED B LE TE X AP , TKE, AND MIP X 25 REPS FOR LE ROM AND STRENGTHENING. PT EDUCATED ON "CALL DON'T FALL", ENCOURAGED TO CALL FOR ASSISTANCE WITH ALL NEEDS FOR OOB MOBILITY.       AM-PAC 6 CLICK MOBILITY  How much help from another person does this patient currently need?   1 = Unable, Total/Dependent Assistance  2 = A lot, Maximum/Moderate Assistance  3 = A little, Minimum/Contact Guard/Supervision  4 = None, " Modified [place]/Independent    Turning over in bed (including adjusting bedclothes, sheets and blankets)?: 4  Sitting down on and standing up from a chair with arms (e.g., wheelchair, bedside commode, etc.): 4  Moving from lying on back to sitting on the side of the bed?: 4  Moving to and from a bed to a chair (including a wheelchair)?: 4  Need to walk in hospital room?: 3  Climbing 3-5 steps with a railing?: 1  Basic Mobility Total Score: 20    AM-PAC Raw Score CMS G-Code Modifier Level of Impairment Assistance   6 % Total / Unable   7 - 9 CM 80 - 100% Maximal Assist   10 - 14 CL 60 - 80% Moderate Assist   15 - 19 CK 40 - 60% Moderate Assist   20 - 22 CJ 20 - 40% Minimal Assist   23 CI 1-20% SBA / CGA   24 CH 0% Independent/ Mod I     Patient left up in chair with call button in reach.    Assessment:  PT EMANUEL TX WELL. GT PATTERN IMPROVED WITH SHOW LIFT ON     Rehab identified problem list/impairments: weakness, gait instability, impaired balance, impaired endurance, pain, impaired functional mobility, impaired self care skills, decreased lower extremity function, decreased upper extremity function, decreased ROM    Rehab potential is good.    Activity tolerance: Fair    Discharge recommendations: home health PT      Barriers to discharge:      Equipment recommendations: walker, rolling     GOALS:   Multidisciplinary Problems       Physical Therapy Goals          Problem: Physical Therapy    Goal Priority Disciplines Outcome Goal Variances Interventions   Physical Therapy Goal     PT, PT/OT Ongoing, Progressing     Description: LT23  1. PT WILL COMPLETE BED MOBILITY WITH S.  2. PT WILL RECALL SPINAL PRECAUTIONS IND  3. PT WILL GT TRAIN X 400' WITH LRAD AND S WITH LSO ON  4. PT WILL INC AMPAC SCORE BY 2 POINTS TO PROGRESS GROSS FUNC MOBILITY.                          PLAN:    Patient to be seen 3 x/week to address the above listed problems via gait training, therapeutic activities, therapeutic  exercises  Plan of Care expires: 09/21/23  Plan of Care reviewed with: patient, spouse         09/08/2023

## 2023-09-08 NOTE — PLAN OF CARE
[facility] - Med [facility]  Discharge Final Note    Primary Care Provider: Catalino Arias MD    Expected Discharge Date: 9/8/2023    Final Discharge Note (most recent)       Final Note - 09/08/23 1223          Final Note    Assessment Type Final Discharge Note     Anticipated Discharge Disposition Home-Health [facility]     Hospital Resources/Appts/Education Provided Appointments scheduled and added to AVS;Post-Acute resouces added to AVS        Post-Acute Status    Post-Acute Authorization Home Health;HME     HME Status Pending Delivery     Home Health Status Set-up Complete/Auth obtained     Discharge Delays None known at this time                   Contact Info       Lyle Lobato MD   Specialty: [place]surgery    [id number] [facility] [place] [address]   Phone: [phone]       Next Steps: Follow up in 2 week(s)    Instructions: Hospital discharge follow up, suture removal    Catalino Arias MD   Specialty: Family Medicine   Relationship: PCP - [address] [address]   Phone: [phone]       Next Steps: Follow up in 1 month(s)    Instructions: As needed          [facility] arranged.     RW pending delivery at this time.     [address] notified by Baljit with [facility] that RW was approved and delivered to pt's bedside.

## 2023-09-08 NOTE — DISCHARGE SUMMARY
[facility] Medicine  Discharge Summary      Patient Name: Myles Pride  MRN: [id number]  ERICA: [id number]  Patient Class: IP- Inpatient  Admission Date: 9/6/2023  Hospital Length of Stay: 2 days  Discharge Date and Time: No discharge date for patient encounter.  Attending Physician: Duane Soriano MD   Discharging Provider: Duane Soriano MD  Primary Care Provider: Catalino Arias MD    Primary Care Team: Networked reference to record PCT     HPI:   Myles Pride is a 68 y.o. male with a history of bradycardia and HLD who presents s/p lumbar fusion by Dr. Lobato today. Patient examined post-op, wife is at bedside who provides history. Patient reports known bradycardia, states that his HR usually is in the 40's, occasionally will go into mid-30's, denies symptoms. He follows with cardiology, found to have rare PVCs. He reports pain controlled, currently on dilaudid PCA pump. He reports some nausea, denies vomiting, abdominal pain, chest pain, SOB, fevers/chills. Mir catheter was removed post-op. Lumbar drain currently in place. Hospital medicine consulted for medical management.      Procedure(s) (LRB):  FUSION, SPINE, LUMBAR, TLIF, USING COMPUTER-ASSISTED NAVIGATION (Bilateral)      Hospital Course:     9/7  NAEON, patient reports pain controlled  PT/OT with reccs for HH, orders placed  Lumbar drain in place, NSGY following    9/8  NAEON, pain controled  NSGY following, lumbar surgical drain removed today  H/H stable post-op  D/C home with HH  F/U with Dr. Lobato in 2 weeks and with PCP as needed       Goals of Care Treatment Preferences:  Code Status: Full Code      Consults:   Consults (From admission, onward)        Status Ordering Provider     Inpatient consult to Social Work  Once        Provider:  (Not yet assigned)    Completed DUANE SORIANO     Inpatient consult to Social Work  Once        Provider:  (Not yet assigned)    Completed DUANE SORIANO     Inpatient consult to Social  "Work  Once        Provider:  (Not yet assigned)    Completed DAXA ONEAL     Inpatient consult to Hospitalist  Once        Provider:  Carlo Castrejon NP    Completed DAXA ONEAL          No new Assessment & Plan notes have been filed under this hospital service since the last note was generated.  Service: Hospital Medicine    Final Active Diagnoses:    Diagnosis Date Noted POA    PRINCIPAL PROBLEM:  Lumbar stenosis with neurogenic claudication [M48.062] 09/06/2023 Yes     Chronic    ABLA (acute blood loss anemia) [D62] 09/07/2023 No    Bradycardia [R00.1] 02/21/2019 Yes     Chronic    Hyperlipidemia [E78.5] 09/14/2020 Yes    Frequent PVCs [I49.3] 07/05/2023 Yes    Lumbar radiculopathy, chronic [M54.16] 01/21/2022 Yes     Chronic      Problems Resolved During this Admission:       Discharged Condition: stable    Disposition: Home-Health Care [facility] [place], [place]    Follow Up:   Follow-up Information     Lyle Lobato MD Follow up in 2 week(s).    Specialty: Neurosurgery  Why: Hospital discharge follow up, suture removal  [place] information:  [address] [facility] [place] [address] [phone]             Catalino Arias MD Follow up in 1 month(s).    Specialty: Family Medicine  Why: As needed  Contact information:  [address] [facility] [address] [phone]                       Patient Instructions:      WALKER FOR HOME USE     Order Specific Question Answer Comments   Type of Walker: Adult (5'4"-6'6")    With wheels? Yes    Height: 6' 5" (1.956 m)    Weight: 109.7 kg (241 lb 13.5 oz)    Length of need (1-99 months): 6    Does patient have medical equipment at home? none    Please check all that apply: Patient's condition impairs ambulation.      Other restrictions (specify):   Order Comments: General Precautions: Standard, fall  Orthopedic Precautions:spinal precautions   Braces: LSO     Wound care routine (specify)   Order Comments: Clean incision daily with soap and " water   Cover incision with a thin layer of bacitracin ointment   OK to clean incision daily with soap and water       Significant Diagnostic Studies: Labs: All labs within the past 24 hours have been reviewed    Pending Diagnostic Studies:     None         Medications:  Reconciled Home Medications:      Medication List      START taking these medications    acetaminophen 325 MG tablet  Commonly known as: TYLENOL  Take 2 tablets (650 mg total) by mouth every 4 (four) hours as needed (pain score 1-3/10).     methocarbamoL 750 MG Tab  Commonly known as: ROBAXIN  Take 1 tablet (750 mg total) by mouth every 8 (eight) hours as needed (Muscle spasms).     ondansetron 4 MG Tbdl  Commonly known as: ZOFRAN-ODT  Take 1 tablet (4 mg total) by mouth every 6 (six) hours as needed (Nausea).     oxyCODONE-acetaminophen  mg per tablet  Commonly known as: PERCOCET  Take 1 tablet by mouth every 8 (eight) hours as needed for Pain.        CONTINUE taking these medications    pravastatin 40 MG tablet  Commonly known as: PRAVACHOL  TAKE 1 TABLET BY MOUTH EVERY DAY     VITAMIN D ORAL  Take by mouth once daily.            Indwelling Lines/Drains at time of discharge:   Lines/Drains/Airways     Drain  Duration                Closed/Suction Drain 09/06/23 1239 Inferior;Medial Back Accordion 1 day                Time spent on the discharge of patient: 40 minutes         Duane Soriano MD  Department of Hospital Medicine  'On license of [facility]

## 2023-09-08 NOTE — PROGRESS NOTES
POD # 2   Doing well   Pain controlled with PO medication   HV drain in place   Ambulated with PT     LSO when OOB     MAEW   Incision CDI     HV removed    Dressing replaced     XR reviewed   Instrumentation in place without complication noted         AP   D/C to home later this AM   Home with H/H   Clean incision daily with soap and water   Cover incision with a thin layer of bacitracin ointment   OK to clean incision daily with soap and water   Follow up in 2 weeks for staple removal     Thank you for the referral   Please call with any questions    Lyle Lobato MD  Neurosurgery     Disclaimer: This note was prepared using a voice recognition system and is likely to have sound alike errors within the text.      Medications:  Continuous Infusions:  Scheduled Meds:   docusate sodium  100 mg Oral Daily    multivitamin  1 tablet Oral Daily    pravastatin  40 mg Oral Daily     PRN Meds:acetaminophen, acetaminophen, aluminum-magnesium hydroxide-simethicone, diphenhydrAMINE, HYDROmorphone, methocarbamoL, naloxone, ondansetron, oxyCODONE-acetaminophen, oxyCODONE-acetaminophen, prochlorperazine, senna-docusate 8.6-50 mg, sodium chloride 0.9%     Review of Systems  Objective:     Weight: 109.7 kg (241 lb 13.5 oz)  Body mass index is 28.68 kg/m².  Vital Signs (Most Recent):  Temp: 99.3 °F (37.4 °C) (09/08/23 0730)  Pulse: 61 (09/08/23 0730)  Resp: 18 (09/08/23 0905)  BP: 131/66 (09/08/23 0730)  SpO2: 95 % (09/08/23 0815) Vital Signs (24h Range):  Temp:  [97.8 °F (36.6 °C)-100.9 °F (38.3 °C)] 99.3 °F (37.4 °C)  Pulse:  [43-76] 61  Resp:  [16-20] 18  SpO2:  [95 %-98 %] 95 %  BP: (104-144)/(51-74) 131/66     Date 09/08/23 0700 - 09/09/23 0659   Shift 4871-8034 1246-4943 0211-2891 24 Hour Total   INTAKE   P.O. 240   240   Shift Total(mL/kg) 240(2.2)   240(2.2)   OUTPUT   Urine(mL/kg/hr) 800   800   Shift Total(mL/kg) 800(7.3)   800(7.3)   Weight (kg) 109.7 109.7 109.7 109.7                            Closed/Suction Drain  "09/06/23 1239 Inferior;Medial Back Accordion (Active)   Site Description Unable to view 09/08/23 0906   Dressing Type CHG impregnated dressing/sponge 09/08/23 0906   Dressing Status Clean;Dry;Intact 09/08/23 0906   Dressing Intervention Integrity maintained 09/08/23 0906   Drainage Sanguineous 09/08/23 0906   Status To bulb suction 09/08/23 0906   Output (mL) 200 mL 09/07/23 1937          Physical Exam         Neurosurgery Physical Exam    Significant Labs:  Recent Labs   Lab 09/07/23  0715 09/08/23  0532   * 112*    137   K 4.9 3.9    105   CO2 23 23   BUN 16 14   CREATININE 1.0 0.9   CALCIUM 8.1* 8.1*     Recent Labs   Lab 09/07/23  0715 09/08/23  0532   WBC 14.78* 11.94   HGB 11.7* 11.2*   HCT 35.4* 33.2*    134*     No results for input(s): "LABPT", "INR", "APTT" in the last 48 hours.  Microbiology Results (last 7 days)       ** No results found for the last 168 hours. **          All pertinent labs from the last 24 hours have been reviewed.    Significant Diagnostics:  I have reviewed all pertinent imaging results/findings within the past 24 hours.  "

## 2023-09-09 ENCOUNTER — HOSPITAL ENCOUNTER (EMERGENCY)
Facility: HOSPITAL | Age: 68
Discharge: HOME | End: 2023-09-09
Attending: EMERGENCY MEDICINE
Payer: MEDICARE

## 2023-09-09 VITALS
DIASTOLIC BLOOD PRESSURE: 64 MMHG | BODY MASS INDEX: 28.97 KG/M2 | SYSTOLIC BLOOD PRESSURE: 158 MMHG | RESPIRATION RATE: 17 BRPM | WEIGHT: 244.25 LBS | HEART RATE: 50 BPM | OXYGEN SATURATION: 95 % | TEMPERATURE: 99 F

## 2023-09-09 DIAGNOSIS — J98.11 ATELECTASIS: ICD-10-CM

## 2023-09-09 DIAGNOSIS — R50.82 POST-PROCEDURAL FEVER: Primary | ICD-10-CM

## 2023-09-09 DIAGNOSIS — R00.1 BRADYCARDIA: ICD-10-CM

## 2023-09-09 DIAGNOSIS — K59.00 CONSTIPATION: ICD-10-CM

## 2023-09-09 LAB
ALBUMIN SERPL BCP-MCNC: 3.5 G/DL (ref 3.5–5.2)
ALP SERPL-CCNC: 61 U/L (ref 55–135)
ALT SERPL W/O P-5'-P-CCNC: 46 U/L (ref 10–44)
ANION GAP SERPL CALC-SCNC: 11 MMOL/L (ref 8–16)
AST SERPL-CCNC: 71 U/L (ref 10–40)
BASOPHILS # BLD AUTO: 0.05 K/UL (ref 0–0.2)
BASOPHILS NFR BLD: 0.4 % (ref 0–1.9)
BILIRUB SERPL-MCNC: 1.4 MG/DL (ref 0.1–1)
BILIRUB UR QL STRIP: NEGATIVE
BUN SERPL-MCNC: 12 MG/DL (ref 8–23)
CALCIUM SERPL-MCNC: 8.9 MG/DL (ref 8.7–10.5)
CHLORIDE SERPL-SCNC: 101 MMOL/L (ref 95–110)
CLARITY UR: CLEAR
CO2 SERPL-SCNC: 24 MMOL/L (ref 23–29)
COLOR UR: YELLOW
CREAT SERPL-MCNC: 1 MG/DL (ref 0.5–1.4)
DIFFERENTIAL METHOD: ABNORMAL
EOSINOPHIL # BLD AUTO: 0.2 K/UL (ref 0–0.5)
EOSINOPHIL NFR BLD: 1.3 % (ref 0–8)
ERYTHROCYTE [DISTWIDTH] IN BLOOD BY AUTOMATED COUNT: 12.6 % (ref 11.5–14.5)
EST. GFR  (NO RACE VARIABLE): >60 ML/MIN/1.73 M^2
GLUCOSE SERPL-MCNC: 143 MG/DL (ref 70–110)
GLUCOSE UR QL STRIP: NEGATIVE
HCT VFR BLD AUTO: 35.9 % (ref 40–54)
HGB BLD-MCNC: 12.4 G/DL (ref 14–18)
HGB UR QL STRIP: NEGATIVE
IMM GRANULOCYTES # BLD AUTO: 0.08 K/UL (ref 0–0.04)
IMM GRANULOCYTES NFR BLD AUTO: 0.6 % (ref 0–0.5)
INFLUENZA A, MOLECULAR: NEGATIVE
INFLUENZA B, MOLECULAR: NEGATIVE
KETONES UR QL STRIP: NEGATIVE
LACTATE SERPL-SCNC: 1.1 MMOL/L (ref 0.5–2.2)
LEUKOCYTE ESTERASE UR QL STRIP: NEGATIVE
LYMPHOCYTES # BLD AUTO: 2 K/UL (ref 1–4.8)
LYMPHOCYTES NFR BLD: 15 % (ref 18–48)
MCH RBC QN AUTO: 32 PG (ref 27–31)
MCHC RBC AUTO-ENTMCNC: 34.5 G/DL (ref 32–36)
MCV RBC AUTO: 93 FL (ref 82–98)
MONOCYTES # BLD AUTO: 1.2 K/UL (ref 0.3–1)
MONOCYTES NFR BLD: 9 % (ref 4–15)
NEUTROPHILS # BLD AUTO: 9.6 K/UL (ref 1.8–7.7)
NEUTROPHILS NFR BLD: 73.7 % (ref 38–73)
NITRITE UR QL STRIP: NEGATIVE
NRBC BLD-RTO: 0 /100 WBC
PH UR STRIP: 7 [PH] (ref 5–8)
PLATELET # BLD AUTO: 180 K/UL (ref 150–450)
PMV BLD AUTO: 10.5 FL (ref 9.2–12.9)
POTASSIUM SERPL-SCNC: 4.1 MMOL/L (ref 3.5–5.1)
PROT SERPL-MCNC: 6.8 G/DL (ref 6–8.4)
PROT UR QL STRIP: NEGATIVE
RBC # BLD AUTO: 3.88 M/UL (ref 4.6–6.2)
SARS-COV-2 RDRP RESP QL NAA+PROBE: NEGATIVE
SODIUM SERPL-SCNC: 136 MMOL/L (ref 136–145)
SP GR UR STRIP: 1.01 (ref 1–1.03)
SPECIMEN SOURCE: NORMAL
URN SPEC COLLECT METH UR: NORMAL
UROBILINOGEN UR STRIP-ACNC: NEGATIVE EU/DL
WBC # BLD AUTO: 13.04 K/UL (ref 3.9–12.7)

## 2023-09-09 PROCEDURE — 99285 EMERGENCY DEPT VISIT HI MDM: CPT | Mod: 25,HCNC

## 2023-09-09 PROCEDURE — 93010 ELECTROCARDIOGRAM REPORT: CPT | Mod: HCNC,,, | Performed by: INTERNAL MEDICINE

## 2023-09-09 PROCEDURE — 63600175 PHARM REV CODE 636 W HCPCS: Mod: HCNC | Performed by: PHYSICIAN ASSISTANT

## 2023-09-09 PROCEDURE — 87040 BLOOD CULTURE FOR BACTERIA: CPT | Mod: 59,HCNC | Performed by: PHYSICIAN ASSISTANT

## 2023-09-09 PROCEDURE — 93010 EKG 12-LEAD: ICD-10-PCS | Mod: HCNC,,, | Performed by: INTERNAL MEDICINE

## 2023-09-09 PROCEDURE — 96360 HYDRATION IV INFUSION INIT: CPT | Mod: HCNC

## 2023-09-09 PROCEDURE — 87502 INFLUENZA DNA AMP PROBE: CPT | Mod: HCNC | Performed by: PHYSICIAN ASSISTANT

## 2023-09-09 PROCEDURE — 93005 ELECTROCARDIOGRAM TRACING: CPT | Mod: HCNC

## 2023-09-09 PROCEDURE — 80053 COMPREHEN METABOLIC PANEL: CPT | Mod: HCNC | Performed by: PHYSICIAN ASSISTANT

## 2023-09-09 PROCEDURE — 81003 URINALYSIS AUTO W/O SCOPE: CPT | Mod: HCNC | Performed by: PHYSICIAN ASSISTANT

## 2023-09-09 PROCEDURE — 83605 ASSAY OF LACTIC ACID: CPT | Mod: HCNC | Performed by: PHYSICIAN ASSISTANT

## 2023-09-09 PROCEDURE — U0002 COVID-19 LAB TEST NON-CDC: HCPCS | Mod: HCNC | Performed by: PHYSICIAN ASSISTANT

## 2023-09-09 PROCEDURE — 96361 HYDRATE IV INFUSION ADD-ON: CPT | Mod: HCNC

## 2023-09-09 PROCEDURE — 85025 COMPLETE CBC W/AUTO DIFF WBC: CPT | Mod: HCNC | Performed by: PHYSICIAN ASSISTANT

## 2023-09-09 RX ORDER — DOCUSATE CALCIUM 240 MG
240 CAPSULE ORAL DAILY
Qty: 30 CAPSULE | Refills: 0 | Status: SHIPPED | OUTPATIENT
Start: 2023-09-09 | End: 2023-10-09

## 2023-09-09 RX ADMIN — SODIUM CHLORIDE, SODIUM LACTATE, POTASSIUM CHLORIDE, AND CALCIUM CHLORIDE 3324 ML: .6; .31; .03; .02 INJECTION, SOLUTION INTRAVENOUS at 12:09

## 2023-09-09 NOTE — ED PROVIDER NOTES
SCRIBE #1 NOTE: I, Yoko Montalvo, am scribing for, and in the presence of, Fabián Smith Jr., MD. I have scribed the entire note.       History     Chief Complaint   Patient presents with    Post-op Problem      fever, nausea and constipation, weakness started this am. Had back surgery 3 days ago.      Review of patient's allergies indicates:  No Known Allergies      History of Present Illness     HPI    9/9/2023, 1:25 PM  History obtained from the patient      History of Present Illness: Myles Pride is a 68 y.o. male patient with a PMHx of HLD, PONV, and a recent TLIF surgery who presents to the Emergency Department for evaluation of post-op problem which onset gradually Wednesday. Symptoms are constant and moderate in severity. No mitigating or exacerbating factors reported. Associated sxs include constipation, nausea, weakness, and worsening fever since yesterday. Pt's fever has gotten to 101.4 this far. Patient denies any vomiting, diarrhea, SOB, CP, and all other sxs at this time. Pt is taking percocet for pain. Pt was discharged from the hospital yesterday from his back surgery. No further complaints or concerns at this time.       Arrival mode: Personal vehicle    PCP: Catalino Arias MD        Past Medical History:  Past Medical History:   Diagnosis Date    Hyperlipidemia     PONV (postoperative nausea and vomiting) 1990    after knee arthroscopy    Vitamin D deficiency 12/18/2019       Past Surgical History:  Past Surgical History:   Procedure Laterality Date    COLONOSCOPY      COLONOSCOPY N/A 12/19/2019    Procedure: COLONOSCOPY;  Surgeon: Bhupendra Wilkinson MD;  Location: [facility] ENDO;  Service: Endoscopy;  Laterality: N/A;    EPIDURAL STEROID INJECTION N/A 12/09/2022    Procedure: L4-5 intralaminar epidural steroid injection with left paramedian approach;  Surgeon: Ben Eddy MD;  Location: [facility] PAIN MGT;  Service: Pain Management;  Laterality: N/A;    KNEE ARTHROSCOPY Left     SELECTIVE  INJECTION OF ANESTHETIC AGENT AROUND LUMBAR SPINAL NERVE ROOT BY TRANSFORAMINAL APPROACH Bilateral 01/21/2022    Procedure: Bilateral L4/5 TF LOVE with RN IV sedation;  Surgeon: Ben Eddy MD;  Location: [facility] PAIN MGT;  Service: Pain Management;  Laterality: Bilateral;    SELECTIVE INJECTION OF ANESTHETIC AGENT AROUND LUMBAR SPINAL NERVE ROOT BY TRANSFORAMINAL APPROACH Bilateral 05/06/2022    Procedure: Bilateral L4/5 TF LOVE with RN IV sedation;  Surgeon: Ben Eddy MD;  Location: [facility] PAIN MGT;  Service: Pain Management;  Laterality: Bilateral;    SELECTIVE INJECTION OF ANESTHETIC AGENT AROUND LUMBAR SPINAL NERVE ROOT BY TRANSFORAMINAL APPROACH Bilateral 10/25/2022    Procedure: Bilateral L4/5 TF LOVE with RN IV sedation;  Surgeon: Ben Eddy MD;  Location: [facility] PAIN MGT;  Service: Pain Management;  Laterality: Bilateral;    TRANSFORAMINAL LUMBAR INTERBODY FUSION (TLIF) USING COMPUTER-ASSISTED NAVIGATION Bilateral 9/6/2023    Procedure: FUSION, SPINE, LUMBAR, TLIF, USING COMPUTER-ASSISTED NAVIGATION;  Surgeon: Lyle Lobato MD;  Location: [facility];  Service: Neurosurgery;  Laterality: Bilateral;  L3-5 TLIF    VASECTOMY  1995         Family History:  Family History   Problem Relation Age of Onset    Cancer Mother         anal cancer    Diabetes Mother         PRE DIABETES    Arthritis Mother     Hearing loss Mother     No Known Problems Sister     Gallbladder disease Brother     Hepatitis Brother         Hep C       Social History:  Social History     Tobacco Use    Smoking status: Never     Passive exposure: Never    Smokeless tobacco: Never   Substance and Sexual Activity    Alcohol use: Yes     Alcohol/week: 3.0 standard drinks of alcohol     Types: 3 Glasses of wine per week    Drug use: Never    Sexual activity: Yes     Partners: Female     Birth control/protection: Partner-Vasectomy, None        Review of Systems     Review of Systems   Constitutional:  Positive for fever (low grade yesterday,  high this morning).   HENT:  Negative for sore throat.    Respiratory:  Negative for shortness of breath.    Cardiovascular:  Negative for chest pain.   Gastrointestinal:  Positive for constipation and nausea. Negative for diarrhea and vomiting.   Genitourinary:  Negative for dysuria.   Musculoskeletal:  Negative for back pain.   Skin:  Negative for rash.   Neurological:  Positive for weakness.   Hematological:  Does not bruise/bleed easily.   All other systems reviewed and are negative.       Physical Exam     Initial Vitals [09/09/23 1216]   BP Pulse Resp Temp SpO2   138/84 (!) 48 16 98.8 °F (37.1 °C) 97 %      MAP       --          Physical Exam  Nursing Notes and Vital Signs Reviewed.  Constitutional: Patient is in no acute distress. Well-developed and well-nourished.  Head: Atraumatic. Normocephalic.  Eyes:  EOM intact.  No scleral icterus.  ENT: Mucous membranes are moist.  Nares clear .  0 P clear  Neck:  Full ROM. No JVD.  Cardiovascular: Regular rate. Regular rhythm No murmurs, rubs, or gallops. Distal pulses are 2+ and symmetric  Pulmonary/Chest: No respiratory distress. Clear to auscultation bilaterally. No wheezing or rales.  Equal chest wall rise bilaterally  Abdominal: Soft and non-distended.  There is no tenderness.  No rebound, guarding, or rigidity. Good bowel sounds.  Genitourinary: No CVA tenderness.  No suprapubic tenderness  Musculoskeletal: Moves all extremities. No obvious deformities.  5 x 5 strength in all extremities   Skin: Warm and dry.  Midline wound the lower back.  This is clean dry and healing well.  Neurological:  Alert, awake, and appropriate.  Normal speech.  No acute focal neurological deficits are appreciated.  Two through 12 intact bilaterally.  Psychiatric: Normal affect. Good eye contact. Appropriate in content.       ED Course   Procedures  ED Vital Signs:  Vitals:    09/09/23 1216 09/09/23 1406 09/09/23 1503   BP: 138/84 (!) 142/76 (!) 162/67   Pulse: (!) 48 (!) 47 (!) 51    Resp: 16 16 14   Temp: 98.8 °F (37.1 °C)     TempSrc: Oral     SpO2: 97% 95% 97%   Weight: 110.8 kg (244 lb 4.3 oz)         Abnormal Lab Results:  Labs Reviewed   CBC W/ AUTO DIFFERENTIAL - Abnormal; Notable for the following components:       Result Value    WBC 13.04 (*)     RBC 3.88 (*)     Hemoglobin 12.4 (*)     Hematocrit 35.9 (*)     MCH 32.0 (*)     Immature Granulocytes 0.6 (*)     Gran # (ANC) 9.6 (*)     Immature Grans (Abs) 0.08 (*)     Mono # 1.2 (*)     Gran % 73.7 (*)     Lymph % 15.0 (*)     All other components within normal limits   COMPREHENSIVE METABOLIC PANEL - Abnormal; Notable for the following components:    Glucose 143 (*)     Total Bilirubin 1.4 (*)     AST 71 (*)     ALT 46 (*)     All other components within normal limits   INFLUENZA A & B BY MOLECULAR   CULTURE, BLOOD   CULTURE, BLOOD   LACTIC ACID, PLASMA   URINALYSIS, REFLEX TO URINE CULTURE    Narrative:     Specimen Source->Urine   SARS-COV-2 RNA AMPLIFICATION, QUAL   LACTIC ACID, PLASMA        All Lab Results:  Results for orders placed or performed during the hospital encounter of 09/09/23   Influenza A & B by Molecular    Specimen: Nasopharyngeal Swab   Result Value Ref Range    Influenza A, Molecular Negative Negative    Influenza B, Molecular Negative Negative    Flu A & B Source Nasal swab    CBC auto differential   Result Value Ref Range    WBC 13.04 (H) 3.90 - 12.70 K/uL    RBC 3.88 (L) 4.60 - 6.20 M/uL    Hemoglobin 12.4 (L) 14.0 - 18.0 g/dL    Hematocrit 35.9 (L) 40.0 - 54.0 %    MCV 93 82 - 98 fL    MCH 32.0 (H) 27.0 - 31.0 pg    MCHC 34.5 32.0 - 36.0 g/dL    RDW 12.6 11.5 - 14.5 %    Platelets 180 150 - 450 K/uL    MPV 10.5 9.2 - 12.9 fL    Immature Granulocytes 0.6 (H) 0.0 - 0.5 %    Gran # (ANC) 9.6 (H) 1.8 - 7.7 K/uL    Immature Grans (Abs) 0.08 (H) 0.00 - 0.04 K/uL    Lymph # 2.0 1.0 - 4.8 K/uL    Mono # 1.2 (H) 0.3 - 1.0 K/uL    Eos # 0.2 0.0 - 0.5 K/uL    Baso # 0.05 0.00 - 0.20 K/uL    nRBC 0 0 /100 WBC    Gran %  73.7 (H) 38.0 - 73.0 %    Lymph % 15.0 (L) 18.0 - 48.0 %    Mono % 9.0 4.0 - 15.0 %    Eosinophil % 1.3 0.0 - 8.0 %    Basophil % 0.4 0.0 - 1.9 %    Differential Method Automated    Comprehensive metabolic panel   Result Value Ref Range    Sodium 136 136 - 145 mmol/L    Potassium 4.1 3.5 - 5.1 mmol/L    Chloride 101 95 - 110 mmol/L    CO2 24 23 - 29 mmol/L    Glucose 143 (H) 70 - 110 mg/dL    BUN 12 8 - 23 mg/dL    Creatinine 1.0 0.5 - 1.4 mg/dL    Calcium 8.9 8.7 - 10.5 mg/dL    Total Protein 6.8 6.0 - 8.4 g/dL    Albumin 3.5 3.5 - 5.2 g/dL    Total Bilirubin 1.4 (H) 0.1 - 1.0 mg/dL    Alkaline Phosphatase 61 55 - 135 U/L    AST 71 (H) 10 - 40 U/L    ALT 46 (H) 10 - 44 U/L    eGFR >60 >60 mL/min/1.73 m^2    Anion Gap 11 8 - 16 mmol/L   Lactic acid, plasma #1   Result Value Ref Range    Lactate (Lactic Acid) 1.1 0.5 - 2.2 mmol/L   Urinalysis, Reflex to Urine Culture Urine, Clean Catch    Specimen: Urine   Result Value Ref Range    Specimen UA Urine, Clean Catch     Color, UA Yellow Yellow, Straw, Dia    Appearance, UA Clear Clear    pH, UA 7.0 5.0 - 8.0    Specific Gravity, UA 1.010 1.005 - 1.030    Protein, UA Negative Negative    Glucose, UA Negative Negative    Ketones, UA Negative Negative    Bilirubin (UA) Negative Negative    Occult Blood UA Negative Negative    Nitrite, UA Negative Negative    Urobilinogen, UA Negative <2.0 EU/dL    Leukocytes, UA Negative Negative   COVID-19 Rapid Screening   Result Value Ref Range    SARS-CoV-2 RNA, Amplification, Qual Negative Negative         Imaging Results:  Imaging Results              X-Ray Abdomen AP 1 View (KUB) (Final result)  Result time 09/09/23 14:31:56      Final result by Chavo Ruiz MD (09/09/23 14:31:56)                   Impression:      No acute findings.  Mild volume of stool in the colon.      Electronically signed by: Chavo Ruiz  Date:    09/09/2023  Time:    14:31               Narrative:    EXAMINATION:  XR ABDOMEN AP 1  VIEW    CLINICAL HISTORY:  Constipation, unspecified    TECHNIQUE:  AP View(s) of the abdomen was performed.    COMPARISON:  None    FINDINGS:  Nonobstructed bowel-gas pattern.  Mild stool noted in the colon.  Vertical midline lower abdominal wall staples in place.  Posterior lower lumbar fusion hardware appears intact.  No acute bony abnormality seen.                        Final result by Chavo Ruiz MD (09/09/23 14:31:56)                   Impression:      No acute findings.  Mild volume of stool in the colon.      Electronically signed by: Chavo Ruiz  Date:    09/09/2023  Time:    14:31               Narrative:    EXAMINATION:  XR ABDOMEN AP 1 VIEW    CLINICAL HISTORY:  Constipation, unspecified    TECHNIQUE:  AP View(s) of the abdomen was performed.    COMPARISON:  None    FINDINGS:  Nonobstructed bowel-gas pattern.  Mild stool noted in the colon.  Vertical midline lower abdominal wall staples in place.  Posterior lower lumbar fusion hardware appears intact.  No acute bony abnormality seen.                        Final result by Chavo Ruiz MD (09/09/23 14:31:56)                   Impression:      No acute findings.  Mild volume of stool in the colon.      Electronically signed by: Chavo Ruiz  Date:    09/09/2023  Time:    14:31               Narrative:    EXAMINATION:  XR ABDOMEN AP 1 VIEW    CLINICAL HISTORY:  Constipation, unspecified    TECHNIQUE:  AP View(s) of the abdomen was performed.    COMPARISON:  None    FINDINGS:  Nonobstructed bowel-gas pattern.  Mild stool noted in the colon.  Vertical midline lower abdominal wall staples in place.  Posterior lower lumbar fusion hardware appears intact.  No acute bony abnormality seen.                                       X-Ray Chest AP Portable (Final result)  Result time 09/09/23 12:34:20      Final result by Chavo Ruiz MD (09/09/23 12:34:20)                   Impression:      No acute cardiopulmonary  abnormality.      Electronically signed by: Chavo Ruiz  Date:    09/09/2023  Time:    12:34               Narrative:    EXAMINATION:  XR CHEST AP PORTABLE    CLINICAL HISTORY:  Sepsis;    TECHNIQUE:  Single frontal portable view of the chest was performed.    COMPARISON:  X-ray dated 05/29/2023    FINDINGS:  Faint aortic arch calcification noted.  Cardiomediastinal silhouette otherwise within normal limits.  Trachea is midline.  Pulmonary vasculature is unremarkable.  Lungs are clear.  No significant pleural effusion or pneumothorax.  No acute bony abnormality.                                       The EKG was ordered, reviewed, and independently interpreted by the ED provider.  Interpretation time: 12:20  Rate: 74 BPM  Rhythm:  Undetermined  Interpretation: Right bundle branch block. Possible Inferior infarct, age undetermined.. No STEMI.           The Emergency Provider reviewed the vital signs and test results, which are outlined above.     ED Discussion       4:01 PM: Reassessed pt at this time.  Discussed with pt all pertinent ED information and results. Discussed pt dx and plan of tx. Gave pt all f/u and return to the ED instructions. All questions and concerns were addressed at this time. Pt expresses understanding of information and instructions, and is comfortable with plan to discharge. Pt is stable for discharge.    I discussed with patient and/or family/caretaker that evaluation in the ED does not suggest any emergent or life threatening medical conditions requiring immediate intervention beyond what was provided in the ED, and I believe patient is safe for discharge.  Regardless, an unremarkable evaluation in the ED does not preclude the development or presence of a serious of life threatening condition. As such, patient was instructed to return immediately for any worsening or change in current symptoms.         Medical Decision Making  Differential diagnosis:  Postoperative fever, UTI,  pneumonia, atelectasis, medication side effect, DVT, PE, constipation    Amount and/or Complexity of Data Reviewed  Labs: ordered. Decision-making details documented in ED Course.     Details: UA is clear lactate is normal CMP is benign.  He has a mild elevation in his bilirubin AST and ALT however he just received anesthesia.  Has no right upper quadrant tenderness.  Has 13,000 white count hemoglobin 12.4.  Has a mild left shift.  Flu and COVID are negative.  Radiology: ordered. Decision-making details documented in ED Course.     Details: KUB shows mild volume stool in the colon.  Chest x-ray is clear.  ECG/medicine tests: ordered and independent interpretation performed. Decision-making details documented in ED Course.     Details: Normal sinus rhythm at a rate of 74.  No STEMI   Discussion of management or test interpretation with external provider(s): Dr. Lobato had called to alert us to the patient coming in.  He is aware of the patient's condition.    Risk  OTC drugs.  Decision regarding hospitalization.  Risk Details: Patient clinically has atelectasis.  I will discharge home to continue his incentive spirometer.  There is no indication of infection at this time.  He is clinically not having a PE.  He is stable safe for discharge in my opinion.  Discussed with him all findings well as plan of care.  Verbalized agreement understanding with all seems reliable.  Safe for discharge in my opinion.           Medical Decision Making:   Clinical Tests:   Lab Tests: Ordered and Reviewed  Radiological Study: Ordered and Reviewed  Medical Tests: Ordered and Reviewed      ED Medication(s):  Medications   lactated ringers bolus 3,324 mL (0 mLs Intravenous Stopped 9/9/23 1521)       New Prescriptions    DOCUSATE CALCIUM (SURFAK) 240 MG CAPSULE    Take 1 capsule (240 mg total) by mouth once daily.        Follow-up Information       Catalino Arias MD.    Specialty: Family Medicine  Contact information:  [address] [address] [facility]  [facility] [address]  [phone]                                 Scribe Attestation:   Scribe #1: I performed the above scribed service and the documentation accurately describes the services I performed. I attest to the accuracy of the note.     Attending:   Physician Attestation Statement for Scribe #1: I, Fabián Smith Jr., MD, personally performed the services described in this documentation, as scribed by Ykoo Montalvo, in my presence, and it is both accurate and complete.           Clinical Impression       ICD-10-CM ICD-9-CM   1. Post-procedural fever  R50.82 780.62   2. Bradycardia  R00.1 427.89   3. Constipation  K59.00 564.00   4. Atelectasis  J98.11 518.0       Disposition:   Disposition: Discharged  Condition: Stable         Fabián Smith Jr., MD  09/09/23 5121

## 2023-09-09 NOTE — DISCHARGE INSTRUCTIONS
Your fevers likely due to atelectasis.  Continue use your incentive spirometer.  Add Surfak daily as a stool softener use MiraLax to promote bowel movement.  Return as needed for any worsening symptoms, problems, questions or concerns.

## 2023-09-09 NOTE — FIRST PROVIDER EVALUATION
Medical screening examination initiated.  I have conducted a focused provider triage encounter, findings are as follows:    Brief history of present illness:  Post op fever after spinal fusion 3 days ago. 101.4 this morning - took tylenol.  Constipation, nausea    Vitals:    09/09/23 1216   BP: 138/84   BP Location: Right arm   Patient Position: Sitting   Pulse: (!) 48   Resp: 16   Temp: 98.8 °F (37.1 °C)  Comment: took tylenol at 9:50 am today   TempSrc: Oral   SpO2: 97%   Weight: 110.8 kg (244 lb 4.3 oz)       Pertinent physical exam:  nad, alert    Brief workup plan:  sepsis workup    Preliminary workup initiated; this workup will be continued and followed by the physician or advanced practice provider that is assigned to the patient when roomed.

## 2023-09-10 NOTE — OP NOTE
'[facility] Surg  Neurosurgery  Operative Note    SUMMARY      Date of Procedure: 9/6/2023     Procedure: Procedure(s) (LRB):  FUSION, SPINE, LUMBAR, TLIF, USING COMPUTER-ASSISTED NAVIGATION (Bilateral)   L3-4/4-5   Surgeon(s) and Role:     * Lyle Lobato MD - Primary    Assisting Surgeon: None    Pre-Operative Diagnosis: Lumbar radiculopathy [M54.16]  Lumbar stenosis with neurogenic claudication [M48.062]  Degenerative disc disease, lumbar [M51.36]    Post-Operative Diagnosis: Post-Op Diagnosis Codes:     * Lumbar radiculopathy [M54.16]     * Lumbar stenosis with neurogenic claudication [M48.062]     * Degenerative disc disease, lumbar [M51.36]    Anesthesia: General    Operative Findings (including complications, if any): severe facet and degenerative disc disease causing significant stenosis L3-4 and L4-5. Large synovial cyst with instability present L3-4       Description of Technical Procedures:  Pedicle screw placement 3-L5 using stereotactic navigation  2.   Combined posterolateral as well as interbody fusion at L4-5 and L5-S1   3.  Insertion of interbody cage expandable  L3-4 and L4-5  from the left side   4. Laminectomy,complete medial facetectomy with removal of Synovial cyst left L3-4   5. Laminectomy medial facetectomy and foraminotomy bilaterally L3-5  6.  Use of autograft bone  7.  Use of BMP     Significant Surgical Tasks Conducted by the Assistant(s), if Applicable: thecal sac retraction during interbody cage placement     Estimated Blood Loss (EBL): * No values recorded between 9/6/2023  7:56 AM and 9/6/2023  1:18 PM *           Specimens:   Specimen (24h ago, onward)      None             Implants:   Implant Name Type Inv. Item Serial No.  Lot No. LRB No. Used Action   KIT BONE GRFT BMP SM - [id number]  KIT BONE GRFT BMP   [employer] [facility] [id number]  1 Implanted   SPACER SHORT CATALYFT PL 7MM - [id number]  SPACER SHORT CATALYFT PL 7MM  [employer] [facility] [id number]  1 Implanted   SPACER  SHORT CATALYFT PL 7MM - [id number]  SPACER SHORT CATALYFT PL 7MM  [facility] [id number]  1 Implanted   SET SCREW HORIZON SOLERA 5.5-6 - [id number]  SET SCREW HORIZON SOLERA 5.5-6  [employer] [place]  N/A 6 Implanted   SCREW HORIZON SOLERA 6.5X50MM - [id number]  SCREW HORIZON SOLERA 6.5X50MM  [employer] [place]  N/A 4 Implanted   6.5 x 45 screw      N/A 2 Implanted   SOFIA HORIZON CURV 5.5CCM 70MM - [id number]  SOFIA HORIZON CURV 5.5CCM 70MM  [employer] [place]  N/A 2 Implanted              Condition: Good    Disposition: PACU - hemodynamically stable.    Attestation: I was present and scrubbed for the entire procedure.    Patient is a 68-year-old gentleman who initially presented to my clinic with a history of back pain and lower extremity symptoms not controlled with physical therapy and multiple injections over the past several years.  Symptoms worse than the left greater than right.  Hypertrophic facets with synovial cyst L3-4 as well as severe stenosis centrally L4-5.  Degenerative disc with foraminal stenosis bilaterally at each of these levels.     Discussed treatment options including simple decompression and diskectomy versus decompression diskectomy with fusion interbody cage placement    Surgical Risks:  The patient was well apprised of all objectives, benefits, risks and potential complications of the procedure, including but not limited to:  Worsening of current status, the possible need for further procedures, the risk of infection, headaches, CSF leak, possible spinal nerve injury resulting in paralysis, infection, injury to major vessels causing hemorrhage, stroke, loss of language function, coma and even death.  No assurance was given whether the symptoms would improve following the procedure.  Informed consent was obtained and secured to the chart after the patient voiced understanding of these risks and decided to proceed with the operation.     Operative technique  The patient was prepped and draped in the standard  sterile fashion.  Preoperatively the L3-L5  levels were marked with fluoroscopy and the incision was planned in the midline.  The subcutaneous tissue was injected with 1% lidocaine with epinephrine     The skin was subsequently opened sharply with a #10 blade.  Dissection was carried down superiorly and inferiorly above the below the prior surgical incision to expose the supraspinous ligament and the lamina.  The musculature was elevated subperiosteally to expose the facets on the bilaterally with the Bovie electrocautery as well as the Gomes elevator.  Hemostasis was achieved.  Self retraining retractors were then inserted.     Next the stereotactic reference frame was fastened to the spinous process at the level above the planned procedure.  Sterile drapes were then prior placed around the operative site.  The O-arm navigation system was then brought into the operative field and intraoperative spin was done.  The O-arm was then removed.  Using stereotactic navigation a 5.5 mm tap was used to create the trajectory for the pedicle screws at the L3, L4 and L5 levels bilaterally.  A ball-tip probe was used to feel down the pedicle tracts to make sure there was bone all the way around.  Next the lumbar pedicle screws were placed bilaterally using stereotactic navigation at L3 L4 and L5. Once all the screws were in place each screw was stimulated to ensure that greater than 12 Volts require to produce EMG activity in the associated muscle group.     The facet joint at L3-4 was very heterotropic and overgrown with bone.   the facet on the left side at L3-4 was removed in order to expose the lateral thecal sac as well as the exiting nerve root.  Using my assistant's help the thecal sac and exiting nerve root were carefully retracted medially and the disc space was exposed.  There was a large synovial cyst at this level causing severe compression of the thecal sac medically.  The entire joint was necessary to remove in  order to completely decompress the thecal sac and foramen.The synovial cyst dissected off the nerve root carefully and then was sent to pathology for permanent specimen.      Using bipolar cautery the epidural veins were cauterized.  Next using the 15 blade the disc space was incised.  Using a pituitary rongeur the disc was removed without difficulty.  The endplates were scraped of any disc material using the upgoing and downgoing curettes.  The remaining disc was removed using the Vik using stereotactic navigation.  Again the disc space was removed of any disc material using the pituitary rongeurs.   An expandable interbody was placed to the interbody space.  This was filled with BMP for fusion along with autograft bone.  The interbody cage was then expanded and locked into its final position with fluoroscopy confirming its final placement    Next the facet joint of L4-5 was removed from the left.   This was also found to be neuro atrophic and causing the significant amount of thecal sac compression.  The facet joint on the left side was removed without any difficulty and the thecal sac and exiting nerve root were identified.  Using my assistance help the medial nerve root as well as the thecal sac was retracted medially to assist with exposing the disc space.  Bipolar cautery was used to cauterize epidural veins.  Fifteen blade was used to incise the disc space.  Serial endplate scrapers were used to dilate the space and curettes were used to scrape the endplates of any remaining disc material. Next a 7 mm expandable interbody cage was placed to the disc space of L4-5.  Prior to this it was filled with the autologous bone removed from the facet as well as the lateral edge of the lamina which was processed in the bone mill after being stripped of any remaining muscle and tissue as well as BMP     The cage was then expanded into its final locked position under direct fluoroscopic visualization. Proper placement  and trajectory was confirmed with intraoperative fluoroscopy.     Next the right sided facet as well as lamina were removed. Using a Kerrison the foramen was decompressed laterally until the exiting nerve root as well as the descending nerve root was identified  and noted to be free of any remaining compression.  This was completed at both the L4-5 as well as the L3-4 level        A [place] was used to feel out the foramen to make sure there was no remaining compression of either the L4  nerve root going out laterally as well as the L5 nerve root traveling inferiorly.     All of this bone was set aside and process in the bone mill for use as autograft bone in the posterolateral gutter      Next the inter screw distance was measured and a loyd was placed bilaterally into the poly axial screws.  Inner threaded caps were secured to the polyaxial screws to ensure fixation of the rods.   This set screws were then locked into the final position at the L3-L4 and L5 level with the final torquing wrench.      Next using a Gomes elevator the transverse processes bilaterally were exposed and decorticated using my assistance help.  The remaining bone chips from the patient's saved laminectomy bone were placed laterally for the posterolateral fusion with allograft bone chips.       The wound was copiously irrigated with antibiotic saline/Betadine solution.  A 1/8 Hemovac drain was placed and brought out through a separate stab incision. 1 g of vancomycin powder was placed to the surgical cavity.  The fascia was subsequently closed utilizing 0 Vicryl sutures.  Exparel was injected into the muscle for postoperative pain control.  The subcuticular layer was closed with a 2 0 Vicryl in an inverted fashion.  The skin was then approximated with staples.  The incision was dressed in a clean and dry dressing.     All sponge counts, needle counts and instrument counts were correct at the end of the case x 2.  The patient tolerated the  procedure well without any complication and was transferred in  stable condition to the recovery room.     Lyle Lobato MD  Neurosurgery      Disclaimer: This note was prepared using a voice recognition system and is likely to have sound alike errors within the text.

## 2023-09-11 ENCOUNTER — PATIENT MESSAGE (OUTPATIENT)
Dept: INTERNAL MEDICINE | Facility: CLINIC | Age: 68
End: 2023-09-11
Payer: MEDICARE

## 2023-09-11 ENCOUNTER — PATIENT MESSAGE (OUTPATIENT)
Dept: NEUROSURGERY | Facility: CLINIC | Age: 68
End: 2023-09-11
Payer: MEDICARE

## 2023-09-12 NOTE — PHYSICIAN QUERY
PT Name: Myles Pride  MR #: [id number]    DOCUMENTATION CLARIFICATION      CDS/: MATEUSZ Harris, RN               Contact information:[email or web address]    This form is a permanent document in the medical record.      Query Date: September 11, 2023    By submitting this query, we are merely seeking further clarification of documentation. Please utilize your independent clinical judgment when addressing the question(s) below.    The Medical Record contains the following:   Indicators  Supporting Clinical Findings Location in Medical Record    x Anemia documented Active Diagnoses:  Diagnosis   ABLA (acute blood loss anemia) Date Noted   09/07/2023    Final Active Diagnoses:  Diagnosis   ABLA (acute blood loss anemia)    Neurosurgery Progress Notes 09/07/2023        Hospital Medicine Discharge Summary File Time: 09/09/2023      x H&H 15.2 & 45.8 on 8/30/23    11.7 & 35.4 on 9/7/23    11.2 & 33.2 on 9/8/23   Lab Results 8/30, 9/7 & 9/8/23    x BP                    HR BP: (110-138)/(59-83)              Pulse:  [52-77]       BP: (104-144)/(51-74)              Pulse:  [43-76] Neurosurgery Progress Notes 09/07/2023    Neurosurgery Progress Notes 09/08/2023    Bleeding      x Procedure/Surgery Performed/EBL Procedure: FUSION, SPINE, LUMBAR, TLIF, USING COMPUTER-ASSISTED NAVIGATION (Bilateral) L3-4/4-5     Estimated Blood Loss (EBL): * No values recorded between 9/6/2023  7:56 AM and 9/6/2023  1:18 PM * Neurosurgery Op Note File Time: 09/10/2023    Transfusion(s)      x Acute/Chronic illness Lumbar stenosis with neurogenic claudication;  Bradycardia; Hyperlipidemia; Lumbar radiculopathy, chronic, Frequent PVCs;  ABLA (acute blood loss anemia)   Hospital Medicine Discharge Summary File Time: 09/09/2023    x Treatments Plan to remove HV drain and change dressing in AM.  Will continue to monitor. Please call with any changes.    Multivitamin tablet Dose: 1 tablet: Oral: Daily    Neurosurgery Progress Notes  09/07/2023        MAR 09/07 & 09/08/2023    x Other H/H stable post-op Hospital Medicine Discharge Summary File Time: 09/09/2023     Please clinically validate the diagnosis of Acute blood loss anemia.    If validated, please provide additional clinical support for the diagnosis.       [   ] Above stated diagnosis is not confirmed and/or it has been ruled out     [   ] Above stated diagnosis is not confirmed and/or it has been ruled out, other diagnosis ruled in (please          specify):_______________     [  x ] Above stated diagnosis is confirmed. Please specify clinical support (signs, symptoms, and treatment) for  the confirmed diagnosis: _drop in H/H after surgery___________________     [   ] Other clarification (please specify): ___________________                      Please document in your progress notes daily for the duration of treatment, until resolved, and include in your discharge summary.    Form No. [id number]

## 2023-09-13 ENCOUNTER — PATIENT OUTREACH (OUTPATIENT)
Dept: ADMINISTRATIVE | Facility: CLINIC | Age: 68
End: 2023-09-13
Payer: MEDICARE

## 2023-09-13 ENCOUNTER — PATIENT MESSAGE (OUTPATIENT)
Dept: ADMINISTRATIVE | Facility: CLINIC | Age: 68
End: 2023-09-13
Payer: MEDICARE

## 2023-09-13 NOTE — PROGRESS NOTES
C3 nurse attempted to contact Myles Pride for a TCC post hospital discharge follow up call. No answer. Left voicemail with callback information. The patient has a scheduled HOSFU appointment with MÓNICA NICOLE on 9/14/23 @ 5476.

## 2023-09-14 ENCOUNTER — TELEPHONE (OUTPATIENT)
Dept: INTERNAL MEDICINE | Facility: CLINIC | Age: 68
End: 2023-09-14

## 2023-09-14 ENCOUNTER — OFFICE VISIT (OUTPATIENT)
Dept: INTERNAL MEDICINE | Facility: CLINIC | Age: 68
End: 2023-09-14
Payer: MEDICARE

## 2023-09-14 DIAGNOSIS — D64.9 ANEMIA, UNSPECIFIED TYPE: ICD-10-CM

## 2023-09-14 DIAGNOSIS — R79.89 ELEVATED LIVER FUNCTION TESTS: ICD-10-CM

## 2023-09-14 DIAGNOSIS — Z09 HOSPITAL DISCHARGE FOLLOW-UP: ICD-10-CM

## 2023-09-14 DIAGNOSIS — D64.9 ANEMIA, UNSPECIFIED TYPE: Primary | ICD-10-CM

## 2023-09-14 DIAGNOSIS — N40.0 BENIGN PROSTATIC HYPERPLASIA, UNSPECIFIED WHETHER LOWER URINARY TRACT SYMPTOMS PRESENT: ICD-10-CM

## 2023-09-14 DIAGNOSIS — Z09 HOSPITAL DISCHARGE FOLLOW-UP: Primary | ICD-10-CM

## 2023-09-14 DIAGNOSIS — R73.09 ELEVATED RANDOM BLOOD GLUCOSE LEVEL: ICD-10-CM

## 2023-09-14 LAB
BACTERIA BLD CULT: NORMAL
BACTERIA BLD CULT: NORMAL

## 2023-09-14 PROCEDURE — 99214 OFFICE O/P EST MOD 30 MIN: CPT | Mod: HCNC,95,, | Performed by: FAMILY MEDICINE

## 2023-09-14 PROCEDURE — 99214 PR OFFICE/OUTPT VISIT, EST, LEVL IV, 30-39 MIN: ICD-10-PCS | Mod: HCNC,95,, | Performed by: FAMILY MEDICINE

## 2023-09-14 RX ORDER — TAMSULOSIN HYDROCHLORIDE 0.4 MG/1
0.4 CAPSULE ORAL DAILY
Qty: 90 CAPSULE | Refills: 1 | Status: SHIPPED | OUTPATIENT
Start: 2023-09-14 | End: 2024-09-13

## 2023-09-14 NOTE — PROGRESS NOTES
C3 nurse spoke with Myles Pride for a TCC post hospital discharge follow up call. The patient has a scheduled [facility] appointment with Catalino Arias MD on 9/14/23 @ 0623.

## 2023-09-14 NOTE — TELEPHONE ENCOUNTER
----- Message from Catalino Arias MD sent at 9/14/2023  1:47 PM CDT -----  Please call patient.  Please arrange for blood work to be drawn.  He actively has home health.  I am sure that they could collect such.  Can you help navigate figure out how to get these orders to the home health people.  I would like to see us CBC, CMP, A1c.    Please arrange for a follow-up visit with me on either Tuesday or Wednesday afternoon after lunch.  Preferably in person

## 2023-09-14 NOTE — PROGRESS NOTES
Subjective:       Patient ID: Myles Pride is a 68 y.o. male.    Chief Complaint: No chief complaint on file.    HPI    The patient location is: home  The chief complaint leading to consultation is: hospital followupw    Visit type: audiovisual    Face to Face time with patient: 25   minutes of total time spent on the encounter, which includes face to face time and non-face to face time preparing to see the patient (eg, review of tests), Obtaining and/or reviewing separately obtained history, Documenting clinical information in the electronic or other health record, Independently interpreting results (not separately reported) and communicating results to the patient/family/caregiver, or Care coordination (not separately reported).         Each patient to whom he or she provides medical services by telemedicine is:  (1) informed of the relationship between the physician and patient and the respective role of any other health care provider with respect to management of the patient; and (2) notified that he or she may decline to receive medical services by telemedicine and may withdraw from such care at any time.    Notes:       Transitional Care Note    Family and/or Caretaker present at visit?  Yes.  Diagnostic tests reviewed/disposition: I have reviewed all completed as well as pending diagnostic tests at the time of discharge.  Disease/illness education: yes  Home health/community services discussion/referrals: Patient has [facility] health [facility] at [facility] .   Establishment or re-establishment of referral orders for community resources: No other necessary community resources.   Discussion with other health care providers: No discussion with other health care providers necessary.           Patient Active Problem List   Diagnosis    Bradycardia    Risk of myocardial infarction or stroke 7.5% or greater in next 10 years    Vitamin D deficiency    Colon cancer screening    Hyperlipidemia    Lumbar  radiculopathy, chronic    Frequent PVCs    Lumbar stenosis with neurogenic claudication    ABLA (acute blood loss anemia)       Past Medical History:   Diagnosis Date    Hyperlipidemia     PONV (postoperative nausea and vomiting) 1990    after knee arthroscopy    Vitamin D deficiency 12/18/2019       Past Surgical History:   Procedure Laterality Date    COLONOSCOPY      COLONOSCOPY N/A 12/19/2019    Procedure: COLONOSCOPY;  Surgeon: Bhupendra Wilkinson MD;  Location: [facility] ENDO;  Service: Endoscopy;  Laterality: N/A;    EPIDURAL STEROID INJECTION N/A 12/09/2022    Procedure: L4-5 intralaminar epidural steroid injection with left paramedian approach;  Surgeon: Ben Eddy MD;  Location: [facility] PAIN MGT;  Service: Pain Management;  Laterality: N/A;    KNEE ARTHROSCOPY Left     SELECTIVE INJECTION OF ANESTHETIC AGENT AROUND LUMBAR SPINAL NERVE ROOT BY TRANSFORAMINAL APPROACH Bilateral 01/21/2022    Procedure: Bilateral L4/5 TF LOVE with RN IV sedation;  Surgeon: Ben Eddy MD;  Location: [facility] PAIN MGT;  Service: Pain Management;  Laterality: Bilateral;    SELECTIVE INJECTION OF ANESTHETIC AGENT AROUND LUMBAR SPINAL NERVE ROOT BY TRANSFORAMINAL APPROACH Bilateral 05/06/2022    Procedure: Bilateral L4/5 TF LOVE with RN IV sedation;  Surgeon: Ben Eddy MD;  Location: [facility] PAIN MGT;  Service: Pain Management;  Laterality: Bilateral;    SELECTIVE INJECTION OF ANESTHETIC AGENT AROUND LUMBAR SPINAL NERVE ROOT BY TRANSFORAMINAL APPROACH Bilateral 10/25/2022    Procedure: Bilateral L4/5 TF LOVE with RN IV sedation;  Surgeon: Ben Eddy MD;  [facility];  Service: Pain Management;  Laterality: Bilateral;    TRANSFORAMINAL LUMBAR INTERBODY FUSION (TLIF) USING COMPUTER-ASSISTED NAVIGATION Bilateral 9/6/2023    Procedure: FUSION, SPINE, LUMBAR, TLIF, USING COMPUTER-ASSISTED NAVIGATION;  Surgeon: Lyle Lobato MD;  Location: [facility];  Service: Neurosurgery;  Laterality: Bilateral;  L3-5 TLIF    VASECTOMY  1995  "      Family History   Problem Relation Age of Onset    Cancer Mother         anal cancer    Diabetes Mother         PRE DIABETES    Arthritis Mother     Hearing loss Mother     No Known Problems Sister     Gallbladder disease Brother     Hepatitis Brother         Hep C       Social History     Tobacco Use   Smoking Status Never    Passive exposure: Never   Smokeless Tobacco Never       Wt Readings from Last 5 Encounters:   09/09/23 110.8 kg (244 lb 4.3 oz)   09/06/23 109.7 kg (241 lb 13.5 oz)   07/13/23 111.4 kg (245 lb 9.5 oz)   07/05/23 112.4 kg (247 lb 12.8 oz)   06/21/23 111.6 kg (246 lb)       For further HPI details, see assessment and plan.    Review of Systems   Constitutional:  Positive for activity change. Negative for unexpected weight change.   HENT:  Negative for hearing loss, rhinorrhea and trouble swallowing.    Eyes:  Negative for discharge and visual disturbance.   Respiratory:  Negative for cough, chest tightness, shortness of breath and wheezing.    Cardiovascular:  Negative for chest pain and palpitations.   Gastrointestinal:  Positive for constipation. Negative for blood in stool, diarrhea and vomiting.   Endocrine: Positive for polyuria. Negative for polydipsia.   Genitourinary:  Negative for difficulty urinating, hematuria and urgency.   Musculoskeletal:  Negative for arthralgias, joint swelling and neck pain.   Neurological:  Positive for weakness. Negative for headaches.   Psychiatric/Behavioral:  Positive for confusion and dysphoric mood.         Seems surgery still feels a bit " foggy " focus altered       Objective:      There were no vitals filed for this visit.    Physical Exam  Constitutional:       General: He is not in acute distress.     Appearance: He is not ill-appearing.   Pulmonary:      Effort: Pulmonary effort is normal. No respiratory distress.   Neurological:      General: No focal deficit present.      Mental Status: He is alert.   Psychiatric:         Mood and Affect: " "Mood normal.         Behavior: Behavior normal.         Assessment:       1. Anemia, unspecified type    2. Elevated liver function tests    3. Hospital discharge follow-up        Plan:   Anemia, unspecified type  -     CBC Auto Differential; Future; Expected date: 09/14/2023    Elevated liver function tests  -     Comprehensive Metabolic Panel; Future; Expected date: 09/14/2023    Hospital discharge follow-up      Presents for follow up for hospital follow-up   He was in the emergency department recently after a fever.  He had had surgery the day prior  Reviewed investigations in the ER documentation.  Noted mild anemia with a mild increase in white blood cells.  Noted mild increase in liver function testing.  Noted mild increase in random glucose.    Prior to his emergency department visit he had Neurosurgery.  Reviewed the discharge summary dated September 8th.    Since the time of his emergency room visit he seems to have improved somewhat, although not back to baseline.     No clinical measured fevers although his temperature is not entirely normal with a reading of 99.1 earlier today.  No constant chills but does sound periodic once his pain medicine starts to wear off.    Fortunately he did have a bowel movement earlier today and he is feeling better following that.  Appetite has still not normalized.    Pain seems variable as well. Estimate 2-3 /10 - feels has "moderated quite a bit since surgery".  Patient is taking Percocet.  Sounds if his he had some struggles with bowel movements.  Encouraged stool softeners and fiber supplementation. Advise back off opiate as tolerated.    Would recommend continued monitoring repeat CBC and comprehensive metabolic panel and a1c - will try and get via  services    Patient's wife tells me he has been in bed more than expected.  It sounds as if he is not getting sufficient amounts of sleep during the night with frequent urination almost on the hour.  Prior to surgery " patient had discontinued Flomax.  I would like to resume Flomax to diminished nocturia and to optimize his rest.  I do want him to make active efforts with working with his physical and occupational therapist and begin to normalize his activity levels.      If worsens of concerned advise seek medical attention.    Plan a follow-up visit within 1 week to check in.    This note was verbally dictated, please excuse any type errors.

## 2023-09-14 NOTE — TELEPHONE ENCOUNTER
Called the patient to informed he have lab ordered by Dr. Arias to check his A1C, CBC and CMP, collected by the  nurse. Patient agreed and  nurse also informed.

## 2023-09-15 ENCOUNTER — LAB VISIT (OUTPATIENT)
Dept: LAB | Facility: HOSPITAL | Age: 68
End: 2023-09-15
Attending: FAMILY MEDICINE
Payer: MEDICARE

## 2023-09-15 DIAGNOSIS — D64.9 ANEMIA, UNSPECIFIED TYPE: ICD-10-CM

## 2023-09-15 DIAGNOSIS — Z79.899 ENCOUNTER FOR LONG-TERM (CURRENT) USE OF MEDICATIONS: ICD-10-CM

## 2023-09-15 DIAGNOSIS — R79.89 ELEVATED LIVER FUNCTION TESTS: ICD-10-CM

## 2023-09-15 LAB
ALBUMIN SERPL BCP-MCNC: 3.5 G/DL (ref 3.5–5.2)
ALP SERPL-CCNC: 78 U/L (ref 55–135)
ALT SERPL W/O P-5'-P-CCNC: 68 U/L (ref 10–44)
ANION GAP SERPL CALC-SCNC: 13 MMOL/L (ref 8–16)
AST SERPL-CCNC: 45 U/L (ref 10–40)
BASOPHILS # BLD AUTO: 0.06 K/UL (ref 0–0.2)
BASOPHILS NFR BLD: 0.6 % (ref 0–1.9)
BILIRUB SERPL-MCNC: 0.7 MG/DL (ref 0.1–1)
BUN SERPL-MCNC: 15 MG/DL (ref 8–23)
CALCIUM SERPL-MCNC: 9.5 MG/DL (ref 8.7–10.5)
CHLORIDE SERPL-SCNC: 102 MMOL/L (ref 95–110)
CO2 SERPL-SCNC: 22 MMOL/L (ref 23–29)
CREAT SERPL-MCNC: 1 MG/DL (ref 0.5–1.4)
DIFFERENTIAL METHOD: ABNORMAL
EOSINOPHIL # BLD AUTO: 0.2 K/UL (ref 0–0.5)
EOSINOPHIL NFR BLD: 2 % (ref 0–8)
ERYTHROCYTE [DISTWIDTH] IN BLOOD BY AUTOMATED COUNT: 12.5 % (ref 11.5–14.5)
EST. GFR  (NO RACE VARIABLE): >60 ML/MIN/1.73 M^2
ESTIMATED AVG GLUCOSE: 108 MG/DL (ref 68–131)
GLUCOSE SERPL-MCNC: 142 MG/DL (ref 70–110)
HBA1C MFR BLD: 5.4 % (ref 4–5.6)
HCT VFR BLD AUTO: 39.3 % (ref 40–54)
HGB BLD-MCNC: 12.8 G/DL (ref 14–18)
IMM GRANULOCYTES # BLD AUTO: 0.08 K/UL (ref 0–0.04)
IMM GRANULOCYTES NFR BLD AUTO: 0.8 % (ref 0–0.5)
LYMPHOCYTES # BLD AUTO: 2.1 K/UL (ref 1–4.8)
LYMPHOCYTES NFR BLD: 19.6 % (ref 18–48)
MCH RBC QN AUTO: 31.4 PG (ref 27–31)
MCHC RBC AUTO-ENTMCNC: 32.6 G/DL (ref 32–36)
MCV RBC AUTO: 97 FL (ref 82–98)
MONOCYTES # BLD AUTO: 0.6 K/UL (ref 0.3–1)
MONOCYTES NFR BLD: 6.1 % (ref 4–15)
NEUTROPHILS # BLD AUTO: 7.4 K/UL (ref 1.8–7.7)
NEUTROPHILS NFR BLD: 70.9 % (ref 38–73)
NRBC BLD-RTO: 0 /100 WBC
PLATELET # BLD AUTO: 346 K/UL (ref 150–450)
PMV BLD AUTO: 9.8 FL (ref 9.2–12.9)
POTASSIUM SERPL-SCNC: 4.2 MMOL/L (ref 3.5–5.1)
PROT SERPL-MCNC: 6.9 G/DL (ref 6–8.4)
RBC # BLD AUTO: 4.07 M/UL (ref 4.6–6.2)
SODIUM SERPL-SCNC: 137 MMOL/L (ref 136–145)
TSH SERPL DL<=0.005 MIU/L-ACNC: 1.02 UIU/ML (ref 0.4–4)
WBC # BLD AUTO: 10.46 K/UL (ref 3.9–12.7)

## 2023-09-15 PROCEDURE — 80053 COMPREHEN METABOLIC PANEL: CPT | Mod: HCNC | Performed by: FAMILY MEDICINE

## 2023-09-15 PROCEDURE — 85025 COMPLETE CBC W/AUTO DIFF WBC: CPT | Mod: HCNC | Performed by: FAMILY MEDICINE

## 2023-09-15 PROCEDURE — 83036 HEMOGLOBIN GLYCOSYLATED A1C: CPT | Mod: HCNC | Performed by: FAMILY MEDICINE

## 2023-09-15 PROCEDURE — 36415 COLL VENOUS BLD VENIPUNCTURE: CPT | Mod: HCNC | Performed by: FAMILY MEDICINE

## 2023-09-15 PROCEDURE — 84443 ASSAY THYROID STIM HORMONE: CPT | Mod: HCNC | Performed by: FAMILY MEDICINE

## 2023-09-21 ENCOUNTER — HOSPITAL ENCOUNTER (OUTPATIENT)
Dept: RADIOLOGY | Facility: HOSPITAL | Age: 68
Discharge: HOME | End: 2023-09-21
Attending: PHYSICIAN ASSISTANT
Payer: MEDICARE

## 2023-09-21 ENCOUNTER — OFFICE VISIT (OUTPATIENT)
Dept: NEUROSURGERY | Facility: CLINIC | Age: 68
End: 2023-09-21
Payer: MEDICARE

## 2023-09-21 ENCOUNTER — OFFICE VISIT (OUTPATIENT)
Dept: INTERNAL MEDICINE | Facility: CLINIC | Age: 68
End: 2023-09-21
Payer: MEDICARE

## 2023-09-21 VITALS
HEIGHT: 77 IN | BODY MASS INDEX: 27.49 KG/M2 | DIASTOLIC BLOOD PRESSURE: 90 MMHG | SYSTOLIC BLOOD PRESSURE: 154 MMHG | HEART RATE: 64 BPM | WEIGHT: 232.81 LBS

## 2023-09-21 VITALS
HEART RATE: 100 BPM | SYSTOLIC BLOOD PRESSURE: 130 MMHG | WEIGHT: 229.94 LBS | OXYGEN SATURATION: 95 % | DIASTOLIC BLOOD PRESSURE: 82 MMHG | HEIGHT: 77 IN | TEMPERATURE: 97 F | BODY MASS INDEX: 27.15 KG/M2

## 2023-09-21 DIAGNOSIS — E78.5 HYPERLIPIDEMIA, UNSPECIFIED HYPERLIPIDEMIA TYPE: ICD-10-CM

## 2023-09-21 DIAGNOSIS — Z48.89 ENCOUNTER FOR POSTOPERATIVE WOUND CHECK: ICD-10-CM

## 2023-09-21 DIAGNOSIS — Z48.02 ENCOUNTER FOR STAPLE REMOVAL: ICD-10-CM

## 2023-09-21 DIAGNOSIS — R79.89 ELEVATED LIVER FUNCTION TESTS: ICD-10-CM

## 2023-09-21 DIAGNOSIS — N40.0 BENIGN PROSTATIC HYPERPLASIA, UNSPECIFIED WHETHER LOWER URINARY TRACT SYMPTOMS PRESENT: ICD-10-CM

## 2023-09-21 DIAGNOSIS — Z98.1 STATUS POST LUMBAR SPINAL FUSION: ICD-10-CM

## 2023-09-21 DIAGNOSIS — M54.9 DORSALGIA, UNSPECIFIED: ICD-10-CM

## 2023-09-21 DIAGNOSIS — Z98.1 S/P LUMBAR FUSION: Primary | ICD-10-CM

## 2023-09-21 DIAGNOSIS — D64.9 ANEMIA, UNSPECIFIED TYPE: Primary | ICD-10-CM

## 2023-09-21 PROCEDURE — 1125F PR PAIN SEVERITY QUANTIFIED, PAIN PRESENT: ICD-10-PCS | Mod: HCNC,CPTII,S$GLB, | Performed by: PHYSICIAN ASSISTANT

## 2023-09-21 PROCEDURE — 3008F BODY MASS INDEX DOCD: CPT | Mod: HCNC,CPTII,S$GLB, | Performed by: PHYSICIAN ASSISTANT

## 2023-09-21 PROCEDURE — 3288F FALL RISK ASSESSMENT DOCD: CPT | Mod: HCNC,CPTII,S$GLB, | Performed by: FAMILY MEDICINE

## 2023-09-21 PROCEDURE — 3075F PR MOST RECENT SYSTOLIC BLOOD PRESS GE 130-139MM HG: ICD-10-PCS | Mod: HCNC,CPTII,S$GLB, | Performed by: FAMILY MEDICINE

## 2023-09-21 PROCEDURE — 3288F PR FALLS RISK ASSESSMENT DOCUMENTED: ICD-10-PCS | Mod: HCNC,CPTII,S$GLB, | Performed by: PHYSICIAN ASSISTANT

## 2023-09-21 PROCEDURE — 1101F PR PT FALLS ASSESS DOC 0-1 FALLS W/OUT INJ PAST YR: ICD-10-PCS | Mod: HCNC,CPTII,S$GLB, | Performed by: PHYSICIAN ASSISTANT

## 2023-09-21 PROCEDURE — 3080F DIAST BP >= 90 MM HG: CPT | Mod: HCNC,CPTII,S$GLB, | Performed by: PHYSICIAN ASSISTANT

## 2023-09-21 PROCEDURE — 3288F FALL RISK ASSESSMENT DOCD: CPT | Mod: HCNC,CPTII,S$GLB, | Performed by: PHYSICIAN ASSISTANT

## 2023-09-21 PROCEDURE — 3008F PR BODY MASS INDEX (BMI) DOCUMENTED: ICD-10-PCS | Mod: HCNC,CPTII,S$GLB, | Performed by: FAMILY MEDICINE

## 2023-09-21 PROCEDURE — 3079F DIAST BP 80-89 MM HG: CPT | Mod: HCNC,CPTII,S$GLB, | Performed by: FAMILY MEDICINE

## 2023-09-21 PROCEDURE — 1111F PR DISCHARGE MEDS RECONCILED W/ CURRENT OUTPATIENT MED LIST: ICD-10-PCS | Mod: HCNC,CPTII,S$GLB, | Performed by: FAMILY MEDICINE

## 2023-09-21 PROCEDURE — 72100 X-RAY EXAM L-S SPINE 2/3 VWS: CPT | Mod: 26,HCNC,, | Performed by: RADIOLOGY

## 2023-09-21 PROCEDURE — 3044F PR MOST RECENT HEMOGLOBIN A1C LEVEL <7.0%: ICD-10-PCS | Mod: HCNC,CPTII,S$GLB, | Performed by: FAMILY MEDICINE

## 2023-09-21 PROCEDURE — 3077F PR MOST RECENT SYSTOLIC BLOOD PRESSURE >= 140 MM HG: ICD-10-PCS | Mod: HCNC,CPTII,S$GLB, | Performed by: PHYSICIAN ASSISTANT

## 2023-09-21 PROCEDURE — 99024 PR POST-OP FOLLOW-UP VISIT: ICD-10-PCS | Mod: HCNC,S$GLB,, | Performed by: PHYSICIAN ASSISTANT

## 2023-09-21 PROCEDURE — 1125F AMNT PAIN NOTED PAIN PRSNT: CPT | Mod: HCNC,CPTII,S$GLB, | Performed by: PHYSICIAN ASSISTANT

## 2023-09-21 PROCEDURE — 72100 XR LUMBAR SPINE AP AND LATERAL: ICD-10-PCS | Mod: 26,HCNC,, | Performed by: RADIOLOGY

## 2023-09-21 PROCEDURE — 3008F PR BODY MASS INDEX (BMI) DOCUMENTED: ICD-10-PCS | Mod: HCNC,CPTII,S$GLB, | Performed by: PHYSICIAN ASSISTANT

## 2023-09-21 PROCEDURE — 3288F PR FALLS RISK ASSESSMENT DOCUMENTED: ICD-10-PCS | Mod: HCNC,CPTII,S$GLB, | Performed by: FAMILY MEDICINE

## 2023-09-21 PROCEDURE — 1159F PR MEDICATION LIST DOCUMENTED IN MEDICAL RECORD: ICD-10-PCS | Mod: HCNC,CPTII,S$GLB, | Performed by: PHYSICIAN ASSISTANT

## 2023-09-21 PROCEDURE — 1101F PR PT FALLS ASSESS DOC 0-1 FALLS W/OUT INJ PAST YR: ICD-10-PCS | Mod: HCNC,CPTII,S$GLB, | Performed by: FAMILY MEDICINE

## 2023-09-21 PROCEDURE — 3008F BODY MASS INDEX DOCD: CPT | Mod: HCNC,CPTII,S$GLB, | Performed by: FAMILY MEDICINE

## 2023-09-21 PROCEDURE — 1159F MED LIST DOCD IN RCRD: CPT | Mod: HCNC,CPTII,S$GLB, | Performed by: PHYSICIAN ASSISTANT

## 2023-09-21 PROCEDURE — 99024 POSTOP FOLLOW-UP VISIT: CPT | Mod: HCNC,S$GLB,, | Performed by: PHYSICIAN ASSISTANT

## 2023-09-21 PROCEDURE — 99999 PR PBB SHADOW E&M-EST. PATIENT-LVL IV: CPT | Mod: PBBFAC,HCNC,, | Performed by: FAMILY MEDICINE

## 2023-09-21 PROCEDURE — 99999 PR PBB SHADOW E&M-EST. PATIENT-LVL III: CPT | Mod: PBBFAC,HCNC,, | Performed by: PHYSICIAN ASSISTANT

## 2023-09-21 PROCEDURE — 1159F MED LIST DOCD IN RCRD: CPT | Mod: HCNC,CPTII,S$GLB, | Performed by: FAMILY MEDICINE

## 2023-09-21 PROCEDURE — 1159F PR MEDICATION LIST DOCUMENTED IN MEDICAL RECORD: ICD-10-PCS | Mod: HCNC,CPTII,S$GLB, | Performed by: FAMILY MEDICINE

## 2023-09-21 PROCEDURE — 3080F PR MOST RECENT DIASTOLIC BLOOD PRESSURE >= 90 MM HG: ICD-10-PCS | Mod: HCNC,CPTII,S$GLB, | Performed by: PHYSICIAN ASSISTANT

## 2023-09-21 PROCEDURE — 1111F DSCHRG MED/CURRENT MED MERGE: CPT | Mod: HCNC,CPTII,S$GLB, | Performed by: FAMILY MEDICINE

## 2023-09-21 PROCEDURE — 3079F PR MOST RECENT DIASTOLIC BLOOD PRESSURE 80-89 MM HG: ICD-10-PCS | Mod: HCNC,CPTII,S$GLB, | Performed by: FAMILY MEDICINE

## 2023-09-21 PROCEDURE — 3044F HG A1C LEVEL LT 7.0%: CPT | Mod: HCNC,CPTII,S$GLB, | Performed by: FAMILY MEDICINE

## 2023-09-21 PROCEDURE — 99214 PR OFFICE/OUTPT VISIT, EST, LEVL IV, 30-39 MIN: ICD-10-PCS | Mod: HCNC,S$GLB,, | Performed by: FAMILY MEDICINE

## 2023-09-21 PROCEDURE — 3044F PR MOST RECENT HEMOGLOBIN A1C LEVEL <7.0%: ICD-10-PCS | Mod: HCNC,CPTII,S$GLB, | Performed by: PHYSICIAN ASSISTANT

## 2023-09-21 PROCEDURE — 99999 PR PBB SHADOW E&M-EST. PATIENT-LVL III: ICD-10-PCS | Mod: PBBFAC,HCNC,, | Performed by: PHYSICIAN ASSISTANT

## 2023-09-21 PROCEDURE — 99214 OFFICE O/P EST MOD 30 MIN: CPT | Mod: HCNC,S$GLB,, | Performed by: FAMILY MEDICINE

## 2023-09-21 PROCEDURE — 3075F SYST BP GE 130 - 139MM HG: CPT | Mod: HCNC,CPTII,S$GLB, | Performed by: FAMILY MEDICINE

## 2023-09-21 PROCEDURE — 72100 X-RAY EXAM L-S SPINE 2/3 VWS: CPT | Mod: TC,HCNC

## 2023-09-21 PROCEDURE — 3044F HG A1C LEVEL LT 7.0%: CPT | Mod: HCNC,CPTII,S$GLB, | Performed by: PHYSICIAN ASSISTANT

## 2023-09-21 PROCEDURE — 3077F SYST BP >= 140 MM HG: CPT | Mod: HCNC,CPTII,S$GLB, | Performed by: PHYSICIAN ASSISTANT

## 2023-09-21 PROCEDURE — 99999 PR PBB SHADOW E&M-EST. PATIENT-LVL IV: ICD-10-PCS | Mod: PBBFAC,HCNC,, | Performed by: FAMILY MEDICINE

## 2023-09-21 PROCEDURE — 1101F PT FALLS ASSESS-DOCD LE1/YR: CPT | Mod: HCNC,CPTII,S$GLB, | Performed by: PHYSICIAN ASSISTANT

## 2023-09-21 PROCEDURE — 1101F PT FALLS ASSESS-DOCD LE1/YR: CPT | Mod: HCNC,CPTII,S$GLB, | Performed by: FAMILY MEDICINE

## 2023-09-21 NOTE — PROGRESS NOTES
Subjective:      Patient ID: Myles Pride is a 68 y.o. male.    Chief Complaint: Follow-up (Pt coming in for post op #1 TLIF, pt is in no pain )    HPI  The patient is here today for postop assessment.  He was in the ER shortly after being discharged from his surgery for constipation and fever. These symptoms have resolved.  Pt states he only has incisional pain.   He is now able to cross his legs (he wasn't able to do this prior to surgery).  Denies leg pain and weakness.  Last Thursday he stopped taking pain medications.    At this time, he denies persistent HA, dizziness, chest pain, shortness of breath and wound drainage.  No issues urinating.  Last BM was 2 days ago.  Ambulates with walker.    Date of Procedure: 9/6/2023    Procedure: Procedure(s) (LRB):  FUSION, SPINE, LUMBAR, TLIF, USING COMPUTER-ASSISTED NAVIGATION (Bilateral)   L3-4/4-5   Surgeon(s) and Role:     * Lyle Lobato MD - Primary  Operative Findings (including complications, if any): severe facet and degenerative disc disease causing significant stenosis L3-4 and L4-5. Large synovial cyst with instability present L3-4       Description of Technical Procedures:  Pedicle screw placement 3-L5 using stereotactic navigation  2.   Combined posterolateral as well as interbody fusion at L4-5 and L5-S1   3.  Insertion of interbody cage expandable  L3-4 and L4-5  from the left side   4. Laminectomy,complete medial facetectomy with removal of Synovial cyst left L3-4   5. Laminectomy medial facetectomy and foraminotomy bilaterally L3-5  6.  Use of autograft bone  7.  Use of BMP         Objective:        General    Constitutional: He is oriented to person, place, and time. He appears well-developed and well-nourished.   Cardiovascular:  Normal rate and regular rhythm.            Pulmonary/Chest: Effort normal.   Abdominal: Soft.   Neurological: He is alert and oriented to person, place, and time.   Psychiatric: He has a normal mood and affect.  His behavior is normal.           NSGY EXAM:  Vitals reviewed  MAEW  Incision CDI  Healing well  There is no erythema, swelling or fluctuance  Staples removed w/out issues            Assessment:       Encounter Diagnoses   Name Primary?    Encounter for postoperative wound check     S/P lumbar fusion Yes    Encounter for staple removal     Dorsalgia, unspecified           Plan:       Myles was seen today for follow-up.    Diagnoses and all orders for this visit:    S/P lumbar fusion    Encounter for postoperative wound check    Encounter for staple removal    Dorsalgia, unspecified  -     X-Ray Lumbar Spine Ap And Lateral; Future  -     X-Ray Lumbar Spine Ap And Lateral; Future      The patient is doing well following recent TLIF. He is happy with his progress so far.  Will complete HH services and resume HEP or go to outpatient therapy (informed pt to update me if referral is needed).    He will complete x-rays on his way out today and prior to his next appointment with Dr. Lobato in 4-5 weeks.  Advised pt to reach out with any changes between now and then.        Dara Alonzo PA-C

## 2023-09-21 NOTE — PROGRESS NOTES
Subjective:       Patient ID: Myles Pride is a 68 y.o. male.    Chief Complaint: Follow-up    HPI    Patient Active Problem List   Diagnosis    Bradycardia    Risk of myocardial infarction or stroke 7.5% or greater in next 10 years    Vitamin D deficiency    Colon cancer screening    Hyperlipidemia    Lumbar radiculopathy, chronic    Frequent PVCs    Lumbar stenosis with neurogenic claudication    ABLA (acute blood loss anemia)       Past Medical History:   Diagnosis Date    Hyperlipidemia     PONV (postoperative nausea and vomiting) 1990    after knee arthroscopy    Vitamin D deficiency 12/18/2019       Past Surgical History:   Procedure Laterality Date    COLONOSCOPY      COLONOSCOPY N/A 12/19/2019    Procedure: COLONOSCOPY;  Surgeon: Bhupendra Wilkinson MD;  Location: [facility];  Service: Endoscopy;  Laterality: N/A;    EPIDURAL STEROID INJECTION N/A 12/09/2022    Procedure: L4-5 intralaminar epidural steroid injection with left paramedian approach;  Surgeon: Ben Eddy MD;  Location: [facility];  Service: Pain Management;  Laterality: N/A;    KNEE ARTHROSCOPY Left     SELECTIVE INJECTION OF ANESTHETIC AGENT AROUND LUMBAR SPINAL NERVE ROOT BY TRANSFORAMINAL APPROACH Bilateral 01/21/2022    Procedure: Bilateral L4/5 TF LOVE with RN IV sedation;  Surgeon: Ben Eddy MD;  Location: [facility];  Service: Pain Management;  Laterality: Bilateral;    SELECTIVE INJECTION OF ANESTHETIC AGENT AROUND LUMBAR SPINAL NERVE ROOT BY TRANSFORAMINAL APPROACH Bilateral 05/06/2022    Procedure: Bilateral L4/5 TF LOVE with RN IV sedation;  Surgeon: Ben Eddy MD;  Location: [facility];  Service: Pain Management;  Laterality: Bilateral;    SELECTIVE INJECTION OF ANESTHETIC AGENT AROUND LUMBAR SPINAL NERVE ROOT BY TRANSFORAMINAL APPROACH Bilateral 10/25/2022    Procedure: Bilateral L4/5 TF LOVE with RN IV sedation;  Surgeon: Ben Eddy MD;  Location: [facility];  Service: Pain Management;  Laterality:  Bilateral;    TRANSFORAMINAL LUMBAR INTERBODY FUSION (TLIF) USING COMPUTER-ASSISTED NAVIGATION Bilateral 9/6/2023    Procedure: FUSION, SPINE, LUMBAR, TLIF, USING COMPUTER-ASSISTED NAVIGATION;  Surgeon: Lyle Lobato MD;  Location: [facility];  Service: Neurosurgery;  Laterality: Bilateral;  L3-5 TLIF    VASECTOMY  1995       Family History   Problem Relation Age of Onset    Cancer Mother         anal cancer    Diabetes Mother         PRE DIABETES    Arthritis Mother     Hearing loss Mother     No Known Problems Sister     Gallbladder disease Brother     Hepatitis Brother         Hep C       Social History     Tobacco Use   Smoking Status Never    Passive exposure: Never   Smokeless Tobacco Never       Wt Readings from Last 5 Encounters:   09/21/23 104.3 kg (229 lb 15 oz)   09/21/23 105.6 kg (232 lb 12.9 oz)   09/09/23 110.8 kg (244 lb 4.3 oz)   09/06/23 109.7 kg (241 lb 13.5 oz)   07/13/23 111.4 kg (245 lb 9.5 oz)       For further HPI details, see assessment and plan.    Review of Systems   Constitutional:  Positive for activity change. Negative for unexpected weight change.   HENT:  Negative for hearing loss, rhinorrhea and trouble swallowing.    Eyes:  Negative for discharge and visual disturbance.   Respiratory:  Negative for chest tightness and wheezing.    Cardiovascular:  Negative for chest pain and palpitations.   Gastrointestinal:  Negative for blood in stool, constipation, diarrhea and vomiting.   Endocrine: Negative for polydipsia and polyuria.   Genitourinary:  Negative for difficulty urinating, hematuria and urgency.   Musculoskeletal:  Negative for arthralgias, joint swelling and neck pain.   Neurological:  Negative for weakness and headaches.   Psychiatric/Behavioral:  Negative for confusion and dysphoric mood.        Objective:      Vitals:    09/21/23 1252   BP: 130/82   Pulse: 100   Temp: 96.5 °F (35.8 °C)       Physical Exam  Constitutional:       General: He is not in acute distress.      Appearance: He is not ill-appearing.   Pulmonary:      Effort: Pulmonary effort is normal. No respiratory distress.   Skin:     Coloration: Skin is not jaundiced or pale.   Neurological:      General: No focal deficit present.      Mental Status: He is alert.   Psychiatric:         Mood and Affect: Mood normal.         Behavior: Behavior normal.         Assessment:       1. Anemia, unspecified type    2. Elevated liver function tests    3. Hyperlipidemia, unspecified hyperlipidemia type    4. Benign prostatic hyperplasia, unspecified whether lower urinary tract symptoms present    5. Status post lumbar spinal fusion        Plan:   Anemia, unspecified type  -     CBC Auto Differential; Future; Expected date: 09/21/2023    Elevated liver function tests  -     HEPATIC FUNCTION PANEL; Future; Expected date: 09/21/2023    Hyperlipidemia, unspecified hyperlipidemia type  -     LIPID PANEL; Future; Expected date: 09/21/2023    Benign prostatic hyperplasia, unspecified whether lower urinary tract symptoms present    Status post lumbar spinal fusion    Other orders  -     (In Office Administered) Pneumococcal Conjugate Vaccine (20 Valent) (IM)        Follow-up visit     Our last visit was recently done via virtual visit.  I had some concerns as he had not entirely normalized post surgery feeling abnormally fatigued.  I am pleased to see he is looking better and feeling better.  Bowel movements have improved    Reviewed labs that were drawn roughly 6 days ago.  No active concerns.  Noting random glucose elevations but suspect stress response secondary to recent surgery and given his A1c is appropriate not actively concerned.  We will continue to monitor this in the future.    Noting a mild elevation in his liver function persist.  Trending in the right direction.  Advise limit alcohol and acetaminophen intake.  We will order hepatic function panel to be drawn in roughly 2-3 months.  If LFTs are not normalizing advanced lab  investigation and imaging with ultrasound.  Patient's body mass index is 27.27.  I recognize he isn't a post surgical state but do encouraged healthy dietary choices as I suspect some fatty deposits could be decreased.    Anemia   Seems to be trending in the right direction.  Suspect postsurgical.  At the time of his hepatic function panel I will also order complete blood count.  If persistently low will advance laboratory investigation    Benign prostatic hyperplasia  At our last visit we added back Flomax.  He seems to be urinating well.  Continue medication    Status post lumbar fusion  Reviewed today's visit with his neurosurgical team.  Continue therapy as indicated    Hyperlipidemia  Continue statin therapy.  We will continue to monitor liver function.  Lab Results   Component Value Date    LDLCALC 104.0 10/03/2022     PNA20  Lab2-3 month  6 mo f/u or prn  This note was verbally dictated, please excuse any type errors.

## 2023-10-02 ENCOUNTER — TELEPHONE (OUTPATIENT)
Dept: NEUROSURGERY | Facility: CLINIC | Age: 68
End: 2023-10-02
Payer: MEDICARE

## 2023-10-02 DIAGNOSIS — M48.062 LUMBAR STENOSIS WITH NEUROGENIC CLAUDICATION: Primary | ICD-10-CM

## 2023-10-02 DIAGNOSIS — Z98.1 S/P LUMBAR FUSION: ICD-10-CM

## 2023-10-02 NOTE — TELEPHONE ENCOUNTER
----- Message from Esther Mercer MA sent at 10/2/2023  2:04 PM CDT -----  Contact: [phone]    ----- Message -----  From: Abril Dumas  Sent: 10/2/2023  10:25 AM CDT  To: [facility] Staff      Caller: Phoenix Lin-     Patient: Bigg Bueno-    Reason: The office is requesting a call back from the nurse to get advice on the patient treatment or     care plan.    Comments: Please call the office back to advise.

## 2023-10-02 NOTE — TELEPHONE ENCOUNTER
Patient requested referral to be placed to [facility] physical therapy. Expected to start on 10/16.

## 2023-10-05 ENCOUNTER — PATIENT MESSAGE (OUTPATIENT)
Dept: NEUROSURGERY | Facility: CLINIC | Age: 68
End: 2023-10-05
Payer: MEDICARE

## 2023-10-12 ENCOUNTER — CLINICAL SUPPORT (OUTPATIENT)
Dept: REHABILITATION | Facility: HOSPITAL | Age: 68
End: 2023-10-12
Attending: NEUROLOGICAL SURGERY
Payer: MEDICARE

## 2023-10-12 DIAGNOSIS — M48.062 LUMBAR STENOSIS WITH NEUROGENIC CLAUDICATION: ICD-10-CM

## 2023-10-12 DIAGNOSIS — R53.1 DECREASED STRENGTH, ENDURANCE, AND MOBILITY: Primary | ICD-10-CM

## 2023-10-12 DIAGNOSIS — Z74.09 DECREASED STRENGTH, ENDURANCE, AND MOBILITY: Primary | ICD-10-CM

## 2023-10-12 DIAGNOSIS — M53.86 DECREASED RANGE OF MOTION OF LUMBAR SPINE: ICD-10-CM

## 2023-10-12 DIAGNOSIS — R68.89 DECREASED STRENGTH, ENDURANCE, AND MOBILITY: Primary | ICD-10-CM

## 2023-10-12 DIAGNOSIS — Z98.1 S/P LUMBAR FUSION: ICD-10-CM

## 2023-10-12 PROCEDURE — 97161 PT EVAL LOW COMPLEX 20 MIN: CPT | Mod: HCNC | Performed by: PHYSICAL THERAPIST

## 2023-10-12 PROCEDURE — 97110 THERAPEUTIC EXERCISES: CPT | Mod: HCNC | Performed by: PHYSICAL THERAPIST

## 2023-10-12 NOTE — PLAN OF CARE
[facility]   Physical Therapy Initial Evaluation      Date: 10/12/2023   Name: Myles Pride  Clinic Number: [id number]    Therapy Diagnosis:    Encounter Diagnoses   Name Primary?    Decreased strength, endurance, and mobility Yes    Decreased range of motion of lumbar spine     Lumbar stenosis with neurogenic claudication     S/P lumbar fusion       Physician: Lyle Lobato MD     Physician Orders: PT Eval and Treat  Medical Diagnosis from Referral: s/p lumbar fusion, lumbar stenosis with neurogenic claudication   Evaluation Date: 10/12/2023  Authorization Period Expiration: 10/1/2024  Plan of Care Expiration: 1/10/2024  Progress Note Due: 11/11/2023  Visit # / Visits authorized: 1/1   FOTO: 1/3 (last performed on 10/12/2023)    Precautions: Standard and post op precautions    **SURGERY DATE: lumbar fusion L3/4, L 4/5. 9/6/2023**    Time In: 1436  Time Out: 1525  Total Billable Time (timed & untimed codes): 45 minutes    Subjective     Date of onset: 9/6/2023    History of current condition - Myles reports that he was having back and hip pain for a while. The hip started to improve, but his back did not. He was unable to walk or stand for long, and he could not tolerate standing up straight. He was having pain, numbness, and tingling into both legs to about the knee prior to surgery. Patient underwent a L3-4, L4-5 fusion and removal of a synovial cyst on 9/6/2023. He states that he has been doing very well since surgery, and he is no longer having the radicular s/s. Patient reports that he does have arthritis in his right hip and still has pain at times with it, and he is also still struggling to maintain better posture since the surgery now that he is able to tolerate it. Patient would like to focus on strengthening, core stability, posture, etc. Patient reports that he has been in Home Health PT over the past month and was discharged earlier this week. He has a history of  left knee pain and weakness due to arthritis as well.     Falls: none    Imaging: [x] Xray [] MRI [] CT: Performed on: 9/21/2023    Pain:  Current 0/10, worst 2/10, best 0/10   Location: [] Right   [] Left:  low back  Description: aching   Aggravating Factors: prolonged walking and standing  Easing Factors: activity avoidance, rest,     Prior Therapy:   [] N/A    [x] Yes: did help  Social History: Pt lives with their spouse  Occupation: Pt is retired.   Prior Level of Function: Independent and pain free with all ADL, IADL, community mobility and functional activities.   Current Level of Function: Independent with all ADL, IADL, community mobility and functional activities with reports of increased pain and need for increased time and frequent breaks.      Dominant Extremity:    [x] Right    [] Left    Pts goals: Pt reported goals are to decrease overall pain levels in order to return to prior functional level.     Medical History:   Past Medical History:   Diagnosis Date    Hyperlipidemia     PONV (postoperative nausea and vomiting) 1990    after knee arthroscopy    Vitamin D deficiency 12/18/2019       Surgical History:   Myles Pride  has a past surgical history that includes Vasectomy (1995); Knee arthroscopy (Left); Colonoscopy; Colonoscopy (N/A, 12/19/2019); Selective injection of anesthetic agent around lumbar spinal nerve root by transforaminal approach (Bilateral, 01/21/2022); Selective injection of anesthetic agent around lumbar spinal nerve root by transforaminal approach (Bilateral, 05/06/2022); Selective injection of anesthetic agent around lumbar spinal nerve root by transforaminal approach (Bilateral, 10/25/2022); Epidural steroid injection (N/A, 12/09/2022); and Transforaminal lumbar interbody fusion (TLIF) using computer-assisted navigation (Bilateral, 9/6/2023).    Medications:   Myles has a current medication list which includes the following prescription(s): acetaminophen, ergocalciferol  (vitamin d2), ondansetron, oxycodone-acetaminophen, pravastatin, and tamsulosin.    Allergies:   Review of patient's allergies indicates:  No Known Allergies     Objective        RANGE OF MOTION:   Lumbar ROM Right  (spine) Left   Pain/Dysfunction with Movement Goal   Lumbar Flexion (60º) NT ---  40   Lumbar Extension (30º) NT ---  0   Lumbar Side Bending (25º) NT   20       STRENGTH:   L/E MMT Right Left Pain/Dysfunction with Movement Goal   Hip Flexion  4+/5 4+/5  4+/5 B   Hip Extension  NT NT  4+/5 B   Hip Abduction  4-/5 4-/5 Tested in seated today 4+/5 B   Knee Extension 4+/5 4+/5  5/5 B   Knee Flexion 5/5 5/5  5/5 B   Hip IR 4/5 4+/5 Minimal pain in 4+/5 B   Hip ER 4/5 4+/5  4+/5 B   Ankle DF 5/5 5/5  5/5 B   Ankle PF 5/5 5/5  5/5 B        MUSCLE LENGTH:   Muscle Tested  Right Left  Goal   Hip Flexors [] Normal  [x] Limited [] Normal  [x] Limited Normal B   ITB / TFL [] Normal  [x] Limited [] Normal  [x] Limited Normal B   Hamstrings  [] Normal  [x] Limited [] Normal  [x] Limited Normal B   Piriformis  [] Normal  [x] Limited [] Normal  [x] Limited Normal B   Gastrocnemius  [] Normal  [x] Limited [] Normal  [x] Limited Normal B   Soleus  [] Normal  [x] Limited [] Normal  [x] Limited Normal B       JOINT MOBILITY:   NT due to post operative state      SPECIAL TESTS:   NT due to post operative state      SENSATION  [x] Intact to Light Touch   [] Impaired:      PALPATION: Muscles: Increased tone noted with palpation of: bilateral paraspinals, quadratus lumborum, glutes, piriformis. Incisional scar appears to be healing well.       POSTURE:  Pt presents with postural abnormalities which include:    [x] Forward Head   [] Increased Lumbar Lordosis   [x] Rounded Shoulder   [] Genu Recurvatum   [] Increased Thoracic Kyphosis [] Genu Valgus   [] Trunk Deviated    [] Pes Planus   [] Scapular Winging    [x] Other: decreased lumbar lordosis, flexed trunk posture        GAIT ANALYSIS The patient ambulated with the following  assistive device: none; the pt presents with the following gait abnormalities: trendelenburg, bradykinetic, decreased step length bilateral, decreased hip extension bilateral, and flexed trunk .    FUNCTIONAL MOVEMENT PATTERNS  Movement Analysis Notes   Bed Mobility  []Functional  [x]Dysfunctional:  []Painful  [x]Non-Painful    Poor motor planning, decreased core activation, required cues for proper supine to sitting transition    Sit to Stand []Functional  [x]Dysfunctional:  []Painful  [x]Non-Painful    Currently in brace, slow guarded   Squat []Functional  []Dysfunctional:  []Painful  []Non-Painful    NT today         Function:     Intake Outcome Measure for FOTO Lumbar Spine Survey    Therapist reviewed FOTO scores for Myles on 10/12/2023.   FOTO report - see Media section or FOTO account for episode details    Intake Score: 44%         Treatment     Total Treatment time (time-based codes) separate from Evaluation: (15) minutes     Myles received the treatments listed below:        THERAPEUTIC EXERCISES to develop strength, endurance, ROM, flexibility, posture, and core stabilization for (15) minutes including:    Intervention Performed Today    HEP established and discussed x See Patient Instructions for details                                         Plan for Next Visit:        Patient Education and Home Exercises     Education provided: (included in treatment section) minutes  PURPOSE: Patient educated on the impairments noted above and the effects of physical therapy intervention to improve overall condition and QOL.   EXERCISE: Patient was educated on all the above exercise prior/during/after for proper posture, positioning, and execution for safe performance with home exercise program.   STRENGTH: Patient educated on the importance of improved core and extremity strength in order to improve alignment of the spine and extremities with static positions and dynamic movement.   GAIT & BALANCE: Patient  educated on the importance of strong core and lower extremity musculature in order to improve both static and dynamic balance, improve gait mechanics, reduce fall risk and improve household and community mobility.   POSTURE: Patient educated on postural awareness to reduce stress and maintain optimal alignment of the spine with static positions and dynamic movement   TRANSFERS & TRANSITIONS: Patient educated on proper technique for bed mobility, transitions and transfers to improve body mechanics and decrease risk of injury.   POST-OP PRECAUTIONS: patient educated on post-operative precautions in order to protect surgical repair, decrease risk of injury and promote healing.     Written Home Exercises Provided: yes.  Exercises were reviewed and Myles was able to demonstrate them prior to the end of the session.  Myles demonstrated good  understanding of the education provided. See EMR under Patient Instructions for exercises provided during therapy sessions.    Assessment     Myles is a 68 y.o. male referred to outpatient Physical Therapy with a medical diagnosis of s/p lumbar fusion, lumbar stenosis with neurogenic claudication . Pt presents with impairments in the following categories: IMPAIRMENTS: ROM, strength, endurance, posture, gait mechanics, core strength and stability, functional movement patterns, and post-operative precautions    Pt prognosis is Good  Pt will benefit from skilled outpatient Physical Therapy to address the deficits stated above and in the chart below, provide pt/family education, and to maximize pt's level of independence.     Plan of care discussed with patient: Yes  Pt's spiritual, cultural and educational needs considered and patient is agreeable to the plan of care and goals as stated below:     Anticipated Barriers for therapy: co-morbidities    Medical Necessity is demonstrated by the following  History  Co-morbidities and personal factors that may impact the plan of care [x]  "LOW: no personal factors / co-morbidities  [] MODERATE: 1-2 personal factors / co-morbidities  [] HIGH: 3+ personal factors / co-morbidities    Moderate / High Support Documentation:   Past Medical History:   Diagnosis Date    Hyperlipidemia     PONV (postoperative nausea and vomiting) 1990    after knee arthroscopy    Vitamin D deficiency 12/18/2019        Examination  Body Structures and Functions, activity limitations and participation restrictions that may impact the plan of care [x] LOW: addressing 1-2 elements  [] MODERATE: 3+ elements  [] HIGH: 4+ elements (please support below)    Moderate / High Support Documentation: See above in "Current Level of Function"      Clinical Presentation [x] LOW: stable  [] MODERATE: Evolving  [] HIGH: Unstable     Decision Making/ Complexity Score: low         Short Term Goals:  6 weeks Status  Date Met   PAIN: Pt will report worst pain of 1/10 in order to progress toward max functional ability and improve quality of life. [x] Progressing  [] Met  [] Not Met    FUNCTION: Patient will demonstrate improved function as indicated by a score of greater than or equal to 52 out of 100 on FOTO. [x] Progressing  [] Met  [] Not Met    MOBILITY: Patient will improve AROM to 50% of stated goals, listed in objective measures above, in order to progress towards independence with functional activities.  [x] Progressing  [] Met  [] Not Met    STRENGTH: Patient will improve strength to 50% of stated goals, listed in objective measures above, in order to progress towards independence with functional activities. [x] Progressing  [] Met  [] Not Met    POSTURE: Patient will correct postural deviations in sitting and standing, to decrease pain and promote long term stability.  [x] Progressing  [] Met  [] Not Met    GAIT: Patient will demonstrate improved gait mechanics in order to improve functional mobility, improve quality of life, and decrease risk of further injury or fall.  [x] " Progressing  [] Met  [] Not Met    HEP: Patient will demonstrate independence with HEP in order to progress toward functional independence. [x] Progressing  [] Met  [] Not Met      Long Term Goals:  12 weeks Status Date Met   PAIN: Pt will report worst pain of 0/10 in order to progress toward max functional ability and improve quality of life [x] Progressing  [] Met  [] Not Met    FUNCTION: Patient will demonstrate improved function as indicated by a score of greater than or equal to 61 out of 100 on FOTO. [x] Progressing  [] Met  [] Not Met    MOBILITY: Patient will improve AROM to stated goals, listed in objective measures above, in order to return to maximal functional potential and improve quality of life.  [x] Progressing  [] Met  [] Not Met    STRENGTH: Patient will improve strength to stated goals, listed in objective measures above, in order to improve functional independence and quality of life.  [x] Progressing  [] Met  [] Not Met    GAIT: Patient will demonstrate normalized gait mechanics with minimal compensation in order to return to PLOF. [x] Progressing  [] Met  [] Not Met    Patient will return to normal ADL's, IADL's, community involvement, recreational activities, and work-related activities with less than or equal to 0/10 pain and maximal function.  [x] Progressing  [] Met  [] Not Met      Plan     Plan of care Certification: 10/12/2023 to 1/10/2024.    Outpatient Physical Therapy 2 times weekly for 12 weeks to include any combination of the following interventions: virtual visits, dry needling, modalities, electrical stimulation (IFC, Pre-Mod, Attended with Functional Dry Needling), Aquatic Therapy, Cervical/Lumbar Traction, Gait Training, Manual Therapy, Neuromuscular Re-ed, Patient Education, Self Care, Therapeutic Exercise, and Therapeutic Activites     Mady Billy, PT, DPT

## 2023-10-17 ENCOUNTER — CLINICAL SUPPORT (OUTPATIENT)
Dept: REHABILITATION | Facility: HOSPITAL | Age: 68
End: 2023-10-17
Payer: MEDICARE

## 2023-10-17 DIAGNOSIS — M53.86 DECREASED RANGE OF MOTION OF LUMBAR SPINE: ICD-10-CM

## 2023-10-17 DIAGNOSIS — R53.1 DECREASED STRENGTH, ENDURANCE, AND MOBILITY: ICD-10-CM

## 2023-10-17 DIAGNOSIS — Z98.1 S/P LUMBAR FUSION: Primary | ICD-10-CM

## 2023-10-17 DIAGNOSIS — R68.89 DECREASED STRENGTH, ENDURANCE, AND MOBILITY: ICD-10-CM

## 2023-10-17 DIAGNOSIS — M48.062 LUMBAR STENOSIS WITH NEUROGENIC CLAUDICATION: Chronic | ICD-10-CM

## 2023-10-17 DIAGNOSIS — Z74.09 DECREASED STRENGTH, ENDURANCE, AND MOBILITY: ICD-10-CM

## 2023-10-17 PROCEDURE — 97112 NEUROMUSCULAR REEDUCATION: CPT | Mod: HCNC | Performed by: PHYSICAL THERAPIST

## 2023-10-17 PROCEDURE — 97110 THERAPEUTIC EXERCISES: CPT | Mod: HCNC | Performed by: PHYSICAL THERAPIST

## 2023-10-17 NOTE — PROGRESS NOTES
"[facility] OUTPATIENT THERAPY AND [facility]LL[facility]   Physical Therapy Treatment Note        Name: Myles Pride  Clinic Number: [id number]    Therapy Diagnosis:   Encounter Diagnoses   Name Primary?    S/P lumbar fusion Yes    Decreased range of motion of lumbar spine     Lumbar stenosis with neurogenic claudication     Decreased strength, endurance, and mobility      Physician: Lyle Lobato MD    Visit Date: 10/17/2023    Physician Orders: PT Eval and Treat  Medical Diagnosis from Referral: s/p lumbar fusion, lumbar stenosis with neurogenic claudication   Evaluation Date: 10/12/2023  Authorization Period Expiration: 12/31/2023  Plan of Care Expiration: 1/10/2024  Progress Note Due: 11/11/2023  Visit # / Visits authorized: 1/20 (+eval)   FOTO: 1/3 (last performed on 10/12/2023)     Precautions: Standard and post op precautions     **SURGERY DATE: lumbar fusion L3/4, L 4/5. 9/6/2023**  PTA Visit #: 0/5     Time In: 1136  Time Out: 1216  Total Billable Time: 35 minutes (Billing reflects 1 on 1 treatment time spent with patient)    Subjective     Patient reports: feeling about the same as last visit. No new complaints since eval.    He/She was compliant with home exercise program.  Response to previous treatment: no adverse reaction  Functional change: none noted yet    Pain: 0.5/10     Location: low back    Objective      Objective Measures updated at progress report or POC update only unless otherwise noted.       Treatment     Myles received the treatments listed below:       THERAPEUTIC EXERCISES to develop strength, endurance, ROM, flexibility, posture, and core stabilization for (18) minutes including:    Intervention Performed Today    Nustep for ROM and strength x 6', level 4   Ball Squeezes x 30x, 3" holds   Heel Digs/DKTC on stability ball x 20x   SAQ x 30x each LE, 2#                              Plan for Next Visit:          NEUROMUSCULAR RE-EDUCATION ACTIVITIES to improve Balance, Coordination, " "Kinesthetic, Sense, Proprioception, and Posture for (17) minutes.  The following were included:    Intervention Performed Today    Abdominal brace x 20x, 3' holds on and off   Glute sets x 20x, 3" holds on and off   Clams - sidelying  x 20x each LE                              Plan for Next Visit:        Patient Education and Home Exercises       Home Exercises Provided and Patient Education Provided     Education provided: (included in treatment section) minutes  PURPOSE: Patient educated on the impairments noted above and the effects of physical therapy intervention to improve overall condition and QOL.   EXERCISE: Patient was educated on all the above exercise prior/during/after for proper posture, positioning, and execution for safe performance with home exercise program.   STRENGTH: Patient educated on the importance of improved core and extremity strength in order to improve alignment of the spine and extremities with static positions and dynamic movement.   POSTURE: Patient educated on postural awareness to reduce stress and maintain optimal alignment of the spine with static positions and dynamic movement     Written Home Exercises Provided: yes.  Exercises were reviewed and Myles was able to demonstrate them prior to the end of the session.  Myles demonstrated good  understanding of the education provided. See EMR under Patient Instructions for exercises provided during therapy sessions.    Assessment     Patient tolerated treatment well. He completed exercises without difficulty or complaint. Good core activation noted with all exercises this visit. Patient would benefit from continued LE and core strengthening. He is appropriate to progress again next visit.     Myles is progressing well towards his goals.   Patient prognosis is Good.     Patient will continue to benefit from skilled outpatient physical therapy to address the deficits listed in the problem list box on initial evaluation, provide " pt/family education and to maximize patient's level of independence in the home and community environment.     Patient's spiritual, cultural and educational needs considered and pt agreeable to plan of care and goals.     Anticipated Barriers for therapy: co-morbidities    Goals:  Short Term Goals:  6 weeks Status  Date Met   PAIN: Pt will report worst pain of 1/10 in order to progress toward max functional ability and improve quality of life. [x] Progressing  [] Met  [] Not Met     FUNCTION: Patient will demonstrate improved function as indicated by a score of greater than or equal to 52 out of 100 on FOTO. [x] Progressing  [] Met  [] Not Met     MOBILITY: Patient will improve AROM to 50% of stated goals, listed in objective measures above, in order to progress towards independence with functional activities.  [x] Progressing  [] Met  [] Not Met     STRENGTH: Patient will improve strength to 50% of stated goals, listed in objective measures above, in order to progress towards independence with functional activities. [x] Progressing  [] Met  [] Not Met     POSTURE: Patient will correct postural deviations in sitting and standing, to decrease pain and promote long term stability.  [x] Progressing  [] Met  [] Not Met     GAIT: Patient will demonstrate improved gait mechanics in order to improve functional mobility, improve quality of life, and decrease risk of further injury or fall.  [x] Progressing  [] Met  [] Not Met     HEP: Patient will demonstrate independence with HEP in order to progress toward functional independence. [x] Progressing  [] Met  [] Not Met        Long Term Goals:  12 weeks Status Date Met   PAIN: Pt will report worst pain of 0/10 in order to progress toward max functional ability and improve quality of life [x] Progressing  [] Met  [] Not Met     FUNCTION: Patient will demonstrate improved function as indicated by a score of greater than or equal to 61 out of 100 on FOTO. [x] Progressing  []  Met  [] Not Met     MOBILITY: Patient will improve AROM to stated goals, listed in objective measures above, in order to return to maximal functional potential and improve quality of life.  [x] Progressing  [] Met  [] Not Met     STRENGTH: Patient will improve strength to stated goals, listed in objective measures above, in order to improve functional independence and quality of life.  [x] Progressing  [] Met  [] Not Met     GAIT: Patient will demonstrate normalized gait mechanics with minimal compensation in order to return to PLOF. [x] Progressing  [] Met  [] Not Met     Patient will return to normal ADL's, IADL's, community involvement, recreational activities, and work-related activities with less than or equal to 0/10 pain and maximal function.  [x] Progressing  [] Met  [] Not Met           Plan     Continue Plan of Care (POC) and progress per patient tolerance. See treatment section for details on planned progressions next session.    10/12/2023 (eval): Outpatient Physical Therapy 2 times weekly for 12 weeks to include any combination of the following interventions: virtual visits, dry needling, modalities, electrical stimulation (IFC, Pre-Mod, Attended with Functional Dry Needling), Aquatic Therapy, Cervical/Lumbar Traction, Gait Training, Manual Therapy, Neuromuscular Re-ed, Patient Education, Self Care, Therapeutic Exercise, and Therapeutic Activites     Mady Billy, PT

## 2023-10-18 ENCOUNTER — PATIENT MESSAGE (OUTPATIENT)
Dept: NEUROSURGERY | Facility: CLINIC | Age: 68
End: 2023-10-18
Payer: MEDICARE

## 2023-10-19 ENCOUNTER — CLINICAL SUPPORT (OUTPATIENT)
Dept: REHABILITATION | Facility: HOSPITAL | Age: 68
End: 2023-10-19
Payer: MEDICARE

## 2023-10-19 DIAGNOSIS — Z74.09 DECREASED STRENGTH, ENDURANCE, AND MOBILITY: ICD-10-CM

## 2023-10-19 DIAGNOSIS — Z98.1 S/P LUMBAR FUSION: Primary | ICD-10-CM

## 2023-10-19 DIAGNOSIS — R53.1 DECREASED STRENGTH, ENDURANCE, AND MOBILITY: ICD-10-CM

## 2023-10-19 DIAGNOSIS — M53.86 DECREASED RANGE OF MOTION OF LUMBAR SPINE: ICD-10-CM

## 2023-10-19 DIAGNOSIS — M48.062 LUMBAR STENOSIS WITH NEUROGENIC CLAUDICATION: Chronic | ICD-10-CM

## 2023-10-19 DIAGNOSIS — R68.89 DECREASED STRENGTH, ENDURANCE, AND MOBILITY: ICD-10-CM

## 2023-10-19 PROCEDURE — 97110 THERAPEUTIC EXERCISES: CPT | Mod: HCNC | Performed by: PHYSICAL THERAPIST

## 2023-10-19 PROCEDURE — 97140 MANUAL THERAPY 1/> REGIONS: CPT | Mod: HCNC | Performed by: PHYSICAL THERAPIST

## 2023-10-19 PROCEDURE — 97112 NEUROMUSCULAR REEDUCATION: CPT | Mod: HCNC | Performed by: PHYSICAL THERAPIST

## 2023-10-19 NOTE — PROGRESS NOTES
[facility]   Physical Therapy Treatment Note        Name: Myles Pride  Clinic Number: [id number]    Therapy Diagnosis:   Encounter Diagnoses   Name Primary?    S/P lumbar fusion Yes    Decreased range of motion of lumbar spine     Lumbar stenosis with neurogenic claudication     Decreased strength, endurance, and mobility      Physician: Lyle Lobato MD    Visit Date: 10/19/2023    Physician Orders: PT Eval and Treat  Medical Diagnosis from Referral: s/p lumbar fusion, lumbar stenosis with neurogenic claudication   Evaluation Date: 10/12/2023  Authorization Period Expiration: 12/31/2023  Plan of Care Expiration: 1/10/2024  Progress Note Due: 11/11/2023  Visit # / Visits authorized: 2/20 (+eval)   FOTO: 1/3 (last performed on 10/12/2023)     Precautions: Standard and post op precautions     **SURGERY DATE: lumbar fusion L3/4, L 4/5. 9/6/2023**  PTA Visit #: 0/5     Time In: 1132  Time Out: 1218  Total Billable Time: 42 minutes (Billing reflects 1 on 1 treatment time spent with patient)    Subjective     Patient reports: that he felt fine after last visit and had no complaints. He would like to progress some today.     He/She was compliant with home exercise program.  Response to previous treatment: no adverse reaction  Functional change: none noted yet    Pain: 0.5/10     Location: low back    Objective      Objective Measures updated at progress report or POC update only unless otherwise noted.       Treatment     Myles received the treatments listed below:       THERAPEUTIC EXERCISES to develop strength, endurance, ROM, flexibility, posture, and core stabilization for (17) minutes including:    Intervention Performed Today    Nustep for ROM and strength x 6'.5, level 4   Heel Digs/DKTC on stability ball x 20x   LAQ x 30x each LE                              Plan for Next Visit:          NEUROMUSCULAR RE-EDUCATION ACTIVITIES to improve Balance, Coordination, Kinesthetic,  "Sense, Proprioception, and Posture for (17) minutes.  The following were included:    Intervention Performed Today    Abdominal brace x 20x, 3' holds on and off   Glute sets x 20x, 3" holds on and off   Clams - sidelying  x 20x each LE   Bridges - with abdominal brace (added) x 2 x 10, small range not past neutral   Shuttle Squats (added) x 2 x 10, 4 bands                    Plan for Next Visit:        MANUAL THERAPY TECHNIQUES were applied for (8) minutes, including:    Manual Intervention Performed Today    Soft Tissue Mobilization [x] bilateral  paraspinals, quadratus lumborum   Joint Mobilizations []     []     []    Functional Dry Needling  []      Plan for Next Visit: Continue as needed         Patient Education and Home Exercises       Home Exercises Provided and Patient Education Provided     Education provided: (included in treatment section) minutes  PURPOSE: Patient educated on the impairments noted above and the effects of physical therapy intervention to improve overall condition and QOL.   EXERCISE: Patient was educated on all the above exercise prior/during/after for proper posture, positioning, and execution for safe performance with home exercise program.   STRENGTH: Patient educated on the importance of improved core and extremity strength in order to improve alignment of the spine and extremities with static positions and dynamic movement.   POSTURE: Patient educated on postural awareness to reduce stress and maintain optimal alignment of the spine with static positions and dynamic movement     Written Home Exercises Provided: yes.  Exercises were reviewed and Myles was able to demonstrate them prior to the end of the session.  Myles demonstrated good  understanding of the education provided. See EMR under Patient Instructions for exercises provided during therapy sessions.    Assessment     Patient tolerated treatment very well. He completed exercises without difficulty or complaint. Patient " did well with progressions, demonstrating good form and core activation. HE responded well to manual therapy, reporting good relief following. Patient left treatment with reports of a good workout but without complaints of pain.     Myles is progressing well towards his goals.   Patient prognosis is Good.     Patient will continue to benefit from skilled outpatient physical therapy to address the deficits listed in the problem list box on initial evaluation, provide pt/family education and to maximize patient's level of independence in the home and community environment.     Patient's spiritual, cultural and educational needs considered and pt agreeable to plan of care and goals.     Anticipated Barriers for therapy: co-morbidities    Goals:  Short Term Goals:  6 weeks Status  Date Met   PAIN: Pt will report worst pain of 1/10 in order to progress toward max functional ability and improve quality of life. [x] Progressing  [] Met  [] Not Met     FUNCTION: Patient will demonstrate improved function as indicated by a score of greater than or equal to 52 out of 100 on FOTO. [x] Progressing  [] Met  [] Not Met     MOBILITY: Patient will improve AROM to 50% of stated goals, listed in objective measures above, in order to progress towards independence with functional activities.  [x] Progressing  [] Met  [] Not Met     STRENGTH: Patient will improve strength to 50% of stated goals, listed in objective measures above, in order to progress towards independence with functional activities. [x] Progressing  [] Met  [] Not Met     POSTURE: Patient will correct postural deviations in sitting and standing, to decrease pain and promote long term stability.  [x] Progressing  [] Met  [] Not Met     GAIT: Patient will demonstrate improved gait mechanics in order to improve functional mobility, improve quality of life, and decrease risk of further injury or fall.  [x] Progressing  [] Met  [] Not Met     HEP: Patient will demonstrate  independence with HEP in order to progress toward functional independence. [x] Progressing  [] Met  [] Not Met        Long Term Goals:  12 weeks Status Date Met   PAIN: Pt will report worst pain of 0/10 in order to progress toward max functional ability and improve quality of life [x] Progressing  [] Met  [] Not Met     FUNCTION: Patient will demonstrate improved function as indicated by a score of greater than or equal to 61 out of 100 on FOTO. [x] Progressing  [] Met  [] Not Met     MOBILITY: Patient will improve AROM to stated goals, listed in objective measures above, in order to return to maximal functional potential and improve quality of life.  [x] Progressing  [] Met  [] Not Met     STRENGTH: Patient will improve strength to stated goals, listed in objective measures above, in order to improve functional independence and quality of life.  [x] Progressing  [] Met  [] Not Met     GAIT: Patient will demonstrate normalized gait mechanics with minimal compensation in order to return to PLOF. [x] Progressing  [] Met  [] Not Met     Patient will return to normal ADL's, IADL's, community involvement, recreational activities, and work-related activities with less than or equal to 0/10 pain and maximal function.  [x] Progressing  [] Met  [] Not Met           Plan     Continue Plan of Care (POC) and progress per patient tolerance. See treatment section for details on planned progressions next session.    10/12/2023 (eval): Outpatient Physical Therapy 2 times weekly for 12 weeks to include any combination of the following interventions: virtual visits, dry needling, modalities, electrical stimulation (IFC, Pre-Mod, Attended with Functional Dry Needling), Aquatic Therapy, Cervical/Lumbar Traction, Gait Training, Manual Therapy, Neuromuscular Re-ed, Patient Education, Self Care, Therapeutic Exercise, and Therapeutic Activites     Mady Billy, PT

## 2023-10-20 ENCOUNTER — EXTERNAL HOME HEALTH (OUTPATIENT)
Dept: HOME HEALTH SERVICES | Facility: HOSPITAL | Age: 68
End: 2023-10-20
Payer: MEDICARE

## 2023-10-24 ENCOUNTER — CLINICAL SUPPORT (OUTPATIENT)
Dept: REHABILITATION | Facility: HOSPITAL | Age: 68
End: 2023-10-24
Attending: NEUROLOGICAL SURGERY
Payer: MEDICARE

## 2023-10-24 DIAGNOSIS — R68.89 DECREASED STRENGTH, ENDURANCE, AND MOBILITY: ICD-10-CM

## 2023-10-24 DIAGNOSIS — Z74.09 DECREASED STRENGTH, ENDURANCE, AND MOBILITY: ICD-10-CM

## 2023-10-24 DIAGNOSIS — M53.86 DECREASED RANGE OF MOTION OF LUMBAR SPINE: ICD-10-CM

## 2023-10-24 DIAGNOSIS — Z98.1 S/P LUMBAR FUSION: Primary | ICD-10-CM

## 2023-10-24 DIAGNOSIS — M48.062 LUMBAR STENOSIS WITH NEUROGENIC CLAUDICATION: Chronic | ICD-10-CM

## 2023-10-24 DIAGNOSIS — R53.1 DECREASED STRENGTH, ENDURANCE, AND MOBILITY: ICD-10-CM

## 2023-10-24 PROCEDURE — 97530 THERAPEUTIC ACTIVITIES: CPT | Mod: HCNC | Performed by: PHYSICAL THERAPIST

## 2023-10-24 PROCEDURE — 97112 NEUROMUSCULAR REEDUCATION: CPT | Mod: HCNC | Performed by: PHYSICAL THERAPIST

## 2023-10-24 PROCEDURE — 97110 THERAPEUTIC EXERCISES: CPT | Mod: HCNC | Performed by: PHYSICAL THERAPIST

## 2023-10-24 NOTE — PROGRESS NOTES
[facility]   Physical Therapy Treatment Note        Name: Myles Pride  Clinic Number: [id number]    Therapy Diagnosis:   Encounter Diagnoses   Name Primary?    S/P lumbar fusion Yes    Decreased range of motion of lumbar spine     Lumbar stenosis with neurogenic claudication     Decreased strength, endurance, and mobility      Physician: Lyle Lobato MD    Visit Date: 10/24/2023    Physician Orders: PT Eval and Treat  Medical Diagnosis from Referral: s/p lumbar fusion, lumbar stenosis with neurogenic claudication   Evaluation Date: 10/12/2023  Authorization Period Expiration: 12/31/2023  Plan of Care Expiration: 1/10/2024  Progress Note Due: 11/11/2023  Visit # / Visits authorized: 3/20 (+eval)   FOTO: 1/3 (last performed on 10/12/2023)     Precautions: Standard and post op precautions     **SURGERY DATE: lumbar fusion L3/4, L 4/5. 9/6/2023**  PTA Visit #: 0/5     Time In: 1305  Time Out: 1349  Total Billable Time: 44 minutes (Billing reflects 1 on 1 treatment time spent with patient)    Subjective     Patient reports: only minimal muscle soreness following last visit. He is still doing well overall.    He/She was compliant with home exercise program.  Response to previous treatment: no adverse reaction  Functional change: none noted yet    Pain: 1/10  right hip   Location: low back    Objective      Objective Measures updated at progress report or POC update only unless otherwise noted.       Treatment     Myles received the treatments listed below:       THERAPEUTIC EXERCISES to develop strength, endurance, ROM, flexibility, posture, and core stabilization for (15) minutes including:    Intervention Performed Today    Nustep for ROM and strength x 6', level 4   Heel Digs/DKTC on stability ball x 20x   LAQ x 30x each LE                              Plan for Next Visit:          NEUROMUSCULAR RE-EDUCATION ACTIVITIES to improve Balance, Coordination, Kinesthetic, Sense,  "Proprioception, and Posture for (17) minutes.  The following were included:    Intervention Performed Today    Abdominal brace x 20x, 3' holds on and off   Glute sets x 20x, 3" holds on and off   Clams - sidelying  x 20x each LE   Bridges - with abdominal brace  x 2 x 10, small range not past neutral   SLR with ab brace (added) x 2 x 10 each LE                    Plan for Next Visit:        THERAPEUTIC ACTIVITIES to improve dynamic and functional  performance for (8) minutes including:    Intervention Performed Today    Shuttle Squats  x 3 x 10, 5 bands   Standing Hip 3-way (added) x 10x each LE                                   Plan for Next Visit:         MANUAL THERAPY TECHNIQUES were applied for (4) minutes, including:    Manual Intervention Performed Today    Soft Tissue Mobilization [x] bilateral  paraspinals, quadratus lumborum   Joint Mobilizations []     []     []    Functional Dry Needling  []      Plan for Next Visit: Continue as needed         Patient Education and Home Exercises       Home Exercises Provided and Patient Education Provided     Education provided: (included in treatment section) minutes  PURPOSE: Patient educated on the impairments noted above and the effects of physical therapy intervention to improve overall condition and QOL.   EXERCISE: Patient was educated on all the above exercise prior/during/after for proper posture, positioning, and execution for safe performance with home exercise program.   STRENGTH: Patient educated on the importance of improved core and extremity strength in order to improve alignment of the spine and extremities with static positions and dynamic movement.   POSTURE: Patient educated on postural awareness to reduce stress and maintain optimal alignment of the spine with static positions and dynamic movement     Written Home Exercises Provided: yes.  Exercises were reviewed and Myles was able to demonstrate them prior to the end of the session.  Myles " demonstrated good  understanding of the education provided. See EMR under Patient Instructions for exercises provided during therapy sessions.    Assessment     Patient did well with treatment today, including progressions. Progressed to standing hip 3-way to work on additional hip strengthening and stabilization. Patient demonstrated good overall form with progressions. His core and hip strengthening and stability continue to improve each visit.    Myles is progressing well towards his goals.   Patient prognosis is Good.     Patient will continue to benefit from skilled outpatient physical therapy to address the deficits listed in the problem list box on initial evaluation, provide pt/family education and to maximize patient's level of independence in the home and community environment.     Patient's spiritual, cultural and educational needs considered and pt agreeable to plan of care and goals.     Anticipated Barriers for therapy: co-morbidities    Goals:  Short Term Goals:  6 weeks Status  Date Met   PAIN: Pt will report worst pain of 1/10 in order to progress toward max functional ability and improve quality of life. [x] Progressing  [] Met  [] Not Met     FUNCTION: Patient will demonstrate improved function as indicated by a score of greater than or equal to 52 out of 100 on FOTO. [x] Progressing  [] Met  [] Not Met     MOBILITY: Patient will improve AROM to 50% of stated goals, listed in objective measures above, in order to progress towards independence with functional activities.  [x] Progressing  [] Met  [] Not Met     STRENGTH: Patient will improve strength to 50% of stated goals, listed in objective measures above, in order to progress towards independence with functional activities. [x] Progressing  [] Met  [] Not Met     POSTURE: Patient will correct postural deviations in sitting and standing, to decrease pain and promote long term stability.  [x] Progressing  [] Met  [] Not Met     GAIT: Patient  will demonstrate improved gait mechanics in order to improve functional mobility, improve quality of life, and decrease risk of further injury or fall.  [x] Progressing  [] Met  [] Not Met     HEP: Patient will demonstrate independence with HEP in order to progress toward functional independence. [x] Progressing  [] Met  [] Not Met        Long Term Goals:  12 weeks Status Date Met   PAIN: Pt will report worst pain of 0/10 in order to progress toward max functional ability and improve quality of life [x] Progressing  [] Met  [] Not Met     FUNCTION: Patient will demonstrate improved function as indicated by a score of greater than or equal to 61 out of 100 on FOTO. [x] Progressing  [] Met  [] Not Met     MOBILITY: Patient will improve AROM to stated goals, listed in objective measures above, in order to return to maximal functional potential and improve quality of life.  [x] Progressing  [] Met  [] Not Met     STRENGTH: Patient will improve strength to stated goals, listed in objective measures above, in order to improve functional independence and quality of life.  [x] Progressing  [] Met  [] Not Met     GAIT: Patient will demonstrate normalized gait mechanics with minimal compensation in order to return to PLOF. [x] Progressing  [] Met  [] Not Met     Patient will return to normal ADL's, IADL's, community involvement, recreational activities, and work-related activities with less than or equal to 0/10 pain and maximal function.  [x] Progressing  [] Met  [] Not Met           Plan     Continue Plan of Care (POC) and progress per patient tolerance. See treatment section for details on planned progressions next session.    10/12/2023 (eval): Outpatient Physical Therapy 2 times weekly for 12 weeks to include any combination of the following interventions: virtual visits, dry needling, modalities, electrical stimulation (IFC, Pre-Mod, Attended with Functional Dry Needling), Aquatic Therapy, Cervical/Lumbar Traction, Gait  Training, Manual Therapy, Neuromuscular Re-ed, Patient Education, Self Care, Therapeutic Exercise, and Therapeutic Activites     Mady Billy, PT

## 2023-10-27 ENCOUNTER — DOCUMENTATION ONLY (OUTPATIENT)
Dept: REHABILITATION | Facility: HOSPITAL | Age: 68
End: 2023-10-27

## 2023-10-27 ENCOUNTER — CLINICAL SUPPORT (OUTPATIENT)
Dept: REHABILITATION | Facility: HOSPITAL | Age: 68
End: 2023-10-27
Attending: NEUROLOGICAL SURGERY
Payer: MEDICARE

## 2023-10-27 DIAGNOSIS — Z98.1 S/P LUMBAR FUSION: Primary | ICD-10-CM

## 2023-10-27 DIAGNOSIS — R53.1 DECREASED STRENGTH, ENDURANCE, AND MOBILITY: ICD-10-CM

## 2023-10-27 DIAGNOSIS — Z74.09 DECREASED STRENGTH, ENDURANCE, AND MOBILITY: ICD-10-CM

## 2023-10-27 DIAGNOSIS — M48.062 LUMBAR STENOSIS WITH NEUROGENIC CLAUDICATION: Chronic | ICD-10-CM

## 2023-10-27 DIAGNOSIS — R68.89 DECREASED STRENGTH, ENDURANCE, AND MOBILITY: ICD-10-CM

## 2023-10-27 DIAGNOSIS — M53.86 DECREASED RANGE OF MOTION OF LUMBAR SPINE: ICD-10-CM

## 2023-10-27 PROCEDURE — 97110 THERAPEUTIC EXERCISES: CPT | Mod: HCNC | Performed by: PHYSICAL THERAPIST

## 2023-10-27 PROCEDURE — 97530 THERAPEUTIC ACTIVITIES: CPT | Mod: HCNC | Performed by: PHYSICAL THERAPIST

## 2023-10-27 PROCEDURE — 97112 NEUROMUSCULAR REEDUCATION: CPT | Mod: HCNC | Performed by: PHYSICAL THERAPIST

## 2023-10-27 NOTE — PROGRESS NOTES
PT/PTA met face to face to discuss pt's treatment plan and progress towards established goals. Pt will be seen by a physical therapist minimally every 6th visit or every 30 days.        Steve Rosales PTA

## 2023-10-27 NOTE — PROGRESS NOTES
[facility]   Physical Therapy Treatment Note        Name: Mylse Pride  Clinic Number: [id number]    Therapy Diagnosis:   Encounter Diagnoses   Name Primary?    S/P lumbar fusion Yes    Decreased range of motion of lumbar spine     Lumbar stenosis with neurogenic claudication     Decreased strength, endurance, and mobility      Physician: Lyle Lobato MD    Visit Date: 10/27/2023    Physician Orders: PT Eval and Treat  Medical Diagnosis from Referral: s/p lumbar fusion, lumbar stenosis with neurogenic claudication   Evaluation Date: 10/12/2023  Authorization Period Expiration: 12/31/2023  Plan of Care Expiration: 1/10/2024  Progress Note Due: 11/11/2023  Visit # / Visits authorized: 4/20 (+eval)   FOTO: 1/3 (last performed on 10/12/2023)     Precautions: Standard and post op precautions     **SURGERY DATE: lumbar fusion L3/4, L 4/5. 9/6/2023**  PTA Visit #: 0/5     Time In: 1433  Time Out: 1518  Total Billable Time: 44 minutes (Billing reflects 1 on 1 treatment time spent with patient)    Subjective     Patient reports: feeling pretty good today. No complaints today.     He/She was compliant with home exercise program.  Response to previous treatment: no adverse reaction  Functional change: none noted yet    Pain: 0/10  right hip   Location: low back    Objective      Objective Measures updated at progress report or POC update only unless otherwise noted.       Treatment     Myles received the treatments listed below:       THERAPEUTIC EXERCISES to develop strength, endurance, ROM, flexibility, posture, and core stabilization for (6) minutes including:    Intervention Performed Today    Nustep for ROM and strength x 6', level 4   Heel Digs/DKTC on stability ball  20x   LAQ  30x each LE                              Plan for Next Visit:          NEUROMUSCULAR RE-EDUCATION ACTIVITIES to improve Balance, Coordination, Kinesthetic, Sense, Proprioception, and Posture for (15)  "minutes.  The following were included:    Intervention Performed Today    Abdominal brace  20x, 3' holds on and off   Glute sets  20x, 3" holds on and off   Clams - sidelying  x 20x each LE   Bridges - with abdominal brace  x 2 x 10, small range not past neutral   SLR with ab brace  x 2 x 10 each LE                    Plan for Next Visit:        THERAPEUTIC ACTIVITIES to improve dynamic and functional  performance for (18) minutes including:    Intervention Performed Today    Shuttle Squats  x 3 x 10, 6 bands   Standing Hip 3-way  x 10x each LE   Paloff Press (added) x 2 x 10, 3" holds each direction   Mini squats (added) x 2 x 10                         Plan for Next Visit:         MANUAL THERAPY TECHNIQUES were applied for (6) minutes, including:    Manual Intervention Performed Today    Soft Tissue Mobilization [x] bilateral  paraspinals, quadratus lumborum   Joint Mobilizations []     []     []    Functional Dry Needling  []      Plan for Next Visit: Continue as needed         Patient Education and Home Exercises       Home Exercises Provided and Patient Education Provided     Education provided: (included in treatment section) minutes  PURPOSE: Patient educated on the impairments noted above and the effects of physical therapy intervention to improve overall condition and QOL.   EXERCISE: Patient was educated on all the above exercise prior/during/after for proper posture, positioning, and execution for safe performance with home exercise program.   STRENGTH: Patient educated on the importance of improved core and extremity strength in order to improve alignment of the spine and extremities with static positions and dynamic movement.   POSTURE: Patient educated on postural awareness to reduce stress and maintain optimal alignment of the spine with static positions and dynamic movement     Written Home Exercises Provided: yes.  Exercises were reviewed and Myles was able to demonstrate them prior to the end of " the session.  Myles demonstrated good  understanding of the education provided. See EMR under Patient Instructions for exercises provided during therapy sessions.    Assessment     Patient tolerated treatment very well and was able to be progressed again this visit without issue. Added mini squats for functional strengthening, and patient was able to maintain good form and core activation. Added Paloff Press for additional core strengthening and stabilization. Patient was also able to maintain very good form with this activity. He is progressing very well towards goals.     Myles is progressing well towards his goals.   Patient prognosis is Good.     Patient will continue to benefit from skilled outpatient physical therapy to address the deficits listed in the problem list box on initial evaluation, provide pt/family education and to maximize patient's level of independence in the home and community environment.     Patient's spiritual, cultural and educational needs considered and pt agreeable to plan of care and goals.     Anticipated Barriers for therapy: co-morbidities    Goals:  Short Term Goals:  6 weeks Status  Date Met   PAIN: Pt will report worst pain of 1/10 in order to progress toward max functional ability and improve quality of life. [x] Progressing  [] Met  [] Not Met     FUNCTION: Patient will demonstrate improved function as indicated by a score of greater than or equal to 52 out of 100 on FOTO. [x] Progressing  [] Met  [] Not Met     MOBILITY: Patient will improve AROM to 50% of stated goals, listed in objective measures above, in order to progress towards independence with functional activities.  [x] Progressing  [] Met  [] Not Met     STRENGTH: Patient will improve strength to 50% of stated goals, listed in objective measures above, in order to progress towards independence with functional activities. [x] Progressing  [] Met  [] Not Met     POSTURE: Patient will correct postural deviations in  sitting and standing, to decrease pain and promote long term stability.  [x] Progressing  [] Met  [] Not Met     GAIT: Patient will demonstrate improved gait mechanics in order to improve functional mobility, improve quality of life, and decrease risk of further injury or fall.  [x] Progressing  [] Met  [] Not Met     HEP: Patient will demonstrate independence with HEP in order to progress toward functional independence. [x] Progressing  [] Met  [] Not Met        Long Term Goals:  12 weeks Status Date Met   PAIN: Pt will report worst pain of 0/10 in order to progress toward max functional ability and improve quality of life [x] Progressing  [] Met  [] Not Met     FUNCTION: Patient will demonstrate improved function as indicated by a score of greater than or equal to 61 out of 100 on FOTO. [x] Progressing  [] Met  [] Not Met     MOBILITY: Patient will improve AROM to stated goals, listed in objective measures above, in order to return to maximal functional potential and improve quality of life.  [x] Progressing  [] Met  [] Not Met     STRENGTH: Patient will improve strength to stated goals, listed in objective measures above, in order to improve functional independence and quality of life.  [x] Progressing  [] Met  [] Not Met     GAIT: Patient will demonstrate normalized gait mechanics with minimal compensation in order to return to PLOF. [x] Progressing  [] Met  [] Not Met     Patient will return to normal ADL's, IADL's, community involvement, recreational activities, and work-related activities with less than or equal to 0/10 pain and maximal function.  [x] Progressing  [] Met  [] Not Met           Plan     Continue Plan of Care (POC) and progress per patient tolerance. See treatment section for details on planned progressions next session.    10/12/2023 (eval): Outpatient Physical Therapy 2 times weekly for 12 weeks to include any combination of the following interventions: virtual visits, dry needling,  modalities, electrical stimulation (IFC, Pre-Mod, Attended with Functional Dry Needling), Aquatic Therapy, Cervical/Lumbar Traction, Gait Training, Manual Therapy, Neuromuscular Re-ed, Patient Education, Self Care, Therapeutic Exercise, and Therapeutic Activites     Mady Bilyl, PT

## 2023-10-30 ENCOUNTER — CLINICAL SUPPORT (OUTPATIENT)
Dept: REHABILITATION | Facility: HOSPITAL | Age: 68
End: 2023-10-30
Payer: MEDICARE

## 2023-10-30 DIAGNOSIS — R68.89 DECREASED STRENGTH, ENDURANCE, AND MOBILITY: ICD-10-CM

## 2023-10-30 DIAGNOSIS — Z98.1 S/P LUMBAR FUSION: Primary | ICD-10-CM

## 2023-10-30 DIAGNOSIS — Z74.09 DECREASED STRENGTH, ENDURANCE, AND MOBILITY: ICD-10-CM

## 2023-10-30 DIAGNOSIS — M48.062 LUMBAR STENOSIS WITH NEUROGENIC CLAUDICATION: Chronic | ICD-10-CM

## 2023-10-30 DIAGNOSIS — R53.1 DECREASED STRENGTH, ENDURANCE, AND MOBILITY: ICD-10-CM

## 2023-10-30 DIAGNOSIS — M53.86 DECREASED RANGE OF MOTION OF LUMBAR SPINE: ICD-10-CM

## 2023-10-30 PROCEDURE — 97530 THERAPEUTIC ACTIVITIES: CPT | Mod: HCNC,CQ

## 2023-10-30 PROCEDURE — 97112 NEUROMUSCULAR REEDUCATION: CPT | Mod: HCNC,CQ

## 2023-10-30 PROCEDURE — 97110 THERAPEUTIC EXERCISES: CPT | Mod: HCNC,CQ

## 2023-10-30 NOTE — PROGRESS NOTES
[facility]   Physical Therapy Treatment Note        Name: Myles Pride  Clinic Number: [id number]    Therapy Diagnosis:   Encounter Diagnoses   Name Primary?    S/P lumbar fusion Yes    Decreased range of motion of lumbar spine     Lumbar stenosis with neurogenic claudication     Decreased strength, endurance, and mobility      Physician: Lyle Lobato MD    Visit Date: 10/30/2023    Physician Orders: PT Eval and Treat  Medical Diagnosis from Referral: s/p lumbar fusion, lumbar stenosis with neurogenic claudication   Evaluation Date: 10/12/2023  Authorization Period Expiration: 12/31/2023  Plan of Care Expiration: 1/10/2024  Progress Note Due: 11/11/2023  Visit # / Visits authorized: 5/20 (+eval)   FOTO: 1/3 (last performed on 10/12/2023)     Precautions: Standard and post op precautions     **SURGERY DATE: lumbar fusion L3/4, L 4/5. 9/6/2023**  PTA Visit #: 1/5     Time In: 1000  Time Out: 1045  Total Billable Time: 45 minutes (Billing reflects 1 on 1 treatment time spent with patient)    Subjective     Patient reports: he has been walking about 1 mile at [employer] and doing some exercises intermittently while walking. Feeling fatigue in his low back after walk and minimal discomfort    He was compliant with home exercise program.    Response to previous treatment: no adverse reaction    Functional change: doing more home chores and increased back flexibility but still some stiffness noted    Pain: 0/10  right hip   Location: low back    Objective      Objective Measures updated at progress report or POC update only unless otherwise noted.       Treatment     Myles received the treatments listed below:       THERAPEUTIC EXERCISES to develop strength, endurance, ROM, flexibility, posture, and core stabilization for (10) minutes including:    Intervention Performed Today    Nustep for ROM and strength x 6', level 4   Heel Digs/DKTC on stability ball x 20x   LONG ARCH QUAD  " x 30x each LOWER EXTREMITY    Lower trunk rotation  x 3 minutes                          Plan for Next Visit:          NEUROMUSCULAR RE-EDUCATION ACTIVITIES to improve Balance, Coordination, Kinesthetic, Sense, Proprioception, and Posture for (18) minutes.  The following were included:    Intervention Performed Today    Abdominal brace  20x, 3' holds on and off   Glute sets  20x, 3" holds on and off   Clams - sidelying  x 3 x 8 each LOWER EXTREMITY (increased)    Bridges - with abdominal brace  x 3 x 8 small range not past neutral (increased)    Straight leg raise with abdominal brace   x 2 x 10 bilateral    Abdominal brace  X  x X 10 alternating shoulder flexion  X 10 march in place                Plan for Next Visit:        THERAPEUTIC ACTIVITIES to improve dynamic and functional  performance for (17) minutes including:    Intervention Performed Today    Shuttle Squats  x 3 x 10, 6 bands   Standing Hip 3-way  X  X  x 10 x flexion bilateral   2 x 10 abduction bilateral   X 10 extension bilateral     Paloff Press   2 x 10, 3" holds each direction   Mini squats   2 x 10                         Plan for Next Visit:         MANUAL THERAPY TECHNIQUES were applied for (0) minutes, including:    Manual Intervention Performed Today    Soft Tissue Mobilization [] bilateral  paraspinals, quadratus lumborum   Joint Mobilizations []     []     []    Functional Dry Needling  []      Plan for Next Visit: Continue as needed         Patient Education and Home Exercises       Home Exercises Provided and Patient Education Provided     Education provided: (included in treatment section) minutes  PURPOSE: Patient educated on the impairments noted above and the effects of physical therapy intervention to improve overall condition and QOL.   EXERCISE: Patient was educated on all the above exercise prior/during/after for proper posture, positioning, and execution for safe performance with home exercise program.   STRENGTH: Patient " educated on the importance of improved core and extremity strength in order to improve alignment of the spine and extremities with static positions and dynamic movement.   POSTURE: Patient educated on postural awareness to reduce stress and maintain optimal alignment of the spine with static positions and dynamic movement     Written Home Exercises Provided: yes.  Exercises were reviewed and Myles was able to demonstrate them prior to the end of the session.  Myles demonstrated good  understanding of the education provided. See EMR under Patient Instructions for exercises provided during therapy sessions.    Assessment     Patient tolerated treatment very well and was able to be progressed again this visit without issue. Patient was also able to maintain good abdominal stability with all exercises. He is progressing very well towards goals.     Myles is progressing well towards his goals.   Patient prognosis is Good.     Patient will continue to benefit from skilled outpatient physical therapy to address the deficits listed in the problem list box on initial evaluation, provide pt/family education and to maximize patient's level of independence in the home and community environment.     Patient's spiritual, cultural and educational needs considered and pt agreeable to plan of care and goals.     Anticipated Barriers for therapy: co-morbidities    Goals:  Short Term Goals:  6 weeks Status  Date Met   PAIN: Pt will report worst pain of 1/10 in order to progress toward max functional ability and improve quality of life. [x] Progressing  [] Met  [] Not Met     FUNCTION: Patient will demonstrate improved function as indicated by a score of greater than or equal to 52 out of 100 on FOTO. [x] Progressing  [] Met  [] Not Met     MOBILITY: Patient will improve AROM to 50% of stated goals, listed in objective measures above, in order to progress towards independence with functional activities.  [x] Progressing  []  Met  [] Not Met     STRENGTH: Patient will improve strength to 50% of stated goals, listed in objective measures above, in order to progress towards independence with functional activities. [x] Progressing  [] Met  [] Not Met     POSTURE: Patient will correct postural deviations in sitting and standing, to decrease pain and promote long term stability.  [x] Progressing  [] Met  [] Not Met     GAIT: Patient will demonstrate improved gait mechanics in order to improve functional mobility, improve quality of life, and decrease risk of further injury or fall.  [x] Progressing  [] Met  [] Not Met     HEP: Patient will demonstrate independence with HEP in order to progress toward functional independence. [x] Progressing  [] Met  [] Not Met        Long Term Goals:  12 weeks Status Date Met   PAIN: Pt will report worst pain of 0/10 in order to progress toward max functional ability and improve quality of life [x] Progressing  [] Met  [] Not Met     FUNCTION: Patient will demonstrate improved function as indicated by a score of greater than or equal to 61 out of 100 on FOTO. [x] Progressing  [] Met  [] Not Met     MOBILITY: Patient will improve AROM to stated goals, listed in objective measures above, in order to return to maximal functional potential and improve quality of life.  [x] Progressing  [] Met  [] Not Met     STRENGTH: Patient will improve strength to stated goals, listed in objective measures above, in order to improve functional independence and quality of life.  [x] Progressing  [] Met  [] Not Met     GAIT: Patient will demonstrate normalized gait mechanics with minimal compensation in order to return to PLOF. [x] Progressing  [] Met  [] Not Met     Patient will return to normal ADL's, IADL's, community involvement, recreational activities, and work-related activities with less than or equal to 0/10 pain and maximal function.  [x] Progressing  [] Met  [] Not Met           Plan     Continue Plan of Care (POC)  and progress per patient tolerance. See treatment section for details on planned progressions next session.    10/12/2023 (eval): Outpatient Physical Therapy 2 times weekly for 12 weeks to include any combination of the following interventions: virtual visits, dry needling, modalities, electrical stimulation (IFC, Pre-Mod, Attended with Functional Dry Needling), Aquatic Therapy, Cervical/Lumbar Traction, Gait Training, Manual Therapy, Neuromuscular Re-ed, Patient Education, Self Care, Therapeutic Exercise, and Therapeutic Activites     Steve Rosales, PTA

## 2023-11-02 ENCOUNTER — CLINICAL SUPPORT (OUTPATIENT)
Dept: REHABILITATION | Facility: HOSPITAL | Age: 68
End: 2023-11-02
Payer: MEDICARE

## 2023-11-02 DIAGNOSIS — M53.86 DECREASED RANGE OF MOTION OF LUMBAR SPINE: ICD-10-CM

## 2023-11-02 DIAGNOSIS — Z74.09 DECREASED STRENGTH, ENDURANCE, AND MOBILITY: ICD-10-CM

## 2023-11-02 DIAGNOSIS — Z98.1 S/P LUMBAR FUSION: Primary | ICD-10-CM

## 2023-11-02 DIAGNOSIS — M48.062 LUMBAR STENOSIS WITH NEUROGENIC CLAUDICATION: Chronic | ICD-10-CM

## 2023-11-02 DIAGNOSIS — R53.1 DECREASED STRENGTH, ENDURANCE, AND MOBILITY: ICD-10-CM

## 2023-11-02 DIAGNOSIS — R68.89 DECREASED STRENGTH, ENDURANCE, AND MOBILITY: ICD-10-CM

## 2023-11-02 PROCEDURE — 97110 THERAPEUTIC EXERCISES: CPT | Mod: HCNC,CQ

## 2023-11-02 PROCEDURE — 97112 NEUROMUSCULAR REEDUCATION: CPT | Mod: HCNC,CQ

## 2023-11-02 PROCEDURE — 97530 THERAPEUTIC ACTIVITIES: CPT | Mod: HCNC,CQ

## 2023-11-02 NOTE — PROGRESS NOTES
[facility]   Physical Therapy Treatment Note        Name: Myles Pride  [facility] Number: [id number]    Therapy Diagnosis:   Encounter Diagnoses   Name Primary?    S/P lumbar fusion Yes    Decreased range of motion of lumbar spine     Lumbar stenosis with neurogenic claudication     Decreased strength, endurance, and mobility        Physician: Lyle Lobato MD    Visit Date: 11/2/2023    Physician Orders: PT Eval and Treat  Medical Diagnosis from Referral: s/p lumbar fusion, lumbar stenosis with neurogenic claudication   Evaluation Date: 10/12/2023  Authorization Period Expiration: 12/31/2023  Plan of Care Expiration: 1/10/2024  Progress Note Due: 11/11/2023  Visit # / Visits authorized: 6/20 (+eval)   FOTO: 1/3 (last performed on 10/12/2023)     Precautions: Standard and post op precautions     **SURGERY DATE: lumbar fusion L3/4, L 4/5. 9/6/2023**  PTA Visit #: 2/5     Time In: 1530  Time Out: 1615  Total Billable Time: 45 minutes (Billing reflects 1 on 1 treatment time spent with patient)    Subjective     Patient reports: he walked slightly farther and maybe a little more briskly and also did more exercises like he did in has last therapy session leaving him a little sore but with rest, he felt better and is feeling pretty good currently.     He was compliant with home exercise program.    Response to previous treatment: no adverse reaction    Functional change: doing more home chores and increased back flexibility but still some stiffness noted    Pain: 0/10  right hip   Location: low back    Objective      Objective Measures updated at progress report or POC update only unless otherwise noted.       Treatment     Myles received the treatments listed below:       THERAPEUTIC EXERCISES to develop strength, endurance, ROM, flexibility, posture, and core stabilization for (12) minutes including:    Intervention Performed Today    Nustep for ROM and strength  6', level 4  "  Exercise bike (added)  x 6 minutes    Heel Digs/DKTC on stability ball  20x   LONG ARCH QUAD  x 30x each LOWER EXTREMITY sitting no back support in clinic chair   Lower trunk rotation   3 minutes                          Plan for Next Visit:        NEUROMUSCULAR RE-EDUCATION ACTIVITIES to improve Balance, Coordination, Kinesthetic, Sense, Proprioception, and Posture for (15) minutes.  The following were included:    Intervention Performed Today    Abdominal brace  20x, 3' holds on and off   Glute sets  20x, 3" holds on and off   Clams - sidelying  x 3 x 8 each LOWER EXTREMITY    Bridges - with abdominal brace  x 3 x 10 small range not past neutral (increased)    Straight leg raise with abdominal brace   x 3 x 8 bilateral (increased)     Abdominal brace  X  x X 10 alternating shoulder flexion  X 10 march in place                Plan for Next Visit:        THERAPEUTIC ACTIVITIES to improve dynamic and functional  performance for (18) minutes including:    Intervention Performed Today    Shuttle Squats   3 x 10, 6 bands   Standing Hip 3-way  X  X  x 2 x 10 flexion bilateral (increased)   2 x 10 abduction bilateral (increased)   2 X 10 extension bilateral (increased)     Paloff Press   2 x 10, 3" holds each direction   Mini squats  x 3 x 10 (increased) holding onto parallel bars    Side to side steps (added)  x 2 minutes yellow band                    Plan for Next Visit:         MANUAL THERAPY TECHNIQUES were applied for (0) minutes, including:    Manual Intervention Performed Today    Soft Tissue Mobilization [] bilateral  paraspinals, quadratus lumborum   Joint Mobilizations []     []     []    Functional Dry Needling  []      Plan for Next Visit: Continue as needed         Patient Education and Home Exercises       Home Exercises Provided and Patient Education Provided     Education provided: (included in treatment section) minutes  PURPOSE: Patient educated on the impairments noted above and the effects of " physical therapy intervention to improve overall condition and QOL.   EXERCISE: Patient was educated on all the above exercise prior/during/after for proper posture, positioning, and execution for safe performance with home exercise program.   STRENGTH: Patient educated on the importance of improved core and extremity strength in order to improve alignment of the spine and extremities with static positions and dynamic movement.   POSTURE: Patient educated on postural awareness to reduce stress and maintain optimal alignment of the spine with static positions and dynamic movement     Written Home Exercises Provided: yes.  Exercises were reviewed and Myles was able to demonstrate them prior to the end of the session.  Myles demonstrated good  understanding of the education provided. See EMR under Patient Instructions for exercises provided during therapy sessions.    Assessment     Patient tolerated treatment very well and was able to be progressed again this visit without issue. Patient was also able to maintain good abdominal stability with all exercises. He is progressing very well towards goals. His squat technique after instructions were improved.     Myles is progressing well towards his goals.   Patient prognosis is Good.     Patient will continue to benefit from skilled outpatient physical therapy to address the deficits listed in the problem list box on initial evaluation, provide pt/family education and to maximize patient's level of independence in the home and community environment.     Patient's spiritual, cultural and educational needs considered and pt agreeable to plan of care and goals.     Anticipated Barriers for therapy: co-morbidities    Goals:  Short Term Goals:  6 weeks Status  Date Met   PAIN: Pt will report worst pain of 1/10 in order to progress toward max functional ability and improve quality of life. [x] Progressing  [] Met  [] Not Met     FUNCTION: Patient will demonstrate improved  function as indicated by a score of greater than or equal to 52 out of 100 on FOTO. [x] Progressing  [] Met  [] Not Met     MOBILITY: Patient will improve AROM to 50% of stated goals, listed in objective measures above, in order to progress towards independence with functional activities.  [x] Progressing  [] Met  [] Not Met     STRENGTH: Patient will improve strength to 50% of stated goals, listed in objective measures above, in order to progress towards independence with functional activities. [x] Progressing  [] Met  [] Not Met     POSTURE: Patient will correct postural deviations in sitting and standing, to decrease pain and promote long term stability.  [x] Progressing  [] Met  [] Not Met     GAIT: Patient will demonstrate improved gait mechanics in order to improve functional mobility, improve quality of life, and decrease risk of further injury or fall.  [x] Progressing  [] Met  [] Not Met     HEP: Patient will demonstrate independence with HEP in order to progress toward functional independence. [x] Progressing  [] Met  [] Not Met        Long Term Goals:  12 weeks Status Date Met   PAIN: Pt will report worst pain of 0/10 in order to progress toward max functional ability and improve quality of life [x] Progressing  [] Met  [] Not Met     FUNCTION: Patient will demonstrate improved function as indicated by a score of greater than or equal to 61 out of 100 on FOTO. [x] Progressing  [] Met  [] Not Met     MOBILITY: Patient will improve AROM to stated goals, listed in objective measures above, in order to return to maximal functional potential and improve quality of life.  [x] Progressing  [] Met  [] Not Met     STRENGTH: Patient will improve strength to stated goals, listed in objective measures above, in order to improve functional independence and quality of life.  [x] Progressing  [] Met  [] Not Met     GAIT: Patient will demonstrate normalized gait mechanics with minimal compensation in order to return to  PLOF. [x] Progressing  [] Met  [] Not Met     Patient will return to normal ADL's, IADL's, community involvement, recreational activities, and work-related activities with less than or equal to 0/10 pain and maximal function.  [x] Progressing  [] Met  [] Not Met           Plan     Continue Plan of Care (POC) and progress per patient tolerance. See treatment section for details on planned progressions next session.    10/12/2023 (eval): Outpatient Physical Therapy 2 times weekly for 12 weeks to include any combination of the following interventions: virtual visits, dry needling, modalities, electrical stimulation (IFC, Pre-Mod, Attended with Functional Dry Needling), Aquatic Therapy, Cervical/Lumbar Traction, Gait Training, Manual Therapy, Neuromuscular Re-ed, Patient Education, Self Care, Therapeutic Exercise, and Therapeutic Activites     Steve Rosales, PTA

## 2023-11-06 ENCOUNTER — OFFICE VISIT (OUTPATIENT)
Dept: DERMATOLOGY | Facility: CLINIC | Age: 68
End: 2023-11-06
Payer: MEDICARE

## 2023-11-06 DIAGNOSIS — L81.4 LENTIGINES: ICD-10-CM

## 2023-11-06 DIAGNOSIS — D48.9 NEOPLASM OF UNCERTAIN BEHAVIOR: ICD-10-CM

## 2023-11-06 DIAGNOSIS — B35.3 TINEA PEDIS, UNSPECIFIED LATERALITY: ICD-10-CM

## 2023-11-06 DIAGNOSIS — B35.1 ONYCHOMYCOSIS: ICD-10-CM

## 2023-11-06 DIAGNOSIS — L82.1 SEBORRHEIC KERATOSES: Primary | ICD-10-CM

## 2023-11-06 DIAGNOSIS — D18.00 HEMANGIOMA, UNSPECIFIED SITE: ICD-10-CM

## 2023-11-06 PROCEDURE — 11103 TANGNTL BX SKIN EA SEP/ADDL: CPT | Mod: HCNC,S$GLB,, | Performed by: PHYSICIAN ASSISTANT

## 2023-11-06 PROCEDURE — 3044F HG A1C LEVEL LT 7.0%: CPT | Mod: HCNC,CPTII,S$GLB, | Performed by: PHYSICIAN ASSISTANT

## 2023-11-06 PROCEDURE — 1159F MED LIST DOCD IN RCRD: CPT | Mod: HCNC,CPTII,S$GLB, | Performed by: PHYSICIAN ASSISTANT

## 2023-11-06 PROCEDURE — 11102 PR TANGENTIAL BIOPSY, SKIN, SINGLE LESION: ICD-10-PCS | Mod: HCNC,S$GLB,, | Performed by: PHYSICIAN ASSISTANT

## 2023-11-06 PROCEDURE — 1160F RVW MEDS BY RX/DR IN RCRD: CPT | Mod: HCNC,CPTII,S$GLB, | Performed by: PHYSICIAN ASSISTANT

## 2023-11-06 PROCEDURE — 99999 PR PBB SHADOW E&M-EST. PATIENT-LVL III: CPT | Mod: PBBFAC,HCNC,, | Performed by: PHYSICIAN ASSISTANT

## 2023-11-06 PROCEDURE — 11102 TANGNTL BX SKIN SINGLE LES: CPT | Mod: HCNC,S$GLB,, | Performed by: PHYSICIAN ASSISTANT

## 2023-11-06 PROCEDURE — 88305 TISSUE EXAM BY PATHOLOGIST: ICD-10-PCS | Mod: 26,HCNC,, | Performed by: PATHOLOGY

## 2023-11-06 PROCEDURE — 1159F PR MEDICATION LIST DOCUMENTED IN MEDICAL RECORD: ICD-10-PCS | Mod: HCNC,CPTII,S$GLB, | Performed by: PHYSICIAN ASSISTANT

## 2023-11-06 PROCEDURE — 99204 OFFICE O/P NEW MOD 45 MIN: CPT | Mod: 25,HCNC,S$GLB, | Performed by: PHYSICIAN ASSISTANT

## 2023-11-06 PROCEDURE — 11103 PR TANGENTIAL BIOPSY, SKIN, EA ADDTL LESION: ICD-10-PCS | Mod: HCNC,S$GLB,, | Performed by: PHYSICIAN ASSISTANT

## 2023-11-06 PROCEDURE — 88305 TISSUE EXAM BY PATHOLOGIST: CPT | Mod: HCNC | Performed by: PATHOLOGY

## 2023-11-06 PROCEDURE — 1160F PR REVIEW ALL MEDS BY PRESCRIBER/CLIN PHARMACIST DOCUMENTED: ICD-10-PCS | Mod: HCNC,CPTII,S$GLB, | Performed by: PHYSICIAN ASSISTANT

## 2023-11-06 PROCEDURE — 88305 TISSUE EXAM BY PATHOLOGIST: CPT | Mod: 26,HCNC,, | Performed by: PATHOLOGY

## 2023-11-06 PROCEDURE — 3044F PR MOST RECENT HEMOGLOBIN A1C LEVEL <7.0%: ICD-10-PCS | Mod: HCNC,CPTII,S$GLB, | Performed by: PHYSICIAN ASSISTANT

## 2023-11-06 PROCEDURE — 1125F PR PAIN SEVERITY QUANTIFIED, PAIN PRESENT: ICD-10-PCS | Mod: HCNC,CPTII,S$GLB, | Performed by: PHYSICIAN ASSISTANT

## 2023-11-06 PROCEDURE — 99204 PR OFFICE/OUTPT VISIT, NEW, LEVL IV, 45-59 MIN: ICD-10-PCS | Mod: 25,HCNC,S$GLB, | Performed by: PHYSICIAN ASSISTANT

## 2023-11-06 PROCEDURE — 1125F AMNT PAIN NOTED PAIN PRSNT: CPT | Mod: HCNC,CPTII,S$GLB, | Performed by: PHYSICIAN ASSISTANT

## 2023-11-06 PROCEDURE — 99999 PR PBB SHADOW E&M-EST. PATIENT-LVL III: ICD-10-PCS | Mod: PBBFAC,HCNC,, | Performed by: PHYSICIAN ASSISTANT

## 2023-11-06 RX ORDER — KETOCONAZOLE 20 MG/G
CREAM TOPICAL 2 TIMES DAILY
Qty: 60 G | Refills: 0 | Status: SHIPPED | OUTPATIENT
Start: 2023-11-06

## 2023-11-06 RX ORDER — CICLOPIROX 80 MG/ML
SOLUTION TOPICAL
Qty: 6.6 ML | Refills: 6 | Status: SHIPPED | OUTPATIENT
Start: 2023-11-06 | End: 2024-02-06

## 2023-11-06 NOTE — PROGRESS NOTES
Subjective:      Patient ID:  Myles Pride is a 68 y.o. male who presents for   Chief Complaint   Patient presents with    Skin Check     Full body, skin exam. Skin lesion on shoulder, lesion on r knee.     Fungus     Toenail fungus. X previous toenail fungus.      Hx of SK, angioma, last seen in 2018 by DR. Barnard. Here for c/o spot of shoulder.  History of Present Illness: The patient presents with chief complaint of spot.  Location: right shoulder  Duration: several months to years  Signs/Symptoms: raised, rough    Prior treatments: none    C/o spot of right knee, wart like, raised, rough.    C/o spot of mid chestl    The patient denies personal.  + family history of skin cancer- brother (?NMSC)          Review of Systems   Constitutional:  Negative for fever and chills.   Gastrointestinal:  Negative for nausea and vomiting.   Skin:  Positive for activity-related sunscreen use. Negative for itching, rash, dry skin, daily sunscreen use, recent sunburn and dry lips.   Hematologic/Lymphatic: Does not bruise/bleed easily.       Objective:   Physical Exam   Constitutional: He appears well-developed and well-nourished. No distress.   Neurological: He is alert and oriented to person, place, and time. He is not disoriented.   Psychiatric: He has a normal mood and affect.   Skin:   Areas Examined (abnormalities noted in diagram):   Scalp / Hair Palpated and Inspected  Head / Face Inspection Performed  Neck Inspection Performed  Chest / Axilla Inspection Performed  Abdomen Inspection Performed  Genitals / Buttocks / Groin Inspection Performed  Back Inspection Performed  RUE Inspected  LUE Inspection Performed  RLE Inspected  LLE Inspection Performed  Nails and Digits Inspection Performed                     Diagram Legend     Erythematous scaling macule/papule c/w actinic keratosis       Vascular papule c/w angioma      Pigmented verrucoid papule/plaque c/w seborrheic keratosis      Yellow umbilicated papule c/w  sebaceous hyperplasia      Irregularly shaped tan macule c/w lentigo     1-2 mm smooth white papules consistent with Milia      Movable subcutaneous cyst with punctum c/w epidermal inclusion cyst      Subcutaneous movable cyst c/w pilar cyst      Firm pink to brown papule c/w dermatofibroma      Pedunculated fleshy papule(s) c/w skin tag(s)      Evenly pigmented macule c/w junctional nevus     Mildly variegated pigmented, slightly irregular-bordered macule c/w mildly atypical nevus      Flesh colored to evenly pigmented papule c/w intradermal nevus       Pink pearly papule/plaque c/w basal cell carcinoma      Erythematous hyperkeratotic cursted plaque c/w SCC      Surgical scar with no sign of skin cancer recurrence      Open and closed comedones      Inflammatory papules and pustules      Verrucoid papule consistent consistent with wart     Erythematous eczematous patches and plaques     Dystrophic onycholytic nail with subungual debris c/w onychomycosis     Umbilicated papule    Erythematous-base heme-crusted tan verrucoid plaque consistent with inflamed seborrheic keratosis     Erythematous Silvery Scaling Plaque c/w Psoriasis     See annotation      Assessment / Plan:      Pathology Orders:       Normal Orders This Visit    Specimen to Pathology, Dermatology     Comments:    Number of Specimens:->2  ------------------------->-------------------------  Spec 1 Procedure:->Biopsy  Spec 1 Clinical Impression:->pigmented lesion r/o atypia  Spec 1 Source:->left shoulder  ------------------------->-------------------------  Spec 2 Procedure:->Biopsy  Spec 2 Clinical Impression:->pigmented lesion r/o atypia  Spec 2 Source:->mid-chest  Release to patient->Immediate    Questions:    Procedure Type: Dermatology and skin neoplasms    Number of Specimens: 2    ------------------------: -------------------------    Spec 1 Procedure: Biopsy    Spec 1 Clinical Impression: pigmented lesion r/o atypia    Spec 1 Source: left  shoulder    ------------------------: -------------------------    Spec 2 Procedure: Biopsy    Spec 2 Clinical Impression: pigmented lesion r/o atypia    Spec 2 Source: mid-chest    Clinical Information: dark brown, black stuck on papule of left shoulder; irregular pink and brown thin plaque (~1.5 cm x 1 cm) mid-chest    Release to patient: Immediate          Seborrheic keratoses  Reassurance given.  Lesions are benign.    Lentigines  Encouraged photoprotection.    Hemangioma, unspecified site  Reassurance given.  Lesions are benign.    Onychomycosis  -     ciclopirox (PENLAC) 8 % Soln; Apply to nail and nail fold once daily for up to 1 year  Dispense: 6.6 mL; Refill: 6  Trial of above. He declines consideration of po meds due to concern for side effects. Discussed duration of tx and potential 18 months for clearing of nails. Would reconsider compounded rx in future ([facility] vs. [facility]).    Tinea pedis, unspecified laterality  -     ketoconazole (NIZORAL) 2 % cream; Apply topically 2 (two) times daily. For feet and web-spaces. Use for up to 4 weeks.  Dispense: 60 g; Refill: 0    Neoplasm of uncertain behavior  -     Specimen to Pathology, Dermatology  PROCEDURE NOTE - SHAVE BIOPSY   Location: 2 sites: a) left shoulder b) mid-chest    After risk, benefits, and alternatives were discussed with the patient, the patient agrees to the procedure by verbal informed consent.  The area(s) were cleansed with alcohol. 1.5 cc of lidocaine 1% with epinephrine was injected for local anesthesia into each lesion(s).  A sharp dermablade was used to remove part or all of the lesion(s).  The specimen(s) will be sent for tissue pathology.  Hemostasis was obtained with aluminum chloride and/or hyfrecation.  The area(s) were dressed with vaseline ointment and bandaged.  The patient tolerated the procedure well without adverse events.  Wound care instructions were given to the patient on the AVS.  The patient will be  notified of pathology results once available. Results will also be available in Epic.             Follow up for call for results.

## 2023-11-07 ENCOUNTER — CLINICAL SUPPORT (OUTPATIENT)
Dept: REHABILITATION | Facility: HOSPITAL | Age: 68
End: 2023-11-07
Payer: MEDICARE

## 2023-11-07 DIAGNOSIS — R68.89 DECREASED STRENGTH, ENDURANCE, AND MOBILITY: ICD-10-CM

## 2023-11-07 DIAGNOSIS — M48.062 LUMBAR STENOSIS WITH NEUROGENIC CLAUDICATION: Chronic | ICD-10-CM

## 2023-11-07 DIAGNOSIS — R53.1 DECREASED STRENGTH, ENDURANCE, AND MOBILITY: ICD-10-CM

## 2023-11-07 DIAGNOSIS — Z98.1 S/P LUMBAR FUSION: Primary | ICD-10-CM

## 2023-11-07 DIAGNOSIS — M53.86 DECREASED RANGE OF MOTION OF LUMBAR SPINE: ICD-10-CM

## 2023-11-07 DIAGNOSIS — Z74.09 DECREASED STRENGTH, ENDURANCE, AND MOBILITY: ICD-10-CM

## 2023-11-07 PROCEDURE — 97110 THERAPEUTIC EXERCISES: CPT | Mod: HCNC | Performed by: PHYSICAL THERAPIST

## 2023-11-07 PROCEDURE — 97530 THERAPEUTIC ACTIVITIES: CPT | Mod: HCNC | Performed by: PHYSICAL THERAPIST

## 2023-11-07 PROCEDURE — 97112 NEUROMUSCULAR REEDUCATION: CPT | Mod: HCNC | Performed by: PHYSICAL THERAPIST

## 2023-11-07 NOTE — PROGRESS NOTES
[facility]   Physical Therapy Treatment Note        Name: Myles Pride  [facility] Number: [id number]    Therapy Diagnosis:   Encounter Diagnoses   Name Primary?    S/P lumbar fusion Yes    Decreased range of motion of lumbar spine     Lumbar stenosis with neurogenic claudication     Decreased strength, endurance, and mobility        Physician: Lyle Lobato MD    Visit Date: 11/7/2023    Physician Orders: PT Eval and Treat  Medical Diagnosis from Referral: s/p lumbar fusion, lumbar stenosis with neurogenic claudication   Evaluation Date: 10/12/2023  Authorization Period Expiration: 12/31/2023  Plan of Care Expiration: 1/10/2024  Progress Note Due: 11/11/2023  Visit # / Visits authorized: 7/20 (+eval)   FOTO: 1/3 (last performed on 10/12/2023)     Precautions: Standard and post op precautions     **SURGERY DATE: lumbar fusion L3/4, L 4/5. 9/6/2023**    PTA Visit #: 2/5     Time In: 1348  Time Out: 1445  Total Billable Time: 53 minutes (Billing reflects 1 on 1 treatment time spent with patient)    Subjective     Patient reports: he is doing pretty good. Some stiffness noted when he first gets up from a chair after sitting for a while. However, his back hasn't been feeling too bad.     He was compliant with home exercise program.    Response to previous treatment: no adverse reaction    Functional change: doing more home chores and increased back flexibility but still some stiffness noted    Pain: 0/10  right hip   Location: low back    Objective      Objective Measures updated at progress report or POC update only unless otherwise noted.       Treatment     Myles received the treatments listed below:       THERAPEUTIC EXERCISES to develop strength, endurance, ROM, flexibility, posture, and core stabilization for (12) minutes including:    Intervention Performed Today    Nustep for ROM and strength x 6', level 5   Exercise bike (added)   6 minutes    Heel Digs/DKTC on stability  "ball  20x   LONG ARCH QUAD  time 30x each LOWER EXTREMITY sitting no back support in clinic chair   Lower trunk rotation   3 minutes    Hip flexor stretch x Demonstrated in prone and EOM                    Plan for Next Visit:        NEUROMUSCULAR RE-EDUCATION ACTIVITIES to improve Balance, Coordination, Kinesthetic, Sense, Proprioception, and Posture for (27) minutes.  The following were included:    Intervention Performed Today    Abdominal brace  20x, 3' holds on and off   Glute sets  20x, 3" holds on and off   Clams - sidelying   3 x 8 each LOWER EXTREMITY    Sidelying hip abduction (added) x 10x each side, PT assist required when lifting R LE   Bridges - with abdominal brace  x 3 x 10 small range not past neutral (increased)    Straight leg raise with abdominal brace   x 3 x 8 bilateral    Abdominal brace  X  x X 10 alternating shoulder flexion  X 10 march in place    Isometric trunk rotation (added) x 10 x 5" holds each direction          Plan for Next Visit: prone hip extension       THERAPEUTIC ACTIVITIES to improve dynamic and functional  performance for (14) minutes including:    Intervention Performed Today    Shuttle Squats   3 x 10, 6 bands   Standing Hip 3-way  X  X  x 2 x 10 flexion bilateral (increased)   2 x 10 abduction bilateral (increased)   2 X 10 extension bilateral (increased)     Paloff Press   2 x 10, 3" holds each direction   Mini squats  x 3 x 10 holding onto parallel bars    Side to side steps  x 2 minutes yellow band                    Plan for Next Visit:         MANUAL THERAPY TECHNIQUES were applied for (0) minutes, including:    Manual Intervention Performed Today    Soft Tissue Mobilization [] bilateral  paraspinals, quadratus lumborum   Joint Mobilizations []     []     []    Functional Dry Needling  []      Plan for Next Visit: Continue as needed         Patient Education and Home Exercises       Home Exercises Provided and Patient Education Provided     Education provided: " (included in treatment section) minutes  PURPOSE: Patient educated on the impairments noted above and the effects of physical therapy intervention to improve overall condition and QOL.   EXERCISE: Patient was educated on all the above exercise prior/during/after for proper posture, positioning, and execution for safe performance with home exercise program.   STRENGTH: Patient educated on the importance of improved core and extremity strength in order to improve alignment of the spine and extremities with static positions and dynamic movement.   POSTURE: Patient educated on postural awareness to reduce stress and maintain optimal alignment of the spine with static positions and dynamic movement     Written Home Exercises Provided: yes.  Exercises were reviewed and Myles was able to demonstrate them prior to the end of the session.  Myles demonstrated good  understanding of the education provided. See EMR under Patient Instructions for exercises provided during therapy sessions.    Assessment     Patient tolerated treatment very well and was able to be progressed again this visit without issue. Patient was also able to maintain good abdominal stability with all exercises. He is progressing very well towards goals. He required less cues on form with squats. Patient presents tight hip flexors, especially R LE, as noted when trying to bring R LE into hip extension during sidelying hip abduction. Discussed prone and edge of mat hip flexor stretches for patient to work on at home, and he demonstrated understanding of each. This could contribute to flexed hip posture, as well as decreased posterior chain/extensor strength and endurance, and also possibly due to leg length. Educated patient to focus on better upright posture throughout his day, and he expressed understanding.     Myles is progressing well towards his goals.   Patient prognosis is Good.     Patient will continue to benefit from skilled outpatient  physical therapy to address the deficits listed in the problem list box on initial evaluation, provide pt/family education and to maximize patient's level of independence in the home and community environment.     Patient's spiritual, cultural and educational needs considered and pt agreeable to plan of care and goals.     Anticipated Barriers for therapy: co-morbidities    Goals:  Short Term Goals:  6 weeks Status  Date Met   PAIN: Pt will report worst pain of 1/10 in order to progress toward max functional ability and improve quality of life. [x] Progressing  [] Met  [] Not Met     FUNCTION: Patient will demonstrate improved function as indicated by a score of greater than or equal to 52 out of 100 on FOTO. [x] Progressing  [] Met  [] Not Met     MOBILITY: Patient will improve AROM to 50% of stated goals, listed in objective measures above, in order to progress towards independence with functional activities.  [x] Progressing  [] Met  [] Not Met     STRENGTH: Patient will improve strength to 50% of stated goals, listed in objective measures above, in order to progress towards independence with functional activities. [x] Progressing  [] Met  [] Not Met     POSTURE: Patient will correct postural deviations in sitting and standing, to decrease pain and promote long term stability.  [x] Progressing  [] Met  [] Not Met     GAIT: Patient will demonstrate improved gait mechanics in order to improve functional mobility, improve quality of life, and decrease risk of further injury or fall.  [x] Progressing  [] Met  [] Not Met     HEP: Patient will demonstrate independence with HEP in order to progress toward functional independence. [x] Progressing  [] Met  [] Not Met        Long Term Goals:  12 weeks Status Date Met   PAIN: Pt will report worst pain of 0/10 in order to progress toward max functional ability and improve quality of life [x] Progressing  [] Met  [] Not Met     FUNCTION: Patient will demonstrate improved  function as indicated by a score of greater than or equal to 61 out of 100 on FOTO. [x] Progressing  [] Met  [] Not Met     MOBILITY: Patient will improve AROM to stated goals, listed in objective measures above, in order to return to maximal functional potential and improve quality of life.  [x] Progressing  [] Met  [] Not Met     STRENGTH: Patient will improve strength to stated goals, listed in objective measures above, in order to improve functional independence and quality of life.  [x] Progressing  [] Met  [] Not Met     GAIT: Patient will demonstrate normalized gait mechanics with minimal compensation in order to return to PLOF. [x] Progressing  [] Met  [] Not Met     Patient will return to normal ADL's, IADL's, community involvement, recreational activities, and work-related activities with less than or equal to 0/10 pain and maximal function.  [x] Progressing  [] Met  [] Not Met           Plan     Continue Plan of Care (POC) and progress per patient tolerance. See treatment section for details on planned progressions next session.    10/12/2023 (eval): Outpatient Physical Therapy 2 times weekly for 12 weeks to include any combination of the following interventions: virtual visits, dry needling, modalities, electrical stimulation (IFC, Pre-Mod, Attended with Functional Dry Needling), Aquatic Therapy, Cervical/Lumbar Traction, Gait Training, Manual Therapy, Neuromuscular Re-ed, Patient Education, Self Care, Therapeutic Exercise, and Therapeutic Activites     Mady Billy, PT

## 2023-11-10 ENCOUNTER — CLINICAL SUPPORT (OUTPATIENT)
Dept: REHABILITATION | Facility: HOSPITAL | Age: 68
End: 2023-11-10
Payer: MEDICARE

## 2023-11-10 DIAGNOSIS — Z74.09 DECREASED STRENGTH, ENDURANCE, AND MOBILITY: ICD-10-CM

## 2023-11-10 DIAGNOSIS — Z98.1 S/P LUMBAR FUSION: Primary | ICD-10-CM

## 2023-11-10 DIAGNOSIS — M53.86 DECREASED RANGE OF MOTION OF LUMBAR SPINE: ICD-10-CM

## 2023-11-10 DIAGNOSIS — R53.1 DECREASED STRENGTH, ENDURANCE, AND MOBILITY: ICD-10-CM

## 2023-11-10 DIAGNOSIS — M48.062 LUMBAR STENOSIS WITH NEUROGENIC CLAUDICATION: Chronic | ICD-10-CM

## 2023-11-10 DIAGNOSIS — R68.89 DECREASED STRENGTH, ENDURANCE, AND MOBILITY: ICD-10-CM

## 2023-11-10 PROCEDURE — 97530 THERAPEUTIC ACTIVITIES: CPT | Mod: HCNC | Performed by: PHYSICAL THERAPIST

## 2023-11-10 PROCEDURE — 97112 NEUROMUSCULAR REEDUCATION: CPT | Mod: HCNC | Performed by: PHYSICAL THERAPIST

## 2023-11-10 NOTE — PROGRESS NOTES
[facility]   Physical Therapy Treatment Note        Name: Myles Pride  [facility] Number: [id number]    Therapy Diagnosis:   Encounter Diagnoses   Name Primary?    S/P lumbar fusion Yes    Decreased range of motion of lumbar spine     Lumbar stenosis with neurogenic claudication     Decreased strength, endurance, and mobility        Physician: Lyle Lobato MD    Visit Date: 11/10/2023    Physician Orders: PT Eval and Treat  Medical Diagnosis from Referral: s/p lumbar fusion, lumbar stenosis with neurogenic claudication   Evaluation Date: 10/12/2023  Authorization Period Expiration: 12/31/2023  Plan of Care Expiration: 1/10/2024  Progress Note Due: 11/11/2023  Visit # / Visits authorized: 8/20 (+eval)   FOTO: 1/3 (last performed on 10/12/2023)     Precautions: Standard and post op precautions     **SURGERY DATE: lumbar fusion L3/4, L 4/5. 9/6/2023**    PTA Visit #: 0/5     Time In: 0916  Time Out: 1005  Total Billable Time: 48 minutes (Billing reflects 1 on 1 treatment time spent with patient)    Subjective     Patient reports: he had a little soreness following last visit but has still been feeling good overall.     He was compliant with home exercise program.    Response to previous treatment: no adverse reaction    Functional change: doing more home chores and increased back flexibility but still some stiffness noted    Pain: 0/10  right hip   Location: low back    Objective      Objective Measures updated at progress report or POC update only unless otherwise noted.       Treatment     Myles received the treatments listed below:       THERAPEUTIC EXERCISES to develop strength, endurance, ROM, flexibility, posture, and core stabilization for (7) minutes including:    Intervention Performed Today    Nustep for ROM and strength x 6', level 5   Exercise bike (added)   6 minutes    Heel Digs/DKTC on stability ball  20x   LONG ARCH QUAD   30x each LOWER EXTREMITY sitting no back  "support in clinic chair   Lower trunk rotation   3 minutes    Hip flexor stretch  Demonstrated in prone and EOM                    Plan for Next Visit:        NEUROMUSCULAR RE-EDUCATION ACTIVITIES to improve Balance, Coordination, Kinesthetic, Sense, Proprioception, and Posture for (26) minutes.  The following were included:    Intervention Performed Today    Abdominal brace  20x, 3' holds on and off   Glute sets  20x, 3" holds on and off   Clams - sidelying   3 x 8 each LOWER EXTREMITY    Sidelying hip abduction  x 12x each side, PT assist required when lifting R LE   Bridges - with abdominal brace  x 3 x 10 small range not past neutral    Straight leg raise with abdominal brace   x 3 x 8 bilateral    Abdominal brace  X  x X 10 alternating shoulder flexion  X 10 march in place    Isometric trunk rotation  x 10 x 5" holds each direction   Quadruped alt LE (added) x 1 x 15 each LE     Plan for Next Visit: prone hip extension       THERAPEUTIC ACTIVITIES to improve dynamic and functional  performance for (15) minutes including:    Intervention Performed Today    Shuttle Squats   3 x 10, 6 bands   Standing Hip 3-way  X  X  x 2 x 10 flexion bilateral   2 x 10 abduction bilateral   2 X 10 extension bilateral    Paloff Press   2 x 10, 3" holds each direction   Mini squats  x 3 x 10 holding onto parallel bars    Side to side steps  x 2 minutes yellow band                    Plan for Next Visit:         MANUAL THERAPY TECHNIQUES were applied for (0) minutes, including:    Manual Intervention Performed Today    Soft Tissue Mobilization [] bilateral  paraspinals, quadratus lumborum   Joint Mobilizations []     []     []    Functional Dry Needling  []      Plan for Next Visit: Continue as needed         Patient Education and Home Exercises       Home Exercises Provided and Patient Education Provided     Education provided: (included in treatment section) minutes  PURPOSE: Patient educated on the impairments noted above and the " effects of physical therapy intervention to improve overall condition and QOL.   EXERCISE: Patient was educated on all the above exercise prior/during/after for proper posture, positioning, and execution for safe performance with home exercise program.   STRENGTH: Patient educated on the importance of improved core and extremity strength in order to improve alignment of the spine and extremities with static positions and dynamic movement.   POSTURE: Patient educated on postural awareness to reduce stress and maintain optimal alignment of the spine with static positions and dynamic movement     Written Home Exercises Provided: yes.  Exercises were reviewed and Myles was able to demonstrate them prior to the end of the session.  Myles demonstrated good  understanding of the education provided. See EMR under Patient Instructions for exercises provided during therapy sessions.    Assessment     Patient did well with treatment today. He was able to be progressed with quadruped alt LE activity, and he did very well. However, he did demonstrate core and hip weakness with the activity, especially with having to stabilize on L LE. Patient is progressing very well towards goals, but he would benefit from continued core, postural, and hip strengthening and stabilization.     Myles is progressing well towards his goals.   Patient prognosis is Good.     Patient will continue to benefit from skilled outpatient physical therapy to address the deficits listed in the problem list box on initial evaluation, provide pt/family education and to maximize patient's level of independence in the home and community environment.     Patient's spiritual, cultural and educational needs considered and pt agreeable to plan of care and goals.     Anticipated Barriers for therapy: co-morbidities    Goals:  Short Term Goals:  6 weeks Status  Date Met   PAIN: Pt will report worst pain of 1/10 in order to progress toward max functional ability  and improve quality of life. [x] Progressing  [] Met  [] Not Met     FUNCTION: Patient will demonstrate improved function as indicated by a score of greater than or equal to 52 out of 100 on FOTO. [x] Progressing  [] Met  [] Not Met     MOBILITY: Patient will improve AROM to 50% of stated goals, listed in objective measures above, in order to progress towards independence with functional activities.  [x] Progressing  [] Met  [] Not Met     STRENGTH: Patient will improve strength to 50% of stated goals, listed in objective measures above, in order to progress towards independence with functional activities. [x] Progressing  [] Met  [] Not Met     POSTURE: Patient will correct postural deviations in sitting and standing, to decrease pain and promote long term stability.  [x] Progressing  [] Met  [] Not Met     GAIT: Patient will demonstrate improved gait mechanics in order to improve functional mobility, improve quality of life, and decrease risk of further injury or fall.  [x] Progressing  [] Met  [] Not Met     HEP: Patient will demonstrate independence with HEP in order to progress toward functional independence. [x] Progressing  [] Met  [] Not Met        Long Term Goals:  12 weeks Status Date Met   PAIN: Pt will report worst pain of 0/10 in order to progress toward max functional ability and improve quality of life [x] Progressing  [] Met  [] Not Met     FUNCTION: Patient will demonstrate improved function as indicated by a score of greater than or equal to 61 out of 100 on FOTO. [x] Progressing  [] Met  [] Not Met     MOBILITY: Patient will improve AROM to stated goals, listed in objective measures above, in order to return to maximal functional potential and improve quality of life.  [x] Progressing  [] Met  [] Not Met     STRENGTH: Patient will improve strength to stated goals, listed in objective measures above, in order to improve functional independence and quality of life.  [x] Progressing  [] Met  [] Not  Met     GAIT: Patient will demonstrate normalized gait mechanics with minimal compensation in order to return to PLOF. [x] Progressing  [] Met  [] Not Met     Patient will return to normal ADL's, IADL's, community involvement, recreational activities, and work-related activities with less than or equal to 0/10 pain and maximal function.  [x] Progressing  [] Met  [] Not Met           Plan     Continue Plan of Care (POC) and progress per patient tolerance. See treatment section for details on planned progressions next session.    10/12/2023 (eval): Outpatient Physical Therapy 2 times weekly for 12 weeks to include any combination of the following interventions: virtual visits, dry needling, modalities, electrical stimulation (IFC, Pre-Mod, Attended with Functional Dry Needling), Aquatic Therapy, Cervical/Lumbar Traction, Gait Training, Manual Therapy, Neuromuscular Re-ed, Patient Education, Self Care, Therapeutic Exercise, and Therapeutic Activites     Mady Billy, PT

## 2023-11-13 ENCOUNTER — CLINICAL SUPPORT (OUTPATIENT)
Dept: REHABILITATION | Facility: HOSPITAL | Age: 68
End: 2023-11-13
Payer: MEDICARE

## 2023-11-13 DIAGNOSIS — Z98.1 S/P LUMBAR FUSION: Primary | ICD-10-CM

## 2023-11-13 DIAGNOSIS — R68.89 DECREASED STRENGTH, ENDURANCE, AND MOBILITY: ICD-10-CM

## 2023-11-13 DIAGNOSIS — M53.86 DECREASED RANGE OF MOTION OF LUMBAR SPINE: ICD-10-CM

## 2023-11-13 DIAGNOSIS — R53.1 DECREASED STRENGTH, ENDURANCE, AND MOBILITY: ICD-10-CM

## 2023-11-13 DIAGNOSIS — Z74.09 DECREASED STRENGTH, ENDURANCE, AND MOBILITY: ICD-10-CM

## 2023-11-13 DIAGNOSIS — M48.062 LUMBAR STENOSIS WITH NEUROGENIC CLAUDICATION: Chronic | ICD-10-CM

## 2023-11-13 PROCEDURE — 97110 THERAPEUTIC EXERCISES: CPT | Mod: HCNC | Performed by: PHYSICAL THERAPIST

## 2023-11-13 PROCEDURE — 97530 THERAPEUTIC ACTIVITIES: CPT | Mod: HCNC | Performed by: PHYSICAL THERAPIST

## 2023-11-13 PROCEDURE — 97112 NEUROMUSCULAR REEDUCATION: CPT | Mod: HCNC | Performed by: PHYSICAL THERAPIST

## 2023-11-13 NOTE — PLAN OF CARE
[facility]   Physical Therapy Treatment Note + Progress Note       Name: Myles Pride  Clinic Number: [id number]    Therapy Diagnosis:   Encounter Diagnoses   Name Primary?    S/P lumbar fusion Yes    Decreased range of motion of lumbar spine     Lumbar stenosis with neurogenic claudication     Decreased strength, endurance, and mobility        Physician: Lyle Lobato MD    Visit Date: 11/13/2023    Physician Orders: PT Eval and Treat  Medical Diagnosis from Referral: s/p lumbar fusion, lumbar stenosis with neurogenic claudication   Evaluation Date: 10/12/2023  Authorization Period Expiration: 12/31/2023  Plan of Care Expiration: 1/10/2024  Progress Note Due: 12/13/2023  Visit # / Visits authorized: 9/20 (+eval)   FOTO: 1/3 (last performed on 10/12/2023)     Precautions: Standard and post op precautions     **SURGERY DATE: lumbar fusion L3/4, L 4/5. 9/6/2023**    PTA Visit #: 0/5     Time In: 0918  Time Out: 1005  Total Billable Time: 45 minutes (Billing reflects 1 on 1 treatment time spent with patient)    Subjective     Patient reports: he is feeling okay today without any pain. He feels he is doing much better overall and feels he is getting stronger and returning to more functional activities with less limitation.     He was compliant with home exercise program.  Response to previous treatment: no adverse reaction  Functional change: doing more home chores and increased back flexibility but still some stiffness noted    Pain: 0/10  right hip   Location: low back    Objective      Objective Measures updated at progress report or POC update only unless otherwise noted.     RANGE OF MOTION:   Lumbar ROM Right  (spine) Left    Pain/Dysfunction with Movement Goal   Lumbar Flexion (60º) 25 --- No pain with ROM testing today  40   Lumbar Extension (30º) 0 ---   0   Lumbar Side Bending (25º) 15 10    20         STRENGTH:   L/E MMT Right Left Pain/Dysfunction with Movement Goal  Right   11/13/2023  Left  11/13/2023    Hip Flexion  4+/5 4+/5   4+/5 B 5 5   Hip Extension  NT NT   4+/5 B     Hip Abduction  4-/5 4-/5 Tested in seated today 4+/5 B     Knee Extension 4+/5 4+/5   5/5 B 5 5   Knee Flexion 5/5 5/5   5/5 B 5 5   Hip IR 4/5 4+/5  4+/5 B 4+ 4+   Hip ER 4/5 4+/5   4+/5 B 4+ 4+   Ankle DF 5/5 5/5   5/5 B 5 5   Ankle PF 5/5 5/5   5/5 B 5 5         MUSCLE LENGTH:   Muscle Tested  Right Left  Goal   Hip Flexors [] Normal  [x] Limited [] Normal  [x] Limited Normal B   ITB / TFL [] Normal  [x] Limited [] Normal  [x] Limited Normal B   Hamstrings  [] Normal  [x] Limited [] Normal  [x] Limited Normal B   Piriformis  [] Normal  [x] Limited [] Normal  [x] Limited Normal B   Gastrocnemius  [] Normal  [x] Limited [] Normal  [x] Limited Normal B   Soleus  [] Normal  [x] Limited [] Normal  [x] Limited Normal B   **Although still decreased, muscle length has improved as compared to previous visits.         SENSATION  [x] Intact to Light Touch                       [] Impaired:        PALPATION: Muscles: Increased tone noted with palpation of: bilateral paraspinals, quadratus lumborum, glutes, piriformis, but decreased as compared to previous visits. Incisional scar is well healed.        POSTURE:  Pt presents with postural abnormalities which include:               [x] Forward Head                                [] Increased Lumbar Lordosis              [x] Rounded Shoulder                         [] Genu Recurvatum              [] Increased Thoracic Kyphosis        [] Genu Valgus              [] Trunk Deviated                              [] Pes Planus              [] Scapular Winging                          [x] Other: decreased lumbar lordosis, flexed trunk posture           GAIT ANALYSIS The patient ambulated with the following assistive device: none; the pt presents with the following gait abnormalities: trendelenburg, bradykinetic, decreased step length bilateral, decreased hip extension  "bilateral, and flexed trunk .  11/13/2023: Patient now presents with improved anuel and step length. His bilateral hip extension is still limited but improved as well. Decreased trendelenburg gait. Patient still presents with flexed trunk posture, but has lorena working on postural awareness and correction.      FUNCTIONAL MOVEMENT PATTERNS  Movement Analysis Notes   Bed Mobility  []Functional  [x]Dysfunctional:  []Painful  [x]Non-Painful    Improving motor planning and core activation   Sit to Stand [x]Functional  []Dysfunctional:  []Painful  [x]Non-Painful    Improved motor planning and core activation   Squat [x]Functional  []Dysfunctional:  []Painful  [x]Non-Painful    Improving motor planning and core activation. Able to perform squat taps today to 24" box without issue.             Function:     Treatment     Myles received the treatments listed below:       THERAPEUTIC EXERCISES to develop strength, endurance, ROM, flexibility, posture, and core stabilization for (10) minutes including:    Intervention Performed Today    Nustep for ROM and strength x 6', level 5   Exercise bike (added)   6 minutes    Heel Digs/DKTC on stability ball  20x   LONG ARCH QUAD   30x each LOWER EXTREMITY sitting no back support in clinic chair   Lower trunk rotation   3 minutes    Hip flexor stretch  Demonstrated in prone and EOM             Re-assessment x Objective measurements, FOTO, pt education      Plan for Next Visit:        NEUROMUSCULAR RE-EDUCATION ACTIVITIES to improve Balance, Coordination, Kinesthetic, Sense, Proprioception, and Posture for (17) minutes.  The following were included:    Intervention Performed Today    Abdominal brace  20x, 3' holds on and off   Glute sets  20x, 3" holds on and off   Clams - sidelying   3 x 8 each LOWER EXTREMITY    Sidelying hip abduction  x 12x each side, PT assist required when lifting R LE   Bridges - with abdominal brace  x 3 x 10 small range not past neutral    Straight leg raise " "with abdominal brace   x 3 x 8 bilateral    Abdominal brace   X 10 alternating shoulder flexion  X 10 march in place    Isometric trunk rotation   10 x 5" holds each direction   Quadruped alt LE (added) x 1 x 15 each LE     Plan for Next Visit: prone hip extension       THERAPEUTIC ACTIVITIES to improve dynamic and functional  performance for (18) minutes including:    Intervention Performed Today    Shuttle Squats   3 x 10, 6 bands   Standing Hip 3-way  X  X  x 2 x 10 flexion bilateral   2 x 10 abduction bilateral   2 X 10 extension bilateral    Paloff Press  x 2 x 10, 3" holds each direction, blue theraband    Squat taps on 24" box (progressed) x 2 x 10    Side to side steps  x 2 minutes red band                    Plan for Next Visit:         MANUAL THERAPY TECHNIQUES were applied for (0) minutes, including:    Manual Intervention Performed Today    Soft Tissue Mobilization [] bilateral  paraspinals, quadratus lumborum   Joint Mobilizations []     []     []    Functional Dry Needling  []      Plan for Next Visit: Continue as needed         Patient Education and Home Exercises       Home Exercises Provided and Patient Education Provided     Education provided: (included in treatment section) minutes  PURPOSE: Patient educated on the impairments noted above and the effects of physical therapy intervention to improve overall condition and QOL.   EXERCISE: Patient was educated on all the above exercise prior/during/after for proper posture, positioning, and execution for safe performance with home exercise program.   STRENGTH: Patient educated on the importance of improved core and extremity strength in order to improve alignment of the spine and extremities with static positions and dynamic movement.   POSTURE: Patient educated on postural awareness to reduce stress and maintain optimal alignment of the spine with static positions and dynamic movement     Written Home Exercises Provided: yes.  Exercises were " reviewed and Myles was able to demonstrate them prior to the end of the session.  Myles demonstrated good  understanding of the education provided. See EMR under Patient Instructions for exercises provided during therapy sessions.    Assessment     Patient tolerated treatment well and has attended 10 total PT visits. He has made good overall progress with PT, demonstrating improvements in lumbar ROM, LE strength, and overall core stability. His postural awareness has improved as well, and he is focusing on better posture throughout his day and with gait. He reports decreased pain levels and improved overall function with less limitation. Patient would benefit from continued PT to address remaining limitations, especially functional strength and stability, in order to achieve max functional potential.     Myles is progressing well towards his goals.   Patient prognosis is Good.     Patient will continue to benefit from skilled outpatient physical therapy to address the deficits listed in the problem list box on initial evaluation, provide pt/family education and to maximize patient's level of independence in the home and community environment.     Patient's spiritual, cultural and educational needs considered and pt agreeable to plan of care and goals.     Anticipated Barriers for therapy: co-morbidities    Goals:  Short Term Goals:  6 weeks Status  Date Met   PAIN: Pt will report worst pain of 1/10 in order to progress toward max functional ability and improve quality of life. [] Progressing  [x] Met  [] Not Met 11/13/2023    FUNCTION: Patient will demonstrate improved function as indicated by a score of greater than or equal to 52 out of 100 on FOTO. [] Progressing  [x] Met  [] Not Met 11/13/2023     MOBILITY: Patient will improve AROM to 50% of stated goals, listed in objective measures above, in order to progress towards independence with functional activities.  [] Progressing  [x] Met  [] Not Met   11/13/2023    STRENGTH: Patient will improve strength to 50% of stated goals, listed in objective measures above, in order to progress towards independence with functional activities. [] Progressing  [x] Met  [] Not Met  11/13/2023    POSTURE: Patient will correct postural deviations in sitting and standing, to decrease pain and promote long term stability.  [] Progressing  [x] Met  [] Not Met  11/13/2023    GAIT: Patient will demonstrate improved gait mechanics in order to improve functional mobility, improve quality of life, and decrease risk of further injury or fall.  [] Progressing  [x] Met  [] Not Met  11/13/2023    HEP: Patient will demonstrate independence with HEP in order to progress toward functional independence. [] Progressing  [x] Met  [] Not Met  11/13/2023       Long Term Goals:  12 weeks Status Date Met   PAIN: Pt will report worst pain of 0/10 in order to progress toward max functional ability and improve quality of life [x] Progressing  [] Met  [] Not Met     FUNCTION: Patient will demonstrate improved function as indicated by a score of greater than or equal to 61 out of 100 on FOTO. [x] Progressing  [] Met  [] Not Met     MOBILITY: Patient will improve AROM to stated goals, listed in objective measures above, in order to return to maximal functional potential and improve quality of life.  [x] Progressing  [] Met  [] Not Met     STRENGTH: Patient will improve strength to stated goals, listed in objective measures above, in order to improve functional independence and quality of life.  [x] Progressing  [] Met  [] Not Met Partially Met  11/13/2023    GAIT: Patient will demonstrate normalized gait mechanics with minimal compensation in order to return to PLOF. [x] Progressing  [] Met  [] Not Met     Patient will return to normal ADL's, IADL's, community involvement, recreational activities, and work-related activities with less than or equal to 0/10 pain and maximal function.  [x] Progressing  []  Met  [] Not Met           Plan     Continue Plan of Care (POC) and progress per patient tolerance. See treatment section for details on planned progressions next session.    11/13/2023: It is my recommendation that patient continue with PT at his current frequency of 2 times per week for the remainder of his approved visits. His treatment plan will remain the same, and he will be progressed appropriately.     10/12/2023 (eval): Outpatient Physical Therapy 2 times weekly for 12 weeks to include any combination of the following interventions: virtual visits, dry needling, modalities, electrical stimulation (IFC, Pre-Mod, Attended with Functional Dry Needling), Aquatic Therapy, Cervical/Lumbar Traction, Gait Training, Manual Therapy, Neuromuscular Re-ed, Patient Education, Self Care, Therapeutic Exercise, and Therapeutic Activites     Mady Billy, PT

## 2023-11-13 NOTE — PROGRESS NOTES
[facility]   Physical Therapy Treatment Note + Progress Note       Name: Myles Pride  Clinic Number: [id number]    Therapy Diagnosis:   Encounter Diagnoses   Name Primary?    S/P lumbar fusion Yes    Decreased range of motion of lumbar spine     Lumbar stenosis with neurogenic claudication     Decreased strength, endurance, and mobility        Physician: Lyle Lobato MD    Visit Date: 11/13/2023    Physician Orders: PT Eval and Treat  Medical Diagnosis from Referral: s/p lumbar fusion, lumbar stenosis with neurogenic claudication   Evaluation Date: 10/12/2023  Authorization Period Expiration: 12/31/2023  Plan of Care Expiration: 1/10/2024  Progress Note Due: 12/13/2023  Visit # / Visits authorized: 9/20 (+eval)   FOTO: 1/3 (last performed on 10/12/2023)     Precautions: Standard and post op precautions     **SURGERY DATE: lumbar fusion L3/4, L 4/5. 9/6/2023**    PTA Visit #: 0/5     Time In: 0918  Time Out: 1005  Total Billable Time: 45 minutes (Billing reflects 1 on 1 treatment time spent with patient)    Subjective     Patient reports: he is feeling okay today without any pain. He feels he is doing much better overall and feels he is getting stronger and returning to more functional activities with less limitation.     He was compliant with home exercise program.  Response to previous treatment: no adverse reaction  Functional change: doing more home chores and increased back flexibility but still some stiffness noted    Pain: 0/10  right hip   Location: low back    Objective      Objective Measures updated at progress report or POC update only unless otherwise noted.     RANGE OF MOTION:   Lumbar ROM Right  (spine) Left    Pain/Dysfunction with Movement Goal   Lumbar Flexion (60º) 25 --- No pain with ROM testing today  40   Lumbar Extension (30º) 0 ---   0   Lumbar Side Bending (25º) 15 10    20         STRENGTH:   L/E MMT Right Left Pain/Dysfunction with Movement Goal  Right   11/13/2023  Left  11/13/2023    Hip Flexion  4+/5 4+/5   4+/5 B 5 5   Hip Extension  NT NT   4+/5 B     Hip Abduction  4-/5 4-/5 Tested in seated today 4+/5 B     Knee Extension 4+/5 4+/5   5/5 B 5 5   Knee Flexion 5/5 5/5   5/5 B 5 5   Hip IR 4/5 4+/5  4+/5 B 4+ 4+   Hip ER 4/5 4+/5   4+/5 B 4+ 4+   Ankle DF 5/5 5/5   5/5 B 5 5   Ankle PF 5/5 5/5   5/5 B 5 5         MUSCLE LENGTH:   Muscle Tested  Right Left  Goal   Hip Flexors [] Normal  [x] Limited [] Normal  [x] Limited Normal B   ITB / TFL [] Normal  [x] Limited [] Normal  [x] Limited Normal B   Hamstrings  [] Normal  [x] Limited [] Normal  [x] Limited Normal B   Piriformis  [] Normal  [x] Limited [] Normal  [x] Limited Normal B   Gastrocnemius  [] Normal  [x] Limited [] Normal  [x] Limited Normal B   Soleus  [] Normal  [x] Limited [] Normal  [x] Limited Normal B   **Although still decreased, muscle length has improved as compared to previous visits.         SENSATION  [x] Intact to Light Touch                       [] Impaired:        PALPATION: Muscles: Increased tone noted with palpation of: bilateral paraspinals, quadratus lumborum, glutes, piriformis, but decreased as compared to previous visits. Incisional scar is well healed.        POSTURE:  Pt presents with postural abnormalities which include:               [x] Forward Head                                [] Increased Lumbar Lordosis              [x] Rounded Shoulder                         [] Genu Recurvatum              [] Increased Thoracic Kyphosis        [] Genu Valgus              [] Trunk Deviated                              [] Pes Planus              [] Scapular Winging                          [x] Other: decreased lumbar lordosis, flexed trunk posture           GAIT ANALYSIS The patient ambulated with the following assistive device: none; the pt presents with the following gait abnormalities: trendelenburg, bradykinetic, decreased step length bilateral, decreased hip extension  "bilateral, and flexed trunk .  11/13/2023: Patient now presents with improved anuel and step length. His bilateral hip extension is still limited but improved as well. Decreased trendelenburg gait. Patient still presents with flexed trunk posture, but has lorena working on postural awareness and correction.      FUNCTIONAL MOVEMENT PATTERNS  Movement Analysis Notes   Bed Mobility  []Functional  [x]Dysfunctional:  []Painful  [x]Non-Painful    Improving motor planning and core activation   Sit to Stand [x]Functional  []Dysfunctional:  []Painful  [x]Non-Painful    Improved motor planning and core activation   Squat [x]Functional  []Dysfunctional:  []Painful  [x]Non-Painful    Improving motor planning and core activation. Able to perform squat taps today to 24" box without issue.             Function:     Treatment     Myles received the treatments listed below:       THERAPEUTIC EXERCISES to develop strength, endurance, ROM, flexibility, posture, and core stabilization for (10) minutes including:    Intervention Performed Today    Nustep for ROM and strength x 6', level 5   Exercise bike (added)   6 minutes    Heel Digs/DKTC on stability ball  20x   LONG ARCH QUAD   30x each LOWER EXTREMITY sitting no back support in clinic chair   Lower trunk rotation   3 minutes    Hip flexor stretch  Demonstrated in prone and EOM             Re-assessment x Objective measurements, FOTO, pt education      Plan for Next Visit:        NEUROMUSCULAR RE-EDUCATION ACTIVITIES to improve Balance, Coordination, Kinesthetic, Sense, Proprioception, and Posture for (17) minutes.  The following were included:    Intervention Performed Today    Abdominal brace  20x, 3' holds on and off   Glute sets  20x, 3" holds on and off   Clams - sidelying   3 x 8 each LOWER EXTREMITY    Sidelying hip abduction  x 12x each side, PT assist required when lifting R LE   Bridges - with abdominal brace  x 3 x 10 small range not past neutral    Straight leg raise " "with abdominal brace   x 3 x 8 bilateral    Abdominal brace   X 10 alternating shoulder flexion  X 10 march in place    Isometric trunk rotation   10 x 5" holds each direction   Quadruped alt LE (added) x 1 x 15 each LE     Plan for Next Visit: prone hip extension       THERAPEUTIC ACTIVITIES to improve dynamic and functional  performance for (18) minutes including:    Intervention Performed Today    Shuttle Squats   3 x 10, 6 bands   Standing Hip 3-way  X  X  x 2 x 10 flexion bilateral   2 x 10 abduction bilateral   2 X 10 extension bilateral    Paloff Press  x 2 x 10, 3" holds each direction, blue theraband    Squat taps on 24" box (progressed) x 2 x 10    Side to side steps  x 2 minutes red band                    Plan for Next Visit:         MANUAL THERAPY TECHNIQUES were applied for (0) minutes, including:    Manual Intervention Performed Today    Soft Tissue Mobilization [] bilateral  paraspinals, quadratus lumborum   Joint Mobilizations []     []     []    Functional Dry Needling  []      Plan for Next Visit: Continue as needed         Patient Education and Home Exercises       Home Exercises Provided and Patient Education Provided     Education provided: (included in treatment section) minutes  PURPOSE: Patient educated on the impairments noted above and the effects of physical therapy intervention to improve overall condition and QOL.   EXERCISE: Patient was educated on all the above exercise prior/during/after for proper posture, positioning, and execution for safe performance with home exercise program.   STRENGTH: Patient educated on the importance of improved core and extremity strength in order to improve alignment of the spine and extremities with static positions and dynamic movement.   POSTURE: Patient educated on postural awareness to reduce stress and maintain optimal alignment of the spine with static positions and dynamic movement     Written Home Exercises Provided: yes.  Exercises were " reviewed and Myles was able to demonstrate them prior to the end of the session.  Myles demonstrated good  understanding of the education provided. See EMR under Patient Instructions for exercises provided during therapy sessions.    Assessment     Patient tolerated treatment well and has attended 10 total PT visits. He has made good overall progress with PT, demonstrating improvements in lumbar ROM, LE strength, and overall core stability. His postural awareness has improved as well, and he is focusing on better posture throughout his day and with gait. He reports decreased pain levels and improved overall function with less limitation. Patient would benefit from continued PT to address remaining limitations, especially functional strength and stability, in order to achieve max functional potential.     Myles is progressing well towards his goals.   Patient prognosis is Good.     Patient will continue to benefit from skilled outpatient physical therapy to address the deficits listed in the problem list box on initial evaluation, provide pt/family education and to maximize patient's level of independence in the home and community environment.     Patient's spiritual, cultural and educational needs considered and pt agreeable to plan of care and goals.     Anticipated Barriers for therapy: co-morbidities    Goals:  Short Term Goals:  6 weeks Status  Date Met   PAIN: Pt will report worst pain of 1/10 in order to progress toward max functional ability and improve quality of life. [] Progressing  [x] Met  [] Not Met 11/13/2023    FUNCTION: Patient will demonstrate improved function as indicated by a score of greater than or equal to 52 out of 100 on FOTO. [] Progressing  [x] Met  [] Not Met 11/13/2023     MOBILITY: Patient will improve AROM to 50% of stated goals, listed in objective measures above, in order to progress towards independence with functional activities.  [] Progressing  [x] Met  [] Not Met   11/13/2023    STRENGTH: Patient will improve strength to 50% of stated goals, listed in objective measures above, in order to progress towards independence with functional activities. [] Progressing  [x] Met  [] Not Met  11/13/2023    POSTURE: Patient will correct postural deviations in sitting and standing, to decrease pain and promote long term stability.  [] Progressing  [x] Met  [] Not Met  11/13/2023    GAIT: Patient will demonstrate improved gait mechanics in order to improve functional mobility, improve quality of life, and decrease risk of further injury or fall.  [] Progressing  [x] Met  [] Not Met  11/13/2023    HEP: Patient will demonstrate independence with HEP in order to progress toward functional independence. [] Progressing  [x] Met  [] Not Met  11/13/2023       Long Term Goals:  12 weeks Status Date Met   PAIN: Pt will report worst pain of 0/10 in order to progress toward max functional ability and improve quality of life [x] Progressing  [] Met  [] Not Met     FUNCTION: Patient will demonstrate improved function as indicated by a score of greater than or equal to 61 out of 100 on FOTO. [x] Progressing  [] Met  [] Not Met     MOBILITY: Patient will improve AROM to stated goals, listed in objective measures above, in order to return to maximal functional potential and improve quality of life.  [x] Progressing  [] Met  [] Not Met     STRENGTH: Patient will improve strength to stated goals, listed in objective measures above, in order to improve functional independence and quality of life.  [x] Progressing  [] Met  [] Not Met Partially Met  11/13/2023    GAIT: Patient will demonstrate normalized gait mechanics with minimal compensation in order to return to PLOF. [x] Progressing  [] Met  [] Not Met     Patient will return to normal ADL's, IADL's, community involvement, recreational activities, and work-related activities with less than or equal to 0/10 pain and maximal function.  [x] Progressing  []  Met  [] Not Met           Plan     Continue Plan of Care (POC) and progress per patient tolerance. See treatment section for details on planned progressions next session.    11/13/2023: It is my recommendation that patient continue with PT at his current frequency of 2 times per week for the remainder of his approved visits. His treatment plan will remain the same, and he will be progressed appropriately.     10/12/2023 (eval): Outpatient Physical Therapy 2 times weekly for 12 weeks to include any combination of the following interventions: virtual visits, dry needling, modalities, electrical stimulation (IFC, Pre-Mod, Attended with Functional Dry Needling), Aquatic Therapy, Cervical/Lumbar Traction, Gait Training, Manual Therapy, Neuromuscular Re-ed, Patient Education, Self Care, Therapeutic Exercise, and Therapeutic Activites     Mady Billy, PT

## 2023-11-14 ENCOUNTER — OFFICE VISIT (OUTPATIENT)
Dept: NEUROSURGERY | Facility: CLINIC | Age: 68
End: 2023-11-14
Attending: PHYSICIAN ASSISTANT
Payer: MEDICARE

## 2023-11-14 ENCOUNTER — HOSPITAL ENCOUNTER (OUTPATIENT)
Dept: RADIOLOGY | Facility: HOSPITAL | Age: 68
Discharge: HOME | End: 2023-11-14
Attending: PHYSICIAN ASSISTANT
Payer: MEDICARE

## 2023-11-14 VITALS
DIASTOLIC BLOOD PRESSURE: 85 MMHG | BODY MASS INDEX: 24.21 KG/M2 | HEART RATE: 95 BPM | HEIGHT: 77 IN | SYSTOLIC BLOOD PRESSURE: 120 MMHG | WEIGHT: 205 LBS

## 2023-11-14 DIAGNOSIS — M48.062 LUMBAR STENOSIS WITH NEUROGENIC CLAUDICATION: Primary | ICD-10-CM

## 2023-11-14 DIAGNOSIS — Z98.1 S/P LUMBAR FUSION: ICD-10-CM

## 2023-11-14 PROCEDURE — 1125F PR PAIN SEVERITY QUANTIFIED, PAIN PRESENT: ICD-10-PCS | Mod: HCNC,CPTII,S$GLB, | Performed by: NEUROLOGICAL SURGERY

## 2023-11-14 PROCEDURE — 72100 XR LUMBAR SPINE AP AND LATERAL: ICD-10-PCS | Mod: 26,HCNC,, | Performed by: RADIOLOGY

## 2023-11-14 PROCEDURE — 1101F PT FALLS ASSESS-DOCD LE1/YR: CPT | Mod: HCNC,CPTII,S$GLB, | Performed by: NEUROLOGICAL SURGERY

## 2023-11-14 PROCEDURE — 99999 PR PBB SHADOW E&M-EST. PATIENT-LVL III: ICD-10-PCS | Mod: PBBFAC,HCNC,, | Performed by: NEUROLOGICAL SURGERY

## 2023-11-14 PROCEDURE — 99024 POSTOP FOLLOW-UP VISIT: CPT | Mod: HCNC,S$GLB,, | Performed by: NEUROLOGICAL SURGERY

## 2023-11-14 PROCEDURE — 3074F PR MOST RECENT SYSTOLIC BLOOD PRESSURE < 130 MM HG: ICD-10-PCS | Mod: HCNC,CPTII,S$GLB, | Performed by: NEUROLOGICAL SURGERY

## 2023-11-14 PROCEDURE — 3044F PR MOST RECENT HEMOGLOBIN A1C LEVEL <7.0%: ICD-10-PCS | Mod: HCNC,CPTII,S$GLB, | Performed by: NEUROLOGICAL SURGERY

## 2023-11-14 PROCEDURE — 3044F HG A1C LEVEL LT 7.0%: CPT | Mod: HCNC,CPTII,S$GLB, | Performed by: NEUROLOGICAL SURGERY

## 2023-11-14 PROCEDURE — 1159F PR MEDICATION LIST DOCUMENTED IN MEDICAL RECORD: ICD-10-PCS | Mod: HCNC,CPTII,S$GLB, | Performed by: NEUROLOGICAL SURGERY

## 2023-11-14 PROCEDURE — 3288F PR FALLS RISK ASSESSMENT DOCUMENTED: ICD-10-PCS | Mod: HCNC,CPTII,S$GLB, | Performed by: NEUROLOGICAL SURGERY

## 2023-11-14 PROCEDURE — 1125F AMNT PAIN NOTED PAIN PRSNT: CPT | Mod: HCNC,CPTII,S$GLB, | Performed by: NEUROLOGICAL SURGERY

## 2023-11-14 PROCEDURE — 99999 PR PBB SHADOW E&M-EST. PATIENT-LVL III: CPT | Mod: PBBFAC,HCNC,, | Performed by: NEUROLOGICAL SURGERY

## 2023-11-14 PROCEDURE — 3074F SYST BP LT 130 MM HG: CPT | Mod: HCNC,CPTII,S$GLB, | Performed by: NEUROLOGICAL SURGERY

## 2023-11-14 PROCEDURE — 3079F PR MOST RECENT DIASTOLIC BLOOD PRESSURE 80-89 MM HG: ICD-10-PCS | Mod: HCNC,CPTII,S$GLB, | Performed by: NEUROLOGICAL SURGERY

## 2023-11-14 PROCEDURE — 72100 X-RAY EXAM L-S SPINE 2/3 VWS: CPT | Mod: TC,HCNC

## 2023-11-14 PROCEDURE — 72100 X-RAY EXAM L-S SPINE 2/3 VWS: CPT | Mod: 26,HCNC,, | Performed by: RADIOLOGY

## 2023-11-14 PROCEDURE — 1159F MED LIST DOCD IN RCRD: CPT | Mod: HCNC,CPTII,S$GLB, | Performed by: NEUROLOGICAL SURGERY

## 2023-11-14 PROCEDURE — 99024 PR POST-OP FOLLOW-UP VISIT: ICD-10-PCS | Mod: HCNC,S$GLB,, | Performed by: NEUROLOGICAL SURGERY

## 2023-11-14 PROCEDURE — 3079F DIAST BP 80-89 MM HG: CPT | Mod: HCNC,CPTII,S$GLB, | Performed by: NEUROLOGICAL SURGERY

## 2023-11-14 PROCEDURE — 1101F PR PT FALLS ASSESS DOC 0-1 FALLS W/OUT INJ PAST YR: ICD-10-PCS | Mod: HCNC,CPTII,S$GLB, | Performed by: NEUROLOGICAL SURGERY

## 2023-11-14 PROCEDURE — 3288F FALL RISK ASSESSMENT DOCD: CPT | Mod: HCNC,CPTII,S$GLB, | Performed by: NEUROLOGICAL SURGERY

## 2023-11-14 NOTE — PROGRESS NOTES
Subjective:      Patient ID: Myles Pride is a 68 y.o. male.    Chief Complaint: Back Pain (Pt visit for lower back pain rate 1/10 stiffness )    HPI  Doing well 8 weeks s/p TLIF   Doing well overall   Ambulates unassisted   Leg pain improved   Back stiffness as expected       Date of Procedure: 9/6/2023    Procedure: Procedure(s) (LRB):  FUSION, SPINE, LUMBAR, TLIF, USING COMPUTER-ASSISTED NAVIGATION (Bilateral)   L3-4/4-5   Surgeon(s) and Role:     * Lyle Lobato MD - Primary  Operative Findings (including complications, if any): severe facet and degenerative disc disease causing significant stenosis L3-4 and L4-5. Large synovial cyst with instability present L3-4       Description of Technical Procedures:  Pedicle screw placement 3-L5 using stereotactic navigation  2.   Combined posterolateral as well as interbody fusion at L4-5 and L5-S1   3.  Insertion of interbody cage expandable  L3-4 and L4-5  from the left side   4. Laminectomy,complete medial facetectomy with removal of Synovial cyst left L3-4   5. Laminectomy medial facetectomy and foraminotomy bilaterally L3-5  6.  Use of autograft bone  7.  Use of BMP         Objective:        General    Constitutional: He is oriented to person, place, and time. He appears well-developed and well-nourished.   Cardiovascular:  Normal rate and regular rhythm.            Pulmonary/Chest: Effort normal.   Abdominal: Soft.   Neurological: He is alert and oriented to person, place, and time.   Psychiatric: He has a normal mood and affect. His behavior is normal.           NSGY EXAM:  Vitals reviewed  MAEW  Incision healed   There is no erythema, swelling or fluctuance  Staples removed w/out issues        L3 through L5 surgical fusion changes with disc spacers are stable.  No evidence of hardware loosening.  Alignment unchanged.  No acute abnormality.     Assessment:       Encounter Diagnoses   Name Primary?    Lumbar stenosis with neurogenic claudication Yes     S/P lumbar fusion           Plan:       Myles was seen today for back pain.    Diagnoses and all orders for this visit:    Lumbar stenosis with neurogenic claudication  -     X-Ray Lumbar Spine AP And Lateral; Future    S/P lumbar fusion    Doing well 8 weeks post op   Continue progressing activity as tolerated   Follow up 4 weeks as scheduled    Thank you for the referral   Please call with any questions    Lyle Lobato MD  Neurosurgery     Disclaimer: This note was prepared using a voice recognition system and is likely to have sound alike errors within the text.

## 2023-11-15 LAB
FINAL PATHOLOGIC DIAGNOSIS: NORMAL
GROSS: NORMAL
Lab: NORMAL
MICROSCOPIC EXAM: NORMAL

## 2023-11-16 ENCOUNTER — CLINICAL SUPPORT (OUTPATIENT)
Dept: REHABILITATION | Facility: HOSPITAL | Age: 68
End: 2023-11-16
Payer: MEDICARE

## 2023-11-16 DIAGNOSIS — R53.1 DECREASED STRENGTH, ENDURANCE, AND MOBILITY: ICD-10-CM

## 2023-11-16 DIAGNOSIS — R68.89 DECREASED STRENGTH, ENDURANCE, AND MOBILITY: ICD-10-CM

## 2023-11-16 DIAGNOSIS — Z74.09 DECREASED STRENGTH, ENDURANCE, AND MOBILITY: ICD-10-CM

## 2023-11-16 DIAGNOSIS — Z98.1 S/P LUMBAR FUSION: Primary | ICD-10-CM

## 2023-11-16 DIAGNOSIS — M53.86 DECREASED RANGE OF MOTION OF LUMBAR SPINE: ICD-10-CM

## 2023-11-16 DIAGNOSIS — M48.062 LUMBAR STENOSIS WITH NEUROGENIC CLAUDICATION: Chronic | ICD-10-CM

## 2023-11-16 PROCEDURE — 97110 THERAPEUTIC EXERCISES: CPT | Mod: HCNC | Performed by: PHYSICAL THERAPIST

## 2023-11-16 PROCEDURE — 97530 THERAPEUTIC ACTIVITIES: CPT | Mod: HCNC | Performed by: PHYSICAL THERAPIST

## 2023-11-16 PROCEDURE — 97112 NEUROMUSCULAR REEDUCATION: CPT | Mod: HCNC | Performed by: PHYSICAL THERAPIST

## 2023-11-16 NOTE — PROGRESS NOTES
[facility]   Physical Therapy Treatment Note        Name: Myles Pride  Clinic Number: [id number]    Therapy Diagnosis:   Encounter Diagnoses   Name Primary?    S/P lumbar fusion Yes    Decreased range of motion of lumbar spine     Lumbar stenosis with neurogenic claudication     Decreased strength, endurance, and mobility        Physician: Lyle Lobato MD    Visit Date: 11/16/2023    Physician Orders: PT Eval and Treat  Medical Diagnosis from Referral: s/p lumbar fusion, lumbar stenosis with neurogenic claudication   Evaluation Date: 10/12/2023  Authorization Period Expiration: 12/31/2023  Plan of Care Expiration: 1/10/2024  Progress Note Due: 12/13/2023  Visit # / Visits authorized: 10/20 (+eval)   FOTO: 1/3 (last performed on 10/12/2023)     Precautions: Standard and post op precautions     **SURGERY DATE: lumbar fusion L3/4, L 4/5. 9/6/2023**    PTA Visit #: 0/5     Time In: 0920  Time Out: 0958  Total Billable Time: 38 minutes (Billing reflects 1 on 1 treatment time spent with patient)    Subjective     Patient reports: feeling pretty good with no new complaints and no pain today. He will be going out of town next week, so he would like to see how he feels with that. If he does well then he thinks he will make his next visit his last.     He was compliant with home exercise program.  Response to previous treatment: no adverse reaction  Functional change: doing more home chores and increased back flexibility but still some stiffness noted    Pain: 0/10  right hip   Location: low back    Objective      Objective Measures updated at progress report or POC update only unless otherwise noted.       Treatment     Myles received the treatments listed below:       THERAPEUTIC EXERCISES to develop strength, endurance, ROM, flexibility, posture, and core stabilization for (6) minutes including:    Intervention Performed Today    Nustep for ROM and strength x 6', level 5  "  Exercise bike (added)   6 minutes    Heel Digs/DKTC on stability ball  20x   LONG ARCH QUAD   30x each LOWER EXTREMITY sitting no back support in clinic chair   Lower trunk rotation   3 minutes    Hip flexor stretch  Demonstrated in prone and EOM             Re-assessment  Objective measurements, FOTO, pt education      Plan for Next Visit:        NEUROMUSCULAR RE-EDUCATION ACTIVITIES to improve Balance, Coordination, Kinesthetic, Sense, Proprioception, and Posture for (16) minutes.  The following were included:    Intervention Performed Today    Abdominal brace  20x, 3' holds on and off   Glute sets  20x, 3" holds on and off   Clams - sidelying   3 x 8 each LOWER EXTREMITY    Sidelying hip abduction  x 12x each side, PT assist required when lifting R LE   Bridges - with abdominal brace  x 3 x 10 small range not past neutral    Straight leg raise with abdominal brace   x 3 x 8 bilateral    Abdominal brace   X 10 alternating shoulder flexion  X 10 march in place    Isometric trunk rotation   10 x 5" holds each direction   Quadruped alt LE  x 1 x 15 each LE     Plan for Next Visit: prone hip extension       THERAPEUTIC ACTIVITIES to improve dynamic and functional  performance for (16) minutes including:    Intervention Performed Today    Shuttle Squats   3 x 10, 6 bands   Standing Hip 3-way  X  X  x 2 x 10 flexion bilateral   2 x 10 abduction bilateral   2 X 10 extension bilateral    Paloff Press  x 2 x 10, 3" holds each direction, blue theraband    Squat taps on 24" box  x 3 x 10    Side to side steps  x 2 minutes red band                    Plan for Next Visit:         MANUAL THERAPY TECHNIQUES were applied for (0) minutes, including:    Manual Intervention Performed Today    Soft Tissue Mobilization [] bilateral  paraspinals, quadratus lumborum   Joint Mobilizations []     []     []    Functional Dry Needling  []      Plan for Next Visit: Continue as needed         Patient Education and Home Exercises       Home " Exercises Provided and Patient Education Provided     Education provided: (included in treatment section) minutes  PURPOSE: Patient educated on the impairments noted above and the effects of physical therapy intervention to improve overall condition and QOL.   EXERCISE: Patient was educated on all the above exercise prior/during/after for proper posture, positioning, and execution for safe performance with home exercise program.   STRENGTH: Patient educated on the importance of improved core and extremity strength in order to improve alignment of the spine and extremities with static positions and dynamic movement.   POSTURE: Patient educated on postural awareness to reduce stress and maintain optimal alignment of the spine with static positions and dynamic movement     Written Home Exercises Provided: yes.  Exercises were reviewed and Myles was able to demonstrate them prior to the end of the session.  Myles demonstrated good  understanding of the education provided. See EMR under Patient Instructions for exercises provided during therapy sessions.    Assessment     Patient did very well with treatment today. He completed exercises without issue or complaint. Patient demonstrates good form and understanding of exercises. He still requires initial cues with quadruped alt LE, but is able to self correct after a few repetitions. Patient will see how he feels after traveling over the next week and will determine if additional PT is necessary.     Myles is progressing well towards his goals.   Patient prognosis is Good.     Patient will continue to benefit from skilled outpatient physical therapy to address the deficits listed in the problem list box on initial evaluation, provide pt/family education and to maximize patient's level of independence in the home and community environment.     Patient's spiritual, cultural and educational needs considered and pt agreeable to plan of care and goals.     Anticipated  Barriers for therapy: co-morbidities    Goals:  Short Term Goals:  6 weeks Status  Date Met   PAIN: Pt will report worst pain of 1/10 in order to progress toward max functional ability and improve quality of life. [] Progressing  [x] Met  [] Not Met 11/13/2023    FUNCTION: Patient will demonstrate improved function as indicated by a score of greater than or equal to 52 out of 100 on FOTO. [] Progressing  [x] Met  [] Not Met 11/13/2023     MOBILITY: Patient will improve AROM to 50% of stated goals, listed in objective measures above, in order to progress towards independence with functional activities.  [] Progressing  [x] Met  [] Not Met  11/13/2023    STRENGTH: Patient will improve strength to 50% of stated goals, listed in objective measures above, in order to progress towards independence with functional activities. [] Progressing  [x] Met  [] Not Met  11/13/2023    POSTURE: Patient will correct postural deviations in sitting and standing, to decrease pain and promote long term stability.  [] Progressing  [x] Met  [] Not Met  11/13/2023    GAIT: Patient will demonstrate improved gait mechanics in order to improve functional mobility, improve quality of life, and decrease risk of further injury or fall.  [] Progressing  [x] Met  [] Not Met  11/13/2023    HEP: Patient will demonstrate independence with HEP in order to progress toward functional independence. [] Progressing  [x] Met  [] Not Met  11/13/2023       Long Term Goals:  12 weeks Status Date Met   PAIN: Pt will report worst pain of 0/10 in order to progress toward max functional ability and improve quality of life [x] Progressing  [] Met  [] Not Met     FUNCTION: Patient will demonstrate improved function as indicated by a score of greater than or equal to 61 out of 100 on FOTO. [x] Progressing  [] Met  [] Not Met     MOBILITY: Patient will improve AROM to stated goals, listed in objective measures above, in order to return to maximal functional potential  and improve quality of life.  [x] Progressing  [] Met  [] Not Met     STRENGTH: Patient will improve strength to stated goals, listed in objective measures above, in order to improve functional independence and quality of life.  [x] Progressing  [] Met  [] Not Met Partially Met  11/13/2023    GAIT: Patient will demonstrate normalized gait mechanics with minimal compensation in order to return to PLOF. [x] Progressing  [] Met  [] Not Met     Patient will return to normal ADL's, IADL's, community involvement, recreational activities, and work-related activities with less than or equal to 0/10 pain and maximal function.  [x] Progressing  [] Met  [] Not Met           Plan     Continue Plan of Care (POC) and progress per patient tolerance. See treatment section for details on planned progressions next session.    11/13/2023: It is my recommendation that patient continue with PT at his current frequency of 2 times per week for the remainder of his approved visits. His treatment plan will remain the same, and he will be progressed appropriately.     10/12/2023 (eval): Outpatient Physical Therapy 2 times weekly for 12 weeks to include any combination of the following interventions: virtual visits, dry needling, modalities, electrical stimulation (IFC, Pre-Mod, Attended with Functional Dry Needling), Aquatic Therapy, Cervical/Lumbar Traction, Gait Training, Manual Therapy, Neuromuscular Re-ed, Patient Education, Self Care, Therapeutic Exercise, and Therapeutic Activites     Mady Billy, PT

## 2023-12-05 ENCOUNTER — PATIENT MESSAGE (OUTPATIENT)
Dept: OPHTHALMOLOGY | Facility: CLINIC | Age: 68
End: 2023-12-05
Payer: MEDICARE

## 2023-12-05 ENCOUNTER — CLINICAL SUPPORT (OUTPATIENT)
Dept: REHABILITATION | Facility: HOSPITAL | Age: 68
End: 2023-12-05
Payer: MEDICARE

## 2023-12-05 DIAGNOSIS — R53.1 DECREASED STRENGTH, ENDURANCE, AND MOBILITY: ICD-10-CM

## 2023-12-05 DIAGNOSIS — Z74.09 DECREASED STRENGTH, ENDURANCE, AND MOBILITY: ICD-10-CM

## 2023-12-05 DIAGNOSIS — Z98.1 S/P LUMBAR FUSION: Primary | ICD-10-CM

## 2023-12-05 DIAGNOSIS — M48.062 LUMBAR STENOSIS WITH NEUROGENIC CLAUDICATION: Chronic | ICD-10-CM

## 2023-12-05 DIAGNOSIS — M53.86 DECREASED RANGE OF MOTION OF LUMBAR SPINE: ICD-10-CM

## 2023-12-05 DIAGNOSIS — R68.89 DECREASED STRENGTH, ENDURANCE, AND MOBILITY: ICD-10-CM

## 2023-12-05 PROCEDURE — 97530 THERAPEUTIC ACTIVITIES: CPT | Mod: HCNC | Performed by: PHYSICAL THERAPIST

## 2023-12-05 PROCEDURE — 97112 NEUROMUSCULAR REEDUCATION: CPT | Mod: HCNC | Performed by: PHYSICAL THERAPIST

## 2023-12-05 NOTE — PROGRESS NOTES
[facility]   Physical Therapy Treatment Note        Name: Myles Pride  Clinic Number: [id number]    Therapy Diagnosis:   Encounter Diagnoses   Name Primary?    S/P lumbar fusion Yes    Decreased range of motion of lumbar spine     Lumbar stenosis with neurogenic claudication     Decreased strength, endurance, and mobility        Physician: Lyle Lobato MD    Visit Date: 12/5/2023    Physician Orders: PT Eval and Treat  Medical Diagnosis from Referral: s/p lumbar fusion, lumbar stenosis with neurogenic claudication   Evaluation Date: 10/12/2023  Authorization Period Expiration: 12/31/2023  Plan of Care Expiration: 1/10/2024  Progress Note Due: 12/13/2023  Visit # / Visits authorized: 11/20 (+eval)   FOTO: 1/3 (last performed on 10/12/2023)     Precautions: Standard and post op precautions     **SURGERY DATE: lumbar fusion L3/4, L 4/5. 9/6/2023**    PTA Visit #: 0/5     Time In: 1005  Time Out: 1055  Total Billable Time: 44 minutes (Billing reflects 1 on 1 treatment time spent with patient)    Subjective     Patient reports: that he did really good over Thanksgiving break while traveling. They took breaks while driving, and his back did well. Patient still notices occasional stiffness, but not really any pain. His right hip also occasionally bothers him. Patient would like to finish his remaining 3 approved visits in order to continued to work on core stability and functional strengthening in order to achieve max functional potential before DC.     He was compliant with home exercise program.  Response to previous treatment: no adverse reaction  Functional change: doing more home chores and increased back flexibility but still some stiffness noted    Pain: 0/10    Location: low back    Objective      Objective Measures updated at progress report or POC update only unless otherwise noted.       Treatment     Myles received the treatments listed below:       THERAPEUTIC  "EXERCISES to develop strength, endurance, ROM, flexibility, posture, and core stabilization for (6) minutes including:    Intervention Performed Today    Nustep for ROM and strength x 6', level 6   Exercise bike   6 minutes    Heel Digs/DKTC on stability ball  20x   LONG ARCH QUAD   30x each LOWER EXTREMITY sitting no back support in clinic chair   Lower trunk rotation   3 minutes    Hip flexor stretch  Demonstrated in prone and EOM             Re-assessment  Objective measurements, FOTO, pt education      Plan for Next Visit:        NEUROMUSCULAR RE-EDUCATION ACTIVITIES to improve Balance, Coordination, Kinesthetic, Sense, Proprioception, and Posture for (15) minutes.  The following were included:    Intervention Performed Today    Abdominal brace  20x, 3' holds on and off   Glute sets  20x, 3" holds on and off   Clams - sidelying   3 x 8 each LOWER EXTREMITY    Sidelying hip abduction   12x each side, PT assist required when lifting R LE   Bridges - with abdominal brace  x 3 x 10 small range not past neutral    Straight leg raise with abdominal brace    3 x 8 bilateral    Abdominal brace   X 10 alternating shoulder flexion  X 10 march in place    Isometric trunk rotation   10 x 5" holds each direction   Quadruped alt LE  x 1 x 15 each LE   Table Top Holds (added) x 10x, 5" holds     Plan for Next Visit: prone hip extension       THERAPEUTIC ACTIVITIES to improve dynamic and functional  performance for (23) minutes including:    Intervention Performed Today    Shuttle Squats   3 x 10, 6 bands   Standing Hip 3-way  X  X  x 2 x 10 flexion bilateral   2 x 10 abduction bilateral   2 X 10 extension bilateral    Paloff Press  x 2 x 10, 3" holds each direction, blue theraband    Squat taps on 24" box  x 3 x 10    Side to side steps  x 2 minutes red band   Side stepping with PT holding hand resistance around hips (added) x Down and back 5x each side   Forward stepping with PT holding hand resistance around hips (added) x " Down and back 5x each side          Plan for Next Visit:         MANUAL THERAPY TECHNIQUES were applied for (0) minutes, including:    Manual Intervention Performed Today    Soft Tissue Mobilization [] bilateral  paraspinals, quadratus lumborum   Joint Mobilizations []     []     []    Functional Dry Needling  []      Plan for Next Visit: Continue as needed         Patient Education and Home Exercises       Home Exercises Provided and Patient Education Provided     Education provided: (included in treatment section) minutes  PURPOSE: Patient educated on the impairments noted above and the effects of physical therapy intervention to improve overall condition and QOL.   EXERCISE: Patient was educated on all the above exercise prior/during/after for proper posture, positioning, and execution for safe performance with home exercise program.   STRENGTH: Patient educated on the importance of improved core and extremity strength in order to improve alignment of the spine and extremities with static positions and dynamic movement.   POSTURE: Patient educated on postural awareness to reduce stress and maintain optimal alignment of the spine with static positions and dynamic movement     Written Home Exercises Provided: yes.  Exercises were reviewed and Myles was able to demonstrate them prior to the end of the session.  Myles demonstrated good  understanding of the education provided. See EMR under Patient Instructions for exercises provided during therapy sessions.    Assessment     Patient tolerated treatment well today. Side stepping with resistance and table top holds added this visit for additional core strengthening, especially for more functional strengthening. Patient did very well with progressions and demonstrates improving core activation and stabilization. He would benefit from finishing remaining few visits to work on continued functional strengthening and stabilization.     Myles is progressing well  towards his goals.   Patient prognosis is Good.     Patient will continue to benefit from skilled outpatient physical therapy to address the deficits listed in the problem list box on initial evaluation, provide pt/family education and to maximize patient's level of independence in the home and community environment.     Patient's spiritual, cultural and educational needs considered and pt agreeable to plan of care and goals.     Anticipated Barriers for therapy: co-morbidities    Goals:  Short Term Goals:  6 weeks Status  Date Met   PAIN: Pt will report worst pain of 1/10 in order to progress toward max functional ability and improve quality of life. [] Progressing  [x] Met  [] Not Met 11/13/2023    FUNCTION: Patient will demonstrate improved function as indicated by a score of greater than or equal to 52 out of 100 on FOTO. [] Progressing  [x] Met  [] Not Met 11/13/2023     MOBILITY: Patient will improve AROM to 50% of stated goals, listed in objective measures above, in order to progress towards independence with functional activities.  [] Progressing  [x] Met  [] Not Met  11/13/2023    STRENGTH: Patient will improve strength to 50% of stated goals, listed in objective measures above, in order to progress towards independence with functional activities. [] Progressing  [x] Met  [] Not Met  11/13/2023    POSTURE: Patient will correct postural deviations in sitting and standing, to decrease pain and promote long term stability.  [] Progressing  [x] Met  [] Not Met  11/13/2023    GAIT: Patient will demonstrate improved gait mechanics in order to improve functional mobility, improve quality of life, and decrease risk of further injury or fall.  [] Progressing  [x] Met  [] Not Met  11/13/2023    HEP: Patient will demonstrate independence with HEP in order to progress toward functional independence. [] Progressing  [x] Met  [] Not Met  11/13/2023       Long Term Goals:  12 weeks Status Date Met   PAIN: Pt will report  worst pain of 0/10 in order to progress toward max functional ability and improve quality of life [x] Progressing  [] Met  [] Not Met     FUNCTION: Patient will demonstrate improved function as indicated by a score of greater than or equal to 61 out of 100 on FOTO. [x] Progressing  [] Met  [] Not Met     MOBILITY: Patient will improve AROM to stated goals, listed in objective measures above, in order to return to maximal functional potential and improve quality of life.  [x] Progressing  [] Met  [] Not Met     STRENGTH: Patient will improve strength to stated goals, listed in objective measures above, in order to improve functional independence and quality of life.  [x] Progressing  [] Met  [] Not Met Partially Met  11/13/2023    GAIT: Patient will demonstrate normalized gait mechanics with minimal compensation in order to return to PLOF. [x] Progressing  [] Met  [] Not Met     Patient will return to normal ADL's, IADL's, community involvement, recreational activities, and work-related activities with less than or equal to 0/10 pain and maximal function.  [x] Progressing  [] Met  [] Not Met           Plan     Continue Plan of Care (POC) and progress per patient tolerance. See treatment section for details on planned progressions next session.    11/13/2023: It is my recommendation that patient continue with PT at his current frequency of 2 times per week for the remainder of his approved visits. His treatment plan will remain the same, and he will be progressed appropriately.     10/12/2023 (eval): Outpatient Physical Therapy 2 times weekly for 12 weeks to include any combination of the following interventions: virtual visits, dry needling, modalities, electrical stimulation (IFC, Pre-Mod, Attended with Functional Dry Needling), Aquatic Therapy, Cervical/Lumbar Traction, Gait Training, Manual Therapy, Neuromuscular Re-ed, Patient Education, Self Care, Therapeutic Exercise, and Therapeutic Activites     Mady  Mariajose, PT

## 2023-12-06 ENCOUNTER — LAB VISIT (OUTPATIENT)
Dept: LAB | Facility: HOSPITAL | Age: 68
End: 2023-12-06
Attending: FAMILY MEDICINE
Payer: MEDICARE

## 2023-12-06 DIAGNOSIS — R79.89 ELEVATED LIVER FUNCTION TESTS: ICD-10-CM

## 2023-12-06 DIAGNOSIS — D64.9 ANEMIA, UNSPECIFIED TYPE: ICD-10-CM

## 2023-12-06 DIAGNOSIS — E78.5 HYPERLIPIDEMIA, UNSPECIFIED HYPERLIPIDEMIA TYPE: ICD-10-CM

## 2023-12-06 DIAGNOSIS — Z12.5 SCREENING FOR PROSTATE CANCER: ICD-10-CM

## 2023-12-06 DIAGNOSIS — Z79.899 ENCOUNTER FOR LONG-TERM (CURRENT) USE OF MEDICATIONS: ICD-10-CM

## 2023-12-06 LAB
ALBUMIN SERPL BCP-MCNC: 4 G/DL (ref 3.5–5.2)
ALP SERPL-CCNC: 110 U/L (ref 55–135)
ALT SERPL W/O P-5'-P-CCNC: 25 U/L (ref 10–44)
AST SERPL-CCNC: 35 U/L (ref 10–40)
BASOPHILS # BLD AUTO: 0.05 K/UL (ref 0–0.2)
BASOPHILS NFR BLD: 0.8 % (ref 0–1.9)
BILIRUB DIRECT SERPL-MCNC: 0.3 MG/DL (ref 0.1–0.3)
BILIRUB SERPL-MCNC: 0.8 MG/DL (ref 0.1–1)
CHOLEST SERPL-MCNC: 167 MG/DL (ref 120–199)
CHOLEST SERPL-MCNC: 167 MG/DL (ref 120–199)
CHOLEST/HDLC SERPL: 4.1 {RATIO} (ref 2–5)
CHOLEST/HDLC SERPL: 4.1 {RATIO} (ref 2–5)
COMPLEXED PSA SERPL-MCNC: 1.8 NG/ML (ref 0–4)
DIFFERENTIAL METHOD: NORMAL
EOSINOPHIL # BLD AUTO: 0.2 K/UL (ref 0–0.5)
EOSINOPHIL NFR BLD: 3.5 % (ref 0–8)
ERYTHROCYTE [DISTWIDTH] IN BLOOD BY AUTOMATED COUNT: 13.7 % (ref 11.5–14.5)
HCT VFR BLD AUTO: 46.3 % (ref 40–54)
HDLC SERPL-MCNC: 41 MG/DL (ref 40–75)
HDLC SERPL-MCNC: 41 MG/DL (ref 40–75)
HDLC SERPL: 24.6 % (ref 20–50)
HDLC SERPL: 24.6 % (ref 20–50)
HGB BLD-MCNC: 14.9 G/DL (ref 14–18)
IMM GRANULOCYTES # BLD AUTO: 0.01 K/UL (ref 0–0.04)
IMM GRANULOCYTES NFR BLD AUTO: 0.2 % (ref 0–0.5)
LDLC SERPL CALC-MCNC: 105 MG/DL (ref 63–159)
LDLC SERPL CALC-MCNC: 105 MG/DL (ref 63–159)
LYMPHOCYTES # BLD AUTO: 2.7 K/UL (ref 1–4.8)
LYMPHOCYTES NFR BLD: 43.7 % (ref 18–48)
MCH RBC QN AUTO: 30.7 PG (ref 27–31)
MCHC RBC AUTO-ENTMCNC: 32.2 G/DL (ref 32–36)
MCV RBC AUTO: 95 FL (ref 82–98)
MONOCYTES # BLD AUTO: 0.5 K/UL (ref 0.3–1)
MONOCYTES NFR BLD: 7.7 % (ref 4–15)
NEUTROPHILS # BLD AUTO: 2.7 K/UL (ref 1.8–7.7)
NEUTROPHILS NFR BLD: 44.1 % (ref 38–73)
NONHDLC SERPL-MCNC: 126 MG/DL
NONHDLC SERPL-MCNC: 126 MG/DL
NRBC BLD-RTO: 0 /100 WBC
PLATELET # BLD AUTO: 224 K/UL (ref 150–450)
PMV BLD AUTO: 11.1 FL (ref 9.2–12.9)
PROT SERPL-MCNC: 7 G/DL (ref 6–8.4)
RBC # BLD AUTO: 4.86 M/UL (ref 4.6–6.2)
TRIGL SERPL-MCNC: 105 MG/DL (ref 30–150)
TRIGL SERPL-MCNC: 105 MG/DL (ref 30–150)
WBC # BLD AUTO: 6.2 K/UL (ref 3.9–12.7)

## 2023-12-06 PROCEDURE — 80061 LIPID PANEL: CPT | Mod: HCNC | Performed by: FAMILY MEDICINE

## 2023-12-06 PROCEDURE — 36415 COLL VENOUS BLD VENIPUNCTURE: CPT | Mod: HCNC | Performed by: FAMILY MEDICINE

## 2023-12-06 PROCEDURE — 84153 ASSAY OF PSA TOTAL: CPT | Mod: HCNC | Performed by: FAMILY MEDICINE

## 2023-12-06 PROCEDURE — 85025 COMPLETE CBC W/AUTO DIFF WBC: CPT | Mod: HCNC | Performed by: FAMILY MEDICINE

## 2023-12-06 PROCEDURE — 80076 HEPATIC FUNCTION PANEL: CPT | Mod: HCNC | Performed by: FAMILY MEDICINE

## 2023-12-07 ENCOUNTER — OFFICE VISIT (OUTPATIENT)
Dept: OPHTHALMOLOGY | Facility: CLINIC | Age: 68
End: 2023-12-07
Payer: MEDICARE

## 2023-12-07 DIAGNOSIS — H25.13 NUCLEAR SCLEROSIS, BILATERAL: Primary | ICD-10-CM

## 2023-12-07 DIAGNOSIS — H52.4 ASTIGMATISM WITH PRESBYOPIA, BILATERAL: ICD-10-CM

## 2023-12-07 DIAGNOSIS — H04.129 DRY EYE: ICD-10-CM

## 2023-12-07 DIAGNOSIS — H52.203 ASTIGMATISM WITH PRESBYOPIA, BILATERAL: ICD-10-CM

## 2023-12-07 DIAGNOSIS — H25.013 CORTICAL AGE-RELATED CATARACT OF BOTH EYES: ICD-10-CM

## 2023-12-07 PROCEDURE — 1160F PR REVIEW ALL MEDS BY PRESCRIBER/CLIN PHARMACIST DOCUMENTED: ICD-10-PCS | Mod: HCNC,CPTII,S$GLB, | Performed by: OPTOMETRIST

## 2023-12-07 PROCEDURE — 92004 PR EYE EXAM, NEW PATIENT,COMPREHESV: ICD-10-PCS | Mod: HCNC,S$GLB,, | Performed by: OPTOMETRIST

## 2023-12-07 PROCEDURE — 1159F PR MEDICATION LIST DOCUMENTED IN MEDICAL RECORD: ICD-10-PCS | Mod: HCNC,CPTII,S$GLB, | Performed by: OPTOMETRIST

## 2023-12-07 PROCEDURE — 1160F RVW MEDS BY RX/DR IN RCRD: CPT | Mod: HCNC,CPTII,S$GLB, | Performed by: OPTOMETRIST

## 2023-12-07 PROCEDURE — 3044F PR MOST RECENT HEMOGLOBIN A1C LEVEL <7.0%: ICD-10-PCS | Mod: HCNC,CPTII,S$GLB, | Performed by: OPTOMETRIST

## 2023-12-07 PROCEDURE — 92004 COMPRE OPH EXAM NEW PT 1/>: CPT | Mod: HCNC,S$GLB,, | Performed by: OPTOMETRIST

## 2023-12-07 PROCEDURE — 99999 PR PBB SHADOW E&M-EST. PATIENT-LVL II: ICD-10-PCS | Mod: PBBFAC,HCNC,, | Performed by: OPTOMETRIST

## 2023-12-07 PROCEDURE — 92015 PR REFRACTION: ICD-10-PCS | Mod: HCNC,S$GLB,, | Performed by: OPTOMETRIST

## 2023-12-07 PROCEDURE — 99999 PR PBB SHADOW E&M-EST. PATIENT-LVL II: CPT | Mod: PBBFAC,HCNC,, | Performed by: OPTOMETRIST

## 2023-12-07 PROCEDURE — 3044F HG A1C LEVEL LT 7.0%: CPT | Mod: HCNC,CPTII,S$GLB, | Performed by: OPTOMETRIST

## 2023-12-07 PROCEDURE — 92015 DETERMINE REFRACTIVE STATE: CPT | Mod: HCNC,S$GLB,, | Performed by: OPTOMETRIST

## 2023-12-07 PROCEDURE — 1159F MED LIST DOCD IN RCRD: CPT | Mod: HCNC,CPTII,S$GLB, | Performed by: OPTOMETRIST

## 2023-12-07 NOTE — PROGRESS NOTES
HPI    Vision changes since last eye exam?: stable, denies any new issues just   needs an updated exam. Happy with a +2.50 readers.     Any eye pain today: no    Other ocular symptoms: hx of floaters not anything new    Interested in contact lens fitting today? no              Last edited by Caitlyn Albert on 12/7/2023 10:51 AM.            Assessment /Plan     For exam results, see Encounter Report.    Nuclear sclerosis, bilateral  Cortical age-related cataract of both eyes  Cataracts not significantly affecting activities of daily living and therefore surgery is not indicated at this time.   Will continue to monitor over the next 12 months. Pt to call or RTC with any significant change in vision prior to next visit.     Dry eye  Recommended iVizia bid OU    Astigmatism with presbyopia, bilateral  Eyeglass Final Rx       Eyeglass Final Rx         Sphere Cylinder Axis Add    Right -1.00 +1.25 015 +2.50    Left Vincent +1.25 175 +2.50      Expiration Date: 12/7/2024                  Spec Rx is optional, ok to continue with OTC readers      RTC 1 yr for dilated eye exam or PRN if any problems.   Discussed above and answered questions.

## 2023-12-08 ENCOUNTER — CLINICAL SUPPORT (OUTPATIENT)
Dept: REHABILITATION | Facility: HOSPITAL | Age: 68
End: 2023-12-08
Payer: MEDICARE

## 2023-12-08 DIAGNOSIS — R53.1 DECREASED STRENGTH, ENDURANCE, AND MOBILITY: ICD-10-CM

## 2023-12-08 DIAGNOSIS — Z98.1 S/P LUMBAR FUSION: Primary | ICD-10-CM

## 2023-12-08 DIAGNOSIS — M53.86 DECREASED RANGE OF MOTION OF LUMBAR SPINE: ICD-10-CM

## 2023-12-08 DIAGNOSIS — Z74.09 DECREASED STRENGTH, ENDURANCE, AND MOBILITY: ICD-10-CM

## 2023-12-08 DIAGNOSIS — R68.89 DECREASED STRENGTH, ENDURANCE, AND MOBILITY: ICD-10-CM

## 2023-12-08 DIAGNOSIS — M48.062 LUMBAR STENOSIS WITH NEUROGENIC CLAUDICATION: Chronic | ICD-10-CM

## 2023-12-08 PROCEDURE — 97530 THERAPEUTIC ACTIVITIES: CPT | Mod: HCNC | Performed by: PHYSICAL THERAPIST

## 2023-12-08 PROCEDURE — 97112 NEUROMUSCULAR REEDUCATION: CPT | Mod: HCNC | Performed by: PHYSICAL THERAPIST

## 2023-12-08 NOTE — PROGRESS NOTES
[facility]   Physical Therapy Treatment Note        Name: Myles Pride  [facility] Number: [id number]    Therapy Diagnosis:   Encounter Diagnoses   Name Primary?    S/P lumbar fusion Yes    Decreased range of motion of lumbar spine     Lumbar stenosis with neurogenic claudication     Decreased strength, endurance, and mobility        Physician: Lyle Lobato MD    Visit Date: 12/8/2023    Physician Orders: PT Eval and Treat  Medical Diagnosis from Referral: s/p lumbar fusion, lumbar stenosis with neurogenic claudication   Evaluation Date: 10/12/2023  Authorization Period Expiration: 12/31/2023  Plan of Care Expiration: 1/10/2024  Progress Note Due: 12/13/2023  Visit # / Visits authorized: 12/20 (+eval)   FOTO: 1/3 (last performed on 10/12/2023)     Precautions: Standard and post op precautions     **SURGERY DATE: lumbar fusion L3/4, L 4/5. 9/6/2023**    PTA Visit #: 0/5     Time In: 0918  Time Out: 1005  Total Billable Time: 45 minutes (Billing reflects 1 on 1 treatment time spent with patient)    Subjective     Patient reports: that he continues to feel better overall and notice improvement in strength. He still has a little soreness in his right hip.     He was compliant with home exercise program.  Response to previous treatment: no adverse reaction  Functional change: doing more home chores and increased back flexibility but still some stiffness noted    Pain: 0/10    Location: low back    Objective      Objective Measures updated at progress report or POC update only unless otherwise noted.       Treatment     Myles received the treatments listed below:       THERAPEUTIC EXERCISES to develop strength, endurance, ROM, flexibility, posture, and core stabilization for (6) minutes including:    Intervention Performed Today    Nustep for ROM and strength x 6', level 6   Exercise bike   6 minutes    Heel Digs/DKTC on stability ball  20x   LONG ARCH QUAD   30x each LOWER EXTREMITY  "sitting no back support in clinic chair   Lower trunk rotation   3 minutes    Hip flexor stretch  Demonstrated in prone and EOM             Re-assessment  Objective measurements, FOTO, pt education      Plan for Next Visit:        NEUROMUSCULAR RE-EDUCATION ACTIVITIES to improve Balance, Coordination, Kinesthetic, Sense, Proprioception, and Posture for (16) minutes.  The following were included:    Intervention Performed Today    Abdominal brace  20x, 3' holds on and off   Glute sets  20x, 3" holds on and off   Clams - sidelying   3 x 8 each LOWER EXTREMITY    Sidelying hip abduction   12x each side, PT assist required when lifting R LE   Bridges - with abdominal brace  x 3 x 10 small range not past neutral    Straight leg raise with abdominal brace    3 x 8 bilateral    Abdominal brace   X 10 alternating shoulder flexion  X 10 march in place    Isometric trunk rotation   10 x 5" holds each direction   Quadruped alt LE  x 1 x 15 each LE   Table Top Holds  x 10x, 5" holds   Standing T's (added) x 2 x 10, green theraband      Plan for Next Visit: prone hip extension       THERAPEUTIC ACTIVITIES to improve dynamic and functional  performance for (23) minutes including:    Intervention Performed Today    Shuttle Squats  x 3 x 10, 6 bands   Standing Hip 3-way  X  X  x 2 x 10 flexion bilateral   2 x 10 abduction bilateral   2 X 10 extension bilateral    Paloff Press  x 2 x 10, 3" holds each direction, blue theraband    Squat taps on 24" box  x 3 x 10    Side to side steps  x 2 minutes red band   Side stepping with PT holding hand resistance around hips  x Down and back 5x each side, purple band   Forward stepping with PT holding hand resistance around hips  x Down and back 5x each side, purple band          Plan for Next Visit:         MANUAL THERAPY TECHNIQUES were applied for (0) minutes, including:    Manual Intervention Performed Today    Soft Tissue Mobilization [] bilateral  paraspinals, quadratus lumborum   Joint " Mobilizations []     []     []    Functional Dry Needling  []      Plan for Next Visit: Continue as needed         Patient Education and Home Exercises       Home Exercises Provided and Patient Education Provided     Education provided: (included in treatment section) minutes  PURPOSE: Patient educated on the impairments noted above and the effects of physical therapy intervention to improve overall condition and QOL.   EXERCISE: Patient was educated on all the above exercise prior/during/after for proper posture, positioning, and execution for safe performance with home exercise program.   STRENGTH: Patient educated on the importance of improved core and extremity strength in order to improve alignment of the spine and extremities with static positions and dynamic movement.   POSTURE: Patient educated on postural awareness to reduce stress and maintain optimal alignment of the spine with static positions and dynamic movement     Written Home Exercises Provided: yes.  Exercises were reviewed and Myles was able to demonstrate them prior to the end of the session.  Myles demonstrated good  understanding of the education provided. See EMR under Patient Instructions for exercises provided during therapy sessions.    Assessment     Patient did well with treatment today. He completed exercises without increased complaint and demonstrated less difficulty with recent exercise progressions. Added standing T's for additional scapular and postural strengthening. Very good form noted with exercise. His core and hip strength and stability continue to improve.     Myles is progressing well towards his goals.   Patient prognosis is Good.     Patient will continue to benefit from skilled outpatient physical therapy to address the deficits listed in the problem list box on initial evaluation, provide pt/family education and to maximize patient's level of independence in the home and community environment.     Patient's  spiritual, cultural and educational needs considered and pt agreeable to plan of care and goals.     Anticipated Barriers for therapy: co-morbidities    Goals:  Short Term Goals:  6 weeks Status  Date Met   PAIN: Pt will report worst pain of 1/10 in order to progress toward max functional ability and improve quality of life. [] Progressing  [x] Met  [] Not Met 11/13/2023    FUNCTION: Patient will demonstrate improved function as indicated by a score of greater than or equal to 52 out of 100 on FOTO. [] Progressing  [x] Met  [] Not Met 11/13/2023     MOBILITY: Patient will improve AROM to 50% of stated goals, listed in objective measures above, in order to progress towards independence with functional activities.  [] Progressing  [x] Met  [] Not Met  11/13/2023    STRENGTH: Patient will improve strength to 50% of stated goals, listed in objective measures above, in order to progress towards independence with functional activities. [] Progressing  [x] Met  [] Not Met  11/13/2023    POSTURE: Patient will correct postural deviations in sitting and standing, to decrease pain and promote long term stability.  [] Progressing  [x] Met  [] Not Met  11/13/2023    GAIT: Patient will demonstrate improved gait mechanics in order to improve functional mobility, improve quality of life, and decrease risk of further injury or fall.  [] Progressing  [x] Met  [] Not Met  11/13/2023    HEP: Patient will demonstrate independence with HEP in order to progress toward functional independence. [] Progressing  [x] Met  [] Not Met  11/13/2023       Long Term Goals:  12 weeks Status Date Met   PAIN: Pt will report worst pain of 0/10 in order to progress toward max functional ability and improve quality of life [x] Progressing  [] Met  [] Not Met     FUNCTION: Patient will demonstrate improved function as indicated by a score of greater than or equal to 61 out of 100 on FOTO. [x] Progressing  [] Met  [] Not Met     MOBILITY: Patient will  improve AROM to stated goals, listed in objective measures above, in order to return to maximal functional potential and improve quality of life.  [x] Progressing  [] Met  [] Not Met     STRENGTH: Patient will improve strength to stated goals, listed in objective measures above, in order to improve functional independence and quality of life.  [x] Progressing  [] Met  [] Not Met Partially Met  11/13/2023    GAIT: Patient will demonstrate normalized gait mechanics with minimal compensation in order to return to PLOF. [x] Progressing  [] Met  [] Not Met     Patient will return to normal ADL's, IADL's, community involvement, recreational activities, and work-related activities with less than or equal to 0/10 pain and maximal function.  [x] Progressing  [] Met  [] Not Met           Plan     Continue Plan of Care (POC) and progress per patient tolerance. See treatment section for details on planned progressions next session.    11/13/2023: It is my recommendation that patient continue with PT at his current frequency of 2 times per week for the remainder of his approved visits. His treatment plan will remain the same, and he will be progressed appropriately.     10/12/2023 (eval): Outpatient Physical Therapy 2 times weekly for 12 weeks to include any combination of the following interventions: virtual visits, dry needling, modalities, electrical stimulation (IFC, Pre-Mod, Attended with Functional Dry Needling), Aquatic Therapy, Cervical/Lumbar Traction, Gait Training, Manual Therapy, Neuromuscular Re-ed, Patient Education, Self Care, Therapeutic Exercise, and Therapeutic Activites     Mady Billy, PT

## 2023-12-12 ENCOUNTER — CLINICAL SUPPORT (OUTPATIENT)
Dept: REHABILITATION | Facility: HOSPITAL | Age: 68
End: 2023-12-12
Payer: MEDICARE

## 2023-12-12 DIAGNOSIS — Z74.09 DECREASED STRENGTH, ENDURANCE, AND MOBILITY: ICD-10-CM

## 2023-12-12 DIAGNOSIS — M48.062 LUMBAR STENOSIS WITH NEUROGENIC CLAUDICATION: Chronic | ICD-10-CM

## 2023-12-12 DIAGNOSIS — Z98.1 S/P LUMBAR FUSION: Primary | ICD-10-CM

## 2023-12-12 DIAGNOSIS — R53.1 DECREASED STRENGTH, ENDURANCE, AND MOBILITY: ICD-10-CM

## 2023-12-12 DIAGNOSIS — R68.89 DECREASED STRENGTH, ENDURANCE, AND MOBILITY: ICD-10-CM

## 2023-12-12 DIAGNOSIS — M53.86 DECREASED RANGE OF MOTION OF LUMBAR SPINE: ICD-10-CM

## 2023-12-12 PROCEDURE — 97530 THERAPEUTIC ACTIVITIES: CPT | Mod: HCNC | Performed by: PHYSICAL THERAPIST

## 2023-12-12 PROCEDURE — 97110 THERAPEUTIC EXERCISES: CPT | Mod: HCNC | Performed by: PHYSICAL THERAPIST

## 2023-12-12 NOTE — PROGRESS NOTES
[facility]   Physical Therapy Treatment Note        Name: Myles Pride  Clinic Number: [id number]    Therapy Diagnosis:   Encounter Diagnoses   Name Primary?    S/P lumbar fusion Yes    Decreased range of motion of lumbar spine     Lumbar stenosis with neurogenic claudication     Decreased strength, endurance, and mobility        Physician: Lyle Lobato MD    Visit Date: 12/12/2023    Physician Orders: PT Eval and Treat  Medical Diagnosis from Referral: s/p lumbar fusion, lumbar stenosis with neurogenic claudication   Evaluation Date: 10/12/2023  Authorization Period Expiration: 12/31/2023  Plan of Care Expiration: 1/10/2024  Progress Note Due: 12/13/2023  Visit # / Visits authorized: 13/20 (+eval)   FOTO: 1/3 (last performed on 10/12/2023)     Precautions: Standard and post op precautions     **SURGERY DATE: lumbar fusion L3/4, L 4/5. 9/6/2023**    PTA Visit #: 0/5     Time In: 1006  Time Out: 1046  Total Billable Time: 40 minutes (Billing reflects 1 on 1 treatment time spent with patient)    Subjective     Patient reports: that he feels good, just a little stiff today.     He was compliant with home exercise program.  Response to previous treatment: no adverse reaction  Functional change: doing more home chores and increased back flexibility but still some stiffness noted    Pain: 0/10    Location: low back    Objective      Objective Measures updated at progress report or POC update only unless otherwise noted.       Treatment     Myles received the treatments listed below:       THERAPEUTIC EXERCISES to develop strength, endurance, ROM, flexibility, posture, and core stabilization for (14) minutes including:    Intervention Performed Today    Nustep for ROM and strength x 6', level 6   Exercise bike   6 minutes    Heel Digs/DKTC on stability ball  20x   LONG ARCH QUAD   30x each LOWER EXTREMITY sitting no back support in clinic chair   Lower trunk rotation   3 minutes   "  Hip flexor stretch  Demonstrated in prone and EOM   Knee flexion and extension machine (added) X  x 2 x 10, 45# knee extension  2 x 10, 65# knee flexion        Re-assessment  Objective measurements, FOTO, pt education      Plan for Next Visit:        NEUROMUSCULAR RE-EDUCATION ACTIVITIES to improve Balance, Coordination, Kinesthetic, Sense, Proprioception, and Posture for (4) minutes.  The following were included:    Intervention Performed Today    Abdominal brace  20x, 3' holds on and off   Glute sets  20x, 3" holds on and off   Clams - sidelying   3 x 8 each LOWER EXTREMITY    Sidelying hip abduction   12x each side, PT assist required when lifting R LE   Bridges - with abdominal brace  --- 3 x 10 small range not past neutral    Straight leg raise with abdominal brace    3 x 8 bilateral    Abdominal brace   X 10 alternating shoulder flexion  X 10 march in place    Isometric trunk rotation   10 x 5" holds each direction   Quadruped alt LE  --- 1 x 15 each LE   Table Top Holds  --- 10x, 5" holds   Standing T's  x 2 x 10, green theraband      Plan for Next Visit: prone hip extension       THERAPEUTIC ACTIVITIES to improve dynamic and functional  performance for (22) minutes including:    Intervention Performed Today    Shuttle Squats  x 3 x 10, 6 bands   Standing Hip 3-way  X  X  x 2 x 10 flexion bilateral   2 x 10 abduction bilateral   2 X 10 extension bilateral    Paloff Press  x 2 x 10, 3" holds each direction, blue theraband    Squat taps on 24" box  x 3 x 10    Side to side steps   2 minutes red band   Side stepping with PT holding hand resistance around hips  x Down and back 5x each side, purple band   Forward stepping with PT holding hand resistance around hips  x Down and back 5x each side, purple band          Plan for Next Visit:         MANUAL THERAPY TECHNIQUES were applied for (0) minutes, including:    Manual Intervention Performed Today    Soft Tissue Mobilization [] bilateral  paraspinals, quadratus " lumborum   Joint Mobilizations []     []     []    Functional Dry Needling  []      Plan for Next Visit: Continue as needed         Patient Education and Home Exercises       Home Exercises Provided and Patient Education Provided     Education provided: (included in treatment section) minutes  PURPOSE: Patient educated on the impairments noted above and the effects of physical therapy intervention to improve overall condition and QOL.   EXERCISE: Patient was educated on all the above exercise prior/during/after for proper posture, positioning, and execution for safe performance with home exercise program.   STRENGTH: Patient educated on the importance of improved core and extremity strength in order to improve alignment of the spine and extremities with static positions and dynamic movement.   POSTURE: Patient educated on postural awareness to reduce stress and maintain optimal alignment of the spine with static positions and dynamic movement     Written Home Exercises Provided: yes.  Exercises were reviewed and Myles was able to demonstrate them prior to the end of the session.  Myles demonstrated good  understanding of the education provided. See EMR under Patient Instructions for exercises provided during therapy sessions.    Assessment     Patient tolerated treatment well. He completed exercises with less difficulty and without complaint. Patient was able to be progressed to knee extension and flexion machine for generalized LE strengthening. He did well with progression, demonstrating good form. Patient demonstrates improved hip and core strength and stabilization. He is progressing well towards DC.     Myles is progressing well towards his goals.   Patient prognosis is Good.     Patient will continue to benefit from skilled outpatient physical therapy to address the deficits listed in the problem list box on initial evaluation, provide pt/family education and to maximize patient's level of independence  in the home and community environment.     Patient's spiritual, cultural and educational needs considered and pt agreeable to plan of care and goals.     Anticipated Barriers for therapy: co-morbidities    Goals:  Short Term Goals:  6 weeks Status  Date Met   PAIN: Pt will report worst pain of 1/10 in order to progress toward max functional ability and improve quality of life. [] Progressing  [x] Met  [] Not Met 11/13/2023    FUNCTION: Patient will demonstrate improved function as indicated by a score of greater than or equal to 52 out of 100 on FOTO. [] Progressing  [x] Met  [] Not Met 11/13/2023     MOBILITY: Patient will improve AROM to 50% of stated goals, listed in objective measures above, in order to progress towards independence with functional activities.  [] Progressing  [x] Met  [] Not Met  11/13/2023    STRENGTH: Patient will improve strength to 50% of stated goals, listed in objective measures above, in order to progress towards independence with functional activities. [] Progressing  [x] Met  [] Not Met  11/13/2023    POSTURE: Patient will correct postural deviations in sitting and standing, to decrease pain and promote long term stability.  [] Progressing  [x] Met  [] Not Met  11/13/2023    GAIT: Patient will demonstrate improved gait mechanics in order to improve functional mobility, improve quality of life, and decrease risk of further injury or fall.  [] Progressing  [x] Met  [] Not Met  11/13/2023    HEP: Patient will demonstrate independence with HEP in order to progress toward functional independence. [] Progressing  [x] Met  [] Not Met  11/13/2023       Long Term Goals:  12 weeks Status Date Met   PAIN: Pt will report worst pain of 0/10 in order to progress toward max functional ability and improve quality of life [x] Progressing  [] Met  [] Not Met     FUNCTION: Patient will demonstrate improved function as indicated by a score of greater than or equal to 61 out of 100 on FOTO. [x]  Progressing  [] Met  [] Not Met     MOBILITY: Patient will improve AROM to stated goals, listed in objective measures above, in order to return to maximal functional potential and improve quality of life.  [x] Progressing  [] Met  [] Not Met     STRENGTH: Patient will improve strength to stated goals, listed in objective measures above, in order to improve functional independence and quality of life.  [x] Progressing  [] Met  [] Not Met Partially Met  11/13/2023    GAIT: Patient will demonstrate normalized gait mechanics with minimal compensation in order to return to PLOF. [x] Progressing  [] Met  [] Not Met     Patient will return to normal ADL's, IADL's, community involvement, recreational activities, and work-related activities with less than or equal to 0/10 pain and maximal function.  [x] Progressing  [] Met  [] Not Met           Plan     Continue Plan of Care (POC) and progress per patient tolerance. See treatment section for details on planned progressions next session.    11/13/2023: It is my recommendation that patient continue with PT at his current frequency of 2 times per week for the remainder of his approved visits. His treatment plan will remain the same, and he will be progressed appropriately.     10/12/2023 (eval): Outpatient Physical Therapy 2 times weekly for 12 weeks to include any combination of the following interventions: virtual visits, dry needling, modalities, electrical stimulation (IFC, Pre-Mod, Attended with Functional Dry Needling), Aquatic Therapy, Cervical/Lumbar Traction, Gait Training, Manual Therapy, Neuromuscular Re-ed, Patient Education, Self Care, Therapeutic Exercise, and Therapeutic Activites     Mady Billy, PT

## 2023-12-15 ENCOUNTER — CLINICAL SUPPORT (OUTPATIENT)
Dept: REHABILITATION | Facility: HOSPITAL | Age: 68
End: 2023-12-15
Payer: MEDICARE

## 2023-12-15 DIAGNOSIS — R53.1 DECREASED STRENGTH, ENDURANCE, AND MOBILITY: ICD-10-CM

## 2023-12-15 DIAGNOSIS — M53.86 DECREASED RANGE OF MOTION OF LUMBAR SPINE: ICD-10-CM

## 2023-12-15 DIAGNOSIS — R68.89 DECREASED STRENGTH, ENDURANCE, AND MOBILITY: ICD-10-CM

## 2023-12-15 DIAGNOSIS — Z74.09 DECREASED STRENGTH, ENDURANCE, AND MOBILITY: ICD-10-CM

## 2023-12-15 DIAGNOSIS — M48.062 LUMBAR STENOSIS WITH NEUROGENIC CLAUDICATION: Chronic | ICD-10-CM

## 2023-12-15 DIAGNOSIS — Z98.1 S/P LUMBAR FUSION: Primary | ICD-10-CM

## 2023-12-15 PROCEDURE — 97112 NEUROMUSCULAR REEDUCATION: CPT | Mod: HCNC | Performed by: PHYSICAL THERAPIST

## 2023-12-15 PROCEDURE — 97110 THERAPEUTIC EXERCISES: CPT | Mod: HCNC | Performed by: PHYSICAL THERAPIST

## 2023-12-15 PROCEDURE — 97530 THERAPEUTIC ACTIVITIES: CPT | Mod: HCNC | Performed by: PHYSICAL THERAPIST

## 2023-12-15 NOTE — PLAN OF CARE
[facility]   Physical Therapy Treatment Note + Discharge Summary       Name: Myles Pride  Clinic Number: [id number]    Therapy Diagnosis:   Encounter Diagnoses   Name Primary?    S/P lumbar fusion Yes    Decreased range of motion of lumbar spine     Lumbar stenosis with neurogenic claudication     Decreased strength, endurance, and mobility        Physician: Lyle Lobato MD    Visit Date: 12/15/2023    Physician Orders: PT Eval and Treat  Medical Diagnosis from Referral: s/p lumbar fusion, lumbar stenosis with neurogenic claudication   Evaluation Date: 10/12/2023  Authorization Period Expiration: 12/31/2023  Plan of Care Expiration: 1/10/2024  Progress Note Due: 12/13/2023  Visit # / Visits authorized: 14/20 (+eval)   FOTO: 1/3 (last performed on 10/12/2023)     Precautions: Standard and post op precautions     **SURGERY DATE: lumbar fusion L3/4, L 4/5. 9/6/2023**    PTA Visit #: 0/5     Time In: 0922  Time Out: 1020  Total Billable Time: 54 minutes (Billing reflects 1 on 1 treatment time spent with patient)    Subjective     Patient reports: that he is feeling good today. He feels much better overall and is okay with making today his last day. Patient feels confident in his ability to keep up with HEP.     He was compliant with home exercise program.  Response to previous treatment: no adverse reaction  Functional change: doing more home chores and increased back flexibility but still some stiffness noted    Pain: 0/10    Location: low back    Objective      Objective Measures updated at progress report or POC update only unless otherwise noted.     RANGE OF MOTION:   Lumbar ROM Right  (spine) Left    Pain/Dysfunction with Movement Goal   Lumbar Flexion (60º) 30 --- No pain with ROM testing today  40   Lumbar Extension (30º) 0 --- NT past 0  0   Lumbar Side Bending (25º) 15 10    20         STRENGTH:   L/E MMT Right Left Pain/Dysfunction with Movement Goal Right   12/15/2023   Left  12/15/2023    Hip Flexion  4+/5 4+/5   4+/5 B 5 5   Hip Extension  NT NT   4+/5 B  3+ 4    Hip Abduction  4-/5 4-/5  4+/5 B 4+ 4+    Knee Extension 4+/5 4+/5   5/5 B 5 5   Knee Flexion 5/5 5/5   5/5 B 5 5   Hip IR 4/5 4+/5   4+/5 B 5 5   Hip ER 4/5 4+/5   4+/5 B 5 5   Ankle DF 5/5 5/5   5/5 B 5 5   Ankle PF 5/5 5/5   5/5 B 5 5         MUSCLE LENGTH:   Muscle Tested  Right Left  Goal   Hip Flexors [] Normal  [x] Limited [] Normal  [x] Limited Normal B   ITB / TFL [] Normal  [x] Limited [] Normal  [x] Limited Normal B   Hamstrings  [] Normal  [x] Limited [] Normal  [x] Limited Normal B   Piriformis  [] Normal  [x] Limited [] Normal  [x] Limited Normal B   Gastrocnemius  [] Normal  [x] Limited [] Normal  [x] Limited Normal B   Soleus  [] Normal  [x] Limited [] Normal  [x] Limited Normal B   **Although still decreased, muscle length has improved as compared to previous visits.         SENSATION  [x] Intact to Light Touch                       [] Impaired:        PALPATION: Muscles: Much improved tone noted with palpation of: bilateral paraspinals, quadratus lumborum, glutes, piriformis.        POSTURE:  Pt presents with postural abnormalities which include:               [x] Forward Head                                [] Increased Lumbar Lordosis              [x] Rounded Shoulder                         [] Genu Recurvatum              [] Increased Thoracic Kyphosis        [] Genu Valgus              [] Trunk Deviated                              [] Pes Planus              [] Scapular Winging                          [x] Other: decreased lumbar lordosis, flexed trunk posture           GAIT ANALYSIS The patient ambulated with the following assistive device: none; the pt presents with the following gait abnormalities: trendelenburg, bradykinetic, decreased step length bilateral, decreased hip extension bilateral, and flexed trunk .  12/15/2023: Patient now presents with improved anuel, step length, and bilateral  "hip extension. Decreased trendelenburg gait. Patient still presents with flexed trunk posture, but has lorena working on postural awareness and correction.      FUNCTIONAL MOVEMENT PATTERNS  Movement Analysis Notes   Bed Mobility  [x]Functional  []Dysfunctional:  []Painful  [x]Non-Painful    Improved motor planning and core activation   Sit to Stand [x]Functional  []Dysfunctional:  []Painful  [x]Non-Painful    Improved motor planning and core activation   Squat [x]Functional  []Dysfunctional:  []Painful  [x]Non-Painful    Improved motor planning and core activation. Able to perform squat taps today to 24" box without issue.             Function:       Treatment     Myles received the treatments listed below:       THERAPEUTIC EXERCISES to develop strength, endurance, ROM, flexibility, posture, and core stabilization for (23) minutes including:    Intervention Performed Today    Nustep for ROM and strength  6', level 6   Exercise bike  x 6 minutes    Heel Digs/DKTC on stability ball  20x   LONG ARCH QUAD   30x each LOWER EXTREMITY sitting no back support in clinic chair   Lower trunk rotation   3 minutes    Hip flexor stretch  Demonstrated in prone and EOM   Knee flexion and extension machine  X  x 2 x 10, 45# knee extension  2 x 10, 65# knee flexion        Re-assessment x Objective measurements, FOTO, pt education      Plan for Next Visit:        NEUROMUSCULAR RE-EDUCATION ACTIVITIES to improve Balance, Coordination, Kinesthetic, Sense, Proprioception, and Posture for (8) minutes.  The following were included:    Intervention Performed Today    Abdominal brace  20x, 3' holds on and off   Glute sets  20x, 3" holds on and off   Clams - sidelying   3 x 8 each LOWER EXTREMITY    Sidelying hip abduction   12x each side, PT assist required when lifting R LE   Bridges - with abdominal brace  --- 3 x 10 small range not past neutral    Straight leg raise with abdominal brace    3 x 8 bilateral    Abdominal brace   X 10 " "alternating shoulder flexion  X 10 march in place    Isometric trunk rotation   10 x 5" holds each direction   Quadruped alt LE  --- 1 x 15 each LE   Table Top Holds  x 10x, 5" holds   Standing T's  x 2 x 10, green theraband      Plan for Next Visit: prone hip extension       THERAPEUTIC ACTIVITIES to improve dynamic and functional  performance for (23) minutes including:    Intervention Performed Today    Shuttle Squats  x 3 x 10, 6 bands   Standing Hip 3-way  X  X  x 2 x 10 flexion bilateral   2 x 10 abduction bilateral   2 X 10 extension bilateral    Paloff Press  x 2 x 10, 3" holds each direction, blue theraband    Squat taps on 24" box  x 3 x 10    Side to side steps  x 2 minutes red band   Side stepping with PT holding hand resistance around hips  x Down and back 5x each side, purple band   Forward stepping with PT holding hand resistance around hips  x Down and back 5x each side, purple band          Plan for Next Visit:       Patient Education and Home Exercises       Home Exercises Provided and Patient Education Provided     Education provided: (included in treatment section) minutes  PURPOSE: Patient educated on the impairments noted above and the effects of physical therapy intervention to improve overall condition and QOL.   EXERCISE: Patient was educated on all the above exercise prior/during/after for proper posture, positioning, and execution for safe performance with home exercise program.   STRENGTH: Patient educated on the importance of improved core and extremity strength in order to improve alignment of the spine and extremities with static positions and dynamic movement.   POSTURE: Patient educated on postural awareness to reduce stress and maintain optimal alignment of the spine with static positions and dynamic movement     Written Home Exercises Provided: yes.  Exercises were reviewed and Myles was able to demonstrate them prior to the end of the session.  Myles demonstrated good  " understanding of the education provided. See EMR under Patient Instructions for exercises provided during therapy sessions.    Assessment     Patient tolerated treatment well and has attended 15 total PT visits. He has made good overall progress with PT, demonstrating improvements in lumbar spine ROM, LE strength, and overall gait. He now demonstrates much improved core activation and overall stabilization with exercises and functional activities. He has returned to most functional activities without pain or limitation at this time. Any limitations in function at this time, mainly come from left knee and occasional right hip pain, both of which have been going on for a long time. Patient is independent with exercises and appropriate for DC from PT with HEP at this time.     Myles is progressing well towards his goals.   Patient prognosis is Good.     Patient will continue to benefit from skilled outpatient physical therapy to address the deficits listed in the problem list box on initial evaluation, provide pt/family education and to maximize patient's level of independence in the home and community environment.     Patient's spiritual, cultural and educational needs considered and pt agreeable to plan of care and goals.     Anticipated Barriers for therapy: co-morbidities    Goals:  Short Term Goals:  6 weeks Status  Date Met   PAIN: Pt will report worst pain of 1/10 in order to progress toward max functional ability and improve quality of life. [] Progressing  [x] Met  [] Not Met 11/13/2023    FUNCTION: Patient will demonstrate improved function as indicated by a score of greater than or equal to 52 out of 100 on FOTO. [] Progressing  [x] Met  [] Not Met 11/13/2023     MOBILITY: Patient will improve AROM to 50% of stated goals, listed in objective measures above, in order to progress towards independence with functional activities.  [] Progressing  [x] Met  [] Not Met  11/13/2023    STRENGTH: Patient will  improve strength to 50% of stated goals, listed in objective measures above, in order to progress towards independence with functional activities. [] Progressing  [x] Met  [] Not Met  11/13/2023    POSTURE: Patient will correct postural deviations in sitting and standing, to decrease pain and promote long term stability.  [] Progressing  [x] Met  [] Not Met  11/13/2023    GAIT: Patient will demonstrate improved gait mechanics in order to improve functional mobility, improve quality of life, and decrease risk of further injury or fall.  [] Progressing  [x] Met  [] Not Met  11/13/2023    HEP: Patient will demonstrate independence with HEP in order to progress toward functional independence. [] Progressing  [x] Met  [] Not Met  11/13/2023       Long Term Goals:  12 weeks Status Date Met   PAIN: Pt will report worst pain of 0/10 in order to progress toward max functional ability and improve quality of life [] Progressing  [x] Met  [] Not Met 12/15/2023    FUNCTION: Patient will demonstrate improved function as indicated by a score of greater than or equal to 61 out of 100 on FOTO. [] Progressing  [x] Met  [] Not Met 12/15/2023    MOBILITY: Patient will improve AROM to stated goals, listed in objective measures above, in order to return to maximal functional potential and improve quality of life.  [x] Progressing  [] Met  [] Not Met     STRENGTH: Patient will improve strength to stated goals, listed in objective measures above, in order to improve functional independence and quality of life.  [x] Progressing  [] Met  [] Not Met Partially Met  12/15/2023  (All but hip extension )    GAIT: Patient will demonstrate normalized gait mechanics with minimal compensation in order to return to PLOF. [] Progressing  [x] Met  [] Not Met  12/15/2023   Patient will return to normal ADL's, IADL's, community involvement, recreational activities, and work-related activities with less than or equal to 0/10 pain and maximal function.  []  Progressing  [x] Met  [] Not Met 12/23/2023          Plan     12/15/2023: Patient is considered DC from PT with HEP at this time.     11/13/2023: It is my recommendation that patient continue with PT at his current frequency of 2 times per week for the remainder of his approved visits. His treatment plan will remain the same, and he will be progressed appropriately.     10/12/2023 (eval): Outpatient Physical Therapy 2 times weekly for 12 weeks to include any combination of the following interventions: virtual visits, dry needling, modalities, electrical stimulation (IFC, Pre-Mod, Attended with Functional Dry Needling), Aquatic Therapy, Cervical/Lumbar Traction, Gait Training, Manual Therapy, Neuromuscular Re-ed, Patient Education, Self Care, Therapeutic Exercise, and Therapeutic Activites     Mady Billy, PT

## 2023-12-15 NOTE — PROGRESS NOTES
[facility]   Physical Therapy Treatment Note + Discharge Summary       Name: Myles Pride  Clinic Number: [id number]    Therapy Diagnosis:   Encounter Diagnoses   Name Primary?    S/P lumbar fusion Yes    Decreased range of motion of lumbar spine     Lumbar stenosis with neurogenic claudication     Decreased strength, endurance, and mobility        Physician: Lyle Lobato MD    Visit Date: 12/15/2023    Physician Orders: PT Eval and Treat  Medical Diagnosis from Referral: s/p lumbar fusion, lumbar stenosis with neurogenic claudication   Evaluation Date: 10/12/2023  Authorization Period Expiration: 12/31/2023  Plan of Care Expiration: 1/10/2024  Progress Note Due: 12/13/2023  Visit # / Visits authorized: 14/20 (+eval)   FOTO: 1/3 (last performed on 10/12/2023)     Precautions: Standard and post op precautions     **SURGERY DATE: lumbar fusion L3/4, L 4/5. 9/6/2023**    PTA Visit #: 0/5     Time In: 0922  Time Out: 1020  Total Billable Time: 54 minutes (Billing reflects 1 on 1 treatment time spent with patient)    Subjective     Patient reports: that he is feeling good today. He feels much better overall and is okay with making today his last day. Patient feels confident in his ability to keep up with HEP.     He was compliant with home exercise program.  Response to previous treatment: no adverse reaction  Functional change: doing more home chores and increased back flexibility but still some stiffness noted    Pain: 0/10    Location: low back    Objective      Objective Measures updated at progress report or POC update only unless otherwise noted.     RANGE OF MOTION:   Lumbar ROM Right  (spine) Left    Pain/Dysfunction with Movement Goal   Lumbar Flexion (60º) 30 --- No pain with ROM testing today  40   Lumbar Extension (30º) 0 --- NT past 0  0   Lumbar Side Bending (25º) 15 10    20         STRENGTH:   L/E MMT Right Left Pain/Dysfunction with Movement Goal Right   12/15/2023   Left  12/15/2023    Hip Flexion  4+/5 4+/5   4+/5 B 5 5   Hip Extension  NT NT   4+/5 B  3+ 4    Hip Abduction  4-/5 4-/5  4+/5 B 4+ 4+    Knee Extension 4+/5 4+/5   5/5 B 5 5   Knee Flexion 5/5 5/5   5/5 B 5 5   Hip IR 4/5 4+/5   4+/5 B 5 5   Hip ER 4/5 4+/5   4+/5 B 5 5   Ankle DF 5/5 5/5   5/5 B 5 5   Ankle PF 5/5 5/5   5/5 B 5 5         MUSCLE LENGTH:   Muscle Tested  Right Left  Goal   Hip Flexors [] Normal  [x] Limited [] Normal  [x] Limited Normal B   ITB / TFL [] Normal  [x] Limited [] Normal  [x] Limited Normal B   Hamstrings  [] Normal  [x] Limited [] Normal  [x] Limited Normal B   Piriformis  [] Normal  [x] Limited [] Normal  [x] Limited Normal B   Gastrocnemius  [] Normal  [x] Limited [] Normal  [x] Limited Normal B   Soleus  [] Normal  [x] Limited [] Normal  [x] Limited Normal B   **Although still decreased, muscle length has improved as compared to previous visits.         SENSATION  [x] Intact to Light Touch                       [] Impaired:        PALPATION: Muscles: Much improved tone noted with palpation of: bilateral paraspinals, quadratus lumborum, glutes, piriformis.        POSTURE:  Pt presents with postural abnormalities which include:               [x] Forward Head                                [] Increased Lumbar Lordosis              [x] Rounded Shoulder                         [] Genu Recurvatum              [] Increased Thoracic Kyphosis        [] Genu Valgus              [] Trunk Deviated                              [] Pes Planus              [] Scapular Winging                          [x] Other: decreased lumbar lordosis, flexed trunk posture           GAIT ANALYSIS The patient ambulated with the following assistive device: none; the pt presents with the following gait abnormalities: trendelenburg, bradykinetic, decreased step length bilateral, decreased hip extension bilateral, and flexed trunk .  12/15/2023: Patient now presents with improved anuel, step length, and bilateral  "hip extension. Decreased trendelenburg gait. Patient still presents with flexed trunk posture, but has lorena working on postural awareness and correction.      FUNCTIONAL MOVEMENT PATTERNS  Movement Analysis Notes   Bed Mobility  [x]Functional  []Dysfunctional:  []Painful  [x]Non-Painful    Improved motor planning and core activation   Sit to Stand [x]Functional  []Dysfunctional:  []Painful  [x]Non-Painful    Improved motor planning and core activation   Squat [x]Functional  []Dysfunctional:  []Painful  [x]Non-Painful    Improved motor planning and core activation. Able to perform squat taps today to 24" box without issue.             Function:       Treatment     Myles received the treatments listed below:       THERAPEUTIC EXERCISES to develop strength, endurance, ROM, flexibility, posture, and core stabilization for (23) minutes including:    Intervention Performed Today    Nustep for ROM and strength  6', level 6   Exercise bike  x 6 minutes    Heel Digs/DKTC on stability ball  20x   LONG ARCH QUAD   30x each LOWER EXTREMITY sitting no back support in clinic chair   Lower trunk rotation   3 minutes    Hip flexor stretch  Demonstrated in prone and EOM   Knee flexion and extension machine  X  x 2 x 10, 45# knee extension  2 x 10, 65# knee flexion        Re-assessment x Objective measurements, FOTO, pt education      Plan for Next Visit:        NEUROMUSCULAR RE-EDUCATION ACTIVITIES to improve Balance, Coordination, Kinesthetic, Sense, Proprioception, and Posture for (8) minutes.  The following were included:    Intervention Performed Today    Abdominal brace  20x, 3' holds on and off   Glute sets  20x, 3" holds on and off   Clams - sidelying   3 x 8 each LOWER EXTREMITY    Sidelying hip abduction   12x each side, PT assist required when lifting R LE   Bridges - with abdominal brace  --- 3 x 10 small range not past neutral    Straight leg raise with abdominal brace    3 x 8 bilateral    Abdominal brace   X 10 " "alternating shoulder flexion  X 10 march in place    Isometric trunk rotation   10 x 5" holds each direction   Quadruped alt LE  --- 1 x 15 each LE   Table Top Holds  x 10x, 5" holds   Standing T's  x 2 x 10, green theraband      Plan for Next Visit: prone hip extension       THERAPEUTIC ACTIVITIES to improve dynamic and functional  performance for (23) minutes including:    Intervention Performed Today    Shuttle Squats  x 3 x 10, 6 bands   Standing Hip 3-way  X  X  x 2 x 10 flexion bilateral   2 x 10 abduction bilateral   2 X 10 extension bilateral    Paloff Press  x 2 x 10, 3" holds each direction, blue theraband    Squat taps on 24" box  x 3 x 10    Side to side steps  x 2 minutes red band   Side stepping with PT holding hand resistance around hips  x Down and back 5x each side, purple band   Forward stepping with PT holding hand resistance around hips  x Down and back 5x each side, purple band          Plan for Next Visit:       Patient Education and Home Exercises       Home Exercises Provided and Patient Education Provided     Education provided: (included in treatment section) minutes  PURPOSE: Patient educated on the impairments noted above and the effects of physical therapy intervention to improve overall condition and QOL.   EXERCISE: Patient was educated on all the above exercise prior/during/after for proper posture, positioning, and execution for safe performance with home exercise program.   STRENGTH: Patient educated on the importance of improved core and extremity strength in order to improve alignment of the spine and extremities with static positions and dynamic movement.   POSTURE: Patient educated on postural awareness to reduce stress and maintain optimal alignment of the spine with static positions and dynamic movement     Written Home Exercises Provided: yes.  Exercises were reviewed and Myles was able to demonstrate them prior to the end of the session.  Myles demonstrated good  " understanding of the education provided. See EMR under Patient Instructions for exercises provided during therapy sessions.    Assessment     Patient tolerated treatment well and has attended 15 total PT visits. He has made good overall progress with PT, demonstrating improvements in lumbar spine ROM, LE strength, and overall gait. He now demonstrates much improved core activation and overall stabilization with exercises and functional activities. He has returned to most functional activities without pain or limitation at this time. Any limitations in function at this time, mainly come from left knee and occasional right hip pain, both of which have been going on for a long time. Patient is independent with exercises and appropriate for DC from PT with HEP at this time.     Myles is progressing well towards his goals.   Patient prognosis is Good.     Patient will continue to benefit from skilled outpatient physical therapy to address the deficits listed in the problem list box on initial evaluation, provide pt/family education and to maximize patient's level of independence in the home and community environment.     Patient's spiritual, cultural and educational needs considered and pt agreeable to plan of care and goals.     Anticipated Barriers for therapy: co-morbidities    Goals:  Short Term Goals:  6 weeks Status  Date Met   PAIN: Pt will report worst pain of 1/10 in order to progress toward max functional ability and improve quality of life. [] Progressing  [x] Met  [] Not Met 11/13/2023    FUNCTION: Patient will demonstrate improved function as indicated by a score of greater than or equal to 52 out of 100 on FOTO. [] Progressing  [x] Met  [] Not Met 11/13/2023     MOBILITY: Patient will improve AROM to 50% of stated goals, listed in objective measures above, in order to progress towards independence with functional activities.  [] Progressing  [x] Met  [] Not Met  11/13/2023    STRENGTH: Patient will  improve strength to 50% of stated goals, listed in objective measures above, in order to progress towards independence with functional activities. [] Progressing  [x] Met  [] Not Met  11/13/2023    POSTURE: Patient will correct postural deviations in sitting and standing, to decrease pain and promote long term stability.  [] Progressing  [x] Met  [] Not Met  11/13/2023    GAIT: Patient will demonstrate improved gait mechanics in order to improve functional mobility, improve quality of life, and decrease risk of further injury or fall.  [] Progressing  [x] Met  [] Not Met  11/13/2023    HEP: Patient will demonstrate independence with HEP in order to progress toward functional independence. [] Progressing  [x] Met  [] Not Met  11/13/2023       Long Term Goals:  12 weeks Status Date Met   PAIN: Pt will report worst pain of 0/10 in order to progress toward max functional ability and improve quality of life [] Progressing  [x] Met  [] Not Met 12/15/2023    FUNCTION: Patient will demonstrate improved function as indicated by a score of greater than or equal to 61 out of 100 on FOTO. [] Progressing  [x] Met  [] Not Met 12/15/2023    MOBILITY: Patient will improve AROM to stated goals, listed in objective measures above, in order to return to maximal functional potential and improve quality of life.  [x] Progressing  [] Met  [] Not Met     STRENGTH: Patient will improve strength to stated goals, listed in objective measures above, in order to improve functional independence and quality of life.  [x] Progressing  [] Met  [] Not Met Partially Met  12/15/2023  (All but hip extension )    GAIT: Patient will demonstrate normalized gait mechanics with minimal compensation in order to return to PLOF. [] Progressing  [x] Met  [] Not Met  12/15/2023   Patient will return to normal ADL's, IADL's, community involvement, recreational activities, and work-related activities with less than or equal to 0/10 pain and maximal function.  []  Progressing  [x] Met  [] Not Met 12/23/2023          Plan     12/15/2023: Patient is considered DC from PT with HEP at this time.     11/13/2023: It is my recommendation that patient continue with PT at his current frequency of 2 times per week for the remainder of his approved visits. His treatment plan will remain the same, and he will be progressed appropriately.     10/12/2023 (eval): Outpatient Physical Therapy 2 times weekly for 12 weeks to include any combination of the following interventions: virtual visits, dry needling, modalities, electrical stimulation (IFC, Pre-Mod, Attended with Functional Dry Needling), Aquatic Therapy, Cervical/Lumbar Traction, Gait Training, Manual Therapy, Neuromuscular Re-ed, Patient Education, Self Care, Therapeutic Exercise, and Therapeutic Activites     Mady Billy, PT

## 2024-01-09 DIAGNOSIS — M25.562 PAIN IN BOTH KNEES, UNSPECIFIED CHRONICITY: Primary | ICD-10-CM

## 2024-01-09 DIAGNOSIS — M25.561 PAIN IN BOTH KNEES, UNSPECIFIED CHRONICITY: Primary | ICD-10-CM

## 2024-01-11 DIAGNOSIS — Z00.00 ENCOUNTER FOR MEDICARE ANNUAL WELLNESS EXAM: ICD-10-CM

## 2024-01-12 ENCOUNTER — OFFICE VISIT (OUTPATIENT)
Dept: ORTHOPEDICS | Facility: CLINIC | Age: 69
End: 2024-01-12
Payer: MEDICARE

## 2024-01-12 ENCOUNTER — HOSPITAL ENCOUNTER (OUTPATIENT)
Dept: RADIOLOGY | Facility: HOSPITAL | Age: 69
Discharge: HOME | End: 2024-01-12
Attending: ORTHOPAEDIC SURGERY
Payer: MEDICARE

## 2024-01-12 VITALS — HEIGHT: 77 IN | BODY MASS INDEX: 27.75 KG/M2 | WEIGHT: 235 LBS

## 2024-01-12 DIAGNOSIS — M16.11 ARTHRITIS OF RIGHT HIP: ICD-10-CM

## 2024-01-12 DIAGNOSIS — M17.11 ARTHRITIS OF KNEE, RIGHT: ICD-10-CM

## 2024-01-12 DIAGNOSIS — M54.17 LUMBOSACRAL RADICULOPATHY AT L4: ICD-10-CM

## 2024-01-12 DIAGNOSIS — M25.562 PAIN IN BOTH KNEES, UNSPECIFIED CHRONICITY: ICD-10-CM

## 2024-01-12 DIAGNOSIS — M54.17 LUMBOSACRAL RADICULOPATHY AT L2: ICD-10-CM

## 2024-01-12 DIAGNOSIS — M25.561 PAIN IN BOTH KNEES, UNSPECIFIED CHRONICITY: ICD-10-CM

## 2024-01-12 DIAGNOSIS — M54.17 LUMBOSACRAL RADICULOPATHY AT S1: ICD-10-CM

## 2024-01-12 DIAGNOSIS — M17.12 ARTHRITIS OF KNEE, LEFT: Primary | ICD-10-CM

## 2024-01-12 DIAGNOSIS — Z98.1 HISTORY OF LUMBAR FUSION: ICD-10-CM

## 2024-01-12 DIAGNOSIS — M16.12 ARTHRITIS OF LEFT HIP: ICD-10-CM

## 2024-01-12 DIAGNOSIS — M21.162 ACQUIRED VARUS DEFORMITY KNEE, LEFT: ICD-10-CM

## 2024-01-12 DIAGNOSIS — M54.17 LUMBOSACRAL RADICULOPATHY AT L5: ICD-10-CM

## 2024-01-12 DIAGNOSIS — M48.061 SPINAL STENOSIS OF LUMBAR REGION, UNSPECIFIED WHETHER NEUROGENIC CLAUDICATION PRESENT: ICD-10-CM

## 2024-01-12 PROCEDURE — 3008F BODY MASS INDEX DOCD: CPT | Mod: CPTII,S$GLB,, | Performed by: ORTHOPAEDIC SURGERY

## 2024-01-12 PROCEDURE — 99999 PR PBB SHADOW E&M-EST. PATIENT-LVL III: CPT | Mod: PBBFAC,,, | Performed by: ORTHOPAEDIC SURGERY

## 2024-01-12 PROCEDURE — 73564 X-RAY EXAM KNEE 4 OR MORE: CPT | Mod: TC,50

## 2024-01-12 PROCEDURE — 73564 X-RAY EXAM KNEE 4 OR MORE: CPT | Mod: 26,50,, | Performed by: RADIOLOGY

## 2024-01-12 PROCEDURE — 3288F FALL RISK ASSESSMENT DOCD: CPT | Mod: CPTII,S$GLB,, | Performed by: ORTHOPAEDIC SURGERY

## 2024-01-12 PROCEDURE — 1101F PT FALLS ASSESS-DOCD LE1/YR: CPT | Mod: CPTII,S$GLB,, | Performed by: ORTHOPAEDIC SURGERY

## 2024-01-12 PROCEDURE — 1159F MED LIST DOCD IN RCRD: CPT | Mod: CPTII,S$GLB,, | Performed by: ORTHOPAEDIC SURGERY

## 2024-01-12 PROCEDURE — 99214 OFFICE O/P EST MOD 30 MIN: CPT | Mod: S$GLB,,, | Performed by: ORTHOPAEDIC SURGERY

## 2024-01-12 PROCEDURE — 1126F AMNT PAIN NOTED NONE PRSNT: CPT | Mod: CPTII,S$GLB,, | Performed by: ORTHOPAEDIC SURGERY

## 2024-01-12 NOTE — PATIENT INSTRUCTIONS
X-rays of your lumbar spine showing 3 level decompression and fusion and hardware intact   You seeing Dr. Oviedo coming up soon for a repeat x-ray  X-ray today of both of her knees showing the left knee complete loss of joint space on the inside with large bone spurs and anteriorly and posteriorly.  You do have quite a bit of bowleggedness and instability in the knee during gait  You need to have the left knee replaced.  Surgeries roughly 2 hours done as outpatient surgery that means you will have you surgery go home the same day.  Once you are awake in recovery room we get you up and walking with the physical therapist.  We will arrange for home physical therapy for 2 weeks.  Home health nurse will show up also change her dressing.  After 2 weeks will take the sutures out or staples out if needed and you can get in the shower after that.  It will take roughly 3-6 months for complete healing to occur.    You already seen Dr. Rivers for your lumbar spine in you was okay for the surgery   I do not think you need to see cardiology before your total knee we can send you to the preop protective nurse we check her labs make sure everything is okay and if you need to see cardiology they get in touch with the  There is a mandatory class you have to attend in the hospital prior to surgery where you will meet with the therapist, home health agencies etcetera    Procedure, common risks and benefits,alternatives discussed in details.All questions answered.Patient expressed understanding.Patient instructed to call for any questions that could arise in the future.    Most common Risks:  Infection/2% chance  Leg-length discrepancy    Neuro-vascular injury ( resulting in loss of motor and sensory functions)/almost all total knee replacements are slightly numb on the outside of the knee.  There is very rare cases where people develop what we call a footdrop.  80% of foot drops recuperate within 6 months to a year  Pain  Blood  clot    Loss of motion/we heal with scar tissue your job is to work very hard through the pain with the physical therapist so you can get motion back and do not scar down and lose motion  Fracture of bone  Failure of procedure to achieve its intended purpose/total knee replacement is not perfect it is 80% successful in decreasing pain and increasing function.  You might still have occasional discomfort in the knee  Failure of hardware/total knee replacement is made out of metal and plastic insert.  They last roughly 15 years on the average.  Basically the plastic wears out with time  Non-union or mal-union of bone  Malalignment  Death you already been through Dr. Rivers in cardiology    Patient instructions for joint replacement    Before surgery  1.  Shower with Hibiclens soap/antibacterial for 3 days prior to surgery to decrease risk of infection  2..  Stop all blood thinners/aspirin, Coumadin, warfarin, Plavix, Eliquis, Xarelto etc 7 days prior to surgery .  You need to stop ibuprofen 7 days prior as well as all vitamins and natural products if you taking any except vitamin-D you can take  3.  No eating or drinking after midnight the night before surgery.  I would like you to drink a bottle of Gatorade or Powerade or Pedialyte the night before surgery prior to midnight so you do not get dehydrated  4.  Take Tylenol 650 mg the night prior to surgery    Ask physicians for prescription of Celebrex or Mobic if needed    After surgery at home  1.  Take Tylenol 650 mg 3 times a day for 14 days then as needed for mild pain  2.  Take gabapentin 300 mg nightly for 6 weeks  3.  Take Celebrex 200 mg or meloxicam 15 mg daily for 6 weeks unless having cardiac issues to take for 2 weeks only  4.  Must take aspirin 81 mg twice a day for 6 weeks unless you are on other blood thinners/Plavix, Eliquis, Xarelto, Coumadin etc  5.  Must wear compressive stockings for 6 weeks minimum to decrease the risk of blood clot and swelling  6.   Hydrocodone/Norco or oxycodone/Percocet will be prescribed to take every 6 hr as needed for breakthrough pain  7.  May apply ice on the knee to help with decreasing pain  8.  Keep wound dry for 2 weeks until stitches/staples removed than you will be allowed to shower in 24 hr and get the wound wet.  No soaking of the wound in the tub or swimming for 4 weeks after surgery  9.  No driving for 4 weeks unless specified by physician  10.  Avoid touching the wound or surrounding area /at least 2 inches on each side of the surgical incision until staples are removed/stitches   11.  May change the surgical dressing if extremely bloody or has drainage on it. May clean the wound with peroxide or Betadine and apply sterile dressing and Ace wrap over it  12.  Leave hospital dressing on for 3 days then may change by applying sterile 4 x 4 and Ace wrap after cleaning with Betadine or peroxide.  May leave this dressing change to home health nurses

## 2024-01-12 NOTE — PROGRESS NOTES
Subjective:     Patient ID: Myles Pride is a 68 y.o. male.    Chief Complaint: Pain of the Right Knee and Pain of the Left Knee      HPI:  06/02/2022  Patient with left knee severe pain today is total complaining also of the right hip.  He had previous x-rays in the electronic records.  He feels the right hip when he abducts his hip and try to get out of the car is seems to be very painful in the groin.  His knee on the left side does not bother him as much.  He does have severe back issues and we did order EMG and nerve conduction studies that show multilevel radiculopathy from L2, L4-L5 and S1 bilaterally.  He states he does get thigh anterior thigh pain.  I did tell him a could be from pinched nerves in his back.  He does see pain management for his back and injection seems to have helped a little bit.  He tries to maintain activities however now he said he cannot even hike 2 miles.  One mildly can walk but now is knee gives out on him on the left and his hip hurts on the right.  He did know if the injection given to him in February in the left knee helped or not because also received injections in his back.  No loss of bowel bladder control blurry vision double vision or loss sense smell or taste or fever or chills    As far as the gabapentin we discussed that in detail today he does not take it as much bit I did tell him for it to work he needs to be on it constantly.  He was prescribed that by pain management to take 300 mg at night for we can go up to 600 mg at night after that and a for a week and then go up to 900 at night.  I did tell him I will probably stop at 600 if you doing well with that.  Will take roughly 3 months for the gabapentin to work well and if he does not take it constantly there is no help for it.  If he decides to do surgery afterwards he needs to be on gabapentin to help with nerve pain    12/01/2022   X-rays obtained today of his hips and the knees.  He is scheduled to receive  injections in his back in a week with pain management Dr. Eddy  .  He said his right hip seems to be constantly aching him.  Last time we saw him we discussed that usually we do hip surgery 1st and then knee surgery after that in joint replacement.  However he does have quite a bit of range of motion left in his hips and the knee is the 1 that is concerning him on specially on the left side.  His pain level is 7/10 sometimes.  He is able to manage in trying to be very active he does water exercise programs.  He did have a lumbar injection around 5 weeks ago but now he is going for a 2nd injection.  He feels the right hip is the 1 that aches the most of both hips and his left knee.  He knows he has arthritis in both knees and both hips.  Also has issues in his back and stenosis.    No fever no chills no shortness of breath or difficulty with chewing swallowing loss of bowel bladder control blurry vision vision or double vision loss sense smell or taste    01/12/2024   Bilateral knee arthritis with the left knee severely deformed.  Also he had mild pain in both of his hips with the right a little bit worse than the left.  Last time seen he had severe spinal stenosis and multilevel radiculopathy.  Since then he had undergone lumbar decompression and fusion by Dr. Lobato  .  He is doing well with his back.  The numbness tingling down the legs subsided some.  He has not having much of any pain he says it is 0/10 when he sitting down is just his knees give out with gait in when he walks distances the pain goes up to around 5 out of 10.  The left knee is the 1 that bothers him the most the hips he can not live with the stated.  I did discuss that if he has loss of motion in the hip will vent him from doing well with the total knee.  He said he has good motion he ambulates without assistive devices.  He does have a brace that he places on the left knee to give him some stability.  He had been on different NSAIDs and  gabapentin and multiple treatments including injections in the back and in the knees by pain management.  You would like to have his left total knee replacement done  Spent a long time with him showed him model how it is performed went over the pros and cons and the instructions in details and reviewed his recent x-rays of the lumbar spine and hips as well as the knees  Past Medical History:   Diagnosis Date    Hyperlipidemia     PONV (postoperative nausea and vomiting) 1990    after knee arthroscopy    Vitamin D deficiency 12/18/2019     Past Surgical History:   Procedure Laterality Date    COLONOSCOPY      COLONOSCOPY N/A 12/19/2019    Procedure: COLONOSCOPY;  Surgeon: Bhupendra Wilkinson MD;  Location: [facility] ENDO;  Service: Endoscopy;  Laterality: N/A;    EPIDURAL STEROID INJECTION N/A 12/09/2022    Procedure: L4-5 intralaminar epidural steroid injection with left paramedian approach;  Surgeon: Ben Eddy MD;  Location: [facility] PAIN MGT;  Service: Pain Management;  Laterality: N/A;    KNEE ARTHROSCOPY Left     SELECTIVE INJECTION OF ANESTHETIC AGENT AROUND LUMBAR SPINAL NERVE ROOT BY TRANSFORAMINAL APPROACH Bilateral 01/21/2022    Procedure: Bilateral L4/5 TF LOVE with RN IV sedation;  Surgeon: Ben Eddy MD;  Location: [facility] PAIN MGT;  Service: Pain Management;  Laterality: Bilateral;    SELECTIVE INJECTION OF ANESTHETIC AGENT AROUND LUMBAR SPINAL NERVE ROOT BY TRANSFORAMINAL APPROACH Bilateral 05/06/2022    Procedure: Bilateral L4/5 TF LOVE with RN IV sedation;  Surgeon: Ben Eddy MD;  Location: [facility] PAIN MGT;  Service: Pain Management;  Laterality: Bilateral;    SELECTIVE INJECTION OF ANESTHETIC AGENT AROUND LUMBAR SPINAL NERVE ROOT BY TRANSFORAMINAL APPROACH Bilateral 10/25/2022    Procedure: Bilateral L4/5 TF LOVE with RN IV sedation;  Surgeon: Ben Eddy MD;  Location: [facility] PAIN MGT;  Service: Pain Management;  Laterality: Bilateral;    TRANSFORAMINAL LUMBAR INTERBODY FUSION (TLIF) USING  COMPUTER-ASSISTED NAVIGATION Bilateral 9/6/2023    Procedure: FUSION, SPINE, LUMBAR, TLIF, USING COMPUTER-ASSISTED NAVIGATION;  Surgeon: Lyle Lobato MD;  Location: [facility];  Service: Neurosurgery;  Laterality: Bilateral;  L3-5 TLIF    VASECTOMY  1995     Family History   Problem Relation Age of Onset    Cancer Mother         anal cancer    Diabetes Mother         PRE DIABETES    Arthritis Mother     Hearing loss Mother     No Known Problems Sister     Gallbladder disease Brother     Hepatitis Brother         Hep C     Social History     Socioeconomic History    Marital status:    Tobacco Use    Smoking status: Never     Passive exposure: Never    Smokeless tobacco: Never   Substance and Sexual Activity    Alcohol use: Yes     Alcohol/week: 3.0 standard drinks of alcohol     Types: 3 Glasses of wine per week    Drug use: Never    Sexual activity: Yes     Partners: Female     Birth control/protection: Partner-Vasectomy, None     Social Determinants of Health     Financial Resource Strain: Low Risk  (9/19/2023)    Overall Financial Resource Strain (CARDIA)     Difficulty of Paying Living Expenses: Not hard at all   Food Insecurity: No Food Insecurity (9/19/2023)    Hunger Vital Sign     Worried About Running Out of Food in the Last Year: Never true     Ran Out of Food in the Last Year: Never true   Transportation Needs: No Transportation Needs (9/19/2023)    PRAPARE - Transportation     Lack of Transportation (Medical): No     Lack of Transportation (Non-Medical): No   Physical Activity: Insufficiently Active (9/19/2023)    Exercise Vital Sign     Days of Exercise per Week: 3 days     Minutes of Exercise per Session: 30 min   Stress: No Stress Concern Present (9/19/2023)    [employer] - Occupational Stress Questionnaire     Feeling of Stress : Not at all   Social Connections: Unknown (9/19/2023)    Social Connection and Isolation Panel [NHANES]     Frequency of Communication  with Friends and Family: Three times a week     Frequency of Social Gatherings with Friends and Family: Once a week     Active Member of Clubs or Organizations: Yes     Attends Club or Organization Meetings: More than 4 times per year     Marital Status:    Housing Stability: Low Risk  (9/19/2023)    Housing Stability Vital Sign     Unable to Pay for Housing in the Last Year: No     Number of Places Lived in the Last Year: 1     Unstable Housing in the Last Year: No     Medication List with Changes/Refills   Current Medications    ACETAMINOPHEN (TYLENOL) 325 MG TABLET    Take 2 tablets (650 mg total) by mouth every 4 (four) hours as needed (pain score 1-3/10).    CICLOPIROX (PENLAC) 8 % SOLN    Apply to nail and nail fold once daily for up to 1 year    ERGOCALCIFEROL, VITAMIN D2, (VITAMIN D ORAL)    Take by mouth once daily.    KETOCONAZOLE (NIZORAL) 2 % CREAM    Apply topically 2 (two) times daily. For feet and web-spaces. Use for up to 4 weeks.    ONDANSETRON (ZOFRAN-ODT) 4 MG TBDL    Take 1 tablet (4 mg total) by mouth every 6 (six) hours as needed (Nausea).    OXYCODONE-ACETAMINOPHEN (PERCOCET)  MG PER TABLET    Take 1 tablet by mouth every 8 (eight) hours as needed for Pain.    PRAVASTATIN (PRAVACHOL) 40 MG TABLET    TAKE 1 TABLET BY MOUTH EVERY DAY    TAMSULOSIN (FLOMAX) 0.4 MG CAP    Take 1 capsule (0.4 mg total) by mouth once daily.     Review of patient's allergies indicates:  No Known Allergies  Review of Systems   Constitutional: Negative for decreased appetite.   HENT:  Negative for tinnitus.    Eyes:  Negative for double vision.   Cardiovascular:  Negative for chest pain.   Respiratory:  Negative for wheezing.    Hematologic/Lymphatic: Negative for bleeding problem.   Skin:  Negative for dry skin.   Musculoskeletal:  Positive for arthritis, back pain, joint pain and stiffness. Negative for gout and neck pain.   Gastrointestinal:  Negative for abdominal pain.   Genitourinary:  Negative for  bladder incontinence.   Neurological:  Negative for numbness, paresthesias and sensory change.   Psychiatric/Behavioral:  Negative for altered mental status.        Objective:   Body mass index is 27.87 kg/m².  There were no vitals filed for this visit.       General    Constitutional: He is oriented to person, place, and time. He appears well-developed.   HENT:   Head: Atraumatic.   Eyes: EOM are normal.   Pulmonary/Chest: Effort normal.   Neurological: He is alert and oriented to person, place, and time.   Psychiatric: Judgment normal.           Ambulating without any assistive devices  Lumbar without true deformity.  Negative straight leg raising bilaterally.  Pelvis is level  Right hip internal rotation 15° external rotation 20° at 90° of flexion.  Abduction of the hip to 30° and internal rotation yet severe pain in the groin.  No flexion contracture.  Right hip flexors and abductors are 5/5 however he has quad atrophy  Left hip internal rotation 15° external rotation 30° at 90° of flexion.  Abduction to 30° and internal rotation with very mild pain in the groin.  No flexion contractures.  Hip flexors and abductors are 5/5 however is also he has quad atrophy  Right knee range of motion 0-120 degrees.  Very mild crepitus to compression on the patella.  No swelling no defect in the patella tendon or quadriceps tendon.  There is definitely quadriceps atrophy  Left knee with severe varus deformity.  There is quite a bit of loosening lateral collateral.  Range of motion is 5-100°.  No defect in the patella or quadriceps tendon.  There is quad atrophy also.  There is crepitus to compression on the patella as well as pain medially.  Calves are soft nontender  Ankle motion intact  Skin is warm to touch no obvious lesions  Capillary refill less than 2 seconds    Relevant imaging results reviewed and interpreted by me, discussed with the patient and / or family today   X-ray 01/12/2024 bilateral knees with the left knee  severe varus deformity complete loss medial joint space marginal osteophytic changes large posterior osteophytes as well as anterior.  The right knee has also degenerative changes with varus deformity but not as severe as the left knee.  No fracture seen  There is x-ray from November 2023 lumbar spine showing 3 level fusion with hardware  X-ray 12/01/2022 of the pelvis showing right hip with loss of joint space on the lateral aspect with large osteophytes of the acetabulum and superiorly and inferiorly with a small osteophyte on the femoral heads bilaterally.  Same findings on the left hip   X-ray of both knees showing left knee with complete loss of medial joint space with marginal osteophytic changes and severe varus deformity.  The right knee has moderately severe varus deformity and loss of medial joint space and marginal osteophytic changes  X-rays in the electronic records reviewed today in details  X-ray of the pelvis showing right hip with marginal osteophytic changes on the acetabulum with loss of joint space consistent with arthritis.  Left hip has some marginal osteophytes on the acetabulum and the hip joint also consistent with mild arthritis  X-ray of the left knee with complete loss of medial joint space.  Marginal osteophytic changes severe varus deformity.  Radiculopathy  EMG and nerve conduction study showing L2, L4, L5 and S1 radiculopathy  Assessment:     Encounter Diagnoses   Name Primary?    Arthritis of knee, left Yes    Acquired varus deformity knee, left     Arthritis of knee, right     Arthritis of right hip     Lumbosacral radiculopathy at L5     Lumbosacral radiculopathy at S1     Lumbosacral radiculopathy at L2     Lumbosacral radiculopathy at L4     Spinal stenosis of lumbar region, unspecified whether neurogenic claudication present     Arthritis of left hip     History of lumbar fusion         Plan:   Arthritis of knee, left    Acquired varus deformity knee, left    Arthritis of knee,  right    Arthritis of right hip    Lumbosacral radiculopathy at L5    Lumbosacral radiculopathy at S1    Lumbosacral radiculopathy at L2    Lumbosacral radiculopathy at L4    Spinal stenosis of lumbar region, unspecified whether neurogenic claudication present    Arthritis of left hip    History of lumbar fusion         Patient Instructions   X-rays of your lumbar spine showing 3 level decompression and fusion and hardware intact   You seeing Dr. Oviedo coming up soon for a repeat x-ray  X-ray today of both of her knees showing the left knee complete loss of joint space on the inside with large bone spurs and anteriorly and posteriorly.  You do have quite a bit of bowleggedness and instability in the knee during gait  You need to have the left knee replaced.  Surgeries roughly 2 hours done as outpatient surgery that means you will have you surgery go home the same day.  Once you are awake in recovery room we get you up and walking with the physical therapist.  We will arrange for home physical therapy for 2 weeks.  Home health nurse will show up also change her dressing.  After 2 weeks will take the sutures out or staples out if needed and you can get in the shower after that.  It will take roughly 3-6 months for complete healing to occur.    You already seen Dr. Rivers for your lumbar spine in you was okay for the surgery   I do not think you need to see cardiology before your total knee we can send you to the preop protective nurse we check her labs make sure everything is okay and if you need to see cardiology they get in touch with the  There is a mandatory class you have to attend in the hospital prior to surgery where you will meet with the therapist, home health agencies etcetera    Procedure, common risks and benefits,alternatives discussed in details.All questions answered.Patient expressed understanding.Patient instructed to call for any questions that could arise in the future.    Most common  Risks:  Infection/2% chance  Leg-length discrepancy    Neuro-vascular injury ( resulting in loss of motor and sensory functions)/almost all total knee replacements are slightly numb on the outside of the knee.  There is very rare cases where people develop what we call a footdrop.  80% of foot drops recuperate within 6 months to a year  Pain  Blood clot    Loss of motion/we heal with scar tissue your job is to work very hard through the pain with the physical therapist so you can get motion back and do not scar down and lose motion  Fracture of bone  Failure of procedure to achieve its intended purpose/total knee replacement is not perfect it is 80% successful in decreasing pain and increasing function.  You might still have occasional discomfort in the knee  Failure of hardware/total knee replacement is made out of metal and plastic insert.  They last roughly 15 years on the average.  Basically the plastic wears out with time  Non-union or mal-union of bone  Malalignment  Death you already been through Dr. Rivers in cardiology    Patient instructions for joint replacement    Before surgery  1.  Shower with Hibiclens soap/antibacterial for 3 days prior to surgery to decrease risk of infection  2..  Stop all blood thinners/aspirin, Coumadin, warfarin, Plavix, Eliquis, Xarelto etc 7 days prior to surgery .  You need to stop ibuprofen 7 days prior as well as all vitamins and natural products if you taking any except vitamin-D you can take  3.  No eating or drinking after midnight the night before surgery.  I would like you to drink a bottle of Gatorade or Powerade or Pedialyte the night before surgery prior to midnight so you do not get dehydrated  4.  Take Tylenol 650 mg the night prior to surgery    Ask physicians for prescription of Celebrex or Mobic if needed    After surgery at home  1.  Take Tylenol 650 mg 3 times a day for 14 days then as needed for mild pain  2.  Take gabapentin 300 mg nightly for 6 weeks  3.   Take Celebrex 200 mg or meloxicam 15 mg daily for 6 weeks unless having cardiac issues to take for 2 weeks only  4.  Must take aspirin 81 mg twice a day for 6 weeks unless you are on other blood thinners/Plavix, Eliquis, Xarelto, Coumadin etc  5.  Must wear compressive stockings for 6 weeks minimum to decrease the risk of blood clot and swelling  6.  Hydrocodone/Norco or oxycodone/Percocet will be prescribed to take every 6 hr as needed for breakthrough pain  7.  May apply ice on the knee to help with decreasing pain  8.  Keep wound dry for 2 weeks until stitches/staples removed than you will be allowed to shower in 24 hr and get the wound wet.  No soaking of the wound in the tub or swimming for 4 weeks after surgery  9.  No driving for 4 weeks unless specified by physician  10.  Avoid touching the wound or surrounding area /at least 2 inches on each side of the surgical incision until staples are removed/stitches   11.  May change the surgical dressing if extremely bloody or has drainage on it. May clean the wound with peroxide or Betadine and apply sterile dressing and Ace wrap over it  12.  Leave hospital dressing on for 3 days then may change by applying sterile 4 x 4 and Ace wrap after cleaning with Betadine or peroxide.  May leave this dressing change to home health nurses        We discussed total knee replacement in details with him.  I did tell him also right total hip replacement if that hip is bother him a lot needed to be done.  We did tell him the same story that the total knee replacement compared to the total hip replacement.  The only difference is that total hip replacement is 90% successful in decreasing pain increasing function compared to total knee which is 80%.  Brochures about both with pictures given.  We did show him x-rays of total knee replacement, total knee revisions and total hip replacement.  A lot of time spent with him explaining everything in details.  Him and his wife are there is  a lot of questions asked and answered.  I definitely discussed with him that he can be more active after surgery however he should avoid high impact activities.  He likes to hike and do water exercises and walk and that is acceptable after joint replacement.  The pros and cons we did go into details with him at this time   I did tell him as long as he can function sometimes you do not need to proceed with total joint replacement as low till the pain is intolerable.  He states about the right hip is more painful than the left knee however the left knee is more unstable due to the severe varus deformity.  I did tell him we can go either way do the right total hip 1st or left total knee 1st depending on what bothers him the most.  I discussed his x-rays with him in details also  We did discuss total knee replacement could be performed separately since he has good range of motion in the hip on the left side.  He would like to have the knee done despite having arthritic changes in the hip despite the fact that he has good motion I do not see any reason why we can not do it.  He wants to wait till sometime in June or July.    I did discuss injections of both of his hips under fluoroscopy as well as his left knee if needed.  Will get in touch with his pain management to see if they can do it next week while they do his back  Disclaimer: This note was prepared using a voice recognition system and is likely to have sound alike errors within the text.

## 2024-01-16 ENCOUNTER — HOSPITAL ENCOUNTER (OUTPATIENT)
Dept: RADIOLOGY | Facility: HOSPITAL | Age: 69
Discharge: HOME | End: 2024-01-16
Attending: NEUROLOGICAL SURGERY
Payer: MEDICARE

## 2024-01-16 ENCOUNTER — OFFICE VISIT (OUTPATIENT)
Dept: NEUROSURGERY | Facility: CLINIC | Age: 69
End: 2024-01-16
Payer: MEDICARE

## 2024-01-16 VITALS
WEIGHT: 237.13 LBS | BODY MASS INDEX: 28.12 KG/M2 | HEART RATE: 67 BPM | SYSTOLIC BLOOD PRESSURE: 136 MMHG | DIASTOLIC BLOOD PRESSURE: 82 MMHG

## 2024-01-16 DIAGNOSIS — M17.12 ARTHRITIS OF KNEE, LEFT: Primary | ICD-10-CM

## 2024-01-16 DIAGNOSIS — Z01.818 PREOPERATIVE EXAMINATION: ICD-10-CM

## 2024-01-16 DIAGNOSIS — M48.062 LUMBAR STENOSIS WITH NEUROGENIC CLAUDICATION: ICD-10-CM

## 2024-01-16 DIAGNOSIS — M48.07 SPINAL STENOSIS, LUMBOSACRAL REGION: Primary | ICD-10-CM

## 2024-01-16 DIAGNOSIS — Z01.818 PREOP TESTING: ICD-10-CM

## 2024-01-16 PROCEDURE — 1101F PT FALLS ASSESS-DOCD LE1/YR: CPT | Mod: CPTII,S$GLB,, | Performed by: NEUROLOGICAL SURGERY

## 2024-01-16 PROCEDURE — 1159F MED LIST DOCD IN RCRD: CPT | Mod: CPTII,S$GLB,, | Performed by: NEUROLOGICAL SURGERY

## 2024-01-16 PROCEDURE — 3288F FALL RISK ASSESSMENT DOCD: CPT | Mod: CPTII,S$GLB,, | Performed by: NEUROLOGICAL SURGERY

## 2024-01-16 PROCEDURE — 1125F AMNT PAIN NOTED PAIN PRSNT: CPT | Mod: CPTII,S$GLB,, | Performed by: NEUROLOGICAL SURGERY

## 2024-01-16 PROCEDURE — 72100 X-RAY EXAM L-S SPINE 2/3 VWS: CPT | Mod: TC

## 2024-01-16 PROCEDURE — 99213 OFFICE O/P EST LOW 20 MIN: CPT | Mod: S$GLB,,, | Performed by: NEUROLOGICAL SURGERY

## 2024-01-16 PROCEDURE — 3075F SYST BP GE 130 - 139MM HG: CPT | Mod: CPTII,S$GLB,, | Performed by: NEUROLOGICAL SURGERY

## 2024-01-16 PROCEDURE — 3079F DIAST BP 80-89 MM HG: CPT | Mod: CPTII,S$GLB,, | Performed by: NEUROLOGICAL SURGERY

## 2024-01-16 PROCEDURE — 99999 PR PBB SHADOW E&M-EST. PATIENT-LVL III: CPT | Mod: PBBFAC,,, | Performed by: NEUROLOGICAL SURGERY

## 2024-01-16 PROCEDURE — 3008F BODY MASS INDEX DOCD: CPT | Mod: CPTII,S$GLB,, | Performed by: NEUROLOGICAL SURGERY

## 2024-01-16 PROCEDURE — 72100 X-RAY EXAM L-S SPINE 2/3 VWS: CPT | Mod: 26,,, | Performed by: RADIOLOGY

## 2024-01-16 NOTE — PROGRESS NOTES
Patient is a 67 yo M who presents to clinic today for follow up TLIF. He is about 5 months out s/p L3-5 TLIF. He is doing well overall. Back pain has improved. Still remains stiff in the am. He does report leg pain has completely resolved. He has some hip pain/arthritis and does have general hip issues. His orthopoedist recommended hip and knee replacement. He will proceed with knee replacement first on Feb 20th. Staying avtive, only walking about 1 mile a day. He is also doing outpatient PT. He has some exercises he is doing at home. He does feel as though his endurance has been impacted (likely due to covid). Otherwise he is doing well. He does not report any trauma or falls. At 4 month post op his motion has become more natural with movement and walking. Otherwise doing well. Patient is not taking pain meds. He completed X rays today.               Pertinent positive and negative ROS documented above in HPI, all other systems reviewed and found to be negative.           Constitutional/ General: Awake, alert, oriented X 3. Sitting upright in No acute distress. Well developed/well nourished    Neck: trachea midline.    Respiratory: No increased work of breathing on room air. Chest expansion equal and symmetric.    Neuro: AAO X3 speech is fluent and appripriate CN II-XII grossly intact throughout. EOMI. Face symmetric tongue midline. Shoulder shrug equal and intact BL. Follows commands and moves all extremities antigravity.     Extremities:No cyanosis clubbing or edema. Ambulating without issues.          Post op 5 months L3-5 TLIF. X ray indep reviewed agree with impression. Potential hardware loosening. Will order CT scan to view placement of instrumentation. Patient will follow up in 2 months.         CT L spine non contrast   Follow up in 2 months           Attestation:  Beatriz MONROY PA-C have obtained HPI, performed Physical Examination on the above patient, reviewed the pertinent labs, tests, imaging,  other relevant data and recorded my findings in this clinic note.

## 2024-01-29 ENCOUNTER — OFFICE VISIT (OUTPATIENT)
Dept: INTERNAL MEDICINE | Facility: CLINIC | Age: 69
End: 2024-01-29
Payer: MEDICARE

## 2024-01-29 VITALS
BODY MASS INDEX: 27.98 KG/M2 | SYSTOLIC BLOOD PRESSURE: 140 MMHG | HEIGHT: 77 IN | OXYGEN SATURATION: 97 % | HEART RATE: 55 BPM | WEIGHT: 237 LBS | DIASTOLIC BLOOD PRESSURE: 90 MMHG

## 2024-01-29 DIAGNOSIS — E78.2 MIXED HYPERLIPIDEMIA: ICD-10-CM

## 2024-01-29 DIAGNOSIS — M48.062 LUMBAR STENOSIS WITH NEUROGENIC CLAUDICATION: Chronic | ICD-10-CM

## 2024-01-29 DIAGNOSIS — Z00.00 ENCOUNTER FOR PREVENTIVE HEALTH EXAMINATION: Primary | ICD-10-CM

## 2024-01-29 DIAGNOSIS — Z00.00 ENCOUNTER FOR MEDICARE ANNUAL WELLNESS EXAM: ICD-10-CM

## 2024-01-29 PROCEDURE — 1126F AMNT PAIN NOTED NONE PRSNT: CPT | Mod: CPTII,S$GLB,, | Performed by: NURSE PRACTITIONER

## 2024-01-29 PROCEDURE — 3288F FALL RISK ASSESSMENT DOCD: CPT | Mod: CPTII,S$GLB,, | Performed by: NURSE PRACTITIONER

## 2024-01-29 PROCEDURE — 1159F MED LIST DOCD IN RCRD: CPT | Mod: CPTII,S$GLB,, | Performed by: NURSE PRACTITIONER

## 2024-01-29 PROCEDURE — G0439 PPPS, SUBSEQ VISIT: HCPCS | Mod: S$GLB,,, | Performed by: NURSE PRACTITIONER

## 2024-01-29 PROCEDURE — 3077F SYST BP >= 140 MM HG: CPT | Mod: CPTII,S$GLB,, | Performed by: NURSE PRACTITIONER

## 2024-01-29 PROCEDURE — 1170F FXNL STATUS ASSESSED: CPT | Mod: CPTII,S$GLB,, | Performed by: NURSE PRACTITIONER

## 2024-01-29 PROCEDURE — 1160F RVW MEDS BY RX/DR IN RCRD: CPT | Mod: CPTII,S$GLB,, | Performed by: NURSE PRACTITIONER

## 2024-01-29 PROCEDURE — 99999 PR PBB SHADOW E&M-EST. PATIENT-LVL V: CPT | Mod: PBBFAC,,, | Performed by: NURSE PRACTITIONER

## 2024-01-29 PROCEDURE — 1101F PT FALLS ASSESS-DOCD LE1/YR: CPT | Mod: CPTII,S$GLB,, | Performed by: NURSE PRACTITIONER

## 2024-01-29 PROCEDURE — 3080F DIAST BP >= 90 MM HG: CPT | Mod: CPTII,S$GLB,, | Performed by: NURSE PRACTITIONER

## 2024-01-29 NOTE — PATIENT INSTRUCTIONS
Counseling and Referral of Other Preventative  (Italic type indicates deductible and co-insurance are waived)    Patient Name: Myles Pride  Today's Date: 1/29/2024    Health Maintenance       Date Due Completion Date    RSV Vaccine (Age 60+ and Pregnant patients) (1 - 1-dose 60+ series) Never done ---    Colorectal Cancer Screening 12/19/2024 12/19/2019    Hemoglobin A1c (Diabetic Prevention Screening) 09/15/2026 9/15/2023    Lipid Panel 12/06/2028 12/6/2023    TETANUS VACCINE 04/15/2033 4/15/2023    Override on 7/17/2013: Done        No orders of the defined types were placed in this encounter.      The following information is provided to all patients.  This information is to help you find resources for any of the problems found today that may be affecting your health:                  Living healthy guide: www.[facility].gov      Understanding Diabetes: www.diabetes.org      Eating healthy: www.cdc.gov/healthyweight      CDC home safety checklist: www.cdc.gov/steadi/patient.html      Agency on Aging: www.goea.[place].[facility]      Alcoholics anonymous (AA): www.aa.org      Physical Activity: www.david.nih.gov/zv9tznz      Tobacco use: www.quitwithusla.org

## 2024-01-29 NOTE — PROGRESS NOTES
"  Myles Pride presented for a  Medicare AWV and comprehensive Health Risk Assessment today. The following components were reviewed and updated:    Medical history  Family History  Social history  Allergies and Current Medications  Health Risk Assessment  Health Maintenance  Care Team         ** See Completed Assessments for Annual Wellness Visit within the encounter summary.**         The following assessments were completed:  Living Situation  CAGE  Depression Screening  Timed Get Up and Go  Whisper Test-na  Cognitive Function Screening  Nutrition Screening  ADL Screening  PAQ Screening        Vitals:    01/29/24 1139 01/29/24 1209   BP:  (!) 140/90   Pulse: (!) 55    SpO2: 97%    Weight: 107.5 kg (236 lb 15.9 oz)    Height: 6' 5" (1.956 m)      Body mass index is 28.1 kg/m².  Physical Exam  Vitals and nursing note reviewed.   Constitutional:       Appearance: He is well-developed.   HENT:      Head: Normocephalic.   Cardiovascular:      Rate and Rhythm: Normal rate.      Heart sounds: Normal heart sounds.      Comments: Ectopic beats noted  Pulmonary:      Effort: Pulmonary effort is normal. No respiratory distress.      Breath sounds: Normal breath sounds.   Abdominal:      Palpations: Abdomen is soft. There is no mass.      Tenderness: There is no abdominal tenderness.   Musculoskeletal:         General: Normal range of motion.   Skin:     General: Skin is warm and dry.   Neurological:      Mental Status: He is alert and oriented to person, place, and time.      Motor: No abnormal muscle tone.   Psychiatric:         Speech: Speech normal.         Behavior: Behavior normal.               Diagnoses and health risks identified today and associated recommendations/orders:    1. Encounter for preventive health examination     Review for Opioid Screening: Pt does not have Rx for Opioids      Review for Substance Use Disorders: Alcohol use, see flow sheet for detail No drug use per chart     Encouraged healthy diet " and exercise as tolerated   Discussed receiving rsv vaccine at pharmacy.     He will discuss PSA with PCP    BP elevated at today's visit. Reports feels fine. Reports has been diagnosed with white coat syndrome.  Reports home readings 120s-130s/70s. Discussed s/s of MI and stroke (patient denies any s/s) and advised to go to the ER/911 if occur. Advised patient to monitor BP (keep a log) and if continues to stay elevated (greater than 140/90) to follow up with PCP for further evaluation and treatment. He expressed understanding.      2. Mixed hyperlipidemia  Continue current treatment plan as previously prescribed with your  pcp and cardiologist.     3. Lumbar stenosis with neurogenic claudication  Stable. Continue current treatment plan as previously prescribed with your  neurosurgeon.     4. Encounter for Medicare annual wellness exam  - Ambulatory Referral/Consult to Enhanced Annual Wellness Visit (eAWV)      Provided Myles with a 5-10 year written screening schedule and personal prevention plan. Recommendations were developed using the USPSTF age appropriate recommendations. Education, counseling, and referrals were provided as needed. After Visit Summary printed and given to patient which includes a list of additional screenings\tests needed.    Follow up in about 1 year (around 1/29/2025) for awv.    Rosa Claudio NP  I offered to discuss advanced care planning, including how to pick a person who would make decisions for you if you were unable to make them for yourself, called a health care power of , and what kind of decisions you might make such as use of life sustaining treatments such as ventilators and tube feeding when faced with a life limiting illness recorded on a living will that they will need to know. (How you want to be cared for as you near the end of your natural life)     X  Patient has advanced directives written and agrees to provide copies to the institution.

## 2024-02-06 ENCOUNTER — APPOINTMENT (OUTPATIENT)
Dept: ORTHOPEDICS | Facility: HOSPITAL | Age: 69
End: 2024-02-06
Attending: ORTHOPAEDIC SURGERY
Payer: MEDICARE

## 2024-02-06 ENCOUNTER — OFFICE VISIT (OUTPATIENT)
Dept: INTERNAL MEDICINE | Facility: CLINIC | Age: 69
End: 2024-02-06
Payer: MEDICARE

## 2024-02-06 ENCOUNTER — TELEPHONE (OUTPATIENT)
Dept: PREADMISSION TESTING | Facility: HOSPITAL | Age: 69
End: 2024-02-06
Payer: MEDICARE

## 2024-02-06 VITALS
TEMPERATURE: 98 F | DIASTOLIC BLOOD PRESSURE: 74 MMHG | HEART RATE: 47 BPM | RESPIRATION RATE: 18 BRPM | OXYGEN SATURATION: 95 % | SYSTOLIC BLOOD PRESSURE: 140 MMHG

## 2024-02-06 DIAGNOSIS — Z98.1 S/P LUMBAR FUSION: ICD-10-CM

## 2024-02-06 DIAGNOSIS — M17.12 ARTHRITIS OF KNEE, LEFT: Primary | ICD-10-CM

## 2024-02-06 DIAGNOSIS — Z01.818 PREOPERATIVE EXAMINATION: ICD-10-CM

## 2024-02-06 DIAGNOSIS — R00.1 BRADYCARDIA: Chronic | ICD-10-CM

## 2024-02-06 DIAGNOSIS — Z98.890 POST-OPERATIVE NAUSEA AND VOMITING: ICD-10-CM

## 2024-02-06 DIAGNOSIS — R11.2 POST-OPERATIVE NAUSEA AND VOMITING: ICD-10-CM

## 2024-02-06 PROCEDURE — 99999 PR PBB SHADOW E&M-EST. PATIENT-LVL III: CPT | Mod: PBBFAC,HCNC,,

## 2024-02-06 PROCEDURE — 99214 OFFICE O/P EST MOD 30 MIN: CPT | Mod: HCNC,S$GLB,, | Performed by: INTERNAL MEDICINE

## 2024-02-06 PROCEDURE — 3077F SYST BP >= 140 MM HG: CPT | Mod: HCNC,CPTII,S$GLB, | Performed by: INTERNAL MEDICINE

## 2024-02-06 PROCEDURE — 3078F DIAST BP <80 MM HG: CPT | Mod: HCNC,CPTII,S$GLB, | Performed by: INTERNAL MEDICINE

## 2024-02-06 PROCEDURE — 1160F RVW MEDS BY RX/DR IN RCRD: CPT | Mod: HCNC,CPTII,S$GLB, | Performed by: INTERNAL MEDICINE

## 2024-02-06 PROCEDURE — 1159F MED LIST DOCD IN RCRD: CPT | Mod: HCNC,CPTII,S$GLB, | Performed by: INTERNAL MEDICINE

## 2024-02-06 NOTE — ASSESSMENT & PLAN NOTE
Hx of N/V post-op. Patient reported in most recent surgery, he was treated with prophylactic medications.     --Recommend nausea prophylaxis

## 2024-02-06 NOTE — PROGRESS NOTES
Preoperative History and Physical                                                              Hospital Medicine                                                                      Chief Complaint: Preoperative evaluation     History of Present Illness:      Myles Pride is a 68 y.o. male who presents to the office today for a preoperative consultation at the request of Dr. Ryan Woods who plans on performing ARTHROPLASTY, KNEE, TOTAL, Left on February 20.     Functional Status:      The patient is able to climb a flight of stairs. The patient is able to ambulate without difficulty. The patient's functional status is not affected by the surgical problem. The patient's functional status is not affected by shortness of breath, chest pain, dyspnea on exertion and fatigue.    MET score greater than 4    Past Medical History:      Past Medical History:   Diagnosis Date    Hyperlipidemia     PONV (postoperative nausea and vomiting) 1990    after knee arthroscopy    Vitamin D deficiency 12/18/2019        Past Surgical History:      Past Surgical History:   Procedure Laterality Date    COLONOSCOPY      COLONOSCOPY N/A 12/19/2019    Procedure: COLONOSCOPY;  Surgeon: Bhupendra Wilkinson MD;  Location: [facility];  Service: Endoscopy;  Laterality: N/A;    EPIDURAL STEROID INJECTION N/A 12/09/2022    Procedure: L4-5 intralaminar epidural steroid injection with left paramedian approach;  Surgeon: Ben Eddy MD;  Location: [facility];  Service: Pain Management;  Laterality: N/A;    KNEE ARTHROSCOPY Left     LUMBAR FUSION  09/2023    SELECTIVE INJECTION OF ANESTHETIC AGENT AROUND LUMBAR SPINAL NERVE ROOT BY TRANSFORAMINAL APPROACH Bilateral 01/21/2022    Procedure: Bilateral L4/5 TF LOVE with RN IV sedation;  Surgeon: Ben Eddy MD;  Location: [facility];  Service: Pain Management;  Laterality: Bilateral;    SELECTIVE INJECTION OF ANESTHETIC AGENT AROUND LUMBAR  SPINAL NERVE ROOT BY TRANSFORAMINAL APPROACH Bilateral 05/06/2022    Procedure: Bilateral L4/5 TF LOVE with RN IV sedation;  Surgeon: Ben Eddy MD;  Location: [facility];  Service: Pain Management;  Laterality: Bilateral;    SELECTIVE INJECTION OF ANESTHETIC AGENT AROUND LUMBAR SPINAL NERVE ROOT BY TRANSFORAMINAL APPROACH Bilateral 10/25/2022    Procedure: Bilateral L4/5 TF LOVE with RN IV sedation;  Surgeon: Ben Eddy MD;  Location: [facility];  Service: Pain Management;  Laterality: Bilateral;    TRANSFORAMINAL LUMBAR INTERBODY FUSION (TLIF) USING COMPUTER-ASSISTED NAVIGATION Bilateral 09/06/2023    Procedure: FUSION, SPINE, LUMBAR, TLIF, USING COMPUTER-ASSISTED NAVIGATION;  Surgeon: Lyle Lobato MD;  Location: [facility];  Service: Neurosurgery;  Laterality: Bilateral;  L3-5 TLIF    VASECTOMY  1995        Social History:      Social History     Socioeconomic History    Marital status:    Tobacco Use    Smoking status: Never     Passive exposure: Never    Smokeless tobacco: Never   Substance and Sexual Activity    Alcohol use: Yes     Alcohol/week: 3.0 standard drinks of alcohol     Types: 3 Glasses of wine per week    Drug use: Never    Sexual activity: Yes     Partners: Female     Birth control/protection: Partner-Vasectomy, None     Social Determinants of Health     Financial Resource Strain: Low Risk  (1/29/2024)    Overall Financial Resource Strain (CARDIA)     Difficulty of Paying Living Expenses: Not hard at all   Food Insecurity: No Food Insecurity (1/29/2024)    Hunger Vital Sign     Worried About Running Out of Food in the Last Year: Never true     Ran Out of Food in the Last Year: Never true   Transportation Needs: No Transportation Needs (1/29/2024)    PRAPARE - Transportation     Lack of Transportation (Medical): No     Lack of Transportation (Non-Medical): No   Physical Activity: Sufficiently Active (1/29/2024)    Exercise Vital Sign     Days of Exercise per Week: 5 days      Minutes of Exercise per Session: 30 min   Recent Concern: Physical Activity - Insufficiently Active (1/29/2024)    Exercise Vital Sign     Days of Exercise per Week: 4 days     Minutes of Exercise per Session: 20 min   Stress: No Stress Concern Present (1/29/2024)    [employer] [place] - Occupational Stress Questionnaire     Feeling of Stress : Not at all   Social Connections: Socially Integrated (1/29/2024)    Social Connection and Isolation Panel [NHANES]     Frequency of Communication with Friends and Family: Three times a week     Frequency of Social Gatherings with Friends and Family: Once a week     Attends [employer] Services: More than 4 times per year     Active Member of Clubs or Organizations: Yes     Attends Club or Organization Meetings: More than 4 times per year     Marital Status:    Housing Stability: Low Risk  (1/29/2024)    Housing Stability Vital Sign     Unable to Pay for Housing in the Last Year: No     Number of Places Lived in the Last Year: 1     Unstable Housing in the Last Year: No        Family History:      Family History   Problem Relation Age of Onset    Cancer Mother         anal cancer    Diabetes Mother         PRE DIABETES    Arthritis Mother     Hearing loss Mother     No Known Problems Sister     Gallbladder disease Brother     Hepatitis Brother         Hep C       Allergies:      Review of patient's allergies indicates:  No Known Allergies    Medications:      Current Outpatient Medications   Medication Sig    ergocalciferol, vitamin D2, (VITAMIN D ORAL) Take by mouth once daily.    ketoconazole (NIZORAL) 2 % cream Apply topically 2 (two) times daily. For feet and web-spaces. Use for up to 4 weeks.    pravastatin (PRAVACHOL) 40 MG tablet TAKE 1 TABLET BY MOUTH EVERY DAY    tamsulosin (FLOMAX) 0.4 mg Cap Take 1 capsule (0.4 mg total) by mouth once daily.     No current facility-administered medications for this visit.       Vitals:      Vitals:     02/06/24 1013   BP: (!) 140/74   Pulse: (!) 47   Resp: 18   Temp: 97.7 °F (36.5 °C)       Review of Systems:        Constitutional: Negative for fever, chills, weight loss, malaise/fatigue and diaphoresis.   HENT: Negative for hearing loss, ear pain, nosebleeds, congestion, sore throat, neck pain, tinnitus and ear discharge.    Eyes: Negative for blurred vision, double vision, photophobia, pain, discharge and redness.   Respiratory: Negative for cough, hemoptysis, sputum production, shortness of breath, wheezing and stridor.    Cardiovascular: Negative for chest pain, palpitations, orthopnea, claudication, leg swelling and PND.   Gastrointestinal: Negative for heartburn, nausea, vomiting, abdominal pain, diarrhea, constipation, blood in stool and melena.   Genitourinary: Negative for dysuria, urgency, frequency, hematuria and flank pain.   Musculoskeletal: Negative for myalgias, back pain, joint pain and falls.   Skin: Negative for itching and rash.   Neurological: Negative for dizziness, tingling, tremors, sensory change, speech change, focal weakness, seizures, loss of consciousness, weakness and headaches.   Endo/Heme/Allergies: Negative for environmental allergies and polydipsia. Does not bruise/bleed easily.   Psychiatric/Behavioral: Negative for depression, suicidal ideas, hallucinations, memory loss and substance abuse. The patient is not nervous/anxious and does not have insomnia.    All 14 systems reviewed and negative except as noted above.    Physical Exam:      Constitutional: Appears well-developed, well-nourished and in no acute distress.  Patient is oriented to person, place, and time.   Head: Normocephalic and atraumatic. Mucous membranes moist.  Neck: Neck supple no mass.   Cardiovascular: Normal rate and regular rhythm.  S1 S2 appreciated by ascultation.  Pulmonary/Chest: Effort normal and clear to auscultation bilaterally. No respiratory distress.   Abdomen: Soft. Non-tender and non-distended.  Bowel sounds are normal.   Neurological: Patient is alert and oriented to person, place and time. Moves all extremities.  Skin: Warm and dry. No lesions.  Extremities: No clubbing, cyanosis or edema.    Laboratory data:      Reviewed and noted in plan where applicable. Please see chart for full laboratory data.          Lab Results   Component Value Date    WBC 6.93 02/06/2024    HGB 15.2 02/06/2024    HCT 45.9 02/06/2024    MCV 91 02/06/2024     02/06/2024         Predictors of intubation difficulty:       Morbid obesity? no   Anatomically abnormal facies? no   Prominent incisors? no   Receding mandible? no   Short, thick neck? no   Neck range of motion: normal   Dentition: No chipped, loose, or missing teeth.  Based on the Modified Mallampati, patient is a mallampati score: I (soft palate, uvula, fauces, and tonsillar pillars visible)    Cardiographics:      ECG:  NSR, PVC complexes    Echocardiogram: not indicated    Imaging:      Chest x-ray: not indicated    Assessment and Plan:      Arthritis of knee, left  Planned for ARTHROPLASTY, KNEE, TOTAL, Left with Dr. Ryan Woods on 2/20/24.     Known risk factors for perioperative complications: None    Difficulty with intubation is not anticipated.    Cardiac Risk Estimation: Based on the Revised Cardiac Risk index, patient is a Class 2 risk with a 6.0% risk of a major cardiac event in a low risk procedure.    1.) Preoperative workup as follows: ECG, hemoglobin, hematocrit, electrolytes, creatinine, glucose, liver function studies, urinalysis (urinary tract instrumentation planned).  2.) Change in medication regimen before surgery: discontinue ASA, NSAIDs 7 days before surgery, hold Metformin 24 hours prior to surgery.  3.) Prophylaxis for cardiac events with perioperative beta-blockers: not indicated.  4.) Invasive hemodynamic monitoring perioperatively: at the discretion of anesthesiologist.  5.) Deep vein thrombosis prophylaxis postoperatively:  intermittent pneumatic compression boots and regimen to be chosen by surgical team.  6.) Surveillance for postoperative MI with ECG immediately postoperatively and on postoperati ve days 1 and 2 AND troponin levels 24 hours postoperatively and on day 4 or hospital discharge (whichever comes first): not indicated.  7.) Current medications which may produce withdrawal symptoms if withheld perioperatively: None  8.) Other measures:  None     --Morning of Procedure medications: tamsulosin (if needed)  --Hold all other medications morning of surgery   --Resume all medications post-operatively  --Hold ASA, NSAIDs and all vitamins/supplements 7 days prior to procedure    UA Reviewed: No indication of infection noted    Per Dr. Woods:      --Drink 1 bottle of gatorade prior to midnight the night before surgery      --Take Tylenol 650mg PO the night before surgery     Bradycardia  Chronic, followed by Cardiology.   Asymptomatic, Cardiology advised if becomes symptomatic to return to clinic.     Holter Monitor (6/2023):  Sinus rhythm with heart rates varying between 34 and 129 BPM with an average of 55BPM.     Exercise Stress test (6/21/23)      The ECG portion of the study is negative for ischemia.    The patient reported no chest pain during the stress test.    The blood pressure response to stress was normal.    During stress, rare PVCs are noted.    The exercise capacity was normal.    The patient exercised for 7 minutes 1 seconds on a Doyle protocol, corresponding to a functional capacity of 10 METS, achieving a peak heart rate of 164 bpm, which is 106 % of the age predicted maximum heart rate. The patient experienced no angina during the test. Their exercise capacity was normal.      Post-operative nausea and vomiting  Hx of N/V post-op. Patient reported in most recent surgery, he was treated with prophylactic medications.     --Recommend nausea prophylaxis      S/P lumbar fusion  Recent sx 9/2023    --Close  attention to positioning during surgery advised          Electronically signed by Bere England NP on 2/7/2024 at 10:08 AM.

## 2024-02-06 NOTE — TELEPHONE ENCOUNTER
Patient attended Ortho Boot Camp class today at [facility]. Reviewed Joint Replacement education in a face to face class setting. All patient questions were answered, patient verbalized understanding. Patient given surgery instruction handouts at end of class. Physical Therapy in attendance at beginning of class to address PT questions.

## 2024-02-06 NOTE — ASSESSMENT & PLAN NOTE
Planned for ARTHROPLASTY, KNEE, TOTAL, Left with Dr. Ryan Woods on 2/20/24.     Known risk factors for perioperative complications: None    Difficulty with intubation is not anticipated.    Cardiac Risk Estimation: Based on the Revised Cardiac Risk index, patient is a Class 2 risk with a 6.0% risk of a major cardiac event in a low risk procedure.    1.) Preoperative workup as follows: ECG, hemoglobin, hematocrit, electrolytes, creatinine, glucose, liver function studies, urinalysis (urinary tract instrumentation planned).  2.) Change in medication regimen before surgery: discontinue ASA, NSAIDs 7 days before surgery, hold Metformin 24 hours prior to surgery.  3.) Prophylaxis for cardiac events with perioperative beta-blockers: not indicated.  4.) Invasive hemodynamic monitoring perioperatively: at the discretion of anesthesiologist.  5.) Deep vein thrombosis prophylaxis postoperatively: intermittent pneumatic compression boots and regimen to be chosen by surgical team.  6.) Surveillance for postoperative MI with ECG immediately postoperatively and on postoperati ve days 1 and 2 AND troponin levels 24 hours postoperatively and on day 4 or hospital discharge (whichever comes first): not indicated.  7.) Current medications which may produce withdrawal symptoms if withheld perioperatively: None  8.) Other measures:  None     --Morning of Procedure medications: tamsulosin (if needed)  --Hold all other medications morning of surgery   --Resume all medications post-operatively  --Hold ASA, NSAIDs and all vitamins/supplements 7 days prior to procedure    UA Reviewed: No indication of infection noted    Per Dr. Woods:      --Drink 1 bottle of gatorade prior to midnight the night before surgery      --Take Tylenol 650mg PO the night before surgery

## 2024-02-06 NOTE — DISCHARGE INSTRUCTIONS
Pre op instructions reviewed with patient & wife during Clinic Visit with Provider, verbalized understanding.    To confirm, Surgery is scheduled on 2/20/24. We will call you late afternoon the business day prior to surgery with your arrival time.    *Please report to the [facility] Lobby (1st Floor) located off of [facility] (2nd Entrance/Building on the left, in front of the flag pole).  Address: [address] [place] [address] [place]. [id number]    Your Type & Screen appointment is scheduled on 2/19/24 @ [facility] ([facility]). Please DO NOT remove the red arm band applied.      INSTRUCTIONS IMPORTANT!!!  DO NOT Eat, Drink, or Smoke after 12 midnight unless instructed otherwise by your Surgeon. OK to brush teeth, no gum, candy or mints!    MORNING OF SURGERY, drink small sip of water with the following medications instructed by Pre-Admit Provider:  Flomax if needed    *Additional Medication Instructions: Tylenol 650 mg & drink Gatorade the night before surgery!    *Patients should HOLD all vitamins, herbal supplements, weight loss medication, aspirin products & NSAIDS 7 days prior to surgery, as these can thin the blood. Ok to take Tylenol.    ____  Avoid Alcoholic beverages 3 days prior to surgery, as it can thin the blood.  ____  NO Acrylic/fake nails or nail polish worn day of surgery (specifically hand/arm & foot surgeries).  ____  NO powder, lotions, deodorants, oils or cream on body.  ____  Remove all jewelry, piercings, & foreign objects prior to arrival and leave at home.  ____  Remove Dentures, Hearing Aids & Contact Lens prior to surgery.  ____  Bring photo ID and insurance information to hospital (Leave Valuables at Home).  ____  If going home the same day, arrange for a ride home. You will not be able to drive for 24 hrs if Anesthesia was used.   ____  Females (ages 11-60): may need to give a urine sample the morning of surgery; please see Pre op Nurse prior to using the restroom.  ____   Males: Stop ED medications (Viagra, Cialis) 24 hrs prior to surgery.  ____  Wear clean, loose fitting clothing to allow for dressings/ bandages.      Bathing Instructions:    -Shower with anti-bacterial Soap (Hibiclens or Dial) the night before surgery and the morning of.   -Do not use Hibiclens on your face or genitals.   -Apply clean clothes after shower.  -Do not shave your face or body 2 days prior to surgery unless instructed otherwise by your Surgeon.  -Do not shave pubic hair 7 days prior to surgery (gyn pt's).    [facility]/Ride Policy:  Only 2 adults allowed in pre op/recovery area during your procedure. You MUST HAVE A RIDE HOME from a responsible adult that you know and trust. Medical Transport, Uber or Lyft can ONLY be used if patient has a responsible adult to accompany them during ride home.    Discharge Instructions: You will receive Post-op/Discharge instructions by your Discharge Nurse prior to going home.   *Prevention of surgical site infections:   -Keep incisions clean and dry.   -Do not soak/submerge incisions in water until completely healed.   -Do not apply lotions, powders, creams, or deodorants to site.   -Always make sure hands are cleaned with antibacterial soap/ alcohol-based  prior to touching the surgical site.        *Signs and symptoms of Infection Before or After Surgery:               !!!If you experience any fever, chills, nausea/ vomiting, foul odor/ excessive drainage from surgical site, flu-like symptoms, new wounds or cuts, PLEASE CALL THE SURGEON OFFICE at [phone] or SEND MESSAGE THROUGH [facility] PORTAL!!!       *If you are running late day of surgery, please call the Surgery Dept @ [phone].    *Billing question, please call  [phone]       Thank you,  -[facility] Pre Admit Dept.  [phone] or [phone]  M-F 7:30 am-4:00 pm (Closed Major Holidays)    Additional Tests Scheduled Today:  Labs/urine (1st Floor) Check in at the  !

## 2024-02-06 NOTE — ASSESSMENT & PLAN NOTE
Chronic, followed by Cardiology.   Asymptomatic, Cardiology advised if becomes symptomatic to return to clinic.     Holter Monitor (6/2023):  Sinus rhythm with heart rates varying between 34 and 129 BPM with an average of 55BPM.     Exercise Stress test (6/21/23)      The ECG portion of the study is negative for ischemia.    The patient reported no chest pain during the stress test.    The blood pressure response to stress was normal.    During stress, rare PVCs are noted.    The exercise capacity was normal.    The patient exercised for 7 minutes 1 seconds on a Doyle protocol, corresponding to a functional capacity of 10 METS, achieving a peak heart rate of 164 bpm, which is 106 % of the age predicted maximum heart rate. The patient experienced no angina during the test. Their exercise capacity was normal.

## 2024-02-06 NOTE — PRE-PROCEDURE INSTRUCTIONS
Pre op instructions reviewed with patient & wife during Clinic Visit with Provider, verbalized understanding.    To confirm, Surgery is scheduled on 2/20/24. We will call you late afternoon the business day prior to surgery with your arrival time.    *Please report to the [facility] Lobby (1st Floor) located off of [facility] (2nd Entrance/Building on the left, in front of the flag pole).  Address: [address] [place] [address] [place]. [address]    Your Type & Screen appointment is scheduled on 2/19/24 @ [facility] ([facility]). Please DO NOT remove the red arm band applied.      INSTRUCTIONS IMPORTANT!!!  DO NOT Eat, Drink, or Smoke after 12 midnight unless instructed otherwise by your Surgeon. OK to brush teeth, no gum, candy or mints!    MORNING OF SURGERY, drink small sip of water with the following medications instructed by Pre-Admit Provider:  Flomax if needed    *Additional Medication Instructions: Tylenol 650 mg & drink Gatorade the night before surgery!    *Patients should HOLD all vitamins, herbal supplements, weight loss medication, aspirin products & NSAIDS 7 days prior to surgery, as these can thin the blood. Ok to take Tylenol.    ____  Avoid Alcoholic beverages 3 days prior to surgery, as it can thin the blood.  ____  NO Acrylic/fake nails or nail polish worn day of surgery (specifically hand/arm & foot surgeries).  ____  NO powder, lotions, deodorants, oils or cream on body.  ____  Remove all jewelry, piercings, & foreign objects prior to arrival and leave at home.  ____  Remove Dentures, Hearing Aids & Contact Lens prior to surgery.  ____  Bring photo ID and insurance information to hospital (Leave Valuables at Home).  ____  If going home the same day, arrange for a ride home. You will not be able to drive for 24 hrs if Anesthesia was used.   ____  Females (ages 11-60): may need to give a urine sample the morning of surgery; please see Pre op Nurse prior to using the restroom.  ____   Males: Stop ED medications (Viagra, Cialis) 24 hrs prior to surgery.  ____  Wear clean, loose fitting clothing to allow for dressings/ bandages.      Bathing Instructions:    -Shower with anti-bacterial Soap (Hibiclens or Dial) the night before surgery and the morning of.   -Do not use Hibiclens on your face or genitals.   -Apply clean clothes after shower.  -Do not shave your face or body 2 days prior to surgery unless instructed otherwise by your Surgeon.  -Do not shave pubic hair 7 days prior to surgery (gyn pt's).    [facility] Vis[facility]/Ride Policy:  Only 2 adults allowed in pre op/recovery area during your procedure. You MUST HAVE A RIDE HOME from a responsible adult that you know and trust. Medical Transport, Uber or Lyft can ONLY be used if patient has a responsible adult to accompany them during ride home.    Discharge Instructions: You will receive Post-op/Discharge instructions by your Discharge Nurse prior to going home.   *Prevention of surgical site infections:   -Keep incisions clean and dry.   -Do not soak/submerge incisions in water until completely healed.   -Do not apply lotions, powders, creams, or deodorants to site.   -Always make sure hands are cleaned with antibacterial soap/ alcohol-based  prior to touching the surgical site.        *Signs and symptoms of Infection Before or After Surgery:               !!!If you experience any fever, chills, nausea/ vomiting, foul odor/ excessive drainage from surgical site, flu-like symptoms, new wounds or cuts, PLEASE CALL THE SURGEON OFFICE at [phone] or SEND MESSAGE THROUGH [employer] PORTAL!!!       *If you are running late day of surgery, please call the Surgery Dept @ [phone].    *Billing question, please call  [phone]       Thank you,  -[facility] Pre Admit Dept.  [phone] or [phone]  M-F 7:30 am-4:00 pm (Closed Major Holidays)    Additional Tests Scheduled Today:  Labs/urine (1st Floor) Check in at the  !   Detail Level: Detailed Detail Level: Simple Detail Level: Zone

## 2024-02-19 ENCOUNTER — ANESTHESIA EVENT (OUTPATIENT)
Dept: SURGERY | Facility: HOSPITAL | Age: 69
End: 2024-02-19
Payer: MEDICARE

## 2024-02-19 ENCOUNTER — LAB VISIT (OUTPATIENT)
Dept: LAB | Facility: HOSPITAL | Age: 69
End: 2024-02-19
Attending: ORTHOPAEDIC SURGERY
Payer: MEDICARE

## 2024-02-19 ENCOUNTER — TELEPHONE (OUTPATIENT)
Dept: PREADMISSION TESTING | Facility: HOSPITAL | Age: 69
End: 2024-02-19
Payer: MEDICARE

## 2024-02-19 DIAGNOSIS — Z01.818 PREOP TESTING: ICD-10-CM

## 2024-02-19 LAB
ABO + RH BLD: NORMAL
BLD GP AB SCN CELLS X3 SERPL QL: NORMAL
SPECIMEN OUTDATE: NORMAL

## 2024-02-19 PROCEDURE — 86901 BLOOD TYPING SEROLOGIC RH(D): CPT | Mod: HCNC | Performed by: ORTHOPAEDIC SURGERY

## 2024-02-19 PROCEDURE — 36415 COLL VENOUS BLD VENIPUNCTURE: CPT | Mod: HCNC | Performed by: ORTHOPAEDIC SURGERY

## 2024-02-19 NOTE — TELEPHONE ENCOUNTER
Called and spoke with patient about the following:     Please arrive to [facility] ([facility] at 8:45am on 2/20/24 for your scheduled procedure.  Address: [address] [place] [address] [place]. [address] (2nd Building on left, 1st Floor Lobby)  >>>NO eating or drinking after midnight unless instructed otherwise by your Surgeon<<<    Thank you,  -[facility] Pre [facility] Testing Dept.  Mon-Fri 7:30 am - 4 pm [phone]

## 2024-02-20 ENCOUNTER — ANESTHESIA (OUTPATIENT)
Dept: SURGERY | Facility: HOSPITAL | Age: 69
End: 2024-02-20
Payer: MEDICARE

## 2024-02-20 ENCOUNTER — HOSPITAL ENCOUNTER (OUTPATIENT)
Facility: HOSPITAL | Age: 69
Discharge: HOME | End: 2024-02-20
Attending: ORTHOPAEDIC SURGERY | Admitting: ORTHOPAEDIC SURGERY
Payer: MEDICARE

## 2024-02-20 DIAGNOSIS — Z96.659 POSTOPERATIVE STIFFNESS OF TOTAL KNEE REPLACEMENT, SUBSEQUENT ENCOUNTER: Primary | ICD-10-CM

## 2024-02-20 DIAGNOSIS — M17.12 ARTHRITIS OF KNEE, LEFT: Primary | ICD-10-CM

## 2024-02-20 DIAGNOSIS — M25.669 POSTOPERATIVE STIFFNESS OF TOTAL KNEE REPLACEMENT, SUBSEQUENT ENCOUNTER: Primary | ICD-10-CM

## 2024-02-20 DIAGNOSIS — Z01.818 PREOP TESTING: ICD-10-CM

## 2024-02-20 DIAGNOSIS — M17.12 ARTHRITIS OF LEFT KNEE: ICD-10-CM

## 2024-02-20 DIAGNOSIS — T84.89XD POSTOPERATIVE STIFFNESS OF TOTAL KNEE REPLACEMENT, SUBSEQUENT ENCOUNTER: Primary | ICD-10-CM

## 2024-02-20 LAB
HCT VFR BLD AUTO: 41.7 % (ref 40–54)
HGB BLD-MCNC: 14.1 G/DL (ref 14–18)

## 2024-02-20 PROCEDURE — 97116 GAIT TRAINING THERAPY: CPT | Mod: HCNC

## 2024-02-20 PROCEDURE — C1776 JOINT DEVICE (IMPLANTABLE): HCPCS | Mod: HCNC | Performed by: ORTHOPAEDIC SURGERY

## 2024-02-20 PROCEDURE — 36000710: Mod: HCNC | Performed by: ORTHOPAEDIC SURGERY

## 2024-02-20 PROCEDURE — 36000711: Mod: HCNC | Performed by: ORTHOPAEDIC SURGERY

## 2024-02-20 PROCEDURE — 85018 HEMOGLOBIN: CPT | Mod: HCNC | Performed by: ORTHOPAEDIC SURGERY

## 2024-02-20 PROCEDURE — 71000039 HC RECOVERY, EACH ADD'L HOUR: Mod: HCNC | Performed by: ORTHOPAEDIC SURGERY

## 2024-02-20 PROCEDURE — 25000003 PHARM REV CODE 250: Mod: HCNC

## 2024-02-20 PROCEDURE — 71000016 HC POSTOP RECOV ADDL HR: Mod: HCNC | Performed by: ORTHOPAEDIC SURGERY

## 2024-02-20 PROCEDURE — 97161 PT EVAL LOW COMPLEX 20 MIN: CPT | Mod: HCNC

## 2024-02-20 PROCEDURE — 85014 HEMATOCRIT: CPT | Mod: HCNC | Performed by: ORTHOPAEDIC SURGERY

## 2024-02-20 PROCEDURE — C1713 ANCHOR/SCREW BN/BN,TIS/BN: HCPCS | Mod: HCNC | Performed by: ORTHOPAEDIC SURGERY

## 2024-02-20 PROCEDURE — 63600175 PHARM REV CODE 636 W HCPCS: Mod: HCNC

## 2024-02-20 PROCEDURE — 37000009 HC ANESTHESIA EA ADD 15 MINS: Mod: HCNC | Performed by: ORTHOPAEDIC SURGERY

## 2024-02-20 PROCEDURE — 25000003 PHARM REV CODE 250: Mod: HCNC | Performed by: ORTHOPAEDIC SURGERY

## 2024-02-20 PROCEDURE — 37000008 HC ANESTHESIA 1ST 15 MINUTES: Mod: HCNC | Performed by: ORTHOPAEDIC SURGERY

## 2024-02-20 PROCEDURE — 71000015 HC POSTOP RECOV 1ST HR: Mod: HCNC | Performed by: ORTHOPAEDIC SURGERY

## 2024-02-20 PROCEDURE — 25000003 PHARM REV CODE 250: Mod: HCNC | Performed by: ANESTHESIOLOGY

## 2024-02-20 PROCEDURE — 27447 TOTAL KNEE ARTHROPLASTY: CPT | Mod: AS,HCNC,LT, | Performed by: PHYSICIAN ASSISTANT

## 2024-02-20 PROCEDURE — 71000033 HC RECOVERY, INTIAL HOUR: Mod: HCNC | Performed by: ORTHOPAEDIC SURGERY

## 2024-02-20 PROCEDURE — 27201423 OPTIME MED/SURG SUP & DEVICES STERILE SUPPLY: Mod: HCNC | Performed by: ORTHOPAEDIC SURGERY

## 2024-02-20 PROCEDURE — 27447 TOTAL KNEE ARTHROPLASTY: CPT | Mod: HCNC,LT,, | Performed by: ORTHOPAEDIC SURGERY

## 2024-02-20 PROCEDURE — 63600175 PHARM REV CODE 636 W HCPCS: Mod: HCNC | Performed by: ORTHOPAEDIC SURGERY

## 2024-02-20 DEVICE — CEMENT BONE SMPLX HV GENTMYCN: Type: IMPLANTABLE DEVICE | Site: KNEE | Status: FUNCTIONAL

## 2024-02-20 DEVICE — U2 FEMORAL COMPONENT, PS, #6, LEFT
Type: IMPLANTABLE DEVICE | Site: KNEE | Status: FUNCTIONAL
Brand: U2 FEMORAL COMPONENT, PS

## 2024-02-20 DEVICE — XPE TIBIAL INSERT, PS, #6, 9MM
Type: IMPLANTABLE DEVICE | Site: KNEE | Status: FUNCTIONAL
Brand: XPE TIBIAL INSERT, PS

## 2024-02-20 DEVICE — XPE PATELLAR, ON SET, 3 PEGS, LARGER, Ïˆ 35 MM
Type: IMPLANTABLE DEVICE | Site: KNEE | Status: FUNCTIONAL
Brand: XPE PATELLAR, ON SET, 3 PEGS

## 2024-02-20 DEVICE — U2 TIBIAL BASEPLATE, CMA, #6
Type: IMPLANTABLE DEVICE | Site: KNEE | Status: FUNCTIONAL
Brand: U2 TIBIAL BASEPLATE, CMA

## 2024-02-20 DEVICE — STRAIGHT STEM, PSA TYPE, Ø14X75MM
Type: IMPLANTABLE DEVICE | Site: KNEE | Status: FUNCTIONAL
Brand: STRAIGHT STEM, PSA TYPE

## 2024-02-20 RX ORDER — ROPIVACAINE/EPI/CLONIDINE/KET 2.46-0.005
SYRINGE (ML) INJECTION
Status: DISCONTINUED | OUTPATIENT
Start: 2024-02-20 | End: 2024-02-20 | Stop reason: HOSPADM

## 2024-02-20 RX ORDER — CELECOXIB 100 MG/1
400 CAPSULE ORAL ONCE
Status: CANCELLED | OUTPATIENT
Start: 2024-02-20 | End: 2024-02-20

## 2024-02-20 RX ORDER — OXYCODONE AND ACETAMINOPHEN 10; 325 MG/1; MG/1
1 TABLET ORAL EVERY 6 HOURS PRN
Qty: 40 TABLET | Refills: 0 | Status: SHIPPED | OUTPATIENT
Start: 2024-02-20 | End: 2024-02-20

## 2024-02-20 RX ORDER — SCOLOPAMINE TRANSDERMAL SYSTEM 1 MG/1
1 PATCH, EXTENDED RELEASE TRANSDERMAL
Status: DISCONTINUED | OUTPATIENT
Start: 2024-02-20 | End: 2024-02-20 | Stop reason: HOSPADM

## 2024-02-20 RX ORDER — MORPHINE SULFATE 4 MG/ML
2 INJECTION, SOLUTION INTRAMUSCULAR; INTRAVENOUS EVERY 4 HOURS PRN
Status: CANCELLED | OUTPATIENT
Start: 2024-02-20

## 2024-02-20 RX ORDER — CEFAZOLIN SODIUM 2 G/50ML
2 SOLUTION INTRAVENOUS
Status: CANCELLED | OUTPATIENT
Start: 2024-02-20 | End: 2024-02-21

## 2024-02-20 RX ORDER — MIDAZOLAM HYDROCHLORIDE 1 MG/ML
INJECTION INTRAMUSCULAR; INTRAVENOUS
Status: DISCONTINUED | OUTPATIENT
Start: 2024-02-20 | End: 2024-02-20

## 2024-02-20 RX ORDER — DEXAMETHASONE SODIUM PHOSPHATE 4 MG/ML
INJECTION, SOLUTION INTRA-ARTICULAR; INTRALESIONAL; INTRAMUSCULAR; INTRAVENOUS; SOFT TISSUE
Status: DISCONTINUED | OUTPATIENT
Start: 2024-02-20 | End: 2024-02-20

## 2024-02-20 RX ORDER — PROPOFOL 10 MG/ML
VIAL (ML) INTRAVENOUS
Status: DISCONTINUED | OUTPATIENT
Start: 2024-02-20 | End: 2024-02-20

## 2024-02-20 RX ORDER — SUCCINYLCHOLINE CHLORIDE 20 MG/ML
INJECTION INTRAMUSCULAR; INTRAVENOUS
Status: DISCONTINUED | OUTPATIENT
Start: 2024-02-20 | End: 2024-02-20

## 2024-02-20 RX ORDER — OXYCODONE HYDROCHLORIDE 5 MG/1
10 TABLET ORAL
Status: CANCELLED | OUTPATIENT
Start: 2024-02-20

## 2024-02-20 RX ORDER — ONDANSETRON HYDROCHLORIDE 2 MG/ML
4 INJECTION, SOLUTION INTRAVENOUS DAILY PRN
Status: DISCONTINUED | OUTPATIENT
Start: 2024-02-20 | End: 2024-02-20 | Stop reason: HOSPADM

## 2024-02-20 RX ORDER — DEXAMETHASONE SODIUM PHOSPHATE 4 MG/ML
8 INJECTION, SOLUTION INTRA-ARTICULAR; INTRALESIONAL; INTRAMUSCULAR; INTRAVENOUS; SOFT TISSUE
Status: DISCONTINUED | OUTPATIENT
Start: 2024-02-20 | End: 2024-03-13

## 2024-02-20 RX ORDER — KETOROLAC TROMETHAMINE 30 MG/ML
15 INJECTION, SOLUTION INTRAMUSCULAR; INTRAVENOUS EVERY 8 HOURS PRN
Status: DISCONTINUED | OUTPATIENT
Start: 2024-02-20 | End: 2024-02-20 | Stop reason: HOSPADM

## 2024-02-20 RX ORDER — CEFAZOLIN SODIUM 2 G/50ML
2 SOLUTION INTRAVENOUS
Status: COMPLETED | OUTPATIENT
Start: 2024-02-20 | End: 2024-02-20

## 2024-02-20 RX ORDER — LIDOCAINE HYDROCHLORIDE 20 MG/ML
INJECTION INTRAVENOUS
Status: DISCONTINUED | OUTPATIENT
Start: 2024-02-20 | End: 2024-02-20

## 2024-02-20 RX ORDER — ONDANSETRON HYDROCHLORIDE 2 MG/ML
INJECTION, SOLUTION INTRAVENOUS
Status: DISCONTINUED | OUTPATIENT
Start: 2024-02-20 | End: 2024-02-20

## 2024-02-20 RX ORDER — ACETAMINOPHEN 325 MG/1
650 TABLET ORAL EVERY 8 HOURS PRN
Status: DISCONTINUED | OUTPATIENT
Start: 2024-02-20 | End: 2024-03-13

## 2024-02-20 RX ORDER — CHLORHEXIDINE GLUCONATE ORAL RINSE 1.2 MG/ML
10 SOLUTION DENTAL 2 TIMES DAILY
Status: CANCELLED | OUTPATIENT
Start: 2024-02-20 | End: 2024-02-25

## 2024-02-20 RX ORDER — CHLORHEXIDINE GLUCONATE ORAL RINSE 1.2 MG/ML
10 SOLUTION DENTAL
Status: DISCONTINUED | OUTPATIENT
Start: 2024-02-20 | End: 2024-03-13

## 2024-02-20 RX ORDER — ONDANSETRON HYDROCHLORIDE 2 MG/ML
4 INJECTION, SOLUTION INTRAVENOUS EVERY 12 HOURS PRN
Status: CANCELLED | OUTPATIENT
Start: 2024-02-20

## 2024-02-20 RX ORDER — GABAPENTIN 300 MG/1
600 CAPSULE ORAL NIGHTLY
Qty: 60 CAPSULE | Refills: 0 | Status: SHIPPED | OUTPATIENT
Start: 2024-02-20 | End: 2024-04-25

## 2024-02-20 RX ORDER — ROCURONIUM BROMIDE 10 MG/ML
INJECTION, SOLUTION INTRAVENOUS
Status: DISCONTINUED | OUTPATIENT
Start: 2024-02-20 | End: 2024-02-20

## 2024-02-20 RX ORDER — MELOXICAM 15 MG/1
15 TABLET ORAL DAILY
Qty: 30 TABLET | Refills: 0 | Status: SHIPPED | OUTPATIENT
Start: 2024-02-20 | End: 2024-03-13

## 2024-02-20 RX ORDER — ONDANSETRON 8 MG/1
8 TABLET, ORALLY DISINTEGRATING ORAL EVERY 8 HOURS PRN
Status: CANCELLED | OUTPATIENT
Start: 2024-02-20

## 2024-02-20 RX ORDER — GABAPENTIN 300 MG/1
300 CAPSULE ORAL 3 TIMES DAILY
Status: DISCONTINUED | OUTPATIENT
Start: 2024-02-20 | End: 2024-03-13

## 2024-02-20 RX ORDER — TALC
6 POWDER (GRAM) TOPICAL NIGHTLY PRN
Status: DISCONTINUED | OUTPATIENT
Start: 2024-02-20 | End: 2024-02-20 | Stop reason: HOSPADM

## 2024-02-20 RX ORDER — BISACODYL 10 MG/1
10 SUPPOSITORY RECTAL DAILY PRN
Status: CANCELLED | OUTPATIENT
Start: 2024-02-20

## 2024-02-20 RX ORDER — HYDROMORPHONE HYDROCHLORIDE 2 MG/ML
0.2 INJECTION, SOLUTION INTRAMUSCULAR; INTRAVENOUS; SUBCUTANEOUS EVERY 5 MIN PRN
Status: DISCONTINUED | OUTPATIENT
Start: 2024-02-20 | End: 2024-02-20 | Stop reason: HOSPADM

## 2024-02-20 RX ORDER — SODIUM CHLORIDE 9 MG/ML
INJECTION, SOLUTION INTRAVENOUS CONTINUOUS
Status: CANCELLED | OUTPATIENT
Start: 2024-02-20

## 2024-02-20 RX ORDER — MELOXICAM 15 MG/1
15 TABLET ORAL DAILY
Qty: 30 TABLET | Refills: 0 | Status: SHIPPED | OUTPATIENT
Start: 2024-02-20 | End: 2024-04-25

## 2024-02-20 RX ORDER — TRANEXAMIC ACID 10 MG/ML
1000 INJECTION, SOLUTION INTRAVENOUS
Status: COMPLETED | OUTPATIENT
Start: 2024-02-20 | End: 2024-02-20

## 2024-02-20 RX ORDER — DOCUSATE SODIUM 100 MG/1
100 CAPSULE, LIQUID FILLED ORAL 2 TIMES DAILY
Status: CANCELLED | OUTPATIENT
Start: 2024-02-20

## 2024-02-20 RX ORDER — CELECOXIB 100 MG/1
200 CAPSULE ORAL 2 TIMES DAILY
Status: CANCELLED | OUTPATIENT
Start: 2024-02-21

## 2024-02-20 RX ORDER — OXYCODONE AND ACETAMINOPHEN 10; 325 MG/1; MG/1
1 TABLET ORAL EVERY 6 HOURS PRN
Qty: 40 TABLET | Refills: 0 | Status: SHIPPED | OUTPATIENT
Start: 2024-02-20 | End: 2024-03-13

## 2024-02-20 RX ORDER — OXYCODONE HYDROCHLORIDE 5 MG/1
5 TABLET ORAL
Status: CANCELLED | OUTPATIENT
Start: 2024-02-20

## 2024-02-20 RX ORDER — ACETAMINOPHEN 325 MG/1
650 TABLET ORAL EVERY 8 HOURS PRN
Status: CANCELLED | OUTPATIENT
Start: 2024-02-20

## 2024-02-20 RX ORDER — FENTANYL CITRATE 50 UG/ML
INJECTION, SOLUTION INTRAMUSCULAR; INTRAVENOUS
Status: DISCONTINUED | OUTPATIENT
Start: 2024-02-20 | End: 2024-02-20

## 2024-02-20 RX ORDER — OXYCODONE AND ACETAMINOPHEN 5; 325 MG/1; MG/1
1 TABLET ORAL
Status: DISCONTINUED | OUTPATIENT
Start: 2024-02-20 | End: 2024-02-20 | Stop reason: HOSPADM

## 2024-02-20 RX ORDER — ONDANSETRON 4 MG/1
8 TABLET, ORALLY DISINTEGRATING ORAL EVERY 8 HOURS PRN
Qty: 21 TABLET | Refills: 0 | Status: SHIPPED | OUTPATIENT
Start: 2024-02-20 | End: 2024-03-13

## 2024-02-20 RX ORDER — SODIUM CHLORIDE 9 MG/ML
INJECTION, SOLUTION INTRAVENOUS CONTINUOUS
Status: DISCONTINUED | OUTPATIENT
Start: 2024-02-20 | End: 2024-03-13

## 2024-02-20 RX ORDER — ONDANSETRON 4 MG/1
4 TABLET, FILM COATED ORAL EVERY 6 HOURS PRN
Qty: 21 TABLET | Refills: 0 | Status: SHIPPED | OUTPATIENT
Start: 2024-02-20 | End: 2024-04-25

## 2024-02-20 RX ADMIN — SCOPOLAMINE 1 PATCH: 1.5 PATCH, EXTENDED RELEASE TRANSDERMAL at 09:02

## 2024-02-20 RX ADMIN — GLYCOPYRROLATE 0.2 MG: 0.2 INJECTION, SOLUTION INTRAMUSCULAR; INTRAVENOUS at 11:02

## 2024-02-20 RX ADMIN — SUGAMMADEX 200 MG: 100 INJECTION, SOLUTION INTRAVENOUS at 02:02

## 2024-02-20 RX ADMIN — SODIUM CHLORIDE, POTASSIUM CHLORIDE, SODIUM LACTATE AND CALCIUM CHLORIDE: 600; 310; 30; 20 INJECTION, SOLUTION INTRAVENOUS at 11:02

## 2024-02-20 RX ADMIN — LIDOCAINE HYDROCHLORIDE 80 MG: 20 INJECTION INTRAVENOUS at 11:02

## 2024-02-20 RX ADMIN — ROCURONIUM BROMIDE 25 MG: 10 SOLUTION INTRAVENOUS at 11:02

## 2024-02-20 RX ADMIN — ONDANSETRON 4 MG: 2 INJECTION INTRAMUSCULAR; INTRAVENOUS at 01:02

## 2024-02-20 RX ADMIN — ROCURONIUM BROMIDE 10 MG: 10 SOLUTION INTRAVENOUS at 12:02

## 2024-02-20 RX ADMIN — FENTANYL CITRATE 50 MCG: 50 INJECTION, SOLUTION INTRAMUSCULAR; INTRAVENOUS at 11:02

## 2024-02-20 RX ADMIN — CEFAZOLIN SODIUM 2 G: 2 SOLUTION INTRAVENOUS at 12:02

## 2024-02-20 RX ADMIN — ROCURONIUM BROMIDE 5 MG: 10 SOLUTION INTRAVENOUS at 11:02

## 2024-02-20 RX ADMIN — CHLORHEXIDINE GLUCONATE 0.12% ORAL RINSE 10 ML: 1.2 LIQUID ORAL at 09:02

## 2024-02-20 RX ADMIN — SUCCINYLCHOLINE CHLORIDE 140 MG: 20 INJECTION, SOLUTION INTRAMUSCULAR; INTRAVENOUS; PARENTERAL at 11:02

## 2024-02-20 RX ADMIN — FENTANYL CITRATE 25 MCG: 50 INJECTION, SOLUTION INTRAMUSCULAR; INTRAVENOUS at 02:02

## 2024-02-20 RX ADMIN — FENTANYL CITRATE 50 MCG: 50 INJECTION, SOLUTION INTRAMUSCULAR; INTRAVENOUS at 12:02

## 2024-02-20 RX ADMIN — PROPOFOL 140 MG: 10 INJECTION, EMULSION INTRAVENOUS at 11:02

## 2024-02-20 RX ADMIN — DEXAMETHASONE SODIUM PHOSPHATE 8 MG: 4 INJECTION, SOLUTION INTRA-ARTICULAR; INTRALESIONAL; INTRAMUSCULAR; INTRAVENOUS; SOFT TISSUE at 11:02

## 2024-02-20 RX ADMIN — MIDAZOLAM HYDROCHLORIDE 2 MG: 1 INJECTION, SOLUTION INTRAMUSCULAR; INTRAVENOUS at 11:02

## 2024-02-20 RX ADMIN — GABAPENTIN 300 MG: 300 CAPSULE ORAL at 09:02

## 2024-02-20 RX ADMIN — TRANEXAMIC ACID 1000 MG: 10 INJECTION, SOLUTION INTRAVENOUS at 01:02

## 2024-02-20 RX ADMIN — TRANEXAMIC ACID 1000 MG: 10 INJECTION, SOLUTION INTRAVENOUS at 11:02

## 2024-02-20 RX ADMIN — SODIUM CHLORIDE, POTASSIUM CHLORIDE, SODIUM LACTATE AND CALCIUM CHLORIDE: 600; 310; 30; 20 INJECTION, SOLUTION INTRAVENOUS at 01:02

## 2024-02-20 RX ADMIN — ACETAMINOPHEN 650 MG: 325 TABLET ORAL at 09:02

## 2024-02-20 NOTE — OP NOTE
[facility] ([facility])  Orthopedic Surgery  Operative Note    SUMMARY     Date of Procedure: 2/20/2024     Procedure: Procedure(s) (LRB):  ARTHROPLASTY, KNEE, TOTAL (Left)       Surgeon(s) and Role:     * Ryan Woods MD - Primary    Assisting Surgeon:  Symone MATHEWS    Pre-Operative Diagnosis: Arthritis of knee, left [M17.12]    Post-Operative Diagnosis: Post-Op Diagnosis Codes:     * Arthritis of knee, left [M17.12]    Anesthesia: General    Injections/Meds: IRAIDA with 40cc NS    Tourniquet time:  No tourniquet    Significant Surgical Tasks Conducted by the Assistant(s), if Applicable:    To hold multiple retractors to protect ligaments and neurovascular structures in order to perform surgery safely and efficiently.    Complications: none     Estimated Blood Loss (EBL):  200 mL           Implants:  [employer] femur size 6. PS, tibial tray size 6., tibial insert 9 mm PS, dome patella size number 35, gentamicin cement by [employer]     Specimens: None           Condition: Good    Disposition: PACU - hemodynamically stable.    Attestation: I performed the procedure.    Description:  Medial parapatellar approach used to perform left TKA.  After patient received antibiotics and preop meds the knee was prepped and draped in usual sterile fashion. Midline incision was made 2 fingers above the superior pole of the patella to 2 fingers below.  Full-thickness skin flap raised medially and partially laterally.  Medial parapatellar incision was made to medial tibial tubercle  Medial release off the medial tibial flare perform subperiosteal elevating the tissues to the posterior medial corner of the tibia.  Patellar fat pad resected partially.  Superior capsulectomy performed.  Osteophytes removed mediolateral meniscus removed. Patella was resurfaced after measuring and free hand technique used.  Partial lateral facetectomy performed.  Patella button trial was applied and the measured and reconstituted  thickness. The patella was moved laterally the knee hyperflexed.  Quarter-inch drill bit used to open the canal into the femur and the canal was suctioned.  Irrigated and suctioned again.  Canal finer inserted an intramedullary alignment loyd inserted into the femoral canal and pinned our cutting block at 5° of valgus cut. The loyd was removed and distal femur was cut through the cutting block. We measured the size of the femur and marked our rotation and then 1 cutting block applied and pinned in place and proceeded cutting the anterior condyle posterior condyle and chamfer cuts followed by box cut. The femoral canal was bone grafted.  Trial was applied on the femur and posterior osteophytes removed. Directed attention to the tibia quarter-inch drill bit used to open the canal into the tibia.  Canal Finders inserted. A gated the canal and suctioned it. Fluted intramedullary alignment loyd applied and using the depth gauge and the cutting block on the tibia.  Proceeded with multiple bent Homans protecting the collateral ligaments and posterior neurovascular structures and using the oscillating saw cut the tibia.  Excess osteophytes removed. Measured the tibia, marked the rotation on the base plate , pinned in place and punched our Gato.  Trials placed into the knee and put the knee through range of motion checking gap in extension , flexion at 45° of flexion.  Come from the sizes.    The knee was pulse lavaged then was injected in the surrounding soft tissues for postop pain control.  Prosthesis was cemented in place and excess cement was removed. Once cement has hardened the knee was placed through range of motion confirming stability. Once prosthesis was checked Closure of the knee performed with 1.  Vicryl and figure-of-eight and running fashion. Subcutaneous tissues were irrigated and then closed using 1.  Vicryl inverted stitch and skin using staple gun.  Sterile dressing applied.

## 2024-02-20 NOTE — PLAN OF CARE
PT EVAL complete. Required CGA for bed mobility, ambulated 100ft CGA using RW. Recommending low intensity therapy upon d/c.

## 2024-02-20 NOTE — PT/OT/SLP EVAL
Physical Therapy Evaluation and Treatment    Patient Name: Myles Pride   MRN: [id number]  Recent Surgery: Procedure(s) (LRB):  ARTHROPLASTY, KNEE, TOTAL (Left) Day of Surgery    Recommendations:     Discharge Recommendations: Low Intensity Therapy   Discharge Equipment Recommendations: none   Barriers to discharge: None    Assessment:     Myles Pride is a 68 y.o. male admitted with a medical diagnosis of <principal problem not specified>. He presents with the following impairments/functional limitations: weakness, impaired endurance, impaired functional mobility, gait instability, impaired balance, pain, decreased safety awareness, decreased lower extremity function, decreased ROM, orthopedic precautions.    Rehab Prognosis: Good; patient would benefit from acute PT services to address these deficits and reach maximum level of function.    Plan:     During this hospitalization, patient to be seen 3 x/week to address the above listed problems via gait training, therapeutic activities, therapeutic exercises    Plan of Care Expires: 03/05/24    Subjective     Chief Complaint: Pt is motivated to participate  Patient Comments/Goals: none stated  Pain/Comfort:  Pain Rating 1: 3/10  Location - Side 1: Left  Location - Orientation 1: generalized  Location 1: leg  Pain Addressed 1: Reposition, Distraction, Cessation of Activity  Pain Rating Post-Intervention 1: 3/10    Social History:  Living Environment: Patient lives with their spouse in a 2 story home with number of outside stair(s): 1 and bed/bath on 1st floor  Prior Level of Function: Prior to admission, patient was independent, driving and retired, and ambulated household and community distances using no AD  Equipment Used at Home: raised toilet, shower chair, walker, rolling (toilet rails)  DME owned (not currently used): none  Assistance Upon Discharge: family    Objective:     Communicated with LETICIA Khan and epic chart review prior to session. Patient  "found supine with peripheral IV, wound vac upon PT entry to room.    General Precautions: Standard, fall   Orthopedic Precautions: LLE weight bearing as tolerated   Braces: N/A    Respiratory Status: Room air    Exams:  Cognition: Patient is oriented to Person, Place, Time, Situation  RLE ROM: WFL  RLE Strength:  Grossly 4+/5  LLE ROM: WFL  LLE Strength:  NT due to surgery  Sensation:    -       Intact  Skin Integrity/Edema:     -       Skin integrity: Visible skin intact    Functional Mobility:  Gait belt applied - Yes  Bed Mobility  Rolling Left: contact guard assistance  Scooting: contact guard assistance  Supine to Sit: contact guard assistance  Transfers  Sit to Stand: contact guard assistance with rolling walker and with cues for weight bearing precautions  Gait  Patient ambulated 100ft with rolling walker and contact guard assistance. Patient demonstrates steady gait. C/o dizziness upon standing, improved with time, no LOB, no SOB. All lines remained intact throughout ambulation trail.  Balance  Sitting: stand by assistance  Standing: contact guard assistance    Therapeutic Activities and Exercises:   Pt educated on role of PT in acute care and POC. Educated on L LE WBAT, use of ice, and proper positioning of LE. Educated on importance of OOB activities, activity pacing, and HEP (marching/hip flex, hip abd, heel slides/LAQ, quad sets, ankle pumps) in order to maintain/regain strength. Encouraged to sit up in chair for all meals. Educated on proper use of RW for safety and to reduce risk of falling. Educated on "call don't fall" policy and increased risk of falling due to weakness, instructed to utilize call bell for assistance with all transfers. Pt agreeable to all requests.    AM-PAC 6 CLICK MOBILITY  Total Score:16    Patient left sitting edge of bed with all lines intact, call button in reach, and spouse present.    GOALS:   Multidisciplinary Problems       Physical Therapy Goals          Problem: " Physical Therapy    Goal Priority Disciplines Outcome Goal Variances Interventions   Physical Therapy Goal     PT, PT/OT      Description: Goals to be met by 3/5/24.  1. Pt will complete bed mobility MOD I.  2. Pt will complete sit to stand MOD I.  3. Pt will ambulate 200ft MOD I using RW.  4. Pt will increase AMPAC score by 2 points to progress functional mobility.                       History:     Past Medical History:   Diagnosis Date    Hyperlipidemia     PONV (postoperative nausea and vomiting) 1990    after knee arthroscopy    Vitamin D deficiency 12/18/2019       Past Surgical History:   Procedure Laterality Date    COLONOSCOPY      COLONOSCOPY N/A 12/19/2019    Procedure: COLONOSCOPY;  Surgeon: Bhupendra Wilkinson MD;  Location: [facility] ENDO;  Service: Endoscopy;  Laterality: N/A;    EPIDURAL STEROID INJECTION N/A 12/09/2022    Procedure: L4-5 intralaminar epidural steroid injection with left paramedian approach;  Surgeon: Ben Eddy MD;  Location: [facility] PAIN MGT;  Service: Pain Management;  Laterality: N/A;    KNEE ARTHROSCOPY Left     LUMBAR FUSION  09/2023    SELECTIVE INJECTION OF ANESTHETIC AGENT AROUND LUMBAR SPINAL NERVE ROOT BY TRANSFORAMINAL APPROACH Bilateral 01/21/2022    Procedure: Bilateral L4/5 TF LOVE with RN IV sedation;  Surgeon: Ben Eddy MD;  Location: [facility] PAIN MGT;  Service: Pain Management;  Laterality: Bilateral;    SELECTIVE INJECTION OF ANESTHETIC AGENT AROUND LUMBAR SPINAL NERVE ROOT BY TRANSFORAMINAL APPROACH Bilateral 05/06/2022    Procedure: Bilateral L4/5 TF LOVE with RN IV sedation;  Surgeon: Ben Eddy MD;  Location: [facility] PAIN MGT;  Service: Pain Management;  Laterality: Bilateral;    SELECTIVE INJECTION OF ANESTHETIC AGENT AROUND LUMBAR SPINAL NERVE ROOT BY TRANSFORAMINAL APPROACH Bilateral 10/25/2022    Procedure: Bilateral L4/5 TF LOVE with RN IV sedation;  Surgeon: Ben Eddy MD;  Location: [facility] PAIN MGT;  Service: Pain Management;  Laterality: Bilateral;     TRANSFORAMINAL LUMBAR INTERBODY FUSION (TLIF) USING COMPUTER-ASSISTED NAVIGATION Bilateral 09/06/2023    Procedure: FUSION, SPINE, LUMBAR, TLIF, USING COMPUTER-ASSISTED NAVIGATION;  Surgeon: Lyle Lobato MD;  Location: [facility];  Service: Neurosurgery;  Laterality: Bilateral;  L3-5 TLIF    VASECTOMY  1995       Time Tracking:     PT Received On: 02/20/24  PT Start Time: 1640  PT Stop Time: 1705  PT Total Time (min): 25 min     Billable Minutes: Evaluation 15min and Gait Training 10min    2/20/2024

## 2024-02-20 NOTE — H&P
Subjective:     Patient ID: Myles Pride is a 68 y.o. male.    Chief Complaint: Pain of the Right Knee and Pain of the Left Knee      HPI:  06/02/2022  Patient with left knee severe pain today is total complaining also of the right hip.  He had previous x-rays in the electronic records.  He feels the right hip when he abducts his hip and try to get out of the car is seems to be very painful in the groin.  His knee on the left side does not bother him as much.  He does have severe back issues and we did order EMG and nerve conduction studies that show multilevel radiculopathy from L2, L4-L5 and S1 bilaterally.  He states he does get thigh anterior thigh pain.  I did tell him a could be from pinched nerves in his back.  He does see pain management for his back and injection seems to have helped a little bit.  He tries to maintain activities however now he said he cannot even hike 2 miles.  One mildly can walk but now is knee gives out on him on the left and his hip hurts on the right.  He did know if the injection given to him in February in the left knee helped or not because also received injections in his back.  No loss of bowel bladder control blurry vision double vision or loss sense smell or taste or fever or chills    As far as the gabapentin we discussed that in detail today he does not take it as much bit I did tell him for it to work he needs to be on it constantly.  He was prescribed that by pain management to take 300 mg at night for we can go up to 600 mg at night after that and a for a week and then go up to 900 at night.  I did tell him I will probably stop at 600 if you doing well with that.  Will take roughly 3 months for the gabapentin to work well and if he does not take it constantly there is no help for it.  If he decides to do surgery afterwards he needs to be on gabapentin to help with nerve pain    12/01/2022   X-rays obtained today of his hips and the knees.  He is scheduled to receive  injections in his back in a week with pain management Dr. Eddy  .  He said his right hip seems to be constantly aching him.  Last time we saw him we discussed that usually we do hip surgery 1st and then knee surgery after that in joint replacement.  However he does have quite a bit of range of motion left in his hips and the knee is the 1 that is concerning him on specially on the left side.  His pain level is 7/10 sometimes.  He is able to manage in trying to be very active he does water exercise programs.  He did have a lumbar injection around 5 weeks ago but now he is going for a 2nd injection.  He feels the right hip is the 1 that aches the most of both hips and his left knee.  He knows he has arthritis in both knees and both hips.  Also has issues in his back and stenosis.    No fever no chills no shortness of breath or difficulty with chewing swallowing loss of bowel bladder control blurry vision vision or double vision loss sense smell or taste    01/12/2024   Bilateral knee arthritis with the left knee severely deformed.  Also he had mild pain in both of his hips with the right a little bit worse than the left.  Last time seen he had severe spinal stenosis and multilevel radiculopathy.  Since then he had undergone lumbar decompression and fusion by Dr. Lobato  .  He is doing well with his back.  The numbness tingling down the legs subsided some.  He has not having much of any pain he says it is 0/10 when he sitting down is just his knees give out with gait in when he walks distances the pain goes up to around 5 out of 10.  The left knee is the 1 that bothers him the most the hips he can not live with the stated.  I did discuss that if he has loss of motion in the hip will vent him from doing well with the total knee.  He said he has good motion he ambulates without assistive devices.  He does have a brace that he places on the left knee to give him some stability.  He had been on different NSAIDs and  gabapentin and multiple treatments including injections in the back and in the knees by pain management.  You would like to have his left total knee replacement done  Spent a long time with him showed him model how it is performed went over the pros and cons and the instructions in details and reviewed his recent x-rays of the lumbar spine and hips as well as the knees  Past Medical History:   Diagnosis Date    Hyperlipidemia     PONV (postoperative nausea and vomiting) 1990    after knee arthroscopy    Vitamin D deficiency 12/18/2019     Past Surgical History:   Procedure Laterality Date    COLONOSCOPY      COLONOSCOPY N/A 12/19/2019    Procedure: COLONOSCOPY;  Surgeon: Bhupendra Wilkinson MD;  Location: [facility] ENDO;  Service: Endoscopy;  Laterality: N/A;    EPIDURAL STEROID INJECTION N/A 12/09/2022    Procedure: L4-5 intralaminar epidural steroid injection with left paramedian approach;  Surgeon: Ben Eddy MD;  Location: [facility] PAIN MGT;  Service: Pain Management;  Laterality: N/A;    KNEE ARTHROSCOPY Left     SELECTIVE INJECTION OF ANESTHETIC AGENT AROUND LUMBAR SPINAL NERVE ROOT BY TRANSFORAMINAL APPROACH Bilateral 01/21/2022    Procedure: Bilateral L4/5 TF LOVE with RN IV sedation;  Surgeon: Ben Eddy MD;  Location: [facility] PAIN [facility]GT;  Service: Pain Management;  Laterality: Bilateral;    SELECTIVE INJECTION OF ANESTHETIC AGENT AROUND LUMBAR SPINAL NERVE ROOT BY TRANSFORAMINAL APPROACH Bilateral 05/06/2022    Procedure: Bilateral L4/5 TF LOVE with RN IV sedation;  Surgeon: Ben Eddy MD;  Location: [facility] PAIN [facility]GT;  Service: Pain Management;  Laterality: Bilateral;    SELECTIVE INJECTION OF ANESTHETIC AGENT AROUND LUMBAR SPINAL NERVE ROOT BY TRANSFORAMINAL APPROACH Bilateral 10/25/2022    Procedure: Bilateral L4/5 TF LOVE with RN IV sedation;  Surgeon: Ben Eddy MD;  Location: [facility] PAIN [facility]GT;  Service: Pain Management;  Laterality: Bilateral;    TRANSFORAMINAL LUMBAR INTERBODY FUSION (TLIF) USING  COMPUTER-ASSISTED NAVIGATION Bilateral 9/6/2023    Procedure: FUSION, SPINE, LUMBAR, TLIF, USING COMPUTER-ASSISTED NAVIGATION;  Surgeon: Lyle Lobato MD;  Location: [facility];  Service: Neurosurgery;  Laterality: Bilateral;  L3-5 TLIF    VASECTOMY  1995     Family History   Problem Relation Age of Onset    Cancer Mother         anal cancer    Diabetes Mother         PRE DIABETES    Arthritis Mother     Hearing loss Mother     No Known Problems Sister     Gallbladder disease Brother     Hepatitis Brother         Hep C     Social History     Socioeconomic History    Marital status:    Tobacco Use    Smoking status: Never     Passive exposure: Never    Smokeless tobacco: Never   Substance and Sexual Activity    Alcohol use: Yes     Alcohol/week: 3.0 standard drinks of alcohol     Types: 3 Glasses of wine per week    Drug use: Never    Sexual activity: Yes     Partners: Female     Birth control/protection: Partner-Vasectomy, None     Social Determinants of Health     Financial Resource Strain: Low Risk  (9/19/2023)    Overall Financial Resource Strain (CARDIA)     Difficulty of Paying Living Expenses: Not hard at all   Food Insecurity: No Food Insecurity (9/19/2023)    Hunger Vital Sign     Worried About Running Out of Food in the Last Year: Never true     Ran Out of Food in the Last Year: Never true   Transportation Needs: No Transportation Needs (9/19/2023)    PRAPARE - Transportation     Lack of Transportation (Medical): No     Lack of Transportation (Non-Medical): No   Physical Activity: Insufficiently Active (9/19/2023)    Exercise Vital Sign     Days of Exercise per Week: 3 days     Minutes of Exercise per Session: 30 min   Stress: No Stress Concern Present (9/19/2023)    [employer] - Occupational Stress Questionnaire     Feeling of Stress : Not at all   Social Connections: Unknown (9/19/2023)    Social Connection and Isolation Panel [NHANES]     Frequency of Communication  with Friends and Family: Three times a week     Frequency of Social Gatherings with Friends and Family: Once a week     Active Member of Clubs or Organizations: Yes     Attends Club or Organization Meetings: More than 4 times per year     Marital Status:    Housing Stability: Low Risk  (9/19/2023)    Housing Stability Vital Sign     Unable to Pay for Housing in the Last Year: No     Number of Places Lived in the Last Year: 1     Unstable Housing in the Last Year: No     Medication List with Changes/Refills   Current Medications    ACETAMINOPHEN (TYLENOL) 325 MG TABLET    Take 2 tablets (650 mg total) by mouth every 4 (four) hours as needed (pain score 1-3/10).    CICLOPIROX (PENLAC) 8 % SOLN    Apply to nail and nail fold once daily for up to 1 year    ERGOCALCIFEROL, VITAMIN D2, (VITAMIN D ORAL)    Take by mouth once daily.    KETOCONAZOLE (NIZORAL) 2 % CREAM    Apply topically 2 (two) times daily. For feet and web-spaces. Use for up to 4 weeks.    ONDANSETRON (ZOFRAN-ODT) 4 MG TBDL    Take 1 tablet (4 mg total) by mouth every 6 (six) hours as needed (Nausea).    OXYCODONE-ACETAMINOPHEN (PERCOCET)  MG PER TABLET    Take 1 tablet by mouth every 8 (eight) hours as needed for Pain.    PRAVASTATIN (PRAVACHOL) 40 MG TABLET    TAKE 1 TABLET BY MOUTH EVERY DAY    TAMSULOSIN (FLOMAX) 0.4 MG CAP    Take 1 capsule (0.4 mg total) by mouth once daily.     Review of patient's allergies indicates:  No Known Allergies  Review of Systems   Constitutional: Negative for decreased appetite.   HENT:  Negative for tinnitus.    Eyes:  Negative for double vision.   Cardiovascular:  Negative for chest pain.   Respiratory:  Negative for wheezing.    Hematologic/Lymphatic: Negative for bleeding problem.   Skin:  Negative for dry skin.   Musculoskeletal:  Positive for arthritis, back pain, joint pain and stiffness. Negative for gout and neck pain.   Gastrointestinal:  Negative for abdominal pain.   Genitourinary:  Negative for  bladder incontinence.   Neurological:  Negative for numbness, paresthesias and sensory change.   Psychiatric/Behavioral:  Negative for altered mental status.        Objective:   Body mass index is 27.87 kg/m².  There were no vitals filed for this visit.       General    Constitutional: He is oriented to person, place, and time. He appears well-developed.   HENT:   Head: Atraumatic.   Eyes: EOM are normal.   Pulmonary/Chest: Effort normal.   Neurological: He is alert and oriented to person, place, and time.   Psychiatric: Judgment normal.           Ambulating without any assistive devices  Lumbar without true deformity.  Negative straight leg raising bilaterally.  Pelvis is level  Right hip internal rotation 15° external rotation 20° at 90° of flexion.  Abduction of the hip to 30° and internal rotation yet severe pain in the groin.  No flexion contracture.  Right hip flexors and abductors are 5/5 however he has quad atrophy  Left hip internal rotation 15° external rotation 30° at 90° of flexion.  Abduction to 30° and internal rotation with very mild pain in the groin.  No flexion contractures.  Hip flexors and abductors are 5/5 however is also he has quad atrophy  Right knee range of motion 0-120 degrees.  Very mild crepitus to compression on the patella.  No swelling no defect in the patella tendon or quadriceps tendon.  There is definitely quadriceps atrophy  Left knee with severe varus deformity.  There is quite a bit of loosening lateral collateral.  Range of motion is 5-100°.  No defect in the patella or quadriceps tendon.  There is quad atrophy also.  There is crepitus to compression on the patella as well as pain medially.  Calves are soft nontender  Ankle motion intact  Skin is warm to touch no obvious lesions  Capillary refill less than 2 seconds    Relevant imaging results reviewed and interpreted by me, discussed with the patient and / or family today   X-ray 01/12/2024 bilateral knees with the left knee  severe varus deformity complete loss medial joint space marginal osteophytic changes large posterior osteophytes as well as anterior.  The right knee has also degenerative changes with varus deformity but not as severe as the left knee.  No fracture seen  There is x-ray from November 2023 lumbar spine showing 3 level fusion with hardware  X-ray 12/01/2022 of the pelvis showing right hip with loss of joint space on the lateral aspect with large osteophytes of the acetabulum and superiorly and inferiorly with a small osteophyte on the femoral heads bilaterally.  Same findings on the left hip   X-ray of both knees showing left knee with complete loss of medial joint space with marginal osteophytic changes and severe varus deformity.  The right knee has moderately severe varus deformity and loss of medial joint space and marginal osteophytic changes  X-rays in the electronic records reviewed today in details  X-ray of the pelvis showing right hip with marginal osteophytic changes on the acetabulum with loss of joint space consistent with arthritis.  Left hip has some marginal osteophytes on the acetabulum and the hip joint also consistent with mild arthritis  X-ray of the left knee with complete loss of medial joint space.  Marginal osteophytic changes severe varus deformity.  Radiculopathy  EMG and nerve conduction study showing L2, L4, L5 and S1 radiculopathy  Assessment:     Encounter Diagnoses   Name Primary?    Arthritis of knee, left Yes    Acquired varus deformity knee, left     Arthritis of knee, right     Arthritis of right hip     Lumbosacral radiculopathy at L5     Lumbosacral radiculopathy at S1     Lumbosacral radiculopathy at L2     Lumbosacral radiculopathy at L4     Spinal stenosis of lumbar region, unspecified whether neurogenic claudication present     Arthritis of left hip     History of lumbar fusion         Plan:   Arthritis of knee, left    Acquired varus deformity knee, left    Arthritis of knee,  right    Arthritis of right hip    Lumbosacral radiculopathy at L5    Lumbosacral radiculopathy at S1    Lumbosacral radiculopathy at L2    Lumbosacral radiculopathy at L4    Spinal stenosis of lumbar region, unspecified whether neurogenic claudication present    Arthritis of left hip    History of lumbar fusion         Patient Instructions   X-rays of your lumbar spine showing 3 level decompression and fusion and hardware intact   You seeing Dr. Oviedo coming up soon for a repeat x-ray  X-ray today of both of her knees showing the left knee complete loss of joint space on the inside with large bone spurs and anteriorly and posteriorly.  You do have quite a bit of bowleggedness and instability in the knee during gait  You need to have the left knee replaced.  Surgeries roughly 2 hours done as outpatient surgery that means you will have you surgery go home the same day.  Once you are awake in recovery room we get you up and walking with the physical therapist.  We will arrange for home physical therapy for 2 weeks.  Home health nurse will show up also change her dressing.  After 2 weeks will take the sutures out or staples out if needed and you can get in the shower after that.  It will take roughly 3-6 months for complete healing to occur.    You already seen Dr. Rivers for your lumbar spine in you was okay for the surgery   I do not think you need to see cardiology before your total knee we can send you to the preop protective nurse we check her labs make sure everything is okay and if you need to see cardiology they get in touch with the  There is a mandatory class you have to attend in the hospital prior to surgery where you will meet with the therapist, home health agencies etcetera    Procedure, common risks and benefits,alternatives discussed in details.All questions answered.Patient expressed understanding.Patient instructed to call for any questions that could arise in the future.    Most common  Risks:  Infection/2% chance  Leg-length discrepancy    Neuro-vascular injury ( resulting in loss of motor and sensory functions)/almost all total knee replacements are slightly numb on the outside of the knee.  There is very rare cases where people develop what we call a footdrop.  80% of foot drops recuperate within 6 months to a year  Pain  Blood clot    Loss of motion/we heal with scar tissue your job is to work very hard through the pain with the physical therapist so you can get motion back and do not scar down and lose motion  Fracture of bone  Failure of procedure to achieve its intended purpose/total knee replacement is not perfect it is 80% successful in decreasing pain and increasing function.  You might still have occasional discomfort in the knee  Failure of hardware/total knee replacement is made out of metal and plastic insert.  They last roughly 15 years on the average.  Basically the plastic wears out with time  Non-union or mal-union of bone  Malalignment  Death you already been through Dr. Rivers in cardiology    Patient instructions for joint replacement    Before surgery  1.  Shower with Hibiclens soap/antibacterial for 3 days prior to surgery to decrease risk of infection  2..  Stop all blood thinners/aspirin, Coumadin, warfarin, Plavix, Eliquis, Xarelto etc 7 days prior to surgery .  You need to stop ibuprofen 7 days prior as well as all vitamins and natural products if you taking any except vitamin-D you can take  3.  No eating or drinking after midnight the night before surgery.  I would like you to drink a bottle of Gatorade or Powerade or Pedialyte the night before surgery prior to midnight so you do not get dehydrated  4.  Take Tylenol 650 mg the night prior to surgery    Ask physicians for prescription of Celebrex or Mobic if needed    After surgery at home  1.  Take Tylenol 650 mg 3 times a day for 14 days then as needed for mild pain  2.  Take gabapentin 300 mg nightly for 6 weeks  3.   Take Celebrex 200 mg or meloxicam 15 mg daily for 6 weeks unless having cardiac issues to take for 2 weeks only  4.  Must take aspirin 81 mg twice a day for 6 weeks unless you are on other blood thinners/Plavix, Eliquis, Xarelto, Coumadin etc  5.  Must wear compressive stockings for 6 weeks minimum to decrease the risk of blood clot and swelling  6.  Hydrocodone/Norco or oxycodone/Percocet will be prescribed to take every 6 hr as needed for breakthrough pain  7.  May apply ice on the knee to help with decreasing pain  8.  Keep wound dry for 2 weeks until stitches/staples removed than you will be allowed to shower in 24 hr and get the wound wet.  No soaking of the wound in the tub or swimming for 4 weeks after surgery  9.  No driving for 4 weeks unless specified by physician  10.  Avoid touching the wound or surrounding area /at least 2 inches on each side of the surgical incision until staples are removed/stitches   11.  May change the surgical dressing if extremely bloody or has drainage on it. May clean the wound with peroxide or Betadine and apply sterile dressing and Ace wrap over it  12.  Leave hospital dressing on for 3 days then may change by applying sterile 4 x 4 and Ace wrap after cleaning with Betadine or peroxide.  May leave this dressing change to home health nurses        We discussed total knee replacement in details with him.  I did tell him also right total hip replacement if that hip is bother him a lot needed to be done.  We did tell him the same story that the total knee replacement compared to the total hip replacement.  The only difference is that total hip replacement is 90% successful in decreasing pain increasing function compared to total knee which is 80%.  Brochures about both with pictures given.  We did show him x-rays of total knee replacement, total knee revisions and total hip replacement.  A lot of time spent with him explaining everything in details.  Him and his wife are there is  a lot of questions asked and answered.  I definitely discussed with him that he can be more active after surgery however he should avoid high impact activities.  He likes to hike and do water exercises and walk and that is acceptable after joint replacement.  The pros and cons we did go into details with him at this time   I did tell him as long as he can function sometimes you do not need to proceed with total joint replacement as low till the pain is intolerable.  He states about the right hip is more painful than the left knee however the left knee is more unstable due to the severe varus deformity.  I did tell him we can go either way do the right total hip 1st or left total knee 1st depending on what bothers him the most.  I discussed his x-rays with him in details also  We did discuss total knee replacement could be performed separately since he has good range of motion in the hip on the left side.  He would like to have the knee done despite having arthritic changes in the hip despite the fact that he has good motion I do not see any reason why we can not do it.  He wants to wait till sometime in June or July.  S proceed with left TKA  Disclaimer: This note was prepared using a voice recognition system and is likely to have sound alike errors within the text.

## 2024-02-20 NOTE — TRANSFER OF CARE
"Anesthesia Transfer of Care Note    Patient: Myles Pride    Procedure(s) Performed: Procedure(s) (LRB):  ARTHROPLASTY, KNEE, TOTAL (Left)    Patient location: PACU    Anesthesia Type: general    Transport from OR: Transported from OR on room air with adequate spontaneous ventilation    Post pain: adequate analgesia    Post assessment: no apparent anesthetic complications and tolerated procedure well    Post vital signs: stable    Level of consciousness: responds to stimulation and sedated    Nausea/Vomiting: no nausea/vomiting    Complications: none    Transfer of care protocol was followedComments: Report given to PACU RN at bedside. Hand off tool used. RN given opportunity to ask questions or clarify concerns. No Concerns verbalized. RN was asked if ready to assume care of patient. RN verbally confirmed. Pt. left in stable condition. SV. Vital Signs Return to Near Baseline. No s/s of distress noted.       Last vitals: Visit Vitals  BP (!) 153/82 (BP Location: Right arm, Patient Position: Sitting)   Pulse (!) 48   Temp 36.7 °C (98.1 °F) (Temporal)   Resp 16   Ht 6' 5" (1.956 m)   Wt 108 kg (238 lb 3.3 oz)   SpO2 98%   BMI 28.25 kg/m²     "

## 2024-02-20 NOTE — DISCHARGE SUMMARY
[facility] - Surgery (Hospital)  Discharge Note  Short Stay    Procedure(s) (LRB):  ARTHROPLASTY, KNEE, TOTAL (Left)      OUTCOME: Patient tolerated treatment/procedure well without complication and is now ready for discharge.    DISPOSITION: Home-Health Care [facility] [place]    FINAL DIAGNOSIS:  <principal problem not specified>  Arthritis of left knee with severe varus deformity and posterior medial instability  FOLLOWUP: In clinic    DISCHARGE INSTRUCTIONS:    Discharge Procedure Orders   Basic Metabolic Panel   Standing Status: Future Number of Occurrences: 1 Standing Exp. Date: 03/31/25     CBC Auto Differential   Standing Status: Future Number of Occurrences: 1 Standing Exp. Date: 03/31/25     Urinalysis   Standing Status: Future Number of Occurrences: 1 Standing Exp. Date: 03/31/25     Order Specific Question Answer Comments   Collection Type Urine, Clean Catch         TIME SPENT ON DISCHARGE:  25 minutes

## 2024-02-20 NOTE — ANESTHESIA PREPROCEDURE EVALUATION
02/20/2024  Myles Pride is a 68 y.o., male.    Patient Active Problem List   Diagnosis    Bradycardia    Risk of myocardial infarction or stroke 7.5% or greater in next 10 years    Vitamin D deficiency    Colon cancer screening    Hyperlipidemia    Lumbar radiculopathy, chronic    Frequent PVCs    Lumbar stenosis with neurogenic claudication    ABLA (acute blood loss anemia)    S/P lumbar fusion    Decreased range of motion of lumbar spine    Decreased strength, endurance, and mobility    Arthritis of knee, left    Post-operative nausea and vomiting     Past Surgical History:   Procedure Laterality Date    COLONOSCOPY      COLONOSCOPY N/A 12/19/2019    Procedure: COLONOSCOPY;  Surgeon: Bhupendra Wilkinson MD;  Location: [facility] ENDO;  Service: Endoscopy;  Laterality: N/A;    EPIDURAL STEROID INJECTION N/A 12/09/2022    Procedure: L4-5 intralaminar epidural steroid injection with left paramedian approach;  Surgeon: Ben Eddy MD;  Location: [facility] PAIN MGT;  Service: Pain Management;  Laterality: N/A;    KNEE ARTHROSCOPY Left     LUMBAR FUSION  09/2023    SELECTIVE INJECTION OF ANESTHETIC AGENT AROUND LUMBAR SPINAL NERVE ROOT BY TRANSFORAMINAL APPROACH Bilateral 01/21/2022    Procedure: Bilateral L4/5 TF LOVE with RN IV sedation;  Surgeon: Ben Eddy MD;  Location: [facility] PAIN MGT;  Service: Pain Management;  Laterality: Bilateral;    SELECTIVE INJECTION OF ANESTHETIC AGENT AROUND LUMBAR SPINAL NERVE ROOT BY TRANSFORAMINAL APPROACH Bilateral 05/06/2022    Procedure: Bilateral L4/5 TF LOVE with RN IV sedation;  Surgeon: Ben Eddy MD;  Location: [facility] PAIN MGT;  Service: Pain Management;  Laterality: Bilateral;    SELECTIVE INJECTION OF ANESTHETIC AGENT AROUND LUMBAR SPINAL NERVE ROOT BY TRANSFORAMINAL APPROACH Bilateral 10/25/2022    Procedure: Bilateral L4/5 TF LOVE with RN IV sedation;  Surgeon: Ben PETTIT  MD Surjit;  Location: [facility] PAIN MGT;  Service: Pain Management;  Laterality: Bilateral;    TRANSFORAMINAL LUMBAR INTERBODY FUSION (TLIF) USING COMPUTER-ASSISTED NAVIGATION Bilateral 09/06/2023    Procedure: FUSION, SPINE, LUMBAR, TLIF, USING COMPUTER-ASSISTED NAVIGATION;  Surgeon: Lyle Lobato MD;  Location: [facility] OR;  Service: Neurosurgery;  Laterality: Bilateral;  L3-5 TLIF    VASECTOMY  1995       Pre-op Assessment    I have reviewed the Patient Summary Reports.    I have reviewed the NPO Status.   I have reviewed the Medications.     Review of Systems  Anesthesia Hx:  No problems with previous Anesthesia              Personal Hx of Anesthesia complications, Post-Operative Nausea/Vomiting                    Social:  Non-Smoker       Hematology/Oncology:  Hematology Normal                                     Cardiovascular:                hyperlipidemia    Pt reports bradycardia                         Pulmonary:  Pulmonary Normal                       Renal/:  Renal/ Normal                 Hepatic/GI:  Hepatic/GI Normal                 Neurological:    Neuromuscular Disease,       Lumbar radiculopathy, chronic  Lumbar stenosis with neurogenic claudication    S/P lumbar fusion                            Endocrine:  Endocrine Normal                Physical Exam  General: Well nourished    Airway:  Mallampati: II   Mouth Opening: Normal  TM Distance: Normal  Neck ROM: Normal ROM    Dental:  Intact, Caps / Implants  Upper implant      Anesthesia Plan  Type of Anesthesia, risks & benefits discussed:    Anesthesia Type: Gen ETT  Intra-op Monitoring Plan: Standard ASA Monitors  Post Op Pain Control Plan: multimodal analgesia  Induction:  IV  Airway Plan: , Post-Induction  Informed Consent: Informed consent signed with the Patient and all parties understand the risks and agree with anesthesia plan.  All questions answered.   ASA Score: 2    Ready For Surgery From Anesthesia Perspective.     .      Chemistry         Component Value Date/Time     02/06/2024 1040    K 4.4 02/06/2024 1040     02/06/2024 1040    CO2 27 02/06/2024 1040    BUN 13 02/06/2024 1040    CREATININE 1.1 02/06/2024 1040    GLU 80 02/06/2024 1040        Component Value Date/Time    CALCIUM 9.5 02/06/2024 1040    ALKPHOS 110 12/06/2023 0917    AST 35 12/06/2023 0917    ALT 25 12/06/2023 0917    BILITOT 0.8 12/06/2023 0917    ESTGFRAFRICA >60.0 04/01/2022 1123    EGFRNONAA >60.0 04/01/2022 1123        Lab Results   Component Value Date    WBC 6.93 02/06/2024    HGB 15.2 02/06/2024    HCT 45.9 02/06/2024    MCV 91 02/06/2024     02/06/2024       Vent. Rate : 074 BPM     Atrial Rate : 039 BPM      P-R Int : 168 ms          QRS Dur : 128 ms       QT Int : 378 ms       P-R-T Axes : 063 029 009 degrees      QTc Int : 419 ms     Normal sinus rhythm , Premature ventricular and fusion complexes   Right bundle branch block   Possible Inferior infarct ,age undetermined   Abnormal ECG   When compared with ECG of 21-JUN-2023 08:13,   Current undetermined rhythm precludes rhythm comparison, needs review   Minimal criteria for Anterior infarct are no longer Present   Borderline criteria for Inferior infarct are now Present   T wave inversion no longer evident in Anterior leads   Confirmed by KRYSTLE KINGSLEY, KAELA ([id number]) on 9/10/2023 2:42:28 PM     Echo 9/29/20:  The left ventricle is normal in size with normal systolic function. The estimated ejection fraction is 60%.  Normal left ventricular diastolic function.  Normal right ventricular systolic function.     Holter 6/21/23:  Sinus rhythm with heart rates varying between 34 and 129 BPM with an average of 55BPM.  There were very frequent PVCs totalling 32802 and averaging 380.85 per hour. The ventricular arrhythmias percentage was 11.6.  There were 14 runs of non-sustained monomorphic ventricular tachycardia and lasting to 16 beats.  There were very frequent PACs totalling 5393 and averaging 112.35 per  hour.

## 2024-02-20 NOTE — ANESTHESIA POSTPROCEDURE EVALUATION
Anesthesia Post Evaluation    Patient: Myles Pride    Procedure(s) Performed: Procedure(s) (LRB):  ARTHROPLASTY, KNEE, TOTAL (Left)    Final Anesthesia Type: general      Patient location during evaluation: PACU  Patient participation: Yes- Able to Participate  Level of consciousness: awake and alert, oriented and awake  Post-procedure vital signs: reviewed and stable  Pain management: adequate  Airway patency: patent    PONV status at discharge: No PONV  Anesthetic complications: no      Cardiovascular status: blood pressure returned to baseline  Respiratory status: unassisted  Hydration status: euvolemic  Follow-up not needed.              Vitals Value Taken Time   /74 02/20/24 1551   Temp 36.4 °C (97.5 °F) 02/20/24 1445   Pulse 60 02/20/24 1555   Resp 15 02/20/24 1553   SpO2 97 % 02/20/24 1555   Vitals shown include unvalidated device data.      No case tracking events are documented in the log.      Pain/Karey Score: Pain Rating Prior to Med Admin: 0 (2/20/2024  9:42 AM)  Karey Score: 9 (2/20/2024  3:15 PM)

## 2024-02-20 NOTE — DISCHARGE INSTRUCTIONS
Patient instructions for joint replacement          After surgery at home  1.  Take Tylenol 650 mg 3 times a day for 14 days then as needed for mild pain  2.  Take gabapentin 300-600 mg nightly for 6 weeks  3.  Take Celebrex 200 mg or meloxicam 15 mg daily for 6 weeks unless having cardiac issues to take for 2 weeks only  4.  Must take aspirin 81 mg twice a day for 6 weeks unless you are on other blood thinners/Plavix, Eliquis, Xarelto, Coumadin etc  5.  Must wear compressive stockings for 6 weeks minimum to decrease the risk of blood clot and swelling  6.  Hydrocodone/Norco or oxycodone/Percocet will be prescribed to take every 6 hr as needed for breakthrough pain  7.  May apply ice on the knee to help with decreasing pain  8.  Keep wound dry for 2 weeks until stitches/staples removed than you will be allowed to shower in 24 hr and get the wound wet.  No soaking of the wound in the tub or swimming for 4 weeks after surgery  9.  No driving for 4 weeks unless specified by physician  10.  Avoid touching the wound or surrounding area /at least 2 inches on each side of the surgical incision until staples are removed/stitches   11.  May change the surgical dressing if extremely bloody or has drainage on it. May clean the wound with peroxide or Betadine and apply sterile dressing and Ace wrap over it  12.  Leave hospital dressing on for 3 days then may change by applying sterile 4 x 4 and Ace wrap after cleaning with Betadine or peroxide.  May leave this dressing change to home health nurses

## 2024-02-20 NOTE — ANESTHESIA PROCEDURE NOTES
Intubation    Date/Time: 2/20/2024 11:52 AM    Performed by: Janette Moise CRNA  Authorized by: Janette Moise CRNA    Intubation:     Induction:  Intravenous    Intubated:  Postinduction    Mask Ventilation:  Easy with oral airway    Attempts:  1    Attempted By:  CRNA    Method of Intubation:  Video laryngoscopy    Blade:  Diego 4    Laryngeal View Grade: Grade I - full view of cords      Difficult Airway Encountered?: No      Complications:  None    Airway Device:  Oral endotracheal tube    Airway Device Size:  7.5    Style/Cuff Inflation:  Cuffed    Tube secured:  22    Secured at:  The lips    Placement Verified By:  Capnometry    Complicating Factors:  Retrognathia    Findings Post-Intubation:  BS equal bilateral and atraumatic/condition of teeth unchanged

## 2024-02-21 VITALS
TEMPERATURE: 98 F | RESPIRATION RATE: 16 BRPM | OXYGEN SATURATION: 98 % | SYSTOLIC BLOOD PRESSURE: 135 MMHG | BODY MASS INDEX: 28.12 KG/M2 | WEIGHT: 238.19 LBS | HEIGHT: 77 IN | HEART RATE: 65 BPM | DIASTOLIC BLOOD PRESSURE: 80 MMHG

## 2024-02-21 PROCEDURE — G0180 MD CERTIFICATION HHA PATIENT: HCPCS | Mod: ,,, | Performed by: ORTHOPAEDIC SURGERY

## 2024-02-21 NOTE — PLAN OF CARE
[facility] - Surgery (Hospital)  Discharge Assessment    Primary Care Provider: Catalino Arias MD     Discharge Assessment (most recent)       BRIEF DISCHARGE ASSESSMENT - 02/20/24 3955          Discharge Planning    Assessment Type Discharge Planning Brief Assessment     Resource/Environmental Concerns none     Support Systems Spouse/significant other     Equipment Currently Used at Home walker, rolling;shower chair;raised toilet     Current Living Arrangements home     Patient/Family Anticipates Transition to home with help/services     Patient/Family Anticipated Services at Transition home health care     DME Needed Upon Discharge  none     Discharge Plan A Home Health                   Patient received rolling walker September of 2023. [facility]  received orders for PT.

## 2024-03-04 ENCOUNTER — OFFICE VISIT (OUTPATIENT)
Dept: ORTHOPEDICS | Facility: CLINIC | Age: 69
End: 2024-03-04
Payer: MEDICARE

## 2024-03-04 ENCOUNTER — HOSPITAL ENCOUNTER (OUTPATIENT)
Dept: RADIOLOGY | Facility: HOSPITAL | Age: 69
Discharge: HOME | End: 2024-03-04
Attending: ORTHOPAEDIC SURGERY
Payer: MEDICARE

## 2024-03-04 VITALS — BODY MASS INDEX: 28.12 KG/M2 | HEIGHT: 77 IN | WEIGHT: 238.13 LBS

## 2024-03-04 DIAGNOSIS — M17.12 ARTHRITIS OF KNEE, LEFT: ICD-10-CM

## 2024-03-04 DIAGNOSIS — Z96.652 STATUS POST TOTAL LEFT KNEE REPLACEMENT USING CEMENT: Primary | ICD-10-CM

## 2024-03-04 PROCEDURE — 73562 X-RAY EXAM OF KNEE 3: CPT | Mod: 59,TC,HCNC,RT

## 2024-03-04 PROCEDURE — 1101F PT FALLS ASSESS-DOCD LE1/YR: CPT | Mod: HCNC,CPTII,S$GLB, | Performed by: PHYSICIAN ASSISTANT

## 2024-03-04 PROCEDURE — 3288F FALL RISK ASSESSMENT DOCD: CPT | Mod: HCNC,CPTII,S$GLB, | Performed by: PHYSICIAN ASSISTANT

## 2024-03-04 PROCEDURE — 1125F AMNT PAIN NOTED PAIN PRSNT: CPT | Mod: HCNC,CPTII,S$GLB, | Performed by: PHYSICIAN ASSISTANT

## 2024-03-04 PROCEDURE — 99024 POSTOP FOLLOW-UP VISIT: CPT | Mod: HCNC,S$GLB,, | Performed by: PHYSICIAN ASSISTANT

## 2024-03-04 PROCEDURE — 99999 PR PBB SHADOW E&M-EST. PATIENT-LVL III: CPT | Mod: PBBFAC,HCNC,, | Performed by: PHYSICIAN ASSISTANT

## 2024-03-04 PROCEDURE — 1159F MED LIST DOCD IN RCRD: CPT | Mod: HCNC,CPTII,S$GLB, | Performed by: PHYSICIAN ASSISTANT

## 2024-03-04 PROCEDURE — 73562 X-RAY EXAM OF KNEE 3: CPT | Mod: 26,59,HCNC,RT | Performed by: RADIOLOGY

## 2024-03-04 PROCEDURE — 73564 X-RAY EXAM KNEE 4 OR MORE: CPT | Mod: 26,HCNC,LT, | Performed by: RADIOLOGY

## 2024-03-04 NOTE — PROGRESS NOTES
Patient ID: Myles Pride is a 68 y.o. male.    Chief Complaint: No chief complaint on file.      HPI: Myles Pride  is a 68 y.o. male who c/o No chief complaint on file.     Post op visit 1   Patient notes pain is 5/10   The patient is doing well since surgery and is pleased with his results   He has been able to advance activity of daily living and thus his quality of life   Pain is increased with use and activity over the course of the day  He has no longer taking the narcotic pain medications; he does find relief with Mobic and Neurontin at this time.  He presents today using a cane to assist with ambulation   Compliant with DVT prophylaxis   Compliant with PT; he would like to go to outpatient PT at the [place] which he is already established care with    Patient is presently denying any shortness of breath, chest pain, fever/chills, nausea/vomiting, loss of taste or smell, numbness/tingling or sensation changes, loss of bladder or bowel function, loss of taste/smell.     Surgery: Left Total Knee    Surgery Date:  02/20/2024    Past Medical History:   Diagnosis Date    Hyperlipidemia     PONV (postoperative nausea and vomiting) 1990    after knee arthroscopy    Vitamin D deficiency 12/18/2019     Past Surgical History:   Procedure Laterality Date    COLONOSCOPY      COLONOSCOPY N/A 12/19/2019    Procedure: COLONOSCOPY;  Surgeon: Bhupendra Wilkinson MD;  Location: [facility];  Service: Endoscopy;  Laterality: N/A;    EPIDURAL STEROID INJECTION N/A 12/09/2022    Procedure: L4-5 intralaminar epidural steroid injection with left paramedian approach;  Surgeon: Ben Eddy MD;  Location: [facility];  Service: Pain Management;  Laterality: N/A;    KNEE ARTHROSCOPY Left     LUMBAR FUSION  09/2023    SELECTIVE INJECTION OF ANESTHETIC AGENT AROUND LUMBAR SPINAL NERVE ROOT BY TRANSFORAMINAL APPROACH Bilateral 01/21/2022    Procedure: Bilateral L4/5 TF LOVE with RN IV sedation;  Surgeon: Ben Eddy MD;   Location: [facility] PAIN MGT;  Service: Pain Management;  Laterality: Bilateral;    SELECTIVE INJECTION OF ANESTHETIC AGENT AROUND LUMBAR SPINAL NERVE ROOT BY TRANSFORAMINAL APPROACH Bilateral 05/06/2022    Procedure: Bilateral L4/5 TF LOVE with RN IV sedation;  Surgeon: Ben Eddy MD;  Location: [facility] PAIN MGT;  Service: Pain Management;  Laterality: Bilateral;    SELECTIVE INJECTION OF ANESTHETIC AGENT AROUND LUMBAR SPINAL NERVE ROOT BY TRANSFORAMINAL APPROACH Bilateral 10/25/2022    Procedure: Bilateral L4/5 TF LOVE with RN IV sedation;  Surgeon: Ben Eddy MD;  Location: [facility] PAIN MGT;  Service: Pain Management;  Laterality: Bilateral;    TOTAL KNEE ARTHROPLASTY Left 2/20/2024    Procedure: ARTHROPLASTY, KNEE, TOTAL;  Surgeon: Ryan Woods MD;  Location: [facility];  Service: Orthopedics;  Laterality: Left;    TRANSFORAMINAL LUMBAR INTERBODY FUSION (TLIF) USING COMPUTER-ASSISTED NAVIGATION Bilateral 09/06/2023    Procedure: FUSION, SPINE, LUMBAR, TLIF, USING COMPUTER-ASSISTED NAVIGATION;  Surgeon: Lyle Lobato MD;  Location: [facility] OR;  Service: Neurosurgery;  Laterality: Bilateral;  L3-5 TLIF    VASECTOMY  1995     Family History   Problem Relation Age of Onset    Cancer Mother         anal cancer    Diabetes Mother         PRE DIABETES    Arthritis Mother     Hearing loss Mother     No Known Problems Sister     Gallbladder disease Brother     Hepatitis Brother         Hep C     Social History     Socioeconomic History    Marital status:    Tobacco Use    Smoking status: Never     Passive exposure: Never    Smokeless tobacco: Never   Substance and Sexual Activity    Alcohol use: Yes     Alcohol/week: 3.0 standard drinks of alcohol     Types: 3 Glasses of wine per week    Drug use: Never    Sexual activity: Yes     Partners: Female     Birth control/protection: Partner-Vasectomy, None     Social Determinants of Health     Financial Resource Strain: Low Risk  (1/29/2024)    Overall Financial  Resource Strain (CARDIA)     Difficulty of Paying Living Expenses: Not hard at all   Food Insecurity: No Food Insecurity (1/29/2024)    Hunger Vital Sign     Worried About Running Out of Food in the Last Year: Never true     Ran Out of Food in the Last Year: Never true   Transportation Needs: No Transportation Needs (1/29/2024)    PRAPARE - Transportation     Lack of Transportation (Medical): No     Lack of Transportation (Non-Medical): No   Physical Activity: Sufficiently Active (1/29/2024)    Exercise Vital Sign     Days of Exercise per Week: 5 days     Minutes of Exercise per Session: 30 min   Recent Concern: Physical Activity - Insufficiently Active (1/29/2024)    Exercise Vital Sign     Days of Exercise per Week: 4 days     Minutes of Exercise per Session: 20 min   Stress: No Stress Concern Present (1/29/2024)    [employer] - Occupational Stress Questionnaire     Feeling of Stress : Not at all   Social Connections: Socially Integrated (1/29/2024)    Social Connection and Isolation Panel [NHANES]     Frequency of Communication with Friends and Family: Three times a week     Frequency of Social Gatherings with Friends and Family: Once a week     Attends Anabaptist Services: More than 4 times per year     Active Member of Clubs or Organizations: Yes     Attends Club or Organization Meetings: More than 4 times per year     Marital Status:    Housing Stability: Low Risk  (1/29/2024)    Housing Stability Vital Sign     Unable to Pay for Housing in the Last Year: No     Number of Places Lived in the Last Year: 1     Unstable Housing in the Last Year: No     Medication List with Changes/Refills   Current Medications    ERGOCALCIFEROL, VITAMIN D2, (VITAMIN D ORAL)    Take by mouth once daily.    GABAPENTIN (NEURONTIN) 300 MG CAPSULE    Take 2 capsules (600 mg total) by mouth every evening.    KETOCONAZOLE (NIZORAL) 2 % CREAM    Apply topically 2 (two) times daily. For feet and  web-spaces. Use for up to 4 weeks.    MELOXICAM (MOBIC) 15 MG TABLET    Take 1 tablet (15 mg total) by mouth once daily.    MELOXICAM (MOBIC) 15 MG TABLET    Take 1 tablet (15 mg total) by mouth once daily. Take with food    ONDANSETRON (ZOFRAN) 4 MG TABLET    Take 1 tablet (4 mg total) by mouth every 6 (six) hours as needed for Nausea (Nausea).    ONDANSETRON (ZOFRAN-ODT) 4 MG TBDL    Take 2 tablets (8 mg total) by mouth every 8 (eight) hours as needed (Nausea).    OXYCODONE-ACETAMINOPHEN (PERCOCET)  MG PER TABLET    Take 1 tablet by mouth every 6 (six) hours as needed for Pain.    PRAVASTATIN (PRAVACHOL) 40 MG TABLET    TAKE 1 TABLET BY MOUTH EVERY DAY    TAMSULOSIN (FLOMAX) 0.4 MG CAP    Take 1 capsule (0.4 mg total) by mouth once daily.     Review of patient's allergies indicates:  No Known Allergies    Objective:     Left Lower Extremity  NVI  WWP foot  Comp soft  Cap refill < 2 sec  Calf NT, soft  No defect in the patellar quadriceps tendon   Edema noted   Some bruising noted although color change of green and yellow is starting to occur  (-) Michael sign  CESAR  ROM : Patient is able to easily exhibit full flexion and extension on passive range of motion.   Wiggles toes  DF/PF intact  Sensation intact  Inc C/D/I  No SOI    IMAGING  FINDINGS:  Left knee arthroplasty present without loosening.  Alignment anatomic.  Soft tissue air resolved.  Right knee unchanged with degenerative findings.     Impression:     As above    Assessment:       Encounter Diagnosis   Name Primary?    Status post total left knee replacement using cement Yes          Plan:       Diagnoses and all orders for this visit:    Status post total left knee replacement using cement        Myles Pride is an established pt here for postop follow-up after left total knee replacement by Dr. Woods. The incision was cleaned with hydrogen peroxide.  All staples were removed, and suture strips were applied across the incision.  They  should remain in place until they fall off in approximately 1-2 weeks. The patient was instructed not to soak the incision in standing water but may clean the incision with clean running water and antibacterial soap.  Patient should notify the office of any signs or symptoms of infection including fevers, erythema, purulent drainage, increasing pain.  Patient will continue with DVT prophylaxis.  Patient will start outpatient physical therapy.  Will follow-up in 4-6 weeks.  Patient verbalized understanding of all instructions and agreed with the above plan.    No follow-ups on file.    The patient understands, chooses and consents to this plan and accepts all   the risks which include but are not limited to the risks mentioned above.     Disclaimer: This note was prepared using a voice recognition system and is likely to have sound alike errors within the text.

## 2024-03-07 NOTE — PT/OT/SLP EVAL
Physical Therapy Evaluation    Patient Name:  Myles Pride   MRN:  [id number]    Recommendations:     Discharge Recommendations: home health PT   Discharge Equipment Recommendations: none   Barriers to discharge: None    Assessment:     Myles Pride is a 68 y.o. male admitted with a medical diagnosis of Lumbar stenosis with neurogenic claudication.  He presents with the following impairments/functional limitations: weakness, impaired self care skills, impaired functional mobility, gait instability, impaired balance, decreased ROM, decreased lower extremity function, impaired endurance, pain .    Rehab Prognosis: Good; patient would benefit from acute skilled PT services to address these deficits and reach maximum level of function.    Recent Surgery: Procedure(s) (LRB):  FUSION, SPINE, LUMBAR, TLIF, USING COMPUTER-ASSISTED NAVIGATION (Bilateral) 1 Day Post-Op    Plan:     During this hospitalization, patient to be seen 3 x/week to address the identified rehab impairments via gait training, therapeutic activities, therapeutic exercises and progress toward the following goals:    Plan of Care Expires:  09/21/23    Subjective     Chief Complaint: PAIN   Patient/Family Comments/goals: PROGRESS GT.   Pain/Comfort:  Pain Rating 1: 2/10  Location 1: back  Pain Addressed 1: Pre-medicate for activity  Pain Rating Post-Intervention 1: 2/10    Patients cultural, spiritual, Caodaism conflicts given the current situation:      Living Environment:   PT LIVES AT HOME WITH WIFE IN A 2 STORY HOME WITH BED AND BATHROOM DOWN STAIRS AND NO STEPS TO ENTER HOME  Prior to admission, patients level of function was IND AND DRIVES.  Equipment used at home: none.  DME owned (not currently used): none.  Upon discharge, patient will have assistance from WIFE .    Objective:     Communicated with NURSE AND Epic CHART REVIEW  prior to session.  Patient found supine with peripheral IV, PCA, hemovac  upon PT entry to room.    General  "Precautions: Standard, fall  Orthopedic Precautions:spinal precautions   Braces: LSO      Exams:  RLE ROM: LIMITED  RLE Strength: WFL  LLE ROM: LIMITED  LLE Strength: WFL    Functional Mobility:  Bed Mobility:     Rolling Left:  stand by assistance  Supine to Sit: stand by assistance  Transfers:     Sit to Stand:  contact guard assistance with no AD  Bed to Chair: contact guard assistance with  no AD  using  Stand Pivot  Gait: PT GT TRAINED X 175' WITH CGA AND ANTALGIC GT PATTERN D/T LEG LENGTH DISCREPANCY      AM-PAC 6 CLICK MOBILITY  Total Score:18       Treatment & Education:  PT EDUCATED ON SPINAL PRECAUTIONS: NO BENDING, LIFTING AND TWISTING. ALSO ED ON USE OF LSO  PT ALSO EDUCATED ON REC TO GET RE-FITTED AND WEAR SHOE LIFT.   PT EDUCATED ON "CALL DON'T FALL", ENCOURAGED TO CALL FOR ASSISTANCE WITH ALL NEEDS FOR OOB MOBILITY.        Patient left up in chair with all lines intact and call button in reach.    GOALS:   Multidisciplinary Problems       Physical Therapy Goals          Problem: Physical Therapy    Goal Priority Disciplines Outcome Goal Variances Interventions   Physical Therapy Goal     PT, PT/OT      Description: LT23  1. PT WILL COMPLETE BED MOBILITY WITH S.  2. PT WILL RECALL SPINAL PRECAUTIONS IND  3. PT WILL GT TRAIN X 400' WITH LRAD AND S WITH LSO ON  4. PT WILL INC AMPAC SCORE BY 2 POINTS TO PROGRESS GROSS FUNC MOBILITY.                          History:     Past Medical History:   Diagnosis Date    Hyperlipidemia     PONV (postoperative nausea and vomiting)     after knee arthroscopy    Vitamin D deficiency 2019       Past Surgical History:   Procedure Laterality Date    COLONOSCOPY      COLONOSCOPY N/A 2019    Procedure: COLONOSCOPY;  Surgeon: Bhupendra Wilkinson MD;  Location: [facility];  Service: Endoscopy;  Laterality: N/A;    EPIDURAL STEROID INJECTION N/A 2022    Procedure: L4-5 intralaminar epidural steroid injection with left paramedian approach;  Surgeon: " Ben Eddy MD;  Location: [facility];  Service: Pain Management;  Laterality: N/A;    KNEE ARTHROSCOPY Left     SELECTIVE INJECTION OF ANESTHETIC AGENT AROUND LUMBAR SPINAL NERVE ROOT BY TRANSFORAMINAL APPROACH Bilateral 01/21/2022    Procedure: Bilateral L4/5 TF LOVE with RN IV sedation;  Surgeon: Ben Eddy MD;  Location: [facility];  Service: Pain Management;  Laterality: Bilateral;    SELECTIVE INJECTION OF ANESTHETIC AGENT AROUND LUMBAR SPINAL NERVE ROOT BY TRANSFORAMINAL APPROACH Bilateral 05/06/2022    Procedure: Bilateral L4/5 TF LOVE with RN IV sedation;  Surgeon: Ben Eddy MD;  Location: [facility];  Service: Pain Management;  Laterality: Bilateral;    SELECTIVE INJECTION OF ANESTHETIC AGENT AROUND LUMBAR SPINAL NERVE ROOT BY TRANSFORAMINAL APPROACH Bilateral 10/25/2022    Procedure: Bilateral L4/5 TF LOVE with RN IV sedation;  Surgeon: Ben Eddy MD;  Location: [facility];  Service: Pain Management;  Laterality: Bilateral;    TRANSFORAMINAL LUMBAR INTERBODY FUSION (TLIF) USING COMPUTER-ASSISTED NAVIGATION Bilateral 9/6/2023    Procedure: FUSION, SPINE, LUMBAR, TLIF, USING COMPUTER-ASSISTED NAVIGATION;  Surgeon: Lyle Lobato MD;  Location: [facility];  Service: Neurosurgery;  Laterality: Bilateral;  L3-5 TLIF    VASECTOMY  1995       Time Tracking:     PT Received On: 09/07/23  PT Start Time: 0910     PT Stop Time: 0933  PT Total Time (min): 23 min     Billable Minutes: Evaluation 10 and Therapeutic Activity 13      09/07/2023   Stable

## 2024-03-11 ENCOUNTER — CLINICAL SUPPORT (OUTPATIENT)
Dept: REHABILITATION | Facility: HOSPITAL | Age: 69
End: 2024-03-11
Payer: MEDICARE

## 2024-03-11 DIAGNOSIS — M25.662 KNEE STIFFNESS, LEFT: ICD-10-CM

## 2024-03-11 DIAGNOSIS — Z96.652 STATUS POST TOTAL LEFT KNEE REPLACEMENT USING CEMENT: ICD-10-CM

## 2024-03-11 DIAGNOSIS — R29.898 WEAKNESS OF LEFT LOWER EXTREMITY: ICD-10-CM

## 2024-03-11 DIAGNOSIS — M25.562 CHRONIC PAIN OF LEFT KNEE: Primary | ICD-10-CM

## 2024-03-11 DIAGNOSIS — G89.29 CHRONIC PAIN OF LEFT KNEE: Primary | ICD-10-CM

## 2024-03-11 PROBLEM — M25.561 CHRONIC PAIN OF RIGHT KNEE: Status: ACTIVE | Noted: 2024-03-11

## 2024-03-11 PROCEDURE — 97161 PT EVAL LOW COMPLEX 20 MIN: CPT | Mod: HCNC

## 2024-03-11 PROCEDURE — 97112 NEUROMUSCULAR REEDUCATION: CPT | Mod: HCNC

## 2024-03-11 NOTE — PROGRESS NOTES
"[facility]   Physical Therapy Initial Evaluation      Date: 3/11/2024   Name: Myles Pride  Clinic Number: [id number]    Therapy Diagnosis:    Encounter Diagnoses   Name Primary?    Chronic pain of left knee Yes    Knee stiffness, left     Weakness of left lower extremity     Status post total left knee replacement using cement       Physician: Symone Reid PA     Physician Orders: PT Eval and Treat  Medical Diagnosis from Referral: Status post total left knee replacement using cement   Evaluation Date: 3/11/2024  Authorization Period Expiration: 03/04/2025   Plan of Care Expiration: 05/06/2024  Progress Note Due: 04/10/2024  Visit # / Visits authorized: 1/1   FOTO: 1/3 (last performed on 3/11/2024)    Precautions: Standard    Time In: 1500  Time Out: 1600  Total Billable Time (timed & untimed codes): 60 minutes    Subjective     Date of surgery: 02/20/2024    History of current condition - Myles reports he has history of left knee pain and previous knee dislocation when he was younger. Patient states over time his knee began to feel unstable and decided to have TKA on left knee. Since surgery patient has been performing home health Physical therapy 3x per week. Patient notes he was able to obtain 100 degrees of flexion with at home testing. Patient has been getting swelling in correlation with levels of activity and comes down when he ices and elevates. Patient notes he has been sleeping well and his pain levels managable. Patient would like to return to hiking 5-8 miles.     Falls: 0    Imaging: [x] Xray [] MRI [] CT: Performed on: 02/20/2024  "FINDINGS: Left knee prosthesis appears in good position with postop change about the joint."    Pain:  Current 1/10, worst 4/10, best 0/10   Location: [] Right   [x] Left:  knee  Description: aching , dull, throbbing, and tight  Aggravating Factors: increased activity, stiffness  Easing Factors: activity avoidance, rest, ice, " medications    Prior Therapy:   [] N/A    [x] Yes: s/p lumbar  Social History: Pt lives with their spouse  Occupation: Pt is retired  Prior Level of Function: Independent and pain free with all ADL, IADL, community mobility and functional activities.   Current Level of Function: Independent with all ADL, IADL, community mobility and functional activities with reports of increased pain and need for increased time and frequent breaks.      Dominant Extremity:    [x] Right    [] Left    Pts goals: Pt reported goals are to decrease overall pain levels in order to return to prior functional level. Hiking 5-8 miles.     Medical History:   Past Medical History:   Diagnosis Date    Hyperlipidemia     PONV (postoperative nausea and vomiting) 1990    after knee arthroscopy    Vitamin D deficiency 12/18/2019       Surgical History:   Myles Pride  has a past surgical history that includes Vasectomy (1995); Knee arthroscopy (Left); Colonoscopy; Colonoscopy (N/A, 12/19/2019); Selective injection of anesthetic agent around lumbar spinal nerve root by transforaminal approach (Bilateral, 01/21/2022); Selective injection of anesthetic agent around lumbar spinal nerve root by transforaminal approach (Bilateral, 05/06/2022); Selective injection of anesthetic agent around lumbar spinal nerve root by transforaminal approach (Bilateral, 10/25/2022); Epidural steroid injection (N/A, 12/09/2022); Transforaminal lumbar interbody fusion (TLIF) using computer-assisted navigation (Bilateral, 09/06/2023); Lumbar fusion (09/2023); and Total knee arthroplasty (Left, 2/20/2024).    Medications:   Myles has a current medication list which includes the following prescription(s): ergocalciferol (vitamin d2), gabapentin, ketoconazole, meloxicam, meloxicam, ondansetron, ondansetron, oxycodone-acetaminophen, pravastatin, and tamsulosin, and the following Facility-Administered Medications: sodium chloride 0.9%, acetaminophen, chlorhexidine,  dexamethasone, and gabapentin.    Allergies:   Review of patient's allergies indicates:  No Known Allergies     Objective      RANGE OF MOTION:   Knee AROM/PROM Right Left Pain/Dysfunction with Movement Goal   Knee Flexion (135º) 130 100  125   Knee Extension (0º) 5 -5  5     STRENGTH:   L/E MMT Right  (spine) Left Pain/Dysfunction with Movement Goal   Hip Flexion  5/5 4+/5  4+/5 B   Hip Extension  4/5 4/5  4+/5 B   Hip Abduction  4/5 4/5  4+/5 B   Knee Extension 4+/5 4-/5  5/5 B   Knee Flexion 4+/5 4-/5  5/5 B   Hip IR 4+/5 4/5  4+/5 B   Hip ER 4/5 4-/5  4+/5 B   Ankle DF 4+/5 4/5  5/5 B   Ankle PF 4/5 4-/5  5/5 B     MUSCLE LENGTH:   Muscle Tested  Right Left  Limitation Goal   Hip Flexors [] Normal  [] Limited [] Normal  [x] Limited  Normal B   ITB / TFL [] Normal  [] Limited [] Normal  [] Limited  Normal B   Quadriceps [] Normal  [] Limited [] Normal  [x] Limited  Normal B   Hamstrings  [] Normal  [] Limited [] Normal  [x] Limited  Normal B   Piriformis  [] Normal  [] Limited [] Normal  [] Limited  Normal B   Gastrocnemius  [] Normal  [] Limited [] Normal  [x] Limited  Normal B   Soleus  [] Normal  [] Limited [] Normal  [] Limited  Normal B       SENSATION  [x] Intact to Light Touch   [] Impaired:    PALPATION: Muscles: Increased tone and tenderness to palpation of: left quadriceps, hamstrings, hip adductors, pes anserine, popliteus, gastrocnemius. Structures: Increased tenderness to palpation of: left LOWER STRUCTURES : knee joint.    POSTURE:  Pt presents with postural abnormalities which include:    [x] Forward Head   [] Increased Lumbar Lordosis   [x] Rounded Shoulder   [] Genu Recurvatum   [] Increased Thoracic Kyphosis [] Genu Valgus   [] Trunk Deviated    [] Pes Planus   [] Scapular Winging    [] Other:     GAIT ANALYSIS The patient ambulated with the following assistive device: straight cane; the pt presents with the following gait abnormalities: trendelenburg, bradykinetic, increased ARIELA, decreased step  length on left, decreased heel strike on left, decreased knee flexion on left, and decreased knee extension on left    FUNCTIONAL TESTING    Outcome Norms Goal   Timed Up and Go 11.85 <13.5 sec    Five Time Sit to Stand 23.01 60s: <11.4 sec  70s: <12.6 sec  80s: 14.8 sec 11.4   6 minutes walk  60s: >538f, 572m  70s: >471f, 527m  80s: >417f, 392m            Function:     Intake Outcome Measure for FOTO S/p TKA Survey    Therapist reviewed FOTO scores for Myles on 3/11/2024.   FOTO report - see Media section or FOTO account for episode details    Intake Score: 64%       Treatment     Total Treatment time (time-based codes) separate from Evaluation: (25) minutes     Myles received the treatments listed below:      MANUAL THERAPY TECHNIQUES were applied for (0) minutes, including:    Manual Intervention Performed Today    Soft Tissue Mobilization [] left    Joint Mobilizations []     []     []    Functional Dry Needling  []      Plan for Next Visit: Continue as needed       NEUROMUSCULAR RE-EDUCATION ACTIVITIES to improve Balance, Coordination, Kinesthetic, Sense, Proprioception, and Posture for (25) minutes.  The following were included:    Intervention Performed Today    Home exercise plan  x Demonstration, education, performance   Ankle Pumps     Heel Slides     Quad Sets     Sit to Stands     Straight Leg Raise     Heel Prop     Side Lying Hip Abduction                      Plan for Next Visit: bike, heel raises, bridges, clamshells, gait training, LAQ, SAQ, step ups, mini squats     Patient Education and Home Exercises     Education provided: time included in treatment time.   PURPOSE: Patient educated on the impairments noted above and the effects of physical therapy intervention to improve overall condition and QOL.   EXERCISE: Patient was educated on all the above exercise prior/during/after for proper posture, positioning, and execution for safe performance with home exercise program.   STRENGTH: Patient  educated on the importance of improved core and extremity strength in order to improve alignment of the spine and extremities with static positions and dynamic movement.   GAIT & BALANCE: Patient educated on the importance of strong core and lower extremity musculature in order to improve both static and dynamic balance, improve gait mechanics, reduce fall risk and improve household and community mobility.   ASSISTIVE DEVICE: Patient educated on proper utilization of assistive device for safe and efficient ambulation and to reduce risk of falls  SLEEPING POSITIONS: Patient educated on the use of pillows to aid in neutral alignment of spine and extremities when sleeping in supine or side lying.  TRANSFERS & TRANSITIONS: Patient educated on proper technique for bed mobility, transitions and transfers to improve body mechanics and decrease risk of injury.   POST-OP PRECAUTIONS: patient educated on post-operative precautions in order to protect surgical repair, decrease risk of injury and promote healing.   WOUND CARE: patient educated on proper wound hygiene with no bath/emersion in water, daily dressing changes, compression sleeve to be worn during the day and elevation to decrease swelling     Written Home Exercises Provided: yes.  Exercises were reviewed and Myles was able to demonstrate them prior to the end of the session.  Myles demonstrated good  understanding of the education provided. See EMR under Patient Instructions for exercises provided during therapy sessions.    Assessment     Myles is a 68 y.o. male referred to outpatient Physical Therapy with a medical diagnosis of Status post total left knee replacement using cement. Pt presents with impairments in the following categories: IMPAIRMENTS: ROM, strength, endurance, muscle length, balance, posture, gait mechanics, and functional movement patterns    Pt prognosis is Good  Pt will benefit from skilled outpatient Physical Therapy to address the  "deficits stated above and in the chart below, provide pt/family education, and to maximize pt's level of independence.     Plan of care discussed with patient: Yes  Pt's spiritual, cultural and educational needs considered and patient is agreeable to the plan of care and goals as stated below:     Anticipated Barriers for therapy: none    Medical Necessity is demonstrated by the following  History  Co-morbidities and personal factors that may impact the plan of care [x] LOW: no personal factors / co-morbidities  [] MODERATE: 1-2 personal factors / co-morbidities  [] HIGH: 3+ personal factors / co-morbidities    Moderate / High Support Documentation:   Past Medical History:   Diagnosis Date    Hyperlipidemia     PONV (postoperative nausea and vomiting) 1990    after knee arthroscopy    Vitamin D deficiency 12/18/2019        Examination  Body Structures and Functions, activity limitations and participation restrictions that may impact the plan of care [] LOW: addressing 1-2 elements  [x] MODERATE: 3+ elements  [] HIGH: 4+ elements (please support below)    Moderate / High Support Documentation: See above in "Current Level of Function"      Clinical Presentation [] LOW: stable  [x] MODERATE: Evolving  [] HIGH: Unstable     Decision Making/ Complexity Score: low         Short Term Goals:  4 weeks Status  Date Met   PAIN: Pt will report worst pain of 2/10 in order to progress toward max functional ability and improve quality of life. [x] Progressing  [] Met  [] Not Met    FUNCTION: Patient will demonstrate improved function as indicated by a score of greater than or equal to 50% of goal score out of 100 on FOTO. [x] Progressing  [] Met  [] Not Met    MOBILITY: Patient will improve AROM to 50% of stated goals, listed in objective measures above, in order to progress towards independence with functional activities.  [x] Progressing  [] Met  [] Not Met    STRENGTH: Patient will improve strength to 50% of stated goals, " listed in objective measures above, in order to progress towards independence with functional activities. [x] Progressing  [] Met  [] Not Met    POSTURE: Patient will correct postural deviations in sitting and standing, to decrease pain and promote long term stability.  [x] Progressing  [] Met  [] Not Met    GAIT: Patient will demonstrate improved gait mechanics including improving gait in order to improve functional mobility, improve quality of life, and decrease risk of further injury or fall.  [x] Progressing  [] Met  [] Not Met    HEP: Patient will demonstrate independence with HEP in order to progress toward functional independence. [x] Progressing  [] Met  [] Not Met      Long Term Goals:  8 weeks Status Date Met   PAIN: Pt will report worst pain of 0/10 in order to progress toward max functional ability and improve quality of life [x] Progressing  [] Met  [] Not Met    FUNCTION: Patient will demonstrate improved function as indicated by a score of greater than or equal to goal score out of 100 on FOTO. [x] Progressing  [] Met  [] Not Met    MOBILITY: Patient will improve AROM to stated goals, listed in objective measures above, in order to return to maximal functional potential and improve quality of life.  [x] Progressing  [] Met  [] Not Met    STRENGTH: Patient will improve strength to stated goals, listed in objective measures above, in order to improve functional independence and quality of life.  [x] Progressing  [] Met  [] Not Met    GAIT: Patient will demonstrate normalized gait mechanics with minimal compensation in order to return to PLOF. [x] Progressing  [] Met  [] Not Met    Patient will return to normal ADL's, IADL's, community involvement, recreational activities, and work-related activities with less than or equal to 0/10 pain and maximal function.  [x] Progressing  [] Met  [] Not Met      Plan     Plan of care Certification: 3/11/2024 to 05/06/2024.    Outpatient Physical Therapy 3 times  weekly for 8 weeks to include any combination of the following interventions: virtual visits, dry needling, modalities, electrical stimulation (IFC, Pre-Mod, Attended with Functional Dry Needling), Cervical/Lumbar Traction, Gait Training, Manual Therapy, Neuromuscular Re-ed, Patient Education, Self Care, Therapeutic Exercise, and Therapeutic Activites     Reynaldo Baker, PT, DPT

## 2024-03-11 NOTE — PLAN OF CARE
"[facility]   Physical Therapy Initial Evaluation      Date: 3/11/2024   Name: Myles Pride  Clinic Number: [id number]    Therapy Diagnosis:    Encounter Diagnoses   Name Primary?    Chronic pain of left knee Yes    Knee stiffness, left     Weakness of left lower extremity     Status post total left knee replacement using cement       Physician: Symone Reid PA     Physician Orders: PT Eval and Treat  Medical Diagnosis from Referral: Status post total left knee replacement using cement   Evaluation Date: 3/11/2024  Authorization Period Expiration: 03/04/2025   Plan of Care Expiration: 05/06/2024  Progress Note Due: 04/10/2024  Visit # / Visits authorized: 1/1   FOTO: 1/3 (last performed on 3/11/2024)    Precautions: Standard    Time In: 1500  Time Out: 1600  Total Billable Time (timed & untimed codes): 60 minutes    Subjective     Date of surgery: 02/20/2024    History of current condition - Myles reports he has history of left knee pain and previous knee dislocation when he was younger. Patient states over time his knee began to feel unstable and decided to have TKA on left knee. Since surgery patient has been performing home health Physical therapy 3x per week. Patient notes he was able to obtain 100 degrees of flexion with at home testing. Patient has been getting swelling in correlation with levels of activity and comes down when he ices and elevates. Patient notes he has been sleeping well and his pain levels managable. Patient would like to return to hiking 5-8 miles.     Falls: 0    Imaging: [x] Xray [] MRI [] CT: Performed on: 02/20/2024  "FINDINGS: Left knee prosthesis appears in good position with postop change about the joint."    Pain:  Current 1/10, worst 4/10, best 0/10   Location: [] Right   [x] Left:  knee  Description: aching , dull, throbbing, and tight  Aggravating Factors: increased activity, stiffness  Easing Factors: activity avoidance, rest, ice, " medications    Prior Therapy:   [] N/A    [x] Yes: s/p lumbar  Social History: Pt lives with their spouse  Occupation: Pt is retired  Prior Level of Function: Independent and pain free with all ADL, IADL, community mobility and functional activities.   Current Level of Function: Independent with all ADL, IADL, community mobility and functional activities with reports of increased pain and need for increased time and frequent breaks.      Dominant Extremity:    [x] Right    [] Left    Pts goals: Pt reported goals are to decrease overall pain levels in order to return to prior functional level. Hiking 5-8 miles.     Medical History:   Past Medical History:   Diagnosis Date    Hyperlipidemia     PONV (postoperative nausea and vomiting) 1990    after knee arthroscopy    Vitamin D deficiency 12/18/2019       Surgical History:   Myles Pride  has a past surgical history that includes Vasectomy (1995); Knee arthroscopy (Left); Colonoscopy; Colonoscopy (N/A, 12/19/2019); Selective injection of anesthetic agent around lumbar spinal nerve root by transforaminal approach (Bilateral, 01/21/2022); Selective injection of anesthetic agent around lumbar spinal nerve root by transforaminal approach (Bilateral, 05/06/2022); Selective injection of anesthetic agent around lumbar spinal nerve root by transforaminal approach (Bilateral, 10/25/2022); Epidural steroid injection (N/A, 12/09/2022); Transforaminal lumbar interbody fusion (TLIF) using computer-assisted navigation (Bilateral, 09/06/2023); Lumbar fusion (09/2023); and Total knee arthroplasty (Left, 2/20/2024).    Medications:   Myles has a current medication list which includes the following prescription(s): ergocalciferol (vitamin d2), gabapentin, ketoconazole, meloxicam, meloxicam, ondansetron, ondansetron, oxycodone-acetaminophen, pravastatin, and tamsulosin, and the following Facility-Administered Medications: sodium chloride 0.9%, acetaminophen, chlorhexidine,  dexamethasone, and gabapentin.    Allergies:   Review of patient's allergies indicates:  No Known Allergies     Objective      RANGE OF MOTION:   Knee AROM/PROM Right Left Pain/Dysfunction with Movement Goal   Knee Flexion (135º) 130 100  125   Knee Extension (0º) 5 -5  5     STRENGTH:   L/E MMT Right  (spine) Left Pain/Dysfunction with Movement Goal   Hip Flexion  5/5 4+/5  4+/5 B   Hip Extension  4/5 4/5  4+/5 B   Hip Abduction  4/5 4/5  4+/5 B   Knee Extension 4+/5 4-/5  5/5 B   Knee Flexion 4+/5 4-/5  5/5 B   Hip IR 4+/5 4/5  4+/5 B   Hip ER 4/5 4-/5  4+/5 B   Ankle DF 4+/5 4/5  5/5 B   Ankle PF 4/5 4-/5  5/5 B     MUSCLE LENGTH:   Muscle Tested  Right Left  Limitation Goal   Hip Flexors [] Normal  [] Limited [] Normal  [x] Limited  Normal B   ITB / TFL [] Normal  [] Limited [] Normal  [] Limited  Normal B   Quadriceps [] Normal  [] Limited [] Normal  [x] Limited  Normal B   Hamstrings  [] Normal  [] Limited [] Normal  [x] Limited  Normal B   Piriformis  [] Normal  [] Limited [] Normal  [] Limited  Normal B   Gastrocnemius  [] Normal  [] Limited [] Normal  [x] Limited  Normal B   Soleus  [] Normal  [] Limited [] Normal  [] Limited  Normal B       SENSATION  [x] Intact to Light Touch   [] Impaired:    PALPATION: Muscles: Increased tone and tenderness to palpation of: left quadriceps, hamstrings, hip adductors, pes anserine, popliteus, gastrocnemius. Structures: Increased tenderness to palpation of: left LOWER STRUCTURES : knee joint.    POSTURE:  Pt presents with postural abnormalities which include:    [x] Forward Head   [] Increased Lumbar Lordosis   [x] Rounded Shoulder   [] Genu Recurvatum   [] Increased Thoracic Kyphosis [] Genu Valgus   [] Trunk Deviated    [] Pes Planus   [] Scapular Winging    [] Other:     GAIT ANALYSIS The patient ambulated with the following assistive device: straight cane; the pt presents with the following gait abnormalities: trendelenburg, bradykinetic, increased ARIELA, decreased step  length on left, decreased heel strike on left, decreased knee flexion on left, and decreased knee extension on left    FUNCTIONAL TESTING    Outcome Norms Goal   Timed Up and Go 11.85 <13.5 sec    Five Time Sit to Stand 23.01 60s: <11.4 sec  70s: <12.6 sec  80s: 14.8 sec 11.4   6 minutes walk  60s: >538f, 572m  70s: >471f, 527m  80s: >417f, 392m            Function:     Intake Outcome Measure for FOTO S/p TKA Survey    Therapist reviewed FOTO scores for Myles on 3/11/2024.   FOTO report - see Media section or FOTO account for episode details    Intake Score: 64%       Treatment     Total Treatment time (time-based codes) separate from Evaluation: (25) minutes     Myles received the treatments listed below:      MANUAL THERAPY TECHNIQUES were applied for (0) minutes, including:    Manual Intervention Performed Today    Soft Tissue Mobilization [] left    Joint Mobilizations []     []     []    Functional Dry Needling  []      Plan for Next Visit: Continue as needed       NEUROMUSCULAR RE-EDUCATION ACTIVITIES to improve Balance, Coordination, Kinesthetic, Sense, Proprioception, and Posture for (25) minutes.  The following were included:    Intervention Performed Today    Home exercise plan  x Demonstration, education, performance   Ankle Pumps     Heel Slides     Quad Sets     Sit to Stands     Straight Leg Raise     Heel Prop     Side Lying Hip Abduction                      Plan for Next Visit: bike, heel raises, bridges, clamshells, gait training, LAQ, SAQ, step ups, mini squats     Patient Education and Home Exercises     Education provided: time included in treatment time.   PURPOSE: Patient educated on the impairments noted above and the effects of physical therapy intervention to improve overall condition and QOL.   EXERCISE: Patient was educated on all the above exercise prior/during/after for proper posture, positioning, and execution for safe performance with home exercise program.   STRENGTH: Patient  educated on the importance of improved core and extremity strength in order to improve alignment of the spine and extremities with static positions and dynamic movement.   GAIT & BALANCE: Patient educated on the importance of strong core and lower extremity musculature in order to improve both static and dynamic balance, improve gait mechanics, reduce fall risk and improve household and community mobility.   ASSISTIVE DEVICE: Patient educated on proper utilization of assistive device for safe and efficient ambulation and to reduce risk of falls  SLEEPING POSITIONS: Patient educated on the use of pillows to aid in neutral alignment of spine and extremities when sleeping in supine or side lying.  TRANSFERS & TRANSITIONS: Patient educated on proper technique for bed mobility, transitions and transfers to improve body mechanics and decrease risk of injury.   POST-OP PRECAUTIONS: patient educated on post-operative precautions in order to protect surgical repair, decrease risk of injury and promote healing.   WOUND CARE: patient educated on proper wound hygiene with no bath/emersion in water, daily dressing changes, compression sleeve to be worn during the day and elevation to decrease swelling     Written Home Exercises Provided: yes.  Exercises were reviewed and Myles was able to demonstrate them prior to the end of the session.  Myles demonstrated good  understanding of the education provided. See EMR under Patient Instructions for exercises provided during therapy sessions.    Assessment     Myles is a 68 y.o. male referred to outpatient Physical Therapy with a medical diagnosis of Status post total left knee replacement using cement. Pt presents with impairments in the following categories: IMPAIRMENTS: ROM, strength, endurance, muscle length, balance, posture, gait mechanics, and functional movement patterns    Pt prognosis is Good  Pt will benefit from skilled outpatient Physical Therapy to address the  "deficits stated above and in the chart below, provide pt/family education, and to maximize pt's level of independence.     Plan of care discussed with patient: Yes  Pt's spiritual, cultural and educational needs considered and patient is agreeable to the plan of care and goals as stated below:     Anticipated Barriers for therapy: none    Medical Necessity is demonstrated by the following  History  Co-morbidities and personal factors that may impact the plan of care [x] LOW: no personal factors / co-morbidities  [] MODERATE: 1-2 personal factors / co-morbidities  [] HIGH: 3+ personal factors / co-morbidities    Moderate / High Support Documentation:   Past Medical History:   Diagnosis Date    Hyperlipidemia     PONV (postoperative nausea and vomiting) 1990    after knee arthroscopy    Vitamin D deficiency 12/18/2019        Examination  Body Structures and Functions, activity limitations and participation restrictions that may impact the plan of care [] LOW: addressing 1-2 elements  [x] MODERATE: 3+ elements  [] HIGH: 4+ elements (please support below)    Moderate / High Support Documentation: See above in "Current Level of Function"      Clinical Presentation [] LOW: stable  [x] MODERATE: Evolving  [] HIGH: Unstable     Decision Making/ Complexity Score: low         Short Term Goals:  4 weeks Status  Date Met   PAIN: Pt will report worst pain of 2/10 in order to progress toward max functional ability and improve quality of life. [x] Progressing  [] Met  [] Not Met    FUNCTION: Patient will demonstrate improved function as indicated by a score of greater than or equal to 50% of goal score out of 100 on FOTO. [x] Progressing  [] Met  [] Not Met    MOBILITY: Patient will improve AROM to 50% of stated goals, listed in objective measures above, in order to progress towards independence with functional activities.  [x] Progressing  [] Met  [] Not Met    STRENGTH: Patient will improve strength to 50% of stated goals, " listed in objective measures above, in order to progress towards independence with functional activities. [x] Progressing  [] Met  [] Not Met    POSTURE: Patient will correct postural deviations in sitting and standing, to decrease pain and promote long term stability.  [x] Progressing  [] Met  [] Not Met    GAIT: Patient will demonstrate improved gait mechanics including improving gait in order to improve functional mobility, improve quality of life, and decrease risk of further injury or fall.  [x] Progressing  [] Met  [] Not Met    HEP: Patient will demonstrate independence with HEP in order to progress toward functional independence. [x] Progressing  [] Met  [] Not Met      Long Term Goals:  8 weeks Status Date Met   PAIN: Pt will report worst pain of 0/10 in order to progress toward max functional ability and improve quality of life [x] Progressing  [] Met  [] Not Met    FUNCTION: Patient will demonstrate improved function as indicated by a score of greater than or equal to goal score out of 100 on FOTO. [x] Progressing  [] Met  [] Not Met    MOBILITY: Patient will improve AROM to stated goals, listed in objective measures above, in order to return to maximal functional potential and improve quality of life.  [x] Progressing  [] Met  [] Not Met    STRENGTH: Patient will improve strength to stated goals, listed in objective measures above, in order to improve functional independence and quality of life.  [x] Progressing  [] Met  [] Not Met    GAIT: Patient will demonstrate normalized gait mechanics with minimal compensation in order to return to PLOF. [x] Progressing  [] Met  [] Not Met    Patient will return to normal ADL's, IADL's, community involvement, recreational activities, and work-related activities with less than or equal to 0/10 pain and maximal function.  [x] Progressing  [] Met  [] Not Met      Plan     Plan of care Certification: 3/11/2024 to 05/06/2024.    Outpatient Physical Therapy 3 times  weekly for 8 weeks to include any combination of the following interventions: virtual visits, dry needling, modalities, electrical stimulation (IFC, Pre-Mod, Attended with Functional Dry Needling), Cervical/Lumbar Traction, Gait Training, Manual Therapy, Neuromuscular Re-ed, Patient Education, Self Care, Therapeutic Exercise, and Therapeutic Activites     Reynaldo Baker, PT, DPT

## 2024-03-12 ENCOUNTER — HOSPITAL ENCOUNTER (OUTPATIENT)
Dept: RADIOLOGY | Facility: HOSPITAL | Age: 69
Discharge: HOME | End: 2024-03-12
Attending: PHYSICIAN ASSISTANT
Payer: MEDICARE

## 2024-03-12 ENCOUNTER — OFFICE VISIT (OUTPATIENT)
Dept: NEUROSURGERY | Facility: CLINIC | Age: 69
End: 2024-03-12
Payer: MEDICARE

## 2024-03-12 ENCOUNTER — PATIENT MESSAGE (OUTPATIENT)
Dept: NEUROSURGERY | Facility: CLINIC | Age: 69
End: 2024-03-12

## 2024-03-12 DIAGNOSIS — M48.062 LUMBAR STENOSIS WITH NEUROGENIC CLAUDICATION: Primary | ICD-10-CM

## 2024-03-12 DIAGNOSIS — M48.07 SPINAL STENOSIS, LUMBOSACRAL REGION: ICD-10-CM

## 2024-03-12 PROCEDURE — 99212 OFFICE O/P EST SF 10 MIN: CPT | Mod: HCNC,S$GLB,, | Performed by: PHYSICIAN ASSISTANT

## 2024-03-12 PROCEDURE — 72131 CT LUMBAR SPINE W/O DYE: CPT | Mod: TC,HCNC

## 2024-03-12 PROCEDURE — 72131 CT LUMBAR SPINE W/O DYE: CPT | Mod: 26,HCNC,, | Performed by: STUDENT IN AN ORGANIZED HEALTH CARE EDUCATION/TRAINING PROGRAM

## 2024-03-13 ENCOUNTER — OFFICE VISIT (OUTPATIENT)
Dept: INTERNAL MEDICINE | Facility: CLINIC | Age: 69
End: 2024-03-13
Payer: MEDICARE

## 2024-03-13 ENCOUNTER — CLINICAL SUPPORT (OUTPATIENT)
Dept: REHABILITATION | Facility: HOSPITAL | Age: 69
End: 2024-03-13
Payer: MEDICARE

## 2024-03-13 VITALS
HEART RATE: 62 BPM | OXYGEN SATURATION: 99 % | DIASTOLIC BLOOD PRESSURE: 76 MMHG | HEIGHT: 77 IN | BODY MASS INDEX: 28.49 KG/M2 | SYSTOLIC BLOOD PRESSURE: 128 MMHG | WEIGHT: 241.31 LBS

## 2024-03-13 DIAGNOSIS — E78.2 MIXED HYPERLIPIDEMIA: ICD-10-CM

## 2024-03-13 DIAGNOSIS — Z91.89 10 YEAR RISK OF MI OR STROKE 7.5% OR GREATER: ICD-10-CM

## 2024-03-13 DIAGNOSIS — Z98.1 S/P LUMBAR FUSION: ICD-10-CM

## 2024-03-13 DIAGNOSIS — M17.12 ARTHRITIS OF KNEE, LEFT: Primary | ICD-10-CM

## 2024-03-13 DIAGNOSIS — M25.562 CHRONIC PAIN OF LEFT KNEE: Primary | ICD-10-CM

## 2024-03-13 DIAGNOSIS — R29.898 WEAKNESS OF LEFT LOWER EXTREMITY: ICD-10-CM

## 2024-03-13 DIAGNOSIS — M25.662 KNEE STIFFNESS, LEFT: ICD-10-CM

## 2024-03-13 DIAGNOSIS — Z79.899 ENCOUNTER FOR LONG-TERM (CURRENT) USE OF MEDICATIONS: ICD-10-CM

## 2024-03-13 DIAGNOSIS — N40.0 BENIGN PROSTATIC HYPERPLASIA, UNSPECIFIED WHETHER LOWER URINARY TRACT SYMPTOMS PRESENT: ICD-10-CM

## 2024-03-13 DIAGNOSIS — R00.1 BRADYCARDIA: ICD-10-CM

## 2024-03-13 DIAGNOSIS — G89.29 CHRONIC PAIN OF LEFT KNEE: Primary | ICD-10-CM

## 2024-03-13 PROCEDURE — 1159F MED LIST DOCD IN RCRD: CPT | Mod: CPTII,S$GLB,, | Performed by: FAMILY MEDICINE

## 2024-03-13 PROCEDURE — 1126F AMNT PAIN NOTED NONE PRSNT: CPT | Mod: CPTII,S$GLB,, | Performed by: FAMILY MEDICINE

## 2024-03-13 PROCEDURE — 97112 NEUROMUSCULAR REEDUCATION: CPT | Performed by: PHYSICAL THERAPIST

## 2024-03-13 PROCEDURE — 3288F FALL RISK ASSESSMENT DOCD: CPT | Mod: CPTII,S$GLB,, | Performed by: FAMILY MEDICINE

## 2024-03-13 PROCEDURE — 1101F PT FALLS ASSESS-DOCD LE1/YR: CPT | Mod: CPTII,S$GLB,, | Performed by: FAMILY MEDICINE

## 2024-03-13 PROCEDURE — 3078F DIAST BP <80 MM HG: CPT | Mod: CPTII,S$GLB,, | Performed by: FAMILY MEDICINE

## 2024-03-13 PROCEDURE — 97110 THERAPEUTIC EXERCISES: CPT | Performed by: PHYSICAL THERAPIST

## 2024-03-13 PROCEDURE — 3074F SYST BP LT 130 MM HG: CPT | Mod: CPTII,S$GLB,, | Performed by: FAMILY MEDICINE

## 2024-03-13 PROCEDURE — 99999 PR PBB SHADOW E&M-EST. PATIENT-LVL III: CPT | Mod: PBBFAC,,, | Performed by: FAMILY MEDICINE

## 2024-03-13 PROCEDURE — 99214 OFFICE O/P EST MOD 30 MIN: CPT | Mod: S$GLB,,, | Performed by: FAMILY MEDICINE

## 2024-03-13 PROCEDURE — 3008F BODY MASS INDEX DOCD: CPT | Mod: CPTII,S$GLB,, | Performed by: FAMILY MEDICINE

## 2024-03-13 NOTE — PROGRESS NOTES
"[facility]   Physical Therapy Treatment Note        Name: Myles Pride  Clinic Number: [id number]    Therapy Diagnosis:   Encounter Diagnoses   Name Primary?    Chronic pain of left knee Yes    Knee stiffness, left     Weakness of left lower extremity      Physician: Symone Reid PA    Visit Date: 3/13/2024    Physician Orders: PT Eval and Treat  Medical Diagnosis from Referral: Status post total left knee replacement using cement   Evaluation Date: 3/11/2024  Authorization Period Expiration: 12/31/2024   Plan of Care Expiration: 05/06/2024  Progress Note Due: 04/10/2024  Visit # / Visits authorized: 1/20 (+eval)   FOTO: 1/3 (last performed on 3/11/2024)     Precautions: Standard    PTA Visit #: 0/5     Time In: 1504  Time Out: 1605  Total Billable Time: 54 minutes (Billing reflects 1 on 1 treatment time spent with patient)    Subjective     Patient reports: feeling pretty good today. His pain has been minimal.    He/She was compliant with home exercise program.  Response to previous treatment: no adverse reaction  Functional change: improving range and function    Pain: 0/10     Location: left knee    Objective      Objective Measures updated at progress report or POC update only unless otherwise noted.       Treatment     Myles received the treatments listed below:       MANUAL THERAPY TECHNIQUES were applied for (0) minutes, including:    Manual Intervention Performed Today    Soft Tissue Mobilization [] left  quadriceps, hamstrings   Joint Mobilizations []     []     []    Functional Dry Needling  []      Plan for Next Visit: Continue as needed           THERAPEUTIC EXERCISES to develop strength, endurance, ROM, flexibility, posture, and core stabilization for (25) minutes including:    Intervention Performed Today    Upright Bike for ROM and strength x 5', level 1   Heel slides x 3', 3-5" holds on and off   Heel prop knee extension stretch x 5 minutes, 3# weight on top " "  Leg length assessment and discussion x R LE 2inch longer than L LE. Heel lift provided to patient for L LE                                   Plan for Next Visit:          NEUROMUSCULAR RE-EDUCATION ACTIVITIES to improve Balance, Coordination, Kinesthetic, Sense, Proprioception, and Posture for (29) minutes.  The following were included:    Intervention Performed Today    Quad sets with NMES 10"/10" on/off x 3'   SAQ with NMES x 3'   SLR with NMES x 3'   LAQ with NMES x 3'   Standing TKE x 3 x 10, kyle duncan, L LE                  **billing time includes set up, instructions, pt education, and cues  Plan for Next Visit:      Patient Education and Home Exercises       Home Exercises Provided and Patient Education Provided     Education provided: (included in treatment section) minutes  PURPOSE: Patient educated on the impairments noted above and the effects of physical therapy intervention to improve overall condition and QOL.   EXERCISE: Patient was educated on all the above exercise prior/during/after for proper posture, positioning, and execution for safe performance with home exercise program.   STRENGTH: Patient educated on the importance of improved core and extremity strength in order to improve alignment of the spine and extremities with static positions and dynamic movement.   GAIT & BALANCE: Patient educated on the importance of strong core and lower extremity musculature in order to improve both static and dynamic balance, improve gait mechanics, reduce fall risk and improve household and community mobility.   POSTURE: Patient educated on postural awareness to reduce stress and maintain optimal alignment of the spine with static positions and dynamic movement     Written Home Exercises Provided: yes.  Exercises were reviewed and Myles was able to demonstrate them prior to the end of the session.  Myles demonstrated good  understanding of the education provided. See EMR under Patient Instructions " for exercises provided during therapy sessions.    Assessment     Patient tolerated treatment very well. He completed exercises with minimal difficulty and complaint. Treatment sessions focused on knee extension today, and patient demonstrated improvement in extension motion by the end of session. It was determined that patient still has approximately a 2inch leg length discrepancy, so a heel lift was provided this visit. Patient also reports that he has a innersole for his shoe at home that he may go back to using.     Myles is progressing well towards his goals.   Patient prognosis is Good.     Patient will continue to benefit from skilled outpatient physical therapy to address the deficits listed in the problem list box on initial evaluation, provide pt/family education and to maximize patient's level of independence in the home and community environment.     Patient's spiritual, cultural and educational needs considered and pt agreeable to plan of care and goals.     Anticipated Barriers for therapy: none     Goals:    Short Term Goals:  4 weeks Status  Date Met   PAIN: Pt will report worst pain of 2/10 in order to progress toward max functional ability and improve quality of life. [x] Progressing  [] Met  [] Not Met     FUNCTION: Patient will demonstrate improved function as indicated by a score of greater than or equal to 50% of goal score out of 100 on FOTO. [x] Progressing  [] Met  [] Not Met     MOBILITY: Patient will improve AROM to 50% of stated goals, listed in objective measures above, in order to progress towards independence with functional activities.  [x] Progressing  [] Met  [] Not Met     STRENGTH: Patient will improve strength to 50% of stated goals, listed in objective measures above, in order to progress towards independence with functional activities. [x] Progressing  [] Met  [] Not Met     POSTURE: Patient will correct postural deviations in sitting and standing, to decrease pain and  promote long term stability.  [x] Progressing  [] Met  [] Not Met     GAIT: Patient will demonstrate improved gait mechanics including improving gait in order to improve functional mobility, improve quality of life, and decrease risk of further injury or fall.  [x] Progressing  [] Met  [] Not Met     HEP: Patient will demonstrate independence with HEP in order to progress toward functional independence. [x] Progressing  [] Met  [] Not Met        Long Term Goals:  8 weeks Status Date Met   PAIN: Pt will report worst pain of 0/10 in order to progress toward max functional ability and improve quality of life [x] Progressing  [] Met  [] Not Met     FUNCTION: Patient will demonstrate improved function as indicated by a score of greater than or equal to goal score out of 100 on FOTO. [x] Progressing  [] Met  [] Not Met     MOBILITY: Patient will improve AROM to stated goals, listed in objective measures above, in order to return to maximal functional potential and improve quality of life.  [x] Progressing  [] Met  [] Not Met     STRENGTH: Patient will improve strength to stated goals, listed in objective measures above, in order to improve functional independence and quality of life.  [x] Progressing  [] Met  [] Not Met     GAIT: Patient will demonstrate normalized gait mechanics with minimal compensation in order to return to PLOF. [x] Progressing  [] Met  [] Not Met     Patient will return to normal ADL's, IADL's, community involvement, recreational activities, and work-related activities with less than or equal to 0/10 pain and maximal function.  [x] Progressing  [] Met  [] Not Met          Plan     Continue Plan of Care (POC) and progress per patient tolerance. See treatment section for details on planned progressions next session.      Mady Lin, PT

## 2024-03-13 NOTE — PROGRESS NOTES
Subjective:       Patient ID: Myles Pride is a 68 y.o. male.    Chief Complaint: Follow-up (med)    Follow-up        Patient Active Problem List   Diagnosis    Bradycardia    Risk of myocardial infarction or stroke 7.5% or greater in next 10 years    Vitamin D deficiency    Colon cancer screening    Hyperlipidemia    Lumbar radiculopathy, chronic    Frequent PVCs    Lumbar stenosis with neurogenic claudication    ABLA (acute blood loss anemia)    S/P lumbar fusion    Decreased range of motion of lumbar spine    Decreased strength, endurance, and mobility    Arthritis of knee, left    Post-operative nausea and vomiting    Chronic pain of left knee    Knee stiffness, left    Weakness of left lower extremity       Past Medical History:   Diagnosis Date    Hyperlipidemia     PONV (postoperative nausea and vomiting) 1990    after knee arthroscopy    Vitamin D deficiency 12/18/2019       Past Surgical History:   Procedure Laterality Date    COLONOSCOPY      COLONOSCOPY N/A 12/19/2019    Procedure: COLONOSCOPY;  Surgeon: Bhupendra Wilkinson MD;  Location: [facility] ENDO;  Service: Endoscopy;  Laterality: N/A;    EPIDURAL STEROID INJECTION N/A 12/09/2022    Procedure: L4-5 intralaminar epidural steroid injection with left paramedian approach;  Surgeon: Ben Eddy MD;  Location: [facility] PAIN MGT;  Service: Pain Management;  Laterality: N/A;    KNEE ARTHROSCOPY Left     LUMBAR FUSION  09/2023    SELECTIVE INJECTION OF ANESTHETIC AGENT AROUND LUMBAR SPINAL NERVE ROOT BY TRANSFORAMINAL APPROACH Bilateral 01/21/2022    Procedure: Bilateral L4/5 TF LOVE with RN IV sedation;  Surgeon: Ben Eddy MD;  Location: [facility] PAIN MGT;  Service: Pain Management;  Laterality: Bilateral;    SELECTIVE INJECTION OF ANESTHETIC AGENT AROUND LUMBAR SPINAL NERVE ROOT BY TRANSFORAMINAL APPROACH Bilateral 05/06/2022    Procedure: Bilateral L4/5 TF LOVE with RN IV sedation;  Surgeon: Ben Eddy MD;  Location: [facility] PAIN MGT;  Service: Pain  Management;  Laterality: Bilateral;    SELECTIVE INJECTION OF ANESTHETIC AGENT AROUND LUMBAR SPINAL NERVE ROOT BY TRANSFORAMINAL APPROACH Bilateral 10/25/2022    Procedure: Bilateral L4/5 TF LOVE with RN IV sedation;  Surgeon: Ben Eddy MD;  Location: [facility];  Service: Pain Management;  Laterality: Bilateral;    TOTAL KNEE ARTHROPLASTY Left 2/20/2024    Procedure: ARTHROPLASTY, KNEE, TOTAL;  Surgeon: Ryan Woods MD;  Location: [facility];  Service: Orthopedics;  Laterality: Left;    TRANSFORAMINAL LUMBAR INTERBODY FUSION (TLIF) USING COMPUTER-ASSISTED NAVIGATION Bilateral 09/06/2023    Procedure: FUSION, SPINE, LUMBAR, TLIF, USING COMPUTER-ASSISTED NAVIGATION;  Surgeon: Lyle Lobato MD;  Location: [facility];  Service: Neurosurgery;  Laterality: Bilateral;  L3-5 TLIF    VASECTOMY  1995       Family History   Problem Relation Age of Onset    Cancer Mother         anal cancer    Diabetes Mother         PRE DIABETES    Arthritis Mother     Hearing loss Mother     No Known Problems Sister     Gallbladder disease Brother     Hepatitis Brother         Hep C       Social History     Tobacco Use   Smoking Status Never    Passive exposure: Never   Smokeless Tobacco Never       Wt Readings from Last 5 Encounters:   03/13/24 109.5 kg (241 lb 4.7 oz)   03/04/24 108 kg (238 lb 1.6 oz)   02/20/24 108 kg (238 lb 3.3 oz)   01/29/24 107.5 kg (236 lb 15.9 oz)   01/16/24 107.6 kg (237 lb 1.7 oz)       For further HPI details, see assessment and plan.    Review of Systems    Objective:      Vitals:    03/13/24 1246   BP: 128/76   Pulse: 62       Physical Exam  Constitutional:       General: He is not in acute distress.     Appearance: He is not ill-appearing.   Pulmonary:      Effort: Pulmonary effort is normal. No respiratory distress.   Neurological:      General: No focal deficit present.      Mental Status: He is alert.   Psychiatric:         Mood and Affect: Mood normal.         Behavior: Behavior normal.          Assessment:       1. Arthritis of knee, left    2. Bradycardia    3. Mixed hyperlipidemia    4. Benign prostatic hyperplasia, unspecified whether lower urinary tract symptoms present    5. 10 year risk of MI or stroke 7.5% or greater    6. S/P lumbar fusion    7. Encounter for long-term (current) use of medications        Plan:       Chronic bradycardia  Asymptomatic   Pulse rate 62 today.  Continue to monitor going forward    Hyperlipidemia   On statin  Pravastatin   Continue medication as is     The 10-year ASCVD risk score (Pardeep MONTEMAYOR, et al., 2019) is: 14.6%    Values used to calculate the score:      Age: 68 years      Sex: Male      Is Non- : No      Diabetic: No      Tobacco smoker: No      Systolic Blood Pressure: 128 mmHg      Is BP treated: No      HDL Cholesterol: 41 mg/dL      Total Cholesterol: 167 mg/dL  Patient was presently on aspirin 81 mg.  He is tolerating it.  No bleeding issues.  Given ASCVD score plan to continue this along with statin for at least the next couple of years.  Will revisit or if he develops any bleeding issues we will discontinue    BMI 28  encouraged some weight loss    BPH   Patient discontinued his Flomax.  He is urinating without issues.  We will leave it on the drug list at present in case he decides to returned to the medication.  Discussed risk of hypotension.  If at our next encounter he has gone 6 months without it we will remove from drug list     Status post lumbar surgery and knee replacement.  Doing well.  At present time taking meloxicam and gabapentin although pain seems to be improved.  Seems to be healing and progressing well.    Ordering labs to be drawn in 6 months.  Follow up a few days later  Cleaned up drug list.  At present time should be 100% accurate        This note was verbally dictated, please excuse any type errors.

## 2024-03-18 ENCOUNTER — CLINICAL SUPPORT (OUTPATIENT)
Dept: REHABILITATION | Facility: HOSPITAL | Age: 69
End: 2024-03-18
Payer: MEDICARE

## 2024-03-18 ENCOUNTER — PATIENT MESSAGE (OUTPATIENT)
Dept: ORTHOPEDICS | Facility: CLINIC | Age: 69
End: 2024-03-18
Payer: MEDICARE

## 2024-03-18 DIAGNOSIS — G89.29 CHRONIC PAIN OF LEFT KNEE: Primary | ICD-10-CM

## 2024-03-18 DIAGNOSIS — M25.662 KNEE STIFFNESS, LEFT: ICD-10-CM

## 2024-03-18 DIAGNOSIS — R29.898 WEAKNESS OF LEFT LOWER EXTREMITY: ICD-10-CM

## 2024-03-18 DIAGNOSIS — M25.562 CHRONIC PAIN OF LEFT KNEE: Primary | ICD-10-CM

## 2024-03-18 PROCEDURE — 97110 THERAPEUTIC EXERCISES: CPT | Performed by: PHYSICAL THERAPIST

## 2024-03-18 PROCEDURE — 97112 NEUROMUSCULAR REEDUCATION: CPT | Performed by: PHYSICAL THERAPIST

## 2024-03-18 NOTE — TELEPHONE ENCOUNTER
No care due was identified.  [facility] Embedded Care Due Messages. Reference number: [id number].   3/18/2024 11:48:20 AM CDT

## 2024-03-18 NOTE — PROGRESS NOTES
[facility]   Physical Therapy Treatment Note        Name: Myles Pride  Clinic Number: [id number]    Therapy Diagnosis:   Encounter Diagnoses   Name Primary?    Chronic pain of left knee Yes    Knee stiffness, left     Weakness of left lower extremity      Physician: Symone Reid PA    Visit Date: 3/18/2024    Physician Orders: PT Eval and Treat  Medical Diagnosis from Referral: Status post total left knee replacement using cement   Evaluation Date: 3/11/2024  Authorization Period Expiration: 12/31/2024   Plan of Care Expiration: 05/06/2024  Progress Note Due: 04/10/2024  Visit # / Visits authorized: 2/20 (+eval)   FOTO: 1/3 (last performed on 3/11/2024)     Precautions: Standard    PTA Visit #: 0/5     Time In: 0804  Time Out: 0906  Total Billable Time: 50 minutes (Billing reflects 1 on 1 treatment time spent with patient)    Subjective     Patient reports: feeling okay today. No pain reported, and he has been feeling good since last visit.     He/She was compliant with home exercise program.  Response to previous treatment: no adverse reaction  Functional change: improving range and function    Pain: 0/10     Location: left knee    Objective      Objective Measures updated at progress report or POC update only unless otherwise noted.     3/18/2024: L Knee AROM: 0-0-109 (post treatment)    Treatment     Myles received the treatments listed below:       MANUAL THERAPY TECHNIQUES were applied for (6) minutes, including:    Manual Intervention Performed Today    Soft Tissue Mobilization [] left  quadriceps, hamstrings   Joint Mobilizations [x] Patellar glides, knee extension mobs   PROM L knee [x] Flexion and extension     []    Functional Dry Needling  []      Plan for Next Visit: Continue as needed           THERAPEUTIC EXERCISES to develop strength, endurance, ROM, flexibility, posture, and core stabilization for (20) minutes including:    Intervention Performed Today   "  Upright Bike for ROM and strength x 6', level 1   Heel slides x 3', 3-5" holds on and off   Heel prop knee extension stretch x 5 minutes, 3# weight on top   Ball Squeezes x 3 x 10, 3" holds on and off                                   Plan for Next Visit:          NEUROMUSCULAR RE-EDUCATION ACTIVITIES to improve Balance, Coordination, Kinesthetic, Sense, Proprioception, and Posture for (24) minutes.  The following were included:    Intervention Performed Today    Quad sets with NMES 10"/10" on/off x 3'   SAQ with NMES x 3'   SLR with NMES x 3'   LAQ with NMES x 3'   Standing TKE x 3 x 10, pink short cook, L LE   Sidelying hip abduction (added) x 2 x 10, L LE             **billing time includes set up, instructions, pt education, and cues  Plan for Next Visit:      Patient Education and Home Exercises       Home Exercises Provided and Patient Education Provided     Education provided: (included in treatment section) minutes  PURPOSE: Patient educated on the impairments noted above and the effects of physical therapy intervention to improve overall condition and QOL.   EXERCISE: Patient was educated on all the above exercise prior/during/after for proper posture, positioning, and execution for safe performance with home exercise program.   STRENGTH: Patient educated on the importance of improved core and extremity strength in order to improve alignment of the spine and extremities with static positions and dynamic movement.   GAIT & BALANCE: Patient educated on the importance of strong core and lower extremity musculature in order to improve both static and dynamic balance, improve gait mechanics, reduce fall risk and improve household and community mobility.   POSTURE: Patient educated on postural awareness to reduce stress and maintain optimal alignment of the spine with static positions and dynamic movement     Written Home Exercises Provided: yes.  Exercises were reviewed and Myles was able to demonstrate them " prior to the end of the session.  Myles demonstrated good  understanding of the education provided. See EMR under Patient Instructions for exercises provided during therapy sessions.    Assessment     Patient did well with treatment today. He completed exercises with  less difficulty an without increased complaint. Improved quad activation noted today, as well as overall left knee extension. Patient able to achieve full knee extension by the end of sessions today, especially following manual therapy. He is progressing well towards goals.     Myles is progressing well towards his goals.   Patient prognosis is Good.     Patient will continue to benefit from skilled outpatient physical therapy to address the deficits listed in the problem list box on initial evaluation, provide pt/family education and to maximize patient's level of independence in the home and community environment.     Patient's spiritual, cultural and educational needs considered and pt agreeable to plan of care and goals.     Anticipated Barriers for therapy: none     Goals:    Short Term Goals:  4 weeks Status  Date Met   PAIN: Pt will report worst pain of 2/10 in order to progress toward max functional ability and improve quality of life. [x] Progressing  [] Met  [] Not Met     FUNCTION: Patient will demonstrate improved function as indicated by a score of greater than or equal to 50% of goal score out of 100 on FOTO. [x] Progressing  [] Met  [] Not Met     MOBILITY: Patient will improve AROM to 50% of stated goals, listed in objective measures above, in order to progress towards independence with functional activities.  [x] Progressing  [] Met  [] Not Met     STRENGTH: Patient will improve strength to 50% of stated goals, listed in objective measures above, in order to progress towards independence with functional activities. [x] Progressing  [] Met  [] Not Met     POSTURE: Patient will correct postural deviations in sitting and standing, to  decrease pain and promote long term stability.  [x] Progressing  [] Met  [] Not Met     GAIT: Patient will demonstrate improved gait mechanics including improving gait in order to improve functional mobility, improve quality of life, and decrease risk of further injury or fall.  [x] Progressing  [] Met  [] Not Met     HEP: Patient will demonstrate independence with HEP in order to progress toward functional independence. [x] Progressing  [] Met  [] Not Met        Long Term Goals:  8 weeks Status Date Met   PAIN: Pt will report worst pain of 0/10 in order to progress toward max functional ability and improve quality of life [x] Progressing  [] Met  [] Not Met     FUNCTION: Patient will demonstrate improved function as indicated by a score of greater than or equal to goal score out of 100 on FOTO. [x] Progressing  [] Met  [] Not Met     MOBILITY: Patient will improve AROM to stated goals, listed in objective measures above, in order to return to maximal functional potential and improve quality of life.  [x] Progressing  [] Met  [] Not Met     STRENGTH: Patient will improve strength to stated goals, listed in objective measures above, in order to improve functional independence and quality of life.  [x] Progressing  [] Met  [] Not Met     GAIT: Patient will demonstrate normalized gait mechanics with minimal compensation in order to return to PLOF. [x] Progressing  [] Met  [] Not Met     Patient will return to normal ADL's, IADL's, community involvement, recreational activities, and work-related activities with less than or equal to 0/10 pain and maximal function.  [x] Progressing  [] Met  [] Not Met          Plan     Continue Plan of Care (POC) and progress per patient tolerance. See treatment section for details on planned progressions next session.      Mady Lin, PT

## 2024-03-19 RX ORDER — PRAVASTATIN SODIUM 40 MG/1
40 TABLET ORAL DAILY
Qty: 90 TABLET | Refills: 2 | Status: SHIPPED | OUTPATIENT
Start: 2024-03-19

## 2024-03-19 NOTE — TELEPHONE ENCOUNTER
Refill Decision Note   Myles Pride  is requesting a refill authorization.  Brief Assessment and Rationale for Refill:  Approve     Medication Therapy Plan:         Comments:     Note composed:11:44 AM 03/19/2024

## 2024-03-20 ENCOUNTER — CLINICAL SUPPORT (OUTPATIENT)
Dept: REHABILITATION | Facility: HOSPITAL | Age: 69
End: 2024-03-20
Payer: MEDICARE

## 2024-03-20 DIAGNOSIS — R29.898 WEAKNESS OF LEFT LOWER EXTREMITY: ICD-10-CM

## 2024-03-20 DIAGNOSIS — G89.29 CHRONIC PAIN OF LEFT KNEE: Primary | ICD-10-CM

## 2024-03-20 DIAGNOSIS — M25.562 CHRONIC PAIN OF LEFT KNEE: Primary | ICD-10-CM

## 2024-03-20 DIAGNOSIS — M25.662 KNEE STIFFNESS, LEFT: ICD-10-CM

## 2024-03-20 PROCEDURE — 97112 NEUROMUSCULAR REEDUCATION: CPT | Performed by: PHYSICAL THERAPIST

## 2024-03-20 PROCEDURE — 97110 THERAPEUTIC EXERCISES: CPT | Performed by: PHYSICAL THERAPIST

## 2024-03-20 NOTE — PROGRESS NOTES
[facility]   Physical Therapy Treatment Note        Name: Myles Pride  Clinic Number: [id number]    Therapy Diagnosis:   Encounter Diagnoses   Name Primary?    Chronic pain of left knee Yes    Knee stiffness, left     Weakness of left lower extremity      Physician: Symone Reid PA    Visit Date: 3/20/2024    Physician Orders: PT Eval and Treat  Medical Diagnosis from Referral: Status post total left knee replacement using cement   Evaluation Date: 3/11/2024  Authorization Period Expiration: 12/31/2024   Plan of Care Expiration: 05/06/2024  Progress Note Due: 04/10/2024  Visit # / Visits authorized: 3/20 (+eval)   FOTO: 1/3 (last performed on 3/11/2024)     Precautions: Standard    PTA Visit #: 0/5     Time In: 1105  Time Out: 1200  Total Billable Time: 45 minutes (Billing reflects 1 on 1 treatment time spent with patient)    Subjective     Patient reports: feeling pretty good today without any new complaints.     He/She was compliant with home exercise program.  Response to previous treatment: no adverse reaction  Functional change: improving range and function    Pain: 0/10     Location: left knee    Objective      Objective Measures updated at progress report or POC update only unless otherwise noted.     3/20/2024: L Knee AROM: 0-0-110 (post treatment)    Treatment     Myles received the treatments listed below:       MANUAL THERAPY TECHNIQUES were applied for (6) minutes, including:    Manual Intervention Performed Today    Soft Tissue Mobilization [] left  quadriceps, hamstrings   Joint Mobilizations [x] Patellar glides, knee extension mobs   PROM L knee [x] Flexion and extension     []    Functional Dry Needling  []      Plan for Next Visit: Continue as needed           THERAPEUTIC EXERCISES to develop strength, endurance, ROM, flexibility, posture, and core stabilization for (15) minutes including:    Intervention Performed Today    Upright Bike for ROM and  "strength x 6', level 1   Heel slides  3', 3-5" holds on and off   Knee extension stretch - "grocery bag hang" x 5 minutes, 5# weight    Ball Squeezes  3 x 10, 3" holds on and off                                   Plan for Next Visit:          NEUROMUSCULAR RE-EDUCATION ACTIVITIES to improve Balance, Coordination, Kinesthetic, Sense, Proprioception, and Posture for (24) minutes.  The following were included:    Intervention Performed Today    Quad sets with NMES 10"/10" on/off x 3'   SAQ with NMES x 3'   SLR with NMES x 3'   LAQ with NMES x 3'   Standing TKE x 3 x 10, pink short cook, L LE   Sidelying hip abduction  x 2 x 10, L LE             **billing time includes set up, instructions, pt education, and cues  Plan for Next Visit:      Patient Education and Home Exercises       Home Exercises Provided and Patient Education Provided     Education provided: (included in treatment section) minutes  PURPOSE: Patient educated on the impairments noted above and the effects of physical therapy intervention to improve overall condition and QOL.   EXERCISE: Patient was educated on all the above exercise prior/during/after for proper posture, positioning, and execution for safe performance with home exercise program.   STRENGTH: Patient educated on the importance of improved core and extremity strength in order to improve alignment of the spine and extremities with static positions and dynamic movement.   GAIT & BALANCE: Patient educated on the importance of strong core and lower extremity musculature in order to improve both static and dynamic balance, improve gait mechanics, reduce fall risk and improve household and community mobility.   POSTURE: Patient educated on postural awareness to reduce stress and maintain optimal alignment of the spine with static positions and dynamic movement     Written Home Exercises Provided: yes.  Exercises were reviewed and Myles was able to demonstrate them prior to the end of the " session.  Myles demonstrated good  understanding of the education provided. See EMR under Patient Instructions for exercises provided during therapy sessions.    Assessment     Patient tolerated treatment well again this visit. He completed exercises with less difficulty and without complaint. Improving quadriceps activation noted again this visit. Improved knee extension following knee extension hang. Steri-strip added to top part of incisional scar to promote healing and full closure. Patient progressing well towards goals.     Myles is progressing well towards his goals.   Patient prognosis is Good.     Patient will continue to benefit from skilled outpatient physical therapy to address the deficits listed in the problem list box on initial evaluation, provide pt/family education and to maximize patient's level of independence in the home and community environment.     Patient's spiritual, cultural and educational needs considered and pt agreeable to plan of care and goals.     Anticipated Barriers for therapy: none     Goals:    Short Term Goals:  4 weeks Status  Date Met   PAIN: Pt will report worst pain of 2/10 in order to progress toward max functional ability and improve quality of life. [x] Progressing  [] Met  [] Not Met     FUNCTION: Patient will demonstrate improved function as indicated by a score of greater than or equal to 50% of goal score out of 100 on FOTO. [x] Progressing  [] Met  [] Not Met     MOBILITY: Patient will improve AROM to 50% of stated goals, listed in objective measures above, in order to progress towards independence with functional activities.  [x] Progressing  [] Met  [] Not Met     STRENGTH: Patient will improve strength to 50% of stated goals, listed in objective measures above, in order to progress towards independence with functional activities. [x] Progressing  [] Met  [] Not Met     POSTURE: Patient will correct postural deviations in sitting and standing, to decrease  pain and promote long term stability.  [x] Progressing  [] Met  [] Not Met     GAIT: Patient will demonstrate improved gait mechanics including improving gait in order to improve functional mobility, improve quality of life, and decrease risk of further injury or fall.  [x] Progressing  [] Met  [] Not Met     HEP: Patient will demonstrate independence with HEP in order to progress toward functional independence. [x] Progressing  [] Met  [] Not Met        Long Term Goals:  8 weeks Status Date Met   PAIN: Pt will report worst pain of 0/10 in order to progress toward max functional ability and improve quality of life [x] Progressing  [] Met  [] Not Met     FUNCTION: Patient will demonstrate improved function as indicated by a score of greater than or equal to goal score out of 100 on FOTO. [x] Progressing  [] Met  [] Not Met     MOBILITY: Patient will improve AROM to stated goals, listed in objective measures above, in order to return to maximal functional potential and improve quality of life.  [x] Progressing  [] Met  [] Not Met     STRENGTH: Patient will improve strength to stated goals, listed in objective measures above, in order to improve functional independence and quality of life.  [x] Progressing  [] Met  [] Not Met     GAIT: Patient will demonstrate normalized gait mechanics with minimal compensation in order to return to PLOF. [x] Progressing  [] Met  [] Not Met     Patient will return to normal ADL's, IADL's, community involvement, recreational activities, and work-related activities with less than or equal to 0/10 pain and maximal function.  [x] Progressing  [] Met  [] Not Met          Plan     Continue Plan of Care (POC) and progress per patient tolerance. See treatment section for details on planned progressions next session.      Mady Lin, PT

## 2024-03-22 ENCOUNTER — CLINICAL SUPPORT (OUTPATIENT)
Dept: REHABILITATION | Facility: HOSPITAL | Age: 69
End: 2024-03-22
Payer: MEDICARE

## 2024-03-22 DIAGNOSIS — R29.898 WEAKNESS OF LEFT LOWER EXTREMITY: ICD-10-CM

## 2024-03-22 DIAGNOSIS — M25.562 CHRONIC PAIN OF LEFT KNEE: Primary | ICD-10-CM

## 2024-03-22 DIAGNOSIS — M25.662 KNEE STIFFNESS, LEFT: ICD-10-CM

## 2024-03-22 DIAGNOSIS — G89.29 CHRONIC PAIN OF LEFT KNEE: Primary | ICD-10-CM

## 2024-03-22 PROCEDURE — 97112 NEUROMUSCULAR REEDUCATION: CPT

## 2024-03-22 PROCEDURE — 97530 THERAPEUTIC ACTIVITIES: CPT

## 2024-03-22 PROCEDURE — 97110 THERAPEUTIC EXERCISES: CPT

## 2024-03-22 NOTE — PROGRESS NOTES
[facility]   Physical Therapy Treatment Note        Name: Myles Pride  Clinic Number: [id number]    Therapy Diagnosis:   Encounter Diagnoses   Name Primary?    Chronic pain of left knee Yes    Knee stiffness, left     Weakness of left lower extremity      Physician: Symone Reid PA    Visit Date: 3/22/2024    Physician Orders: PT Eval and Treat  Medical Diagnosis from Referral: Status post total left knee replacement using cement   Evaluation Date: 3/11/2024  Authorization Period Expiration: 12/31/2024   Plan of Care Expiration: 05/06/2024  Progress Note Due: 04/10/2024  Visit # / Visits authorized: 4/20 (+eval)   FOTO: 1/3 (last performed on 3/11/2024)     Precautions: Standard    PTA Visit #: 0/5     Time In: 0829  Time Out: 0940  Total Billable Time: 60 minutes (Billing reflects 1 on 1 treatment time spent with patient)    Subjective     Patient reports: he has been doing well with exercises at home and going to quarter mile walks around the neighborhood. Patient notes last night was his first night sleeping without pain medications and was a little more sore but still sleeping through the night well.     He/She was compliant with home exercise program.  Response to previous treatment: no adverse reaction  Functional change: improving range and function, neighborhood walks    Pain: 0/10     Location: left knee    Objective      Objective Measures updated at progress report or POC update only unless otherwise noted.     3/22/2024    Treatment     Myles received the treatments listed below:     MANUAL THERAPY TECHNIQUES were applied for (5) minutes, including:    Manual Intervention Performed Today    Soft Tissue Mobilization [] left  quadriceps, hamstrings   Joint Mobilizations [x] Patellar glides, knee extension mobs   PROM L knee [] Flexion and extension     []    Functional Dry Needling  []      Plan for Next Visit: Continue as needed      THERAPEUTIC EXERCISES to  "develop strength, endurance, ROM, flexibility, posture, and core stabilization for (20) minutes including:    Intervention Performed Today    Upright Bike for ROM and strength x 6', level 1   Heel slides x 4', 3-5" holds on and off   Knee extension stretch - "grocery bag hang" x 5 minutes, 7.5# weight    Ball Squeezes  3 x 10, 3" holds on and off   Standing Hamstring Curls x 3x10 on L                              Plan for Next Visit:      NEUROMUSCULAR RE-EDUCATION ACTIVITIES to improve Balance, Coordination, Kinesthetic, Sense, Proprioception, and Posture for (16) minutes.  The following were included:    Intervention Performed Today    Quad sets with NMES 10"/10" on/off x 3'   SAQ with NMES x 3'   SLR with NMES x 3'   LAQ with NMES x 3'   Standing TKE  3 x 10, pink short cook, L LE   Sidelying hip abduction   2 x 10, L LE             **billing time includes set up, instructions, pt education, and cues  Plan for Next Visit:      THERAPEUTIC ACTIVITIES: to improve functional performance through dynamic activities, for (14)  minutes including:   x = performed today    Therapeutic Activities 3/22/2024    Mini Squats x 2x10   Stairs x X20 on 6 inch steps with L   Standing Marches x X20 bilateral                    BOLD = new this visit  Plan for Next Visit:      Examples: Coordination, Kinesthetic Sense, Push/pull, Squat, Stairs, bending, lifting, catching, pushing, pulling, throwing, squatting  Patient Education and Home Exercises       Home Exercises Provided and Patient Education Provided     Education provided: (included in treatment section) minutes  PURPOSE: Patient educated on the impairments noted above and the effects of physical therapy intervention to improve overall condition and QOL.   EXERCISE: Patient was educated on all the above exercise prior/during/after for proper posture, positioning, and execution for safe performance with home exercise program.   STRENGTH: Patient educated on the importance of " improved core and extremity strength in order to improve alignment of the spine and extremities with static positions and dynamic movement.   GAIT & BALANCE: Patient educated on the importance of strong core and lower extremity musculature in order to improve both static and dynamic balance, improve gait mechanics, reduce fall risk and improve household and community mobility.   POSTURE: Patient educated on postural awareness to reduce stress and maintain optimal alignment of the spine with static positions and dynamic movement     Written Home Exercises Provided: yes.  Exercises were reviewed and Myles was able to demonstrate them prior to the end of the session.  Myles demonstrated good  understanding of the education provided. See EMR under Patient Instructions for exercises provided during therapy sessions.    Assessment     Patient tolerated treatment well. Patient progressed functionally with performance of squats with limited range of motion, steps to 6 inch, and standing marches. Patient demonstrates continued improvement with quad contraction but NMES with active range of motion helps increase quad contraction in different positions.     Myles is progressing well towards his goals.   Patient prognosis is Good.     Patient will continue to benefit from skilled outpatient physical therapy to address the deficits listed in the problem list box on initial evaluation, provide pt/family education and to maximize patient's level of independence in the home and community environment.     Patient's spiritual, cultural and educational needs considered and pt agreeable to plan of care and goals.     Anticipated Barriers for therapy: none     Goals:    Short Term Goals:  4 weeks Status  Date Met   PAIN: Pt will report worst pain of 2/10 in order to progress toward max functional ability and improve quality of life. [x] Progressing  [] Met  [] Not Met     FUNCTION: Patient will demonstrate improved function as  indicated by a score of greater than or equal to 50% of goal score out of 100 on FOTO. [x] Progressing  [] Met  [] Not Met     MOBILITY: Patient will improve AROM to 50% of stated goals, listed in objective measures above, in order to progress towards independence with functional activities.  [x] Progressing  [] Met  [] Not Met     STRENGTH: Patient will improve strength to 50% of stated goals, listed in objective measures above, in order to progress towards independence with functional activities. [x] Progressing  [] Met  [] Not Met     POSTURE: Patient will correct postural deviations in sitting and standing, to decrease pain and promote long term stability.  [x] Progressing  [] Met  [] Not Met     GAIT: Patient will demonstrate improved gait mechanics including improving gait in order to improve functional mobility, improve quality of life, and decrease risk of further injury or fall.  [x] Progressing  [] Met  [] Not Met     HEP: Patient will demonstrate independence with HEP in order to progress toward functional independence. [x] Progressing  [] Met  [] Not Met        Long Term Goals:  8 weeks Status Date Met   PAIN: Pt will report worst pain of 0/10 in order to progress toward max functional ability and improve quality of life [x] Progressing  [] Met  [] Not Met     FUNCTION: Patient will demonstrate improved function as indicated by a score of greater than or equal to goal score out of 100 on FOTO. [x] Progressing  [] Met  [] Not Met     MOBILITY: Patient will improve AROM to stated goals, listed in objective measures above, in order to return to maximal functional potential and improve quality of life.  [x] Progressing  [] Met  [] Not Met     STRENGTH: Patient will improve strength to stated goals, listed in objective measures above, in order to improve functional independence and quality of life.  [x] Progressing  [] Met  [] Not Met     GAIT: Patient will demonstrate normalized gait mechanics with minimal  compensation in order to return to PLOF. [x] Progressing  [] Met  [] Not Met     Patient will return to normal ADL's, IADL's, community involvement, recreational activities, and work-related activities with less than or equal to 0/10 pain and maximal function.  [x] Progressing  [] Met  [] Not Met          Plan     Continue Plan of Care (POC) and progress per patient tolerance. See treatment section for details on planned progressions next session.      Reynaldo Baker, PT

## 2024-03-25 ENCOUNTER — EXTERNAL HOME HEALTH (OUTPATIENT)
Dept: HOME HEALTH SERVICES | Facility: HOSPITAL | Age: 69
End: 2024-03-25
Payer: MEDICARE

## 2024-03-25 ENCOUNTER — CLINICAL SUPPORT (OUTPATIENT)
Dept: REHABILITATION | Facility: HOSPITAL | Age: 69
End: 2024-03-25
Payer: MEDICARE

## 2024-03-25 DIAGNOSIS — M25.662 KNEE STIFFNESS, LEFT: ICD-10-CM

## 2024-03-25 DIAGNOSIS — M25.562 CHRONIC PAIN OF LEFT KNEE: Primary | ICD-10-CM

## 2024-03-25 DIAGNOSIS — R29.898 WEAKNESS OF LEFT LOWER EXTREMITY: ICD-10-CM

## 2024-03-25 DIAGNOSIS — G89.29 CHRONIC PAIN OF LEFT KNEE: Primary | ICD-10-CM

## 2024-03-25 PROCEDURE — 97112 NEUROMUSCULAR REEDUCATION: CPT | Mod: HCNC | Performed by: PHYSICAL THERAPIST

## 2024-03-25 PROCEDURE — 97530 THERAPEUTIC ACTIVITIES: CPT | Mod: HCNC | Performed by: PHYSICAL THERAPIST

## 2024-03-25 NOTE — PROGRESS NOTES
[facility]   Physical Therapy Treatment Note        Name: Myles Pride  [facility] Number: [id number]    Therapy Diagnosis:   Encounter Diagnoses   Name Primary?    Chronic pain of left knee Yes    Knee stiffness, left     Weakness of left lower extremity      Physician: Symone Reid PA    Visit Date: 3/25/2024    Physician Orders: PT Eval and Treat  Medical Diagnosis from Referral: Status post total left knee replacement using cement   Evaluation Date: 3/11/2024  Authorization Period Expiration: 12/31/2024   Plan of Care Expiration: 05/06/2024  Progress Note Due: 04/10/2024  Visit # / Visits authorized: 5/20 (+eval)   FOTO: 1/3 (last performed on 3/11/2024)     Precautions: Standard    PTA Visit #: 0/5     Time In: 0905  Time Out: 1008  Total Billable Time: 30 minutes (Billing reflects 1 on 1 treatment time spent with patient)    Subjective     Patient reports: he is doing well. He doesn't have any pain today, and he has just been feeling stiffness. He has still been keeping up with exercises.    He/She was compliant with home exercise program.  Response to previous treatment: no adverse reaction  Functional change: improving range and function, neighborhood walks    Pain: 0/10     Location: left knee    Objective      Objective Measures updated at progress report or POC update only unless otherwise noted.       Treatment     Myles received the treatments listed below:     MANUAL THERAPY TECHNIQUES were applied for (10) minutes, including:    Manual Intervention Performed Today    Soft Tissue Mobilization [x] left  quadriceps, hamstrings   Joint Mobilizations [x] Patellar glides, knee extension mobs   PROM L knee [x] Flexion and extension     []    Functional Dry Needling  []      Plan for Next Visit: Continue as needed      THERAPEUTIC EXERCISES to develop strength, endurance, ROM, flexibility, posture, and core stabilization for (0) minutes including:  **performed but not  "billed due to 1:1 time  Intervention Performed Today    Upright Bike for ROM and strength x 6', level 1   Heel slides  4', 3-5" holds on and off   Knee extension stretch - "grocery bag hang" x 5 minutes, 7.5# weight    Ball Squeezes  3 x 10, 3" holds on and off   Standing Hamstring Curls x 3x10 on L                            **performed but not all billed due to 1:1 time  Plan for Next Visit:      NEUROMUSCULAR RE-EDUCATION ACTIVITIES to improve Balance, Coordination, Kinesthetic, Sense, Proprioception, and Posture for (12) minutes.  The following were included:  **some exercises not billed due to 1:1 time  Intervention Performed Today    Quad sets with NMES 10"/10" on/off x 3'   SAQ with NMES x 3'   SLR with NMES x 3'   LAQ with NMES x 3'   Standing TKE  3 x 10, pink short cook, L LE   Sidelying hip abduction   2 x 10, L LE             **billing time includes set up, instructions, pt education, and cues  Plan for Next Visit:      THERAPEUTIC ACTIVITIES: to improve functional performance through dynamic activities, for (8)  minutes including:   x = performed today    Therapeutic Activities 3/25/2024    Mini Squats x 2x10   Stairs x X20 on 6 inch steps with L   Standing Marches x X20 bilateral                    BOLD = new this visit  Plan for Next Visit:      Examples: Coordination, Kinesthetic Sense, Push/pull, Squat, Stairs, bending, lifting, catching, pushing, pulling, throwing, squatting  Patient Education and Home Exercises       Home Exercises Provided and Patient Education Provided     Education provided: (included in treatment section) minutes  PURPOSE: Patient educated on the impairments noted above and the effects of physical therapy intervention to improve overall condition and QOL.   EXERCISE: Patient was educated on all the above exercise prior/during/after for proper posture, positioning, and execution for safe performance with home exercise program.   STRENGTH: Patient educated on the importance of " improved core and extremity strength in order to improve alignment of the spine and extremities with static positions and dynamic movement.   GAIT & BALANCE: Patient educated on the importance of strong core and lower extremity musculature in order to improve both static and dynamic balance, improve gait mechanics, reduce fall risk and improve household and community mobility.   POSTURE: Patient educated on postural awareness to reduce stress and maintain optimal alignment of the spine with static positions and dynamic movement     Written Home Exercises Provided: yes.  Exercises were reviewed and Myles was able to demonstrate them prior to the end of the session.  Myles demonstrated good  understanding of the education provided. See EMR under Patient Instructions for exercises provided during therapy sessions.    Assessment     Patient did well with treatment today. He demonstrated less difficulty with functional progressions from last visit, and he continues to present with improving left knee ROM.     Myles is progressing well towards his goals.   Patient prognosis is Good.     Patient will continue to benefit from skilled outpatient physical therapy to address the deficits listed in the problem list box on initial evaluation, provide pt/family education and to maximize patient's level of independence in the home and community environment.     Patient's spiritual, cultural and educational needs considered and pt agreeable to plan of care and goals.     Anticipated Barriers for therapy: none     Goals:    Short Term Goals:  4 weeks Status  Date Met   PAIN: Pt will report worst pain of 2/10 in order to progress toward max functional ability and improve quality of life. [x] Progressing  [] Met  [] Not Met     FUNCTION: Patient will demonstrate improved function as indicated by a score of greater than or equal to 50% of goal score out of 100 on FOTO. [x] Progressing  [] Met  [] Not Met     MOBILITY: Patient  will improve AROM to 50% of stated goals, listed in objective measures above, in order to progress towards independence with functional activities.  [x] Progressing  [] Met  [] Not Met     STRENGTH: Patient will improve strength to 50% of stated goals, listed in objective measures above, in order to progress towards independence with functional activities. [x] Progressing  [] Met  [] Not Met     POSTURE: Patient will correct postural deviations in sitting and standing, to decrease pain and promote long term stability.  [x] Progressing  [] Met  [] Not Met     GAIT: Patient will demonstrate improved gait mechanics including improving gait in order to improve functional mobility, improve quality of life, and decrease risk of further injury or fall.  [x] Progressing  [] Met  [] Not Met     HEP: Patient will demonstrate independence with HEP in order to progress toward functional independence. [x] Progressing  [] Met  [] Not Met        Long Term Goals:  8 weeks Status Date Met   PAIN: Pt will report worst pain of 0/10 in order to progress toward max functional ability and improve quality of life [x] Progressing  [] Met  [] Not Met     FUNCTION: Patient will demonstrate improved function as indicated by a score of greater than or equal to goal score out of 100 on FOTO. [x] Progressing  [] Met  [] Not Met     MOBILITY: Patient will improve AROM to stated goals, listed in objective measures above, in order to return to maximal functional potential and improve quality of life.  [x] Progressing  [] Met  [] Not Met     STRENGTH: Patient will improve strength to stated goals, listed in objective measures above, in order to improve functional independence and quality of life.  [x] Progressing  [] Met  [] Not Met     GAIT: Patient will demonstrate normalized gait mechanics with minimal compensation in order to return to PLOF. [x] Progressing  [] Met  [] Not Met     Patient will return to normal ADL's, IADL's, community  involvement, recreational activities, and work-related activities with less than or equal to 0/10 pain and maximal function.  [x] Progressing  [] Met  [] Not Met          Plan     Continue Plan of Care (POC) and progress per patient tolerance. See treatment section for details on planned progressions next session.      Mady Lin, PT

## 2024-03-27 ENCOUNTER — CLINICAL SUPPORT (OUTPATIENT)
Dept: REHABILITATION | Facility: HOSPITAL | Age: 69
End: 2024-03-27
Payer: MEDICARE

## 2024-03-27 DIAGNOSIS — R29.898 WEAKNESS OF LEFT LOWER EXTREMITY: ICD-10-CM

## 2024-03-27 DIAGNOSIS — M25.662 KNEE STIFFNESS, LEFT: ICD-10-CM

## 2024-03-27 DIAGNOSIS — G89.29 CHRONIC PAIN OF LEFT KNEE: Primary | ICD-10-CM

## 2024-03-27 DIAGNOSIS — M25.562 CHRONIC PAIN OF LEFT KNEE: Primary | ICD-10-CM

## 2024-03-27 PROCEDURE — 97112 NEUROMUSCULAR REEDUCATION: CPT | Mod: HCNC | Performed by: PHYSICAL THERAPIST

## 2024-03-27 PROCEDURE — 97530 THERAPEUTIC ACTIVITIES: CPT | Mod: HCNC | Performed by: PHYSICAL THERAPIST

## 2024-03-27 NOTE — PROGRESS NOTES
"[facility]   Physical Therapy Treatment Note        Name: Myles Pride  Clinic Number: [id number]    Therapy Diagnosis:   Encounter Diagnoses   Name Primary?    Chronic pain of left knee Yes    Knee stiffness, left     Weakness of left lower extremity      Physician: Symone Reid PA    Visit Date: 3/27/2024    Physician Orders: PT Eval and Treat  Medical Diagnosis from Referral: Status post total left knee replacement using cement   Evaluation Date: 3/11/2024  Authorization Period Expiration: 12/31/2024   Plan of Care Expiration: 05/06/2024  Progress Note Due: 04/10/2024  Visit # / Visits authorized: 6/20 (+eval)   FOTO: 1/3 (last performed on 3/11/2024)     Precautions: Standard    PTA Visit #: 0/5     Time In: 1103  Time Out: 1159  Total Billable Time: 30 minutes (Billing reflects 1 on 1 treatment time spent with patient)    Subjective     Patient reports: feeling good today. No pain again today, and his knee is "coming along".     He/She was compliant with home exercise program.  Response to previous treatment: no adverse reaction  Functional change: improving range and function, neighborhood walks    Pain: 0/10     Location: left knee    Objective      Objective Measures updated at progress report or POC update only unless otherwise noted.       Treatment     Myles received the treatments listed below:     MANUAL THERAPY TECHNIQUES were applied for (5) minutes, including:    Manual Intervention Performed Today    Soft Tissue Mobilization [] left  quadriceps, hamstrings   Joint Mobilizations [] Patellar glides, knee extension mobs   PROM L knee [x] Flexion and extension     []    Functional Dry Needling  []      Plan for Next Visit: Continue as needed      THERAPEUTIC EXERCISES to develop strength, endurance, ROM, flexibility, posture, and core stabilization for (0) minutes including:  **performed but not billed due to 1:1 time  Intervention Performed Today    Upright " "Bike for ROM and strength x 6', level 1   Heel slides  4', 3-5" holds on and off   Knee extension stretch - "grocery bag hang"  5 minutes, 7.5# weight    Ball Squeezes  3 x 10, 3" holds on and off   Standing Hamstring Curls x 3x10 on L                            **performed but not all billed due to 1:1 time  Plan for Next Visit:      NEUROMUSCULAR RE-EDUCATION ACTIVITIES to improve Balance, Coordination, Kinesthetic, Sense, Proprioception, and Posture for (13) minutes.  The following were included:  **some exercises not billed due to 1:1 time  Intervention Performed Today    Quad sets with NMES 10"/10" on/off x 3'   SAQ with NMES x 3'   SLR with NMES x 3'   LAQ with NMES x 3'   Standing TKE x 3 x 10, pink short cook, L LE   Sidelying hip abduction   2 x 10, L LE             **billing time includes set up, instructions, pt education, and cues  Plan for Next Visit:      THERAPEUTIC ACTIVITIES: to improve functional performance through dynamic activities, for (12)  minutes including:   x = performed today    Therapeutic Activities 3/27/2024    Mini Squats x 2x10   Stairs x X20 on 6 inch steps with L   Standing Marches x X20 bilateral    Standing lunge for knee flexion stretch on stairs x 2nd step, 5 x 10" holds              BOLD = new this visit  Plan for Next Visit:      Examples: Coordination, Kinesthetic Sense, Push/pull, Squat, Stairs, bending, lifting, catching, pushing, pulling, throwing, squatting  Patient Education and Home Exercises       Home Exercises Provided and Patient Education Provided     Education provided: (included in treatment section) minutes  PURPOSE: Patient educated on the impairments noted above and the effects of physical therapy intervention to improve overall condition and QOL.   EXERCISE: Patient was educated on all the above exercise prior/during/after for proper posture, positioning, and execution for safe performance with home exercise program.   STRENGTH: Patient educated on the " importance of improved core and extremity strength in order to improve alignment of the spine and extremities with static positions and dynamic movement.   GAIT & BALANCE: Patient educated on the importance of strong core and lower extremity musculature in order to improve both static and dynamic balance, improve gait mechanics, reduce fall risk and improve household and community mobility.   POSTURE: Patient educated on postural awareness to reduce stress and maintain optimal alignment of the spine with static positions and dynamic movement     Written Home Exercises Provided: yes.  Exercises were reviewed and Myles was able to demonstrate them prior to the end of the session.  Myles demonstrated good  understanding of the education provided. See EMR under Patient Instructions for exercises provided during therapy sessions.    Assessment     Patient tolerated treatment well again today. He continues to present with improving quadriceps activation and strength. He was able to be progressed to functional SLS today and did well. His flexion and extension ROM are both improving well thus far. Incisional scar has scabbing still present with steri-strips but appears to be continuing to heal well.     Myles is progressing well towards his goals.   Patient prognosis is Good.     Patient will continue to benefit from skilled outpatient physical therapy to address the deficits listed in the problem list box on initial evaluation, provide pt/family education and to maximize patient's level of independence in the home and community environment.     Patient's spiritual, cultural and educational needs considered and pt agreeable to plan of care and goals.     Anticipated Barriers for therapy: none     Goals:    Short Term Goals:  4 weeks Status  Date Met   PAIN: Pt will report worst pain of 2/10 in order to progress toward max functional ability and improve quality of life. [x] Progressing  [] Met  [] Not Met     FUNCTION:  Patient will demonstrate improved function as indicated by a score of greater than or equal to 50% of goal score out of 100 on FOTO. [x] Progressing  [] Met  [] Not Met     MOBILITY: Patient will improve AROM to 50% of stated goals, listed in objective measures above, in order to progress towards independence with functional activities.  [x] Progressing  [] Met  [] Not Met     STRENGTH: Patient will improve strength to 50% of stated goals, listed in objective measures above, in order to progress towards independence with functional activities. [x] Progressing  [] Met  [] Not Met     POSTURE: Patient will correct postural deviations in sitting and standing, to decrease pain and promote long term stability.  [x] Progressing  [] Met  [] Not Met     GAIT: Patient will demonstrate improved gait mechanics including improving gait in order to improve functional mobility, improve quality of life, and decrease risk of further injury or fall.  [x] Progressing  [] Met  [] Not Met     HEP: Patient will demonstrate independence with HEP in order to progress toward functional independence. [x] Progressing  [] Met  [] Not Met        Long Term Goals:  8 weeks Status Date Met   PAIN: Pt will report worst pain of 0/10 in order to progress toward max functional ability and improve quality of life [x] Progressing  [] Met  [] Not Met     FUNCTION: Patient will demonstrate improved function as indicated by a score of greater than or equal to goal score out of 100 on FOTO. [x] Progressing  [] Met  [] Not Met     MOBILITY: Patient will improve AROM to stated goals, listed in objective measures above, in order to return to maximal functional potential and improve quality of life.  [x] Progressing  [] Met  [] Not Met     STRENGTH: Patient will improve strength to stated goals, listed in objective measures above, in order to improve functional independence and quality of life.  [x] Progressing  [] Met  [] Not Met     GAIT: Patient will  demonstrate normalized gait mechanics with minimal compensation in order to return to PLOF. [x] Progressing  [] Met  [] Not Met     Patient will return to normal ADL's, IADL's, community involvement, recreational activities, and work-related activities with less than or equal to 0/10 pain and maximal function.  [x] Progressing  [] Met  [] Not Met          Plan     Continue Plan of Care (POC) and progress per patient tolerance. See treatment section for details on planned progressions next session.      Mady Lin, PT

## 2024-04-01 ENCOUNTER — CLINICAL SUPPORT (OUTPATIENT)
Dept: REHABILITATION | Facility: HOSPITAL | Age: 69
End: 2024-04-01
Payer: MEDICARE

## 2024-04-01 DIAGNOSIS — M25.562 CHRONIC PAIN OF LEFT KNEE: Primary | ICD-10-CM

## 2024-04-01 DIAGNOSIS — M25.662 KNEE STIFFNESS, LEFT: ICD-10-CM

## 2024-04-01 DIAGNOSIS — G89.29 CHRONIC PAIN OF LEFT KNEE: Primary | ICD-10-CM

## 2024-04-01 DIAGNOSIS — R29.898 WEAKNESS OF LEFT LOWER EXTREMITY: ICD-10-CM

## 2024-04-01 PROCEDURE — 97140 MANUAL THERAPY 1/> REGIONS: CPT | Mod: HCNC | Performed by: PHYSICAL THERAPIST

## 2024-04-01 PROCEDURE — 97112 NEUROMUSCULAR REEDUCATION: CPT | Mod: HCNC | Performed by: PHYSICAL THERAPIST

## 2024-04-01 NOTE — PROGRESS NOTES
[facility]   Physical Therapy Treatment Note        Name: Myles Pride  Clinic Number: [id number]    Therapy Diagnosis:   Encounter Diagnoses   Name Primary?    Chronic pain of left knee Yes    Knee stiffness, left     Weakness of left lower extremity      Physician: Symone Reid PA    Visit Date: 4/1/2024    Physician Orders: PT Eval and Treat  Medical Diagnosis from Referral: Status post total left knee replacement using cement   Evaluation Date: 3/11/2024  Authorization Period Expiration: 12/31/2024   Plan of Care Expiration: 05/06/2024  Progress Note Due: 04/10/2024  Visit # / Visits authorized: 7/20 (+eval)   FOTO: 1/3 (last performed on 3/11/2024)     Precautions: Standard    PTA Visit #: 0/5     Time In: 0902  Time Out: 0959  Total Billable Time: 30 minutes (Billing reflects 1 on 1 treatment time spent with patient)    Subjective     Patient reports: he has still been doing well. His knee has not been painful, just expected swelling with increased activity, etc.     He/She was compliant with home exercise program.  Response to previous treatment: no adverse reaction  Functional change: improving range and function, neighborhood walks    Pain: 0/10     Location: left knee    Objective      Objective Measures updated at progress report or POC update only unless otherwise noted.       Treatment     Myles received the treatments listed below:     MANUAL THERAPY TECHNIQUES were applied for (17) minutes, including:    Manual Intervention Performed Today    Soft Tissue Mobilization [] left  quadriceps, hamstrings   Joint Mobilizations [] Patellar glides, knee extension mobs   PROM L knee [] Flexion and extension    Incisional scar care [x] Cleaned and redressed incision site. Had patient take picture of incision and send to MD.   Functional Dry Needling  []      Plan for Next Visit: Continue as needed      THERAPEUTIC EXERCISES to develop strength, endurance, ROM,  "flexibility, posture, and core stabilization for (0) minutes including:  **performed but not billed due to 1:1 time  Intervention Performed Today    Upright Bike for ROM and strength x 6', level 1   Heel slides  4', 3-5" holds on and off   Knee extension stretch - "grocery bag hang"  5 minutes, 7.5# weight    Ball Squeezes  3 x 10, 3" holds on and off   Standing Hamstring Curls  3x10 on L                            **performed but not all billed due to 1:1 time  Plan for Next Visit:      NEUROMUSCULAR RE-EDUCATION ACTIVITIES to improve Balance, Coordination, Kinesthetic, Sense, Proprioception, and Posture for (13) minutes.  The following were included:  **some exercises not billed due to 1:1 time  Intervention Performed Today    Quad sets with NMES 10"/10" on/off x 3'   SAQ with NMES x 3'   SLR with NMES x 3'   LAQ with NMES x 3'   Standing TKE x 3 x 10, pink short cook, L LE   Sidelying hip abduction  x 2 x 10, L LE             **billing time includes set up, instructions, pt education, and cues  Plan for Next Visit:      THERAPEUTIC ACTIVITIES: to improve functional performance through dynamic activities, for (0)  minutes including:   x = performed today    Therapeutic Activities 4/1/2024    Mini Squats  2x10   Stairs  X20 on 6 inch steps with L   Standing Marches  X20 bilateral    Standing lunge for knee flexion stretch on stairs  2nd step, 5 x 10" holds              BOLD = new this visit  Plan for Next Visit:      Examples: Coordination, Kinesthetic Sense, Push/pull, Squat, Stairs, bending, lifting, catching, pushing, pulling, throwing, squatting  Patient Education and Home Exercises       Home Exercises Provided and Patient Education Provided     Education provided: (included in treatment section) minutes  PURPOSE: Patient educated on the impairments noted above and the effects of physical therapy intervention to improve overall condition and QOL.   EXERCISE: Patient was educated on all the above exercise " prior/during/after for proper posture, positioning, and execution for safe performance with home exercise program.   STRENGTH: Patient educated on the importance of improved core and extremity strength in order to improve alignment of the spine and extremities with static positions and dynamic movement.   GAIT & BALANCE: Patient educated on the importance of strong core and lower extremity musculature in order to improve both static and dynamic balance, improve gait mechanics, reduce fall risk and improve household and community mobility.   POSTURE: Patient educated on postural awareness to reduce stress and maintain optimal alignment of the spine with static positions and dynamic movement     Written Home Exercises Provided: yes.  Exercises were reviewed and Myles was able to demonstrate them prior to the end of the session.  Myles demonstrated good  understanding of the education provided. See EMR under Patient Instructions for exercises provided during therapy sessions.    Assessment     Patient did well with treatment today. He completed exercise without difficulty or complaint. Held standing activities due to time constraints today and held flexion based exercises due to incisional scar. Redness with very minimal clear drainage noted in the middle of incisional scar today. Appears to have part of a possible stitch coming out. Changed out dressing today and had patient send a picture to MD for further assessment. No warmth present.     Myles is progressing well towards his goals.   Patient prognosis is Good.     Patient will continue to benefit from skilled outpatient physical therapy to address the deficits listed in the problem list box on initial evaluation, provide pt/family education and to maximize patient's level of independence in the home and community environment.     Patient's spiritual, cultural and educational needs considered and pt agreeable to plan of care and goals.     Anticipated  Barriers for therapy: none     Goals:    Short Term Goals:  4 weeks Status  Date Met   PAIN: Pt will report worst pain of 2/10 in order to progress toward max functional ability and improve quality of life. [x] Progressing  [] Met  [] Not Met     FUNCTION: Patient will demonstrate improved function as indicated by a score of greater than or equal to 50% of goal score out of 100 on FOTO. [x] Progressing  [] Met  [] Not Met     MOBILITY: Patient will improve AROM to 50% of stated goals, listed in objective measures above, in order to progress towards independence with functional activities.  [x] Progressing  [] Met  [] Not Met     STRENGTH: Patient will improve strength to 50% of stated goals, listed in objective measures above, in order to progress towards independence with functional activities. [x] Progressing  [] Met  [] Not Met     POSTURE: Patient will correct postural deviations in sitting and standing, to decrease pain and promote long term stability.  [x] Progressing  [] Met  [] Not Met     GAIT: Patient will demonstrate improved gait mechanics including improving gait in order to improve functional mobility, improve quality of life, and decrease risk of further injury or fall.  [x] Progressing  [] Met  [] Not Met     HEP: Patient will demonstrate independence with HEP in order to progress toward functional independence. [x] Progressing  [] Met  [] Not Met        Long Term Goals:  8 weeks Status Date Met   PAIN: Pt will report worst pain of 0/10 in order to progress toward max functional ability and improve quality of life [x] Progressing  [] Met  [] Not Met     FUNCTION: Patient will demonstrate improved function as indicated by a score of greater than or equal to goal score out of 100 on FOTO. [x] Progressing  [] Met  [] Not Met     MOBILITY: Patient will improve AROM to stated goals, listed in objective measures above, in order to return to maximal functional potential and improve quality of life.  [x]  Progressing  [] Met  [] Not Met     STRENGTH: Patient will improve strength to stated goals, listed in objective measures above, in order to improve functional independence and quality of life.  [x] Progressing  [] Met  [] Not Met     GAIT: Patient will demonstrate normalized gait mechanics with minimal compensation in order to return to PLOF. [x] Progressing  [] Met  [] Not Met     Patient will return to normal ADL's, IADL's, community involvement, recreational activities, and work-related activities with less than or equal to 0/10 pain and maximal function.  [x] Progressing  [] Met  [] Not Met          Plan     Continue Plan of Care (POC) and progress per patient tolerance. See treatment section for details on planned progressions next session.      Mady Lin, PT

## 2024-04-02 RX ORDER — SULFAMETHOXAZOLE AND TRIMETHOPRIM 800; 160 MG/1; MG/1
1 TABLET ORAL 2 TIMES DAILY
Qty: 20 TABLET | Refills: 0 | Status: SHIPPED | OUTPATIENT
Start: 2024-04-02 | End: 2024-04-18

## 2024-04-04 ENCOUNTER — CLINICAL SUPPORT (OUTPATIENT)
Dept: REHABILITATION | Facility: HOSPITAL | Age: 69
End: 2024-04-04
Payer: MEDICARE

## 2024-04-04 DIAGNOSIS — G89.29 CHRONIC PAIN OF LEFT KNEE: Primary | ICD-10-CM

## 2024-04-04 DIAGNOSIS — M25.662 KNEE STIFFNESS, LEFT: ICD-10-CM

## 2024-04-04 DIAGNOSIS — M25.562 CHRONIC PAIN OF LEFT KNEE: Primary | ICD-10-CM

## 2024-04-04 DIAGNOSIS — R29.898 WEAKNESS OF LEFT LOWER EXTREMITY: ICD-10-CM

## 2024-04-04 PROCEDURE — 97140 MANUAL THERAPY 1/> REGIONS: CPT | Mod: HCNC

## 2024-04-04 PROCEDURE — 97110 THERAPEUTIC EXERCISES: CPT | Mod: HCNC

## 2024-04-04 NOTE — PROGRESS NOTES
[facility]   Physical Therapy Treatment Note        Name: Myles Pride  Clinic Number: [id number]    Therapy Diagnosis:   Encounter Diagnoses   Name Primary?    Chronic pain of left knee Yes    Knee stiffness, left     Weakness of left lower extremity      Physician: Symone Reid PA    Visit Date: 4/4/2024    Physician Orders: PT Eval and Treat  Medical Diagnosis from Referral: Status post total left knee replacement using cement   Evaluation Date: 3/11/2024  Authorization Period Expiration: 12/31/2024   Plan of Care Expiration: 05/06/2024  Progress Note Due: 04/10/2024  Visit # / Visits authorized: 8/20 (+eval)   FOTO: 1/3 (last performed on 3/11/2024)     Precautions: Standard    PTA Visit #: 0/5     Time In: 0904  Time Out: 1010  Total Billable Time: 32 minutes     Subjective     Patient reports: he started antibiotic on Tuesday 04/02/2024 after sending picture of his incision to his doctor. Patient notes knee continues to feel great.     He/She was compliant with home exercise program.  Response to previous treatment: no adverse reaction  Functional change: improving range and function, neighborhood walks    Pain: 0/10     Location: left knee    Objective      Objective Measures updated at progress report or POC update only unless otherwise noted.       Treatment     Myles received the treatments listed below:     MANUAL THERAPY TECHNIQUES were applied for (9) minutes, including:    Manual Intervention Performed Today    Soft Tissue Mobilization [] left  quadriceps, hamstrings   Joint Mobilizations [] Patellar glides, knee extension mobs   PROM L knee [] Flexion and extension    Incisional scar care [x] Cleaned and redressed incision site   Functional Dry Needling  []      Plan for Next Visit: Continue as needed      THERAPEUTIC EXERCISES to develop strength, endurance, ROM, flexibility, posture, and core stabilization for (15) minutes including:  **performed but not  "billed due to 1:1 time  Intervention Performed Today    Upright Bike for ROM and strength x 6', level 1   Heel slides x 4', 3-5" holds on and off   Knee extension stretch - "grocery bag hang" x 5 minutes, 7.5# weight    Ball Squeezes  3 x 10, 3" holds on and off   Standing Hamstring Curls  3x10 on L                            **performed but not all billed due to 1:1 time  Plan for Next Visit:      NEUROMUSCULAR RE-EDUCATION ACTIVITIES to improve Balance, Coordination, Kinesthetic, Sense, Proprioception, and Posture for (0) minutes.  The following were included:  NB = not billed  Intervention Performed Today    Quad sets with NMES 10"/10" on/off X - NB 3'   SAQ with NMES X - NB 3'   SLR with NMES X - NB 3'   LAQ with NMES X - NB 3'   Standing TKE  3 x 10, pink short cook, L LE   Sidelying hip abduction   2 x 10, L LE             **billing time includes set up, instructions, pt education, and cues  Plan for Next Visit:      THERAPEUTIC ACTIVITIES: to improve functional performance through dynamic activities, for (0)  minutes including:   x = performed today  NB = not billed  Therapeutic Activities 4/4/2024    Mini Squats X - NB 2x10   Stairs X - NB X20 on 6 inch steps with L   Standing Marches X - NB X20 bilateral    Standing lunge for knee flexion stretch on stairs  2nd step, 5 x 10" holds              BOLD = new this visit  Plan for Next Visit:      Examples: Coordination, Kinesthetic Sense, Push/pull, Squat, Stairs, bending, lifting, catching, pushing, pulling, throwing, squatting  Patient Education and Home Exercises       Home Exercises Provided and Patient Education Provided     Education provided: (included in treatment section) minutes  PURPOSE: Patient educated on the impairments noted above and the effects of physical therapy intervention to improve overall condition and QOL.   EXERCISE: Patient was educated on all the above exercise prior/during/after for proper posture, positioning, and execution for safe " performance with home exercise program.   STRENGTH: Patient educated on the importance of improved core and extremity strength in order to improve alignment of the spine and extremities with static positions and dynamic movement.   GAIT & BALANCE: Patient educated on the importance of strong core and lower extremity musculature in order to improve both static and dynamic balance, improve gait mechanics, reduce fall risk and improve household and community mobility.   POSTURE: Patient educated on postural awareness to reduce stress and maintain optimal alignment of the spine with static positions and dynamic movement     Written Home Exercises Provided: yes.  Exercises were reviewed and Myles was able to demonstrate them prior to the end of the session.  Myles demonstrated good  understanding of the education provided. See EMR under Patient Instructions for exercises provided during therapy sessions.    Assessment     Patient tolerated treatment well today. Patient presents with continued redness and minimal drainage from mid point in incision. Extruding stick removed with no effect to incision closure. Steri strips applied over part of incision to assist with approximation. Patient demonstrate improvement with observe VMO contraction.     Myles is progressing well towards his goals.   Patient prognosis is Good.     Patient will continue to benefit from skilled outpatient physical therapy to address the deficits listed in the problem list box on initial evaluation, provide pt/family education and to maximize patient's level of independence in the home and community environment.     Patient's spiritual, cultural and educational needs considered and pt agreeable to plan of care and goals.     Anticipated Barriers for therapy: none     Goals:    Short Term Goals:  4 weeks Status  Date Met   PAIN: Pt will report worst pain of 2/10 in order to progress toward max functional ability and improve quality of life. [x]  Progressing  [] Met  [] Not Met     FUNCTION: Patient will demonstrate improved function as indicated by a score of greater than or equal to 50% of goal score out of 100 on FOTO. [x] Progressing  [] Met  [] Not Met     MOBILITY: Patient will improve AROM to 50% of stated goals, listed in objective measures above, in order to progress towards independence with functional activities.  [x] Progressing  [] Met  [] Not Met     STRENGTH: Patient will improve strength to 50% of stated goals, listed in objective measures above, in order to progress towards independence with functional activities. [x] Progressing  [] Met  [] Not Met     POSTURE: Patient will correct postural deviations in sitting and standing, to decrease pain and promote long term stability.  [x] Progressing  [] Met  [] Not Met     GAIT: Patient will demonstrate improved gait mechanics including improving gait in order to improve functional mobility, improve quality of life, and decrease risk of further injury or fall.  [x] Progressing  [] Met  [] Not Met     HEP: Patient will demonstrate independence with HEP in order to progress toward functional independence. [x] Progressing  [] Met  [] Not Met        Long Term Goals:  8 weeks Status Date Met   PAIN: Pt will report worst pain of 0/10 in order to progress toward max functional ability and improve quality of life [x] Progressing  [] Met  [] Not Met     FUNCTION: Patient will demonstrate improved function as indicated by a score of greater than or equal to goal score out of 100 on FOTO. [x] Progressing  [] Met  [] Not Met     MOBILITY: Patient will improve AROM to stated goals, listed in objective measures above, in order to return to maximal functional potential and improve quality of life.  [x] Progressing  [] Met  [] Not Met     STRENGTH: Patient will improve strength to stated goals, listed in objective measures above, in order to improve functional independence and quality of life.  [x] Progressing  []  Met  [] Not Met     GAIT: Patient will demonstrate normalized gait mechanics with minimal compensation in order to return to PLOF. [x] Progressing  [] Met  [] Not Met     Patient will return to normal ADL's, IADL's, community involvement, recreational activities, and work-related activities with less than or equal to 0/10 pain and maximal function.  [x] Progressing  [] Met  [] Not Met          Plan     Continue Plan of Care (POC) and progress per patient tolerance. See treatment section for details on planned progressions next session.      Reynaldo Baker, PT

## 2024-04-08 ENCOUNTER — CLINICAL SUPPORT (OUTPATIENT)
Dept: REHABILITATION | Facility: HOSPITAL | Age: 69
End: 2024-04-08
Payer: MEDICARE

## 2024-04-08 DIAGNOSIS — M25.562 CHRONIC PAIN OF LEFT KNEE: Primary | ICD-10-CM

## 2024-04-08 DIAGNOSIS — R29.898 WEAKNESS OF LEFT LOWER EXTREMITY: ICD-10-CM

## 2024-04-08 DIAGNOSIS — G89.29 CHRONIC PAIN OF LEFT KNEE: Primary | ICD-10-CM

## 2024-04-08 DIAGNOSIS — M25.662 KNEE STIFFNESS, LEFT: ICD-10-CM

## 2024-04-08 PROCEDURE — 97112 NEUROMUSCULAR REEDUCATION: CPT | Mod: HCNC | Performed by: PHYSICAL THERAPIST

## 2024-04-08 PROCEDURE — 97140 MANUAL THERAPY 1/> REGIONS: CPT | Mod: HCNC | Performed by: PHYSICAL THERAPIST

## 2024-04-08 PROCEDURE — 97530 THERAPEUTIC ACTIVITIES: CPT | Mod: HCNC | Performed by: PHYSICAL THERAPIST

## 2024-04-08 NOTE — PROGRESS NOTES
"[facility]   Physical Therapy Treatment Note        Name: Myles Pride  Clinic Number: [id number]    Therapy Diagnosis:   Encounter Diagnoses   Name Primary?    Chronic pain of left knee Yes    Knee stiffness, left     Weakness of left lower extremity      Physician: Symone Reid PA    Visit Date: 4/8/2024    Physician Orders: PT Eval and Treat  Medical Diagnosis from Referral: Status post total left knee replacement using cement   Evaluation Date: 3/11/2024  Authorization Period Expiration: 12/31/2024   Plan of Care Expiration: 05/06/2024  Progress Note Due: 04/10/2024  Visit # / Visits authorized: 9/20 (+eval)   FOTO: 1/3 (last performed on 3/11/2024)     Precautions: Standard    PTA Visit #: 0/5     Time In: 1106  Time Out: 1201  Total Billable Time: 45 minutes     Subjective     Patient reports: no new complaints. His knee continues to feel good overall.     He/She was compliant with home exercise program.  Response to previous treatment: no adverse reaction  Functional change: improving range and function, neighborhood walks    Pain: 0/10     Location: left knee    Objective      Objective Measures updated at progress report or POC update only unless otherwise noted.       Treatment     Myles received the treatments listed below:     MANUAL THERAPY TECHNIQUES were applied for (15) minutes, including:    Manual Intervention Performed Today    Soft Tissue Mobilization [] left  quadriceps, hamstrings   Joint Mobilizations [x] Patellar glides, knee extension mobs, 30" x 3   PROM L knee [] Flexion and extension, knee extension stretch with overpressure 30" x 3   Incisional scar care [x] Cleaned and redressed incision site   Functional Dry Needling  []      Plan for Next Visit: Continue as needed      THERAPEUTIC EXERCISES to develop strength, endurance, ROM, flexibility, posture, and core stabilization for (5) minutes including:  **performed but not billed due to 1:1 " "time  Intervention Performed Today    Upright Bike for ROM and strength x 6', level 1   Heel slides  4', 3-5" holds on and off   Knee extension stretch - "grocery bag hang"  5 minutes, 7.5# weight    Heel prop knee extension stretch x 10', 10#   Ball Squeezes  3 x 10, 3" holds on and off   Standing Hamstring Curls  3x10 on L                            **performed but not all billed due to 1:1 time  Plan for Next Visit:      NEUROMUSCULAR RE-EDUCATION ACTIVITIES to improve Balance, Coordination, Kinesthetic, Sense, Proprioception, and Posture for (15) minutes.  The following were included:  NB = not billed, Exercises below supervised by Rogerio Stark, PT, DPT today.   Intervention Performed Today    Quad sets with NMES 10"/10" on/off  3'   SAQ  x 3', L LE   SLR  x 3', L LE   LAQ x 3', L LE   Standing TKE  3 x 10, pink short cook, L LE   Prone TKE (added) x 3 x 10, 10# L LE   Sidelying hip abduction   2 x 10, L LE             **billing time includes set up, instructions, pt education, and cues  Plan for Next Visit:      THERAPEUTIC ACTIVITIES: to improve functional performance through dynamic activities, for (10)  minutes including:   x = performed today  NB = not billed  Therapeutic Activities 4/8/2024    Mini Squats x 2x10   Stairs x X20 on 6 inch steps with L   Standing Marches x X20 bilateral    Standing lunge for knee flexion stretch on stairs  2nd step, 5 x 10" holds              BOLD = new this visit  Plan for Next Visit:      Examples: Coordination, Kinesthetic Sense, Push/pull, Squat, Stairs, bending, lifting, catching, pushing, pulling, throwing, squatting  Patient Education and Home Exercises       Home Exercises Provided and Patient Education Provided     Education provided: (included in treatment section) minutes  PURPOSE: Patient educated on the impairments noted above and the effects of physical therapy intervention to improve overall condition and QOL.   EXERCISE: Patient was educated on all the above " exercise prior/during/after for proper posture, positioning, and execution for safe performance with home exercise program.   STRENGTH: Patient educated on the importance of improved core and extremity strength in order to improve alignment of the spine and extremities with static positions and dynamic movement.   GAIT & BALANCE: Patient educated on the importance of strong core and lower extremity musculature in order to improve both static and dynamic balance, improve gait mechanics, reduce fall risk and improve household and community mobility.   POSTURE: Patient educated on postural awareness to reduce stress and maintain optimal alignment of the spine with static positions and dynamic movement     Written Home Exercises Provided: yes.  Exercises were reviewed and Myles was able to demonstrate them prior to the end of the session.  Myles demonstrated good  understanding of the education provided. See EMR under Patient Instructions for exercises provided during therapy sessions.    Assessment     Patient did well with treatment today. He completed exercises without difficulty or complaint. Patient was able to complete quadriceps strengthening exercises today without NMES, and he was still able to demonstrate good quadriceps activation. Minimal cues required for full quad activation initially during SLR, but then patient was able to perform remaining repetitions correctly. Less drainage noted at incisional scar as compared to last visit, as well as decreased redness. Steri strip again applied over part of incision to assist with approximation. Patient progressing well towards goals.     Myles is progressing well towards his goals.   Patient prognosis is Good.     Patient will continue to benefit from skilled outpatient physical therapy to address the deficits listed in the problem list box on initial evaluation, provide pt/family education and to maximize patient's level of independence in the home and  community environment.     Patient's spiritual, cultural and educational needs considered and pt agreeable to plan of care and goals.     Anticipated Barriers for therapy: none     Goals:    Short Term Goals:  4 weeks Status  Date Met   PAIN: Pt will report worst pain of 2/10 in order to progress toward max functional ability and improve quality of life. [x] Progressing  [] Met  [] Not Met     FUNCTION: Patient will demonstrate improved function as indicated by a score of greater than or equal to 50% of goal score out of 100 on FOTO. [x] Progressing  [] Met  [] Not Met     MOBILITY: Patient will improve AROM to 50% of stated goals, listed in objective measures above, in order to progress towards independence with functional activities.  [x] Progressing  [] Met  [] Not Met     STRENGTH: Patient will improve strength to 50% of stated goals, listed in objective measures above, in order to progress towards independence with functional activities. [x] Progressing  [] Met  [] Not Met     POSTURE: Patient will correct postural deviations in sitting and standing, to decrease pain and promote long term stability.  [x] Progressing  [] Met  [] Not Met     GAIT: Patient will demonstrate improved gait mechanics including improving gait in order to improve functional mobility, improve quality of life, and decrease risk of further injury or fall.  [x] Progressing  [] Met  [] Not Met     HEP: Patient will demonstrate independence with HEP in order to progress toward functional independence. [x] Progressing  [] Met  [] Not Met        Long Term Goals:  8 weeks Status Date Met   PAIN: Pt will report worst pain of 0/10 in order to progress toward max functional ability and improve quality of life [x] Progressing  [] Met  [] Not Met     FUNCTION: Patient will demonstrate improved function as indicated by a score of greater than or equal to goal score out of 100 on FOTO. [x] Progressing  [] Met  [] Not Met     MOBILITY: Patient will  improve AROM to stated goals, listed in objective measures above, in order to return to maximal functional potential and improve quality of life.  [x] Progressing  [] Met  [] Not Met     STRENGTH: Patient will improve strength to stated goals, listed in objective measures above, in order to improve functional independence and quality of life.  [x] Progressing  [] Met  [] Not Met     GAIT: Patient will demonstrate normalized gait mechanics with minimal compensation in order to return to PLOF. [x] Progressing  [] Met  [] Not Met     Patient will return to normal ADL's, IADL's, community involvement, recreational activities, and work-related activities with less than or equal to 0/10 pain and maximal function.  [x] Progressing  [] Met  [] Not Met          Plan     Continue Plan of Care (POC) and progress per patient tolerance. See treatment section for details on planned progressions next session.      Mady Lin, PT

## 2024-04-11 ENCOUNTER — CLINICAL SUPPORT (OUTPATIENT)
Dept: REHABILITATION | Facility: HOSPITAL | Age: 69
End: 2024-04-11
Payer: MEDICARE

## 2024-04-11 DIAGNOSIS — M25.662 KNEE STIFFNESS, LEFT: ICD-10-CM

## 2024-04-11 DIAGNOSIS — M25.562 CHRONIC PAIN OF LEFT KNEE: Primary | ICD-10-CM

## 2024-04-11 DIAGNOSIS — R29.898 WEAKNESS OF LEFT LOWER EXTREMITY: ICD-10-CM

## 2024-04-11 DIAGNOSIS — G89.29 CHRONIC PAIN OF LEFT KNEE: Primary | ICD-10-CM

## 2024-04-11 PROCEDURE — 97110 THERAPEUTIC EXERCISES: CPT | Mod: HCNC | Performed by: PHYSICAL THERAPIST

## 2024-04-11 PROCEDURE — 97530 THERAPEUTIC ACTIVITIES: CPT | Mod: HCNC | Performed by: PHYSICAL THERAPIST

## 2024-04-11 NOTE — PROGRESS NOTES
[facility]   Physical Therapy Treatment Note + Progress Note       Name: Myles Pride  Clinic Number: [id number]    Therapy Diagnosis:   Encounter Diagnoses   Name Primary?    Chronic pain of left knee Yes    Knee stiffness, left     Weakness of left lower extremity      Physician: Symone Reid PA    Visit Date: 4/11/2024    Physician Orders: PT Eval and Treat  Medical Diagnosis from Referral: Status post total left knee replacement using cement   Evaluation Date: 3/11/2024  Authorization Period Expiration: 12/31/2024   Plan of Care Expiration: 05/06/2024  Progress Note Due: 05/11/2024  Visit # / Visits authorized: 10/20 (+eval)   FOTO: 1/3 (last performed on 3/11/2024)     Precautions: Standard    PTA Visit #: 0/5     Time In: 0906  Time Out: 1003  Total Billable Time: 30 minutes     Subjective     Patient reports: feeling fine today. His knee is continuing to make good progress.     He/She was compliant with home exercise program.  Response to previous treatment: no adverse reaction  Functional change: improving range and function, neighborhood walks    Pain: 0/10     Location: left knee    Objective      Objective Measures updated at progress report or POC update only unless otherwise noted.     RANGE OF MOTION:   **numbers in () are from initial evaluation  Knee AROM/PROM Right Left Pain/Dysfunction with Movement Goal   Knee Flexion (135º) 130 115  (100)   125   Knee Extension (0º) 5 0  (-5)   5      STRENGTH:   L/E MMT Right  (spine) Left Pain/Dysfunction with Movement Goal Right   4/11/2024  Left  4/11/2024    Hip Flexion  5/5 4+/5   4+/5 B 5 5   Hip Extension  4/5 4/5   4+/5 B 4+ 4   Hip Abduction  4/5 4/5   4+/5 B 4+ 4+   Knee Extension 4+/5 4-/5   5/5 B 5 4   Knee Flexion 4+/5 4-/5   5/5 B 5 4+   Hip IR 4+/5 4/5   4+/5 B 4+ 4+   Hip ER 4/5 4-/5   4+/5 B 4+ 4   Ankle DF 4+/5 4/5   5/5 B 5 4+   Ankle PF 4/5 4-/5   5/5 B 4+ 4+      MUSCLE LENGTH:   Muscle Tested  Right  Left  Limitation Goal   Hip Flexors [] Normal  [] Limited [] Normal  [x] Limited   Normal B   ITB / TFL [] Normal  [] Limited [] Normal  [] Limited   Normal B   Quadriceps [] Normal  [] Limited [] Normal  [x] Limited   Normal B   Hamstrings  [] Normal  [] Limited [] Normal  [x] Limited   Normal B   Piriformis  [] Normal  [] Limited [] Normal  [] Limited   Normal B   Gastrocnemius  [] Normal  [] Limited [] Normal  [x] Limited   Normal B   Soleus  [] Normal  [] Limited [] Normal  [] Limited   Normal B         SENSATION  [x] Intact to Light Touch                       [] Impaired:     PALPATION: Muscles: Increased tone and tenderness to palpation of: left quadriceps, hamstrings, hip adductors, pes anserine, popliteus, gastrocnemius, but improved as compared to previous visits. Structures: Increased tenderness to palpation of: left LOWER STRUCTURES : knee joint, but improved as well. Incisional scar appears to be healing better as compared to previous visits. No drainage present today, no swelling, warmth, nor redness noted.     POSTURE:  Pt presents with postural abnormalities which include:               [x] Forward Head                                [] Increased Lumbar Lordosis              [x] Rounded Shoulder                         [] Genu Recurvatum              [] Increased Thoracic Kyphosis        [] Genu Valgus              [] Trunk Deviated                              [] Pes Planus              [] Scapular Winging                          [] Other:      GAIT ANALYSIS The patient ambulated with the following assistive device: straight cane; the pt presents with the following gait abnormalities: trendelenburg, bradykinetic, increased ARIELA, decreased step length on left, decreased heel strike on left, decreased knee flexion on left, and decreased knee extension on left  4/11/2024: Patient now presents with improved anuel, step length on left, and heel strike on left. Mild trendelenburg gait still noted, and  "slight decrease in terminal knee extension still noted with L LE, but improving.      FUNCTIONAL TESTING - NT 4/11/2024     Outcome Norms Goal   Timed Up and Go 11.85 <13.5 sec     Five Time Sit to Stand 23.01 60s: <11.4 sec  70s: <12.6 sec  80s: 14.8 sec 11.4   6 minutes walk   60s: >538f, 572m  70s: >471f, 527m  80s: >417f, 392m                  Function:        Treatment     Myles received the treatments listed below:     MANUAL THERAPY TECHNIQUES were applied for (8) minutes, including:    Manual Intervention Performed Today    Soft Tissue Mobilization [] left  quadriceps, hamstrings   Joint Mobilizations [x] Patellar glides, knee extension mobs, 30" x 3   PROM L knee [x] Flexion and extension, knee extension stretch with overpressure 30" x 3   Incisional scar care [x] Cleaned and redressed incision site   Functional Dry Needling  []      Plan for Next Visit: Continue as needed      THERAPEUTIC EXERCISES to develop strength, endurance, ROM, flexibility, posture, and core stabilization for (12) minutes including:  **performed but not billed due to 1:1 time  Intervention Performed Today    Upright Bike for ROM and strength x 6', level 1   Heel slides  4', 3-5" holds on and off   Knee extension stretch - "grocery bag hang"  5 minutes, 7.5# weight    Heel prop knee extension stretch x 10', 10#   Ball Squeezes  3 x 10, 3" holds on and off   Standing Hamstring Curls  3x10 on L                       Re-assessment  x Objective tests, FOTO, pt education   **performed but not all billed due to 1:1 time  Plan for Next Visit:      NEUROMUSCULAR RE-EDUCATION ACTIVITIES to improve Balance, Coordination, Kinesthetic, Sense, Proprioception, and Posture for (0) minutes.  The following were included:  NB = not billed, due to 1:1 time  Intervention Performed Today    Quad sets  x 3'   SAQ  x 3', L LE   SLR  x 3', L LE   LAQ x 3', L LE   Standing TKE x 3 x 10, pink short cook, L LE   Prone TKE (added) x 3 x 10, 10# L LE " "  Sidelying hip abduction  x 2 x 10, L LE             **billing time includes set up, instructions, pt education, and cues  Plan for Next Visit:      THERAPEUTIC ACTIVITIES: to improve functional performance through dynamic activities, for (10)  minutes including:   x = performed today  NB = not billed  Therapeutic Activities 4/11/2024    Mini Squats x 2x10   Stairs x X20 on 6 inch steps with L   Standing Marches x X20 bilateral    Standing lunge for knee flexion stretch on stairs x 2nd step, 5 x 10" holds              BOLD = new this visit  Plan for Next Visit:      Examples: Coordination, Kinesthetic Sense, Push/pull, Squat, Stairs, bending, lifting, catching, pushing, pulling, throwing, squatting  Patient Education and Home Exercises       Home Exercises Provided and Patient Education Provided     Education provided: (included in treatment section) minutes  PURPOSE: Patient educated on the impairments noted above and the effects of physical therapy intervention to improve overall condition and QOL.   EXERCISE: Patient was educated on all the above exercise prior/during/after for proper posture, positioning, and execution for safe performance with home exercise program.   STRENGTH: Patient educated on the importance of improved core and extremity strength in order to improve alignment of the spine and extremities with static positions and dynamic movement.   GAIT & BALANCE: Patient educated on the importance of strong core and lower extremity musculature in order to improve both static and dynamic balance, improve gait mechanics, reduce fall risk and improve household and community mobility.   POSTURE: Patient educated on postural awareness to reduce stress and maintain optimal alignment of the spine with static positions and dynamic movement     Written Home Exercises Provided: yes.  Exercises were reviewed and Myles was able to demonstrate them prior to the end of the session.  Myles demonstrated good  " understanding of the education provided. See EMR under Patient Instructions for exercises provided during therapy sessions.    Assessment     Patient tolerated treatment well and has attended 11 total PT visits. He has made good overall progress with PT, demonstrating improvements in left knee ROM, LE strength, and overall gait. His gait has much improved as well, including improved anuel, step length, and knee flexion/extension. He would benefit from continued work on terminal knee extension during gait on L LE, but he is aware and working on this each day. Incisional scar appears to be healing better again today. No drainage present today, nor swelling, warmth, or redness. Much improved quadriceps activation noted with exercises, and patient is now able to tolerate quadriceps strengthening activities without NMES, demonstrating good control on his own. Patient would benefit from continued PT in order to address remaining deficits in ROM, strength, and gait, in order to achieve max functional potential.     Myles is progressing well towards his goals.   Patient prognosis is Good.     Patient will continue to benefit from skilled outpatient physical therapy to address the deficits listed in the problem list box on initial evaluation, provide pt/family education and to maximize patient's level of independence in the home and community environment.     Patient's spiritual, cultural and educational needs considered and pt agreeable to plan of care and goals.     Anticipated Barriers for therapy: none     Goals:    Short Term Goals:  4 weeks Status  Date Met   PAIN: Pt will report worst pain of 2/10 in order to progress toward max functional ability and improve quality of life. [] Progressing  [x] Met  [] Not Met 4/11/2024    FUNCTION: Patient will demonstrate improved function as indicated by a score of greater than or equal to 50% of goal score out of 100 on FOTO. [] Progressing  [x] Met  [] Not Met 4/11/2024      MOBILITY: Patient will improve AROM to 50% of stated goals, listed in objective measures above, in order to progress towards independence with functional activities.  [] Progressing  [x] Met  [] Not Met 4/11/2024     STRENGTH: Patient will improve strength to 50% of stated goals, listed in objective measures above, in order to progress towards independence with functional activities. [] Progressing  [x] Met  [] Not Met 4/11/2024     POSTURE: Patient will correct postural deviations in sitting and standing, to decrease pain and promote long term stability.  [] Progressing  [x] Met  [] Not Met  4/11/2024    GAIT: Patient will demonstrate improved gait mechanics including improving gait in order to improve functional mobility, improve quality of life, and decrease risk of further injury or fall.  [] Progressing  [x] Met  [] Not Met 4/11/2024     HEP: Patient will demonstrate independence with HEP in order to progress toward functional independence. [] Progressing  [x] Met  [] Not Met  4/11/2024       Long Term Goals:  8 weeks Status Date Met   PAIN: Pt will report worst pain of 0/10 in order to progress toward max functional ability and improve quality of life [] Progressing  [x] Met  [] Not Met 4/11/2024     FUNCTION: Patient will demonstrate improved function as indicated by a score of greater than or equal to goal score out of 100 on FOTO. [x] Progressing  [] Met  [] Not Met     MOBILITY: Patient will improve AROM to stated goals, listed in objective measures above, in order to return to maximal functional potential and improve quality of life.  [x] Progressing  [] Met  [] Not Met     STRENGTH: Patient will improve strength to stated goals, listed in objective measures above, in order to improve functional independence and quality of life.  [x] Progressing  [] Met  [] Not Met     GAIT: Patient will demonstrate normalized gait mechanics with minimal compensation in order to return to PLOF. [x] Progressing  []  Met  [] Not Met     Patient will return to normal ADL's, IADL's, community involvement, recreational activities, and work-related activities with less than or equal to 0/10 pain and maximal function.  [x] Progressing  [] Met  [] Not Met          Plan     Continue Plan of Care (POC) and progress per patient tolerance. See treatment section for details on planned progressions next session.    4/11/2024: It is my recommendation that patient continue with PT at his current frequency of 2 times per week for the remainder of his approved visits. His treatment plan will remain the same, and he will be progressed appropriately.     Mady Lin, PT

## 2024-04-15 ENCOUNTER — CLINICAL SUPPORT (OUTPATIENT)
Dept: REHABILITATION | Facility: HOSPITAL | Age: 69
End: 2024-04-15
Payer: MEDICARE

## 2024-04-15 DIAGNOSIS — R29.898 WEAKNESS OF LEFT LOWER EXTREMITY: ICD-10-CM

## 2024-04-15 DIAGNOSIS — M25.562 CHRONIC PAIN OF LEFT KNEE: Primary | ICD-10-CM

## 2024-04-15 DIAGNOSIS — M25.662 KNEE STIFFNESS, LEFT: ICD-10-CM

## 2024-04-15 DIAGNOSIS — G89.29 CHRONIC PAIN OF LEFT KNEE: Primary | ICD-10-CM

## 2024-04-15 PROCEDURE — 97112 NEUROMUSCULAR REEDUCATION: CPT | Mod: HCNC | Performed by: PHYSICAL THERAPIST

## 2024-04-15 PROCEDURE — 97530 THERAPEUTIC ACTIVITIES: CPT | Mod: HCNC | Performed by: PHYSICAL THERAPIST

## 2024-04-15 NOTE — PROGRESS NOTES
"[facility]   Physical Therapy Treatment Note        Name: Myles Pride  Clinic Number: [id number]    Therapy Diagnosis:   Encounter Diagnoses   Name Primary?    Chronic pain of left knee Yes    Knee stiffness, left     Weakness of left lower extremity      Physician: Symone Reid PA    Visit Date: 4/15/2024    Physician Orders: PT Eval and Treat  Medical Diagnosis from Referral: Status post total left knee replacement using cement   Evaluation Date: 3/11/2024  Authorization Period Expiration: 12/31/2024   Plan of Care Expiration: 05/06/2024  Progress Note Due: 05/11/2024  Visit # / Visits authorized: 11/20 (+eval)   FOTO: 1/3 (last performed on 3/11/2024)     Precautions: Standard    PTA Visit #: 0/5     Time In: 0808  Time Out: 0902  Total Billable Time: 46 minutes     Subjective     Patient reports: feeling good, no new complaints.      He/She was compliant with home exercise program.  Response to previous treatment: no adverse reaction  Functional change: improving range and function, neighborhood walks    Pain: 0/10     Location: left knee    Objective      Objective Measures updated at progress report or POC update only unless otherwise noted.       Treatment     Myles received the treatments listed below:     MANUAL THERAPY TECHNIQUES were applied for (6) minutes, including:    Manual Intervention Performed Today    Soft Tissue Mobilization [] left  quadriceps, hamstrings   Joint Mobilizations [x] Patellar glides, knee extension mobs, 30" x 3   PROM L knee [x] Flexion and extension, knee extension stretch with overpressure 30" x 3   Incisional scar care [] Cleaned and redressed incision site   Functional Dry Needling  []      Plan for Next Visit: Continue as needed        THERAPEUTIC EXERCISES to develop strength, endurance, ROM, flexibility, posture, and core stabilization for (0) minutes including:  **performed but not billed due to 1:1 time  Intervention Performed " "Today    Upright Bike for ROM and strength x 6', level 1   Heel slides  4', 3-5" holds on and off   Knee extension stretch - "grocery bag hang"  5 minutes, 7.5# weight    Heel prop knee extension stretch  10', 10#   Standing Hamstring Curls x 3x10 on L                            **performed but not all billed due to 1:1 time  Plan for Next Visit:      NEUROMUSCULAR RE-EDUCATION ACTIVITIES to improve Balance, Coordination, Kinesthetic, Sense, Proprioception, and Posture for (17) minutes.  The following were included:  NB = not billed, Exercises below supervised by Rogerio Stark, PT, DPT today.   Intervention Performed Today    Quad sets  x 3 x 10, 5" holds   SAQ  x 3 x 10, 2# L LE   SLR  x 3 x 10, 2# L LE   LAQ x 3 x 10, 2# L LE   Standing TKE  3 x 10, pink short cook, L LE   Prone TKE   3 x 10, 10# L LE   Sidelying hip abduction  x 3 x 10, 2# L LE             **billing time includes set up, instructions, pt education, and cues  Plan for Next Visit:      THERAPEUTIC ACTIVITIES: to improve functional performance through dynamic activities, for (23)  minutes including:   x = performed today  NB = not billed  Therapeutic Activities 4/15/2024    Mini Squats x 2x10   Stairs x X20 on 6 inch steps with L   Standing Marches x X20 bilateral    Standing lunge for knee flexion stretch on stairs x 2nd step, 10 x 10" holds   Shuttle Squats (added) x 3 x 10, 7 bands   SLS (added) x 3 x 30" holds L LE   Step downs (added) x 1 x 10, 4" step    BOLD = new this visit  Plan for Next Visit:      Examples: Coordination, Kinesthetic Sense, Push/pull, Squat, Stairs, bending, lifting, catching, pushing, pulling, throwing, squatting  Patient Education and Home Exercises       Home Exercises Provided and Patient Education Provided     Education provided: (included in treatment section) minutes  PURPOSE: Patient educated on the impairments noted above and the effects of physical therapy intervention to improve overall condition and QOL. "   EXERCISE: Patient was educated on all the above exercise prior/during/after for proper posture, positioning, and execution for safe performance with home exercise program.   STRENGTH: Patient educated on the importance of improved core and extremity strength in order to improve alignment of the spine and extremities with static positions and dynamic movement.   GAIT & BALANCE: Patient educated on the importance of strong core and lower extremity musculature in order to improve both static and dynamic balance, improve gait mechanics, reduce fall risk and improve household and community mobility.   POSTURE: Patient educated on postural awareness to reduce stress and maintain optimal alignment of the spine with static positions and dynamic movement     Written Home Exercises Provided: yes.  Exercises were reviewed and Myles was able to demonstrate them prior to the end of the session.  Myles demonstrated good  understanding of the education provided. See EMR under Patient Instructions for exercises provided during therapy sessions.    Assessment     Patient tolerated treatment very well. He was able to resume all standing activities and was able to be progressed to more functional/standing strengthening activities. He was also able to tolerate weight progression with SAQ, LAQ, and SLR, while maintaining very good quadriceps activation. His incisional scar appears to be healing well. He will follow up with his MD later this week.     Myles is progressing well towards his goals.   Patient prognosis is Good.     Patient will continue to benefit from skilled outpatient physical therapy to address the deficits listed in the problem list box on initial evaluation, provide pt/family education and to maximize patient's level of independence in the home and community environment.     Patient's spiritual, cultural and educational needs considered and pt agreeable to plan of care and goals.     Anticipated Barriers for  therapy: none     Goals:    Short Term Goals:  4 weeks Status  Date Met   PAIN: Pt will report worst pain of 2/10 in order to progress toward max functional ability and improve quality of life. [x] Progressing  [] Met  [] Not Met     FUNCTION: Patient will demonstrate improved function as indicated by a score of greater than or equal to 50% of goal score out of 100 on FOTO. [x] Progressing  [] Met  [] Not Met     MOBILITY: Patient will improve AROM to 50% of stated goals, listed in objective measures above, in order to progress towards independence with functional activities.  [x] Progressing  [] Met  [] Not Met     STRENGTH: Patient will improve strength to 50% of stated goals, listed in objective measures above, in order to progress towards independence with functional activities. [x] Progressing  [] Met  [] Not Met     POSTURE: Patient will correct postural deviations in sitting and standing, to decrease pain and promote long term stability.  [x] Progressing  [] Met  [] Not Met     GAIT: Patient will demonstrate improved gait mechanics including improving gait in order to improve functional mobility, improve quality of life, and decrease risk of further injury or fall.  [x] Progressing  [] Met  [] Not Met     HEP: Patient will demonstrate independence with HEP in order to progress toward functional independence. [x] Progressing  [] Met  [] Not Met        Long Term Goals:  8 weeks Status Date Met   PAIN: Pt will report worst pain of 0/10 in order to progress toward max functional ability and improve quality of life [x] Progressing  [] Met  [] Not Met     FUNCTION: Patient will demonstrate improved function as indicated by a score of greater than or equal to goal score out of 100 on FOTO. [x] Progressing  [] Met  [] Not Met     MOBILITY: Patient will improve AROM to stated goals, listed in objective measures above, in order to return to maximal functional potential and improve quality of life.  [x] Progressing  []  Met  [] Not Met     STRENGTH: Patient will improve strength to stated goals, listed in objective measures above, in order to improve functional independence and quality of life.  [x] Progressing  [] Met  [] Not Met     GAIT: Patient will demonstrate normalized gait mechanics with minimal compensation in order to return to PLOF. [x] Progressing  [] Met  [] Not Met     Patient will return to normal ADL's, IADL's, community involvement, recreational activities, and work-related activities with less than or equal to 0/10 pain and maximal function.  [x] Progressing  [] Met  [] Not Met          Plan     Continue Plan of Care (POC) and progress per patient tolerance. See treatment section for details on planned progressions next session.      Mady Lin, PT

## 2024-04-16 NOTE — PLAN OF CARE
[facility]   Physical Therapy Treatment Note + Progress Note       Name: Myles Pride  Clinic Number: [id number]    Therapy Diagnosis:   Encounter Diagnoses   Name Primary?    Chronic pain of left knee Yes    Knee stiffness, left     Weakness of left lower extremity      Physician: Symone Reid PA    Visit Date: 4/11/2024    Physician Orders: PT Eval and Treat  Medical Diagnosis from Referral: Status post total left knee replacement using cement   Evaluation Date: 3/11/2024  Authorization Period Expiration: 12/31/2024   Plan of Care Expiration: 05/06/2024  Progress Note Due: 05/11/2024  Visit # / Visits authorized: 10/20 (+eval)   FOTO: 1/3 (last performed on 3/11/2024)     Precautions: Standard    PTA Visit #: 0/5     Time In: 0906  Time Out: 1003  Total Billable Time: 30 minutes     Subjective     Patient reports: feeling fine today. His knee is continuing to make good progress.     He/She was compliant with home exercise program.  Response to previous treatment: no adverse reaction  Functional change: improving range and function, neighborhood walks    Pain: 0/10     Location: left knee    Objective      Objective Measures updated at progress report or POC update only unless otherwise noted.     RANGE OF MOTION:   **numbers in () are from initial evaluation  Knee AROM/PROM Right Left Pain/Dysfunction with Movement Goal   Knee Flexion (135º) 130 115  (100)   125   Knee Extension (0º) 5 0  (-5)   5      STRENGTH:   L/E MMT Right  (spine) Left Pain/Dysfunction with Movement Goal Right   4/11/2024  Left  4/11/2024    Hip Flexion  5/5 4+/5   4+/5 B 5 5   Hip Extension  4/5 4/5   4+/5 B 4+ 4   Hip Abduction  4/5 4/5   4+/5 B 4+ 4+   Knee Extension 4+/5 4-/5   5/5 B 5 4   Knee Flexion 4+/5 4-/5   5/5 B 5 4+   Hip IR 4+/5 4/5   4+/5 B 4+ 4+   Hip ER 4/5 4-/5   4+/5 B 4+ 4   Ankle DF 4+/5 4/5   5/5 B 5 4+   Ankle PF 4/5 4-/5   5/5 B 4+ 4+      MUSCLE LENGTH:   Muscle Tested  Right  Left  Limitation Goal   Hip Flexors [] Normal  [] Limited [] Normal  [x] Limited   Normal B   ITB / TFL [] Normal  [] Limited [] Normal  [] Limited   Normal B   Quadriceps [] Normal  [] Limited [] Normal  [x] Limited   Normal B   Hamstrings  [] Normal  [] Limited [] Normal  [x] Limited   Normal B   Piriformis  [] Normal  [] Limited [] Normal  [] Limited   Normal B   Gastrocnemius  [] Normal  [] Limited [] Normal  [x] Limited   Normal B   Soleus  [] Normal  [] Limited [] Normal  [] Limited   Normal B         SENSATION  [x] Intact to Light Touch                       [] Impaired:     PALPATION: Muscles: Increased tone and tenderness to palpation of: left quadriceps, hamstrings, hip adductors, pes anserine, popliteus, gastrocnemius, but improved as compared to previous visits. Structures: Increased tenderness to palpation of: left LOWER STRUCTURES : knee joint, but improved as well. Incisional scar appears to be healing better as compared to previous visits. No drainage present today, no swelling, warmth, nor redness noted.     POSTURE:  Pt presents with postural abnormalities which include:               [x] Forward Head                                [] Increased Lumbar Lordosis              [x] Rounded Shoulder                         [] Genu Recurvatum              [] Increased Thoracic Kyphosis        [] Genu Valgus              [] Trunk Deviated                              [] Pes Planus              [] Scapular Winging                          [] Other:      GAIT ANALYSIS The patient ambulated with the following assistive device: straight cane; the pt presents with the following gait abnormalities: trendelenburg, bradykinetic, increased ARIELA, decreased step length on left, decreased heel strike on left, decreased knee flexion on left, and decreased knee extension on left  4/11/2024: Patient now presents with improved anuel, step length on left, and heel strike on left. Mild trendelenburg gait still noted, and  "slight decrease in terminal knee extension still noted with L LE, but improving.      FUNCTIONAL TESTING - NT 4/11/2024     Outcome Norms Goal   Timed Up and Go 11.85 <13.5 sec     Five Time Sit to Stand 23.01 60s: <11.4 sec  70s: <12.6 sec  80s: 14.8 sec 11.4   6 minutes walk   60s: >538f, 572m  70s: >471f, 527m  80s: >417f, 392m                  Function:        Treatment     Myles received the treatments listed below:     MANUAL THERAPY TECHNIQUES were applied for (8) minutes, including:    Manual Intervention Performed Today    Soft Tissue Mobilization [] left  quadriceps, hamstrings   Joint Mobilizations [x] Patellar glides, knee extension mobs, 30" x 3   PROM L knee [x] Flexion and extension, knee extension stretch with overpressure 30" x 3   Incisional scar care [x] Cleaned and redressed incision site   Functional Dry Needling  []      Plan for Next Visit: Continue as needed      THERAPEUTIC EXERCISES to develop strength, endurance, ROM, flexibility, posture, and core stabilization for (12) minutes including:  **performed but not billed due to 1:1 time  Intervention Performed Today    Upright Bike for ROM and strength x 6', level 1   Heel slides  4', 3-5" holds on and off   Knee extension stretch - "grocery bag hang"  5 minutes, 7.5# weight    Heel prop knee extension stretch x 10', 10#   Ball Squeezes  3 x 10, 3" holds on and off   Standing Hamstring Curls  3x10 on L                       Re-assessment  x Objective tests, FOTO, pt education   **performed but not all billed due to 1:1 time  Plan for Next Visit:      NEUROMUSCULAR RE-EDUCATION ACTIVITIES to improve Balance, Coordination, Kinesthetic, Sense, Proprioception, and Posture for (0) minutes.  The following were included:  NB = not billed, due to 1:1 time  Intervention Performed Today    Quad sets  x 3'   SAQ  x 3', L LE   SLR  x 3', L LE   LAQ x 3', L LE   Standing TKE x 3 x 10, pink short cook, L LE   Prone TKE (added) x 3 x 10, 10# L LE " "  Sidelying hip abduction  x 2 x 10, L LE             **billing time includes set up, instructions, pt education, and cues  Plan for Next Visit:      THERAPEUTIC ACTIVITIES: to improve functional performance through dynamic activities, for (10)  minutes including:   x = performed today  NB = not billed  Therapeutic Activities 4/11/2024    Mini Squats x 2x10   Stairs x X20 on 6 inch steps with L   Standing Marches x X20 bilateral    Standing lunge for knee flexion stretch on stairs x 2nd step, 5 x 10" holds              BOLD = new this visit  Plan for Next Visit:      Examples: Coordination, Kinesthetic Sense, Push/pull, Squat, Stairs, bending, lifting, catching, pushing, pulling, throwing, squatting  Patient Education and Home Exercises       Home Exercises Provided and Patient Education Provided     Education provided: (included in treatment section) minutes  PURPOSE: Patient educated on the impairments noted above and the effects of physical therapy intervention to improve overall condition and QOL.   EXERCISE: Patient was educated on all the above exercise prior/during/after for proper posture, positioning, and execution for safe performance with home exercise program.   STRENGTH: Patient educated on the importance of improved core and extremity strength in order to improve alignment of the spine and extremities with static positions and dynamic movement.   GAIT & BALANCE: Patient educated on the importance of strong core and lower extremity musculature in order to improve both static and dynamic balance, improve gait mechanics, reduce fall risk and improve household and community mobility.   POSTURE: Patient educated on postural awareness to reduce stress and maintain optimal alignment of the spine with static positions and dynamic movement     Written Home Exercises Provided: yes.  Exercises were reviewed and Myles was able to demonstrate them prior to the end of the session.  Myles demonstrated good  " understanding of the education provided. See EMR under Patient Instructions for exercises provided during therapy sessions.    Assessment     Patient tolerated treatment well and has attended 11 total PT visits. He has made good overall progress with PT, demonstrating improvements in left knee ROM, LE strength, and overall gait. His gait has much improved as well, including improved anuel, step length, and knee flexion/extension. He would benefit from continued work on terminal knee extension during gait on L LE, but he is aware and working on this each day. Incisional scar appears to be healing better again today. No drainage present today, nor swelling, warmth, or redness. Much improved quadriceps activation noted with exercises, and patient is now able to tolerate quadriceps strengthening activities without NMES, demonstrating good control on his own. Patient would benefit from continued PT in order to address remaining deficits in ROM, strength, and gait, in order to achieve max functional potential.     Myles is progressing well towards his goals.   Patient prognosis is Good.     Patient will continue to benefit from skilled outpatient physical therapy to address the deficits listed in the problem list box on initial evaluation, provide pt/family education and to maximize patient's level of independence in the home and community environment.     Patient's spiritual, cultural and educational needs considered and pt agreeable to plan of care and goals.     Anticipated Barriers for therapy: none     Goals:    Short Term Goals:  4 weeks Status  Date Met   PAIN: Pt will report worst pain of 2/10 in order to progress toward max functional ability and improve quality of life. [] Progressing  [x] Met  [] Not Met 4/11/2024    FUNCTION: Patient will demonstrate improved function as indicated by a score of greater than or equal to 50% of goal score out of 100 on FOTO. [] Progressing  [x] Met  [] Not Met 4/11/2024      MOBILITY: Patient will improve AROM to 50% of stated goals, listed in objective measures above, in order to progress towards independence with functional activities.  [] Progressing  [x] Met  [] Not Met 4/11/2024     STRENGTH: Patient will improve strength to 50% of stated goals, listed in objective measures above, in order to progress towards independence with functional activities. [] Progressing  [x] Met  [] Not Met 4/11/2024     POSTURE: Patient will correct postural deviations in sitting and standing, to decrease pain and promote long term stability.  [] Progressing  [x] Met  [] Not Met  4/11/2024    GAIT: Patient will demonstrate improved gait mechanics including improving gait in order to improve functional mobility, improve quality of life, and decrease risk of further injury or fall.  [] Progressing  [x] Met  [] Not Met 4/11/2024     HEP: Patient will demonstrate independence with HEP in order to progress toward functional independence. [] Progressing  [x] Met  [] Not Met  4/11/2024       Long Term Goals:  8 weeks Status Date Met   PAIN: Pt will report worst pain of 0/10 in order to progress toward max functional ability and improve quality of life [] Progressing  [x] Met  [] Not Met 4/11/2024     FUNCTION: Patient will demonstrate improved function as indicated by a score of greater than or equal to goal score out of 100 on FOTO. [x] Progressing  [] Met  [] Not Met     MOBILITY: Patient will improve AROM to stated goals, listed in objective measures above, in order to return to maximal functional potential and improve quality of life.  [x] Progressing  [] Met  [] Not Met     STRENGTH: Patient will improve strength to stated goals, listed in objective measures above, in order to improve functional independence and quality of life.  [x] Progressing  [] Met  [] Not Met     GAIT: Patient will demonstrate normalized gait mechanics with minimal compensation in order to return to PLOF. [x] Progressing  []  Met  [] Not Met     Patient will return to normal ADL's, IADL's, community involvement, recreational activities, and work-related activities with less than or equal to 0/10 pain and maximal function.  [x] Progressing  [] Met  [] Not Met          Plan     Continue Plan of Care (POC) and progress per patient tolerance. See treatment section for details on planned progressions next session.    4/11/2024: It is my recommendation that patient continue with PT at his current frequency of 2 times per week for the remainder of his approved visits. His treatment plan will remain the same, and he will be progressed appropriately.     Mady Lin, PT

## 2024-04-18 ENCOUNTER — OFFICE VISIT (OUTPATIENT)
Dept: ORTHOPEDICS | Facility: CLINIC | Age: 69
End: 2024-04-18
Payer: MEDICARE

## 2024-04-18 ENCOUNTER — CLINICAL SUPPORT (OUTPATIENT)
Dept: REHABILITATION | Facility: HOSPITAL | Age: 69
End: 2024-04-18
Payer: MEDICARE

## 2024-04-18 VITALS — HEIGHT: 77 IN | BODY MASS INDEX: 28.5 KG/M2 | WEIGHT: 241.38 LBS

## 2024-04-18 DIAGNOSIS — G89.29 CHRONIC PAIN OF LEFT KNEE: Primary | ICD-10-CM

## 2024-04-18 DIAGNOSIS — R29.898 WEAKNESS OF LEFT LOWER EXTREMITY: ICD-10-CM

## 2024-04-18 DIAGNOSIS — Z96.652 STATUS POST TOTAL LEFT KNEE REPLACEMENT USING CEMENT: Primary | ICD-10-CM

## 2024-04-18 DIAGNOSIS — M25.662 KNEE STIFFNESS, LEFT: ICD-10-CM

## 2024-04-18 DIAGNOSIS — M25.562 CHRONIC PAIN OF LEFT KNEE: Primary | ICD-10-CM

## 2024-04-18 PROCEDURE — 97112 NEUROMUSCULAR REEDUCATION: CPT | Mod: HCNC | Performed by: PHYSICAL THERAPIST

## 2024-04-18 PROCEDURE — 87070 CULTURE OTHR SPECIMN AEROBIC: CPT | Mod: HCNC | Performed by: PHYSICIAN ASSISTANT

## 2024-04-18 PROCEDURE — 87076 CULTURE ANAEROBE IDENT EACH: CPT | Mod: HCNC | Performed by: PHYSICIAN ASSISTANT

## 2024-04-18 PROCEDURE — 97140 MANUAL THERAPY 1/> REGIONS: CPT | Mod: HCNC | Performed by: PHYSICAL THERAPIST

## 2024-04-18 PROCEDURE — 87075 CULTR BACTERIA EXCEPT BLOOD: CPT | Mod: HCNC | Performed by: PHYSICIAN ASSISTANT

## 2024-04-18 PROCEDURE — 3288F FALL RISK ASSESSMENT DOCD: CPT | Mod: HCNC,CPTII,S$GLB, | Performed by: PHYSICIAN ASSISTANT

## 2024-04-18 PROCEDURE — 97530 THERAPEUTIC ACTIVITIES: CPT | Mod: HCNC | Performed by: PHYSICAL THERAPIST

## 2024-04-18 PROCEDURE — 1101F PT FALLS ASSESS-DOCD LE1/YR: CPT | Mod: HCNC,CPTII,S$GLB, | Performed by: PHYSICIAN ASSISTANT

## 2024-04-18 PROCEDURE — 1126F AMNT PAIN NOTED NONE PRSNT: CPT | Mod: HCNC,CPTII,S$GLB, | Performed by: PHYSICIAN ASSISTANT

## 2024-04-18 PROCEDURE — 1159F MED LIST DOCD IN RCRD: CPT | Mod: HCNC,CPTII,S$GLB, | Performed by: PHYSICIAN ASSISTANT

## 2024-04-18 PROCEDURE — 99024 POSTOP FOLLOW-UP VISIT: CPT | Mod: HCNC,S$GLB,, | Performed by: PHYSICIAN ASSISTANT

## 2024-04-18 PROCEDURE — 1160F RVW MEDS BY RX/DR IN RCRD: CPT | Mod: HCNC,CPTII,S$GLB, | Performed by: PHYSICIAN ASSISTANT

## 2024-04-18 PROCEDURE — 87102 FUNGUS ISOLATION CULTURE: CPT | Mod: HCNC | Performed by: PHYSICIAN ASSISTANT

## 2024-04-18 PROCEDURE — 99999 PR PBB SHADOW E&M-EST. PATIENT-LVL III: CPT | Mod: PBBFAC,HCNC,, | Performed by: PHYSICIAN ASSISTANT

## 2024-04-18 RX ORDER — RIFAMPIN 300 MG/1
300 CAPSULE ORAL DAILY
Qty: 10 CAPSULE | Refills: 0 | Status: ON HOLD | OUTPATIENT
Start: 2024-04-19 | End: 2024-04-30 | Stop reason: HOSPADM

## 2024-04-18 RX ORDER — CEPHALEXIN 500 MG/1
500 CAPSULE ORAL EVERY 8 HOURS
Qty: 21 CAPSULE | Refills: 0 | Status: ON HOLD | OUTPATIENT
Start: 2024-04-18 | End: 2024-04-30 | Stop reason: HOSPADM

## 2024-04-18 NOTE — PROGRESS NOTES
"[facility]   Physical Therapy Treatment Note        Name: Myles Pride  Clinic Number: [id number]    Therapy Diagnosis:   Encounter Diagnoses   Name Primary?    Chronic pain of left knee Yes    Knee stiffness, left     Weakness of left lower extremity        Physician: Symone Reid PA    Visit Date: 4/18/2024    Physician Orders: PT Eval and Treat  Medical Diagnosis from Referral: Status post total left knee replacement using cement   Evaluation Date: 3/11/2024  Authorization Period Expiration: 12/31/2024   Plan of Care Expiration: 05/06/2024  Progress Note Due: 05/11/2024  Visit # / Visits authorized: 12/20 (+eval)   FOTO: 1/3 (last performed on 3/11/2024)     Precautions: Standard    PTA Visit #: 0/5     Time In: 0804  Time Out: 0850  Total Billable Time: 45 minutes     Subjective     Patient reports: no new complaints and that his knee continues to make good progress.     He/She was compliant with home exercise program.  Response to previous treatment: no adverse reaction  Functional change: improving range and function, neighborhood walks    Pain: 0/10     Location: left knee    Objective      Objective Measures updated at progress report or POC update only unless otherwise noted.       Treatment     Myles received the treatments listed below:     MANUAL THERAPY TECHNIQUES were applied for (16) minutes, including:    Manual Intervention Performed Today    Soft Tissue Mobilization [] left  quadriceps, hamstrings   Joint Mobilizations [x] Patellar glides, knee extension mobs, 30" x 3   PROM L knee [x] Flexion and extension, knee extension stretch with overpressure 30" x 3   Incisional scar care [x] Cleaned and redressed incision site   Functional Dry Needling  []      Plan for Next Visit: Continue as needed        THERAPEUTIC EXERCISES to develop strength, endurance, ROM, flexibility, posture, and core stabilization for (6) minutes including:  **performed but not billed " "due to 1:1 time  Intervention Performed Today    Upright Bike for ROM and strength x 6', level 1   Heel slides  4', 3-5" holds on and off   Knee extension stretch - "grocery bag hang"  5 minutes, 7.5# weight    Heel prop knee extension stretch  10', 10#   Standing Hamstring Curls  3x10 on L                            **performed but not all billed due to 1:1 time  Plan for Next Visit:      NEUROMUSCULAR RE-EDUCATION ACTIVITIES to improve Balance, Coordination, Kinesthetic, Sense, Proprioception, and Posture for (17) minutes.  The following were included:  NB = not billed, Exercises below supervised by Rogerio Stark, PT, DPT today.   Intervention Performed Today    Quad sets  x 3 x 10, 5" holds   SAQ  x 3 x 10, 2# L LE   SLR  x 3 x 10, 2# L LE   LAQ x 3 x 10, 2# L LE   Standing TKE  3 x 10, pink short cook, L LE   Prone TKE   3 x 10, 10# L LE   Sidelying hip abduction  x 3 x 10, 2# L LE             **billing time includes set up, instructions, pt education, and cues  Plan for Next Visit:      THERAPEUTIC ACTIVITIES: to improve functional performance through dynamic activities, for (6)  minutes including:   x = performed today  NB = not billed  Therapeutic Activities 4/18/2024    Mini Squats  2x10   Stairs  X20 on 6 inch steps with L   Standing Marches  X20 bilateral    Standing lunge for knee flexion stretch on stairs  2nd step, 10 x 10" holds   Shuttle Squats   3 x 10, 7 bands   SLS  x 3 x 30" holds L LE   Step downs  x 1 x 10, 4" step    BOLD = new this visit  Plan for Next Visit:      Examples: Coordination, Kinesthetic Sense, Push/pull, Squat, Stairs, bending, lifting, catching, pushing, pulling, throwing, squatting  Patient Education and Home Exercises       Home Exercises Provided and Patient Education Provided     Education provided: (included in treatment section) minutes  PURPOSE: Patient educated on the impairments noted above and the effects of physical therapy intervention to improve overall condition and " QOL.   EXERCISE: Patient was educated on all the above exercise prior/during/after for proper posture, positioning, and execution for safe performance with home exercise program.   STRENGTH: Patient educated on the importance of improved core and extremity strength in order to improve alignment of the spine and extremities with static positions and dynamic movement.   GAIT & BALANCE: Patient educated on the importance of strong core and lower extremity musculature in order to improve both static and dynamic balance, improve gait mechanics, reduce fall risk and improve household and community mobility.   POSTURE: Patient educated on postural awareness to reduce stress and maintain optimal alignment of the spine with static positions and dynamic movement     Written Home Exercises Provided: yes.  Exercises were reviewed and Myles was able to demonstrate them prior to the end of the session.  Myles demonstrated good  understanding of the education provided. See EMR under Patient Instructions for exercises provided during therapy sessions.    Assessment     Patient did well with treatment today. Incisional site cleaned and redressed. One pinpoint spot of incisional scar appeared to be inflamed today. Redness noted and fluid under the surface. Mild oozing present with knee flexion. Area redressed, and patient was instructed to show MD at his appointment after PT visit. Patient expressed understanding. Many exercises held today in order to not push flexion motion and increase any irritation of incisional scar at this time.     Myles is progressing well towards his goals.   Patient prognosis is Good.     Patient will continue to benefit from skilled outpatient physical therapy to address the deficits listed in the problem list box on initial evaluation, provide pt/family education and to maximize patient's level of independence in the home and community environment.     Patient's spiritual, cultural and educational  needs considered and pt agreeable to plan of care and goals.     Anticipated Barriers for therapy: none     Goals:    Short Term Goals:  4 weeks Status  Date Met   PAIN: Pt will report worst pain of 2/10 in order to progress toward max functional ability and improve quality of life. [x] Progressing  [] Met  [] Not Met     FUNCTION: Patient will demonstrate improved function as indicated by a score of greater than or equal to 50% of goal score out of 100 on FOTO. [x] Progressing  [] Met  [] Not Met     MOBILITY: Patient will improve AROM to 50% of stated goals, listed in objective measures above, in order to progress towards independence with functional activities.  [x] Progressing  [] Met  [] Not Met     STRENGTH: Patient will improve strength to 50% of stated goals, listed in objective measures above, in order to progress towards independence with functional activities. [x] Progressing  [] Met  [] Not Met     POSTURE: Patient will correct postural deviations in sitting and standing, to decrease pain and promote long term stability.  [x] Progressing  [] Met  [] Not Met     GAIT: Patient will demonstrate improved gait mechanics including improving gait in order to improve functional mobility, improve quality of life, and decrease risk of further injury or fall.  [x] Progressing  [] Met  [] Not Met     HEP: Patient will demonstrate independence with HEP in order to progress toward functional independence. [x] Progressing  [] Met  [] Not Met        Long Term Goals:  8 weeks Status Date Met   PAIN: Pt will report worst pain of 0/10 in order to progress toward max functional ability and improve quality of life [x] Progressing  [] Met  [] Not Met     FUNCTION: Patient will demonstrate improved function as indicated by a score of greater than or equal to goal score out of 100 on FOTO. [x] Progressing  [] Met  [] Not Met     MOBILITY: Patient will improve AROM to stated goals, listed in objective measures above, in order to  return to maximal functional potential and improve quality of life.  [x] Progressing  [] Met  [] Not Met     STRENGTH: Patient will improve strength to stated goals, listed in objective measures above, in order to improve functional independence and quality of life.  [x] Progressing  [] Met  [] Not Met     GAIT: Patient will demonstrate normalized gait mechanics with minimal compensation in order to return to PLOF. [x] Progressing  [] Met  [] Not Met     Patient will return to normal ADL's, IADL's, community involvement, recreational activities, and work-related activities with less than or equal to 0/10 pain and maximal function.  [x] Progressing  [] Met  [] Not Met          Plan     Continue Plan of Care (POC) and progress per patient tolerance. See treatment section for details on planned progressions next session.      Mady Lin, PT

## 2024-04-18 NOTE — PROGRESS NOTES
Patient ID: Myles Pride is a 68 y.o. male.    Chief Complaint: Post-op Evaluation (Left TKA 02/20/2024)      HPI: Myles Pride  is a 68 y.o. male who c/o Post-op Evaluation (Left TKA 02/20/2024)     Post op visit 2  Patient notes pain is 0/10   The patient has been doing quite well with surgery and pleased with his results   He has been able to advance activity of daily living and thus his quality of life even further since our last visit   He had concerns of a little bit of erythema at our last visit which we started him on Bactrim and he has completed   It has vastly improved but is still have a pinpoint pustular area around prepatellar bursa potentially looking like a stitch abscess   I would like to culture this and restart antibiotics today    Equipment to assist with ambulation  DVT compliant  Therapy compliant    Patient is presently denying any shortness of breath, chest pain, fever/chills, nausea/vomiting, loss of taste or smell, numbness/tingling or sensation changes, loss of bladder or bowel function, loss of taste/smell.     Surgery: Left Total Knee     Surgery Date:  02/20/2024    Past Medical History:   Diagnosis Date    Hyperlipidemia     PONV (postoperative nausea and vomiting) 1990    after knee arthroscopy    Vitamin D deficiency 12/18/2019     Past Surgical History:   Procedure Laterality Date    COLONOSCOPY      COLONOSCOPY N/A 12/19/2019    Procedure: COLONOSCOPY;  Surgeon: Bhupendra Wilkinson MD;  Location: [facility] ENDO;  Service: Endoscopy;  Laterality: N/A;    EPIDURAL STEROID INJECTION N/A 12/09/2022    Procedure: L4-5 intralaminar epidural steroid injection with left paramedian approach;  Surgeon: Ben Eddy MD;  Location: [facility] PAIN MGT;  Service: Pain Management;  Laterality: N/A;    KNEE ARTHROSCOPY Left     LUMBAR FUSION  09/2023    SELECTIVE INJECTION OF ANESTHETIC AGENT AROUND LUMBAR SPINAL NERVE ROOT BY TRANSFORAMINAL APPROACH Bilateral 01/21/2022    Procedure: Bilateral  L4/5 TF LOVE with RN IV sedation;  Surgeon: Ben Eddy MD;  Location: [facility] PAIN MGT;  Service: Pain Management;  Laterality: Bilateral;    SELECTIVE INJECTION OF ANESTHETIC AGENT AROUND LUMBAR SPINAL NERVE ROOT BY TRANSFORAMINAL APPROACH Bilateral 05/06/2022    Procedure: Bilateral L4/5 TF LOVE with RN IV sedation;  Surgeon: Ben Eddy MD;  Location: [facility] PAIN MGT;  Service: Pain Management;  Laterality: Bilateral;    SELECTIVE INJECTION OF ANESTHETIC AGENT AROUND LUMBAR SPINAL NERVE ROOT BY TRANSFORAMINAL APPROACH Bilateral 10/25/2022    Procedure: Bilateral L4/5 TF LOVE with RN IV sedation;  Surgeon: Ben Eddy MD;  Location: [facility] PAIN MGT;  Service: Pain Management;  Laterality: Bilateral;    TOTAL KNEE ARTHROPLASTY Left 2/20/2024    Procedure: ARTHROPLASTY, KNEE, TOTAL;  Surgeon: Ryan Woods MD;  Location: [facility] [place] OR;  Service: Orthopedics;  Laterality: Left;    TRANSFORAMINAL LUMBAR INTERBODY FUSION (TLIF) USING COMPUTER-ASSISTED NAVIGATION Bilateral 09/06/2023    Procedure: FUSION, SPINE, LUMBAR, TLIF, USING COMPUTER-ASSISTED NAVIGATION;  Surgeon: Lyle Lobato MD;  Location: [facility] [place] OR;  Service: Neurosurgery;  Laterality: Bilateral;  L3-5 TLIF    VASECTOMY  1995     Family History   Problem Relation Name Age of Onset    Cancer Mother Cynthia Pride         anal cancer    Diabetes Mother Cynthia Pride         PRE DIABETES    Arthritis Mother Cynthia Pride     Hearing loss Mother Cynthia Pride     No Known Problems Sister      Gallbladder disease Brother      Hepatitis Brother          Hep C     Social History     Socioeconomic History    Marital status:    Tobacco Use    Smoking status: Never     Passive exposure: Never    Smokeless tobacco: Never   Substance and Sexual Activity    Alcohol use: Yes     Alcohol/week: 3.0 standard drinks of alcohol     Types: 3 Glasses of wine per week    Drug use: Never    Sexual activity: Yes     Partners: Female     Birth control/protection:  Partner-Vasectomy, None     Social Determinants of Health     Financial Resource Strain: Low Risk  (1/29/2024)    Overall Financial Resource Strain (CARDIA)     Difficulty of Paying Living Expenses: Not hard at all   Food Insecurity: No Food Insecurity (1/29/2024)    Hunger Vital Sign     Worried About Running Out of Food in the Last Year: Never true     Ran Out of Food in the Last Year: Never true   Transportation Needs: No Transportation Needs (1/29/2024)    PRAPARE - Transportation     Lack of Transportation (Medical): No     Lack of Transportation (Non-Medical): No   Physical Activity: Sufficiently Active (1/29/2024)    Exercise Vital Sign     Days of Exercise per Week: 5 days     Minutes of Exercise per Session: 30 min   Recent Concern: Physical Activity - Insufficiently Active (1/29/2024)    Exercise Vital Sign     Days of Exercise per Week: 4 days     Minutes of Exercise per Session: 20 min   Stress: No Stress Concern Present (1/29/2024)    Citizen of the [place] of Occupational Health - Occupational Stress Questionnaire     Feeling of Stress : Not at all   Social Connections: Socially Integrated (1/29/2024)    Social Connection and Isolation Panel [NHANES]     Frequency of Communication with Friends and Family: Three times a week     Frequency of Social Gatherings with Friends and Family: Once a week     Attends [employer] Services: More than 4 times per year     Active Member of Clubs or Organizations: Yes     Attends Club or Organization Meetings: More than 4 times per year     Marital Status:    Housing Stability: Low Risk  (1/29/2024)    Housing Stability Vital Sign     Unable to Pay for Housing in the Last Year: No     Number of Places Lived in the Last Year: 1     Unstable Housing in the Last Year: No     Medication List with Changes/Refills   Current Medications    ERGOCALCIFEROL, VITAMIN D2, (VITAMIN D ORAL)    Take by mouth once daily.    GABAPENTIN (NEURONTIN) 300 MG CAPSULE    Take 2 capsules  (600 mg total) by mouth every evening.    KETOCONAZOLE (NIZORAL) 2 % CREAM    Apply topically 2 (two) times daily. For feet and web-spaces. Use for up to 4 weeks.    MELOXICAM (MOBIC) 15 MG TABLET    Take 1 tablet (15 mg total) by mouth once daily.    ONDANSETRON (ZOFRAN) 4 MG TABLET    Take 1 tablet (4 mg total) by mouth every 6 (six) hours as needed for Nausea (Nausea).    PRAVASTATIN (PRAVACHOL) 40 MG TABLET    Take 1 tablet (40 mg total) by mouth once daily.    SULFAMETHOXAZOLE-TRIMETHOPRIM 800-160MG (BACTRIM DS) 800-160 MG TAB    Take 1 tablet by mouth 2 (two) times daily.    TAMSULOSIN (FLOMAX) 0.4 MG CAP    Take 1 capsule (0.4 mg total) by mouth once daily.     Review of patient's allergies indicates:  No Known Allergies    Objective:     Left Lower Extremity  NVI  WWP foot  Comp soft  Cap refill < 2 sec  Calf NT, soft  (-) Michael sign  CESAR  ROM : Patient is able to easily exhibit full flexion and extension on passive range of motion.   Middle of incision right over patella patient has an area of pinpoint pustular stitch abscess appearing  There is slight bogginess to the area with erythema noted and mild warmth   He is completed Bactrim therapy  I would like to add him on additional antibiotics  Culture was obtained today  Wiggles toes  DF/PF intact  Sensation intact  Inc C/D/I  There is a small area of warmth and erythema direct middle of the incision with pinpoint pustules are concerning for stitch abscess  This is vastly improved from last visit after completing Bactrim but I would like to continue additional antibiotics out of precaution  Additionally cultures were obtained today    Imaging:    No x-ray obtained today    Assessment:       Encounter Diagnosis   Name Primary?    Status post total left knee replacement using cement Yes          Plan:       Myles was seen today for post-op evaluation.    Diagnoses and all orders for this visit:    Status post total left knee replacement using  porfirio Wang Bigg is an established pt here for postop follow-up after left total knee replacement by Dr. Woods. The patient will continue with the current medication regimen and treatment plan.  Patient should notify the office of any signs or symptoms of infection including fevers, erythema, purulent drainage, increasing pain.  I would like him to begin Keflex today followed by rifampin tomorrow out of precaution.  Cultures were obtained and are pending.  I did discuss with patient that based off of cultures we may need to adjust antibiotics.  Patient will continue with DVT prophylaxis until at least 6 weeks postop.  Patient will continue outpatient physical therapy.  Will follow-up as scheduled.  We have added a close follow-up for wound check.  Patient verbalized understanding of all instructions and agreed with the above plan.    No follow-ups on file.    The patient understands, chooses and consents to this plan and accepts all   the risks which include but are not limited to the risks mentioned above.     Disclaimer: This note was prepared using a voice recognition system and is likely to have sound alike errors within the text.

## 2024-04-19 ENCOUNTER — PATIENT MESSAGE (OUTPATIENT)
Dept: REHABILITATION | Facility: HOSPITAL | Age: 69
End: 2024-04-19
Payer: MEDICARE

## 2024-04-22 ENCOUNTER — PATIENT MESSAGE (OUTPATIENT)
Dept: ORTHOPEDICS | Facility: CLINIC | Age: 69
End: 2024-04-22
Payer: MEDICARE

## 2024-04-22 ENCOUNTER — CLINICAL SUPPORT (OUTPATIENT)
Dept: REHABILITATION | Facility: HOSPITAL | Age: 69
End: 2024-04-22
Payer: MEDICARE

## 2024-04-22 DIAGNOSIS — G89.29 CHRONIC PAIN OF LEFT KNEE: Primary | ICD-10-CM

## 2024-04-22 DIAGNOSIS — M25.662 KNEE STIFFNESS, LEFT: ICD-10-CM

## 2024-04-22 DIAGNOSIS — M25.562 CHRONIC PAIN OF LEFT KNEE: Primary | ICD-10-CM

## 2024-04-22 DIAGNOSIS — R29.898 WEAKNESS OF LEFT LOWER EXTREMITY: ICD-10-CM

## 2024-04-22 LAB — BACTERIA SPEC AEROBE CULT: NO GROWTH

## 2024-04-22 PROCEDURE — 97110 THERAPEUTIC EXERCISES: CPT | Mod: HCNC | Performed by: PHYSICAL THERAPIST

## 2024-04-22 PROCEDURE — 97140 MANUAL THERAPY 1/> REGIONS: CPT | Mod: HCNC | Performed by: PHYSICAL THERAPIST

## 2024-04-22 NOTE — PROGRESS NOTES
"[facility]   Physical Therapy Treatment Note        Name: Mylse Pride  Clinic Number: [id number]    Therapy Diagnosis:   Encounter Diagnoses   Name Primary?    Chronic pain of left knee Yes    Knee stiffness, left     Weakness of left lower extremity        Physician: Symone Reid PA    Visit Date: 4/22/2024    Physician Orders: PT Eval and Treat  Medical Diagnosis from Referral: Status post total left knee replacement using cement   Evaluation Date: 3/11/2024  Authorization Period Expiration: 12/31/2024   Plan of Care Expiration: 05/06/2024  Progress Note Due: 05/11/2024  Visit # / Visits authorized: 13/20 (+eval)   FOTO: 1/3 (last performed on 3/11/2024)     Precautions: Standard    PTA Visit #: 0/5     Time In: 0907  Time Out: 0940  Total Billable Time: 25 minutes     Subjective     Patient reports: feeling a little stiff today. He went on a vacation over the weekend and did probably the most walking yet since his surgery, so he states that he may have inflamed his knee a little. Patient states that he also had a low grade temperature last night. He will contact his MD today to let her know because he is worried that it could be coming form the knee.     He/She was compliant with home exercise program.  Response to previous treatment: no adverse reaction  Functional change: improving range and function, neighborhood walks    Pain: 2/10     Location: left knee    Objective      Objective Measures updated at progress report or POC update only unless otherwise noted.       Treatment     Myles received the treatments listed below:     MANUAL THERAPY TECHNIQUES were applied for (17) minutes, including:    Manual Intervention Performed Today    Soft Tissue Mobilization [] left  quadriceps, hamstrings   Joint Mobilizations [] Patellar glides, knee extension mobs, 30" x 3   PROM L knee [] Flexion and extension, knee extension stretch with overpressure 30" x 3   Incisional scar " "care [x] Cleaned and redressed incision site   Functional Dry Needling  []      Plan for Next Visit: Continue as needed        THERAPEUTIC EXERCISES to develop strength, endurance, ROM, flexibility, posture, and core stabilization for (8) minutes including:    Intervention Performed Today    Upright Bike for ROM and strength x 6', level 1   Heel slides  4', 3-5" holds on and off   Knee extension stretch - "grocery bag hang"  5 minutes, 7.5# weight    Heel prop knee extension stretch  10', 10#   Standing Hamstring Curls  3x10 on L                              Plan for Next Visit:      NEUROMUSCULAR RE-EDUCATION ACTIVITIES to improve Balance, Coordination, Kinesthetic, Sense, Proprioception, and Posture for (0) minutes.  The following were included:  NB = not billed,  Intervention Performed Today    Quad sets   3 x 10, 5" holds   SAQ   3 x 10, 2# L LE   SLR   3 x 10, 2# L LE   LAQ  3 x 10, 2# L LE   Standing TKE  3 x 10, pink short cook, L LE   Prone TKE   3 x 10, 10# L LE   Sidelying hip abduction   3 x 10, 2# L LE             **billing time includes set up, instructions, pt education, and cues  Plan for Next Visit:      THERAPEUTIC ACTIVITIES: to improve functional performance through dynamic activities, for (0)  minutes including:   x = performed today  NB = not billed  Therapeutic Activities 4/22/2024    Mini Squats  2x10   Stairs  X20 on 6 inch steps with L   Standing Marches  X20 bilateral    Standing lunge for knee flexion stretch on stairs  2nd step, 10 x 10" holds   Shuttle Squats   3 x 10, 7 bands   SLS   3 x 30" holds L LE   Step downs   1 x 10, 4" step    BOLD = new this visit  Plan for Next Visit:      Examples: Coordination, Kinesthetic Sense, Push/pull, Squat, Stairs, bending, lifting, catching, pushing, pulling, throwing, squatting  Patient Education and Home Exercises       Home Exercises Provided and Patient Education Provided     Education provided: (included in treatment section) minutes  PURPOSE: " Patient educated on the impairments noted above and the effects of physical therapy intervention to improve overall condition and QOL.   EXERCISE: Patient was educated on all the above exercise prior/during/after for proper posture, positioning, and execution for safe performance with home exercise program.   STRENGTH: Patient educated on the importance of improved core and extremity strength in order to improve alignment of the spine and extremities with static positions and dynamic movement.   GAIT & BALANCE: Patient educated on the importance of strong core and lower extremity musculature in order to improve both static and dynamic balance, improve gait mechanics, reduce fall risk and improve household and community mobility.   POSTURE: Patient educated on postural awareness to reduce stress and maintain optimal alignment of the spine with static positions and dynamic movement     Written Home Exercises Provided: yes.  Exercises were reviewed and Myles was able to demonstrate them prior to the end of the session.  Myles demonstrated good  understanding of the education provided. See EMR under Patient Instructions for exercises provided during therapy sessions.    Assessment     Patient did well with recumbent bike and cleaning/redressing of incision. Exercises held today due to fever within last 24 hours. Incisional scar checked and redressed due to patient leaving tomorrow to go out of town for the next week. Much improvement noted since last visit. Small pin point opening still preset, but no drainage present, and much less redness. No warmth present.     Myles is progressing well towards his goals.   Patient prognosis is Good.     Patient will continue to benefit from skilled outpatient physical therapy to address the deficits listed in the problem list box on initial evaluation, provide pt/family education and to maximize patient's level of independence in the home and community environment.      Patient's spiritual, cultural and educational needs considered and pt agreeable to plan of care and goals.     Anticipated Barriers for therapy: none     Goals:    Short Term Goals:  4 weeks Status  Date Met   PAIN: Pt will report worst pain of 2/10 in order to progress toward max functional ability and improve quality of life. [x] Progressing  [] Met  [] Not Met     FUNCTION: Patient will demonstrate improved function as indicated by a score of greater than or equal to 50% of goal score out of 100 on FOTO. [x] Progressing  [] Met  [] Not Met     MOBILITY: Patient will improve AROM to 50% of stated goals, listed in objective measures above, in order to progress towards independence with functional activities.  [x] Progressing  [] Met  [] Not Met     STRENGTH: Patient will improve strength to 50% of stated goals, listed in objective measures above, in order to progress towards independence with functional activities. [x] Progressing  [] Met  [] Not Met     POSTURE: Patient will correct postural deviations in sitting and standing, to decrease pain and promote long term stability.  [x] Progressing  [] Met  [] Not Met     GAIT: Patient will demonstrate improved gait mechanics including improving gait in order to improve functional mobility, improve quality of life, and decrease risk of further injury or fall.  [x] Progressing  [] Met  [] Not Met     HEP: Patient will demonstrate independence with HEP in order to progress toward functional independence. [x] Progressing  [] Met  [] Not Met        Long Term Goals:  8 weeks Status Date Met   PAIN: Pt will report worst pain of 0/10 in order to progress toward max functional ability and improve quality of life [x] Progressing  [] Met  [] Not Met     FUNCTION: Patient will demonstrate improved function as indicated by a score of greater than or equal to goal score out of 100 on FOTO. [x] Progressing  [] Met  [] Not Met     MOBILITY: Patient will improve AROM to stated  goals, listed in objective measures above, in order to return to maximal functional potential and improve quality of life.  [x] Progressing  [] Met  [] Not Met     STRENGTH: Patient will improve strength to stated goals, listed in objective measures above, in order to improve functional independence and quality of life.  [x] Progressing  [] Met  [] Not Met     GAIT: Patient will demonstrate normalized gait mechanics with minimal compensation in order to return to PLOF. [x] Progressing  [] Met  [] Not Met     Patient will return to normal ADL's, IADL's, community involvement, recreational activities, and work-related activities with less than or equal to 0/10 pain and maximal function.  [x] Progressing  [] Met  [] Not Met          Plan     Continue Plan of Care (POC) and progress per patient tolerance. See treatment section for details on planned progressions next session.      Mady Lin, PT

## 2024-04-23 DIAGNOSIS — T81.41XA POSTOPERATIVE STITCH ABSCESS: ICD-10-CM

## 2024-04-23 DIAGNOSIS — Z96.652 STATUS POST TOTAL LEFT KNEE REPLACEMENT USING CEMENT: Primary | ICD-10-CM

## 2024-04-23 LAB — BACTERIA SPEC ANAEROBE CULT: ABNORMAL

## 2024-04-25 ENCOUNTER — TELEPHONE (OUTPATIENT)
Dept: PREADMISSION TESTING | Facility: HOSPITAL | Age: 69
End: 2024-04-25
Payer: MEDICARE

## 2024-04-25 ENCOUNTER — OFFICE VISIT (OUTPATIENT)
Dept: ORTHOPEDICS | Facility: CLINIC | Age: 69
DRG: 487 | End: 2024-04-25
Payer: MEDICARE

## 2024-04-25 VITALS — BODY MASS INDEX: 28.5 KG/M2 | HEIGHT: 77 IN | WEIGHT: 241.38 LBS

## 2024-04-25 DIAGNOSIS — M25.462 EFFUSION, LEFT KNEE: Primary | ICD-10-CM

## 2024-04-25 DIAGNOSIS — Z96.652 STATUS POST TOTAL LEFT KNEE REPLACEMENT USING CEMENT: ICD-10-CM

## 2024-04-25 DIAGNOSIS — T84.53XD INFECTION ASSOCIATED WITH INTERNAL RIGHT KNEE PROSTHESIS, SUBSEQUENT ENCOUNTER: ICD-10-CM

## 2024-04-25 LAB
APPEARANCE FLD: NORMAL
BODY FLD TYPE: NORMAL
BODY FLD TYPE: NORMAL
COLOR FLD: YELLOW
CRYSTALS FLD MICRO: NEGATIVE
GRAM STN SPEC: NORMAL
GRAM STN SPEC: NORMAL
LYMPHOCYTES NFR FLD MANUAL: 2 %
MONOS+MACROS NFR FLD MANUAL: 3 %
NEUTROPHILS NFR FLD MANUAL: 95 %
WBC # FLD: NORMAL /CU MM

## 2024-04-25 PROCEDURE — 99999 PR PBB SHADOW E&M-EST. PATIENT-LVL III: CPT | Mod: PBBFAC,HCNC,, | Performed by: ORTHOPAEDIC SURGERY

## 2024-04-25 PROCEDURE — 87070 CULTURE OTHR SPECIMN AEROBIC: CPT | Performed by: ORTHOPAEDIC SURGERY

## 2024-04-25 PROCEDURE — 87205 SMEAR GRAM STAIN: CPT | Performed by: ORTHOPAEDIC SURGERY

## 2024-04-25 PROCEDURE — 87102 FUNGUS ISOLATION CULTURE: CPT | Performed by: ORTHOPAEDIC SURGERY

## 2024-04-25 PROCEDURE — 1125F AMNT PAIN NOTED PAIN PRSNT: CPT | Mod: CPTII,S$GLB,, | Performed by: ORTHOPAEDIC SURGERY

## 2024-04-25 PROCEDURE — 3288F FALL RISK ASSESSMENT DOCD: CPT | Mod: CPTII,S$GLB,, | Performed by: ORTHOPAEDIC SURGERY

## 2024-04-25 PROCEDURE — 87075 CULTR BACTERIA EXCEPT BLOOD: CPT | Performed by: ORTHOPAEDIC SURGERY

## 2024-04-25 PROCEDURE — 1101F PT FALLS ASSESS-DOCD LE1/YR: CPT | Mod: CPTII,S$GLB,, | Performed by: ORTHOPAEDIC SURGERY

## 2024-04-25 PROCEDURE — 89051 BODY FLUID CELL COUNT: CPT | Performed by: ORTHOPAEDIC SURGERY

## 2024-04-25 PROCEDURE — 99024 POSTOP FOLLOW-UP VISIT: CPT | Mod: S$GLB,,, | Performed by: ORTHOPAEDIC SURGERY

## 2024-04-25 PROCEDURE — 89060 EXAM SYNOVIAL FLUID CRYSTALS: CPT | Performed by: ORTHOPAEDIC SURGERY

## 2024-04-25 PROCEDURE — 0S9D3ZX DRAINAGE OF LEFT KNEE JOINT, PERCUTANEOUS APPROACH, DIAGNOSTIC: ICD-10-PCS | Performed by: ORTHOPAEDIC SURGERY

## 2024-04-25 RX ORDER — CICLOPIROX 80 MG/ML
SOLUTION TOPICAL NIGHTLY
COMMUNITY

## 2024-04-25 NOTE — TELEPHONE ENCOUNTER
Called and spoke with pt about the following:     Please arrive to [facility] at 0645 am on 4/26/24 for your scheduled procedure.  Address: [address] [place] [address] [place] [address] ([address] on left, 1st Floor Lobby)  >>>NO eating or drinking after midnight unless instructed otherwise by your Surgeon<<<    Thank you,  -[facility]  Mon-Fri 7:30 am - 4 pm [phone]

## 2024-04-25 NOTE — PATIENT INSTRUCTIONS
Your CRP is 107 and you sed rate is 37  You white count is normal   Your left total knee is extremely swollen in you range of motion is limited to 0-90 degrees   You have been taking antibiotics over the last 2 weeks so most likely the lab might not grow anything however we will send the fluid to [facility] where will look at DNA and message are any of that bacteria will give us a better diagnosis new line we aspirated 50 cc of bloody tinged fluid from her left total knee.  We will also send it to our lab to see if it grows anything and we will check the white cell count in it as well as if you have crystals    This is early you surgery had been done on 02/20/2024 of the left TKA and since it is early infection I would like to do what we call debridement, antibiotics, implant retention procedure which means opening up her knee washing out the knee joint and replacing your plastic insert.  You will be receiving IV antibiotics for roughly 4-6 weeks with the help of the Infectious Disease specialist.  And after that you might be on pills/antibiotics for a period of 6 months.  At this time since it has an early infection we can save the knee joint.  Sometimes this does not work and we have to take the the whole knee out place antibiotic spacer inside you knee and IV antibiotics and return in 6 weeks to put a new knee in place.  Will proceed with I and D and plastic exchange in the morning  Most likely you will be in the hospital for 3-4 days  They will be placing what we call a PICC line for IV antibiotics it should last 6-8 weeks  Procedure, common risks and benefits,alternatives discussed in details.All questions answered.Patient expressed understanding.Patient instructed to call for any questions that could arise in the future.    Most common Risks:  Infection  Leg-length discrepancy  Dislocation  Neuro-vascular injury ( resulting in loss of motor and sensory functions)  Pain  Blood clot  Fat clot  Loss of  motion  Fracture of bone  Failure of procedure to achieve its intended purpose  Failure of hardware  Non-union or mal-union of bone  Malalignment  Death

## 2024-04-25 NOTE — H&P (VIEW-ONLY)
Subjective:     Patient ID: Myles Pride is a 68 y.o. male.    Chief Complaint: Pain and Post-op Evaluation of the Left Knee      HPI:  06/02/2022  Patient with left knee severe pain today is total complaining also of the right hip.  He had previous x-rays in the electronic records.  He feels the right hip when he abducts his hip and try to get out of the car is seems to be very painful in the groin.  His knee on the left side does not bother him as much.  He does have severe back issues and we did order EMG and nerve conduction studies that show multilevel radiculopathy from L2, L4-L5 and S1 bilaterally.  He states he does get thigh anterior thigh pain.  I did tell him a could be from pinched nerves in his back.  He does see pain management for his back and injection seems to have helped a little bit.  He tries to maintain activities however now he said he cannot even hike 2 miles.  One mildly can walk but now is knee gives out on him on the left and his hip hurts on the right.  He did know if the injection given to him in February in the left knee helped or not because also received injections in his back.  No loss of bowel bladder control blurry vision double vision or loss sense smell or taste or fever or chills    As far as the gabapentin we discussed that in detail today he does not take it as much bit I did tell him for it to work he needs to be on it constantly.  He was prescribed that by pain management to take 300 mg at night for we can go up to 600 mg at night after that and a for a week and then go up to 900 at night.  I did tell him I will probably stop at 600 if you doing well with that.  Will take roughly 3 months for the gabapentin to work well and if he does not take it constantly there is no help for it.  If he decides to do surgery afterwards he needs to be on gabapentin to help with nerve pain    12/01/2022   X-rays obtained today of his hips and the knees.  He is scheduled to receive  injections in his back in a week with pain management Dr. Eddy  .  He said his right hip seems to be constantly aching him.  Last time we saw him we discussed that usually we do hip surgery 1st and then knee surgery after that in joint replacement.  However he does have quite a bit of range of motion left in his hips and the knee is the 1 that is concerning him on specially on the left side.  His pain level is 7/10 sometimes.  He is able to manage in trying to be very active he does water exercise programs.  He did have a lumbar injection around 5 weeks ago but now he is going for a 2nd injection.  He feels the right hip is the 1 that aches the most of both hips and his left knee.  He knows he has arthritis in both knees and both hips.  Also has issues in his back and stenosis.    No fever no chills no shortness of breath or difficulty with chewing swallowing loss of bowel bladder control blurry vision vision or double vision loss sense smell or taste    01/12/2024   Bilateral knee arthritis with the left knee severely deformed.  Also he had mild pain in both of his hips with the right a little bit worse than the left.  Last time seen he had severe spinal stenosis and multilevel radiculopathy.  Since then he had undergone lumbar decompression and fusion by Dr. Lobato  .  He is doing well with his back.  The numbness tingling down the legs subsided some.  He has not having much of any pain he says it is 0/10 when he sitting down is just his knees give out with gait in when he walks distances the pain goes up to around 5 out of 10.  The left knee is the 1 that bothers him the most the hips he can not live with the stated.  I did discuss that if he has loss of motion in the hip will vent him from doing well with the total knee.  He said he has good motion he ambulates without assistive devices.  He does have a brace that he places on the left knee to give him some stability.  He had been on different NSAIDs and  gabapentin and multiple treatments including injections in the back and in the knees by pain management.  You would like to have his left total knee replacement done  Spent a long time with him showed him model how it is performed went over the pros and cons and the instructions in details and reviewed his recent x-rays of the lumbar spine and hips as well as the knees    04/25/2024   Left TKA 02/20/2024 was doing well however apparently there was some sutures coming from underneath were pulled by physical therapy.  Did not go to do any dental work.  Eventually had erythema and inflammation and was treated with antibiotics by mouth.  He started to have some fever was taking Tylenol to it.  We obtained sed rate and CRP and the CRP was above 100 and the sed rate above 30 highly suspicious of infected knee.  His knee is swollen.  I had him come in today for aspiration.  Examination revealed large effusion and we aspirated 50 cc of blood tinged fluid today.  I did discuss with him DAIR (debridement antibiotics implant retention with poly exchange) versus 2 stage reimplantation.  You he is only 2 weeks into this and some people and studies have shown double DAIR  procedure sometimes work also  No fever no chills no shortness of breath.  I did tell him that we need to get on top of it quickly to help save the knee.  If the infection goes into the blood stream it becomes a problem.  He probably will be on IV antibiotics for 4-6 weeks and chronic suppressive antibiotics.  Since he has been on antibiotics we will send the aspirate to [facility] and I described that to him.  Past Medical History:   Diagnosis Date    Hyperlipidemia     PONV (postoperative nausea and vomiting) 1990    after knee arthroscopy    Vitamin D deficiency 12/18/2019     Past Surgical History:   Procedure Laterality Date    COLONOSCOPY      COLONOSCOPY N/A 12/19/2019    Procedure: COLONOSCOPY;  Surgeon: Bhupendra Wilkinson MD;  Location: [facility];   Service: Endoscopy;  Laterality: N/A;    EPIDURAL STEROID INJECTION N/A 12/09/2022    Procedure: L4-5 intralaminar epidural steroid injection with left paramedian approach;  Surgeon: Ben Eddy MD;  Location: [facility];  Service: Pain Management;  Laterality: N/A;    KNEE ARTHROSCOPY Left     LUMBAR FUSION  09/2023    SELECTIVE INJECTION OF ANESTHETIC AGENT AROUND LUMBAR SPINAL NERVE ROOT BY TRANSFORAMINAL APPROACH Bilateral 01/21/2022    Procedure: Bilateral L4/5 TF LOVE with RN IV sedation;  Surgeon: Ben Eddy MD;  Location: [facility];  Service: Pain Management;  Laterality: Bilateral;    SELECTIVE INJECTION OF ANESTHETIC AGENT AROUND LUMBAR SPINAL NERVE ROOT BY TRANSFORAMINAL APPROACH Bilateral 05/06/2022    Procedure: Bilateral L4/5 TF LOVE with RN IV sedation;  Surgeon: Ben Eddy MD;  Location: [facility];  Service: Pain Management;  Laterality: Bilateral;    SELECTIVE INJECTION OF ANESTHETIC AGENT AROUND LUMBAR SPINAL NERVE ROOT BY TRANSFORAMINAL APPROACH Bilateral 10/25/2022    Procedure: Bilateral L4/5 TF LOVE with RN IV sedation;  Surgeon: Ben Eddy MD;  Location: [facility];  Service: Pain Management;  Laterality: Bilateral;    TOTAL KNEE ARTHROPLASTY Left 2/20/2024    Procedure: ARTHROPLASTY, KNEE, TOTAL;  Surgeon: Ryan Woods MD;  Location: [facility];  Service: Orthopedics;  Laterality: Left;    TRANSFORAMINAL LUMBAR INTERBODY FUSION (TLIF) USING COMPUTER-ASSISTED NAVIGATION Bilateral 09/06/2023    Procedure: FUSION, SPINE, LUMBAR, TLIF, USING COMPUTER-ASSISTED NAVIGATION;  Surgeon: Lyle Lobato MD;  Location: [facility] OR;  Service: Neurosurgery;  Laterality: Bilateral;  L3-5 TLIF    VASECTOMY  1995     Family History   Problem Relation Name Age of Onset    Cancer Mother Cynthia Pride         anal cancer    Diabetes Mother Cynthia Pride         PRE DIABETES    Arthritis Mother Cynthia Pride     Hearing loss Mother Cynthia Pride     No Known Problems Sister       Gallbladder disease Brother      Hepatitis Brother          Hep C     Social History     Socioeconomic History    Marital status:    Tobacco Use    Smoking status: Never     Passive exposure: Never    Smokeless tobacco: Never   Substance and Sexual Activity    Alcohol use: Yes     Alcohol/week: 3.0 standard drinks of alcohol     Types: 3 Glasses of wine per week     Comment: occ    Drug use: Never    Sexual activity: Yes     Partners: Female     Birth control/protection: Partner-Vasectomy, None     Social Determinants of Health     Financial Resource Strain: Low Risk  (1/29/2024)    Overall Financial Resource Strain (CARDIA)     Difficulty of Paying Living Expenses: Not hard at all   Food Insecurity: No Food Insecurity (1/29/2024)    Hunger Vital Sign     Worried About Running Out of Food in the Last Year: Never true     Ran Out of Food in the Last Year: Never true   Transportation Needs: No Transportation Needs (1/29/2024)    PRAPARE - Transportation     Lack of Transportation (Medical): No     Lack of Transportation (Non-Medical): No   Physical Activity: Sufficiently Active (1/29/2024)    Exercise Vital Sign     Days of Exercise per Week: 5 days     Minutes of Exercise per Session: 30 min   Recent Concern: Physical Activity - Insufficiently Active (1/29/2024)    Exercise Vital Sign     Days of Exercise per Week: 4 days     Minutes of Exercise per Session: 20 min   Stress: No Stress Concern Present (1/29/2024)    [place] [employer] Occupational [employer] - Occupational Stress Questionnaire     Feeling of Stress : Not at all   Social Connections: Socially Integrated (1/29/2024)    Social Connection and Isolation Panel [NHANES]     Frequency of Communication with Friends and Family: Three times a week     Frequency of Social Gatherings with Friends and Family: Once a week     Attends Confucianism Services: More than 4 times per year     Active Member of Clubs or Organizations: Yes     Attends Club or Organization  Meetings: More than 4 times per year     Marital Status:    Housing Stability: Low Risk  (1/29/2024)    Housing Stability Vital Sign     Unable to Pay for Housing in the Last Year: No     Number of Places Lived in the Last Year: 1     Unstable Housing in the Last Year: No     Medication List with Changes/Refills   Current Medications    CEPHALEXIN (KEFLEX) 500 MG CAPSULE    Take 1 capsule (500 mg total) by mouth every 8 (eight) hours.    CICLOPIROX (PENLAC) 8 % SOLN    Apply topically nightly.    ERGOCALCIFEROL, VITAMIN D2, (VITAMIN D ORAL)    Take by mouth once daily.    KETOCONAZOLE (NIZORAL) 2 % CREAM    Apply topically 2 (two) times daily. For feet and web-spaces. Use for up to 4 weeks.    PRAVASTATIN (PRAVACHOL) 40 MG TABLET    Take 1 tablet (40 mg total) by mouth once daily.    RIFAMPIN (RIFADIN) 300 MG CAPSULE    Take 1 capsule (300 mg total) by mouth once daily.    TAMSULOSIN (FLOMAX) 0.4 MG CAP    Take 1 capsule (0.4 mg total) by mouth once daily.   Discontinued Medications    GABAPENTIN (NEURONTIN) 300 MG CAPSULE    Take 2 capsules (600 mg total) by mouth every evening.    MELOXICAM (MOBIC) 15 MG TABLET    Take 1 tablet (15 mg total) by mouth once daily.    ONDANSETRON (ZOFRAN) 4 MG TABLET    Take 1 tablet (4 mg total) by mouth every 6 (six) hours as needed for Nausea (Nausea).     Review of patient's allergies indicates:  No Known Allergies  Review of Systems   Constitutional: Negative for decreased appetite.   HENT:  Negative for tinnitus.    Eyes:  Negative for double vision.   Cardiovascular:  Negative for chest pain.   Respiratory:  Negative for wheezing.    Hematologic/Lymphatic: Negative for bleeding problem.   Skin:  Negative for dry skin.   Musculoskeletal:  Positive for arthritis, back pain, joint pain, joint swelling and stiffness. Negative for gout and neck pain.   Gastrointestinal:  Negative for abdominal pain.   Genitourinary:  Negative for bladder incontinence.   Neurological:   Negative for numbness, paresthesias and sensory change.   Psychiatric/Behavioral:  Negative for altered mental status.        Objective:   Body mass index is 28.63 kg/m².  There were no vitals filed for this visit.       General    Constitutional: He is oriented to person, place, and time. He appears well-developed.   HENT:   Head: Atraumatic.   Eyes: EOM are normal.   Pulmonary/Chest: Effort normal.   Neurological: He is alert and oriented to person, place, and time.   Psychiatric: Judgment normal.           Ambulating without any assistive devices  Lumbar without true deformity.  Negative straight leg raising bilaterally.  Pelvis is level  Right hip internal rotation 15° external rotation 20° at 90° of flexion.  Abduction of the hip to 30° and internal rotation yet severe pain in the groin.  No flexion contracture.  Right hip flexors and abductors are 5/5 however he has quad atrophy  Left hip internal rotation 15° external rotation 30° at 90° of flexion.  Abduction to 30° and internal rotation with very mild pain in the groin.  No flexion contractures.  Hip flexors and abductors are 5/5 however is also he has quad atrophy  Right knee range of motion 0-120 degrees.  Very mild crepitus to compression on the patella.  No swelling no defect in the patella tendon or quadriceps tendon.  There is definitely quadriceps atrophy  Left knee preop with severe varus deformity.  There is quite a bit of loosening lateral collateral.  Range of motion is 5-100°.  No defect in the patella or quadriceps tendon.  There is quad atrophy also.  There is crepitus to compression on the patella as well as pain medially.  Right knee postop TKA midline incision healed well.  There is marked swelling and there is warmness to touch.  Active motion 0-90 degrees.  Very mild erythema.  Calves are soft nontender  Ankle motion intact  Skin is warm to touch no obvious lesions  Capillary refill less than 2 seconds    Relevant imaging results reviewed  and interpreted by me, discussed with the patient and / or family today   X-ray left knee TKA in excellent alignment  X-ray 01/12/2024 bilateral knees with the left knee severe varus deformity complete loss medial joint space marginal osteophytic changes large posterior osteophytes as well as anterior.  The right knee has also degenerative changes with varus deformity but not as severe as the left knee.  No fracture seen  There is x-ray from November 2023 lumbar spine showing 3 level fusion with hardware  X-ray 12/01/2022 of the pelvis showing right hip with loss of joint space on the lateral aspect with large osteophytes of the acetabulum and superiorly and inferiorly with a small osteophyte on the femoral heads bilaterally.  Same findings on the left hip   X-ray of both knees showing left knee with complete loss of medial joint space with marginal osteophytic changes and severe varus deformity.  The right knee has moderately severe varus deformity and loss of medial joint space and marginal osteophytic changes  X-rays in the electronic records reviewed today in details  X-ray of the pelvis showing right hip with marginal osteophytic changes on the acetabulum with loss of joint space consistent with arthritis.  Left hip has some marginal osteophytes on the acetabulum and the hip joint also consistent with mild arthritis  X-ray of the left knee with complete loss of medial joint space.  Marginal osteophytic changes severe varus deformity.  Radiculopathy  EMG and nerve conduction study showing L2, L4, L5 and S1 radiculopathy      Above 100 CRP, sed rate in the 30s, with history of fever and cellulitis on antibiotics over the last couple weeks.  Assessment:     Encounter Diagnoses   Name Primary?    Effusion, left knee Yes    Status post total left knee replacement using cement     Infection associated with internal right knee prosthesis, subsequent encounter         Plan:   Effusion, left knee  -     Large Joint  Aspiration/Injection  -     Case Request Operating Room: INCISION AND DRAINAGE, KNEE, REVISION, ARTHROPLASTY, KNEE  -     CULTURE, ANAEROBE  -     Aerobic culture  -     CULTURE, FUNGUS  -     GRAM STAIN  -     WBC & Diff,Body Fluid Joint Fluid, Left Knee  -     Body fluid crystal Joint Fluid, Left Knee    Status post total left knee replacement using cement  -     Case Request Operating Room: INCISION AND DRAINAGE, KNEE, REVISION, ARTHROPLASTY, KNEE    Infection associated with internal right knee prosthesis, subsequent encounter         Patient Instructions   Your CRP is 107 and you sed rate is 37  You white count is normal   Your left total knee is extremely swollen in you range of motion is limited to 0-90 degrees   You have been taking antibiotics over the last 2 weeks so most likely the lab might not grow anything however we will send the fluid to [facility] where will look at DNA and message are any of that bacteria will give us a better diagnosis new line we aspirated 50 cc of bloody tinged fluid from her left total knee.  We will also send it to our lab to see if it grows anything and we will check the white cell count in it as well as if you have crystals    This is early you surgery had been done on 02/20/2024 of the left TKA and since it is early infection I would like to do what we call debridement, antibiotics, implant retention procedure which means opening up her knee washing out the knee joint and replacing your plastic insert.  You will be receiving IV antibiotics for roughly 4-6 weeks with the help of the Infectious Disease specialist.  And after that you might be on pills/antibiotics for a period of 6 months.  At this time since it has an early infection we can save the knee joint.  Sometimes this does not work and we have to take the the whole knee out place antibiotic spacer inside you knee and IV antibiotics and return in 6 weeks to put a new knee in place.  Will proceed with I and D  and plastic exchange in the morning  Most likely you will be in the hospital for 3-4 days  They will be placing what we call a PICC line for IV antibiotics it should last 6-8 weeks  Procedure, common risks and benefits,alternatives discussed in details.All questions answered.Patient expressed understanding.Patient instructed to call for any questions that could arise in the future.    Most common Risks:  Infection  Leg-length discrepancy  Dislocation  Neuro-vascular injury ( resulting in loss of motor and sensory functions)  Pain  Blood clot  Fat clot  Loss of motion  Fracture of bone  Failure of procedure to achieve its intended purpose  Failure of hardware  Non-union or mal-union of bone  Malalignment  Death      Disclaimer: This note was prepared using a voice recognition system and is likely to have sound alike errors within the text.

## 2024-04-25 NOTE — TELEPHONE ENCOUNTER
Spoke with Dr. Woods's nurse, Maite, about labs needed in pre op day of surgery. Per Maite, Pt will need U/A in pre op day of surgery. Pt will not need a type and screen for surgery.

## 2024-04-25 NOTE — TELEPHONE ENCOUNTER
Pre op instructions reviewed with Pt over telephone, verbalized understanding.    To confirm, Surgery is scheduled on 4/26/24.   PLEASE ARRIVE AT 7:15 AM  *Please report to the [facility] (1st Floor) located off of [facility] (2nd Entrance/Building on the left, in front of the flag pole).  Address: [address] [place] [address] [place]. [address]      INSTRUCTIONS IMPORTANT!!!  DO NOT Eat, Drink, or Smoke after 12 midnight unless instructed otherwise by your Surgeon. OK to brush teeth, no gum, candy or mints!    >>>MEDICATION INSTRUCTIONS<<<: Morning of Surgery, take small sip of water with ONLY these medications:  FLOMAX, PRN    *Diabetic/ Prediabetic Patients: !!!If you take diabetic or weight loss medication, Do NOT take morning of surgery unless instructed by Doctor!!!  Metformin to be stopped 24 hrs prior to surgery.   Long Acting Insulin Instructions: HOLD the night before surgery unless instructed differently by Provider!  Ozempic/ Mounjaro/ Wegovy/ Trulicity/ Semaglutide injections or weight loss medication to be stopped 7 days prior to surgery.    !!!STOP ALL Aspirins, NSAIDS, WEIGHT LOSS INJECTIONS/PILLS, Herbal supplements, & Vitamins 7 DAYS BEFORE SURGERY!!!    ____  Avoid Alcoholic beverages 3 days prior to surgery, as it can thin the blood.  ____  NO Acrylic/fake nails or nail polish worn day of surgery (specifically hand/arm & foot surgeries).  ____  NO powder, lotions, deodorants, oils or cream on body.  ____  Remove all jewelry & piercings & foreign objects before arrival & leave at home.  ____  Remove Dentures, Hearing Aids & Contact Lens prior to surgery.  ____  Bring photo ID and insurance information to hospital (Leave Valuables at Home).  ____  If going home the same day, arrange for a ride home. You will not be able to drive for 24 hrs if Anesthesia was used.   ____  Females (ages 11-60): may need to give a urine sample the morning of surgery; please see Pre op Nurse prior to using  the restroom.  ____  Males: Stop ED medications (Viagra, Cialis) 24 hrs prior to surgery.  ____  Wear clean, loose fitting clothing to allow for dressings/ bandages.      Bathing Instructions:    -Shower with anti-bacterial Soap (Hibiclens or Dial) the night before surgery and the morning of.   -Do not use Hibiclens on your face or genitals.   -Apply clean clothes after shower.  -Do not shave your face or body 2 days prior to surgery unless instructed otherwise by your Surgeon.  -Do not shave pubic hair 7 days prior to surgery (gyn pt's).    [facility]/Ride Policy:  Only 2 adults allowed in pre op/recovery area during your procedure. You MUST HAVE A RIDE HOME from a responsible adult that you know and trust. Medical Transport, Uber or Lyft can ONLY be used if patient has a responsible adult to accompany them during ride home.       *Signs and symptoms of Infection Before or After Surgery:               !!!If you experience any fever, chills, nausea/ vomiting, foul odor/ excessive drainage from surgical site, flu-like symptoms, new wounds or cuts, PLEASE CALL THE SURGEON OFFICE at [phone] or SEND MESSAGE THROUGH [facility] PORTAL!!!     *If you are running late the morning of surgery, please call the Hospital Surgery Dept @ [phone].     *Billing questions:  [phone]     Thank you,  -[facility] Pre Admit Dept.  [phone] or [phone]  M-F 7:30 am-4:00 pm (Closed Major Holidays)

## 2024-04-25 NOTE — PROGRESS NOTES
Subjective:     Patient ID: Myles Pride is a 68 y.o. male.    Chief Complaint: Pain and Post-op Evaluation of the Left Knee      HPI:  06/02/2022  Patient with left knee severe pain today is total complaining also of the right hip.  He had previous x-rays in the electronic records.  He feels the right hip when he abducts his hip and try to get out of the car is seems to be very painful in the groin.  His knee on the left side does not bother him as much.  He does have severe back issues and we did order EMG and nerve conduction studies that show multilevel radiculopathy from L2, L4-L5 and S1 bilaterally.  He states he does get thigh anterior thigh pain.  I did tell him a could be from pinched nerves in his back.  He does see pain management for his back and injection seems to have helped a little bit.  He tries to maintain activities however now he said he cannot even hike 2 miles.  One mildly can walk but now is knee gives out on him on the left and his hip hurts on the right.  He did know if the injection given to him in February in the left knee helped or not because also received injections in his back.  No loss of bowel bladder control blurry vision double vision or loss sense smell or taste or fever or chills    As far as the gabapentin we discussed that in detail today he does not take it as much bit I did tell him for it to work he needs to be on it constantly.  He was prescribed that by pain management to take 300 mg at night for we can go up to 600 mg at night after that and a for a week and then go up to 900 at night.  I did tell him I will probably stop at 600 if you doing well with that.  Will take roughly 3 months for the gabapentin to work well and if he does not take it constantly there is no help for it.  If he decides to do surgery afterwards he needs to be on gabapentin to help with nerve pain    12/01/2022   X-rays obtained today of his hips and the knees.  He is scheduled to receive  injections in his back in a week with pain management Dr. Eddy  .  He said his right hip seems to be constantly aching him.  Last time we saw him we discussed that usually we do hip surgery 1st and then knee surgery after that in joint replacement.  However he does have quite a bit of range of motion left in his hips and the knee is the 1 that is concerning him on specially on the left side.  His pain level is 7/10 sometimes.  He is able to manage in trying to be very active he does water exercise programs.  He did have a lumbar injection around 5 weeks ago but now he is going for a 2nd injection.  He feels the right hip is the 1 that aches the most of both hips and his left knee.  He knows he has arthritis in both knees and both hips.  Also has issues in his back and stenosis.    No fever no chills no shortness of breath or difficulty with chewing swallowing loss of bowel bladder control blurry vision vision or double vision loss sense smell or taste    01/12/2024   Bilateral knee arthritis with the left knee severely deformed.  Also he had mild pain in both of his hips with the right a little bit worse than the left.  Last time seen he had severe spinal stenosis and multilevel radiculopathy.  Since then he had undergone lumbar decompression and fusion by Dr. Lobato  .  He is doing well with his back.  The numbness tingling down the legs subsided some.  He has not having much of any pain he says it is 0/10 when he sitting down is just his knees give out with gait in when he walks distances the pain goes up to around 5 out of 10.  The left knee is the 1 that bothers him the most the hips he can not live with the stated.  I did discuss that if he has loss of motion in the hip will vent him from doing well with the total knee.  He said he has good motion he ambulates without assistive devices.  He does have a brace that he places on the left knee to give him some stability.  He had been on different NSAIDs and  gabapentin and multiple treatments including injections in the back and in the knees by pain management.  You would like to have his left total knee replacement done  Spent a long time with him showed him model how it is performed went over the pros and cons and the instructions in details and reviewed his recent x-rays of the lumbar spine and hips as well as the knees    04/25/2024   Left TKA 02/20/2024 was doing well however apparently there was some sutures coming from underneath were pulled by physical therapy.  Did not go to do any dental work.  Eventually had erythema and inflammation and was treated with antibiotics by mouth.  He started to have some fever was taking Tylenol to it.  We obtained sed rate and CRP and the CRP was above 100 and the sed rate above 30 highly suspicious of infected knee.  His knee is swollen.  I had him come in today for aspiration.  Examination revealed large effusion and we aspirated 50 cc of blood tinged fluid today.  I did discuss with him DAIR (debridement antibiotics implant retention with poly exchange) versus 2 stage reimplantation.  You he is only 2 weeks into this and some people and studies have shown double DAIR  procedure sometimes work also  No fever no chills no shortness of breath.  I did tell him that we need to get on top of it quickly to help save the knee.  If the infection goes into the blood stream it becomes a problem.  He probably will be on IV antibiotics for 4-6 weeks and chronic suppressive antibiotics.  Since he has been on antibiotics we will send the aspirate to [facility] and I described that to him.  Past Medical History:   Diagnosis Date    Hyperlipidemia     PONV (postoperative nausea and vomiting) 1990    after knee arthroscopy    Vitamin D deficiency 12/18/2019     Past Surgical History:   Procedure Laterality Date    COLONOSCOPY      COLONOSCOPY N/A 12/19/2019    Procedure: COLONOSCOPY;  Surgeon: Bhupendra Wilkinson MD;  Location: [facility];   Service: Endoscopy;  Laterality: N/A;    EPIDURAL STEROID INJECTION N/A 12/09/2022    Procedure: L4-5 intralaminar epidural steroid injection with left paramedian approach;  Surgeon: Ben Eddy MD;  Location: [facility];  Service: Pain Management;  Laterality: N/A;    KNEE ARTHROSCOPY Left     LUMBAR FUSION  09/2023    SELECTIVE INJECTION OF ANESTHETIC AGENT AROUND LUMBAR SPINAL NERVE ROOT BY TRANSFORAMINAL APPROACH Bilateral 01/21/2022    Procedure: Bilateral L4/5 TF LOVE with RN IV sedation;  Surgeon: Ben Eddy MD;  Location: [facility];  Service: Pain Management;  Laterality: Bilateral;    SELECTIVE INJECTION OF ANESTHETIC AGENT AROUND LUMBAR SPINAL NERVE ROOT BY TRANSFORAMINAL APPROACH Bilateral 05/06/2022    Procedure: Bilateral L4/5 TF LOVE with RN IV sedation;  Surgeon: Ben Eddy MD;  Location: [facility];  Service: Pain Management;  Laterality: Bilateral;    SELECTIVE INJECTION OF ANESTHETIC AGENT AROUND LUMBAR SPINAL NERVE ROOT BY TRANSFORAMINAL APPROACH Bilateral 10/25/2022    Procedure: Bilateral L4/5 TF LOVE with RN IV sedation;  Surgeon: Ben Eddy MD;  Location: [facility];  Service: Pain Management;  Laterality: Bilateral;    TOTAL KNEE ARTHROPLASTY Left 2/20/2024    Procedure: ARTHROPLASTY, KNEE, TOTAL;  Surgeon: Ryan Woods MD;  Location: [facility];  Service: Orthopedics;  Laterality: Left;    TRANSFORAMINAL LUMBAR INTERBODY FUSION (TLIF) USING COMPUTER-ASSISTED NAVIGATION Bilateral 09/06/2023    Procedure: FUSION, SPINE, LUMBAR, TLIF, USING COMPUTER-ASSISTED NAVIGATION;  Surgeon: Lyle Lobato MD;  Location: [facility] OR;  Service: Neurosurgery;  Laterality: Bilateral;  L3-5 TLIF    VASECTOMY  1995     Family History   Problem Relation Name Age of Onset    Cancer Mother Cynthia Pride         anal cancer    Diabetes Mother Cynthia Pride         PRE DIABETES    Arthritis Mother Cynthia Pride     Hearing loss Mother Cynthia Pride     No Known Problems Sister       Gallbladder disease Brother      Hepatitis Brother          Hep C     Social History     Socioeconomic History    Marital status:    Tobacco Use    Smoking status: Never     Passive exposure: Never    Smokeless tobacco: Never   Substance and Sexual Activity    Alcohol use: Yes     Alcohol/week: 3.0 standard drinks of alcohol     Types: 3 Glasses of wine per week     Comment: occ    Drug use: Never    Sexual activity: Yes     Partners: Female     Birth control/protection: Partner-Vasectomy, None     Social Determinants of Health     Financial Resource Strain: Low Risk  (1/29/2024)    Overall Financial Resource Strain (CARDIA)     Difficulty of Paying Living Expenses: Not hard at all   Food Insecurity: No Food Insecurity (1/29/2024)    Hunger Vital Sign     Worried About Running Out of Food in the Last Year: Never true     Ran Out of Food in the Last Year: Never true   Transportation Needs: No Transportation Needs (1/29/2024)    PRAPARE - Transportation     Lack of Transportation (Medical): No     Lack of Transportation (Non-Medical): No   Physical Activity: Sufficiently Active (1/29/2024)    Exercise Vital Sign     Days of Exercise per Week: 5 days     Minutes of Exercise per Session: 30 min   Recent Concern: Physical Activity - Insufficiently Active (1/29/2024)    Exercise Vital Sign     Days of Exercise per Week: 4 days     Minutes of Exercise per Session: 20 min   Stress: No Stress Concern Present (1/29/2024)    [place] [employer] - Occupational Stress Questionnaire     Feeling of Stress : Not at all   Social Connections: Socially Integrated (1/29/2024)    Social Connection and Isolation Panel [NHANES]     Frequency of Communication with Friends and Family: Three times a week     Frequency of Social Gatherings with Friends and Family: Once a week     Attends Jainism Services: More than 4 times per year     Active Member of Clubs or Organizations: Yes     Attends Club or Organization  Meetings: More than 4 times per year     Marital Status:    Housing Stability: Low Risk  (1/29/2024)    Housing Stability Vital Sign     Unable to Pay for Housing in the Last Year: No     Number of Places Lived in the Last Year: 1     Unstable Housing in the Last Year: No     Medication List with Changes/Refills   Current Medications    CEPHALEXIN (KEFLEX) 500 MG CAPSULE    Take 1 capsule (500 mg total) by mouth every 8 (eight) hours.    CICLOPIROX (PENLAC) 8 % SOLN    Apply topically nightly.    ERGOCALCIFEROL, VITAMIN D2, (VITAMIN D ORAL)    Take by mouth once daily.    KETOCONAZOLE (NIZORAL) 2 % CREAM    Apply topically 2 (two) times daily. For feet and web-spaces. Use for up to 4 weeks.    PRAVASTATIN (PRAVACHOL) 40 MG TABLET    Take 1 tablet (40 mg total) by mouth once daily.    RIFAMPIN (RIFADIN) 300 MG CAPSULE    Take 1 capsule (300 mg total) by mouth once daily.    TAMSULOSIN (FLOMAX) 0.4 MG CAP    Take 1 capsule (0.4 mg total) by mouth once daily.   Discontinued Medications    GABAPENTIN (NEURONTIN) 300 MG CAPSULE    Take 2 capsules (600 mg total) by mouth every evening.    MELOXICAM (MOBIC) 15 MG TABLET    Take 1 tablet (15 mg total) by mouth once daily.    ONDANSETRON (ZOFRAN) 4 MG TABLET    Take 1 tablet (4 mg total) by mouth every 6 (six) hours as needed for Nausea (Nausea).     Review of patient's allergies indicates:  No Known Allergies  Review of Systems   Constitutional: Negative for decreased appetite.   HENT:  Negative for tinnitus.    Eyes:  Negative for double vision.   Cardiovascular:  Negative for chest pain.   Respiratory:  Negative for wheezing.    Hematologic/Lymphatic: Negative for bleeding problem.   Skin:  Negative for dry skin.   Musculoskeletal:  Positive for arthritis, back pain, joint pain, joint swelling and stiffness. Negative for gout and neck pain.   Gastrointestinal:  Negative for abdominal pain.   Genitourinary:  Negative for bladder incontinence.   Neurological:   Negative for numbness, paresthesias and sensory change.   Psychiatric/Behavioral:  Negative for altered mental status.        Objective:   Body mass index is 28.63 kg/m².  There were no vitals filed for this visit.       General    Constitutional: He is oriented to person, place, and time. He appears well-developed.   HENT:   Head: Atraumatic.   Eyes: EOM are normal.   Pulmonary/Chest: Effort normal.   Neurological: He is alert and oriented to person, place, and time.   Psychiatric: Judgment normal.           Ambulating without any assistive devices  Lumbar without true deformity.  Negative straight leg raising bilaterally.  Pelvis is level  Right hip internal rotation 15° external rotation 20° at 90° of flexion.  Abduction of the hip to 30° and internal rotation yet severe pain in the groin.  No flexion contracture.  Right hip flexors and abductors are 5/5 however he has quad atrophy  Left hip internal rotation 15° external rotation 30° at 90° of flexion.  Abduction to 30° and internal rotation with very mild pain in the groin.  No flexion contractures.  Hip flexors and abductors are 5/5 however is also he has quad atrophy  Right knee range of motion 0-120 degrees.  Very mild crepitus to compression on the patella.  No swelling no defect in the patella tendon or quadriceps tendon.  There is definitely quadriceps atrophy  Left knee preop with severe varus deformity.  There is quite a bit of loosening lateral collateral.  Range of motion is 5-100°.  No defect in the patella or quadriceps tendon.  There is quad atrophy also.  There is crepitus to compression on the patella as well as pain medially.  Right knee postop TKA midline incision healed well.  There is marked swelling and there is warmness to touch.  Active motion 0-90 degrees.  Very mild erythema.  Calves are soft nontender  Ankle motion intact  Skin is warm to touch no obvious lesions  Capillary refill less than 2 seconds    Relevant imaging results reviewed  and interpreted by me, discussed with the patient and / or family today   X-ray left knee TKA in excellent alignment  X-ray 01/12/2024 bilateral knees with the left knee severe varus deformity complete loss medial joint space marginal osteophytic changes large posterior osteophytes as well as anterior.  The right knee has also degenerative changes with varus deformity but not as severe as the left knee.  No fracture seen  There is x-ray from November 2023 lumbar spine showing 3 level fusion with hardware  X-ray 12/01/2022 of the pelvis showing right hip with loss of joint space on the lateral aspect with large osteophytes of the acetabulum and superiorly and inferiorly with a small osteophyte on the femoral heads bilaterally.  Same findings on the left hip   X-ray of both knees showing left knee with complete loss of medial joint space with marginal osteophytic changes and severe varus deformity.  The right knee has moderately severe varus deformity and loss of medial joint space and marginal osteophytic changes  X-rays in the electronic records reviewed today in details  X-ray of the pelvis showing right hip with marginal osteophytic changes on the acetabulum with loss of joint space consistent with arthritis.  Left hip has some marginal osteophytes on the acetabulum and the hip joint also consistent with mild arthritis  X-ray of the left knee with complete loss of medial joint space.  Marginal osteophytic changes severe varus deformity.  Radiculopathy  EMG and nerve conduction study showing L2, L4, L5 and S1 radiculopathy      Above 100 CRP, sed rate in the 30s, with history of fever and cellulitis on antibiotics over the last couple weeks.  Assessment:     Encounter Diagnoses   Name Primary?    Effusion, left knee Yes    Status post total left knee replacement using cement     Infection associated with internal right knee prosthesis, subsequent encounter         Plan:   Effusion, left knee  -     Large Joint  Aspiration/Injection  -     Case Request Operating Room: INCISION AND DRAINAGE, KNEE, REVISION, ARTHROPLASTY, KNEE  -     CULTURE, ANAEROBE  -     Aerobic culture  -     CULTURE, FUNGUS  -     GRAM STAIN  -     WBC & Diff,Body Fluid Joint Fluid, Left Knee  -     Body fluid crystal Joint Fluid, Left Knee    Status post total left knee replacement using cement  -     Case Request Operating Room: INCISION AND DRAINAGE, KNEE, REVISION, ARTHROPLASTY, KNEE    Infection associated with internal right knee prosthesis, subsequent encounter         Patient Instructions   Your CRP is 107 and you sed rate is 37  You white count is normal   Your left total knee is extremely swollen in you range of motion is limited to 0-90 degrees   You have been taking antibiotics over the last 2 weeks so most likely the lab might not grow anything however we will send the fluid to "[facility] where will look at DNA and message are any of that bacteria will give us a better diagnosis new line we aspirated 50 cc of bloody tinged fluid from her left total knee.  We will also send it to our lab to see if it grows anything and we will check the white cell count in it as well as if you have crystals    This is early you surgery had been done on 02/20/2024 of the left TKA and since it is early infection I would like to do what we call debridement, antibiotics, implant retention procedure which means opening up her knee washing out the knee joint and replacing your plastic insert.  You will be receiving IV antibiotics for roughly 4-6 weeks with the help of the Infectious Disease specialist.  And after that you might be on pills/antibiotics for a period of 6 months.  At this time since it has an early infection we can save the knee joint.  Sometimes this does not work and we have to take the the whole knee out place antibiotic spacer inside you knee and IV antibiotics and return in 6 weeks to put a new knee in place.  Will proceed with I and D  and plastic exchange in the morning  Most likely you will be in the hospital for 3-4 days  They will be placing what we call a PICC line for IV antibiotics it should last 6-8 weeks  Procedure, common risks and benefits,alternatives discussed in details.All questions answered.Patient expressed understanding.Patient instructed to call for any questions that could arise in the future.    Most common Risks:  Infection  Leg-length discrepancy  Dislocation  Neuro-vascular injury ( resulting in loss of motor and sensory functions)  Pain  Blood clot  Fat clot  Loss of motion  Fracture of bone  Failure of procedure to achieve its intended purpose  Failure of hardware  Non-union or mal-union of bone  Malalignment  Death      Disclaimer: This note was prepared using a voice recognition system and is likely to have sound alike errors within the text.

## 2024-04-25 NOTE — PROCEDURES
Large Joint Aspiration/Injection    Date/Time: 4/25/2024 8:20 AM    Performed by: Ryan Woods MD  Authorized by: Ryan Woods MD    Consent Done?:  Yes (Verbal)  Indications:  Joint swelling  Site marked: the procedure site was marked    Timeout: prior to procedure the correct patient, procedure, and site was verified      Details:  Needle Size:  18 G  Ultrasonic Guidance for needle placement?: No    Approach:  Anterolateral  Location:  Knee  Aspirate amount (mL):  50  Aspirate:  Blood-tinged  Lab: fluid sent for laboratory analysis    Patient tolerance:  Patient tolerated the procedure well with no immediate complications     Fluid was sent to the lab for cell count, cultures, crystals  Also fluid is sent to [facility]

## 2024-04-26 ENCOUNTER — HOSPITAL ENCOUNTER (INPATIENT)
Facility: HOSPITAL | Age: 69
LOS: 4 days | Discharge: HOME HEALTH | DRG: 487 | End: 2024-04-30
Attending: ORTHOPAEDIC SURGERY | Admitting: ORTHOPAEDIC SURGERY
Payer: MEDICARE

## 2024-04-26 ENCOUNTER — ANESTHESIA EVENT (OUTPATIENT)
Dept: SURGERY | Facility: HOSPITAL | Age: 69
DRG: 487 | End: 2024-04-26
Payer: MEDICARE

## 2024-04-26 ENCOUNTER — ANESTHESIA (OUTPATIENT)
Dept: SURGERY | Facility: HOSPITAL | Age: 69
DRG: 487 | End: 2024-04-26
Payer: MEDICARE

## 2024-04-26 DIAGNOSIS — M25.462 EFFUSION OF LEFT KNEE: ICD-10-CM

## 2024-04-26 DIAGNOSIS — T84.54XD INFECTION ASSOCIATED WITH INTERNAL LEFT KNEE PROSTHESIS, SUBSEQUENT ENCOUNTER: Primary | ICD-10-CM

## 2024-04-26 LAB
HCT VFR BLD AUTO: 38.3 % (ref 40–54)
HCT VFR BLD AUTO: 38.8 % (ref 40–54)
HGB BLD-MCNC: 12.8 G/DL (ref 14–18)
HGB BLD-MCNC: 13.2 G/DL (ref 14–18)
PATH INTERP FLD-IMP: NORMAL

## 2024-04-26 PROCEDURE — 36000710: Performed by: ORTHOPAEDIC SURGERY

## 2024-04-26 PROCEDURE — 0SUW09Z SUPPLEMENT LEFT KNEE JOINT, TIBIAL SURFACE WITH LINER, OPEN APPROACH: ICD-10-PCS | Performed by: ORTHOPAEDIC SURGERY

## 2024-04-26 PROCEDURE — 63600175 PHARM REV CODE 636 W HCPCS: Mod: HCNC | Performed by: NURSE ANESTHETIST, CERTIFIED REGISTERED

## 2024-04-26 PROCEDURE — 11000001 HC ACUTE MED/SURG PRIVATE ROOM

## 2024-04-26 PROCEDURE — 87102 FUNGUS ISOLATION CULTURE: CPT | Performed by: ORTHOPAEDIC SURGERY

## 2024-04-26 PROCEDURE — 87116 MYCOBACTERIA CULTURE: CPT | Performed by: ORTHOPAEDIC SURGERY

## 2024-04-26 PROCEDURE — 63600175 PHARM REV CODE 636 W HCPCS: Performed by: ORTHOPAEDIC SURGERY

## 2024-04-26 PROCEDURE — 85018 HEMOGLOBIN: CPT | Performed by: ORTHOPAEDIC SURGERY

## 2024-04-26 PROCEDURE — 25000003 PHARM REV CODE 250: Performed by: ORTHOPAEDIC SURGERY

## 2024-04-26 PROCEDURE — 27486 REVISE/REPLACE KNEE JOINT: CPT | Mod: AS,78,52,LT | Performed by: PHYSICIAN ASSISTANT

## 2024-04-26 PROCEDURE — 88305 TISSUE EXAM BY PATHOLOGIST: CPT | Mod: 26,,, | Performed by: PATHOLOGY

## 2024-04-26 PROCEDURE — 27201423 OPTIME MED/SURG SUP & DEVICES STERILE SUPPLY: Performed by: ORTHOPAEDIC SURGERY

## 2024-04-26 PROCEDURE — 37000009 HC ANESTHESIA EA ADD 15 MINS: Performed by: ORTHOPAEDIC SURGERY

## 2024-04-26 PROCEDURE — 87205 SMEAR GRAM STAIN: CPT | Performed by: ORTHOPAEDIC SURGERY

## 2024-04-26 PROCEDURE — 0SPD09Z REMOVAL OF LINER FROM LEFT KNEE JOINT, OPEN APPROACH: ICD-10-PCS | Performed by: ORTHOPAEDIC SURGERY

## 2024-04-26 PROCEDURE — 87206 SMEAR FLUORESCENT/ACID STAI: CPT | Performed by: ORTHOPAEDIC SURGERY

## 2024-04-26 PROCEDURE — 71000039 HC RECOVERY, EACH ADD'L HOUR: Performed by: ORTHOPAEDIC SURGERY

## 2024-04-26 PROCEDURE — 0SBD0ZZ EXCISION OF LEFT KNEE JOINT, OPEN APPROACH: ICD-10-PCS | Performed by: ORTHOPAEDIC SURGERY

## 2024-04-26 PROCEDURE — 3E1U38Z IRRIGATION OF JOINTS USING IRRIGATING SUBSTANCE, PERCUTANEOUS APPROACH: ICD-10-PCS | Performed by: ORTHOPAEDIC SURGERY

## 2024-04-26 PROCEDURE — 87075 CULTR BACTERIA EXCEPT BLOOD: CPT | Performed by: ORTHOPAEDIC SURGERY

## 2024-04-26 PROCEDURE — 71000033 HC RECOVERY, INTIAL HOUR: Performed by: ORTHOPAEDIC SURGERY

## 2024-04-26 PROCEDURE — 36000711: Performed by: ORTHOPAEDIC SURGERY

## 2024-04-26 PROCEDURE — 85014 HEMATOCRIT: CPT | Mod: 91 | Performed by: ORTHOPAEDIC SURGERY

## 2024-04-26 PROCEDURE — 88305 TISSUE EXAM BY PATHOLOGIST: CPT | Performed by: PATHOLOGY

## 2024-04-26 PROCEDURE — C1776 JOINT DEVICE (IMPLANTABLE): HCPCS | Performed by: ORTHOPAEDIC SURGERY

## 2024-04-26 PROCEDURE — 25000003 PHARM REV CODE 250: Mod: HCNC | Performed by: NURSE ANESTHETIST, CERTIFIED REGISTERED

## 2024-04-26 PROCEDURE — 87070 CULTURE OTHR SPECIMN AEROBIC: CPT | Performed by: ORTHOPAEDIC SURGERY

## 2024-04-26 PROCEDURE — 0S9D0ZZ DRAINAGE OF LEFT KNEE JOINT, OPEN APPROACH: ICD-10-PCS | Performed by: ORTHOPAEDIC SURGERY

## 2024-04-26 PROCEDURE — 36415 COLL VENOUS BLD VENIPUNCTURE: CPT | Performed by: ORTHOPAEDIC SURGERY

## 2024-04-26 PROCEDURE — C1729 CATH, DRAINAGE: HCPCS | Performed by: ORTHOPAEDIC SURGERY

## 2024-04-26 PROCEDURE — 27486 REVISE/REPLACE KNEE JOINT: CPT | Mod: 78,52,LT, | Performed by: ORTHOPAEDIC SURGERY

## 2024-04-26 PROCEDURE — 37000008 HC ANESTHESIA 1ST 15 MINUTES: Performed by: ORTHOPAEDIC SURGERY

## 2024-04-26 DEVICE — XPE TIBIAL INSERT, PS, #6, 9MM
Type: IMPLANTABLE DEVICE | Site: KNEE | Status: FUNCTIONAL
Brand: XPE TIBIAL INSERT, PS

## 2024-04-26 RX ORDER — GABAPENTIN 300 MG/1
300 CAPSULE ORAL DAILY
Status: DISCONTINUED | OUTPATIENT
Start: 2024-04-26 | End: 2024-04-30 | Stop reason: HOSPADM

## 2024-04-26 RX ORDER — TRANEXAMIC ACID 10 MG/ML
1000 INJECTION, SOLUTION INTRAVENOUS
Status: COMPLETED | OUTPATIENT
Start: 2024-04-26 | End: 2024-04-26

## 2024-04-26 RX ORDER — SODIUM CHLORIDE 9 MG/ML
INJECTION, SOLUTION INTRAVENOUS CONTINUOUS
Status: DISCONTINUED | OUTPATIENT
Start: 2024-04-26 | End: 2024-04-27

## 2024-04-26 RX ORDER — TAMSULOSIN HYDROCHLORIDE 0.4 MG/1
0.4 CAPSULE ORAL DAILY
Status: DISCONTINUED | OUTPATIENT
Start: 2024-04-27 | End: 2024-04-30 | Stop reason: HOSPADM

## 2024-04-26 RX ORDER — ACETAMINOPHEN 325 MG/1
650 TABLET ORAL EVERY 8 HOURS PRN
Status: DISCONTINUED | OUTPATIENT
Start: 2024-04-26 | End: 2024-04-30 | Stop reason: HOSPADM

## 2024-04-26 RX ORDER — MORPHINE SULFATE 2 MG/ML
2 INJECTION, SOLUTION INTRAMUSCULAR; INTRAVENOUS EVERY 4 HOURS PRN
Status: DISCONTINUED | OUTPATIENT
Start: 2024-04-26 | End: 2024-04-27 | Stop reason: SDUPTHER

## 2024-04-26 RX ORDER — MEPERIDINE HYDROCHLORIDE 25 MG/ML
12.5 INJECTION INTRAMUSCULAR; INTRAVENOUS; SUBCUTANEOUS ONCE AS NEEDED
Status: DISCONTINUED | OUTPATIENT
Start: 2024-04-26 | End: 2024-04-26 | Stop reason: HOSPADM

## 2024-04-26 RX ORDER — LIDOCAINE HYDROCHLORIDE 10 MG/ML
INJECTION, SOLUTION EPIDURAL; INFILTRATION; INTRACAUDAL; PERINEURAL
Status: DISCONTINUED | OUTPATIENT
Start: 2024-04-26 | End: 2024-04-26

## 2024-04-26 RX ORDER — CHLORHEXIDINE GLUCONATE ORAL RINSE 1.2 MG/ML
10 SOLUTION DENTAL 2 TIMES DAILY
Status: DISCONTINUED | OUTPATIENT
Start: 2024-04-26 | End: 2024-04-30 | Stop reason: HOSPADM

## 2024-04-26 RX ORDER — PRAVASTATIN SODIUM 20 MG/1
40 TABLET ORAL DAILY
Status: DISCONTINUED | OUTPATIENT
Start: 2024-04-27 | End: 2024-04-30 | Stop reason: HOSPADM

## 2024-04-26 RX ORDER — ROCURONIUM BROMIDE 10 MG/ML
INJECTION, SOLUTION INTRAVENOUS
Status: DISCONTINUED | OUTPATIENT
Start: 2024-04-26 | End: 2024-04-26

## 2024-04-26 RX ORDER — ONDANSETRON 8 MG/1
8 TABLET, ORALLY DISINTEGRATING ORAL EVERY 8 HOURS PRN
Status: DISCONTINUED | OUTPATIENT
Start: 2024-04-26 | End: 2024-04-27 | Stop reason: SDUPTHER

## 2024-04-26 RX ORDER — CHLORHEXIDINE GLUCONATE ORAL RINSE 1.2 MG/ML
10 SOLUTION DENTAL
Status: DISCONTINUED | OUTPATIENT
Start: 2024-04-26 | End: 2024-04-26 | Stop reason: HOSPADM

## 2024-04-26 RX ORDER — CHOLECALCIFEROL (VITAMIN D3) 25 MCG
1000 TABLET ORAL DAILY
Status: DISCONTINUED | OUTPATIENT
Start: 2024-04-27 | End: 2024-04-30 | Stop reason: HOSPADM

## 2024-04-26 RX ORDER — OXYCODONE HYDROCHLORIDE 5 MG/1
5 TABLET ORAL
Status: DISCONTINUED | OUTPATIENT
Start: 2024-04-26 | End: 2024-04-30 | Stop reason: HOSPADM

## 2024-04-26 RX ORDER — DEXAMETHASONE SODIUM PHOSPHATE 4 MG/ML
8 INJECTION, SOLUTION INTRA-ARTICULAR; INTRALESIONAL; INTRAMUSCULAR; INTRAVENOUS; SOFT TISSUE
Status: DISCONTINUED | OUTPATIENT
Start: 2024-04-26 | End: 2024-04-26 | Stop reason: HOSPADM

## 2024-04-26 RX ORDER — ONDANSETRON HYDROCHLORIDE 2 MG/ML
4 INJECTION, SOLUTION INTRAVENOUS ONCE AS NEEDED
Status: DISCONTINUED | OUTPATIENT
Start: 2024-04-26 | End: 2024-04-26 | Stop reason: HOSPADM

## 2024-04-26 RX ORDER — FENTANYL CITRATE 50 UG/ML
25 INJECTION, SOLUTION INTRAMUSCULAR; INTRAVENOUS EVERY 5 MIN PRN
Status: DISCONTINUED | OUTPATIENT
Start: 2024-04-26 | End: 2024-04-26 | Stop reason: HOSPADM

## 2024-04-26 RX ORDER — ACETAMINOPHEN 325 MG/1
650 TABLET ORAL EVERY 8 HOURS PRN
Status: DISCONTINUED | OUTPATIENT
Start: 2024-04-26 | End: 2024-04-27 | Stop reason: SDUPTHER

## 2024-04-26 RX ORDER — CELECOXIB 100 MG/1
200 CAPSULE ORAL 2 TIMES DAILY
Status: DISCONTINUED | OUTPATIENT
Start: 2024-04-27 | End: 2024-04-30 | Stop reason: HOSPADM

## 2024-04-26 RX ORDER — BISACODYL 10 MG/1
10 SUPPOSITORY RECTAL DAILY PRN
Status: DISCONTINUED | OUTPATIENT
Start: 2024-04-26 | End: 2024-04-30 | Stop reason: HOSPADM

## 2024-04-26 RX ORDER — ONDANSETRON HYDROCHLORIDE 2 MG/ML
4 INJECTION, SOLUTION INTRAVENOUS EVERY 12 HOURS PRN
Status: DISCONTINUED | OUTPATIENT
Start: 2024-04-26 | End: 2024-04-30 | Stop reason: HOSPADM

## 2024-04-26 RX ORDER — NEOSTIGMINE METHYLSULFATE 1 MG/ML
INJECTION, SOLUTION INTRAVENOUS
Status: DISCONTINUED | OUTPATIENT
Start: 2024-04-26 | End: 2024-04-26

## 2024-04-26 RX ORDER — OXYCODONE AND ACETAMINOPHEN 5; 325 MG/1; MG/1
1 TABLET ORAL
Status: DISCONTINUED | OUTPATIENT
Start: 2024-04-26 | End: 2024-04-26 | Stop reason: HOSPADM

## 2024-04-26 RX ORDER — ONDANSETRON 8 MG/1
8 TABLET, ORALLY DISINTEGRATING ORAL EVERY 8 HOURS PRN
Status: DISCONTINUED | OUTPATIENT
Start: 2024-04-26 | End: 2024-04-30 | Stop reason: HOSPADM

## 2024-04-26 RX ORDER — FENTANYL CITRATE 50 UG/ML
INJECTION, SOLUTION INTRAMUSCULAR; INTRAVENOUS
Status: DISCONTINUED | OUTPATIENT
Start: 2024-04-26 | End: 2024-04-26

## 2024-04-26 RX ORDER — SODIUM CHLORIDE, SODIUM LACTATE, POTASSIUM CHLORIDE, CALCIUM CHLORIDE 600; 310; 30; 20 MG/100ML; MG/100ML; MG/100ML; MG/100ML
INJECTION, SOLUTION INTRAVENOUS CONTINUOUS PRN
Status: DISCONTINUED | OUTPATIENT
Start: 2024-04-26 | End: 2024-04-26

## 2024-04-26 RX ORDER — MIDAZOLAM HYDROCHLORIDE 1 MG/ML
INJECTION INTRAMUSCULAR; INTRAVENOUS
Status: DISCONTINUED | OUTPATIENT
Start: 2024-04-26 | End: 2024-04-26

## 2024-04-26 RX ORDER — CELECOXIB 100 MG/1
400 CAPSULE ORAL ONCE
Status: DISCONTINUED | OUTPATIENT
Start: 2024-04-26 | End: 2024-04-27 | Stop reason: SDUPTHER

## 2024-04-26 RX ORDER — PROPOFOL 10 MG/ML
VIAL (ML) INTRAVENOUS
Status: DISCONTINUED | OUTPATIENT
Start: 2024-04-26 | End: 2024-04-26

## 2024-04-26 RX ORDER — OXYCODONE HYDROCHLORIDE 5 MG/1
5 TABLET ORAL
Status: DISCONTINUED | OUTPATIENT
Start: 2024-04-26 | End: 2024-04-27 | Stop reason: SDUPTHER

## 2024-04-26 RX ORDER — HYDROMORPHONE HYDROCHLORIDE 2 MG/ML
0.2 INJECTION, SOLUTION INTRAMUSCULAR; INTRAVENOUS; SUBCUTANEOUS EVERY 5 MIN PRN
Status: DISCONTINUED | OUTPATIENT
Start: 2024-04-26 | End: 2024-04-26 | Stop reason: HOSPADM

## 2024-04-26 RX ORDER — ONDANSETRON HYDROCHLORIDE 2 MG/ML
INJECTION, SOLUTION INTRAVENOUS
Status: DISCONTINUED | OUTPATIENT
Start: 2024-04-26 | End: 2024-04-26

## 2024-04-26 RX ORDER — ONDANSETRON HYDROCHLORIDE 2 MG/ML
4 INJECTION, SOLUTION INTRAVENOUS EVERY 12 HOURS PRN
Status: DISCONTINUED | OUTPATIENT
Start: 2024-04-26 | End: 2024-04-27 | Stop reason: SDUPTHER

## 2024-04-26 RX ORDER — CHLORHEXIDINE GLUCONATE ORAL RINSE 1.2 MG/ML
10 SOLUTION DENTAL 2 TIMES DAILY
Status: DISCONTINUED | OUTPATIENT
Start: 2024-04-26 | End: 2024-04-27 | Stop reason: SDUPTHER

## 2024-04-26 RX ORDER — CELECOXIB 100 MG/1
200 CAPSULE ORAL 2 TIMES DAILY
Status: DISCONTINUED | OUTPATIENT
Start: 2024-04-27 | End: 2024-04-27 | Stop reason: SDUPTHER

## 2024-04-26 RX ORDER — CEFAZOLIN SODIUM 2 G/50ML
2 SOLUTION INTRAVENOUS
Status: COMPLETED | OUTPATIENT
Start: 2024-04-26 | End: 2024-04-26

## 2024-04-26 RX ORDER — SODIUM CHLORIDE 9 MG/ML
INJECTION, SOLUTION INTRAVENOUS CONTINUOUS
Status: DISCONTINUED | OUTPATIENT
Start: 2024-04-26 | End: 2024-04-26

## 2024-04-26 RX ORDER — DOCUSATE SODIUM 100 MG/1
100 CAPSULE, LIQUID FILLED ORAL 2 TIMES DAILY
Status: DISCONTINUED | OUTPATIENT
Start: 2024-04-26 | End: 2024-04-27 | Stop reason: SDUPTHER

## 2024-04-26 RX ORDER — MORPHINE SULFATE 2 MG/ML
2 INJECTION, SOLUTION INTRAMUSCULAR; INTRAVENOUS EVERY 4 HOURS PRN
Status: DISCONTINUED | OUTPATIENT
Start: 2024-04-26 | End: 2024-04-30 | Stop reason: HOSPADM

## 2024-04-26 RX ORDER — DOCUSATE SODIUM 100 MG/1
100 CAPSULE, LIQUID FILLED ORAL 2 TIMES DAILY
Status: DISCONTINUED | OUTPATIENT
Start: 2024-04-26 | End: 2024-04-30 | Stop reason: HOSPADM

## 2024-04-26 RX ORDER — CELECOXIB 100 MG/1
400 CAPSULE ORAL ONCE
Status: COMPLETED | OUTPATIENT
Start: 2024-04-26 | End: 2024-04-26

## 2024-04-26 RX ORDER — SUCCINYLCHOLINE CHLORIDE 20 MG/ML
INJECTION INTRAMUSCULAR; INTRAVENOUS
Status: DISCONTINUED | OUTPATIENT
Start: 2024-04-26 | End: 2024-04-26

## 2024-04-26 RX ORDER — OXYCODONE HYDROCHLORIDE 5 MG/1
10 TABLET ORAL
Status: DISCONTINUED | OUTPATIENT
Start: 2024-04-26 | End: 2024-04-27 | Stop reason: SDUPTHER

## 2024-04-26 RX ORDER — ENOXAPARIN SODIUM 100 MG/ML
40 INJECTION SUBCUTANEOUS EVERY 24 HOURS
Status: DISCONTINUED | OUTPATIENT
Start: 2024-04-27 | End: 2024-04-30 | Stop reason: HOSPADM

## 2024-04-26 RX ORDER — OXYCODONE HYDROCHLORIDE 5 MG/1
10 TABLET ORAL
Status: DISCONTINUED | OUTPATIENT
Start: 2024-04-26 | End: 2024-04-30 | Stop reason: HOSPADM

## 2024-04-26 RX ORDER — BISACODYL 10 MG/1
10 SUPPOSITORY RECTAL DAILY PRN
Status: DISCONTINUED | OUTPATIENT
Start: 2024-04-26 | End: 2024-04-27 | Stop reason: SDUPTHER

## 2024-04-26 RX ORDER — ONDANSETRON HYDROCHLORIDE 2 MG/ML
4 INJECTION, SOLUTION INTRAVENOUS DAILY PRN
Status: DISCONTINUED | OUTPATIENT
Start: 2024-04-26 | End: 2024-04-26 | Stop reason: HOSPADM

## 2024-04-26 RX ORDER — ROPIVACAINE/EPI/CLONIDINE/KET 2.46-0.005
SYRINGE (ML) INJECTION
Status: DISCONTINUED | OUTPATIENT
Start: 2024-04-26 | End: 2024-04-26 | Stop reason: HOSPADM

## 2024-04-26 RX ADMIN — MIDAZOLAM HYDROCHLORIDE 2 MG: 1 INJECTION, SOLUTION INTRAMUSCULAR; INTRAVENOUS at 08:04

## 2024-04-26 RX ADMIN — GABAPENTIN 300 MG: 300 CAPSULE ORAL at 07:04

## 2024-04-26 RX ADMIN — GLYCOPYRROLATE 0.6 MG: 0.2 INJECTION, SOLUTION INTRAMUSCULAR; INTRAVENOUS at 09:04

## 2024-04-26 RX ADMIN — FENTANYL CITRATE 100 MCG: 50 INJECTION, SOLUTION INTRAMUSCULAR; INTRAVENOUS at 08:04

## 2024-04-26 RX ADMIN — DEXAMETHASONE SODIUM PHOSPHATE 8 MG: 4 INJECTION, SOLUTION INTRA-ARTICULAR; INTRALESIONAL; INTRAMUSCULAR; INTRAVENOUS; SOFT TISSUE at 08:04

## 2024-04-26 RX ADMIN — SODIUM CHLORIDE, SODIUM LACTATE, POTASSIUM CHLORIDE, AND CALCIUM CHLORIDE: 600; 310; 30; 20 INJECTION, SOLUTION INTRAVENOUS at 08:04

## 2024-04-26 RX ADMIN — SODIUM CHLORIDE: 9 INJECTION, SOLUTION INTRAVENOUS at 04:04

## 2024-04-26 RX ADMIN — ROCURONIUM BROMIDE 5 MG: 10 SOLUTION INTRAVENOUS at 08:04

## 2024-04-26 RX ADMIN — TRANEXAMIC ACID 1000 MG: 10 INJECTION, SOLUTION INTRAVENOUS at 08:04

## 2024-04-26 RX ADMIN — ONDANSETRON 4 MG: 2 INJECTION INTRAMUSCULAR; INTRAVENOUS at 09:04

## 2024-04-26 RX ADMIN — CELECOXIB 400 MG: 100 CAPSULE ORAL at 06:04

## 2024-04-26 RX ADMIN — DOCUSATE SODIUM 100 MG: 100 CAPSULE, LIQUID FILLED ORAL at 08:04

## 2024-04-26 RX ADMIN — ACETAMINOPHEN 650 MG: 325 TABLET ORAL at 07:04

## 2024-04-26 RX ADMIN — CEFAZOLIN 2 G: 2 INJECTION, POWDER, FOR SOLUTION INTRAMUSCULAR; INTRAVENOUS at 06:04

## 2024-04-26 RX ADMIN — CHLORHEXIDINE GLUCONATE 0.12% ORAL RINSE 10 ML: 1.2 LIQUID ORAL at 08:04

## 2024-04-26 RX ADMIN — SUCCINYLCHOLINE CHLORIDE 140 MG: 20 INJECTION, SOLUTION INTRAMUSCULAR; INTRAVENOUS; PARENTERAL at 08:04

## 2024-04-26 RX ADMIN — NEOSTIGMINE METHYLSULFATE 4 MG: 1 INJECTION INTRAVENOUS at 09:04

## 2024-04-26 RX ADMIN — LIDOCAINE HYDROCHLORIDE 50 MG: 10 SOLUTION INTRAVENOUS at 08:04

## 2024-04-26 RX ADMIN — PROPOFOL 130 MG: 10 INJECTION, EMULSION INTRAVENOUS at 08:04

## 2024-04-26 RX ADMIN — CHLORHEXIDINE GLUCONATE 0.12% ORAL RINSE 10 ML: 1.2 LIQUID ORAL at 07:04

## 2024-04-26 RX ADMIN — ROCURONIUM BROMIDE 25 MG: 10 SOLUTION INTRAVENOUS at 08:04

## 2024-04-26 RX ADMIN — CEFAZOLIN SODIUM 2 G: 2 SOLUTION INTRAVENOUS at 08:04

## 2024-04-26 RX ADMIN — GLYCOPYRROLATE 0.2 MG: 0.2 INJECTION, SOLUTION INTRAMUSCULAR; INTRAVENOUS at 08:04

## 2024-04-26 NOTE — ANESTHESIA PROCEDURE NOTES
Intubation    Date/Time: 4/26/2024 8:34 AM    Performed by: Crow Zepeda CRNA  Authorized by: Payam Kenny MD    Intubation:     Induction:  Intravenous    Intubated:  Postinduction    Mask Ventilation:  Easy mask    Attempts:  1    Attempted By:  CRNA    Method of Intubation:  Direct    Blade:  Cuca 3    Laryngeal View Grade: Grade IIA - cords partially seen      Difficult Airway Encountered?: No      Complications:  None    Airway Device:  Oral endotracheal tube    Airway Device Size:  7.5    Style/Cuff Inflation:  Cuffed (inflated to minimal occlusive pressure)    Inflation Amount (mL):  7    Tube secured:  23    Secured at:  The lips    Placement Verified By:  Capnometry    Complicating Factors:  None    Findings Post-Intubation:  BS equal bilateral and atraumatic/condition of teeth unchanged

## 2024-04-26 NOTE — TRANSFER OF CARE
"Anesthesia Transfer of Care Note    Patient: Myles Pride    Procedure(s) Performed: Procedure(s) (LRB):  INCISION AND DRAINAGE, KNEE (Left)  REVISION, ARTHROPLASTY, KNEE (Left)    Patient location: PACU    Anesthesia Type: general    Transport from OR: Transported from OR on room air with adequate spontaneous ventilation    Post pain: adequate analgesia    Post assessment: no apparent anesthetic complications and tolerated procedure well    Post vital signs: stable    Level of consciousness: awake    Nausea/Vomiting: no nausea/vomiting    Complications: none    Transfer of care protocol was followed      Last vitals: Visit Vitals  BP (!) 157/89 (BP Location: Right arm, Patient Position: Lying)   Pulse 69   Temp 36.5 °C (97.7 °F) (Temporal)   Resp 18   Ht 6' 5" (1.956 m)   Wt 109.8 kg (242 lb 1 oz)   SpO2 (!) 94%   BMI 28.70 kg/m²     "

## 2024-04-26 NOTE — OP NOTE
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]  Infection associated with left knee prosthesis  Post-Operative Diagnosis: Post-Op Diagnosis Codes:     * Effusion, left knee [M25.462]     * 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  And replaced with size 9 poly insert for a base plate size 6.  Hemovac exiting superior laterally     Specimens:  Synovial lining, multiple cultures of the fluid    Injection IRAIDA with 40 cc of injectable saline       Condition: Good    Disposition: PACU - hemodynamically stable.  Use normal saline wash as well as Xperience  Attestation: I performed the procedure.    Description:  Patient had left TKA performed 02/20/2024 was doing well until he started seeing sutures coming out.  Physical therapy pulled multiple sutures.  Presents to the office with increased inflammation was placed on p.o. antibiotics.  Started with fevers asked to come to the office and blood work showed elevated CRP and sed  rate.  We aspirated 50 cc yesterday from his knee and the report with 23,000 white cells.  Patient had been on antibiotics.  Fluid was sent to the lab as well as to [facility] since he has been on antibiotics in might not grow much.  Everything indicate that is infection.  Discussed debridement, antibiotics implant retention procedures with poly exchange and IV antibiotics for 6 weeks and chronic suppressive antibiotics.  Since infection is very early.  We did also briefly discuss two-stage  The risk of surgery discussed in details   After administering general anesthesia patient left leg was washed with soaking alcohol and ChloraPrep twice and sterile fashion.  Incision was made over the old scar of the anterior knee on the left.  Full-thickness skin flaps raised medially and laterally.  We opened the joint a medial parapatellar incision was made there was quite a bit of fluid we cultured again.  We were able to take some synovial tissues and send it to the lab.  We at that time used a Bovie to cut some synovium out and 15 blade also.  We used a curette to curette a lot of the synovial tissues out.  Pulse lavaged copiously.  Pushed the patella laterally and flexed the knee joint.  Remove the poly insert with an osteotome.  Cleaned the posterior capsule with curette as well as the notch.  Pulse lavaged copiously with saline and then followed by Xperience irrigation.  We inserted a extra large Hemovac exiting superior laterally.  Closed the knee and the quadriceps mechanism using 1. Monofilament.  2-0 monofilament the subcutaneous tissues and staples.  Sterile dressing applied

## 2024-04-26 NOTE — PLAN OF CARE
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, subsequent encounter [T84.54XD]    Admission Date: 4/26/2024  Expected Discharge Date: Per Attending     Transition of Care Barriers: None    Payor: HUMANA MANAGED MEDICARE / Plan: HU[employer] MEDICARE SELECT PARTNER / Product Type: Medicare Advantage /     Extended Emergency Contact Information  Primary Emergency Contact: Randa Pride  Address: [address] [place] [address] [place]  Mobile Phone: [phone]  Relation: Spouse    Discharge Plan A: Home Health         [id number] [place] - [address] [facility]  [address]  [place] [address]  Phone: [phone] Fax: [phone]      Initial Assessment (most recent)       Adult Discharge Assessment - 04/26/24 0849          Discharge Assessment    Assessment Type Discharge Planning Assessment     Confirmed/corrected address, phone number and insurance Yes     Confirmed Demographics Correct on Facesheet     Source of Information health record     Communicated ARY with patient/caregiver Date not available/Unable to determine     Reason For Admission Knee revision     People in Home spouse     Do you expect to return to your current living situation? Yes     Do you have help at home or someone to help you manage your care at home? Yes     Who are your caregiver(s) and their phone number(s)? spouse     Walking or Climbing Stairs Difficulty yes     Walking or Climbing Stairs ambulation difficulty, requires equipment     Dressing/Bathing Difficulty yes     Dressing/Bathing bathing difficulty, requires equipment     Home Layout Able to live on 1st floor     Equipment Currently Used at Home walker,  rolling;other (see comments);shower chair   raised toilet    Readmission within 30 days? No     Patient currently being followed by outpatient case management? No     Do you currently have service(s) that help you manage your care at home? No     Do you take prescription medications? Yes     Do you have prescription coverage? Yes     Coverage [employer]     Do you have any problems affording any of your prescribed medications? No     Is the patient taking medications as prescribed? yes     Who is going to help you get home at discharge? spouse     How do you get to doctors appointments? car, drives self     Are you on dialysis? No     Do you take coumadin? No     Discharge Plan A Home Health     DME Needed Upon Discharge  none     Transition of Care Barriers None                   Pt had [facility] in the past. Sw spoke with Jay in PACU who stated pt will transfer to the floor today. Post-op PT/OT recs pending. Patient has no d/c needs at this time. Sw to follow up, as needed, for d/c planning purposes.

## 2024-04-27 LAB
ANION GAP SERPL CALC-SCNC: 9 MMOL/L (ref 8–16)
ANION GAP SERPL CALC-SCNC: 9 MMOL/L (ref 8–16)
BUN SERPL-MCNC: 15 MG/DL (ref 8–23)
BUN SERPL-MCNC: 15 MG/DL (ref 8–23)
CALCIUM SERPL-MCNC: 8.9 MG/DL (ref 8.7–10.5)
CALCIUM SERPL-MCNC: 8.9 MG/DL (ref 8.7–10.5)
CHLORIDE SERPL-SCNC: 108 MMOL/L (ref 95–110)
CHLORIDE SERPL-SCNC: 108 MMOL/L (ref 95–110)
CK SERPL-CCNC: 325 U/L (ref 20–200)
CO2 SERPL-SCNC: 23 MMOL/L (ref 23–29)
CO2 SERPL-SCNC: 23 MMOL/L (ref 23–29)
CREAT SERPL-MCNC: 1 MG/DL (ref 0.5–1.4)
CREAT SERPL-MCNC: 1 MG/DL (ref 0.5–1.4)
EST. GFR  (NO RACE VARIABLE): >60 ML/MIN/1.73 M^2
EST. GFR  (NO RACE VARIABLE): >60 ML/MIN/1.73 M^2
GLUCOSE SERPL-MCNC: 119 MG/DL (ref 70–110)
GLUCOSE SERPL-MCNC: 119 MG/DL (ref 70–110)
HCT VFR BLD AUTO: 35.7 % (ref 40–54)
HCT VFR BLD AUTO: 35.7 % (ref 40–54)
HGB BLD-MCNC: 11.7 G/DL (ref 14–18)
HGB BLD-MCNC: 11.7 G/DL (ref 14–18)
POTASSIUM SERPL-SCNC: 4.4 MMOL/L (ref 3.5–5.1)
POTASSIUM SERPL-SCNC: 4.4 MMOL/L (ref 3.5–5.1)
SODIUM SERPL-SCNC: 140 MMOL/L (ref 136–145)
SODIUM SERPL-SCNC: 140 MMOL/L (ref 136–145)

## 2024-04-27 PROCEDURE — 97161 PT EVAL LOW COMPLEX 20 MIN: CPT

## 2024-04-27 PROCEDURE — 36415 COLL VENOUS BLD VENIPUNCTURE: CPT | Performed by: ORTHOPAEDIC SURGERY

## 2024-04-27 PROCEDURE — 97530 THERAPEUTIC ACTIVITIES: CPT

## 2024-04-27 PROCEDURE — 85014 HEMATOCRIT: CPT | Performed by: ORTHOPAEDIC SURGERY

## 2024-04-27 PROCEDURE — 25000003 PHARM REV CODE 250: Performed by: STUDENT IN AN ORGANIZED HEALTH CARE EDUCATION/TRAINING PROGRAM

## 2024-04-27 PROCEDURE — 80048 BASIC METABOLIC PNL TOTAL CA: CPT | Performed by: ORTHOPAEDIC SURGERY

## 2024-04-27 PROCEDURE — 25000003 PHARM REV CODE 250: Performed by: ORTHOPAEDIC SURGERY

## 2024-04-27 PROCEDURE — 63600175 PHARM REV CODE 636 W HCPCS: Performed by: STUDENT IN AN ORGANIZED HEALTH CARE EDUCATION/TRAINING PROGRAM

## 2024-04-27 PROCEDURE — 94799 UNLISTED PULMONARY SVC/PX: CPT

## 2024-04-27 PROCEDURE — 97165 OT EVAL LOW COMPLEX 30 MIN: CPT

## 2024-04-27 PROCEDURE — 82550 ASSAY OF CK (CPK): CPT | Performed by: STUDENT IN AN ORGANIZED HEALTH CARE EDUCATION/TRAINING PROGRAM

## 2024-04-27 PROCEDURE — 94761 N-INVAS EAR/PLS OXIMETRY MLT: CPT

## 2024-04-27 PROCEDURE — 63600175 PHARM REV CODE 636 W HCPCS: Performed by: ORTHOPAEDIC SURGERY

## 2024-04-27 PROCEDURE — 85018 HEMOGLOBIN: CPT | Performed by: ORTHOPAEDIC SURGERY

## 2024-04-27 PROCEDURE — 36415 COLL VENOUS BLD VENIPUNCTURE: CPT | Performed by: STUDENT IN AN ORGANIZED HEALTH CARE EDUCATION/TRAINING PROGRAM

## 2024-04-27 PROCEDURE — 11000001 HC ACUTE MED/SURG PRIVATE ROOM

## 2024-04-27 PROCEDURE — 97116 GAIT TRAINING THERAPY: CPT

## 2024-04-27 RX ORDER — METRONIDAZOLE 500 MG/1
500 TABLET ORAL EVERY 12 HOURS
Status: DISCONTINUED | OUTPATIENT
Start: 2024-04-27 | End: 2024-04-30 | Stop reason: HOSPADM

## 2024-04-27 RX ADMIN — GABAPENTIN 300 MG: 300 CAPSULE ORAL at 08:04

## 2024-04-27 RX ADMIN — Medication 1000 UNITS: at 08:04

## 2024-04-27 RX ADMIN — DOCUSATE SODIUM 100 MG: 100 CAPSULE, LIQUID FILLED ORAL at 08:04

## 2024-04-27 RX ADMIN — METRONIDAZOLE 500 MG: 500 TABLET ORAL at 08:04

## 2024-04-27 RX ADMIN — CELECOXIB 200 MG: 100 CAPSULE ORAL at 08:04

## 2024-04-27 RX ADMIN — ACETAMINOPHEN 650 MG: 325 TABLET ORAL at 05:04

## 2024-04-27 RX ADMIN — CHLORHEXIDINE GLUCONATE 0.12% ORAL RINSE 10 ML: 1.2 LIQUID ORAL at 08:04

## 2024-04-27 RX ADMIN — ENOXAPARIN SODIUM 40 MG: 40 INJECTION SUBCUTANEOUS at 05:04

## 2024-04-27 RX ADMIN — SODIUM CHLORIDE: 9 INJECTION, SOLUTION INTRAVENOUS at 08:04

## 2024-04-27 RX ADMIN — TAMSULOSIN HYDROCHLORIDE 0.4 MG: 0.4 CAPSULE ORAL at 08:04

## 2024-04-27 RX ADMIN — DAPTOMYCIN 900 MG: 500 INJECTION, POWDER, LYOPHILIZED, FOR SOLUTION INTRAVENOUS at 09:04

## 2024-04-27 RX ADMIN — CEFTRIAXONE 2 G: 2 INJECTION, POWDER, FOR SOLUTION INTRAMUSCULAR; INTRAVENOUS at 08:04

## 2024-04-27 RX ADMIN — CEFAZOLIN 2 G: 2 INJECTION, POWDER, FOR SOLUTION INTRAMUSCULAR; INTRAVENOUS at 01:04

## 2024-04-27 RX ADMIN — PRAVASTATIN SODIUM 40 MG: 20 TABLET ORAL at 08:04

## 2024-04-27 NOTE — PLAN OF CARE
OT EVAL COMPLETE.  PATIENT WOULD BENEFIT FROM CONT SKILLED OT INTERVENTION.  OT RECOMMENDS OP AT D/C.

## 2024-04-27 NOTE — PT/OT/SLP EVAL
Physical Therapy Evaluation    Patient Name:  Myles Pride   MRN:  [id number]    Recommendations:     Discharge Recommendations: Low Intensity Therapy   Discharge Equipment Recommendations: none   Barriers to discharge: None    Assessment:     Myles Pride is a 68 y.o. male admitted with a medical diagnosis of <principal problem not specified>.  He presents with the following impairments/functional limitations: gait instability, impaired balance, impaired functional mobility, pain. Will benefit from PT to address limitations.    Rehab Prognosis: Good; patient would benefit from acute skilled PT services to address these deficits and reach maximum level of function.    Recent Surgery: Procedure(s) (LRB):  INCISION AND DRAINAGE, KNEE (Left)  REVISION, ARTHROPLASTY, KNEE (Left) 1 Day Post-Op    Plan:     During this hospitalization, patient to be seen 3 x/week to address the identified rehab impairments via gait training, therapeutic activities, therapeutic exercises and progress toward the following goals:    Plan of Care Expires:  05/11/24    Subjective     Chief Complaint: knee stiffness  Patient/Family Comments/goals: return to home/OPT  Pain/Comfort:  Pain Rating 1: 2/10  Location - Side 1: Left  Location 1: knee    Patients cultural, spiritual, Yarsanism conflicts given the current situation:      Living Environment:  Pt lives with wife in 2 story house but stays on 1st floor. Was getting OPT s/p TKA and was IND prior to infection. PT retired and drives  Prior to admission, patients level of function was IND.  Equipment used at home: none.  DME owned (not currently used): rolling walker, bedside commode, and shower chair.  Upon discharge, patient will have assistance from wife.    Objective:     Communicated with Nursing prior to session.  Patient found supine with    upon PT entry to room.    General Precautions: Standard,    Orthopedic Precautions:LLE weight bearing as tolerated   Braces:     Respiratory Status: Room air    Exams:  RLE ROM: WFL  RLE Strength: WFL  LLE ROM: Deficits: limited  LLE Strength: Deficits: knee 2/5. Hip/ankle= 3+/5    Functional Mobility:  Bed Mobility:     Supine to Sit: independence  Transfers:     Bed to Chair: supervision with  rolling walker  using  Stand Pivot  Gait: 100 ft with RW and SBA      AM-PAC 6 CLICK MOBILITY  Total Score:23       Treatment & Education:  Gait trained for WBAT gait and to improve knee flexion/ext    Patient left up in chair with all lines intact, call button in reach, and nursing notified.    GOALS:   Multidisciplinary Problems       Physical Therapy Goals          Problem: Physical Therapy    Goal Priority Disciplines Outcome Goal Variances Interventions   Physical Therapy Goal     PT, PT/OT Progressing     Description: Eval complete 4/27. The following goals will be met in 14 days  1. IND with bed mob  2. IND with transfer  3. Amb 250 ft with RW Mod IND                       History:     Past Medical History:   Diagnosis Date    Hyperlipidemia     PONV (postoperative nausea and vomiting) 1990    after knee arthroscopy    Vitamin D deficiency 12/18/2019       Past Surgical History:   Procedure Laterality Date    COLONOSCOPY      COLONOSCOPY N/A 12/19/2019    Procedure: COLONOSCOPY;  Surgeon: Bhupendra Wilkinson MD;  Location: [facility];  Service: Endoscopy;  Laterality: N/A;    EPIDURAL STEROID INJECTION N/A 12/09/2022    Procedure: L4-5 intralaminar epidural steroid injection with left paramedian approach;  Surgeon: Ben Eddy MD;  Location: [facility] PAIN [facility];  Service: Pain Management;  Laterality: N/A;    KNEE ARTHROSCOPY Left     LUMBAR FUSION  09/2023    SELECTIVE INJECTION OF ANESTHETIC AGENT AROUND LUMBAR SPINAL NERVE ROOT BY TRANSFORAMINAL APPROACH Bilateral 01/21/2022    Procedure: Bilateral L4/5 TF LOVE with RN IV sedation;  Surgeon: Ben Eddy MD;  Location: [facility] PAIN [facility];  Service: Pain Management;  Laterality: Bilateral;    SELECTIVE  INJECTION OF ANESTHETIC AGENT AROUND LUMBAR SPINAL NERVE ROOT BY TRANSFORAMINAL APPROACH Bilateral 05/06/2022    Procedure: Bilateral L4/5 TF LOVE with RN IV sedation;  Surgeon: Ben Eddy MD;  Location: [facility] PAIN [facility]GT;  Service: Pain Management;  Laterality: Bilateral;    SELECTIVE INJECTION OF ANESTHETIC AGENT AROUND LUMBAR SPINAL NERVE ROOT BY TRANSFORAMINAL APPROACH Bilateral 10/25/2022    Procedure: Bilateral L4/5 TF LOVE with RN IV sedation;  Surgeon: Ben Eddy MD;  Location: [facility] PAIN [facility];  Service: Pain Management;  Laterality: Bilateral;    TOTAL KNEE ARTHROPLASTY Left 2/20/2024    Procedure: ARTHROPLASTY, KNEE, TOTAL;  Surgeon: Ryan Woods MD;  Location: [facility] OR;  Service: Orthopedics;  Laterality: Left;    TRANSFORAMINAL LUMBAR INTERBODY FUSION (TLIF) USING COMPUTER-ASSISTED NAVIGATION Bilateral 09/06/2023    Procedure: FUSION, SPINE, LUMBAR, TLIF, USING COMPUTER-ASSISTED NAVIGATION;  Surgeon: Lyle Lobato MD;  Location: [facility] OR;  Service: Neurosurgery;  Laterality: Bilateral;  L3-5 TLIF    VASECTOMY  1995       Time Tracking:     PT Received On: 04/27/24  PT Start Time: 0830     PT Stop Time: 0855  PT Total Time (min): 25 min     Billable Minutes: Evaluation 15 and Gait Training 10      04/27/2024

## 2024-04-27 NOTE — PT/OT/SLP EVAL
Occupational Therapy   Evaluation    Name: Myles Pride  MRN: [id number]  Admitting Diagnosis: <principal problem not specified>  Recent Surgery: Procedure(s) (LRB):  INCISION AND DRAINAGE, KNEE (Left)  REVISION, ARTHROPLASTY, KNEE (Left) 1 Day Post-Op    Recommendations:     Discharge Recommendations: Low Intensity Therapy  Discharge Equipment Recommendations:  none  Barriers to discharge:  None    Assessment:     Myles Pride is a 68 y.o. male with a medical diagnosis of <principal problem not specified>.  He presents with SELF CARE DEBILITY . Performance deficits affecting function: impaired self care skills, impaired functional mobility, gait instability, impaired balance, decreased lower extremity function, pain, orthopedic precautions.      Rehab Prognosis: Good; patient would benefit from acute skilled OT services to address these deficits and reach maximum level of function.       Plan:     Patient to be seen 2 x/week to address the above listed problems via    Plan of Care Expires: 05/11/24  Plan of Care Reviewed with: patient    Subjective     Chief Complaint: (L) KNEE STIFFNESS  Patient/Family Comments/goals: TO BE ABLE TO RETURN HOME.    Occupational Profile:  Living Environment: PATIENT LIVES WITH WIFE IN A 2 STORY HOUSE WITH  STAIRS.    Previous level of function: PATIENT STATED HE WAS (I) WITH ALL LEVELS OF SELF CARE AND WAS PARTICIPATING IN OP THERAPY.    Roles and Routines: PATIENT STATED LIVING QUARTERS ARE ON 2ND FLOOR, BUT PATIENT CAN USE 1ST FLOOR.  PATIENT HAS RW OR CANE.  Equipment Used at Home: walker, rolling, cane, straight, raised toilet (WALK-IN SHOWER, SHOWER CHAIR)  Assistance upon Discharge: WIFE    Pain/Comfort:  Pain Rating 1: 2/10  Location - Side 1: Left  Location 1: knee  Pain Addressed 1: Reposition, Distraction, Cessation of Activity  Pain Rating Post-Intervention 1: 2/10 (DUE TO C/O STIFFNESS)    Patients cultural, spiritual, Catholic conflicts given the current  "situation:      Objective:     Communicated with: NURSE prior to session.  Patient found supine with peripheral IV, Other (comments) (WOUND SUCTION DEVICE) upon OT entry to room.    General Precautions: Standard, fall  Orthopedic Precautions: LLE weight bearing as tolerated  Braces:    Respiratory Status: Room air    Occupational Performance:    Bed Mobility:    PATIENT (I) WITH ALL LEVELS OF BED MOBILITY.    Functional Mobility/Transfers:  Patient completed Sit <> Stand Transfer with stand by assistance  with  rolling walker   Patient completed Bed <> Chair Transfer using Stand Pivot technique with stand by assistance with rolling walker  Functional Mobility: SBA WITH RW    Activities of Daily Living:  Upper Body Dressing: stand by assistance    Lower Body Dressing: minimum assistance    Toileting: stand by assistance      Cognitive/Visual Perceptual:  NO DEFICITS NOTED.    Physical Exam:  Balance:    -       SBA WITH RW  Upper Extremity Range of Motion:     -       Right Upper Extremity: WFL  -       Left Upper Extremity: WFL    AMPAC 6 Click ADL:  AMPAC Total Score: 20    Treatment & Education:  OT EVAL PERFORMED.  PATIENT EDUCATED RE:  PURPOSE OF OT AND IMPORTANCE OF "CALL--DON'T FALL" TO DECREASE FALL RISK.  PATIENT PARTICIPATED IN [facility] MOBILITY AND SELF CARE TASKS.     Patient left up in chair with all lines intact, call button in reach, and NURSE notified    GOALS:   Multidisciplinary Problems       Occupational Therapy Goals          Problem: Occupational Therapy    Goal Priority Disciplines Outcome Interventions   Occupational Therapy Goal     OT, PT/OT     Description: LTG'S TO BE MET IN 14 DAYS (5/11/24)    1)  PATIENT WILL PERFORM UB DRESSING WITH (I) TO DECREASE (D) ON CAREGIVER.    2)  PATIENT WILL PERFORM LB DRESSING WITH MOD (I) TO DECREASE (D) ON CAREGIVER..    3)  PATIENT WILL PERFORM TOILET T/F WITH MOD (I) WITH NECESSARY AE/DME TO DECREASE (D) ON CAREGIVER..    4)  PATIENT WILL PERFORM STANDING " AT SINK X5-X10 MIN WITH MOD (I) WITH RW AS NEEDED TO PERFORM SIMPLE GROOMING/HYGIENE WITH NO LOB.                        History:     Past Medical History:   Diagnosis Date    Hyperlipidemia     PONV (postoperative nausea and vomiting) 1990    after knee arthroscopy    Vitamin D deficiency 12/18/2019         Past Surgical History:   Procedure Laterality Date    COLONOSCOPY      COLONOSCOPY N/A 12/19/2019    Procedure: COLONOSCOPY;  Surgeon: Bhupendra Wilkinson MD;  Location: [facility] ENDO;  Service: Endoscopy;  Laterality: N/A;    EPIDURAL STEROID INJECTION N/A 12/09/2022    Procedure: L4-5 intralaminar epidural steroid injection with left paramedian approach;  Surgeon: Ben Eddy MD;  Location: [facility] PAIN MGT;  Service: Pain Management;  Laterality: N/A;    KNEE ARTHROSCOPY Left     LUMBAR FUSION  09/2023    SELECTIVE INJECTION OF ANESTHETIC AGENT AROUND LUMBAR SPINAL NERVE ROOT BY TRANSFORAMINAL APPROACH Bilateral 01/21/2022    Procedure: Bilateral L4/5 TF LOVE with RN IV sedation;  Surgeon: Ben Eddy MD;  Location: [facility] PAIN MGT;  Service: Pain Management;  Laterality: Bilateral;    SELECTIVE INJECTION OF ANESTHETIC AGENT AROUND LUMBAR SPINAL NERVE ROOT BY TRANSFORAMINAL APPROACH Bilateral 05/06/2022    Procedure: Bilateral L4/5 TF LOVE with RN IV sedation;  Surgeon: Ben Eddy MD;  Location: [facility] PAIN [facility];  Service: Pain Management;  Laterality: Bilateral;    SELECTIVE INJECTION OF ANESTHETIC AGENT AROUND LUMBAR SPINAL NERVE ROOT BY TRANSFORAMINAL APPROACH Bilateral 10/25/2022    Procedure: Bilateral L4/5 TF LOVE with RN IV sedation;  Surgeon: Ben Eddy MD;  Location: [facility] PAIN [facility]GT;  Service: Pain Management;  Laterality: Bilateral;    TOTAL KNEE ARTHROPLASTY Left 2/20/2024    Procedure: ARTHROPLASTY, KNEE, TOTAL;  Surgeon: Ryan Woods MD;  Location: [facility] OR;  Service: Orthopedics;  Laterality: Left;    TRANSFORAMINAL LUMBAR INTERBODY FUSION (TLIF) USING COMPUTER-ASSISTED NAVIGATION Bilateral  09/06/2023    Procedure: FUSION, SPINE, LUMBAR, TLIF, USING COMPUTER-ASSISTED NAVIGATION;  Surgeon: Lyle Lobato MD;  Location: [facility];  Service: Neurosurgery;  Laterality: Bilateral;  L3-5 TLIF    VASECTOMY  1995       Time Tracking:     OT Date of Treatment:    OT Start Time: 0847  OT Stop Time: 0905  OT Total Time (min): 18 min    Billable Minutes:Evaluation 10  Therapeutic Activity 8    4/27/2024

## 2024-04-28 PROBLEM — N40.0 BPH (BENIGN PROSTATIC HYPERPLASIA): Status: ACTIVE | Noted: 2024-04-28

## 2024-04-28 PROBLEM — M25.462 EFFUSION OF LEFT KNEE: Status: ACTIVE | Noted: 2024-04-28

## 2024-04-28 LAB
ANION GAP SERPL CALC-SCNC: 7 MMOL/L (ref 8–16)
ANION GAP SERPL CALC-SCNC: 7 MMOL/L (ref 8–16)
BUN SERPL-MCNC: 12 MG/DL (ref 8–23)
BUN SERPL-MCNC: 12 MG/DL (ref 8–23)
CALCIUM SERPL-MCNC: 9.2 MG/DL (ref 8.7–10.5)
CALCIUM SERPL-MCNC: 9.2 MG/DL (ref 8.7–10.5)
CHLORIDE SERPL-SCNC: 107 MMOL/L (ref 95–110)
CHLORIDE SERPL-SCNC: 107 MMOL/L (ref 95–110)
CO2 SERPL-SCNC: 27 MMOL/L (ref 23–29)
CO2 SERPL-SCNC: 27 MMOL/L (ref 23–29)
CREAT SERPL-MCNC: 1 MG/DL (ref 0.5–1.4)
CREAT SERPL-MCNC: 1 MG/DL (ref 0.5–1.4)
EST. GFR  (NO RACE VARIABLE): >60 ML/MIN/1.73 M^2
EST. GFR  (NO RACE VARIABLE): >60 ML/MIN/1.73 M^2
GLUCOSE SERPL-MCNC: 100 MG/DL (ref 70–110)
GLUCOSE SERPL-MCNC: 100 MG/DL (ref 70–110)
HCT VFR BLD AUTO: 36.3 % (ref 40–54)
HCT VFR BLD AUTO: 36.3 % (ref 40–54)
HGB BLD-MCNC: 12 G/DL (ref 14–18)
HGB BLD-MCNC: 12 G/DL (ref 14–18)
POTASSIUM SERPL-SCNC: 4.9 MMOL/L (ref 3.5–5.1)
POTASSIUM SERPL-SCNC: 4.9 MMOL/L (ref 3.5–5.1)
SODIUM SERPL-SCNC: 141 MMOL/L (ref 136–145)
SODIUM SERPL-SCNC: 141 MMOL/L (ref 136–145)

## 2024-04-28 PROCEDURE — 85014 HEMATOCRIT: CPT | Performed by: ORTHOPAEDIC SURGERY

## 2024-04-28 PROCEDURE — 80048 BASIC METABOLIC PNL TOTAL CA: CPT | Performed by: ORTHOPAEDIC SURGERY

## 2024-04-28 PROCEDURE — 99900035 HC TECH TIME PER 15 MIN (STAT)

## 2024-04-28 PROCEDURE — 63600175 PHARM REV CODE 636 W HCPCS: Performed by: ORTHOPAEDIC SURGERY

## 2024-04-28 PROCEDURE — 25000003 PHARM REV CODE 250: Performed by: ORTHOPAEDIC SURGERY

## 2024-04-28 PROCEDURE — 11000001 HC ACUTE MED/SURG PRIVATE ROOM

## 2024-04-28 PROCEDURE — 94799 UNLISTED PULMONARY SVC/PX: CPT

## 2024-04-28 PROCEDURE — 36415 COLL VENOUS BLD VENIPUNCTURE: CPT | Performed by: ORTHOPAEDIC SURGERY

## 2024-04-28 PROCEDURE — 85018 HEMOGLOBIN: CPT | Performed by: ORTHOPAEDIC SURGERY

## 2024-04-28 PROCEDURE — 25000003 PHARM REV CODE 250: Performed by: STUDENT IN AN ORGANIZED HEALTH CARE EDUCATION/TRAINING PROGRAM

## 2024-04-28 PROCEDURE — 94761 N-INVAS EAR/PLS OXIMETRY MLT: CPT

## 2024-04-28 PROCEDURE — 63600175 PHARM REV CODE 636 W HCPCS: Performed by: STUDENT IN AN ORGANIZED HEALTH CARE EDUCATION/TRAINING PROGRAM

## 2024-04-28 RX ADMIN — Medication 1000 UNITS: at 08:04

## 2024-04-28 RX ADMIN — DOCUSATE SODIUM 100 MG: 100 CAPSULE, LIQUID FILLED ORAL at 08:04

## 2024-04-28 RX ADMIN — PRAVASTATIN SODIUM 40 MG: 20 TABLET ORAL at 08:04

## 2024-04-28 RX ADMIN — ACETAMINOPHEN 650 MG: 325 TABLET ORAL at 08:04

## 2024-04-28 RX ADMIN — CHLORHEXIDINE GLUCONATE 0.12% ORAL RINSE 10 ML: 1.2 LIQUID ORAL at 08:04

## 2024-04-28 RX ADMIN — CEFTRIAXONE 2 G: 2 INJECTION, POWDER, FOR SOLUTION INTRAMUSCULAR; INTRAVENOUS at 08:04

## 2024-04-28 RX ADMIN — CELECOXIB 200 MG: 100 CAPSULE ORAL at 08:04

## 2024-04-28 RX ADMIN — ENOXAPARIN SODIUM 40 MG: 40 INJECTION SUBCUTANEOUS at 04:04

## 2024-04-28 RX ADMIN — DAPTOMYCIN 900 MG: 500 INJECTION, POWDER, LYOPHILIZED, FOR SOLUTION INTRAVENOUS at 11:04

## 2024-04-28 RX ADMIN — TAMSULOSIN HYDROCHLORIDE 0.4 MG: 0.4 CAPSULE ORAL at 08:04

## 2024-04-28 RX ADMIN — METRONIDAZOLE 500 MG: 500 TABLET ORAL at 08:04

## 2024-04-28 RX ADMIN — GABAPENTIN 300 MG: 300 CAPSULE ORAL at 08:04

## 2024-04-28 RX ADMIN — ACETAMINOPHEN 650 MG: 325 TABLET ORAL at 04:04

## 2024-04-28 NOTE — CONSULTS
[facility] Surg [address] [place] [address] Medicine  Consult Note    Patient Name: Myles Pride  MRN: [id number]  Admission Date: 4/26/2024  Hospital Length of Stay: 2 days  Attending Physician: Ryan Woods MD   Primary Care Provider: Catalino Arias MD           Patient information was obtained from patient, past medical records, and ER records.     Consults  Subjective:     Principal Problem: Effusion of left knee    Chief Complaint: No chief complaint on file.       HPI: Mr. Pride is a 68 year old male with a history of LTKA in 2/2024 per DR. Woods, who presents to [facility] for Status post left total knee I and D with poly insert exchange on 4/26.  Intra op cultures collected and patient started on broad spectrum abx.  ID and HMS consulted.          Past Medical History:   Diagnosis Date    Hyperlipidemia     PONV (postoperative nausea and vomiting) 1990    after knee arthroscopy    Vitamin D deficiency 12/18/2019       Past Surgical History:   Procedure Laterality Date    COLONOSCOPY      COLONOSCOPY N/A 12/19/2019    Procedure: COLONOSCOPY;  Surgeon: Bhupendra Wilkinson MD;  Location: [facility];  Service: Endoscopy;  Laterality: N/A;    EPIDURAL STEROID INJECTION N/A 12/09/2022    Procedure: L4-5 intralaminar epidural steroid injection with left paramedian approach;  Surgeon: Ben Eddy MD;  Location: [facility] PAIN T;  Service: Pain Management;  Laterality: N/A;    KNEE ARTHROSCOPY Left     LUMBAR FUSION  09/2023    SELECTIVE INJECTION OF ANESTHETIC AGENT AROUND LUMBAR SPINAL NERVE ROOT BY TRANSFORAMINAL APPROACH Bilateral 01/21/2022    Procedure: Bilateral L4/5 TF LOVE with RN IV sedation;  Surgeon: Ben Eddy MD;  Location: [facility] PAIN MGT;  Service: Pain Management;  Laterality: Bilateral;    SELECTIVE INJECTION OF ANESTHETIC AGENT AROUND LUMBAR SPINAL NERVE ROOT BY TRANSFORAMINAL APPROACH Bilateral 05/06/2022    Procedure: Bilateral L4/5 TF LOVE with RN IV sedation;  Surgeon: Ben Eddy MD;   Location: [facility] PAIN MGT;  Service: Pain Management;  Laterality: Bilateral;    SELECTIVE INJECTION OF ANESTHETIC AGENT AROUND LUMBAR SPINAL NERVE ROOT BY TRANSFORAMINAL APPROACH Bilateral 10/25/2022    Procedure: Bilateral L4/5 TF LOVE with RN IV sedation;  Surgeon: Ben Eddy MD;  Location: [facility] PAIN MGT;  Service: Pain Management;  Laterality: Bilateral;    TOTAL KNEE ARTHROPLASTY Left 2/20/2024    Procedure: ARTHROPLASTY, KNEE, TOTAL;  Surgeon: Ryan Woods MD;  Location: [facility];  Service: Orthopedics;  Laterality: Left;    TRANSFORAMINAL LUMBAR INTERBODY FUSION (TLIF) USING COMPUTER-ASSISTED NAVIGATION Bilateral 09/06/2023    Procedure: FUSION, SPINE, LUMBAR, TLIF, USING COMPUTER-ASSISTED NAVIGATION;  Surgeon: Lyle Lobato MD;  Location: [facility] OR;  Service: Neurosurgery;  Laterality: Bilateral;  L3-5 TLIF    VASECTOMY  1995       Review of patient's allergies indicates:  No Known Allergies    Current Facility-Administered Medications   Medication Dose Route Frequency Provider Last Rate Last Admin    acetaminophen tablet 650 mg  650 mg Oral Q8H PRN Ryan Woods MD   650 mg at 04/28/24 0838    bisacodyL suppository 10 mg  10 mg Rectal Daily PRN Ryan Woods MD        cefTRIAXone (ROCEPHIN) 2 g in dextrose 5 % in water (D5W) 100 mL IVPB (MB+)  2 g Intravenous Q24H Rishabh Ng DO   Stopped at 04/28/24 0905    celecoxib capsule 200 mg  200 mg Oral BID Ryan Woods MD   200 mg at 04/28/24 0836    chlorhexidine 0.12 % solution 10 mL  10 mL Mouth/Throat BID Ryan Woods MD   10 mL at 04/28/24 0838    DAPTOmycin (CUBICIN) 900 mg in sodium chloride 0.9% SolP 50 mL IVPB  900 mg Intravenous Q24H Rishabh Ng DO   Stopped at 04/27/24 0948    docusate sodium capsule 100 mg  100 mg Oral BID Ryan Woods MD   100 mg at 04/28/24 0837    enoxaparin injection 40 mg  40 mg Subcutaneous Daily Ryan Woods MD   40 mg at 04/27/24 1753    gabapentin  capsule 300 mg  300 mg Oral Daily Ryan Woods MD   300 mg at 04/28/24 0837    metroNIDAZOLE tablet 500 mg  500 mg Oral Q12H Rishabh Ng DO   500 mg at 04/28/24 0836    morphine injection 2 mg  2 mg Intravenous Q4H PRRyan Vazquez MD        ondansetron disintegrating tablet 8 mg  8 mg Oral Q8H PRN Ryan Woods MD        ondansetron injection 4 mg  4 mg Intravenous Q12H PRN Ryan Woods MD        oxyCODONE immediate release tablet 10 mg  10 mg Oral Q3H PRN Ryan Woods MD        oxyCODONE immediate release tablet 5 mg  5 mg Oral Q3H PRN Ryan Woods MD        pravastatin tablet 40 mg  40 mg Oral Daily Ryan Woods MD   40 mg at 04/28/24 0836    tamsulosin 24 hr capsule 0.4 mg  0.4 mg Oral Daily Ryan Woods MD   0.4 mg at 04/28/24 0837    vitamin D 1000 units tablet 1,000 Units  1,000 Units Oral Daily Ryan Woods MD   1,000 Units at 04/28/24 0838     Family History       Problem Relation (Age of Onset)    Arthritis Mother    Cancer Mother    Diabetes Mother    Gallbladder disease Brother    Hearing loss Mother    Hepatitis Brother    No Known Problems Sister          Tobacco Use    Smoking status: Never     Passive exposure: Never    Smokeless tobacco: Never   Substance and Sexual Activity    Alcohol use: Yes     Alcohol/week: 3.0 standard drinks of alcohol     Types: 3 Glasses of wine per week     Comment: occ    Drug use: Never    Sexual activity: Yes     Partners: Female     Birth control/protection: Partner-Vasectomy, None     Review of Systems   Musculoskeletal:         Knee pain       Objective:     Vital Signs (Most Recent):  Temp: 98.4 °F (36.9 °C) (04/28/24 0716)  Pulse: 69 (04/28/24 0747)  Resp: 16 (04/28/24 0747)  BP: 137/73 (04/28/24 0716)  SpO2: 98 % (04/28/24 0747) Vital Signs (24h Range):  Temp:  [98 °F (36.7 °C)-99.9 °F (37.7 °C)] 98.4 °F (36.9 °C)  Pulse:  [42-69] 69  Resp:  [16-18] 16  SpO2:  [95 %-100 %] 98  %  BP: (124-168)/(60-87) 137/73     Weight: 109.8 kg (242 lb 1 oz)  Body mass index is 28.7 kg/m².     Physical Exam  Vitals and nursing note reviewed.   Constitutional:       Appearance: Normal appearance.   Cardiovascular:      Rate and Rhythm: Normal rate and regular rhythm.      Pulses: Normal pulses.      Heart sounds: Normal heart sounds.   Pulmonary:      Effort: Pulmonary effort is normal.      Breath sounds: Normal breath sounds.   Abdominal:      General: Bowel sounds are normal. There is no distension.      Palpations: Abdomen is soft.      Tenderness: There is no abdominal tenderness.   Musculoskeletal:         General: No swelling.      Comments: Drain in place left knee   Skin:     General: Skin is warm and dry.   Neurological:      Mental Status: He is alert and oriented to person, place, and time.          Significant Labs: All pertinent labs within the past 24 hours have been reviewed.    Significant Imaging: I have reviewed all pertinent imaging results/findings within the past 24 hours.    Assessment/Plan:     * Effusion of left knee  - hx of LTKA 2/2024 with Dr. Woods  - Status post left total knee I and D with poly insert exchange 4/26  - monitor drain output   - ID following   - aspirate was around 23,000 white cells, cultures negative from recent knee aspiration  - plan to continue Rocephin, daptomycin and metronidazole x 6 weeks  - order for PICC placed  - awaiting final reports cultures and Microgen DX and final id recs      BPH (benign prostatic hyperplasia)  - continue home flomax        Hyperlipidemia  - continue statin nightly       Bradycardia  - chronic stable, asymptomatic, HR around 50-60          VTE Risk Mitigation (From admission, onward)           Ordered     enoxaparin injection 40 mg  Daily         04/26/24 1745     Bilateral Foot Compression Devices  Until discontinued        Comments: Or SCDs    04/26/24 1745     IP VTE LOW RISK PATIENT  Once         04/26/24 0699      Place SYLVIE hose  Until discontinued         04/26/24 0645     Place sequential compression device  Until discontinued         04/26/24 0645                        Thank you for your consult. I will follow-up with patient. Please contact us if you have any additional questions.    Alyx Montejo NP  Department of Hospital Medicine   [facility] - Med Surg 3

## 2024-04-28 NOTE — PROGRESS NOTES
Status post left total knee I and D with poly insert exchange   Is aspirate was around 23,000 white cells   All cultures so far has been negative from recent knee aspiration  [facility] Medicine and Dr. Ng was in put  Patient on Rocephin, daptomycin and metronidazole  The drain still intact with minimal drainage  Exam   no blood on the dressing  Left leg neurovascularly intact  [facility] 5/5   Patient ambulated 250 ft with a walker   Still does not have a PICC line     Plan  IV antibiotics for 6 weeks   We still waiting final reports cultures and Microgen DX  Once PICC line is inserted and IV antibiotics approved he can be discharged home with home physical therapy  Drain and dressing will be pulled and changed hopefully tomorrow    Labs reviewed stable

## 2024-04-28 NOTE — HPI
Mr. Pride is a 68 year old male with a history of LTKA in 2/2024 per DR. Woods, who presents to [facility] for Status post left total knee I and D with poly insert exchange on 4/26.  Intra op cultures collected and patient started on broad spectrum abx.  ID and HMS consulted.

## 2024-04-28 NOTE — CARE UPDATE
This is a 67 yo M with history of left TKA 2/20/24 found to have post op inflammation and expulsion of sutures. Began spiking fevers and was placed on PO antibiotics. CRP and ESR elevated. 50 cc were aspirated from left knee and showed 23K WBC while on antibiotics. Cultures from aspiration finalized with Joss. Patient now taken for debridement with implant retention and poly exchange on 4/26/24. Cultures in process and waiting for Microgen DX. 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     Recommendations:  --For now will use empiric therapy with IV daptomycin (MRSA), IV ceftriaxone (Strep/GN), and PO metronidazole 500 mg BID (Clovisldia isolate)   --Above agents require drug toxicity monitoring   --Plan for 6 week course from date of surgery based on final results of both OR cultures and Microgen DX   --Close orthopedic follow up   --Final OPAT note to follow  --Above d/w primary team

## 2024-04-28 NOTE — ASSESSMENT & PLAN NOTE
- hx of LTKA 2/2024 with Dr. Woods  - Status post left total knee I and D with poly insert exchange 4/26  - monitor drain output   - ID following   - aspirate was around 23,000 white cells, cultures negative from recent knee aspiration  - plan to continue Rocephin, daptomycin and metronidazole x 6 weeks  - order for PICC placed  - awaiting final reports cultures and Microgen DX and final id recs

## 2024-04-28 NOTE — SUBJECTIVE & OBJECTIVE
Past Medical History:   Diagnosis Date    Hyperlipidemia     PONV (postoperative nausea and vomiting) 1990    after knee arthroscopy    Vitamin D deficiency 12/18/2019       Past Surgical History:   Procedure Laterality Date    COLONOSCOPY      COLONOSCOPY N/A 12/19/2019    Procedure: COLONOSCOPY;  Surgeon: Bhupendra Wilkinson MD;  Location: [facility] ENDO;  Service: Endoscopy;  Laterality: N/A;    EPIDURAL STEROID INJECTION N/A 12/09/2022    Procedure: L4-5 intralaminar epidural steroid injection with left paramedian approach;  Surgeon: Ben Eddy MD;  Location: [facility] PAIN MGT;  Service: Pain Management;  Laterality: N/A;    INCISION AND DRAINAGE OF KNEE Left 4/26/2024    Procedure: INCISION AND DRAINAGE, KNEE;  Surgeon: Ryan Woods MD;  Location: [facility] OR;  Service: Orthopedics;  Laterality: Left;  poly exchange    KNEE ARTHROSCOPY Left     LUMBAR FUSION  09/2023    REPLACEMENT, POLYETHYLENE LINER Left 4/26/2024    Procedure: REPLACEMENT, POLYETHYLENE LINER;  Surgeon: Ryan Woods MD;  Location: [facility] OR;  Service: Orthopedics;  Laterality: Left;    SELECTIVE INJECTION OF ANESTHETIC AGENT AROUND LUMBAR SPINAL NERVE ROOT BY TRANSFORAMINAL APPROACH Bilateral 01/21/2022    Procedure: Bilateral L4/5 TF LOVE with RN IV sedation;  Surgeon: Ben Eddy MD;  Location: [facility] PAIN MGT;  Service: Pain Management;  Laterality: Bilateral;    SELECTIVE INJECTION OF ANESTHETIC AGENT AROUND LUMBAR SPINAL NERVE ROOT BY TRANSFORAMINAL APPROACH Bilateral 05/06/2022    Procedure: Bilateral L4/5 TF LOVE with RN IV sedation;  Surgeon: Ben Eddy MD;  Location: [facility] PAIN MGT;  Service: Pain Management;  Laterality: Bilateral;    SELECTIVE INJECTION OF ANESTHETIC AGENT AROUND LUMBAR SPINAL NERVE ROOT BY TRANSFORAMINAL APPROACH Bilateral 10/25/2022    Procedure: Bilateral L4/5 TF LOVE with RN IV sedation;  Surgeon: Ben Eddy MD;  Location: [facility] PAIN MGT;  Service: Pain Management;  Laterality: Bilateral;    SYNOVECTOMY OF  KNEE Left 4/26/2024    Procedure: SYNOVECTOMY, KNEE;  Surgeon: Ryan Woods MD;  Location: [facility] OR;  Service: Orthopedics;  Laterality: Left;    TOTAL KNEE ARTHROPLASTY Left 2/20/2024    Procedure: ARTHROPLASTY, KNEE, TOTAL;  Surgeon: Ryan Woods MD;  Location: [facility] OR;  Service: Orthopedics;  Laterality: Left;    TRANSFORAMINAL LUMBAR INTERBODY FUSION (TLIF) USING COMPUTER-ASSISTED NAVIGATION Bilateral 09/06/2023    Procedure: FUSION, SPINE, LUMBAR, TLIF, USING COMPUTER-ASSISTED NAVIGATION;  Surgeon: Lyle Lobato MD;  Location: [facility] OR;  Service: Neurosurgery;  Laterality: Bilateral;  L3-5 TLIF    VASECTOMY  1995       Review of patient's allergies indicates:  No Known Allergies    Current Facility-Administered Medications   Medication Dose Route Frequency Provider Last Rate Last Admin    acetaminophen tablet 650 mg  650 mg Oral Q8H PRN Ryan Woods MD   650 mg at 04/28/24 1607    bisacodyL suppository 10 mg  10 mg Rectal Daily PRN Ryan Woods MD        cefTRIAXone (ROCEPHIN) 2 g in dextrose 5 % in water (D5W) 100 mL IVPB (MB+)  2 g Intravenous Q24H Rishabh Ng DO   Stopped at 04/29/24 1012    celecoxib capsule 200 mg  200 mg Oral BID Ryan Woods MD   200 mg at 04/29/24 0928    chlorhexidine 0.12 % solution 10 mL  10 mL Mouth/Throat BID Ryan Woods MD   10 mL at 04/29/24 0935    DAPTOmycin (CUBICIN) 900 mg in sodium chloride 0.9% SolP 50 mL IVPB  900 mg Intravenous Q24H Rishabh Ng DO   Stopped at 04/29/24 1119    docusate sodium capsule 100 mg  100 mg Oral BID Ryan Woods MD   100 mg at 04/29/24 0928    enoxaparin injection 40 mg  40 mg Subcutaneous Daily Ryan Woods MD   40 mg at 04/28/24 1606    gabapentin capsule 300 mg  300 mg Oral Daily Ryan Woods MD   300 mg at 04/29/24 0928    metroNIDAZOLE tablet 500 mg  500 mg Oral Q12H Rishabh Ng DO   500 mg at 04/29/24 0928    morphine injection 2 mg  2 mg  Intravenous Q4H PRN Ryan Woods MD        ondansetron disintegrating tablet 8 mg  8 mg Oral Q8H PRN Ryan Woods MD        ondansetron injection 4 mg  4 mg Intravenous Q12H PRN Ryan Woods MD        oxyCODONE immediate release tablet 10 mg  10 mg Oral Q3H PRN Ryan Woods MD        oxyCODONE immediate release tablet 5 mg  5 mg Oral Q3H PRN Ryan Woods MD        pravastatin tablet 40 mg  40 mg Oral Daily Ryan Woods MD   40 mg at 04/29/24 0929    tamsulosin 24 hr capsule 0.4 mg  0.4 mg Oral Daily Ryan Woods MD   0.4 mg at 04/29/24 0929    vitamin D 1000 units tablet 1,000 Units  1,000 Units Oral Daily Ryan Woods MD   1,000 Units at 04/29/24 0928     Family History       Problem Relation (Age of Onset)    Arthritis Mother    Cancer Mother    Diabetes Mother    Gallbladder disease Brother    Hearing loss Mother    Hepatitis Brother    No Known Problems Sister          Tobacco Use    Smoking status: Never     Passive exposure: Never    Smokeless tobacco: Never   Substance and Sexual Activity    Alcohol use: Yes     Alcohol/week: 3.0 standard drinks of alcohol     Types: 3 Glasses of wine per week     Comment: occ    Drug use: Never    Sexual activity: Yes     Partners: Female     Birth control/protection: Partner-Vasectomy, None     Review of Systems   Constitutional:  Positive for activity change. Negative for appetite change and fever.   HENT:  Negative for dental problem.    Respiratory:  Negative for shortness of breath.    Gastrointestinal:  Negative for abdominal pain, nausea and vomiting.   Musculoskeletal:  Positive for arthralgias, gait problem, joint swelling and myalgias.        Knee pain     Skin:  Positive for wound.   Psychiatric/Behavioral:  Negative for agitation, behavioral problems, confusion, decreased concentration and dysphoric mood. The patient is not nervous/anxious.      Objective:     Vital Signs (Most  Recent):  Temp: 98.7 °F (37.1 °C) (04/29/24 1143)  Pulse: (!) 57 (04/29/24 1143)  Resp: 18 (04/29/24 1143)  BP: (!) 148/66 (04/29/24 1143)  SpO2: 99 % (04/29/24 1143) Vital Signs (24h Range):  Temp:  [98 °F (36.7 °C)-99.6 °F (37.6 °C)] 98.7 °F (37.1 °C)  Pulse:  [57-82] 57  Resp:  [16-18] 18  SpO2:  [93 %-100 %] 99 %  BP: (126-149)/(66-83) 148/66     Weight: 109.8 kg (242 lb 1 oz)  Body mass index is 28.7 kg/m².     Physical Exam  Vitals and nursing note reviewed. Exam conducted with a chaperone present (wife, id).   Constitutional:       General: He is not in acute distress.     Appearance: Normal appearance. He is not ill-appearing or toxic-appearing.   HENT:      Head: Normocephalic and atraumatic.   Cardiovascular:      Rate and Rhythm: Bradycardia present.   Pulmonary:      Effort: Pulmonary effort is normal. No respiratory distress.   Abdominal:      Palpations: Abdomen is soft.   Musculoskeletal:         General: Swelling present.      Right lower leg: No edema.      Left lower leg: No edema.      Comments: Left knee, bandage, clean, dry, intact    Skin:     General: Skin is warm and dry.   Neurological:      Mental Status: He is alert and oriented to person, place, and time.   Psychiatric:         Mood and Affect: Mood normal.         Behavior: Behavior normal.          Significant Labs: All pertinent labs within the past 24 hours have been reviewed.    CBC:   Recent Labs   Lab 04/28/24  0346 04/29/24  0440   HGB 12.0*  12.0* 12.3*  12.3*   HCT 36.3*  36.3* 36.9*  36.9*     CMP:   Recent Labs   Lab 04/28/24  0346 04/29/24  0440     141 139  139   K 4.9  4.9 4.6  4.6     107 105  105   CO2 27  27 24  24     100 96  96   BUN 12  12 13  13   CREATININE 1.0  1.0 1.0  1.0   CALCIUM 9.2  9.2 9.2  9.2   ANIONGAP 7*  7* 10  10     Joint fluid studies either in progress or no growth  Anaerobic culture wound finegoldia   Aerobic culture wound no growth    Significant Imaging: I  have reviewed all pertinent imaging results/findings within the past 24 hours.

## 2024-04-28 NOTE — PLAN OF CARE
Pt resting in bed .   Pt awake and alert . Hob elevated .   Wife at beside .   Vitals stable .   POC discussed .   Safety Measures in place   Chart check complete .   Will  continue to monitor .

## 2024-04-29 PROBLEM — Z96.659 INFECTION OF TOTAL KNEE REPLACEMENT: Status: ACTIVE | Noted: 2024-04-29

## 2024-04-29 PROBLEM — T84.59XA INFECTION OF TOTAL KNEE REPLACEMENT: Status: ACTIVE | Noted: 2024-04-29

## 2024-04-29 LAB
ANION GAP SERPL CALC-SCNC: 10 MMOL/L (ref 8–16)
ANION GAP SERPL CALC-SCNC: 10 MMOL/L (ref 8–16)
BACTERIA SPEC AEROBE CULT: NO GROWTH
BUN SERPL-MCNC: 13 MG/DL (ref 8–23)
BUN SERPL-MCNC: 13 MG/DL (ref 8–23)
CALCIUM SERPL-MCNC: 9.2 MG/DL (ref 8.7–10.5)
CALCIUM SERPL-MCNC: 9.2 MG/DL (ref 8.7–10.5)
CHLORIDE SERPL-SCNC: 105 MMOL/L (ref 95–110)
CHLORIDE SERPL-SCNC: 105 MMOL/L (ref 95–110)
CO2 SERPL-SCNC: 24 MMOL/L (ref 23–29)
CO2 SERPL-SCNC: 24 MMOL/L (ref 23–29)
CREAT SERPL-MCNC: 1 MG/DL (ref 0.5–1.4)
CREAT SERPL-MCNC: 1 MG/DL (ref 0.5–1.4)
EST. GFR  (NO RACE VARIABLE): >60 ML/MIN/1.73 M^2
EST. GFR  (NO RACE VARIABLE): >60 ML/MIN/1.73 M^2
GLUCOSE SERPL-MCNC: 96 MG/DL (ref 70–110)
GLUCOSE SERPL-MCNC: 96 MG/DL (ref 70–110)
HCT VFR BLD AUTO: 36.9 % (ref 40–54)
HCT VFR BLD AUTO: 36.9 % (ref 40–54)
HGB BLD-MCNC: 12.3 G/DL (ref 14–18)
HGB BLD-MCNC: 12.3 G/DL (ref 14–18)
POTASSIUM SERPL-SCNC: 4.6 MMOL/L (ref 3.5–5.1)
POTASSIUM SERPL-SCNC: 4.6 MMOL/L (ref 3.5–5.1)
SODIUM SERPL-SCNC: 139 MMOL/L (ref 136–145)
SODIUM SERPL-SCNC: 139 MMOL/L (ref 136–145)

## 2024-04-29 PROCEDURE — 36573 INSJ PICC RS&I 5 YR+: CPT

## 2024-04-29 PROCEDURE — 80048 BASIC METABOLIC PNL TOTAL CA: CPT | Performed by: ORTHOPAEDIC SURGERY

## 2024-04-29 PROCEDURE — 97116 GAIT TRAINING THERAPY: CPT | Mod: CQ

## 2024-04-29 PROCEDURE — 63600175 PHARM REV CODE 636 W HCPCS: Performed by: STUDENT IN AN ORGANIZED HEALTH CARE EDUCATION/TRAINING PROGRAM

## 2024-04-29 PROCEDURE — 25000003 PHARM REV CODE 250: Performed by: STUDENT IN AN ORGANIZED HEALTH CARE EDUCATION/TRAINING PROGRAM

## 2024-04-29 PROCEDURE — 11000001 HC ACUTE MED/SURG PRIVATE ROOM

## 2024-04-29 PROCEDURE — 02HV33Z INSERTION OF INFUSION DEVICE INTO SUPERIOR VENA CAVA, PERCUTANEOUS APPROACH: ICD-10-PCS | Performed by: ORTHOPAEDIC SURGERY

## 2024-04-29 PROCEDURE — 85018 HEMOGLOBIN: CPT | Performed by: ORTHOPAEDIC SURGERY

## 2024-04-29 PROCEDURE — 99223 1ST HOSP IP/OBS HIGH 75: CPT | Mod: ,,, | Performed by: STUDENT IN AN ORGANIZED HEALTH CARE EDUCATION/TRAINING PROGRAM

## 2024-04-29 PROCEDURE — 63600175 PHARM REV CODE 636 W HCPCS: Performed by: ORTHOPAEDIC SURGERY

## 2024-04-29 PROCEDURE — 36415 COLL VENOUS BLD VENIPUNCTURE: CPT | Performed by: ORTHOPAEDIC SURGERY

## 2024-04-29 PROCEDURE — C1751 CATH, INF, PER/CENT/MIDLINE: HCPCS

## 2024-04-29 PROCEDURE — 97530 THERAPEUTIC ACTIVITIES: CPT | Mod: CQ

## 2024-04-29 PROCEDURE — 25000003 PHARM REV CODE 250: Performed by: ORTHOPAEDIC SURGERY

## 2024-04-29 PROCEDURE — 85014 HEMATOCRIT: CPT | Performed by: ORTHOPAEDIC SURGERY

## 2024-04-29 RX ADMIN — Medication 1000 UNITS: at 09:04

## 2024-04-29 RX ADMIN — DOCUSATE SODIUM 100 MG: 100 CAPSULE, LIQUID FILLED ORAL at 08:04

## 2024-04-29 RX ADMIN — CELECOXIB 200 MG: 100 CAPSULE ORAL at 09:04

## 2024-04-29 RX ADMIN — CHLORHEXIDINE GLUCONATE 0.12% ORAL RINSE 10 ML: 1.2 LIQUID ORAL at 09:04

## 2024-04-29 RX ADMIN — DAPTOMYCIN 900 MG: 500 INJECTION, POWDER, LYOPHILIZED, FOR SOLUTION INTRAVENOUS at 10:04

## 2024-04-29 RX ADMIN — CELECOXIB 200 MG: 100 CAPSULE ORAL at 08:04

## 2024-04-29 RX ADMIN — DOCUSATE SODIUM 100 MG: 100 CAPSULE, LIQUID FILLED ORAL at 09:04

## 2024-04-29 RX ADMIN — GABAPENTIN 300 MG: 300 CAPSULE ORAL at 09:04

## 2024-04-29 RX ADMIN — METRONIDAZOLE 500 MG: 500 TABLET ORAL at 09:04

## 2024-04-29 RX ADMIN — PRAVASTATIN SODIUM 40 MG: 20 TABLET ORAL at 09:04

## 2024-04-29 RX ADMIN — ENOXAPARIN SODIUM 40 MG: 40 INJECTION SUBCUTANEOUS at 04:04

## 2024-04-29 RX ADMIN — CEFTRIAXONE 2 G: 2 INJECTION, POWDER, FOR SOLUTION INTRAMUSCULAR; INTRAVENOUS at 09:04

## 2024-04-29 RX ADMIN — TAMSULOSIN HYDROCHLORIDE 0.4 MG: 0.4 CAPSULE ORAL at 09:04

## 2024-04-29 RX ADMIN — METRONIDAZOLE 500 MG: 500 TABLET ORAL at 08:04

## 2024-04-29 RX ADMIN — CHLORHEXIDINE GLUCONATE 0.12% ORAL RINSE 10 ML: 1.2 LIQUID ORAL at 08:04

## 2024-04-29 NOTE — PROCEDURES
"Myles Pride is a 68 y.o. male patient.    Temp: 98.7 °F (37.1 °C) (04/29/24 1143)  Pulse: (!) 57 (04/29/24 1143)  Resp: 18 (04/29/24 1143)  BP: (!) 148/66 (04/29/24 1143)  SpO2: 99 % (04/29/24 1143)  Weight: 109.8 kg (242 lb 1 oz) (04/26/24 0702)  Height: 6' 5" (195.6 cm) (04/26/24 0702)    PICC  Date/Time: 4/29/2024 2:35 PM  Performed by: Crow Wei RN  Consent Done: Yes  Time out: Immediately prior to procedure a time out was called to verify the correct patient, procedure, equipment, support staff and site/side marked as required  Indications: med administration  Anesthesia: local infiltration  Local anesthetic: lidocaine 1% without epinephrine  Anesthetic Total (mL): 2  Preparation: skin prepped with chlorhexidine (without alcohol)  Skin prep agent dried: skin prep agent completely dried prior to procedure  Sterile barriers: all five maximum sterile barriers used - cap, mask, sterile gown, sterile gloves, and large sterile sheet  Hand hygiene: hand hygiene performed prior to central venous catheter insertion  Location details: left basilic  Catheter type: double lumen  Catheter size: 4 Fr  Catheter Length: 43cm    Ultrasound guidance: yes  Vessel Caliber: large and patent, compressibility normal  Needle advanced into vessel with real time Ultrasound guidance.  Guidewire confirmed in vessel.  Sterile sheath used.  Number of attempts: 1  Post-procedure: blood return through all ports, chlorhexidine patch and sterile dressing applied  Specimens: No  Implants: No  Comments: Awaitng xray for confirmation of placement           Crow Wei RN  4/29/2024    "

## 2024-04-29 NOTE — PLAN OF CARE
Problem: Adult Inpatient Plan of Care  Goal: Plan of Care Review  Outcome: Progressing  Goal: Patient-Specific Goal (Individualized)  Outcome: Progressing  Goal: Absence of Hospital-Acquired Illness or Injury  Outcome: Progressing  Goal: Optimal Comfort and Wellbeing  Outcome: Progressing  Goal: Readiness for Transition of Care  Outcome: Progressing     Problem: Wound  Goal: Optimal Coping  Outcome: Progressing  Goal: Optimal Functional Ability  Outcome: Progressing  Goal: Absence of Infection Signs and Symptoms  Outcome: Progressing  Goal: Improved Oral Intake  Outcome: Progressing  Goal: Optimal Pain Control and Function  Outcome: Progressing  Goal: Skin Health and Integrity  Outcome: Progressing  Goal: Optimal Wound Healing  Outcome: Progressing     Problem: Infection  Goal: Absence of Infection Signs and Symptoms  Outcome: Progressing

## 2024-04-29 NOTE — ASSESSMENT & PLAN NOTE
- hx of LTKA 2/2024 with Dr. Woods  - Status post left total knee I and D with poly insert exchange 4/26  - monitor drain output   - ID following   - aspirate was around 23,000 white cells, cultures negative from recent knee aspiration  - plan to continue Rocephin, daptomycin and metronidazole x 6 weeks  - order for PICC placed  - awaiting final reports cultures and Microgen DX and final id recs    Awaiting picc line placement  Infectious disease recommendations intravenous rocephin and dapto  Case management consulted for home intravenous antibiotic(s) for 6 weeks  Awaiting cultures to further guide treatment   Physical/occupational therapy

## 2024-04-29 NOTE — PLAN OF CARE
04/29/24 1056   Post-Acute Status   Post-Acute Authorization Home Health;IV Infusion   Home Health Status Referrals Sent   Coverage [employer]   IV Infusion Status Referral(s) sent   Discharge Plan   Discharge Plan A Home Health     SW spoke with pt and spouse regarding hh and iv infusion. Pt stated he would like [facility] and [facility]. SW sent a referral to both places. Pending [facility] and [facility] to accept.

## 2024-04-29 NOTE — PROGRESS NOTES
[facility]  Orthopedics  Progress Note    Patient Name: Myles Pride  MRN: [id number]  Admission Date: 4/26/2024  Hospital Length of Stay: 3 days  Attending Provider: Ryan Woods MD  Primary Care Provider: Catalino Arias MD  Follow-up For: Procedure(s) (LRB):  INCISION AND DRAINAGE, KNEE (Left)  REVISION, ARTHROPLASTY, KNEE (Left)    Post-Operative Day: 3 Days Post-Op  Subjective:     Principal Problem:Effusion of left knee    Principal Orthopedic Problem:  Knee Infusion with infection left total knee prosthesis    Interval History:  No acute events overnight     Have ordered PICC line for home IV antibiotics   Awaiting final recs of ID appreciate their assistance  We will pull Hemovac today and asked nurse for bedside dressing change  Plan home with home health and home IV antibiotics; appreciate social work assistance in this case    Review of patient's allergies indicates:  No Known Allergies    Current Facility-Administered Medications   Medication Dose Route Frequency Provider Last Rate Last Admin    acetaminophen tablet 650 mg  650 mg Oral Q8H PRN Ryan Woods MD   650 mg at 04/28/24 1607    bisacodyL suppository 10 mg  10 mg Rectal Daily PRN Ryan Woods MD        cefTRIAXone (ROCEPHIN) 2 g in dextrose 5 % in water (D5W) 100 mL IVPB (MB+)  2 g Intravenous Q24H Rishabh Ng DO   Stopped at 04/29/24 1012    celecoxib capsule 200 mg  200 mg Oral BID Ryan Woods MD   200 mg at 04/29/24 0928    chlorhexidine 0.12 % solution 10 mL  10 mL Mouth/Throat BID Ryan Woods MD   10 mL at 04/29/24 0935    DAPTOmycin (CUBICIN) 900 mg in sodium chloride 0.9% SolP 50 mL IVPB  900 mg Intravenous Q24H Rishabh Ng  mL/hr at 04/29/24 1049 900 mg at 04/29/24 1049    docusate sodium capsule 100 mg  100 mg Oral BID Ryan Woods MD   100 mg at 04/29/24 0928    enoxaparin injection 40 mg  40 mg Subcutaneous Daily Ryan Woods MD   40 mg  "at 04/28/24 1606    gabapentin capsule 300 mg  300 mg Oral Daily Ryan Woods MD   300 mg at 04/29/24 0928    metroNIDAZOLE tablet 500 mg  500 mg Oral Q12H Rishabh Ng DO   500 mg at 04/29/24 0928    morphine injection 2 mg  2 mg Intravenous Q4H PRN Ryan Woods MD        ondansetron disintegrating tablet 8 mg  8 mg Oral Q8H PRN Ryan Woods MD        ondansetron injection 4 mg  4 mg Intravenous Q12H PRN Ryan Woods MD        oxyCODONE immediate release tablet 10 mg  10 mg Oral Q3H PRN Ryan Woods MD        oxyCODONE immediate release tablet 5 mg  5 mg Oral Q3H PRN Ryan Woods MD        pravastatin tablet 40 mg  40 mg Oral Daily Ryan Woods MD   40 mg at 04/29/24 0929    tamsulosin 24 hr capsule 0.4 mg  0.4 mg Oral Daily Ryan Woods MD   0.4 mg at 04/29/24 0929    vitamin D 1000 units tablet 1,000 Units  1,000 Units Oral Daily Ryan Woods MD   1,000 Units at 04/29/24 0928     Objective:     Vital Signs (Most Recent):  Temp: 98 °F (36.7 °C) (04/29/24 0732)  Pulse: 65 (04/29/24 0732)  Resp: 16 (04/29/24 0732)  BP: 130/69 (04/29/24 0732)  SpO2: 99 % (04/29/24 0732) Vital Signs (24h Range):  Temp:  [98 °F (36.7 °C)-99.6 °F (37.6 °C)] 98 °F (36.7 °C)  Pulse:  [51-82] 65  Resp:  [16-18] 16  SpO2:  [93 %-100 %] 99 %  BP: (126-149)/(67-83) 130/69     Weight: 109.8 kg (242 lb 1 oz)  Height: 6' 5" (195.6 cm)  Body mass index is 28.7 kg/m².      Intake/Output Summary (Last 24 hours) at 4/29/2024 1104  Last data filed at 4/29/2024 0633  Gross per 24 hour   Intake 480 ml   Output 1900 ml   Net -1420 ml        General    Constitutional: He is oriented to person, place, and time. He appears well-developed and well-nourished.   HENT:   Head: Normocephalic and atraumatic.   Eyes: Pupils are equal, round, and reactive to light.   Neck: Neck supple.   Pulmonary/Chest: Effort normal.   Abdominal: Soft.   Neurological: He is alert and oriented to " person, place, and time.   Psychiatric: He has a normal mood and affect.           Right Knee Exam     Comments:        Left Knee Exam     Comments:  POD5 - L TKA reivsion; DAIRE polyexchange and I/D washout     dressing C/D/I  No warmth/ erythema noted to surgical site  Sensation intact throughout extremity   Str 5/5 dorsal/plantar flexion   Cap refill <2   2 + pulses   Hemovac patent       Significant Labs: All pertinent labs within the past 24 hours have been reviewed.    Significant Imaging: I have reviewed all pertinent imaging results/findings.  Assessment/Plan:     * Effusion of left knee  POD5- L TKA revision DAIRE - polyexchange & I/D washout     Continue current care and observation  IV antibiotics per ID appreciate their assistance   Awaiting final cultures  PICC order placed discussed with nursing staff  We will ask staff to pull Hemovac and do dressing change at bedside today   Patient will be DC home with home health and home IV infusion needs; discussed with social work appreciate their assistance  Patient to ambulate as tolerated to left lower extremity  Encouraged PT OT  Stable from ortho standpoint potential DC tomorrow once authorization has been obtained for antibiotic choices      Dr. Woods is aware of the patient & current presentation. He agrees with the current plan above.       REID Prater  Orthopedics  [facility]

## 2024-04-29 NOTE — PROGRESS NOTES
[facility]  Infectious Disease  Progress Note    Patient Name: Myles Pride  MRN: [id number]  Admission Date: 4/26/2024  Length of Stay: 3 days  Attending Physician: Ryan Woods MD  Primary Care Provider: Catalino Arias MD    Isolation Status: No active isolations  Assessment/Plan:      Cardiac/Vascular  Hyperlipidemia  Continue lipid control per primary    Renal/  BPH (benign prostatic hyperplasia)  Follow up with urology; continue indicated medications     ID  * Infection of total knee replacement  --Patient underwent initial left TKA in Feb 2024   --Found to have extruding sutures and inflammation in knee this month  --S/p arthrocentesis followed by revision with poly exchange and retention on 4/26/24 with Dr. Woods   --Will follow OR cultures and Microgen DX to tailor a final antimicrobial regimen  --For now recommend continuing empiric IV daptomycin and ceftriaxone along with PO metronidazole to target Finegoldia isolate  --Anticipate total 6 week antibiotic course; end date 6/7/24   --Please place PICC line   --Will schedule ID clinic follow up   --Above d/w primary team    Outpatient Antibiotic Therapy Plan:    1) Infection: Infected left TKA s/p implant retention/poly exchange     2) Discharge Antibiotics:    Intravenous antibiotics:  IV ceftriaxone 2 g daily   IV daptomycin 900 mg daily     Oral antibiotics:  PO metronidazole 500 mg BID     3) Therapy Duration:  6 weeks from debridement     Estimated end date of IV antibiotics: 6/7/24    4) Outpatient Weekly Labs:    Order the following labs to be drawn on Mondays:   CBC  CMP   CRP  CPK (when on Daptomycin)    5) Fax Lab Results to Infectious Diseases Provider: Dr. [facility] Fax Number: [phone]         Thank you for your consult. I will sign off. Please contact us if you have any additional questions.    Rishabh Ng, DO  Infectious Disease  [facility]    Subjective:     Principal  Problem:Infection of total knee replacement    HPI: This is a 67 yo M with history of left TKA 2/20/24 found to have post op inflammation and expulsion of sutures. Began spiking fevers and was placed on PO antibiotics. CRP and ESR elevated. 50 cc were aspirated from left knee and showed 23K WBC while on antibiotics. Cultures from aspiration finalized with [facility]. Patient now taken for debridement with implant retention and poly exchange on 4/26/24. Cultures in process and waiting for Microgen DX. 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   Past Medical History:   Diagnosis Date    Hyperlipidemia     PONV (postoperative nausea and vomiting) 1990    after knee arthroscopy    Vitamin D deficiency 12/18/2019       Past Surgical History:   Procedure Laterality Date    COLONOSCOPY      COLONOSCOPY N/A 12/19/2019    Procedure: COLONOSCOPY;  Surgeon: Bhupendra Wilkinson MD;  Location: [facility] ENDO;  Service: Endoscopy;  Laterality: N/A;    EPIDURAL STEROID INJECTION N/A 12/09/2022    Procedure: L4-5 intralaminar epidural steroid injection with left paramedian approach;  Surgeon: Ben Eddy MD;  Location: [facility] PAIN MGT;  Service: Pain Management;  Laterality: N/A;    KNEE ARTHROSCOPY Left     LUMBAR FUSION  09/2023    SELECTIVE INJECTION OF ANESTHETIC AGENT AROUND LUMBAR SPINAL NERVE ROOT BY TRANSFORAMINAL APPROACH Bilateral 01/21/2022    Procedure: Bilateral L4/5 TF LOVE with RN IV sedation;  Surgeon: Ben Eddy MD;  Location: [facility] PAIN MGT;  Service: Pain Management;  Laterality: Bilateral;    SELECTIVE INJECTION OF ANESTHETIC AGENT AROUND LUMBAR SPINAL NERVE ROOT BY TRANSFORAMINAL APPROACH Bilateral 05/06/2022    Procedure: Bilateral L4/5 TF LOVE with RN IV sedation;  Surgeon: Ben Eddy MD;  Location: [facility] PAIN MGT;  Service: Pain Management;  Laterality: Bilateral;    SELECTIVE INJECTION OF ANESTHETIC AGENT AROUND LUMBAR SPINAL NERVE ROOT BY TRANSFORAMINAL APPROACH Bilateral 10/25/2022    Procedure: Bilateral  L4/5 TF LOVE with RN IV sedation;  Surgeon: Ben Eddy MD;  Location: [facility];  Service: Pain Management;  Laterality: Bilateral;    TOTAL KNEE ARTHROPLASTY Left 2/20/2024    Procedure: ARTHROPLASTY, KNEE, TOTAL;  Surgeon: Ryan Woods MD;  Location: [facility];  Service: Orthopedics;  Laterality: Left;    TRANSFORAMINAL LUMBAR INTERBODY FUSION (TLIF) USING COMPUTER-ASSISTED NAVIGATION Bilateral 09/06/2023    Procedure: FUSION, SPINE, LUMBAR, TLIF, USING COMPUTER-ASSISTED NAVIGATION;  Surgeon: Lyle Lobato MD;  Location: [facility];  Service: Neurosurgery;  Laterality: Bilateral;  L3-5 TLIF    VASECTOMY  1995       Review of patient's allergies indicates:  No Known Allergies    Medications:  Current Facility-Administered Medications   Medication Dose Route Frequency Provider Last Rate Last Admin    acetaminophen tablet 650 mg  650 mg Oral Q8H PRN Ryan Woods MD   650 mg at 04/28/24 1607    bisacodyL suppository 10 mg  10 mg Rectal Daily PRN Ryan Woods MD        cefTRIAXone (ROCEPHIN) 2 g in dextrose 5 % in water (D5W) 100 mL IVPB (MB+)  2 g Intravenous Q24H Rishabh Ng DO   Stopped at 04/28/24 0905    celecoxib capsule 200 mg  200 mg Oral BID Ryan Woods MD   200 mg at 04/28/24 2050    chlorhexidine 0.12 % solution 10 mL  10 mL Mouth/Throat BID Ryan Woods MD   10 mL at 04/28/24 2052    DAPTOmycin (CUBICIN) 900 mg in sodium chloride 0.9% SolP 50 mL IVPB  900 mg Intravenous Q24H Rishabh Ng DO   Stopped at 04/28/24 1213    docusate sodium capsule 100 mg  100 mg Oral BID Ryan Woods MD   100 mg at 04/28/24 2051    enoxaparin injection 40 mg  40 mg Subcutaneous Daily Ryan Woods MD   40 mg at 04/28/24 1606    gabapentin capsule 300 mg  300 mg Oral Daily Ryan Woods MD   300 mg at 04/28/24 0837    metroNIDAZOLE tablet 500 mg  500 mg Oral Q12H Rishabh Ng DO   500 mg at 04/28/24 2050    morphine injection 2 mg  2 mg  Intravenous Q4H PRN Ryan Woods MD        ondansetron disintegrating tablet 8 mg  8 mg Oral Q8H PRN Ryan Woods MD        ondansetron injection 4 mg  4 mg Intravenous Q12H PRN Ryan Woods MD        oxyCODONE immediate release tablet 10 mg  10 mg Oral Q3H PRN Ryan Woods MD        oxyCODONE immediate release tablet 5 mg  5 mg Oral Q3H PRN Ryan Woods MD        pravastatin tablet 40 mg  40 mg Oral Daily Ryan Woods MD   40 mg at 04/28/24 0836    tamsulosin 24 hr capsule 0.4 mg  0.4 mg Oral Daily Ryan Woods MD   0.4 mg at 04/28/24 0837    vitamin D 1000 units tablet 1,000 Units  1,000 Units Oral Daily Ryan Woods MD   1,000 Units at 04/28/24 0838     Antibiotics (From admission, onward)      Start     Stop Route Frequency Ordered    04/27/24 2100  metroNIDAZOLE tablet 500 mg         -- Oral Every 12 hours 04/27/24 1401    04/27/24 0900  DAPTOmycin (CUBICIN) 900 mg in sodium chloride 0.9% SolP 50 mL IVPB         -- IV Every 24 hours (non-standard times) 04/27/24 0750    04/27/24 0900  cefTRIAXone (ROCEPHIN) 2 g in dextrose 5 % in water (D5W) 100 mL IVPB (MB+)         -- IV Every 24 hours (non-standard times) 04/27/24 0750          Antifungals (From admission, onward)      None          Antivirals (From admission, onward)      None             Immunization History   Administered Date(s) Administered    COVID-19 MRNA, LN-S PF (MODERNA HALF 0.25 ML DOSE) 11/04/2021, 06/01/2022    COVID-19, MRNA, LN-S, PF (Pfizer) (Purple Cap) 10/07/2022    COVID-19, mRNA, LNP-S, bivalent booster, PF (Moderna Omicron)12 + YEARS 10/07/2022    COVID-19, vector-nr, rS-Ad26, PF (Tyler) 03/10/2021    Influenza (FLUAD) - Quadrivalent - Adjuvanted - PF *Preferred* (65+) 09/27/2021, 10/03/2022    Influenza - Quadrivalent - PF *Preferred* (6 months and older) 10/09/2015, 11/10/2017, 05/06/2018, 11/28/2018, 02/28/2020, 09/23/2020    Pneumococcal Polysaccharide - 23  Valtracee 10/01/2021    Tdap 08/10/2015, 04/15/2023    Zoster Recombinant 08/15/2018, 03/15/2019       Family History       Problem Relation (Age of Onset)    Arthritis Mother    Cancer Mother    Diabetes Mother    Gallbladder disease Brother    Hearing loss Mother    Hepatitis Brother    No Known Problems Sister          Social History     Socioeconomic History    Marital status:    Tobacco Use    Smoking status: Never     Passive exposure: Never    Smokeless tobacco: Never   Substance and Sexual Activity    Alcohol use: Yes     Alcohol/week: 3.0 standard drinks of alcohol     Types: 3 Glasses of wine per week     Comment: occ    Drug use: Never    Sexual activity: Yes     Partners: Female     Birth control/protection: Partner-Vasectomy, None     Social Determinants of Health     Financial Resource Strain: Low Risk  (4/27/2024)    Overall Financial Resource Strain (CARDIA)     Difficulty of Paying Living Expenses: Not hard at all   Food Insecurity: No Food Insecurity (4/27/2024)    Hunger Vital Sign     Worried About Running Out of Food in the Last Year: Never true     Ran Out of Food in the Last Year: Never true   Transportation Needs: No Transportation Needs (4/27/2024)    PRAPARE - Transportation     Lack of Transportation (Medical): No     Lack of Transportation (Non-Medical): No   Physical Activity: Sufficiently Active (1/29/2024)    Exercise Vital Sign     Days of Exercise per Week: 5 days     Minutes of Exercise per Session: 30 min   Recent Concern: Physical Activity - Insufficiently Active (1/29/2024)    Exercise Vital Sign     Days of Exercise per Week: 4 days     Minutes of Exercise per Session: 20 min   Stress: No Stress Concern Present (4/27/2024)    [employer] - Occupational Stress Questionnaire     Feeling of Stress : Not at all   Social Connections: Socially Integrated (1/29/2024)    Social Connection and Isolation Panel [NHANES]     Frequency of Communication with  Friends and Family: Three times a week     Frequency of Social Gatherings with Friends and Family: Once a week     Attends Faith Services: More than 4 times per year     Active Member of Clubs or Organizations: Yes     Attends Club or Organization Meetings: More than 4 times per year     Marital Status:    Housing Stability: Unknown (4/27/2024)    Housing Stability Vital Sign     Unable to Pay for Housing in the Last Year: No     Homeless in the Last Year: No     Review of Systems   Skin:  Positive for wound (post op left knee).   All other systems reviewed and are negative.    Objective:     Vital Signs (Most Recent):  Temp: 98 °F (36.7 °C) (04/29/24 0732)  Pulse: 65 (04/29/24 0732)  Resp: 16 (04/29/24 0732)  BP: 130/69 (04/29/24 0732)  SpO2: 99 % (04/29/24 0732) Vital Signs (24h Range):  Temp:  [98 °F (36.7 °C)-99.6 °F (37.6 °C)] 98 °F (36.7 °C)  Pulse:  [51-82] 65  Resp:  [16-18] 16  SpO2:  [93 %-100 %] 99 %  BP: (126-149)/(67-83) 130/69     Weight: 109.8 kg (242 lb 1 oz)  Body mass index is 28.7 kg/m².    Estimated Creatinine Clearance: 98.8 mL/min (based on SCr of 1 mg/dL).     Physical Exam  Constitutional:       General: He is not in acute distress.     Appearance: Normal appearance. He is not ill-appearing.   Cardiovascular:      Rate and Rhythm: Normal rate and regular rhythm.      Pulses: Normal pulses.      Heart sounds: Normal heart sounds. No murmur heard.     No friction rub. No gallop.   Pulmonary:      Effort: Pulmonary effort is normal. No respiratory distress.      Breath sounds: Normal breath sounds.   Abdominal:      General: Abdomen is flat. Bowel sounds are normal. There is no distension.      Palpations: Abdomen is soft.      Tenderness: There is no abdominal tenderness.   Musculoskeletal:      Comments: Left knee wrapped post op    Skin:     General: Skin is warm and dry.   Neurological:      Mental Status: He is alert.          Significant Labs: Blood Culture: No results for  "input(s): "LABBLOO" in the last 4320 hours.  CBC:   Recent Labs   Lab 04/28/24  0346 04/29/24  0440   HGB 12.0*  12.0* 12.3*  12.3*   HCT 36.3*  36.3* 36.9*  36.9*     CMP:   Recent Labs   Lab 04/28/24  0346 04/29/24  0440     141 139  139   K 4.9  4.9 4.6  4.6     107 105  105   CO2 27 27 24 24     100 96  96   BUN 12  12 13  13   CREATININE 1.0  1.0 1.0  1.0   CALCIUM 9.2  9.2 9.2  9.2   ANIONGAP 7*  7* 10  10     Microbiology Results (last 7 days)       Procedure Component Value Units Date/Time    AFB Culture & Smear [[id number]] Collected: 04/26/24 0903    Order Status: Completed Specimen: Joint Fluid from Knee, Left Updated: 04/27/24 2127     AFB Culture & Smear Culture in progress    Culture, Anaerobe [[id number]] Collected: 04/26/24 0903    Order Status: Completed Specimen: Joint Fluid from Knee, Left Updated: 04/27/24 1426     Anaerobic Culture Culture in progress    Culture, Body Fluid (Aerobic) w/ GS [[id number]] Collected: 04/26/24 0903    Order Status: Completed Specimen: Joint Fluid from Knee, Left Updated: 04/27/24 0727     AEROBIC CULTURE - FLUID No growth    Fungus culture [[id number]] Collected: 04/26/24 0903    Order Status: Sent Specimen: Joint Fluid from Knee, Left Updated: 04/26/24 1734    Aerobic culture [[id number]] Collected: 04/26/24 0938    Order Status: Sent Specimen: Incision site from Pleural Fluid Updated: 04/26/24 0939    Gram stain [[id number]] Collected: 04/26/24 0903    Order Status: Canceled Specimen: Joint Fluid from Knee, Left           All pertinent labs within the past 24 hours have been reviewed.    Significant Imaging: I have reviewed all pertinent imaging results/findings within the past 24 hours.  "

## 2024-04-29 NOTE — PLAN OF CARE
04/29/24 0842   Rounds   Attendance ;Charge nurse;Physical therapist   Discharge Plan A Home Health   Why the patient remains in the hospital Requires continued medical care   Transition of Care Barriers None

## 2024-04-29 NOTE — SUBJECTIVE & OBJECTIVE
Past Medical History:   Diagnosis Date    Hyperlipidemia     PONV (postoperative nausea and vomiting) 1990    after knee arthroscopy    Vitamin D deficiency 12/18/2019       Past Surgical History:   Procedure Laterality Date    COLONOSCOPY      COLONOSCOPY N/A 12/19/2019    Procedure: COLONOSCOPY;  Surgeon: Bhupendra Wilkinson MD;  Location: [facility];  Service: Endoscopy;  Laterality: N/A;    EPIDURAL STEROID INJECTION N/A 12/09/2022    Procedure: L4-5 intralaminar epidural steroid injection with left paramedian approach;  Surgeon: Ben Eddy MD;  Location: [facility];  Service: Pain Management;  Laterality: N/A;    KNEE ARTHROSCOPY Left     LUMBAR FUSION  09/2023    SELECTIVE INJECTION OF ANESTHETIC AGENT AROUND LUMBAR SPINAL NERVE ROOT BY TRANSFORAMINAL APPROACH Bilateral 01/21/2022    Procedure: Bilateral L4/5 TF LOVE with RN IV sedation;  Surgeon: Ben Eddy MD;  Location: [facility];  Service: Pain Management;  Laterality: Bilateral;    SELECTIVE INJECTION OF ANESTHETIC AGENT AROUND LUMBAR SPINAL NERVE ROOT BY TRANSFORAMINAL APPROACH Bilateral 05/06/2022    Procedure: Bilateral L4/5 TF LOVE with RN IV sedation;  Surgeon: Ben Eddy MD;  Location: [facility];  Service: Pain Management;  Laterality: Bilateral;    SELECTIVE INJECTION OF ANESTHETIC AGENT AROUND LUMBAR SPINAL NERVE ROOT BY TRANSFORAMINAL APPROACH Bilateral 10/25/2022    Procedure: Bilateral L4/5 TF LOVE with RN IV sedation;  Surgeon: Ben Eddy MD;  Location: [facility];  Service: Pain Management;  Laterality: Bilateral;    TOTAL KNEE ARTHROPLASTY Left 2/20/2024    Procedure: ARTHROPLASTY, KNEE, TOTAL;  Surgeon: Ryan Woods MD;  Location: [facility] OR;  Service: Orthopedics;  Laterality: Left;    TRANSFORAMINAL LUMBAR INTERBODY FUSION (TLIF) USING COMPUTER-ASSISTED NAVIGATION Bilateral 09/06/2023    Procedure: FUSION, SPINE, LUMBAR, TLIF, USING COMPUTER-ASSISTED NAVIGATION;  Surgeon: Lyle Lobato MD;  Location: [facility]  OR;  Service: Neurosurgery;  Laterality: Bilateral;  L3-5 TLIF    VASECTOMY  1995       Review of patient's allergies indicates:  No Known Allergies    Medications:  Current Facility-Administered Medications   Medication Dose Route Frequency Provider Last Rate Last Admin    acetaminophen tablet 650 mg  650 mg Oral Q8H PRN Ryan Woods MD   650 mg at 04/28/24 1607    bisacodyL suppository 10 mg  10 mg Rectal Daily PRN Ryan Woods MD        cefTRIAXone (ROCEPHIN) 2 g in dextrose 5 % in water (D5W) 100 mL IVPB (MB+)  2 g Intravenous Q24H Rishabh Ng DO   Stopped at 04/28/24 0905    celecoxib capsule 200 mg  200 mg Oral BID Ryan Woods MD   200 mg at 04/28/24 2050    chlorhexidine 0.12 % solution 10 mL  10 mL Mouth/Throat BID Ryan Woods MD   10 mL at 04/28/24 2052    DAPTOmycin (CUBICIN) 900 mg in sodium chloride 0.9% SolP 50 mL IVPB  900 mg Intravenous Q24H Rishabh Ng DO   Stopped at 04/28/24 1213    docusate sodium capsule 100 mg  100 mg Oral BID Ryan Woods MD   100 mg at 04/28/24 2051    enoxaparin injection 40 mg  40 mg Subcutaneous Daily Ryan Woods MD   40 mg at 04/28/24 1606    gabapentin capsule 300 mg  300 mg Oral Daily Ryan Woods MD   300 mg at 04/28/24 0837    metroNIDAZOLE tablet 500 mg  500 mg Oral Q12H Rishabh Ng DO   500 mg at 04/28/24 2050    morphine injection 2 mg  2 mg Intravenous Q4H PRN Ryan Woods MD        ondansetron disintegrating tablet 8 mg  8 mg Oral Q8H PRN Ryan Woods MD        ondansetron injection 4 mg  4 mg Intravenous Q12H PRN Ryan Woods MD        oxyCODONE immediate release tablet 10 mg  10 mg Oral Q3H PRN Ryan Woods MD        oxyCODONE immediate release tablet 5 mg  5 mg Oral Q3H PRN Ryan Woods MD        pravastatin tablet 40 mg  40 mg Oral Daily Ryan Woods MD   40 mg at 04/28/24 0836    tamsulosin 24 hr capsule 0.4 mg  0.4 mg Oral  Daily Ryan Woods MD   0.4 mg at 04/28/24 0837    vitamin D 1000 units tablet 1,000 Units  1,000 Units Oral Daily Ryan Woods MD   1,000 Units at 04/28/24 0838     Antibiotics (From admission, onward)      Start     Stop Route Frequency Ordered    04/27/24 2100  metroNIDAZOLE tablet 500 mg         -- Oral Every 12 hours 04/27/24 1401    04/27/24 0900  DAPTOmycin (CUBICIN) 900 mg in sodium chloride 0.9% SolP 50 mL IVPB         -- IV Every 24 hours (non-standard times) 04/27/24 0750    04/27/24 0900  cefTRIAXone (ROCEPHIN) 2 g in dextrose 5 % in water (D5W) 100 mL IVPB (MB+)         -- IV Every 24 hours (non-standard times) 04/27/24 0750          Antifungals (From admission, onward)      None          Antivirals (From admission, onward)      None             Immunization History   Administered Date(s) Administered    COVID-19 MRNA, LN-S PF (MODERNA HALF 0.25 ML DOSE) 11/04/2021, 06/01/2022    COVID-19, MRNA, LN-S, PF (Pfizer) (Purple Cap) 10/07/2022    COVID-19, mRNA, LNP-S, bivalent booster, PF (Moderna Omicron)12 + YEARS 10/07/2022    COVID-19, vector-nr, rS-Ad26, PF (Tyler) 03/10/2021    Influenza (FLUAD) - Quadrivalent - Adjuvanted - PF *Preferred* (65+) 09/27/2021, 10/03/2022    Influenza - Quadrivalent - PF *Preferred* (6 months and older) 10/09/2015, 11/10/2017, 05/06/2018, 11/28/2018, 02/28/2020, 09/23/2020    Pneumococcal Polysaccharide - 23 Valent 10/01/2021    Tdap 08/10/2015, 04/15/2023    Zoster Recombinant 08/15/2018, 03/15/2019       Family History       Problem Relation (Age of Onset)    Arthritis Mother    Cancer Mother    Diabetes Mother    Gallbladder disease Brother    Hearing loss Mother    Hepatitis Brother    No Known Problems Sister          Social History     Socioeconomic History    Marital status:    Tobacco Use    Smoking status: Never     Passive exposure: Never    Smokeless tobacco: Never   Substance and Sexual Activity    Alcohol use: Yes     Alcohol/week:  3.0 standard drinks of alcohol     Types: 3 Glasses of wine per week     Comment: occ    Drug use: Never    Sexual activity: Yes     Partners: Female     Birth control/protection: Partner-Vasectomy, None     Social Determinants of Health     Financial Resource Strain: Low Risk  (4/27/2024)    Overall Financial Resource Strain (CARDIA)     Difficulty of Paying Living Expenses: Not hard at all   Food Insecurity: No Food Insecurity (4/27/2024)    Hunger Vital Sign     Worried About Running Out of Food in the Last Year: Never true     Ran Out of Food in the Last Year: Never true   Transportation Needs: No Transportation Needs (4/27/2024)    PRAPARE - Transportation     Lack of Transportation (Medical): No     Lack of Transportation (Non-Medical): No   Physical Activity: Sufficiently Active (1/29/2024)    Exercise Vital Sign     Days of Exercise per Week: 5 days     Minutes of Exercise per Session: 30 min   Recent Concern: Physical Activity - Insufficiently Active (1/29/2024)    Exercise Vital Sign     Days of Exercise per Week: 4 days     Minutes of Exercise per Session: 20 min   Stress: No Stress Concern Present (4/27/2024)    [employer] [place] [employer] - Occupational Stress Questionnaire     Feeling of Stress : Not at all   Social Connections: Socially Integrated (1/29/2024)    Social Connection and Isolation Panel [NHANES]     Frequency of Communication with Friends and Family: Three times a week     Frequency of Social Gatherings with Friends and Family: Once a week     Attends Muslim Services: More than 4 times per year     Active Member of Clubs or Organizations: Yes     Attends Club or Organization Meetings: More than 4 times per year     Marital Status:    Housing Stability: Unknown (4/27/2024)    Housing Stability Vital Sign     Unable to Pay for Housing in the Last Year: No     Homeless in the Last Year: No     Review of Systems   Skin:  Positive for wound (post op left knee).   All  "other systems reviewed and are negative.    Objective:     Vital Signs (Most Recent):  Temp: 98 °F (36.7 °C) (04/29/24 0732)  Pulse: 65 (04/29/24 0732)  Resp: 16 (04/29/24 0732)  BP: 130/69 (04/29/24 0732)  SpO2: 99 % (04/29/24 0732) Vital Signs (24h Range):  Temp:  [98 °F (36.7 °C)-99.6 °F (37.6 °C)] 98 °F (36.7 °C)  Pulse:  [51-82] 65  Resp:  [16-18] 16  SpO2:  [93 %-100 %] 99 %  BP: (126-149)/(67-83) 130/69     Weight: 109.8 kg (242 lb 1 oz)  Body mass index is 28.7 kg/m².    Estimated Creatinine Clearance: 98.8 mL/min (based on SCr of 1 mg/dL).     Physical Exam  Constitutional:       General: He is not in acute distress.     Appearance: Normal appearance. He is not ill-appearing.   Cardiovascular:      Rate and Rhythm: Normal rate and regular rhythm.      Pulses: Normal pulses.      Heart sounds: Normal heart sounds. No murmur heard.     No friction rub. No gallop.   Pulmonary:      Effort: Pulmonary effort is normal. No respiratory distress.      Breath sounds: Normal breath sounds.   Abdominal:      General: Abdomen is flat. Bowel sounds are normal. There is no distension.      Palpations: Abdomen is soft.      Tenderness: There is no abdominal tenderness.   Musculoskeletal:      Comments: Left knee wrapped post op    Skin:     General: Skin is warm and dry.   Neurological:      Mental Status: He is alert.          Significant Labs: Blood Culture: No results for input(s): "LABBLOO" in the last 4320 hours.  CBC:   Recent Labs   Lab 04/28/24  0346 04/29/24  0440   HGB 12.0*  12.0* 12.3*  12.3*   HCT 36.3*  36.3* 36.9*  36.9*     CMP:   Recent Labs   Lab 04/28/24  0346 04/29/24  0440     141 139  139   K 4.9  4.9 4.6  4.6     107 105  105   CO2 27  27 24  24     100 96  96   BUN 12  12 13  13   CREATININE 1.0  1.0 1.0  1.0   CALCIUM 9.2  9.2 9.2  9.2   ANIONGAP 7*  7* 10  10     Microbiology Results (last 7 days)       Procedure Component Value Units Date/Time    AFB " Culture & Smear [[id number]] Collected: 04/26/24 0903    Order Status: Completed Specimen: Joint Fluid from Knee, Left Updated: 04/27/24 2127     AFB Culture & Smear Culture in progress    Culture, Anaerobe [[id number]] Collected: 04/26/24 0903    Order Status: Completed Specimen: Joint Fluid from Knee, Left Updated: 04/27/24 1426     Anaerobic Culture Culture in progress    Culture, Body Fluid (Aerobic) w/ GS [[id number]] Collected: 04/26/24 0903    Order Status: Completed Specimen: Joint Fluid from Knee, Left Updated: 04/27/24 0727     AEROBIC CULTURE - FLUID No growth    Fungus culture [[id number]] Collected: 04/26/24 0903    Order Status: Sent Specimen: Joint Fluid from Knee, Left Updated: 04/26/24 1734    Aerobic culture [[id number]] Collected: 04/26/24 0938    Order Status: Sent Specimen: Incision site from Pleural Fluid Updated: 04/26/24 0939    Gram stain [[id number]] Collected: 04/26/24 0903    Order Status: Canceled Specimen: Joint Fluid from Knee, Left           All pertinent labs within the past 24 hours have been reviewed.    Significant Imaging: I have reviewed all pertinent imaging results/findings within the past 24 hours.

## 2024-04-29 NOTE — HOSPITAL COURSE
4/29 admitted for left knee effusion and infected knee joint status post total knee prosthesis. Status post I&D and revision per primary. Infectious disease consulted for intravenous antibiotic(s) x 6 weeks. Awaiting final micro and sensitivities. Awaiting picc placement. Social consulted for intravenous antibiotic(s) per infectious disease. Doing well, no issues. Reports during healing process, knee would ooze fluid from incision.  4/30 picc line placed, left upper arm. Received intravenous antibiotic(s) education. Patient is ready for discharge. Thank you for allowing me to participate in your patient's care. Hospital Medicine will sign off. Please contact for questions or concerns.

## 2024-04-29 NOTE — SUBJECTIVE & OBJECTIVE
Principal Problem:Effusion of left knee    Principal Orthopedic Problem:  Knee Infusion with infection left total knee prosthesis    Interval History:  No acute events overnight     Have ordered PICC line for home IV antibiotics   Awaiting final recs of ID appreciate their assistance  We will pull Hemovac today and asked nurse for bedside dressing change  Plan home with home health and home IV antibiotics; appreciate social work assistance in this case    Review of patient's allergies indicates:  No Known Allergies    Current Facility-Administered Medications   Medication Dose Route Frequency Provider Last Rate Last Admin    acetaminophen tablet 650 mg  650 mg Oral Q8H PRN Ryan Woods MD   650 mg at 04/28/24 1607    bisacodyL suppository 10 mg  10 mg Rectal Daily PRN Ryan Woods MD        cefTRIAXone (ROCEPHIN) 2 g in dextrose 5 % in water (D5W) 100 mL IVPB (MB+)  2 g Intravenous Q24H Rishabh Ng DO   Stopped at 04/29/24 1012    celecoxib capsule 200 mg  200 mg Oral BID Ryan Woods MD   200 mg at 04/29/24 0928    chlorhexidine 0.12 % solution 10 mL  10 mL Mouth/Throat BID Ryan Woods MD   10 mL at 04/29/24 0935    DAPTOmycin (CUBICIN) 900 mg in sodium chloride 0.9% SolP 50 mL IVPB  900 mg Intravenous Q24H Rishabh Ng  mL/hr at 04/29/24 1049 900 mg at 04/29/24 1049    docusate sodium capsule 100 mg  100 mg Oral BID Ryan Woods MD   100 mg at 04/29/24 0928    enoxaparin injection 40 mg  40 mg Subcutaneous Daily Ryan Woods MD   40 mg at 04/28/24 1606    gabapentin capsule 300 mg  300 mg Oral Daily Ryan Woods MD   300 mg at 04/29/24 0928    metroNIDAZOLE tablet 500 mg  500 mg Oral Q12H Rishabh Ng DO   500 mg at 04/29/24 0928    morphine injection 2 mg  2 mg Intravenous Q4H PRN Ryan Woods MD        ondansetron disintegrating tablet 8 mg  8 mg Oral Q8H PRN Ryan Woods MD        ondansetron injection 4 mg  4  "mg Intravenous Q12H PRN Ryan Woods MD        oxyCODONE immediate release tablet 10 mg  10 mg Oral Q3H PRN Ryan Woods MD        oxyCODONE immediate release tablet 5 mg  5 mg Oral Q3H PRN Ryan Woods MD        pravastatin tablet 40 mg  40 mg Oral Daily Ryan Woods MD   40 mg at 04/29/24 0929    tamsulosin 24 hr capsule 0.4 mg  0.4 mg Oral Daily Ryan Woods MD   0.4 mg at 04/29/24 0929    vitamin D 1000 units tablet 1,000 Units  1,000 Units Oral Daily Ryan Woods MD   1,000 Units at 04/29/24 0928     Objective:     Vital Signs (Most Recent):  Temp: 98 °F (36.7 °C) (04/29/24 0732)  Pulse: 65 (04/29/24 0732)  Resp: 16 (04/29/24 0732)  BP: 130/69 (04/29/24 0732)  SpO2: 99 % (04/29/24 0732) Vital Signs (24h Range):  Temp:  [98 °F (36.7 °C)-99.6 °F (37.6 °C)] 98 °F (36.7 °C)  Pulse:  [51-82] 65  Resp:  [16-18] 16  SpO2:  [93 %-100 %] 99 %  BP: (126-149)/(67-83) 130/69     Weight: 109.8 kg (242 lb 1 oz)  Height: 6' 5" (195.6 cm)  Body mass index is 28.7 kg/m².      Intake/Output Summary (Last 24 hours) at 4/29/2024 1104  Last data filed at 4/29/2024 0633  Gross per 24 hour   Intake 480 ml   Output 1900 ml   Net -1420 ml        General    Constitutional: He is oriented to person, place, and time. He appears well-developed and well-nourished.   HENT:   Head: Normocephalic and atraumatic.   Eyes: Pupils are equal, round, and reactive to light.   Neck: Neck supple.   Pulmonary/Chest: Effort normal.   Abdominal: Soft.   Neurological: He is alert and oriented to person, place, and time.   Psychiatric: He has a normal mood and affect.           Right Knee Exam     Comments:        Left Knee Exam     Comments:  POD5 - L TKA reivsion; DAIRE polyexchange and I/D washout     dressing C/D/I  No warmth/ erythema noted to surgical site  Sensation intact throughout extremity   Str 5/5 dorsal/plantar flexion   Cap refill <2   2 + pulses   Hemovac patent       Significant Labs: " All pertinent labs within the past 24 hours have been reviewed.    Significant Imaging: I have reviewed all pertinent imaging results/findings.

## 2024-04-29 NOTE — ASSESSMENT & PLAN NOTE
--Patient underwent initial left TKA in Feb 2024   --Found to have extruding sutures and inflammation in knee this month  --S/p arthrocentesis followed by revision with poly exchange and retention on 4/26/24 with Dr. 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   --Will follow OR cultures to tailor a final antimicrobial regimen  --For now recommend continuing empiric IV daptomycin and ceftriaxone along with PO metronidazole to target Finegoldia isolate  --Anticipate total 6 week antibiotic course; end date 6/7/24   --Please place PICC line   --Will schedule ID clinic follow up   --Above d/w primary team    Outpatient Antibiotic Therapy Plan:    1) Infection: Infected left TKA s/p implant retention/poly exchange     2) Discharge Antibiotics:    Intravenous antibiotics:  IV ceftriaxone 2 g daily   IV daptomycin 900 mg daily     Oral antibiotics:  PO metronidazole 500 mg BID     3) Therapy Duration:  6 weeks from debridement     Estimated end date of IV antibiotics: 6/7/24    4) Outpatient Weekly Labs:    Order the following labs to be drawn on Mondays:   CBC  CMP   CRP  CPK (when on Daptomycin)    5) Fax Lab Results to Infectious Diseases Provider: Dr. [facility] Fax Number: [phone]

## 2024-04-29 NOTE — HPI
This is a 67 yo M with history of left TKA 2/20/24 found to have post op inflammation and expulsion of sutures. Began spiking fevers and was placed on PO antibiotics. CRP and ESR elevated. 50 cc were aspirated from left knee and showed 23K WBC while on antibiotics. 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 Patient now taken for debridement with implant retention and poly exchange on 4/26/24. Cultures in process and waiting for [employer] DX. 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

## 2024-04-29 NOTE — PT/OT/SLP PROGRESS
Physical Therapy  Treatment    Myles Pride   MRN: [id number]   Admitting Diagnosis: Effusion of left knee    PT Received On: 04/29/24  PT Start Time: 0745     PT Stop Time: 0810    PT Total Time (min): 25 min       Billable Minutes:  Gait Training 15 and Therapeutic Activity 10    Treatment Type: Treatment  PT/PTA: PTA     Number of PTA visits since last PT visit: 1       General Precautions: Standard, fall  Orthopedic Precautions: LLE weight bearing as tolerated  Braces: N/A  Respiratory Status: Room air         Subjective:  Communicated with patient's nurse, Janette, and completed Epic chart review prior to session.  Patient agreed to PT session.     Pain/Comfort  Pain Rating 1: 2/10  Location - Side 1: Left  Location 1: knee  Pain Addressed 1: Other (see comments) (ACTIVITY PACING)  Pain Rating Post-Intervention 1: 2/10    Objective:   Patient found with: peripheral IV, wound vac    Patient found sitting up in chair.     STS from chair > RW: SBA    400ft w/ RW CGA-SBA    Stand pivot T/F to chair w/ RW: SBA    Completed x10 reps AROM TE to BLE: LAQ, Hip Flex, AP   Intermittent cues given as needed to maintain correct form during repetitions    Educated patient on importance of increased tolerance to upright position and direct impact on CV endurance and strength. Patient encouraged to sit up in chair/ EOB, for a minimum of 2 consecutive hours, 3x per day. Encouraged patient to perform AROM TE to BLE throughout the day within all available planes of motion. Re enforced importance of utilizing call light to meet needs in room and not attempt to get up without staff assistance. Patient verbalized understanding and agreed to comply.       AM-PAC 6 CLICK MOBILITY  How much help from another person does this patient currently need?   1 = Unable, Total/Dependent Assistance  2 = A lot, Maximum/Moderate Assistance  3 = A little, Minimum/Contact Guard/Supervision  4 = None, Modified  [place]/Independent    Turning over in bed (including adjusting bedclothes, sheets and blankets)?: 1 (NT)  Sitting down on and standing up from a chair with arms (e.g., wheelchair, bedside commode, etc.): 3  Moving from lying on back to sitting on the side of the bed?: 1 (NT)  Moving to and from a bed to a chair (including a wheelchair)?: 3  Need to walk in hospital room?: 3  Climbing 3-5 steps with a railing?: 1 (NT)  Basic Mobility Total Score: 12    AM-PAC Raw Score CMS G-Code Modifier Level of Impairment Assistance   6 % Total / Unable   7 - 9 CM 80 - 100% Maximal Assist   10 - 14 CL 60 - 80% Moderate Assist   15 - 19 CK 40 - 60% Moderate Assist   20 - 22 CJ 20 - 40% Minimal Assist   23 CI 1-20% SBA / CGA   24 CH 0% Independent/ Mod I     Patient left up in chair with call button in reach.    Assessment:  Myles Pride is a 68 y.o. male with a medical diagnosis of Effusion of left knee and presents with overall decline in functional mobility. Patient would continue to benefit from skilled PT to address functional limitations listed below in order to return to PLOF/decrease caregiver burden. Patient is progressing well towards goals established within PT POC.     Rehab identified problem list/impairments: impaired self care skills, impaired functional mobility, gait instability, impaired balance, decreased lower extremity function    Rehab potential is good.    Activity tolerance: Fair    Discharge recommendations: Low Intensity Therapy      Barriers to discharge:      Equipment recommendations: none     GOALS:   Multidisciplinary Problems       Physical Therapy Goals          Problem: Physical Therapy    Goal Priority Disciplines Outcome Goal Variances Interventions   Physical Therapy Goal     PT, PT/OT Progressing     Description: Eval complete 4/27. The following goals will be met in 14 days  1. IND with bed mob  2. IND with transfer  3. Amb 250 ft with RW Mod IND                       PLAN:     Patient to be seen 3 x/week to address the above listed problems via gait training, therapeutic activities, therapeutic exercises  Plan of Care expires: 05/11/24  Plan of Care reviewed with: patient         04/29/2024

## 2024-04-29 NOTE — PROGRESS NOTES
[facility] [address] [place] [address] Medicine  Progress Note    Patient Name: Myles Pride  MRN: [id number]  Patient Class: IP- Surgery Admit   Admission Date: 4/26/2024  Length of Stay: 3 days  Attending Physician: Ryan Woods MD  Primary Care Provider: Catalino Arias MD        Subjective:     Principal Problem:Infection of total knee replacement        HPI:  Mr. Pride is a 68 year old male with a history of LTKA in 2/2024 per DR. Woods, who presents to [facility] for Status post left total knee I and D with poly insert exchange on 4/26.  Intra op cultures collected and patient started on broad spectrum abx.  ID and HMS consulted.          Overview/Hospital Course:  4/29 admitted for left knee effusion and infected knee joint status post total knee prosthesis. Status post I&D and revision per primary. Infectious disease consulted for intravenous antibiotic(s) x 6 weeks. Awaiting final micro and sensitivities. Awaiting picc placement. Social consulted for intravenous antibiotic(s) per infectious disease. Doing well, no issues. Reports during healing process, knee would ooze fluid from incision.    Past Medical History:   Diagnosis Date    Hyperlipidemia     PONV (postoperative nausea and vomiting) 1990    after knee arthroscopy    Vitamin D deficiency 12/18/2019       Past Surgical History:   Procedure Laterality Date    COLONOSCOPY      COLONOSCOPY N/A 12/19/2019    Procedure: COLONOSCOPY;  Surgeon: Bhupendra Wilkinson MD;  Location: [facility] ENDO;  Service: Endoscopy;  Laterality: N/A;    EPIDURAL STEROID INJECTION N/A 12/09/2022    Procedure: L4-5 intralaminar epidural steroid injection with left paramedian approach;  Surgeon: Ben Eddy MD;  Location: [facility] PAIN MGT;  Service: Pain Management;  Laterality: N/A;    INCISION AND DRAINAGE OF KNEE Left 4/26/2024    Procedure: INCISION AND DRAINAGE, KNEE;  Surgeon: Ryan Woods MD;  Location: [facility] OR;  Service: Orthopedics;  Laterality: Left;  poly  exchange    KNEE ARTHROSCOPY Left     LUMBAR FUSION  09/2023    REPLACEMENT, POLYETHYLENE LINER Left 4/26/2024    Procedure: REPLACEMENT, POLYETHYLENE LINER;  Surgeon: Ryan Woods MD;  Location: [facility] OR;  Service: Orthopedics;  Laterality: Left;    SELECTIVE INJECTION OF ANESTHETIC AGENT AROUND LUMBAR SPINAL NERVE ROOT BY TRANSFORAMINAL APPROACH Bilateral 01/21/2022    Procedure: Bilateral L4/5 TF LOVE with RN IV sedation;  Surgeon: Ben Eddy MD;  Location: [facility] PAIN MGT;  Service: Pain Management;  Laterality: Bilateral;    SELECTIVE INJECTION OF ANESTHETIC AGENT AROUND LUMBAR SPINAL NERVE ROOT BY TRANSFORAMINAL APPROACH Bilateral 05/06/2022    Procedure: Bilateral L4/5 TF LOVE with RN IV sedation;  Surgeon: Ben Eddy MD;  Location: [facility] PAIN MGT;  Service: Pain Management;  Laterality: Bilateral;    SELECTIVE INJECTION OF ANESTHETIC AGENT AROUND LUMBAR SPINAL NERVE ROOT BY TRANSFORAMINAL APPROACH Bilateral 10/25/2022    Procedure: Bilateral L4/5 TF LOVE with RN IV sedation;  Surgeon: Ben Eddy MD;  Location: [facility] PAIN MGT;  Service: Pain Management;  Laterality: Bilateral;    SYNOVECTOMY OF KNEE Left 4/26/2024    Procedure: SYNOVECTOMY, KNEE;  Surgeon: Ryan Woods MD;  Location: [facility] OR;  Service: Orthopedics;  Laterality: Left;    TOTAL KNEE ARTHROPLASTY Left 2/20/2024    Procedure: ARTHROPLASTY, KNEE, TOTAL;  Surgeon: Ryan Woods MD;  Location: [facility] OR;  Service: Orthopedics;  Laterality: Left;    TRANSFORAMINAL LUMBAR INTERBODY FUSION (TLIF) USING COMPUTER-ASSISTED NAVIGATION Bilateral 09/06/2023    Procedure: FUSION, SPINE, LUMBAR, TLIF, USING COMPUTER-ASSISTED NAVIGATION;  Surgeon: Lyle Lobato MD;  Location: [facility] OR;  Service: Neurosurgery;  Laterality: Bilateral;  L3-5 TLIF    VASECTOMY  1995       Review of patient's allergies indicates:  No Known Allergies    Current Facility-Administered Medications   Medication Dose Route Frequency Provider Last Rate Last  Admin    acetaminophen tablet 650 mg  650 mg Oral Q8H PRN Ryan Woods MD   650 mg at 04/28/24 1607    bisacodyL suppository 10 mg  10 mg Rectal Daily PRN Ryan Woods MD        cefTRIAXone (ROCEPHIN) 2 g in dextrose 5 % in water (D5W) 100 mL IVPB (MB+)  2 g Intravenous Q24H Rishabh Ng DO   Stopped at 04/29/24 1012    celecoxib capsule 200 mg  200 mg Oral BID Ryan Woods MD   200 mg at 04/29/24 0928    chlorhexidine 0.12 % solution 10 mL  10 mL Mouth/Throat BID Ryan Woods MD   10 mL at 04/29/24 0935    DAPTOmycin (CUBICIN) 900 mg in sodium chloride 0.9% SolP 50 mL IVPB  900 mg Intravenous Q24H Rishabh Ng DO   Stopped at 04/29/24 1119    docusate sodium capsule 100 mg  100 mg Oral BID Ryan Woods MD   100 mg at 04/29/24 0928    enoxaparin injection 40 mg  40 mg Subcutaneous Daily Ryan Woods MD   40 mg at 04/28/24 1606    gabapentin capsule 300 mg  300 mg Oral Daily Ryan Woods MD   300 mg at 04/29/24 0928    metroNIDAZOLE tablet 500 mg  500 mg Oral Q12H Rishabh Ng DO   500 mg at 04/29/24 0928    morphine injection 2 mg  2 mg Intravenous Q4H PRN Ryan Woods MD        ondansetron disintegrating tablet 8 mg  8 mg Oral Q8H PRN Ryan Woods MD        ondansetron injection 4 mg  4 mg Intravenous Q12H PRN Ryan Woods MD        oxyCODONE immediate release tablet 10 mg  10 mg Oral Q3H PRN Ryan Woods MD        oxyCODONE immediate release tablet 5 mg  5 mg Oral Q3H PRN Ryan Woods MD        pravastatin tablet 40 mg  40 mg Oral Daily Ryan Woods MD   40 mg at 04/29/24 0929    tamsulosin 24 hr capsule 0.4 mg  0.4 mg Oral Daily Ryan Woods MD   0.4 mg at 04/29/24 0929    vitamin D 1000 units tablet 1,000 Units  1,000 Units Oral Daily Ryan Woods MD   1,000 Units at 04/29/24 0928     Family History       Problem Relation (Age of Onset)    Arthritis Mother    Cancer  Mother    Diabetes Mother    Gallbladder disease Brother    Hearing loss Mother    Hepatitis Brother    No Known Problems Sister          Tobacco Use    Smoking status: Never     Passive exposure: Never    Smokeless tobacco: Never   Substance and Sexual Activity    Alcohol use: Yes     Alcohol/week: 3.0 standard drinks of alcohol     Types: 3 Glasses of wine per week     Comment: occ    Drug use: Never    Sexual activity: Yes     Partners: Female     Birth control/protection: Partner-Vasectomy, None     Review of Systems   Constitutional:  Positive for activity change. Negative for appetite change and fever.   HENT:  Negative for dental problem.    Respiratory:  Negative for shortness of breath.    Gastrointestinal:  Negative for abdominal pain, nausea and vomiting.   Musculoskeletal:  Positive for arthralgias, gait problem, joint swelling and myalgias.        Knee pain     Skin:  Positive for wound.   Psychiatric/Behavioral:  Negative for agitation, behavioral problems, confusion, decreased concentration and dysphoric mood. The patient is not nervous/anxious.      Objective:     Vital Signs (Most Recent):  Temp: 98.7 °F (37.1 °C) (04/29/24 1143)  Pulse: (!) 57 (04/29/24 1143)  Resp: 18 (04/29/24 1143)  BP: (!) 148/66 (04/29/24 1143)  SpO2: 99 % (04/29/24 1143) Vital Signs (24h Range):  Temp:  [98 °F (36.7 °C)-99.6 °F (37.6 °C)] 98.7 °F (37.1 °C)  Pulse:  [57-82] 57  Resp:  [16-18] 18  SpO2:  [93 %-100 %] 99 %  BP: (126-149)/(66-83) 148/66     Weight: 109.8 kg (242 lb 1 oz)  Body mass index is 28.7 kg/m².     Physical Exam  Vitals and nursing note reviewed. Exam conducted with a chaperone present (wife, id).   Constitutional:       General: He is not in acute distress.     Appearance: Normal appearance. He is not ill-appearing or toxic-appearing.   HENT:      Head: Normocephalic and atraumatic.   Cardiovascular:      Rate and Rhythm: Bradycardia present.   Pulmonary:      Effort: Pulmonary effort is normal. No  respiratory distress.   Abdominal:      Palpations: Abdomen is soft.   Musculoskeletal:         General: Swelling present.      Right lower leg: No edema.      Left lower leg: No edema.      Comments: Left knee, bandage, clean, dry, intact    Skin:     General: Skin is warm and dry.   Neurological:      Mental Status: He is alert and oriented to person, place, and time.   Psychiatric:         Mood and Affect: Mood normal.         Behavior: Behavior normal.          Significant Labs: All pertinent labs within the past 24 hours have been reviewed.    CBC:   Recent Labs   Lab 04/28/24  0346 04/29/24  0440   HGB 12.0*  12.0* 12.3*  12.3*   HCT 36.3*  36.3* 36.9*  36.9*     CMP:   Recent Labs   Lab 04/28/24  0346 04/29/24  0440     141 139  139   K 4.9  4.9 4.6  4.6     107 105  105   CO2 27  27 24  24     100 96  96   BUN 12  12 13  13   CREATININE 1.0  1.0 1.0  1.0   CALCIUM 9.2  9.2 9.2  9.2   ANIONGAP 7*  7* 10  10     Joint fluid studies either in progress or no growth  Anaerobic culture wound finegoldia   Aerobic culture wound no growth    Significant Imaging: I have reviewed all pertinent imaging results/findings within the past 24 hours.    Assessment/Plan:      * Infection of total knee replacement  See above      BPH (benign prostatic hyperplasia)  - continue home flomax        Effusion of left knee  - hx of LTKA 2/2024 with Dr. Woods  - Status post left total knee I and D with poly insert exchange 4/26  - monitor drain output   - ID following   - aspirate was around 23,000 white cells, cultures negative from recent knee aspiration  - plan to continue Rocephin, daptomycin and metronidazole x 6 weeks  - order for PICC placed  - awaiting final reports cultures and Microgen DX and final id recs    Awaiting picc line placement  Infectious disease recommendations intravenous rocephin and dapto  Case management consulted for home intravenous antibiotic(s) for 6  weeks  Awaiting cultures to further guide treatment   Physical/occupational therapy     Lumbar radiculopathy, chronic  Status post lumbar fusion with cage placement  Tolerated well        Hyperlipidemia  - continue statin nightly       Bradycardia  - chronic stable, asymptomatic, HR around 50-60          VTE Risk Mitigation (From admission, onward)           Ordered     enoxaparin injection 40 mg  Daily         04/26/24 1745     Bilateral Foot Compression Devices  Until discontinued        Comments: Or SCDs    04/26/24 1745     IP VTE LOW RISK PATIENT  Once         04/26/24 0645     Place SYLVIE hose  Until discontinued         04/26/24 0645     Place sequential compression device  Until discontinued         04/26/24 0645                    Discharge Planning   ARY: 4/29/2024     Code Status: Full Code   Is the patient medically ready for discharge?:     Reason for patient still in hospital (select all that apply): Patient trending condition, Laboratory test, Treatment, Consult recommendations, and Pending disposition  Discharge Plan A: Home Health                  Raquel Bruce MD  Department of Hospital Medicine   [facility] 3

## 2024-04-29 NOTE — ASSESSMENT & PLAN NOTE
POD5- L TKA revision DAIRE - polyexchange & I/D washout     Continue current care and observation  IV antibiotics per ID appreciate their assistance   Awaiting final cultures  PICC order placed discussed with nursing staff  We will ask staff to pull Hemovac and do dressing change at bedside today   Patient will be DC home with home health and home IV infusion needs; discussed with social work appreciate their assistance  Patient to ambulate as tolerated to left lower extremity  Encouraged PT OT  Stable from ortho standpoint potential DC tomorrow once authorization has been obtained for antibiotic choices

## 2024-04-29 NOTE — NURSING
Pt remains free of falls/injury. Safety precautions maintained. Pt denies pain/discomfort. IV abx given.  Cardiac diet tolerated. Pt ambulated in escobedo 3x's with walker.  VSS. No S/S of distress noted at this time. Bed alarm refused.12 hour chart check complete. Will continue to monitor.

## 2024-04-30 VITALS
HEART RATE: 57 BPM | HEIGHT: 77 IN | SYSTOLIC BLOOD PRESSURE: 166 MMHG | RESPIRATION RATE: 18 BRPM | DIASTOLIC BLOOD PRESSURE: 81 MMHG | TEMPERATURE: 98 F | BODY MASS INDEX: 28.58 KG/M2 | WEIGHT: 242.06 LBS | OXYGEN SATURATION: 99 %

## 2024-04-30 LAB
BACTERIA FLD AEROBE CULT: NO GROWTH
BACTERIA SPEC ANAEROBE CULT: NORMAL
HCT VFR BLD AUTO: 33.7 % (ref 40–54)
HCT VFR BLD AUTO: 33.7 % (ref 40–54)
HGB BLD-MCNC: 11.6 G/DL (ref 14–18)
HGB BLD-MCNC: 11.6 G/DL (ref 14–18)

## 2024-04-30 PROCEDURE — 25000003 PHARM REV CODE 250: Performed by: STUDENT IN AN ORGANIZED HEALTH CARE EDUCATION/TRAINING PROGRAM

## 2024-04-30 PROCEDURE — 85018 HEMOGLOBIN: CPT | Performed by: ORTHOPAEDIC SURGERY

## 2024-04-30 PROCEDURE — 63600175 PHARM REV CODE 636 W HCPCS: Performed by: STUDENT IN AN ORGANIZED HEALTH CARE EDUCATION/TRAINING PROGRAM

## 2024-04-30 PROCEDURE — 25000003 PHARM REV CODE 250: Performed by: ORTHOPAEDIC SURGERY

## 2024-04-30 PROCEDURE — 97116 GAIT TRAINING THERAPY: CPT

## 2024-04-30 PROCEDURE — 85014 HEMATOCRIT: CPT | Performed by: ORTHOPAEDIC SURGERY

## 2024-04-30 PROCEDURE — 97110 THERAPEUTIC EXERCISES: CPT

## 2024-04-30 RX ORDER — OXYCODONE AND ACETAMINOPHEN 10; 325 MG/1; MG/1
1 TABLET ORAL EVERY 6 HOURS PRN
Qty: 28 TABLET | Refills: 0 | Status: SHIPPED | OUTPATIENT
Start: 2024-04-30 | End: 2024-05-06

## 2024-04-30 RX ORDER — CELECOXIB 200 MG/1
200 CAPSULE ORAL DAILY
Qty: 30 CAPSULE | Refills: 2 | Status: SHIPPED | OUTPATIENT
Start: 2024-04-30

## 2024-04-30 RX ORDER — METRONIDAZOLE 500 MG/1
500 TABLET ORAL EVERY 12 HOURS
Qty: 84 TABLET | Refills: 0 | Status: SHIPPED | OUTPATIENT
Start: 2024-04-30 | End: 2024-06-03

## 2024-04-30 RX ORDER — ONDANSETRON 4 MG/1
4 TABLET, ORALLY DISINTEGRATING ORAL EVERY 6 HOURS PRN
Qty: 30 TABLET | Refills: 1 | Status: SHIPPED | OUTPATIENT
Start: 2024-04-30 | End: 2024-05-06

## 2024-04-30 RX ORDER — GABAPENTIN 300 MG/1
300 CAPSULE ORAL NIGHTLY
Qty: 30 CAPSULE | Refills: 11 | Status: SHIPPED | OUTPATIENT
Start: 2024-04-30 | End: 2025-04-30

## 2024-04-30 RX ADMIN — PRAVASTATIN SODIUM 40 MG: 20 TABLET ORAL at 08:04

## 2024-04-30 RX ADMIN — TAMSULOSIN HYDROCHLORIDE 0.4 MG: 0.4 CAPSULE ORAL at 08:04

## 2024-04-30 RX ADMIN — METRONIDAZOLE 500 MG: 500 TABLET ORAL at 08:04

## 2024-04-30 RX ADMIN — CHLORHEXIDINE GLUCONATE 0.12% ORAL RINSE 10 ML: 1.2 LIQUID ORAL at 09:04

## 2024-04-30 RX ADMIN — CELECOXIB 200 MG: 100 CAPSULE ORAL at 08:04

## 2024-04-30 RX ADMIN — DAPTOMYCIN 900 MG: 500 INJECTION, POWDER, LYOPHILIZED, FOR SOLUTION INTRAVENOUS at 08:04

## 2024-04-30 RX ADMIN — GABAPENTIN 300 MG: 300 CAPSULE ORAL at 09:04

## 2024-04-30 RX ADMIN — Medication 1000 UNITS: at 08:04

## 2024-04-30 RX ADMIN — DOCUSATE SODIUM 100 MG: 100 CAPSULE, LIQUID FILLED ORAL at 09:04

## 2024-04-30 RX ADMIN — CEFTRIAXONE 2 G: 2 INJECTION, POWDER, FOR SOLUTION INTRAMUSCULAR; INTRAVENOUS at 09:04

## 2024-04-30 NOTE — SUBJECTIVE & OBJECTIVE
Interval History: See hospital course for today      Review of Systems   Constitutional:  Positive for activity change (improving). Negative for appetite change and fever.   Respiratory:  Negative for shortness of breath.    Gastrointestinal:  Negative for abdominal pain, nausea and vomiting.   Musculoskeletal:  Positive for arthralgias, gait problem, joint swelling and myalgias.   Skin:  Positive for wound.   Psychiatric/Behavioral:  Negative for agitation, behavioral problems, confusion, decreased concentration and dysphoric mood. The patient is not nervous/anxious.      Objective:     Vital Signs (Most Recent):  Temp: 98.3 °F (36.8 °C) (04/30/24 0755)  Pulse: (!) 52 (04/30/24 0755)  Resp: 16 (04/30/24 0755)  BP: 127/68 (04/30/24 0755)  SpO2: 97 % (04/30/24 0755) Vital Signs (24h Range):  Temp:  [97.9 °F (36.6 °C)-98.8 °F (37.1 °C)] 98.3 °F (36.8 °C)  Pulse:  [52-65] 52  Resp:  [16-20] 16  SpO2:  [94 %-99 %] 97 %  BP: (102-148)/(50-81) 127/68     Weight: 109.8 kg (242 lb 1 oz)  Body mass index is 28.7 kg/m².    Intake/Output Summary (Last 24 hours) at 4/30/2024 1202  Last data filed at 4/30/2024 0456  Gross per 24 hour   Intake 480 ml   Output 800 ml   Net -320 ml         Physical Exam  Vitals and nursing note reviewed. Exam conducted with a chaperone present (infusion nurse).   Constitutional:       General: He is not in acute distress.     Appearance: He is not ill-appearing or toxic-appearing.   HENT:      Head: Normocephalic and atraumatic.   Cardiovascular:      Rate and Rhythm: Bradycardia present.   Pulmonary:      Effort: Pulmonary effort is normal. No respiratory distress.   Abdominal:      Palpations: Abdomen is soft.      Tenderness: There is no abdominal tenderness.   Musculoskeletal:         General: Swelling present.      Right lower leg: No edema.      Comments: Left knee ace bandage wrapped, clean, dry, intact    Skin:     General: Skin is warm.   Neurological:      Mental Status: He is alert and  oriented to person, place, and time.      Motor: No weakness.   Psychiatric:         Mood and Affect: Mood normal.         Behavior: Behavior normal.             Significant Labs: All pertinent labs within the past 24 hours have been reviewed.    CBC:   Recent Labs   Lab 04/29/24  0440 04/30/24  0620   HGB 12.3*  12.3* 11.6*  11.6*   HCT 36.9*  36.9* 33.7*  33.7*     CMP:   Recent Labs   Lab 04/29/24  0440     139   K 4.6  4.6     105   CO2 24  24   GLU 96  96   BUN 13  13   CREATININE 1.0  1.0   CALCIUM 9.2  9.2   ANIONGAP 10  10     Wound growing finegoldia    Significant Imaging: I have reviewed all pertinent imaging results/findings within the past 24 hours.  CXR: I have reviewed all pertinent results/findings within the past 24 hours and my personal findings are:  picc line placement

## 2024-04-30 NOTE — PLAN OF CARE
[facility]  Discharge Final Note    Primary Care Provider: Catalino Arias MD    Expected Discharge Date: 4/30/2024    Final Discharge Note (most recent)       Final Note - 04/30/24 1258          Final Note    Assessment Type Final Discharge Note     Anticipated Discharge Disposition Home-Health Care [facility]     Hospital Resources/Appts/Education Provided Appointments scheduled and added to AVS        Post-Acute Status    Post-Acute Authorization Home Health;IV Infusion     Home Health Status Set-up Complete/Auth obtained     Coverage [employer]     IV Infusion Status Set-up Complete/Auth obtained     Discharge Delays None known at this time                     Important Message from Medicare              Follow-up providers       Rishabh Ng DO   Specialty: Infectious Diseases    [id number] [place] [address] Drive  [facility] [address]   Phone: [phone]       Next Steps: Schedule an appointment as soon as possible for a visit in 1 week(s)    Instructions: hospital follow up              After-discharge care                Home Medical Care       [facility]   Service: Home Health Services    [address]  [facility] [address]   Phone: [phone]                             DC Disposition: [facility]  Family Notified: Patient at bedside  Transportation: personal transportation    Patient needed Home Health services set up upon discharge. Patient has [facility] set up and information has been added to Patient's AVS.    Patient needed home infusion services set up upon DC. Patient was agreeable to [facility] being set up. Tori, with [facility] taught at bedside and Patient is ready for DC.    LASHAWN sent message to clinic staff to schedule PCP follow up appointment, with MD/ NP within 1 week. Appointment pending scheduling.

## 2024-04-30 NOTE — PLAN OF CARE
04/30/24 1056   Post-Acute Status   Post-Acute Authorization Home Health;IV Infusion   Home Health Status Set-up Complete/Auth obtained   Coverage [employer]   IV Infusion Status Set-up Complete/Auth obtained   Discharge Delays None known at this time   Discharge Plan   Discharge Plan A Home Health       Patient accepted by [facility] and [facility] for services upon DC home.   LASHAWN updated Attending and per Tori, with [facility], she will be at bedside to teach, shortly after her meeting this morning.    12 14 Per Tori, with [facility], Patient was taught at bedside for IV Abx and patient was accepted by [facility]. Per Hospital Medicine, she will get primary attending to place DC orders.     LASHAWN will continue to follow and assist as needed.

## 2024-04-30 NOTE — PROGRESS NOTES
[facility] 3  [facility] Medicine  Progress Note    Patient Name: Myles Pride  MRN: [id number]  Patient Class: IP- Surgery Admit   Admission Date: 4/26/2024  Length of Stay: 4 days  Attending Physician: Ryan Woods MD  Primary Care Provider: Catalino Arias MD        Subjective:     Principal Problem:Infection of total knee replacement        HPI:  Mr. Pride is a 68 year old male with a history of LTKA in 2/2024 per DR. Woods, who presents to [facility] for Status post left total knee I and D with poly insert exchange on 4/26.  Intra op cultures collected and patient started on broad spectrum abx.  ID and HMS consulted.          Overview/Hospital Course:  4/29 admitted for left knee effusion and infected knee joint status post total knee prosthesis. Status post I&D and revision per primary. Infectious disease consulted for intravenous antibiotic(s) x 6 weeks. Awaiting final micro and sensitivities. Awaiting picc placement. Social consulted for intravenous antibiotic(s) per infectious disease. Doing well, no issues. Reports during healing process, knee would ooze fluid from incision.  4/30 picc line placed, left upper arm. Received intravenous antibiotic(s) education. Patient is ready for discharge. Thank you for allowing me to participate in your patient's care. Hospital Medicine will sign off. Please contact for questions or concerns.      Interval History: See hospital course for today      Review of Systems   Constitutional:  Positive for activity change (improving). Negative for appetite change and fever.   Respiratory:  Negative for shortness of breath.    Gastrointestinal:  Negative for abdominal pain, nausea and vomiting.   Musculoskeletal:  Positive for arthralgias, gait problem, joint swelling and myalgias.   Skin:  Positive for wound.   Psychiatric/Behavioral:  Negative for agitation, behavioral problems, confusion, decreased concentration and dysphoric mood. The patient is not  nervous/anxious.      Objective:     Vital Signs (Most Recent):  Temp: 98.3 °F (36.8 °C) (04/30/24 0755)  Pulse: (!) 52 (04/30/24 0755)  Resp: 16 (04/30/24 0755)  BP: 127/68 (04/30/24 0755)  SpO2: 97 % (04/30/24 0755) Vital Signs (24h Range):  Temp:  [97.9 °F (36.6 °C)-98.8 °F (37.1 °C)] 98.3 °F (36.8 °C)  Pulse:  [52-65] 52  Resp:  [16-20] 16  SpO2:  [94 %-99 %] 97 %  BP: (102-148)/(50-81) 127/68     Weight: 109.8 kg (242 lb 1 oz)  Body mass index is 28.7 kg/m².    Intake/Output Summary (Last 24 hours) at 4/30/2024 1202  Last data filed at 4/30/2024 0456  Gross per 24 hour   Intake 480 ml   Output 800 ml   Net -320 ml         Physical Exam  Vitals and nursing note reviewed. Exam conducted with a chaperone present (infusion nurse).   Constitutional:       General: He is not in acute distress.     Appearance: He is not ill-appearing or toxic-appearing.   HENT:      Head: Normocephalic and atraumatic.   Cardiovascular:      Rate and Rhythm: Bradycardia present.   Pulmonary:      Effort: Pulmonary effort is normal. No respiratory distress.   Abdominal:      Palpations: Abdomen is soft.      Tenderness: There is no abdominal tenderness.   Musculoskeletal:         General: Swelling present.      Right lower leg: No edema.      Comments: Left knee ace bandage wrapped, clean, dry, intact    Skin:     General: Skin is warm.   Neurological:      Mental Status: He is alert and oriented to person, place, and time.      Motor: No weakness.   Psychiatric:         Mood and Affect: Mood normal.         Behavior: Behavior normal.             Significant Labs: All pertinent labs within the past 24 hours have been reviewed.    CBC:   Recent Labs   Lab 04/29/24  0440 04/30/24  0620   HGB 12.3*  12.3* 11.6*  11.6*   HCT 36.9*  36.9* 33.7*  33.7*     CMP:   Recent Labs   Lab 04/29/24  0440     139   K 4.6  4.6     105   CO2 24  24   GLU 96  96   BUN 13  13   CREATININE 1.0  1.0   CALCIUM 9.2  9.2   ANIONGAP 10   10     Wound growing finegoldia    Significant Imaging: I have reviewed all pertinent imaging results/findings within the past 24 hours.  CXR: I have reviewed all pertinent results/findings within the past 24 hours and my personal findings are:  picc line placement    Assessment/Plan:      * Infection of total knee replacement  See left knee effusion      BPH (benign prostatic hyperplasia)  - continue home flomax        Effusion of left knee  - hx of LTKA 2/2024 with Dr. Woods  - Status post left total knee I and D with poly insert exchange 4/26  - monitor drain output   - ID following   - aspirate was around 23,000 white cells, cultures negative from recent knee aspiration  - plan to continue Rocephin, daptomycin and metronidazole x 6 weeks  - order for PICC placed  - awaiting final reports cultures and Microgen DX and final id recs    picc line placed, tolerated well  Infectious disease recommendations intravenous rocephin and dapto  Case management consulted for home intravenous antibiotic(s) for 6 weeks  Awaiting cultures to further guide treatment   Physical/occupational therapy   Nurse practitioner to visit home program ordered on discharge     Lumbar radiculopathy, chronic  Status post lumbar fusion with cage placement  Tolerated well        Hyperlipidemia  - continue statin nightly       Bradycardia  - chronic stable, asymptomatic, HR around 50-60          VTE Risk Mitigation (From admission, onward)           Ordered     enoxaparin injection 40 mg  Daily         04/26/24 1745     Bilateral Foot Compression Devices  Until discontinued        Comments: Or SCDs    04/26/24 1745     IP VTE LOW RISK PATIENT  Once         04/26/24 0645     Place SYLVIE hose  Until discontinued         04/26/24 0645     Place sequential compression device  Until discontinued         04/26/24 0645                    Discharge Planning   ARY: 4/29/2024     Code Status: Full Code   Is the patient medically ready for discharge?:      Reason for patient still in hospital (select all that apply): Pending disposition  Discharge Plan A: Home Health   Discharge Delays: None known at this time      Thank you for allowing me to participate in your patient's care. Hospital Medicine will sign off. Please contact for questions or concerns.         Raquel Bruce MD  Department of Hospital Medicine   [facility]

## 2024-04-30 NOTE — DISCHARGE INSTRUCTIONS
You have been started on new medication(s) from this hospitalization. Please take as directed and schedule hospital follow up appointments with your primary providers.    A nurse practitioner may be contacting you to assess your status post-hospitalization.       Homehealth with physical/occupational therapy order has been ordered. Someone will be in contact.

## 2024-04-30 NOTE — PT/OT/SLP PROGRESS
Physical Therapy Treatment    Patient Name:  Myles Pride   MRN:  [id number]    Recommendations:     Discharge Recommendations: Low Intensity Therapy  Discharge Equipment Recommendations: none  Barriers to discharge: None    Assessment:     Myles Pride is a 68 y.o. male admitted with a medical diagnosis of Infection of total knee replacement.  He presents with the following impairments/functional limitations: impaired functional mobility, gait instability, decreased lower extremity function, weakness, orthopedic precautions, decreased ROM.    Rehab Prognosis: Good; patient would benefit from acute skilled PT services to address these deficits and reach maximum level of function.    Recent Surgery: Procedure(s) (LRB):  INCISION AND DRAINAGE, KNEE (Left)  REPLACEMENT, POLYETHYLENE LINER (Left)  SYNOVECTOMY, KNEE (Left) 4 Days Post-Op    Plan:     During this hospitalization, patient to be seen 3 x/week to address the identified rehab impairments via gait training, therapeutic activities, therapeutic exercises and progress toward the following goals:    Plan of Care Expires:  05/11/24    Subjective     Chief Complaint: Pt is motivated to participate  Patient/Family Comments/goals: none stated  Pain/Comfort:  Pain Rating 1: 0/10      Objective:     Communicated with Janette and epic chart review prior to session.  Patient found up in chair with wound vac, PICC line upon PT entry to room.     General Precautions: Standard, fall  Orthopedic Precautions: LLE weight bearing as tolerated  Braces: N/A  Respiratory Status: Room air     Functional Mobility:  Gait belt applied - Yes  Bed Mobility  Seated in chair at start of session and returned to chair  Transfers  Sit to Stand: stand by assistance with rolling walker and with cues for weight bearing precautions  Gait  Patient ambulated 420ft with rolling walker and stand by assistance. Patient demonstrates steady gait. No c/o dizziness or SOB, no LOB. All lines  "remained intact throughout ambulation trail.  Balance  Sitting: supervision  Standing: stand by assistance    Therapeutic Exercise  Patient performed 1 set(s) of 15 repetitions of the following seated exercises: ankle pumps, long arc quads, and marches for bilateral LE.   Patient required skilled PT for instruction of exercises and appropriate cues to perform exercises safely and appropriately.      AM-PAC 6 CLICK MOBILITY  Turning over in bed (including adjusting bedclothes, sheets and blankets)?: 1 (NT)  Sitting down on and standing up from a chair with arms (e.g., wheelchair, bedside commode, etc.): 3  Moving from lying on back to sitting on the side of the bed?: 1 (NT)  Moving to and from a bed to a chair (including a wheelchair)?: 3  Need to walk in hospital room?: 3  Climbing 3-5 steps with a railing?: 1 (NT)  Basic Mobility Total Score: 12       Treatment & Education:  Reviewed role of PT in acute care and POC. Pt tolerated interventions well. Reviewed L LE WBAT. Reviewed importance of OOB activities, activity pacing, and HEP (marching/hip flex, hip abd, heel slides/LAQ, quad sets, ankle pumps) in order to maintain/regain strength. Encouraged to sit up in chair for all meals. Reviewed proper use of RW for safety and to reduce risk of falling. Reviewed "call don't fall" policy and increased risk of falling due to weakness, instructed to utilize call bell for assistance with all transfers. Pt agreeable to all requests.    Patient left up in chair with all lines intact, call button in reach, and family present..    GOALS:   Multidisciplinary Problems       Physical Therapy Goals          Problem: Physical Therapy    Goal Priority Disciplines Outcome Goal Variances Interventions   Physical Therapy Goal     PT, PT/OT Progressing     Description: Eval complete 4/27. The following goals will be met in 14 days  1. IND with bed mob  2. IND with transfer  3. Amb 250 ft with RW Mod IND                       Time " Tracking:     PT Received On: 04/30/24  PT Start Time: 1050     PT Stop Time: 1115  PT Total Time (min): 25 min     Billable Minutes: Gait Training 15min and Therapeutic Exercise 10min    Treatment Type: Treatment  PT/PTA: PT     Number of PTA visits since last PT visit: 0     04/30/2024

## 2024-04-30 NOTE — ASSESSMENT & PLAN NOTE
- hx of LTKA 2/2024 with Dr. Woods  - Status post left total knee I and D with poly insert exchange 4/26  - monitor drain output   - ID following   - aspirate was around 23,000 white cells, cultures negative from recent knee aspiration  - plan to continue Rocephin, daptomycin and metronidazole x 6 weeks  - order for PICC placed  - awaiting final reports cultures and Microgen DX and final id recs    picc line placed, tolerated well  Infectious disease recommendations intravenous rocephin and dapto  Case management consulted for home intravenous antibiotic(s) for 6 weeks  Awaiting cultures to further guide treatment   Physical/occupational therapy   Nurse practitioner to visit home program ordered on discharge

## 2024-04-30 NOTE — DISCHARGE SUMMARY
[facility] - Med Surg 3  Discharge Summary      Admit Date: 4/26/2024    Discharge Date and Time:  04/30/2024 12:37 PM    Attending Physician: Ryan Woods MD     Reason for Admission:  I and D left knee with poly exchange and synovectomy; history of arthroplasty of left knee    Procedures Performed: Procedure(s) (LRB):  INCISION AND DRAINAGE, KNEE (Left)  REPLACEMENT, POLYETHYLENE LINER (Left)  SYNOVECTOMY, KNEE (Left)    Hospital Course (synopsis of major diagnoses, care, treatment, and services provided during the course of the hospital stay):  Patient received medical management, IV antibiotics, PT OT eval, diet advancement, pain control.  Discharged home with home health and home IV services via PICC line.  We placed on aspirin 81 mg b.i.d. for DVT prophylaxis.  He will follow up in 2 weeks in clinic.      Goals of Care Treatment Preferences:  Code Status: Full Code      Consults: ID, PT, and OT    Significant Diagnostic Studies: Labs: All labs within the past 24 hours have been reviewed    Final Diagnoses:    Principal Problem: Infection of total knee replacement   Secondary Diagnoses:   Active Hospital Problems    Diagnosis  POA    *Infection of total knee replacement [T84.59XA, Z96.659]  Not Applicable    Effusion of left knee [M25.462]  Yes    BPH (benign prostatic hyperplasia) [N40.0]  Yes    Lumbar radiculopathy, chronic [M54.16]  Yes     Chronic    Hyperlipidemia [E78.5]  Yes    Bradycardia [R00.1]  Yes     Chronic      Resolved Hospital Problems   No resolved problems to display.       Discharged Condition: stable    Disposition: Home or Self Care    Follow Up/Patient Instructions:     Medications:  Reconciled Home Medications:      Medication List        ASK your doctor about these medications      cephALEXin 500 MG capsule  Commonly known as: KEFLEX  Take 1 capsule (500 mg total) by mouth every 8 (eight) hours.     ciclopirox 8 % Soln  Commonly known as: PENLAC  Apply topically nightly.      ketoconazole 2 % cream  Commonly known as: NIZORAL  Apply topically 2 (two) times daily. For feet and web-spaces. Use for up to 4 weeks.     pravastatin 40 MG tablet  Commonly known as: PRAVACHOL  Take 1 tablet (40 mg total) by mouth once daily.     rifAMpin 300 MG capsule  Commonly known as: RIFADIN  Take 1 capsule (300 mg total) by mouth once daily.     tamsulosin 0.4 mg Cap  Commonly known as: FLOMAX  Take 1 capsule (0.4 mg total) by mouth once daily.     VITAMIN D ORAL  Take by mouth once daily.            Discharge Procedure Orders   Ambulatory referral/consult to [facility]      Contact information for follow-up providers       Rishabh Ng DO. Schedule an appointment as soon as possible for a visit in 1 week(s).    Specialty: Infectious Diseases  Why: hospital follow up  Contact information:  [address] [facility] [address]  [phone]                       Contact information for after-discharge care       Home Medical Care       [facility] .    Service: Home Health Services  Contact information:  [address] [facility] [id number] [phone]

## 2024-04-30 NOTE — PLAN OF CARE
Pt tolerated interventions well. Required SBA for STS, ambulated 420ft SBA using RW. Recommending low intensity therapy upon d/c.

## 2024-04-30 NOTE — NURSING
Pt remains free of falls/injury. Safety precautions maintained. Pt denies pain/discomfort. IV abx given.    Cardiac diet tolerated. VSS. No S/S of distress noted at this time. Pt meds delivered to bedside.  Pt education completed.  HH and Rep for antibiotic med delivery made follow-up appt with pt at bedside.  Pt and wife educated.

## 2024-04-30 NOTE — CONSULTS
Patient was accepted by [facility] and benefits were run. Patient was taught at bedside for IV abx and [facility] will follow upon Discharge.

## 2024-05-01 ENCOUNTER — TELEPHONE (OUTPATIENT)
Dept: ORTHOPEDICS | Facility: CLINIC | Age: 69
End: 2024-05-01
Payer: MEDICARE

## 2024-05-01 LAB
FINAL PATHOLOGIC DIAGNOSIS: NORMAL
GROSS: NORMAL
Lab: NORMAL

## 2024-05-01 PROCEDURE — G0180 MD CERTIFICATION HHA PATIENT: HCPCS | Mod: ,,, | Performed by: PHYSICIAN ASSISTANT

## 2024-05-01 NOTE — TELEPHONE ENCOUNTER
Called and spoke with francine - informed her that asa 81mg bid for the first 6 weeks after surgery     Verbalized understanding and thankful for call

## 2024-05-01 NOTE — TELEPHONE ENCOUNTER
----- Message from Amadeo Smiley sent at 5/1/2024 10:34 AM CDT -----  Contact: francine/ akash Suresh is needing a call back in regards to the patients baby aspirin medication. Please give her a call back at [phone]

## 2024-05-02 ENCOUNTER — OFFICE VISIT (OUTPATIENT)
Dept: HOME HEALTH SERVICES | Facility: CLINIC | Age: 69
End: 2024-05-02
Payer: MEDICARE

## 2024-05-02 DIAGNOSIS — T84.59XD INFECTION OF TOTAL KNEE REPLACEMENT, SUBSEQUENT ENCOUNTER: ICD-10-CM

## 2024-05-02 DIAGNOSIS — Z09 HOSPITAL DISCHARGE FOLLOW-UP: Primary | ICD-10-CM

## 2024-05-02 DIAGNOSIS — M25.462 EFFUSION OF LEFT KNEE: ICD-10-CM

## 2024-05-02 DIAGNOSIS — Z96.659 INFECTION OF TOTAL KNEE REPLACEMENT, SUBSEQUENT ENCOUNTER: ICD-10-CM

## 2024-05-02 PROCEDURE — 3078F DIAST BP <80 MM HG: CPT | Mod: CPTII,S$GLB,,

## 2024-05-02 PROCEDURE — 3074F SYST BP LT 130 MM HG: CPT | Mod: CPTII,S$GLB,,

## 2024-05-02 PROCEDURE — 1126F AMNT PAIN NOTED NONE PRSNT: CPT | Mod: CPTII,S$GLB,,

## 2024-05-02 PROCEDURE — 99496 TRANSJ CARE MGMT HIGH F2F 7D: CPT | Mod: S$GLB,,,

## 2024-05-02 NOTE — PROGRESS NOTES
[facility]  Transitional Care Management (TCM) Home Visit    Encounter Provider: Max Armas   PCP: Catalino Arias MD  Consult Requested By: Dr. Raquel Bruce  Admit Date: 4/26/24   IP Discharge Date: 4/30/24  Hospital Length of Stay:RRHLOS@ days  Days since discharge (from IP or SNF):  2   [facility] On Call Contact Note:  05/01/2024  Hospital Diagnosis: Infection associated with internal left knee prosthesis, subsequent encounter [T84.54XD]     HISTORY OF PRESENT ILLNESS      Patient ID: Myles Pride is a 68 y.o. male was recently admitted to the hospital, this is their TCM encounter.    Hospital Course Synopsis:  Recent admission with I and D left knee with poly exchange and synovectomy; history of arthroplasty of left knee.     Procedures Performed: Procedure(s) (LRB):  INCISION AND DRAINAGE, KNEE (Left)  REPLACEMENT, POLYETHYLENE LINER (Left)  SYNOVECTOMY, KNEE (Left)     Hospital Course   Patient received medical management, IV antibiotics, PT OT eval, diet advancement, pain control.  Discharged home with home health and home IV services via PICC line.  Infectious disease recommendations rocephin, metronidazole, and daptomycin for 6 weeks. We placed on aspirin 81 mg b.i.d. for DVT prophylaxis.  He will follow up in 2 weeks in clinic.       Patient reports doing well since discharge.  Bandage and Ace wrap in place to left knee.  Currently ambulating independently.  Reports minimal pain, currently managed with Celebrex and Neurontin.  Uses walker occasionally.  Currently working with [facility] and PT/OT.  PICC line to left upper arm, spouse administers antibiotics daily.  Reports compliance with medications.  Denies any further complaints or concerns at this time.  Has follow-ups with PCP on 05/06, ortho 5/9, ID 5/24. Questions elicited. Time allowed for questions, all issues addressed. Contact info given for any concerns or changes.   DECISION MAKING TODAY       Assessment & Plan:  1.  Hospital discharge follow-up    2. Infection of total knee replacement, subsequent encounter  Assessment & Plan:  S/p I&D with poly insert exchange 4/26  Continue Rocephin and daptomycin course for 6 weeks, administered via PICC line  Continue PO metronidazole  Follow-up with ID      3. Effusion of left knee  Assessment & Plan:  S/p left total knee replacement in February  S/p I&D with poly insert exchange 4/26, awaiting final cultures  Currently on Rocephin and daptomycin IV x 6 weeks, administered via PICC line, and p.o. metronidazole  Continue PTOT  Continue current medications  Follow up with ortho             Medication List on Discharge:     Medication List            Accurate as of May 2, 2024 11:59 PM. If you have any questions, ask your nurse or doctor.                CONTINUE taking these medications      celecoxib 200 MG capsule  Commonly known as: CeleBREX  Take 1 capsule (200 mg total) by mouth once daily.     ciclopirox 8 % Soln  Commonly known as: PENLAC  Apply topically nightly.     dextrose 5 % in water (D5W) PgBk 100 mL with cefTRIAXone 2 gram SolR 2 g  Inject 2 g into the vein once daily.     gabapentin 300 MG capsule  Commonly known as: NEURONTIN  Take 1 capsule (300 mg total) by mouth every evening.     ketoconazole 2 % cream  Commonly known as: NIZORAL  Apply topically 2 (two) times daily. For feet and web-spaces. Use for up to 4 weeks.     metroNIDAZOLE 500 MG tablet  Commonly known as: FLAGYL  Take 1 tablet (500 mg total) by mouth every 12 (twelve) hours.     ondansetron 4 MG Tbdl  Commonly known as: ZOFRAN-ODT  Dissolve 1 tablet (4 mg total) by mouth every 6 (six) hours as needed (nausea).     oxyCODONE-acetaminophen  mg per tablet  Commonly known as: PERCOCET  Take 1 tablet by mouth every 6 (six) hours as needed for Pain.     pravastatin 40 MG tablet  Commonly known as: PRAVACHOL  Take 1 tablet (40 mg total) by mouth once daily.     sodium chloride 0.9% SolP 50 mL with DAPTOmycin 500  mg SolR 900 mg  Inject 900 mg into the vein Daily.     tamsulosin 0.4 mg Cap  Commonly known as: FLOMAX  Take 1 capsule (0.4 mg total) by mouth once daily.     VITAMIN D ORAL  Take by mouth once daily.              Medication Reconciliation:  Were medications changed on discharge? Yes  Were medications in the home? Yes  Is the patient taking the medications as directed? Yes  Does the patient understand the medications and changes? Yes  Does updated med list accurately reflects meds patient is currently taking? Yes    ENVIRONMENT OF CARE      Family and/or Caregiver present at visit?  Yes  Name of Caregiver:  Randa  History provided by: patient    Advance Care Planning   Advanced Care Planning Status:  Patient has had an ACP conversation  Living Will: No  Power of : No  [place]: No    Does Caregiver have HCPoA: No  Changes today: none  Is patient hospice appropriate: No  (If needed, use PPS <30 or FAST score >7)  Was referral to hospice placed: No     Impression upon entering the home:  Physical Dwelling: single family home   Appearance of home environment: cleaniness: clean, walking pathways: clear, lighting: adequate, and home structure: sound structure  Functional Status: independent  Mobility: ambulatory  Nutritional access: adequate intake and access  Home Health: Yes,  Agency [facility]    DME/Supplies: rolling walker     Diagnostic tests reviewed/disposition: I have reviewed all completed as well as pending diagnostic tests at the time of discharge.  Disease/illness education: Take all medication as prescribed. Activity as tolerated. Keep all upcoming appts.   Establishment or re-establishment of referral orders for community resources: No other necessary community resources.   Discussion with other health care providers: No discussion with other health care providers necessary.   Does patient have a PCP at [facility]? Yes   Repatriation plan with PCP? follow-up with PCP within 90d   Does patient have an  ostomy (ileostomy, colostomy, suprapubic catheter, nephrostomy tube, tracheostomy, PEG tube, pleurex catheter, cholecystostomy, etc)? No  Were BPAs reviewed? Yes    Social History     Socioeconomic History    Marital status:    Tobacco Use    Smoking status: Never     Passive exposure: Never    Smokeless tobacco: Never   Substance and Sexual Activity    Alcohol use: Yes     Alcohol/week: 3.0 standard drinks of alcohol     Types: 3 Glasses of wine per week     Comment: occ    Drug use: Never    Sexual activity: Yes     Partners: Female     Birth control/protection: Partner-Vasectomy, None     Social Determinants of Health     Financial Resource Strain: Low Risk  (4/27/2024)    Overall Financial Resource Strain (CARDIA)     Difficulty of Paying Living Expenses: Not hard at all   Food Insecurity: No Food Insecurity (4/27/2024)    Hunger Vital Sign     Worried About Running Out of Food in the Last Year: Never true     Ran Out of Food in the Last Year: Never true   Transportation Needs: No Transportation Needs (4/27/2024)    PRAPARE - Transportation     Lack of Transportation (Medical): No     Lack of Transportation (Non-Medical): No   Physical Activity: Sufficiently Active (1/29/2024)    Exercise Vital Sign     Days of Exercise per Week: 5 days     Minutes of Exercise per Session: 30 min   Recent Concern: Physical Activity - Insufficiently Active (1/29/2024)    Exercise Vital Sign     Days of Exercise per Week: 4 days     Minutes of Exercise per Session: 20 min   Stress: No Stress Concern Present (4/27/2024)    [employer] [place] [employer] - Occupational Stress Questionnaire     Feeling of Stress : Not at all   Housing Stability: Low Risk  (4/27/2024)    Housing Stability Vital Sign     Unable to Pay for Housing in the Last Year: No     Homeless in the Last Year: No       OBJECTIVE:     Vital Signs:  There were no vitals filed for this visit.    Review of Systems   Constitutional: Negative.    HENT:  Negative.     Eyes: Negative.    Respiratory: Negative.  Negative for chest tightness.    Cardiovascular: Negative.  Negative for leg swelling.   Gastrointestinal: Negative.    Endocrine: Negative.    Genitourinary: Negative.    Musculoskeletal: Negative.    Skin: Negative.    Allergic/Immunologic: Negative.    Neurological: Negative.    Hematological: Negative.    Psychiatric/Behavioral: Negative.  Negative for agitation.    All other systems reviewed and are negative.      Physical Exam:  Physical Exam  Vitals reviewed.   Constitutional:       General: He is not in acute distress.     Appearance: Normal appearance. He is well-developed.   HENT:      Head: Normocephalic and atraumatic.      Nose: Nose normal.      Mouth/Throat:      Mouth: Mucous membranes are dry.      Pharynx: Oropharynx is clear.   Eyes:      Pupils: Pupils are equal, round, and reactive to light.   Cardiovascular:      Rate and Rhythm: Normal rate and regular rhythm.      Pulses: Normal pulses.      Heart sounds: Normal heart sounds.   Pulmonary:      Effort: Pulmonary effort is normal.      Breath sounds: Normal breath sounds.   Abdominal:      General: Bowel sounds are normal.      Palpations: Abdomen is soft.   Musculoskeletal:         General: Normal range of motion.      Cervical back: Normal range of motion and neck supple.   Skin:     General: Skin is warm and dry.   Neurological:      General: No focal deficit present.      Mental Status: He is alert and oriented to person, place, and time. Mental status is at baseline.   Psychiatric:         Mood and Affect: Mood normal.         Behavior: Behavior normal.         Thought Content: Thought content normal.         Judgment: Judgment normal.         INSTRUCTIONS FOR PATIENT:   - Continue all medications, treatments and therapies as ordered.   - Follow all instructions, recommendations as discussed.  - Maintain Safety Precautions at all times.  - Attend all medical appointments as  scheduled.  - For worsening symptoms: call Primary Care Physician or Nurse Practitioner.  - For emergencies, call 911 or immediately report to the nearest emergency room.   Scheduled Follow-up, Appts Reviewed with Modifications if Needed: Yes  Future Appointments   Date Time Provider Department Center   5/6/2024  9:40 AM Catalino Arias MD [facility] IM [facility]   5/9/2024  9:30 AM ONLH XR1- ONJEFFRY XRNATALIE [facility]   5/9/2024 11:30 AM Symone Reid PA ON [facility]TH[facility]   5/24/2024  9:00 AM Rishabh Ng DO ON [facility]   6/24/2024  9:30 AM Symone Reid PA ON [facility]   7/26/2024 10:30 AM ONLH XR1- ONJEFFRY CAIN [facility]   7/26/2024 11:00 AM Ryan Woods MD [facility] ORTHO [facility]   9/13/2024 10:30 AM LABORATORY, [facility]   9/16/2024  1:40 PM Catalino Arias MD [facility]       Signature: Max Armas NP    Transition of Care Visit:  I have reviewed and updated the history and problem list.  I have reconciled the medication list.  I have discussed the hospitalization and current medical issues, prognosis and plans with the patient/family.

## 2024-05-03 VITALS
OXYGEN SATURATION: 100 % | RESPIRATION RATE: 18 BRPM | SYSTOLIC BLOOD PRESSURE: 128 MMHG | HEART RATE: 64 BPM | DIASTOLIC BLOOD PRESSURE: 74 MMHG

## 2024-05-03 LAB — BACTERIA SPEC ANAEROBE CULT: NORMAL

## 2024-05-03 NOTE — ASSESSMENT & PLAN NOTE
S/p left total knee replacement in February  S/p I&D with poly insert exchange 4/26, awaiting final cultures  Currently on Rocephin and daptomycin IV x 6 weeks, administered via PICC line, and p.o. metronidazole  Continue PTOT  Continue current medications  Follow up with ortho

## 2024-05-03 NOTE — ASSESSMENT & PLAN NOTE
S/p I&D with poly insert exchange 4/26  Continue Rocephin and daptomycin course for 6 weeks, administered via PICC line  Continue PO metronidazole  Follow-up with ID

## 2024-05-06 ENCOUNTER — LAB VISIT (OUTPATIENT)
Dept: LAB | Facility: HOSPITAL | Age: 69
End: 2024-05-06
Attending: STUDENT IN AN ORGANIZED HEALTH CARE EDUCATION/TRAINING PROGRAM
Payer: MEDICARE

## 2024-05-06 ENCOUNTER — OFFICE VISIT (OUTPATIENT)
Dept: INTERNAL MEDICINE | Facility: CLINIC | Age: 69
End: 2024-05-06
Payer: MEDICARE

## 2024-05-06 VITALS
WEIGHT: 239 LBS | DIASTOLIC BLOOD PRESSURE: 76 MMHG | HEART RATE: 72 BPM | SYSTOLIC BLOOD PRESSURE: 134 MMHG | RESPIRATION RATE: 20 BRPM | BODY MASS INDEX: 28.34 KG/M2 | OXYGEN SATURATION: 97 %

## 2024-05-06 DIAGNOSIS — Z47.89 UNSPECIFIED ORTHOPEDIC AFTERCARE: Primary | ICD-10-CM

## 2024-05-06 DIAGNOSIS — N40.0 BENIGN PROSTATIC HYPERPLASIA WITHOUT LOWER URINARY TRACT SYMPTOMS: ICD-10-CM

## 2024-05-06 DIAGNOSIS — Z09 HOSPITAL DISCHARGE FOLLOW-UP: Primary | ICD-10-CM

## 2024-05-06 LAB
ALBUMIN SERPL BCP-MCNC: 3.2 G/DL (ref 3.5–5.2)
ALP SERPL-CCNC: 68 U/L (ref 55–135)
ALT SERPL W/O P-5'-P-CCNC: 20 U/L (ref 10–44)
ANION GAP SERPL CALC-SCNC: 8 MMOL/L (ref 8–16)
AST SERPL-CCNC: 23 U/L (ref 10–40)
BASOPHILS # BLD AUTO: 0.07 K/UL (ref 0–0.2)
BASOPHILS NFR BLD: 0.9 % (ref 0–1.9)
BILIRUB SERPL-MCNC: 0.2 MG/DL (ref 0.1–1)
BUN SERPL-MCNC: 18 MG/DL (ref 8–23)
CALCIUM SERPL-MCNC: 8.9 MG/DL (ref 8.7–10.5)
CHLORIDE SERPL-SCNC: 106 MMOL/L (ref 95–110)
CK SERPL-CCNC: 561 U/L (ref 20–200)
CO2 SERPL-SCNC: 24 MMOL/L (ref 23–29)
CREAT SERPL-MCNC: 1 MG/DL (ref 0.5–1.4)
CRP SERPL-MCNC: 24.9 MG/L (ref 0–8.2)
DIFFERENTIAL METHOD BLD: ABNORMAL
EOSINOPHIL # BLD AUTO: 0.2 K/UL (ref 0–0.5)
EOSINOPHIL NFR BLD: 3 % (ref 0–8)
ERYTHROCYTE [DISTWIDTH] IN BLOOD BY AUTOMATED COUNT: 12.6 % (ref 11.5–14.5)
EST. GFR  (NO RACE VARIABLE): >60 ML/MIN/1.73 M^2
GLUCOSE SERPL-MCNC: 100 MG/DL (ref 70–110)
HCT VFR BLD AUTO: 36.2 % (ref 40–54)
HGB BLD-MCNC: 11.9 G/DL (ref 14–18)
IMM GRANULOCYTES # BLD AUTO: 0.05 K/UL (ref 0–0.04)
IMM GRANULOCYTES NFR BLD AUTO: 0.6 % (ref 0–0.5)
LYMPHOCYTES # BLD AUTO: 2.1 K/UL (ref 1–4.8)
LYMPHOCYTES NFR BLD: 27 % (ref 18–48)
MCH RBC QN AUTO: 30.8 PG (ref 27–31)
MCHC RBC AUTO-ENTMCNC: 32.9 G/DL (ref 32–36)
MCV RBC AUTO: 94 FL (ref 82–98)
MONOCYTES # BLD AUTO: 0.6 K/UL (ref 0.3–1)
MONOCYTES NFR BLD: 7.6 % (ref 4–15)
NEUTROPHILS # BLD AUTO: 4.7 K/UL (ref 1.8–7.7)
NEUTROPHILS NFR BLD: 60.9 % (ref 38–73)
NRBC BLD-RTO: 0 /100 WBC
PLATELET # BLD AUTO: 357 K/UL (ref 150–450)
PMV BLD AUTO: 9.7 FL (ref 9.2–12.9)
POTASSIUM SERPL-SCNC: 4.3 MMOL/L (ref 3.5–5.1)
PROT SERPL-MCNC: 6.7 G/DL (ref 6–8.4)
RBC # BLD AUTO: 3.86 M/UL (ref 4.6–6.2)
SODIUM SERPL-SCNC: 138 MMOL/L (ref 136–145)
WBC # BLD AUTO: 7.73 K/UL (ref 3.9–12.7)

## 2024-05-06 PROCEDURE — 3288F FALL RISK ASSESSMENT DOCD: CPT | Mod: HCNC,CPTII,S$GLB, | Performed by: FAMILY MEDICINE

## 2024-05-06 PROCEDURE — 80053 COMPREHEN METABOLIC PANEL: CPT | Mod: HCNC | Performed by: STUDENT IN AN ORGANIZED HEALTH CARE EDUCATION/TRAINING PROGRAM

## 2024-05-06 PROCEDURE — 1111F DSCHRG MED/CURRENT MED MERGE: CPT | Mod: HCNC,CPTII,S$GLB, | Performed by: FAMILY MEDICINE

## 2024-05-06 PROCEDURE — 99999 PR PBB SHADOW E&M-EST. PATIENT-LVL III: CPT | Mod: PBBFAC,HCNC,, | Performed by: FAMILY MEDICINE

## 2024-05-06 PROCEDURE — G2211 COMPLEX E/M VISIT ADD ON: HCPCS | Mod: HCNC,S$GLB,, | Performed by: FAMILY MEDICINE

## 2024-05-06 PROCEDURE — 99213 OFFICE O/P EST LOW 20 MIN: CPT | Mod: HCNC,S$GLB,, | Performed by: FAMILY MEDICINE

## 2024-05-06 PROCEDURE — 1126F AMNT PAIN NOTED NONE PRSNT: CPT | Mod: HCNC,CPTII,S$GLB, | Performed by: FAMILY MEDICINE

## 2024-05-06 PROCEDURE — 86140 C-REACTIVE PROTEIN: CPT | Mod: HCNC | Performed by: STUDENT IN AN ORGANIZED HEALTH CARE EDUCATION/TRAINING PROGRAM

## 2024-05-06 PROCEDURE — 3075F SYST BP GE 130 - 139MM HG: CPT | Mod: HCNC,CPTII,S$GLB, | Performed by: FAMILY MEDICINE

## 2024-05-06 PROCEDURE — 85025 COMPLETE CBC W/AUTO DIFF WBC: CPT | Mod: HCNC | Performed by: STUDENT IN AN ORGANIZED HEALTH CARE EDUCATION/TRAINING PROGRAM

## 2024-05-06 PROCEDURE — 1159F MED LIST DOCD IN RCRD: CPT | Mod: HCNC,CPTII,S$GLB, | Performed by: FAMILY MEDICINE

## 2024-05-06 PROCEDURE — 82550 ASSAY OF CK (CPK): CPT | Mod: HCNC | Performed by: STUDENT IN AN ORGANIZED HEALTH CARE EDUCATION/TRAINING PROGRAM

## 2024-05-06 PROCEDURE — 3078F DIAST BP <80 MM HG: CPT | Mod: HCNC,CPTII,S$GLB, | Performed by: FAMILY MEDICINE

## 2024-05-06 PROCEDURE — 1101F PT FALLS ASSESS-DOCD LE1/YR: CPT | Mod: HCNC,CPTII,S$GLB, | Performed by: FAMILY MEDICINE

## 2024-05-06 RX ORDER — NAPROXEN SODIUM 220 MG/1
81 TABLET, FILM COATED ORAL DAILY
COMMUNITY

## 2024-05-06 NOTE — ANESTHESIA POSTPROCEDURE EVALUATION
Anesthesia Post Evaluation    Patient: Myles Pride    Procedure(s) Performed: Procedure(s) (LRB):  INCISION AND DRAINAGE, KNEE (Left)  REPLACEMENT, POLYETHYLENE LINER (Left)  SYNOVECTOMY, KNEE (Left)    Final Anesthesia Type: general      Patient location during evaluation: PACU  Patient participation: Yes- Able to Participate  Level of consciousness: awake and alert, oriented and awake  Post-procedure vital signs: reviewed and stable  Pain management: adequate  Airway patency: patent    PONV status at discharge: No PONV  Anesthetic complications: no      Cardiovascular status: blood pressure returned to baseline  Respiratory status: unassisted  Hydration status: euvolemic  Follow-up not needed.              Vitals Value Taken Time   /74 05/03/24 1433   Temp 36.7 °C (98.1 °F) 04/30/24 1206   Pulse 64 05/03/24 1433   Resp 18 05/03/24 1433   SpO2 100 % 05/03/24 1433         No case tracking events are documented in the log.      Pain/Karey Score: No data recorded

## 2024-05-06 NOTE — PROGRESS NOTES
Subjective:       Patient ID: Myles Pride is a 68 y.o. male.    Chief Complaint: No chief complaint on file.    HPI    Transitional Care Note    Family and/or Caretaker present at visit?  Yes. Wife - Randa  Diagnostic tests reviewed/disposition: I have reviewed all completed as well as pending diagnostic tests at the time of discharge.  Disease/illness education: yes  Home health/community services discussion/referrals: has home health - [facility]. Nursing & PT  Establishment or re-establishment of referral orders for community resources: No other necessary community resources.   Discussion with other health care providers: No discussion with other health care providers necessary.         Patient Active Problem List   Diagnosis    Bradycardia    Risk of myocardial infarction or stroke 7.5% or greater in next 10 years    Vitamin D deficiency    Colon cancer screening    Hyperlipidemia    Lumbar radiculopathy, chronic    Frequent PVCs    Lumbar stenosis with neurogenic claudication    ABLA (acute blood loss anemia)    S/P lumbar fusion    Decreased range of motion of lumbar spine    Decreased strength, endurance, and mobility    Arthritis of knee, left    Post-operative nausea and vomiting    Chronic pain of left knee    Knee stiffness, left    Weakness of left lower extremity    Effusion of left knee    BPH (benign prostatic hyperplasia)    Infection of total knee replacement       Past Medical History:   Diagnosis Date    Hyperlipidemia     PONV (postoperative nausea and vomiting) 1990    after knee arthroscopy    Vitamin D deficiency 12/18/2019       Past Surgical History:   Procedure Laterality Date    COLONOSCOPY      COLONOSCOPY N/A 12/19/2019    Procedure: COLONOSCOPY;  Surgeon: Bhupendra Wilkinson MD;  Location: [facility];  Service: Endoscopy;  Laterality: N/A;    EPIDURAL STEROID INJECTION N/A 12/09/2022    Procedure: L4-5 intralaminar epidural steroid injection with left paramedian approach;  Surgeon: Ben  WILVER Eddy MD;  Location: [facility] PAIN MGT;  Service: Pain Management;  Laterality: N/A;    INCISION AND DRAINAGE OF KNEE Left 04/26/2024    Procedure: INCISION AND DRAINAGE, KNEE;  Surgeon: Ryan Woods MD;  Location: [facility] OR;  Service: Orthopedics;  Laterality: Left;  poly exchange    KNEE ARTHROSCOPY Left     LUMBAR FUSION  09/2023    REPLACEMENT, POLYETHYLENE LINER Left 04/26/2024    Procedure: REPLACEMENT, POLYETHYLENE LINER;  Surgeon: Ryan Woods MD;  Location: [facility] OR;  Service: Orthopedics;  Laterality: Left;    SELECTIVE INJECTION OF ANESTHETIC AGENT AROUND LUMBAR SPINAL NERVE ROOT BY TRANSFORAMINAL APPROACH Bilateral 01/21/2022    Procedure: Bilateral L4/5 TF LOVE with RN IV sedation;  Surgeon: Ben Eddy MD;  Location: [facility]IN [facility];  Service: Pain Management;  Laterality: Bilateral;    SELECTIVE INJECTION OF ANESTHETIC AGENT AROUND LUMBAR SPINAL NERVE ROOT BY TRANSFORAMINAL APPROACH Bilateral 05/06/2022    Procedure: Bilateral L4/5 TF LOVE with RN IV sedation;  Surgeon: Ben Eddy MD;  Location: [facility]IN [facility]GT;  Service: Pain Management;  Laterality: Bilateral;    SELECTIVE INJECTION OF ANESTHETIC AGENT AROUND LUMBAR SPINAL NERVE ROOT BY TRANSFORAMINAL APPROACH Bilateral 10/25/2022    Procedure: Bilateral L4/5 TF LOVE with RN IV sedation;  Surgeon: Ben Eddy MD;  Location: [facility]IN [facility]GT;  Service: Pain Management;  Laterality: Bilateral;    SPINE SURGERY  9.6.23    TLIF 3-4 & 4-5    SYNOVECTOMY OF KNEE Left 04/26/2024    Procedure: SYNOVECTOMY, KNEE;  Surgeon: Ryan Woods MD;  Location: [facility] OR;  Service: Orthopedics;  Laterality: Left;    TOTAL KNEE ARTHROPLASTY Left 02/20/2024    Procedure: ARTHROPLASTY, KNEE, TOTAL;  Surgeon: Ryan Woods MD;  Location: [facility] OR;  Service: Orthopedics;  Laterality: Left;    TRANSFORAMINAL LUMBAR INTERBODY FUSION (TLIF) USING COMPUTER-ASSISTED NAVIGATION Bilateral 09/06/2023    Procedure: FUSION, SPINE, LUMBAR, TLIF, USING  COMPUTER-ASSISTED NAVIGATION;  Surgeon: Lyle Lobato MD;  Location: [facility];  Service: Neurosurgery;  Laterality: Bilateral;  L3-5 TLIF    VASECTOMY  1995       Family History   Problem Relation Name Age of Onset    Cancer Mother Cynthia Pride         anal cancer    Diabetes Mother Cynthia Pride         PRE DIABETES    Arthritis Mother Cynthia Pride     Hearing loss Mother Cynthia Pride     No Known Problems Sister      Gallbladder disease Brother      Hepatitis Brother          Hep C       Social History     Tobacco Use   Smoking Status Never    Passive exposure: Never   Smokeless Tobacco Never       Wt Readings from Last 5 Encounters:   05/06/24 108.4 kg (239 lb)   04/26/24 109.8 kg (242 lb 1 oz)   04/25/24 109.5 kg (241 lb 6.5 oz)   04/18/24 109.5 kg (241 lb 6.5 oz)   03/13/24 109.5 kg (241 lb 4.7 oz)       For further HPI details, see assessment and plan.    Review of Systems   Constitutional:  Negative for chills and fever.   HENT:  Negative for trouble swallowing.    Neurological:  Negative for dizziness and light-headedness.   Psychiatric/Behavioral:  Negative for confusion.        Objective:      Vitals:    05/06/24 0937   BP: 134/76   Pulse: 72   Resp: 20       Physical Exam  Constitutional:       General: He is not in acute distress.     Appearance: He is not ill-appearing.   Pulmonary:      Effort: Pulmonary effort is normal. No respiratory distress.   Neurological:      General: No focal deficit present.      Mental Status: He is alert.   Psychiatric:         Mood and Affect: Mood normal.         Behavior: Behavior normal.         Assessment:       1. Hospital discharge follow-up    2. Benign prostatic hyperplasia without lower urinary tract symptoms        Plan:   Hospital discharge follow-up    Benign prostatic hyperplasia without lower urinary tract symptoms        Patient presents for hospital follow-up     In February he had a knee replacement   Unfortunately and recently he had an infection.  He  is status post intervention via Orthopedics in his working with Infectious Disease and home health for antibiotic therapy both orally and through PICC line.    I am pleased to hear he is doing well.  He is pain-free.  Fever free.  Continue antibiotics as per specialist.      Patient was prescribed gabapentin but he has associated some fatigue and he has not having any pain.  I would be comfortable taking a drug holiday to see if he can go without his gabapentin.  If at our next encounter he has no longer utilizing the drug we will remove it from his drug list    Drug list reviewed at present time should be 100% accurate and up-to-date on refills.      Of note patient did returned to his Flomax for benign prostatic hyperplasia.  Urinating well with the medication.  We will continue this medication for now.      Patient was doing labs via his PICC line.  Uncertain what the orders are who the ordering physician is.  Have messaged his specialist for clarification purposes.            This note was verbally dictated, please excuse any type errors.

## 2024-05-09 ENCOUNTER — OFFICE VISIT (OUTPATIENT)
Dept: ORTHOPEDICS | Facility: CLINIC | Age: 69
End: 2024-05-09
Payer: MEDICARE

## 2024-05-09 ENCOUNTER — HOSPITAL ENCOUNTER (OUTPATIENT)
Dept: RADIOLOGY | Facility: HOSPITAL | Age: 69
Discharge: HOME | End: 2024-05-09
Attending: ORTHOPAEDIC SURGERY
Payer: MEDICARE

## 2024-05-09 VITALS — BODY MASS INDEX: 28.22 KG/M2 | HEIGHT: 77 IN | WEIGHT: 239 LBS

## 2024-05-09 DIAGNOSIS — Z96.652 STATUS POST TOTAL LEFT KNEE REPLACEMENT USING CEMENT: Primary | ICD-10-CM

## 2024-05-09 DIAGNOSIS — M17.12 ARTHRITIS OF KNEE, LEFT: ICD-10-CM

## 2024-05-09 DIAGNOSIS — Z96.652 S/P REVISION OF TOTAL KNEE, LEFT: ICD-10-CM

## 2024-05-09 PROCEDURE — 99024 POSTOP FOLLOW-UP VISIT: CPT | Mod: HCNC,S$GLB,, | Performed by: PHYSICIAN ASSISTANT

## 2024-05-09 PROCEDURE — 1159F MED LIST DOCD IN RCRD: CPT | Mod: HCNC,CPTII,S$GLB, | Performed by: PHYSICIAN ASSISTANT

## 2024-05-09 PROCEDURE — 1101F PT FALLS ASSESS-DOCD LE1/YR: CPT | Mod: HCNC,CPTII,S$GLB, | Performed by: PHYSICIAN ASSISTANT

## 2024-05-09 PROCEDURE — 73564 X-RAY EXAM KNEE 4 OR MORE: CPT | Mod: TC,HCNC,LT

## 2024-05-09 PROCEDURE — 73564 X-RAY EXAM KNEE 4 OR MORE: CPT | Mod: 26,HCNC,LT, | Performed by: RADIOLOGY

## 2024-05-09 PROCEDURE — 1160F RVW MEDS BY RX/DR IN RCRD: CPT | Mod: HCNC,CPTII,S$GLB, | Performed by: PHYSICIAN ASSISTANT

## 2024-05-09 PROCEDURE — 99999 PR PBB SHADOW E&M-EST. PATIENT-LVL III: CPT | Mod: PBBFAC,HCNC,, | Performed by: PHYSICIAN ASSISTANT

## 2024-05-09 PROCEDURE — 1126F AMNT PAIN NOTED NONE PRSNT: CPT | Mod: HCNC,CPTII,S$GLB, | Performed by: PHYSICIAN ASSISTANT

## 2024-05-09 PROCEDURE — 3288F FALL RISK ASSESSMENT DOCD: CPT | Mod: HCNC,CPTII,S$GLB, | Performed by: PHYSICIAN ASSISTANT

## 2024-05-09 RX ORDER — BACLOFEN 10 MG/1
10 TABLET ORAL 3 TIMES DAILY
Qty: 90 TABLET | Refills: 11 | Status: SHIPPED | OUTPATIENT
Start: 2024-05-09 | End: 2025-05-09

## 2024-05-09 NOTE — PROGRESS NOTES
Patient ID: Myles Pride is a 68 y.o. male.    Chief Complaint: Post-op Evaluation (L knee I and D with plastic exchange - L TKA 2/20/24/)      HPI: Myles Pride  is a 68 y.o. male who c/o Post-op Evaluation (L knee I and D with plastic exchange - L TKA 2/20/24/)     Post op visit 1   Patient notes pain is 0/10   The patient is doing quite well since surgery and is pleased with his results  He has been able to advance activity of daily living and thus his quality of life  He has not using any devices to assist with ambulation   Still taking over-the-counter medication should he need   Compliant with therapy as well as DVT prophylaxis  I will extend his home health as patient has PICC to left upper extremity and can not go to outpatient facility      He still has a PICC noted to the left upper extremity which ID is following appreciate their assistance in this case  Labs are trending downward   Patient set to see ID later this month      Patient is presently denying any shortness of breath, chest pain, fever/chills, nausea/vomiting, loss of taste or smell, numbness/tingling or sensation changes, loss of bladder or bowel function, loss of taste/smell.     Surgery: Left Total Knee revision with I and D and poly ethylene liner exchange    Surgery Date:  Initial surgery performed 02/20/2024; revision poly exchange and I and D performed 04/26/2024    Past Medical History:   Diagnosis Date    Hyperlipidemia     PONV (postoperative nausea and vomiting) 1990    after knee arthroscopy    Vitamin D deficiency 12/18/2019     Past Surgical History:   Procedure Laterality Date    COLONOSCOPY      COLONOSCOPY N/A 12/19/2019    Procedure: COLONOSCOPY;  Surgeon: Bhupendra Wilkinson MD;  Location: [facility];  Service: Endoscopy;  Laterality: N/A;    EPIDURAL STEROID INJECTION N/A 12/09/2022    Procedure: L4-5 intralaminar epidural steroid injection with left paramedian approach;  Surgeon: Ben Eddy MD;  Location: [facility]  PAIN MGT;  Service: Pain Management;  Laterality: N/A;    INCISION AND DRAINAGE OF KNEE Left 04/26/2024    Procedure: INCISION AND DRAINAGE, KNEE;  Surgeon: Ryan Woods MD;  Location: [facility] OR;  Service: Orthopedics;  Laterality: Left;  poly exchange    KNEE ARTHROSCOPY Left     LUMBAR FUSION  09/2023    REPLACEMENT, POLYETHYLENE LINER Left 04/26/2024    Procedure: REPLACEMENT, POLYETHYLENE LINER;  Surgeon: Ryan Woods MD;  Location: [facility] OR;  Service: Orthopedics;  Laterality: Left;    SELECTIVE INJECTION OF ANESTHETIC AGENT AROUND LUMBAR SPINAL NERVE ROOT BY TRANSFORAMINAL APPROACH Bilateral 01/21/2022    Procedure: Bilateral L4/5 TF LOVE with RN IV sedation;  Surgeon: Ben Eddy MD;  Location: [facility] PAIN MGT;  Service: Pain Management;  Laterality: Bilateral;    SELECTIVE INJECTION OF ANESTHETIC AGENT AROUND LUMBAR SPINAL NERVE ROOT BY TRANSFORAMINAL APPROACH Bilateral 05/06/2022    Procedure: Bilateral L4/5 TF LOVE with RN IV sedation;  Surgeon: Ben Eddy MD;  Location: [facility] PAIN MGT;  Service: Pain Management;  Laterality: Bilateral;    SELECTIVE INJECTION OF ANESTHETIC AGENT AROUND LUMBAR SPINAL NERVE ROOT BY TRANSFORAMINAL APPROACH Bilateral 10/25/2022    Procedure: Bilateral L4/5 TF LOVE with RN IV sedation;  Surgeon: Ben Eddy MD;  Location: [facility] PAIN [facility]GT;  Service: Pain Management;  Laterality: Bilateral;    SPINE SURGERY  9.6.23    TLIF 3-4 & 4-5    SYNOVECTOMY OF KNEE Left 04/26/2024    Procedure: SYNOVECTOMY, KNEE;  Surgeon: Ryan Woods MD;  Location: [facility] OR;  Service: Orthopedics;  Laterality: Left;    TOTAL KNEE ARTHROPLASTY Left 02/20/2024    Procedure: ARTHROPLASTY, KNEE, TOTAL;  Surgeon: Ryan Woods MD;  Location: [facility] OR;  Service: Orthopedics;  Laterality: Left;    TRANSFORAMINAL LUMBAR INTERBODY FUSION (TLIF) USING COMPUTER-ASSISTED NAVIGATION Bilateral 09/06/2023    Procedure: FUSION, SPINE, LUMBAR, TLIF, USING COMPUTER-ASSISTED NAVIGATION;   Surgeon: Lyle Lobato MD;  Location: [facility];  Service: Neurosurgery;  Laterality: Bilateral;  L3-5 TLIF    VASECTOMY  1995     Family History   Problem Relation Name Age of Onset    Cancer Mother Cynthia Pride         anal cancer    Diabetes Mother Cynthia Pride         PRE DIABETES    Arthritis Mother Cynthia Pride     Hearing loss Mother Cynthia Pride     No Known Problems Sister      Gallbladder disease Brother      Hepatitis Brother          Hep C     Social History     Socioeconomic History    Marital status:    Tobacco Use    Smoking status: Never     Passive exposure: Never    Smokeless tobacco: Never   Substance and Sexual Activity    Alcohol use: Yes     Alcohol/week: 3.0 standard drinks of alcohol     Types: 3 Glasses of wine per week     Comment: occ    Drug use: Never    Sexual activity: Yes     Partners: Female     Birth control/protection: Partner-Vasectomy, None     Social Determinants of Health     Financial Resource Strain: Low Risk  (4/27/2024)    Overall Financial Resource Strain (CARDIA)     Difficulty of Paying Living Expenses: Not hard at all   Food Insecurity: No Food Insecurity (4/27/2024)    Hunger Vital Sign     Worried About Running Out of Food in the Last Year: Never true     Ran Out of Food in the Last Year: Never true   Transportation Needs: No Transportation Needs (4/27/2024)    PRAPARE - Transportation     Lack of Transportation (Medical): No     Lack of Transportation (Non-Medical): No   Physical Activity: Sufficiently Active (1/29/2024)    Exercise Vital Sign     Days of Exercise per Week: 5 days     Minutes of Exercise per Session: 30 min   Recent Concern: Physical Activity - Insufficiently Active (1/29/2024)    Exercise Vital Sign     Days of Exercise per Week: 4 days     Minutes of Exercise per Session: 20 min   Stress: No Stress Concern Present (4/27/2024)    [employer] - Occupational Stress Questionnaire     Feeling of Stress : Not at all    Housing Stability: Low Risk  (4/27/2024)    Housing Stability Vital Sign     Unable to Pay for Housing in the Last Year: No     Homeless in the Last Year: No     Medication List with Changes/Refills   Current Medications    ASPIRIN 81 MG CHEW    Take 81 mg by mouth once daily.    CELECOXIB (CELEBREX) 200 MG CAPSULE    Take 1 capsule (200 mg total) by mouth once daily.    CICLOPIROX (PENLAC) 8 % SOLN    Apply topically nightly.    DEXTROSE 5 % IN WATER (D5W) PGBK 100 ML WITH CEFTRIAXONE 2 GRAM SOLR 2 G    Inject 2 g into the vein once daily.    ERGOCALCIFEROL, VITAMIN D2, (VITAMIN D ORAL)    Take by mouth once daily.    GABAPENTIN (NEURONTIN) 300 MG CAPSULE    Take 1 capsule (300 mg total) by mouth every evening.    KETOCONAZOLE (NIZORAL) 2 % CREAM    Apply topically 2 (two) times daily. For feet and web-spaces. Use for up to 4 weeks.    METRONIDAZOLE (FLAGYL) 500 MG TABLET    Take 1 tablet (500 mg total) by mouth every 12 (twelve) hours.    PRAVASTATIN (PRAVACHOL) 40 MG TABLET    Take 1 tablet (40 mg total) by mouth once daily.    SODIUM CHLORIDE 0.9% SOLP 50 ML WITH DAPTOMYCIN 500 MG SOLR 900 MG    Inject 900 mg into the vein Daily.    TAMSULOSIN (FLOMAX) 0.4 MG CAP    Take 1 capsule (0.4 mg total) by mouth once daily.     Review of patient's allergies indicates:  No Known Allergies    Objective:     Left Lower Extremity  NVI  WWP foot  Comp soft  Cap refill < 2 sec  Calf NT, soft  (-) Michael sign  CESAR  ROM : Patient is able to easily exhibit full flexion and extension on passive range of motion.   Wiggles toes  DF/PF intact  Sensation intact  Inc C/D/I  No SOI    IMAGING  FINDINGS:    Left total knee arthroplasty.  There are anterior midline skin staples.  No acute deformity.     Medial degenerative joint disease of the right knee as well as of the patellofemoral joint.        Impression:   No acute abnormality.  Status post left knee arthroplasty.    Assessment:       No diagnosis found.       Plan:       There  are no diagnoses linked to this encounter.    Myles Pride is an established pt here for postop follow-up after left total knee replacement by Dr. Woods.   The incision was cleaned with hydrogen peroxide.  All staples were removed, and suture strips were applied across the incision.  They should remain in place until they fall off in approximately 1-2 weeks. The patient was instructed not to soak the incision in standing water but may clean the incision with clean running water and antibacterial soap.  Patient should notify the office of any signs or symptoms of infection including fevers, erythema, purulent drainage, increasing pain.  Patient will continue with DVT prophylaxis.  Patient will continue home health with PICC to left upper extremity.  Will follow-up in 4-6 weeks.  Patient verbalized understanding of all instructions and agreed with the above plan.    No follow-ups on file.    The patient understands, chooses and consents to this plan and accepts all   the risks which include but are not limited to the risks mentioned above.     Disclaimer: This note was prepared using a voice recognition system and is likely to have sound alike errors within the text.

## 2024-05-13 ENCOUNTER — LAB VISIT (OUTPATIENT)
Dept: LAB | Facility: HOSPITAL | Age: 69
End: 2024-05-13
Attending: STUDENT IN AN ORGANIZED HEALTH CARE EDUCATION/TRAINING PROGRAM
Payer: MEDICARE

## 2024-05-13 DIAGNOSIS — Z79.2 ENCOUNTER FOR LONG-TERM (CURRENT) USE OF ANTIBIOTICS: ICD-10-CM

## 2024-05-13 DIAGNOSIS — T84.54XD INFECTION OF TOTAL LEFT KNEE REPLACEMENT, SUBSEQUENT ENCOUNTER: ICD-10-CM

## 2024-05-13 DIAGNOSIS — R00.1 SEVERE SINUS BRADYCARDIA: Primary | ICD-10-CM

## 2024-05-13 LAB
ALBUMIN SERPL BCP-MCNC: 3.6 G/DL (ref 3.5–5.2)
ALP SERPL-CCNC: 80 U/L (ref 55–135)
ALT SERPL W/O P-5'-P-CCNC: 21 U/L (ref 10–44)
ANION GAP SERPL CALC-SCNC: 10 MMOL/L (ref 8–16)
AST SERPL-CCNC: 28 U/L (ref 10–40)
BILIRUB SERPL-MCNC: 0.3 MG/DL (ref 0.1–1)
BUN SERPL-MCNC: 19 MG/DL (ref 8–23)
CALCIUM SERPL-MCNC: 9.1 MG/DL (ref 8.7–10.5)
CHLORIDE SERPL-SCNC: 107 MMOL/L (ref 95–110)
CK SERPL-CCNC: 839 U/L (ref 20–200)
CO2 SERPL-SCNC: 21 MMOL/L (ref 23–29)
CREAT SERPL-MCNC: 0.9 MG/DL (ref 0.5–1.4)
CRP SERPL-MCNC: 3.9 MG/L (ref 0–8.2)
ERYTHROCYTE [DISTWIDTH] IN BLOOD BY AUTOMATED COUNT: 12.7 % (ref 11.5–14.5)
EST. GFR  (NO RACE VARIABLE): >60 ML/MIN/1.73 M^2
GLUCOSE SERPL-MCNC: 102 MG/DL (ref 70–110)
HCT VFR BLD AUTO: 44.7 % (ref 40–54)
HGB BLD-MCNC: 14.9 G/DL (ref 14–18)
MCH RBC QN AUTO: 31 PG (ref 27–31)
MCHC RBC AUTO-ENTMCNC: 33.3 G/DL (ref 32–36)
MCV RBC AUTO: 93 FL (ref 82–98)
PLATELET # BLD AUTO: 259 K/UL (ref 150–450)
PMV BLD AUTO: 9.8 FL (ref 9.2–12.9)
POTASSIUM SERPL-SCNC: 4.3 MMOL/L (ref 3.5–5.1)
PROT SERPL-MCNC: 7.2 G/DL (ref 6–8.4)
RBC # BLD AUTO: 4.81 M/UL (ref 4.6–6.2)
SODIUM SERPL-SCNC: 138 MMOL/L (ref 136–145)
WBC # BLD AUTO: 6.4 K/UL (ref 3.9–12.7)

## 2024-05-13 PROCEDURE — 85027 COMPLETE CBC AUTOMATED: CPT | Mod: HCNC | Performed by: STUDENT IN AN ORGANIZED HEALTH CARE EDUCATION/TRAINING PROGRAM

## 2024-05-13 PROCEDURE — 82550 ASSAY OF CK (CPK): CPT | Mod: HCNC | Performed by: STUDENT IN AN ORGANIZED HEALTH CARE EDUCATION/TRAINING PROGRAM

## 2024-05-13 PROCEDURE — 36415 COLL VENOUS BLD VENIPUNCTURE: CPT | Mod: HCNC | Performed by: STUDENT IN AN ORGANIZED HEALTH CARE EDUCATION/TRAINING PROGRAM

## 2024-05-13 PROCEDURE — 80053 COMPREHEN METABOLIC PANEL: CPT | Mod: HCNC | Performed by: STUDENT IN AN ORGANIZED HEALTH CARE EDUCATION/TRAINING PROGRAM

## 2024-05-13 PROCEDURE — 86140 C-REACTIVE PROTEIN: CPT | Mod: HCNC | Performed by: STUDENT IN AN ORGANIZED HEALTH CARE EDUCATION/TRAINING PROGRAM

## 2024-05-17 NOTE — PLAN OF CARE
Pt prepped for procedure.    EKG - see results section for interpretation/Xray Image(s) - see wet read section for interpretation

## 2024-05-20 ENCOUNTER — LAB VISIT (OUTPATIENT)
Dept: LAB | Facility: HOSPITAL | Age: 69
End: 2024-05-20
Attending: STUDENT IN AN ORGANIZED HEALTH CARE EDUCATION/TRAINING PROGRAM
Payer: MEDICARE

## 2024-05-20 DIAGNOSIS — T84.54XA INFECTION OF PROSTHETIC LEFT KNEE JOINT: Primary | ICD-10-CM

## 2024-05-20 LAB
ALBUMIN SERPL BCP-MCNC: 3.6 G/DL (ref 3.5–5.2)
ALP SERPL-CCNC: 77 U/L (ref 55–135)
ALT SERPL W/O P-5'-P-CCNC: 22 U/L (ref 10–44)
ANION GAP SERPL CALC-SCNC: 8 MMOL/L (ref 8–16)
AST SERPL-CCNC: 31 U/L (ref 10–40)
BASOPHILS # BLD AUTO: 0.06 K/UL (ref 0–0.2)
BASOPHILS NFR BLD: 1 % (ref 0–1.9)
BILIRUB SERPL-MCNC: 0.2 MG/DL (ref 0.1–1)
BUN SERPL-MCNC: 19 MG/DL (ref 8–23)
CALCIUM SERPL-MCNC: 9 MG/DL (ref 8.7–10.5)
CHLORIDE SERPL-SCNC: 108 MMOL/L (ref 95–110)
CK SERPL-CCNC: 834 U/L (ref 20–200)
CO2 SERPL-SCNC: 23 MMOL/L (ref 23–29)
CREAT SERPL-MCNC: 1 MG/DL (ref 0.5–1.4)
CRP SERPL-MCNC: 1.4 MG/L (ref 0–8.2)
DIFFERENTIAL METHOD BLD: ABNORMAL
EOSINOPHIL # BLD AUTO: 0.2 K/UL (ref 0–0.5)
EOSINOPHIL NFR BLD: 3.9 % (ref 0–8)
ERYTHROCYTE [DISTWIDTH] IN BLOOD BY AUTOMATED COUNT: 13 % (ref 11.5–14.5)
EST. GFR  (NO RACE VARIABLE): >60 ML/MIN/1.73 M^2
FUNGUS SPEC CULT: NORMAL
GLUCOSE SERPL-MCNC: 93 MG/DL (ref 70–110)
HCT VFR BLD AUTO: 40.2 % (ref 40–54)
HGB BLD-MCNC: 13.3 G/DL (ref 14–18)
IMM GRANULOCYTES # BLD AUTO: 0.01 K/UL (ref 0–0.04)
IMM GRANULOCYTES NFR BLD AUTO: 0.2 % (ref 0–0.5)
LYMPHOCYTES # BLD AUTO: 2.2 K/UL (ref 1–4.8)
LYMPHOCYTES NFR BLD: 35.3 % (ref 18–48)
MCH RBC QN AUTO: 30.9 PG (ref 27–31)
MCHC RBC AUTO-ENTMCNC: 33.1 G/DL (ref 32–36)
MCV RBC AUTO: 93 FL (ref 82–98)
MONOCYTES # BLD AUTO: 0.5 K/UL (ref 0.3–1)
MONOCYTES NFR BLD: 7.3 % (ref 4–15)
NEUTROPHILS # BLD AUTO: 3.2 K/UL (ref 1.8–7.7)
NEUTROPHILS NFR BLD: 52.3 % (ref 38–73)
NRBC BLD-RTO: 0 /100 WBC
PLATELET # BLD AUTO: 226 K/UL (ref 150–450)
PMV BLD AUTO: 10.2 FL (ref 9.2–12.9)
POTASSIUM SERPL-SCNC: 4.5 MMOL/L (ref 3.5–5.1)
PROT SERPL-MCNC: 6.7 G/DL (ref 6–8.4)
RBC # BLD AUTO: 4.31 M/UL (ref 4.6–6.2)
SODIUM SERPL-SCNC: 139 MMOL/L (ref 136–145)
WBC # BLD AUTO: 6.17 K/UL (ref 3.9–12.7)

## 2024-05-20 PROCEDURE — 85025 COMPLETE CBC W/AUTO DIFF WBC: CPT | Mod: HCNC | Performed by: STUDENT IN AN ORGANIZED HEALTH CARE EDUCATION/TRAINING PROGRAM

## 2024-05-20 PROCEDURE — 36415 COLL VENOUS BLD VENIPUNCTURE: CPT | Mod: HCNC | Performed by: STUDENT IN AN ORGANIZED HEALTH CARE EDUCATION/TRAINING PROGRAM

## 2024-05-20 PROCEDURE — 80053 COMPREHEN METABOLIC PANEL: CPT | Mod: HCNC | Performed by: STUDENT IN AN ORGANIZED HEALTH CARE EDUCATION/TRAINING PROGRAM

## 2024-05-20 PROCEDURE — 82550 ASSAY OF CK (CPK): CPT | Mod: HCNC | Performed by: STUDENT IN AN ORGANIZED HEALTH CARE EDUCATION/TRAINING PROGRAM

## 2024-05-20 PROCEDURE — 86140 C-REACTIVE PROTEIN: CPT | Mod: HCNC | Performed by: STUDENT IN AN ORGANIZED HEALTH CARE EDUCATION/TRAINING PROGRAM

## 2024-05-24 ENCOUNTER — OFFICE VISIT (OUTPATIENT)
Dept: INFECTIOUS DISEASES | Facility: CLINIC | Age: 69
End: 2024-05-24
Payer: MEDICARE

## 2024-05-24 VITALS
WEIGHT: 241.63 LBS | SYSTOLIC BLOOD PRESSURE: 138 MMHG | BODY MASS INDEX: 28.53 KG/M2 | HEIGHT: 77 IN | DIASTOLIC BLOOD PRESSURE: 75 MMHG | HEART RATE: 61 BPM

## 2024-05-24 DIAGNOSIS — N40.0 BENIGN PROSTATIC HYPERPLASIA, UNSPECIFIED WHETHER LOWER URINARY TRACT SYMPTOMS PRESENT: ICD-10-CM

## 2024-05-24 DIAGNOSIS — E78.2 MIXED HYPERLIPIDEMIA: ICD-10-CM

## 2024-05-24 DIAGNOSIS — T84.59XD INFECTION OF TOTAL KNEE REPLACEMENT, SUBSEQUENT ENCOUNTER: Primary | ICD-10-CM

## 2024-05-24 DIAGNOSIS — Z96.659 INFECTION OF TOTAL KNEE REPLACEMENT, SUBSEQUENT ENCOUNTER: Primary | ICD-10-CM

## 2024-05-24 PROCEDURE — 99999 PR PBB SHADOW E&M-EST. PATIENT-LVL III: CPT | Mod: PBBFAC,HCNC,, | Performed by: STUDENT IN AN ORGANIZED HEALTH CARE EDUCATION/TRAINING PROGRAM

## 2024-05-24 PROCEDURE — 1101F PT FALLS ASSESS-DOCD LE1/YR: CPT | Mod: HCNC,CPTII,S$GLB, | Performed by: STUDENT IN AN ORGANIZED HEALTH CARE EDUCATION/TRAINING PROGRAM

## 2024-05-24 PROCEDURE — 1126F AMNT PAIN NOTED NONE PRSNT: CPT | Mod: HCNC,CPTII,S$GLB, | Performed by: STUDENT IN AN ORGANIZED HEALTH CARE EDUCATION/TRAINING PROGRAM

## 2024-05-24 PROCEDURE — 3078F DIAST BP <80 MM HG: CPT | Mod: HCNC,CPTII,S$GLB, | Performed by: STUDENT IN AN ORGANIZED HEALTH CARE EDUCATION/TRAINING PROGRAM

## 2024-05-24 PROCEDURE — 3075F SYST BP GE 130 - 139MM HG: CPT | Mod: HCNC,CPTII,S$GLB, | Performed by: STUDENT IN AN ORGANIZED HEALTH CARE EDUCATION/TRAINING PROGRAM

## 2024-05-24 PROCEDURE — 3008F BODY MASS INDEX DOCD: CPT | Mod: HCNC,CPTII,S$GLB, | Performed by: STUDENT IN AN ORGANIZED HEALTH CARE EDUCATION/TRAINING PROGRAM

## 2024-05-24 PROCEDURE — 1159F MED LIST DOCD IN RCRD: CPT | Mod: HCNC,CPTII,S$GLB, | Performed by: STUDENT IN AN ORGANIZED HEALTH CARE EDUCATION/TRAINING PROGRAM

## 2024-05-24 PROCEDURE — 3288F FALL RISK ASSESSMENT DOCD: CPT | Mod: HCNC,CPTII,S$GLB, | Performed by: STUDENT IN AN ORGANIZED HEALTH CARE EDUCATION/TRAINING PROGRAM

## 2024-05-24 PROCEDURE — 99214 OFFICE O/P EST MOD 30 MIN: CPT | Mod: HCNC,S$GLB,, | Performed by: STUDENT IN AN ORGANIZED HEALTH CARE EDUCATION/TRAINING PROGRAM

## 2024-05-24 NOTE — PROGRESS NOTES
Infectious Disease Clinic Note    Patient Name: Myles Pride  YOB: 1955    PRESENTING HISTORY       History of Present Illness:  Mr. Myles Pride is a 68 y.o. male w/ significant PMHx of left TKA 2/20/24 found to have post op inflammation and expulsion of sutures. Began spiking fevers and was placed on PO antibiotics. CRP and ESR elevated. 50 cc were aspirated from left knee and showed 23K WBC while on antibiotics. 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 Patient now taken for debridement with implant retention and poly exchange on 4/26/24. Cultures in process and waiting for Micro[facility] DX. 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 Cultures from OR with no growth. S/p arthrocentesis followed by revision with poly exchange and retention on 4/26/24 with Dr. Woods. For now recommend continuing empiric IV daptomycin and ceftriaxone along with PO metronidazole to target Finegoldia isolate.     5/24: OR cultures finalized no growth. MicroGen again with only Finegoldia. Surprising as this would not be expected as a virulent pathogen. Patient tolerating Flagyl and daptomycin. Labs have remained WNL aside from increasing CK. Denies tendonitis today. Discussed micro results. Continues home PT with success. More ROM and less pain. Steri strips in place. Will ensure he has antibiotic prophylaxis prior to dental procedures including cleaning. Next cleaning is 6/5. Will see him before that and send in chronic suppression with Augmentin. Will ask [facility] infusion to stop ceftriaxone.                 Review of Systems:  Constitutional: no fever or chills  Eyes: no visual changes  ENT: no nasal congestion or sore throat  Respiratory: no cough or shortness of breath  Cardiovascular: no chest pain  Gastrointestinal: no nausea or vomiting, no abdominal pain, no constipation, no diarrhea, loose stools from antibiotics   Genitourinary: no hematuria or dysuria  Musculoskeletal: recovering  post op   Skin: no rash  Neurological: no headaches, numbness, or paresthesias    The following portions of the patient's history were reviewed and updated as appropriate: allergies, current medications, past family history, past medical history, past social history, past surgical history, and problem list.    PAST HISTORY:     Immunization History   Administered Date(s) Administered    COVID-19 MRNA, LN-S PF (MODERNA HALF 0.25 ML DOSE) 11/04/2021, 06/01/2022    COVID-19, MRNA, LN-S, PF (Pfizer) (Purple Cap) 10/07/2022    COVID-19, mRNA, LNP-S, bivalent booster, PF (Moderna Omicron)12 + YEARS 10/07/2022    COVID-19, vector-nr, rS-Ad26, PF ([employer]) 03/10/2021    Influenza (FLUAD) - Quadrivalent - Adjuvanted - PF *Preferred* (65+) 09/27/2021, 10/03/2022    Influenza - Quadrivalent - PF *Preferred* (6 months and older) 10/09/2015, 11/10/2017, 05/06/2018, 11/28/2018, 02/28/2020, 09/23/2020    Pneumococcal Polysaccharide - 23 Valent 10/01/2021    Tdap 08/10/2015, 04/15/2023    Zoster Recombinant 08/15/2018, 03/15/2019       Past Medical History:   Diagnosis Date    Hyperlipidemia     PONV (postoperative nausea and vomiting) 1990    after knee arthroscopy    Vitamin D deficiency 12/18/2019       Past Surgical History:   Procedure Laterality Date    COLONOSCOPY      COLONOSCOPY N/A 12/19/2019    Procedure: COLONOSCOPY;  Surgeon: Bhupendra Wilkinson MD;  Location: [facility] ENDO;  Service: Endoscopy;  Laterality: N/A;    EPIDURAL STEROID INJECTION N/A 12/09/2022    Procedure: L4-5 intralaminar epidural steroid injection with left paramedian approach;  Surgeon: Ben Eddy MD;  Location: [facility] PAIN MGT;  Service: Pain Management;  Laterality: N/A;    INCISION AND DRAINAGE OF KNEE Left 04/26/2024    Procedure: INCISION AND DRAINAGE, KNEE;  Surgeon: Ryan Woods MD;  Location: [facility] OR;  Service: Orthopedics;  Laterality: Left;  poly exchange    KNEE ARTHROSCOPY Left     LUMBAR FUSION  09/2023    REPLACEMENT, POLYETHYLENE  LINER Left 04/26/2024    Procedure: REPLACEMENT, POLYETHYLENE LINER;  Surgeon: Ryan Woods MD;  Location: [facility] OR;  Service: Orthopedics;  Laterality: Left;    SELECTIVE INJECTION OF ANESTHETIC AGENT AROUND LUMBAR SPINAL NERVE ROOT BY TRANSFORAMINAL APPROACH Bilateral 01/21/2022    Procedure: Bilateral L4/5 TF LOVE with RN IV sedation;  Surgeon: Ben Eddy MD;  Location: [facility] PAIN MGT;  Service: Pain Management;  Laterality: Bilateral;    SELECTIVE INJECTION OF ANESTHETIC AGENT AROUND LUMBAR SPINAL NERVE ROOT BY TRANSFORAMINAL APPROACH Bilateral 05/06/2022    Procedure: Bilateral L4/5 TF LOVE with RN IV sedation;  Surgeon: Ben Eddy MD;  Location: [facility] PAIN MGT;  Service: Pain Management;  Laterality: Bilateral;    SELECTIVE INJECTION OF ANESTHETIC AGENT AROUND LUMBAR SPINAL NERVE ROOT BY TRANSFORAMINAL APPROACH Bilateral 10/25/2022    Procedure: Bilateral L4/5 TF LOVE with RN IV sedation;  Surgeon: Ben Eddy MD;  Location: [facility] PAIN MGT;  Service: Pain Management;  Laterality: Bilateral;    SPINE SURGERY  9.6.23    TLIF 3-4 & 4-5    SYNOVECTOMY OF KNEE Left 04/26/2024    Procedure: SYNOVECTOMY, KNEE;  Surgeon: Ryan Woods MD;  Location: [facility] OR;  Service: Orthopedics;  Laterality: Left;    TOTAL KNEE ARTHROPLASTY Left 02/20/2024    Procedure: ARTHROPLASTY, KNEE, TOTAL;  Surgeon: Ryan Woods MD;  Location: [facility] OR;  Service: Orthopedics;  Laterality: Left;    TRANSFORAMINAL LUMBAR INTERBODY FUSION (TLIF) USING COMPUTER-ASSISTED NAVIGATION Bilateral 09/06/2023    Procedure: FUSION, SPINE, LUMBAR, TLIF, USING COMPUTER-ASSISTED NAVIGATION;  Surgeon: Lyle Lobato MD;  Location: [facility] OR;  Service: Neurosurgery;  Laterality: Bilateral;  L3-5 TLIF    VASECTOMY  1995       Family History   Problem Relation Name Age of Onset    Cancer Mother Cynthia Pride         anal cancer    Diabetes Mother Cynthia Pride         PRE DIABETES    Arthritis Mother Cynthia Pride     Hearing loss  Mother Cynthia Pride     No Known Problems Sister      Gallbladder disease Brother      Hepatitis Brother          Hep C       Social History     Socioeconomic History    Marital status:    Tobacco Use    Smoking status: Never     Passive exposure: Never    Smokeless tobacco: Never   Substance and Sexual Activity    Alcohol use: Yes     Alcohol/week: 3.0 standard drinks of alcohol     Types: 3 Glasses of wine per week     Comment: occ    Drug use: Never    Sexual activity: Yes     Partners: Female     Birth control/protection: Partner-Vasectomy, None     Social Determinants of Health     Financial Resource Strain: Low Risk  (4/27/2024)    Overall Financial Resource Strain (CARDIA)     Difficulty of Paying Living Expenses: Not hard at all   Food Insecurity: No Food Insecurity (4/27/2024)    Hunger Vital Sign     Worried About Running Out of Food in the Last Year: Never true     Ran Out of Food in the Last Year: Never true   Transportation Needs: No Transportation Needs (4/27/2024)    PRAPARE - Transportation     Lack of Transportation (Medical): No     Lack of Transportation (Non-Medical): No   Physical Activity: Sufficiently Active (1/29/2024)    Exercise Vital Sign     Days of Exercise per Week: 5 days     Minutes of Exercise per Session: 30 min   Recent Concern: Physical Activity - Insufficiently Active (1/29/2024)    Exercise Vital Sign     Days of Exercise per Week: 4 days     Minutes of Exercise per Session: 20 min   Stress: No Stress Concern Present (4/27/2024)    [employer] - Occupational Stress Questionnaire     Feeling of Stress : Not at all   Housing Stability: Low Risk  (4/27/2024)    Housing Stability Vital Sign     Unable to Pay for Housing in the Last Year: No     Homeless in the Last Year: No       MEDICATIONS & ALLERGIES:     Current Outpatient Medications on File Prior to Visit   Medication Sig    aspirin 81 MG Chew Take 81 mg by mouth once daily.    celecoxib  "(CELEBREX) 200 MG capsule Take 1 capsule (200 mg total) by mouth once daily.    ciclopirox (PENLAC) 8 % Soln Apply topically nightly.    dextrose 5 % in water (D5W) PgBk 100 mL with cefTRIAXone 2 gram SolR 2 g Inject 2 g into the vein once daily.    ergocalciferol, vitamin D2, (VITAMIN D ORAL) Take by mouth once daily.    metroNIDAZOLE (FLAGYL) 500 MG tablet Take 1 tablet (500 mg total) by mouth every 12 (twelve) hours.    sodium chloride 0.9% SolP 50 mL with DAPTOmycin 500 mg SolR 900 mg Inject 900 mg into the vein Daily.    tamsulosin (FLOMAX) 0.4 mg Cap Take 1 capsule (0.4 mg total) by mouth once daily.    baclofen (LIORESAL) 10 MG tablet Take 1 tablet (10 mg total) by mouth 3 (three) times daily. (Patient not taking: Reported on 5/24/2024)    gabapentin (NEURONTIN) 300 MG capsule Take 1 capsule (300 mg total) by mouth every evening. (Patient not taking: Reported on 5/9/2024)    ketoconazole (NIZORAL) 2 % cream Apply topically 2 (two) times daily. For feet and web-spaces. Use for up to 4 weeks. (Patient not taking: Reported on 5/24/2024)    pravastatin (PRAVACHOL) 40 MG tablet Take 1 tablet (40 mg total) by mouth once daily. (Patient not taking: Reported on 5/9/2024)     No current facility-administered medications on file prior to visit.       Review of patient's allergies indicates:  No Known Allergies    OBJECTIVE:   Vital Signs:  Vitals:    05/24/24 0858   BP: 138/75   Pulse: 61   Weight: 109.6 kg (241 lb 10 oz)   Height: 6' 5" (1.956 m)       No results found for this or any previous visit (from the past 24 hour(s)).      Physical Exam:   General:  Well developed, well nourished, no acute distress  HEENT:  Normocephalic, atraumatic, EOMI, clear sclera, throat clear without erythema or exudates  CVS:  RRR, S1 and S2 normal, no murmurs, rubs, gallops  Resp:  Lungs clear to auscultation, no wheezes, rales, rhonchi  GI:  Abdomen soft, non-tender, non-distended, normoactive bowel sounds, no masses  MSK:  No " muscle atrophy, peripheral edema, full range of motion  Skin:  Post op site looks good; steri strips intact; no drainage; no edema or erythema   Psych:  Alert and oriented to person, place, and time    ASSESSMENT:     Hyperlipidemia  Continue lipid control and follow up with PCP      BPH (benign prostatic hyperplasia)  Follow up with urology; continue indicated medications      Infection of total knee replacement  --Patient underwent initial left TKA in Feb 2024   --S/p arthrocentesis followed by revision with poly exchange and retention on 4/26/24 with Dr. 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; OR cultures no growth   --Will ask infusion to stop ceftriaxone   --For now recommend continuing empiric IV daptomycin and PO metronidazole to target Finegoldia isolate  --Anticipate total 6 week antibiotic course; end date 6/7/24   --Afterward will start PO amox/clav 875-125 mg BID for at least 6 months as chronic suppression   --Orthopedic team updated with plan   --Follow up with me in 2 weeks      Outpatient Antibiotic Therapy Plan:     1) Infection: Infected left TKA s/p implant retention/poly exchange      2) Discharge Antibiotics:     Intravenous antibiotics:  IV daptomycin 900 mg daily      Oral antibiotics:  PO metronidazole 500 mg BID      3) Therapy Duration:  6 weeks from debridement      Estimated end date of IV antibiotics: 6/7/24     4) Outpatient Weekly Labs:     Order the following labs to be drawn on Mondays:   CBC  CMP   CRP  CPK (when on Daptomycin)     5) Fax Lab Results to Infectious Diseases Provider: Dr. [facility] Fax Number: [phone]      PLAN:     Diagnoses and all orders for this visit:    Infection of total knee replacement, subsequent encounter    Mixed hyperlipidemia    Benign prostatic hyperplasia, unspecified whether lower urinary tract symptoms present          The total time for evaluation and management services performed on 5/24/24 was greater than 30  minutes.        Chester Ng, DO   Infectious Diseases

## 2024-05-27 ENCOUNTER — EXTERNAL HOME HEALTH (OUTPATIENT)
Dept: HOME HEALTH SERVICES | Facility: HOSPITAL | Age: 69
End: 2024-05-27
Payer: MEDICARE

## 2024-05-27 ENCOUNTER — LAB VISIT (OUTPATIENT)
Dept: LAB | Facility: HOSPITAL | Age: 69
End: 2024-05-27
Attending: STUDENT IN AN ORGANIZED HEALTH CARE EDUCATION/TRAINING PROGRAM
Payer: MEDICARE

## 2024-05-27 DIAGNOSIS — T84.54XA INFECTION OF PROSTHETIC LEFT KNEE JOINT: Primary | ICD-10-CM

## 2024-05-27 LAB
ALBUMIN SERPL BCP-MCNC: 3.7 G/DL (ref 3.5–5.2)
ALP SERPL-CCNC: 78 U/L (ref 55–135)
ALT SERPL W/O P-5'-P-CCNC: 23 U/L (ref 10–44)
ANION GAP SERPL CALC-SCNC: 8 MMOL/L (ref 8–16)
AST SERPL-CCNC: 34 U/L (ref 10–40)
BASOPHILS # BLD AUTO: 0.05 K/UL (ref 0–0.2)
BASOPHILS NFR BLD: 0.9 % (ref 0–1.9)
BILIRUB SERPL-MCNC: 0.5 MG/DL (ref 0.1–1)
BUN SERPL-MCNC: 14 MG/DL (ref 8–23)
CALCIUM SERPL-MCNC: 9 MG/DL (ref 8.7–10.5)
CHLORIDE SERPL-SCNC: 103 MMOL/L (ref 95–110)
CK SERPL-CCNC: 1041 U/L (ref 20–200)
CO2 SERPL-SCNC: 23 MMOL/L (ref 23–29)
CREAT SERPL-MCNC: 1 MG/DL (ref 0.5–1.4)
CRP SERPL-MCNC: 0.9 MG/L (ref 0–8.2)
DIFFERENTIAL METHOD BLD: ABNORMAL
EOSINOPHIL # BLD AUTO: 0.2 K/UL (ref 0–0.5)
EOSINOPHIL NFR BLD: 3.3 % (ref 0–8)
ERYTHROCYTE [DISTWIDTH] IN BLOOD BY AUTOMATED COUNT: 13.1 % (ref 11.5–14.5)
EST. GFR  (NO RACE VARIABLE): >60 ML/MIN/1.73 M^2
FUNGUS SPEC CULT: NORMAL
FUNGUS SPEC CULT: NORMAL
GLUCOSE SERPL-MCNC: 102 MG/DL (ref 70–110)
HCT VFR BLD AUTO: 39.8 % (ref 40–54)
HGB BLD-MCNC: 13.1 G/DL (ref 14–18)
IMM GRANULOCYTES # BLD AUTO: 0.02 K/UL (ref 0–0.04)
IMM GRANULOCYTES NFR BLD AUTO: 0.3 % (ref 0–0.5)
LYMPHOCYTES # BLD AUTO: 1.8 K/UL (ref 1–4.8)
LYMPHOCYTES NFR BLD: 30.4 % (ref 18–48)
MCH RBC QN AUTO: 30.4 PG (ref 27–31)
MCHC RBC AUTO-ENTMCNC: 32.9 G/DL (ref 32–36)
MCV RBC AUTO: 92 FL (ref 82–98)
MONOCYTES # BLD AUTO: 0.5 K/UL (ref 0.3–1)
MONOCYTES NFR BLD: 8.8 % (ref 4–15)
NEUTROPHILS # BLD AUTO: 3.3 K/UL (ref 1.8–7.7)
NEUTROPHILS NFR BLD: 56.3 % (ref 38–73)
NRBC BLD-RTO: 0 /100 WBC
PLATELET # BLD AUTO: 173 K/UL (ref 150–450)
PMV BLD AUTO: 10.5 FL (ref 9.2–12.9)
POTASSIUM SERPL-SCNC: 4.2 MMOL/L (ref 3.5–5.1)
PROT SERPL-MCNC: 6.6 G/DL (ref 6–8.4)
RBC # BLD AUTO: 4.31 M/UL (ref 4.6–6.2)
SODIUM SERPL-SCNC: 134 MMOL/L (ref 136–145)
WBC # BLD AUTO: 5.79 K/UL (ref 3.9–12.7)

## 2024-05-27 PROCEDURE — 82550 ASSAY OF CK (CPK): CPT | Mod: HCNC | Performed by: STUDENT IN AN ORGANIZED HEALTH CARE EDUCATION/TRAINING PROGRAM

## 2024-05-27 PROCEDURE — 85025 COMPLETE CBC W/AUTO DIFF WBC: CPT | Mod: HCNC | Performed by: STUDENT IN AN ORGANIZED HEALTH CARE EDUCATION/TRAINING PROGRAM

## 2024-05-27 PROCEDURE — 36415 COLL VENOUS BLD VENIPUNCTURE: CPT | Mod: HCNC | Performed by: STUDENT IN AN ORGANIZED HEALTH CARE EDUCATION/TRAINING PROGRAM

## 2024-05-27 PROCEDURE — 80053 COMPREHEN METABOLIC PANEL: CPT | Mod: HCNC | Performed by: STUDENT IN AN ORGANIZED HEALTH CARE EDUCATION/TRAINING PROGRAM

## 2024-05-27 PROCEDURE — 86140 C-REACTIVE PROTEIN: CPT | Mod: HCNC | Performed by: STUDENT IN AN ORGANIZED HEALTH CARE EDUCATION/TRAINING PROGRAM

## 2024-06-03 ENCOUNTER — OFFICE VISIT (OUTPATIENT)
Dept: INFECTIOUS DISEASES | Facility: CLINIC | Age: 69
End: 2024-06-03
Payer: MEDICARE

## 2024-06-03 VITALS
BODY MASS INDEX: 28.37 KG/M2 | HEIGHT: 77 IN | SYSTOLIC BLOOD PRESSURE: 142 MMHG | HEART RATE: 63 BPM | WEIGHT: 240.31 LBS | DIASTOLIC BLOOD PRESSURE: 80 MMHG

## 2024-06-03 DIAGNOSIS — Z96.659 INFECTION OF TOTAL KNEE REPLACEMENT, SUBSEQUENT ENCOUNTER: Primary | ICD-10-CM

## 2024-06-03 DIAGNOSIS — N40.0 BENIGN PROSTATIC HYPERPLASIA, UNSPECIFIED WHETHER LOWER URINARY TRACT SYMPTOMS PRESENT: ICD-10-CM

## 2024-06-03 DIAGNOSIS — E78.2 MIXED HYPERLIPIDEMIA: ICD-10-CM

## 2024-06-03 DIAGNOSIS — T84.59XD INFECTION OF TOTAL KNEE REPLACEMENT, SUBSEQUENT ENCOUNTER: Primary | ICD-10-CM

## 2024-06-03 PROCEDURE — 3288F FALL RISK ASSESSMENT DOCD: CPT | Mod: HCNC,CPTII,S$GLB, | Performed by: STUDENT IN AN ORGANIZED HEALTH CARE EDUCATION/TRAINING PROGRAM

## 2024-06-03 PROCEDURE — 3079F DIAST BP 80-89 MM HG: CPT | Mod: HCNC,CPTII,S$GLB, | Performed by: STUDENT IN AN ORGANIZED HEALTH CARE EDUCATION/TRAINING PROGRAM

## 2024-06-03 PROCEDURE — 99214 OFFICE O/P EST MOD 30 MIN: CPT | Mod: HCNC,S$GLB,, | Performed by: STUDENT IN AN ORGANIZED HEALTH CARE EDUCATION/TRAINING PROGRAM

## 2024-06-03 PROCEDURE — 99999 PR PBB SHADOW E&M-EST. PATIENT-LVL III: CPT | Mod: PBBFAC,HCNC,, | Performed by: STUDENT IN AN ORGANIZED HEALTH CARE EDUCATION/TRAINING PROGRAM

## 2024-06-03 PROCEDURE — 3077F SYST BP >= 140 MM HG: CPT | Mod: HCNC,CPTII,S$GLB, | Performed by: STUDENT IN AN ORGANIZED HEALTH CARE EDUCATION/TRAINING PROGRAM

## 2024-06-03 PROCEDURE — 1159F MED LIST DOCD IN RCRD: CPT | Mod: HCNC,CPTII,S$GLB, | Performed by: STUDENT IN AN ORGANIZED HEALTH CARE EDUCATION/TRAINING PROGRAM

## 2024-06-03 PROCEDURE — 3008F BODY MASS INDEX DOCD: CPT | Mod: HCNC,CPTII,S$GLB, | Performed by: STUDENT IN AN ORGANIZED HEALTH CARE EDUCATION/TRAINING PROGRAM

## 2024-06-03 PROCEDURE — 1101F PT FALLS ASSESS-DOCD LE1/YR: CPT | Mod: HCNC,CPTII,S$GLB, | Performed by: STUDENT IN AN ORGANIZED HEALTH CARE EDUCATION/TRAINING PROGRAM

## 2024-06-03 PROCEDURE — 1126F AMNT PAIN NOTED NONE PRSNT: CPT | Mod: HCNC,CPTII,S$GLB, | Performed by: STUDENT IN AN ORGANIZED HEALTH CARE EDUCATION/TRAINING PROGRAM

## 2024-06-03 RX ORDER — AMOXICILLIN AND CLAVULANATE POTASSIUM 875; 125 MG/1; MG/1
1 TABLET, FILM COATED ORAL 2 TIMES DAILY
Qty: 60 TABLET | Refills: 5 | Status: SHIPPED | OUTPATIENT
Start: 2024-06-03 | End: 2024-11-30

## 2024-06-03 NOTE — PROGRESS NOTES
Infectious Disease Clinic Note    Patient Name: Myles Pride  YOB: 1955    PRESENTING HISTORY       History of Present Illness:  Mr. Myles Pride is a 68 y.o. male w/ significant PMHx of left TKA 2/20/24 found to have post op inflammation and expulsion of sutures. Began spiking fevers and was placed on PO antibiotics. CRP and ESR elevated. 50 cc were aspirated from left knee and showed 23K WBC while on antibiotics. 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 Patient now taken for debridement with implant retention and poly exchange on 4/26/24. Cultures in process and waiting for [facility] DX. 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 Cultures from OR with no growth. S/p arthrocentesis followed by revision with poly exchange and retention on 4/26/24 with Dr. Woods. For now recommend continuing empiric IV daptomycin and ceftriaxone along with PO metronidazole to target Finegoldia isolate.      5/24: OR cultures finalized no growth. MicroGen again with only Finegoldia. Surprising as this would not be expected as a virulent pathogen. Patient tolerating Flagyl and daptomycin. Labs have remained WNL aside from increasing CK. Denies tendonitis today. Discussed micro results. Continues home PT with success. More ROM and less pain. Steri strips in place. Will ensure he has antibiotic prophylaxis prior to dental procedures including cleaning. Next cleaning is 6/5. Will see him before that and send in chronic suppression with Augmentin. Will ask [facility] infusion to stop ceftriaxone.     6/3: Patient and wife concerned that sutures may be visible though op site again. Have fear from last episode. Photos taken for ortho review. Would like CK rechecked today. Should have resolved since stopping daptomycin. Will begin chronic suppression with Augmentin the next 6 months. Can reach out with any concerns before then. Cleared for dental cleaning 6/5.                  Review of  Systems:  Constitutional: no fever or chills  Eyes: no visual changes  ENT: no nasal congestion or sore throat  Respiratory: no cough or shortness of breath  Cardiovascular: no chest pain  Gastrointestinal: no nausea or vomiting, no abdominal pain, no constipation, no diarrhea  Genitourinary: no hematuria or dysuria  Musculoskeletal: no arthralgias or myalgias  Skin: sutures may be coming out from op site again   Neurological: no headaches, numbness, or paresthesias    The following portions of the patient's history were reviewed and updated as appropriate: allergies, current medications, past family history, past medical history, past social history, past surgical history, and problem list.    PAST HISTORY:     Immunization History   Administered Date(s) Administered    COVID-19 MRNA, LN-S PF (MODERNA HALF 0.25 ML DOSE) 11/04/2021, 06/01/2022    COVID-19, MRNA, LN-S, PF (Pfizer) (Purple Cap) 10/07/2022    COVID-19, mRNA, LNP-S, bivalent booster, PF (Moderna Omicron)12 + YEARS 10/07/2022    COVID-19, vector-nr, rS-Ad26, PF ([employer]) 03/10/2021    Influenza (FLUAD) - Quadrivalent - Adjuvanted - PF *Preferred* (65+) 09/27/2021, 10/03/2022    Influenza - Quadrivalent - PF *Preferred* (6 months and older) 10/09/2015, 11/10/2017, 05/06/2018, 11/28/2018, 02/28/2020, 09/23/2020    Pneumococcal Polysaccharide - 23 Valent 10/01/2021    Tdap 08/10/2015, 04/15/2023    Zoster Recombinant 08/15/2018, 03/15/2019       Past Medical History:   Diagnosis Date    Hyperlipidemia     PONV (postoperative nausea and vomiting) 1990    after knee arthroscopy    Vitamin D deficiency 12/18/2019       Past Surgical History:   Procedure Laterality Date    COLONOSCOPY      COLONOSCOPY N/A 12/19/2019    Procedure: COLONOSCOPY;  Surgeon: Bhupendra Wilkinson MD;  Location: [facility];  Service: Endoscopy;  Laterality: N/A;    EPIDURAL STEROID INJECTION N/A 12/09/2022    Procedure: L4-5 intralaminar epidural steroid injection with left paramedian  approach;  Surgeon: Ben Eddy MD;  Location: [facility] PAIN MGT;  Service: Pain Management;  Laterality: N/A;    INCISION AND DRAINAGE OF KNEE Left 04/26/2024    Procedure: INCISION AND DRAINAGE, KNEE;  Surgeon: Ryan Woods MD;  Location: [facility] OR;  Service: Orthopedics;  Laterality: Left;  poly exchange    KNEE ARTHROSCOPY Left     LUMBAR FUSION  09/2023    REPLACEMENT, POLYETHYLENE LINER Left 04/26/2024    Procedure: REPLACEMENT, POLYETHYLENE LINER;  Surgeon: Ryan Woods MD;  Location: [facility] OR;  Service: Orthopedics;  Laterality: Left;    SELECTIVE INJECTION OF ANESTHETIC AGENT AROUND LUMBAR SPINAL NERVE ROOT BY TRANSFORAMINAL APPROACH Bilateral 01/21/2022    Procedure: Bilateral L4/5 TF LOVE with RN IV sedation;  Surgeon: Ben Eddy MD;  Location: [facility] PAIN MGT;  Service: Pain Management;  Laterality: Bilateral;    SELECTIVE INJECTION OF ANESTHETIC AGENT AROUND LUMBAR SPINAL NERVE ROOT BY TRANSFORAMINAL APPROACH Bilateral 05/06/2022    Procedure: Bilateral L4/5 TF LOVE with RN IV sedation;  Surgeon: Ben Eddy MD;  Location: [facility] PAIN MGT;  Service: Pain Management;  Laterality: Bilateral;    SELECTIVE INJECTION OF ANESTHETIC AGENT AROUND LUMBAR SPINAL NERVE ROOT BY TRANSFORAMINAL APPROACH Bilateral 10/25/2022    Procedure: Bilateral L4/5 TF LOVE with RN IV sedation;  Surgeon: Ben Eddy MD;  Location: [facility] PAIN MGT;  Service: Pain Management;  Laterality: Bilateral;    SPINE SURGERY  9.6.23    TLIF 3-4 & 4-5    SYNOVECTOMY OF KNEE Left 04/26/2024    Procedure: SYNOVECTOMY, KNEE;  Surgeon: Ryan Woods MD;  Location: [facility] OR;  Service: Orthopedics;  Laterality: Left;    TOTAL KNEE ARTHROPLASTY Left 02/20/2024    Procedure: ARTHROPLASTY, KNEE, TOTAL;  Surgeon: Ryan Woods MD;  Location: [facility] OR;  Service: Orthopedics;  Laterality: Left;    TRANSFORAMINAL LUMBAR INTERBODY FUSION (TLIF) USING COMPUTER-ASSISTED NAVIGATION Bilateral 09/06/2023    Procedure: FUSION, SPINE,  LUMBAR, TLIF, USING COMPUTER-ASSISTED NAVIGATION;  Surgeon: Lyle Lobato MD;  Location: [facility] OR;  Service: Neurosurgery;  Laterality: Bilateral;  L3-5 TLIF    VASECTOMY  1995       Family History   Problem Relation Name Age of Onset    Cancer Mother Cynthia Pride         anal cancer    Diabetes Mother Cynthia Pride         PRE DIABETES    Arthritis Mother Cynthia Pride     Hearing loss Mother Cynthia Pride     No Known Problems Sister      Gallbladder disease Brother      Hepatitis Brother          Hep C       Social History     Socioeconomic History    Marital status:    Tobacco Use    Smoking status: Never     Passive exposure: Never    Smokeless tobacco: Never   Substance and Sexual Activity    Alcohol use: Yes     Alcohol/week: 3.0 standard drinks of alcohol     Types: 3 Glasses of wine per week     Comment: occ    Drug use: Never    Sexual activity: Yes     Partners: Female     Birth control/protection: Partner-Vasectomy, None     Social Determinants of Health     Financial Resource Strain: Low Risk  (4/27/2024)    Overall Financial Resource Strain (CARDIA)     Difficulty of Paying Living Expenses: Not hard at all   Food Insecurity: No Food Insecurity (4/27/2024)    Hunger Vital Sign     Worried About Running Out of Food in the Last Year: Never true     Ran Out of Food in the Last Year: Never true   Transportation Needs: No Transportation Needs (4/27/2024)    PRAPARE - Transportation     Lack of Transportation (Medical): No     Lack of Transportation (Non-Medical): No   Physical Activity: Sufficiently Active (1/29/2024)    Exercise Vital Sign     Days of Exercise per Week: 5 days     Minutes of Exercise per Session: 30 min   Recent Concern: Physical Activity - Insufficiently Active (1/29/2024)    Exercise Vital Sign     Days of Exercise per Week: 4 days     Minutes of Exercise per Session: 20 min   Stress: No Stress Concern Present (4/27/2024)    [employer] - Occupational  "Stress Questionnaire     Feeling of Stress : Not at all   Housing Stability: Low Risk  (4/27/2024)    Housing Stability Vital Sign     Unable to Pay for Housing in the Last Year: No     Homeless in the Last Year: No       MEDICATIONS & ALLERGIES:     Current Outpatient Medications on File Prior to Visit   Medication Sig    aspirin 81 MG Chew Take 81 mg by mouth once daily.    ciclopirox (PENLAC) 8 % Soln Apply topically nightly.    ergocalciferol, vitamin D2, (VITAMIN D ORAL) Take by mouth once daily.    sodium chloride 0.9% SolP 50 mL with DAPTOmycin 500 mg SolR 900 mg Inject 900 mg into the vein Daily.    tamsulosin (FLOMAX) 0.4 mg Cap Take 1 capsule (0.4 mg total) by mouth once daily.    [DISCONTINUED] dextrose 5 % in water (D5W) PgBk 100 mL with cefTRIAXone 2 gram SolR 2 g Inject 2 g into the vein once daily.    [DISCONTINUED] metroNIDAZOLE (FLAGYL) 500 MG tablet Take 1 tablet (500 mg total) by mouth every 12 (twelve) hours.    baclofen (LIORESAL) 10 MG tablet Take 1 tablet (10 mg total) by mouth 3 (three) times daily. (Patient not taking: Reported on 6/3/2024)    celecoxib (CELEBREX) 200 MG capsule Take 1 capsule (200 mg total) by mouth once daily. (Patient not taking: Reported on 6/3/2024)    gabapentin (NEURONTIN) 300 MG capsule Take 1 capsule (300 mg total) by mouth every evening. (Patient not taking: Reported on 6/3/2024)    ketoconazole (NIZORAL) 2 % cream Apply topically 2 (two) times daily. For feet and web-spaces. Use for up to 4 weeks. (Patient not taking: Reported on 6/3/2024)    pravastatin (PRAVACHOL) 40 MG tablet Take 1 tablet (40 mg total) by mouth once daily. (Patient not taking: Reported on 6/3/2024)     No current facility-administered medications on file prior to visit.       Review of patient's allergies indicates:  No Known Allergies    OBJECTIVE:   Vital Signs:  Vitals:    06/03/24 0842   BP: (!) 142/80   Pulse: 63   Weight: 109 kg (240 lb 4.8 oz)   Height: 6' 5" (1.956 m)       No results " found for this or any previous visit (from the past 24 hour(s)).      Physical Exam:   General:  Well developed, well nourished, no acute distress  HEENT:  Normocephalic, atraumatic  CVS:  RRR, S1 and S2 normal, no murmurs, rubs, gallops  Resp:  Lungs clear to auscultation, no wheezes, rales, rhonchi  GI:  Abdomen soft, non-tender, non-distended, normoactive bowel sounds, no masses  MSK:  No muscle atrophy, peripheral edema, full range of motion  Skin:  photos below   Psych:  Alert and oriented to person, place, and time              ASSESSMENT:     Hyperlipidemia  Continue lipid control and follow up with PCP      BPH (benign prostatic hyperplasia)  Follow up with urology; continue indicated medications      Infection of total knee replacement  --Patient underwent initial left TKA in Feb 2024   --S/p arthrocentesis followed by revision with poly exchange and retention on 4/26/24 with Dr. 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; OR cultures no growth   --Ceftriaxone and daptomycin have been discontinued   --Will start PO amox/clav 875-125 mg BID for at least 6 months as chronic suppression   --Orthopedic team updated with plan   --Follow up with me in 6 months        PLAN:     Diagnoses and all orders for this visit:    Infection of total knee replacement, subsequent encounter  -     CK; Future    Mixed hyperlipidemia    Benign prostatic hyperplasia, unspecified whether lower urinary tract symptoms present    Other orders  -     amoxicillin-clavulanate 875-125mg (AUGMENTIN) 875-125 mg per tablet; Take 1 tablet by mouth 2 (two) times daily.      The total time for evaluation and management services performed on 6/3/24 was greater than 25 minutes.        Chester Ng,    Infectious Diseases

## 2024-06-07 ENCOUNTER — DOCUMENT SCAN (OUTPATIENT)
Dept: HOME HEALTH SERVICES | Facility: HOSPITAL | Age: 69
End: 2024-06-07
Payer: MEDICARE

## 2024-06-10 ENCOUNTER — TELEPHONE (OUTPATIENT)
Dept: REHABILITATION | Facility: HOSPITAL | Age: 69
End: 2024-06-10
Payer: MEDICARE

## 2024-06-10 ENCOUNTER — DOCUMENTATION ONLY (OUTPATIENT)
Dept: REHABILITATION | Facility: HOSPITAL | Age: 69
End: 2024-06-10
Payer: MEDICARE

## 2024-06-10 NOTE — PROGRESS NOTES
[facility]  Physical Therapy Discharge Note    Name: Myles Pride  [facility] Number: [id number]    Therapy Diagnosis:        Encounter Diagnoses   Name Primary?    Chronic pain of left knee Yes    Knee stiffness, left      Weakness of left lower extremity         Physician: Symone Reid PA     Physician Orders: PT Eval and Treat  Medical Diagnosis from Referral: Status post total left knee replacement using cement   Evaluation Date: 3/11/2024      Date of Last visit: 4/22/2024  Total Visits Received: 14    ASSESSMENT      Patient returned to MD for additional surgery, but patient reports today that he was released from Home Health and Outpatient PT.     Discharge reason: Other:  Patient has been released from outpatient PT by his MD at this time.    Discharge FOTO Score: N/A      Goals:     Short Term Goals:  4 weeks Status  Date Met   PAIN: Pt will report worst pain of 2/10 in order to progress toward max functional ability and improve quality of life. [x] Progressing  [] Met  [] Not Met     FUNCTION: Patient will demonstrate improved function as indicated by a score of greater than or equal to 50% of goal score out of 100 on FOTO. [x] Progressing  [] Met  [] Not Met     MOBILITY: Patient will improve AROM to 50% of stated goals, listed in objective measures above, in order to progress towards independence with functional activities.  [x] Progressing  [] Met  [] Not Met     STRENGTH: Patient will improve strength to 50% of stated goals, listed in objective measures above, in order to progress towards independence with functional activities. [x] Progressing  [] Met  [] Not Met     POSTURE: Patient will correct postural deviations in sitting and standing, to decrease pain and promote long term stability.  [x] Progressing  [] Met  [] Not Met     GAIT: Patient will demonstrate improved gait mechanics including improving gait in order to improve functional mobility, improve quality  of life, and decrease risk of further injury or fall.  [x] Progressing  [] Met  [] Not Met     HEP: Patient will demonstrate independence with HEP in order to progress toward functional independence. [x] Progressing  [] Met  [] Not Met        Long Term Goals:  8 weeks Status Date Met   PAIN: Pt will report worst pain of 0/10 in order to progress toward max functional ability and improve quality of life [x] Progressing  [] Met  [] Not Met     FUNCTION: Patient will demonstrate improved function as indicated by a score of greater than or equal to goal score out of 100 on FOTO. [x] Progressing  [] Met  [] Not Met     MOBILITY: Patient will improve AROM to stated goals, listed in objective measures above, in order to return to maximal functional potential and improve quality of life.  [x] Progressing  [] Met  [] Not Met     STRENGTH: Patient will improve strength to stated goals, listed in objective measures above, in order to improve functional independence and quality of life.  [x] Progressing  [] Met  [] Not Met     GAIT: Patient will demonstrate normalized gait mechanics with minimal compensation in order to return to PLOF. [x] Progressing  [] Met  [] Not Met     Patient will return to normal ADL's, IADL's, community involvement, recreational activities, and work-related activities with less than or equal to 0/10 pain and maximal function.  [x] Progressing  [] Met  [] Not Met          PLAN   This patient is discharged from Physical Therapy      Mady Lin, PT

## 2024-06-10 NOTE — TELEPHONE ENCOUNTER
Called to check on patient. He states that he is doing very well. He just got discharged from Home Health PT, and he states that his MD told him he did not need to return for outpatient PT either.  Mady Billy, PT, DPT

## 2024-06-14 LAB
ACID FAST MOD KINY STN SPEC: NORMAL
MYCOBACTERIUM SPEC QL CULT: NORMAL

## 2024-06-24 ENCOUNTER — OFFICE VISIT (OUTPATIENT)
Dept: ORTHOPEDICS | Facility: CLINIC | Age: 69
End: 2024-06-24
Payer: MEDICARE

## 2024-06-24 VITALS — BODY MASS INDEX: 28.37 KG/M2 | HEIGHT: 77 IN | WEIGHT: 240.31 LBS

## 2024-06-24 DIAGNOSIS — Z96.652 S/P REVISION OF TOTAL KNEE, LEFT: Primary | ICD-10-CM

## 2024-06-24 PROCEDURE — 1159F MED LIST DOCD IN RCRD: CPT | Mod: HCNC,CPTII,S$GLB, | Performed by: PHYSICIAN ASSISTANT

## 2024-06-24 PROCEDURE — 99024 POSTOP FOLLOW-UP VISIT: CPT | Mod: HCNC,S$GLB,, | Performed by: PHYSICIAN ASSISTANT

## 2024-06-24 PROCEDURE — 99999 PR PBB SHADOW E&M-EST. PATIENT-LVL III: CPT | Mod: PBBFAC,HCNC,, | Performed by: PHYSICIAN ASSISTANT

## 2024-06-24 PROCEDURE — 1126F AMNT PAIN NOTED NONE PRSNT: CPT | Mod: HCNC,CPTII,S$GLB, | Performed by: PHYSICIAN ASSISTANT

## 2024-06-24 PROCEDURE — 3288F FALL RISK ASSESSMENT DOCD: CPT | Mod: HCNC,CPTII,S$GLB, | Performed by: PHYSICIAN ASSISTANT

## 2024-06-24 PROCEDURE — 1101F PT FALLS ASSESS-DOCD LE1/YR: CPT | Mod: HCNC,CPTII,S$GLB, | Performed by: PHYSICIAN ASSISTANT

## 2024-06-24 NOTE — PROGRESS NOTES
Patient ID: Myles Pride is a 68 y.o. male.    Chief Complaint: No chief complaint on file.      HPI: Myles Pride  is a 68 y.o. male who c/o No chief complaint on file.     Post op visit 2  Patient notes pain is 0/10   He is doing quite well  He is thankful for us   He is starting to advance activity as he wants to get back in the gym and pool to get his cardio up   He has not using any devices to assist with ambulation   He is currently on Augmentin per ID for 6 months; appreciate their assistance in this case      Patient is presently denying any shortness of breath, chest pain, fever/chills, nausea/vomiting, loss of taste or smell, numbness/tingling or sensation changes, loss of bladder or bowel function, loss of taste/smell.     Surgery: Left Total Knee revision; poly exchange and I and D implant retention    Surgery Date:  04/26/2024   Primary left total knee performed 02/20/2024    Past Medical History:   Diagnosis Date    Hyperlipidemia     PONV (postoperative nausea and vomiting) 1990    after knee arthroscopy    Vitamin D deficiency 12/18/2019     Past Surgical History:   Procedure Laterality Date    COLONOSCOPY      COLONOSCOPY N/A 12/19/2019    Procedure: COLONOSCOPY;  Surgeon: Bhupendra Wilkinson MD;  Location: [facility];  Service: Endoscopy;  Laterality: N/A;    EPIDURAL STEROID INJECTION N/A 12/09/2022    Procedure: L4-5 intralaminar epidural steroid injection with left paramedian approach;  Surgeon: Ben Eddy MD;  Location: [facility];  Service: Pain Management;  Laterality: N/A;    INCISION AND DRAINAGE OF KNEE Left 04/26/2024    Procedure: INCISION AND DRAINAGE, KNEE;  Surgeon: Ryan Woods MD;  Location: [facility];  Service: Orthopedics;  Laterality: Left;  poly exchange    KNEE ARTHROSCOPY Left     LUMBAR FUSION  09/2023    REPLACEMENT, POLYETHYLENE LINER Left 04/26/2024    Procedure: REPLACEMENT, POLYETHYLENE LINER;  Surgeon: Ryan Woods MD;  Location: [facility];   Service: Orthopedics;  Laterality: Left;    SELECTIVE INJECTION OF ANESTHETIC AGENT AROUND LUMBAR SPINAL NERVE ROOT BY TRANSFORAMINAL APPROACH Bilateral 01/21/2022    Procedure: Bilateral L4/5 TF LOVE with RN IV sedation;  Surgeon: Ben Eddy MD;  Location: [facility];  Service: Pain Management;  Laterality: Bilateral;    SELECTIVE INJECTION OF ANESTHETIC AGENT AROUND LUMBAR SPINAL NERVE ROOT BY TRANSFORAMINAL APPROACH Bilateral 05/06/2022    Procedure: Bilateral L4/5 TF LOVE with RN IV sedation;  Surgeon: Ben Eddy MD;  Location: [facility];  Service: Pain Management;  Laterality: Bilateral;    SELECTIVE INJECTION OF ANESTHETIC AGENT AROUND LUMBAR SPINAL NERVE ROOT BY TRANSFORAMINAL APPROACH Bilateral 10/25/2022    Procedure: Bilateral L4/5 TF LOVE with RN IV sedation;  Surgeon: Ben Eddy MD;  Location: [facility];  Service: Pain Management;  Laterality: Bilateral;    SPINE SURGERY  9.6.23    TLIF 3-4 & 4-5    SYNOVECTOMY OF KNEE Left 04/26/2024    Procedure: SYNOVECTOMY, KNEE;  Surgeon: Ryan Woods MD;  Location: [facility];  Service: Orthopedics;  Laterality: Left;    TOTAL KNEE ARTHROPLASTY Left 02/20/2024    Procedure: ARTHROPLASTY, KNEE, TOTAL;  Surgeon: Ryan Woods MD;  Location: [facility];  Service: Orthopedics;  Laterality: Left;    TRANSFORAMINAL LUMBAR INTERBODY FUSION (TLIF) USING COMPUTER-ASSISTED NAVIGATION Bilateral 09/06/2023    Procedure: FUSION, SPINE, LUMBAR, TLIF, USING COMPUTER-ASSISTED NAVIGATION;  Surgeon: Lyle Lobato MD;  Location: [facility];  Service: Neurosurgery;  Laterality: Bilateral;  L3-5 TLIF    VASECTOMY  1995     Family History   Problem Relation Name Age of Onset    Cancer Mother Cynthia Pride         anal cancer    Diabetes Mother Cynthia Pride         PRE DIABETES    Arthritis Mother Cynthia Pride     Hearing loss Mother Cynthia Pride     No Known Problems Sister      Gallbladder disease Brother      Hepatitis Brother          Hep C     Social  History     Socioeconomic History    Marital status:    Tobacco Use    Smoking status: Never     Passive exposure: Never    Smokeless tobacco: Never   Substance and Sexual Activity    Alcohol use: Yes     Alcohol/week: 3.0 standard drinks of alcohol     Types: 3 Glasses of wine per week     Comment: occ    Drug use: Never    Sexual activity: Yes     Partners: Female     Birth control/protection: Partner-Vasectomy, None     Social Determinants of Health     Financial Resource Strain: Low Risk  (4/27/2024)    Overall Financial Resource Strain (CARDIA)     Difficulty of Paying Living Expenses: Not hard at all   Food Insecurity: No Food Insecurity (4/27/2024)    Hunger Vital Sign     Worried About Running Out of Food in the Last Year: Never true     Ran Out of Food in the Last Year: Never true   Transportation Needs: No Transportation Needs (4/27/2024)    PRAPARE - Transportation     Lack of Transportation (Medical): No     Lack of Transportation (Non-Medical): No   Physical Activity: Sufficiently Active (1/29/2024)    Exercise Vital Sign     Days of Exercise per Week: 5 days     Minutes of Exercise per Session: 30 min   Recent Concern: Physical Activity - Insufficiently Active (1/29/2024)    Exercise Vital Sign     Days of Exercise per Week: 4 days     Minutes of Exercise per Session: 20 min   Stress: No Stress Concern Present (4/27/2024)    [employer] [place] [employer] - Occupational Stress Questionnaire     Feeling of Stress : Not at all   Housing Stability: Low Risk  (4/27/2024)    Housing Stability Vital Sign     Unable to Pay for Housing in the Last Year: No     Homeless in the Last Year: No     Medication List with Changes/Refills   Current Medications    AMOXICILLIN-CLAVULANATE 875-125MG (AUGMENTIN) 875-125 MG PER TABLET    Take 1 tablet by mouth 2 (two) times daily.    ASPIRIN 81 MG CHEW    Take 81 mg by mouth once daily.    BACLOFEN (LIORESAL) 10 MG TABLET    Take 1 tablet (10 mg total) by  mouth 3 (three) times daily.    CELECOXIB (CELEBREX) 200 MG CAPSULE    Take 1 capsule (200 mg total) by mouth once daily.    CICLOPIROX (PENLAC) 8 % SOLN    Apply topically nightly.    ERGOCALCIFEROL, VITAMIN D2, (VITAMIN D ORAL)    Take by mouth once daily.    GABAPENTIN (NEURONTIN) 300 MG CAPSULE    Take 1 capsule (300 mg total) by mouth every evening.    KETOCONAZOLE (NIZORAL) 2 % CREAM    Apply topically 2 (two) times daily. For feet and web-spaces. Use for up to 4 weeks.    PRAVASTATIN (PRAVACHOL) 40 MG TABLET    Take 1 tablet (40 mg total) by mouth once daily.    TAMSULOSIN (FLOMAX) 0.4 MG CAP    Take 1 capsule (0.4 mg total) by mouth once daily.     Review of patient's allergies indicates:  No Known Allergies    Objective:     Left Lower Extremity  NVI  WWP foot  Comp soft  Cap refill < 2 sec  Calf NT, soft  (-) Michael sign  CESAR  ROM : Patient is able to easily exhibit full flexion and extension on passive range of motion.   Wiggles toes  DF/PF intact  Sensation intact  Inc C/D/I; patient has 1 spot medially where he had a subcu stitch come through healing appropriately  Patient on Augmentin per ID; advise wife for topical antibiotic ointment on it as directed  No SOI    Imaging:    No imaging obtained today    Assessment:       Encounter Diagnosis   Name Primary?    S/P revision of total knee, left Yes          Plan:       Diagnoses and all orders for this visit:    S/P revision of total knee, left        Myles Wang Bigg is an established pt here for postop follow-up after left total knee replacement by Dr. Woods. The patient will continue with the current medication regimen and treatment plan.  Patient should notify the office of any signs or symptoms of infection including fevers, erythema, purulent drainage, increasing pain.  Patient will continue with DVT prophylaxis until at least 6 weeks postop.  Patient will continue outpatient physical therapy.  Will follow-up as scheduled. Patient verbalized  understanding of all instructions and agreed with the above plan.    No follow-ups on file.    The patient understands, chooses and consents to this plan and accepts all   the risks which include but are not limited to the risks mentioned above.     Disclaimer: This note was prepared using a voice recognition system and is likely to have sound alike errors within the text.

## 2024-07-26 ENCOUNTER — OFFICE VISIT (OUTPATIENT)
Dept: ORTHOPEDICS | Facility: CLINIC | Age: 69
End: 2024-07-26
Payer: MEDICARE

## 2024-07-26 ENCOUNTER — HOSPITAL ENCOUNTER (OUTPATIENT)
Dept: RADIOLOGY | Facility: HOSPITAL | Age: 69
Discharge: HOME | End: 2024-07-26
Attending: ORTHOPAEDIC SURGERY
Payer: MEDICARE

## 2024-07-26 VITALS — WEIGHT: 240.31 LBS | BODY MASS INDEX: 28.37 KG/M2 | HEIGHT: 77 IN

## 2024-07-26 DIAGNOSIS — M25.462 EFFUSION, LEFT KNEE: ICD-10-CM

## 2024-07-26 DIAGNOSIS — Z96.652 S/P REVISION OF TOTAL KNEE, LEFT: Primary | ICD-10-CM

## 2024-07-26 DIAGNOSIS — T84.53XD INFECTION ASSOCIATED WITH INTERNAL RIGHT KNEE PROSTHESIS, SUBSEQUENT ENCOUNTER: ICD-10-CM

## 2024-07-26 PROCEDURE — 99999 PR PBB SHADOW E&M-EST. PATIENT-LVL III: CPT | Mod: PBBFAC,HCNC,, | Performed by: ORTHOPAEDIC SURGERY

## 2024-07-26 PROCEDURE — 73564 X-RAY EXAM KNEE 4 OR MORE: CPT | Mod: 26,HCNC,LT, | Performed by: RADIOLOGY

## 2024-07-26 PROCEDURE — 73564 X-RAY EXAM KNEE 4 OR MORE: CPT | Mod: TC,HCNC,LT

## 2024-07-26 PROCEDURE — 73562 X-RAY EXAM OF KNEE 3: CPT | Mod: 26,59,HCNC,RT | Performed by: RADIOLOGY

## 2024-07-26 NOTE — PROGRESS NOTES
Subjective:     Patient ID: Myles Pride is a 68 y.o. male.    Chief Complaint: Pain and Post-op Evaluation of the Left Knee      HPI:  06/02/2022  Patient with left knee severe pain today is total complaining also of the right hip.  He had previous x-rays in the electronic records.  He feels the right hip when he abducts his hip and try to get out of the car is seems to be very painful in the groin.  His knee on the left side does not bother him as much.  He does have severe back issues and we did order EMG and nerve conduction studies that show multilevel radiculopathy from L2, L4-L5 and S1 bilaterally.  He states he does get thigh anterior thigh pain.  I did tell him a could be from pinched nerves in his back.  He does see pain management for his back and injection seems to have helped a little bit.  He tries to maintain activities however now he said he cannot even hike 2 miles.  One mildly can walk but now is knee gives out on him on the left and his hip hurts on the right.  He did know if the injection given to him in February in the left knee helped or not because also received injections in his back.  No loss of bowel bladder control blurry vision double vision or loss sense smell or taste or fever or chills    As far as the gabapentin we discussed that in detail today he does not take it as much bit I did tell him for it to work he needs to be on it constantly.  He was prescribed that by pain management to take 300 mg at night for we can go up to 600 mg at night after that and a for a week and then go up to 900 at night.  I did tell him I will probably stop at 600 if you doing well with that.  Will take roughly 3 months for the gabapentin to work well and if he does not take it constantly there is no help for it.  If he decides to do surgery afterwards he needs to be on gabapentin to help with nerve pain    12/01/2022   X-rays obtained today of his hips and the knees.  He is scheduled to receive  injections in his back in a week with pain management Dr. Eddy  .  He said his right hip seems to be constantly aching him.  Last time we saw him we discussed that usually we do hip surgery 1st and then knee surgery after that in joint replacement.  However he does have quite a bit of range of motion left in his hips and the knee is the 1 that is concerning him on specially on the left side.  His pain level is 7/10 sometimes.  He is able to manage in trying to be very active he does water exercise programs.  He did have a lumbar injection around 5 weeks ago but now he is going for a 2nd injection.  He feels the right hip is the 1 that aches the most of both hips and his left knee.  He knows he has arthritis in both knees and both hips.  Also has issues in his back and stenosis.    No fever no chills no shortness of breath or difficulty with chewing swallowing loss of bowel bladder control blurry vision vision or double vision loss sense smell or taste    01/12/2024   Bilateral knee arthritis with the left knee severely deformed.  Also he had mild pain in both of his hips with the right a little bit worse than the left.  Last time seen he had severe spinal stenosis and multilevel radiculopathy.  Since then he had undergone lumbar decompression and fusion by Dr. Lobato  .  He is doing well with his back.  The numbness tingling down the legs subsided some.  He has not having much of any pain he says it is 0/10 when he sitting down is just his knees give out with gait in when he walks distances the pain goes up to around 5 out of 10.  The left knee is the 1 that bothers him the most the hips he can not live with the stated.  I did discuss that if he has loss of motion in the hip will vent him from doing well with the total knee.  He said he has good motion he ambulates without assistive devices.  He does have a brace that he places on the left knee to give him some stability.  He had been on different NSAIDs and  gabapentin and multiple treatments including injections in the back and in the knees by pain management.  You would like to have his left total knee replacement done  Spent a long time with him showed him model how it is performed went over the pros and cons and the instructions in details and reviewed his recent x-rays of the lumbar spine and hips as well as the knees    04/25/2024   Left TKA 02/20/2024 was doing well however apparently there was some sutures coming from underneath were pulled by physical therapy.  Did not go to do any dental work.  Eventually had erythema and inflammation and was treated with antibiotics by mouth.  He started to have some fever was taking Tylenol to it.  We obtained sed rate and CRP and the CRP was above 100 and the sed rate above 30 highly suspicious of infected knee.  His knee is swollen.  I had him come in today for aspiration.  Examination revealed large effusion and we aspirated 50 cc of blood tinged fluid today.  I did discuss with him DAIR (debridement antibiotics implant retention with poly exchange) versus 2 stage reimplantation.  You he is only 2 weeks into this and some people and studies have shown double DAIR  procedure sometimes work also  No fever no chills no shortness of breath.  I did tell him that we need to get on top of it quickly to help save the knee.  If the infection goes into the blood stream it becomes a problem.  He probably will be on IV antibiotics for 4-6 weeks and chronic suppressive antibiotics.  Since he has been on antibiotics we will send the aspirate to micro [facility]el[facility] DX and I described that to him.    07/26/2024   Left TKA 02/20/2024   Left TKA DA IR 04/26/2024  Patient grew  finegoldia using micro Nelida DX.  He is under the care of Dr. Ng.  He received IV antibiotics and now he is on Augmentin 875 twice a day for 6 months as prophylactic antibiotics.  Patient stated he has 0 pain he is very pleased with the results.  He finished his physical  therapy 3 weeks ago be now he is doing walking treadmill swimming biking but no jogging and I told him I do not like him to do any jogging which will cause more stress on his total knee.  He is very pleased with his results.  He is actually gained leave town now to go visit his children out of state which I see no reason why not.  He has no fever no chills no shortness of breath or difficulty with chewing swallowing loss of bowel bladder control blurry vision double vision loss sense smell or taste  Past Medical History:   Diagnosis Date    Hyperlipidemia     PONV (postoperative nausea and vomiting) 1990    after knee arthroscopy    Vitamin D deficiency 12/18/2019     Past Surgical History:   Procedure Laterality Date    COLONOSCOPY      COLONOSCOPY N/A 12/19/2019    Procedure: COLONOSCOPY;  Surgeon: Bhupendra Wilkinson MD;  Location: [facility];  Service: Endoscopy;  Laterality: N/A;    EPIDURAL STEROID INJECTION N/A 12/09/2022    Procedure: L4-5 intralaminar epidural steroid injection with left paramedian approach;  Surgeon: Ben Eddy MD;  Location: [facility] PAIN T;  Service: Pain Management;  Laterality: N/A;    INCISION AND DRAINAGE OF KNEE Left 04/26/2024    Procedure: INCISION AND DRAINAGE, KNEE;  Surgeon: Ryan Woods MD;  Location: [facility] OR;  Service: Orthopedics;  Laterality: Left;  poly exchange    KNEE ARTHROSCOPY Left     LUMBAR FUSION  09/2023    REPLACEMENT, POLYETHYLENE LINER Left 04/26/2024    Procedure: REPLACEMENT, POLYETHYLENE LINER;  Surgeon: Ryan Woods MD;  Location: [facility] OR;  Service: Orthopedics;  Laterality: Left;    SELECTIVE INJECTION OF ANESTHETIC AGENT AROUND LUMBAR SPINAL NERVE ROOT BY TRANSFORAMINAL APPROACH Bilateral 01/21/2022    Procedure: Bilateral L4/5 TF LOVE with RN IV sedation;  Surgeon: Ben Eddy MD;  Location: [facility] PAIN MGT;  Service: Pain Management;  Laterality: Bilateral;    SELECTIVE INJECTION OF ANESTHETIC AGENT AROUND LUMBAR SPINAL NERVE ROOT BY  TRANSFORAMINAL APPROACH Bilateral 05/06/2022    Procedure: Bilateral L4/5 TF LOVE with RN IV sedation;  Surgeon: Ben Eddy MD;  Location: [facility] [place] PAIN MGT;  Service: Pain Management;  Laterality: Bilateral;    SELECTIVE INJECTION OF ANESTHETIC AGENT AROUND LUMBAR SPINAL NERVE ROOT BY TRANSFORAMINAL APPROACH Bilateral 10/25/2022    Procedure: Bilateral L4/5 TF LOVE with RN IV sedation;  Surgeon: Ben Eddy MD;  Location: [facility] [place] PAIN MGT;  Service: Pain Management;  Laterality: Bilateral;    SPINE SURGERY  9.6.23    TLIF 3-4 & 4-5    SYNOVECTOMY OF KNEE Left 04/26/2024    Procedure: SYNOVECTOMY, KNEE;  Surgeon: Ryan Woods MD;  Location: [facility] OR;  Service: Orthopedics;  Laterality: Left;    TOTAL KNEE ARTHROPLASTY Left 02/20/2024    Procedure: ARTHROPLASTY, KNEE, TOTAL;  Surgeon: Ryan Woods MD;  Location: [facility] OR;  Service: Orthopedics;  Laterality: Left;    TRANSFORAMINAL LUMBAR INTERBODY FUSION (TLIF) USING COMPUTER-ASSISTED NAVIGATION Bilateral 09/06/2023    Procedure: FUSION, SPINE, LUMBAR, TLIF, USING COMPUTER-ASSISTED NAVIGATION;  Surgeon: Lyle Lobato MD;  Location: [facility] OR;  Service: Neurosurgery;  Laterality: Bilateral;  L3-5 TLIF    VASECTOMY  1995     Family History   Problem Relation Name Age of Onset    Cancer Mother Cynthia Pride         anal cancer    Diabetes Mother Cynthia Pride         PRE DIABETES    Arthritis Mother Cynthia Pride     Hearing loss Mother Cynthia Pride     No Known Problems Sister      Gallbladder disease Brother      Hepatitis Brother          Hep C     Social History     Socioeconomic History    Marital status:    Tobacco Use    Smoking status: Never     Passive exposure: Never    Smokeless tobacco: Never   Substance and Sexual Activity    Alcohol use: Yes     Alcohol/week: 3.0 standard drinks of alcohol     Types: 3 Glasses of wine per week     Comment: occ    Drug use: Never    Sexual activity: Yes     Partners: Female     Birth control/protection:  Partner-Vasectomy, None     Social Determinants of Health     Financial Resource Strain: Low Risk  (4/27/2024)    Overall Financial Resource Strain (CARDIA)     Difficulty of Paying Living Expenses: Not hard at all   Food Insecurity: No Food Insecurity (4/27/2024)    Hunger Vital Sign     Worried About Running Out of Food in the Last Year: Never true     Ran Out of Food in the Last Year: Never true   Transportation Needs: No Transportation Needs (4/27/2024)    PRAPARE - Transportation     Lack of Transportation (Medical): No     Lack of Transportation (Non-Medical): No   Physical Activity: Sufficiently Active (1/29/2024)    Exercise Vital Sign     Days of Exercise per Week: 5 days     Minutes of Exercise per Session: 30 min   Recent Concern: Physical Activity - Insufficiently Active (1/29/2024)    Exercise Vital Sign     Days of Exercise per Week: 4 days     Minutes of Exercise per Session: 20 min   Stress: No Stress Concern Present (4/27/2024)    [employer] - Occupational Stress Questionnaire     Feeling of Stress : Not at all   Housing Stability: Low Risk  (4/27/2024)    Housing Stability Vital Sign     Unable to Pay for Housing in the Last Year: No     Homeless in the Last Year: No     Medication List with Changes/Refills   Current Medications    AMOXICILLIN-CLAVULANATE 875-125MG (AUGMENTIN) 875-125 MG PER TABLET    Take 1 tablet by mouth 2 (two) times daily.    ASPIRIN 81 MG CHEW    Take 81 mg by mouth once daily.    CELECOXIB (CELEBREX) 200 MG CAPSULE    Take 1 capsule (200 mg total) by mouth once daily.    CICLOPIROX (PENLAC) 8 % SOLN    Apply topically nightly.    ERGOCALCIFEROL, VITAMIN D2, (VITAMIN D ORAL)    Take by mouth once daily.    PRAVASTATIN (PRAVACHOL) 40 MG TABLET    Take 1 tablet (40 mg total) by mouth once daily.    TAMSULOSIN (FLOMAX) 0.4 MG CAP    Take 1 capsule (0.4 mg total) by mouth once daily.   Discontinued Medications    BACLOFEN (LIORESAL) 10 MG TABLET     Take 1 tablet (10 mg total) by mouth 3 (three) times daily.    GABAPENTIN (NEURONTIN) 300 MG CAPSULE    Take 1 capsule (300 mg total) by mouth every evening.    KETOCONAZOLE (NIZORAL) 2 % CREAM    Apply topically 2 (two) times daily. For feet and web-spaces. Use for up to 4 weeks.     Review of patient's allergies indicates:  No Known Allergies  Review of Systems   Constitutional: Negative for decreased appetite.   HENT:  Negative for tinnitus.    Eyes:  Negative for double vision.   Cardiovascular:  Negative for chest pain.   Respiratory:  Negative for wheezing.    Hematologic/Lymphatic: Negative for bleeding problem.   Skin:  Negative for dry skin.   Musculoskeletal:  Positive for arthritis, back pain and stiffness. Negative for gout, joint pain, joint swelling and neck pain.   Gastrointestinal:  Negative for abdominal pain.   Genitourinary:  Negative for bladder incontinence.   Neurological:  Negative for numbness, paresthesias and sensory change.   Psychiatric/Behavioral:  Negative for altered mental status.        Objective:   Body mass index is 28.5 kg/m².  There were no vitals filed for this visit.       General    Constitutional: He is oriented to person, place, and time. He appears well-developed.   HENT:   Head: Atraumatic.   Eyes: EOM are normal.   Pulmonary/Chest: Effort normal.   Neurological: He is alert and oriented to person, place, and time.   Psychiatric: Judgment normal.           Ambulating without any assistive devices  Lumbar without true deformity.  Negative straight leg raising bilaterally.  Pelvis is level  Right hip internal rotation 15° external rotation 20° at 90° of flexion.  Abduction of the hip to 30° and internal rotation yet severe pain in the groin.  No flexion contracture.  Right hip flexors and abductors are 5/5 however he has quad atrophy  Left hip internal rotation 15° external rotation 30° at 90° of flexion.  Abduction to 30° and internal rotation with very mild pain in the  groin.  No flexion contractures.  Hip flexors and abductors are 5/5 however is also he has quad atrophy  Right knee range of motion 0-120 degrees.  Very mild crepitus to compression on the patella.  No swelling no defect in the patella tendon or quadriceps tendon.  There is definitely quadriceps atrophy  Left knee preop with severe varus deformity.  There is quite a bit of loosening lateral collateral.  Range of motion is 5-100°.  No defect in the patella or quadriceps tendon.  There is quad atrophy also.  There is crepitus to compression on the patella as well as pain medially.  Right knee postop TKA midline incision healed well.  No swelling, not warm to touch range of motion 0-130 degrees and stable in extension and flexion no defect in the patella or quadriceps tendon   Calves are soft nontender  Ankle motion intact  Skin is warm to touch no obvious lesions  Capillary refill less than 2 seconds    Relevant imaging results reviewed and interpreted by me, discussed with the patient and / or family today   X-ray left knee TKA in excellent alignment  X-ray 01/12/2024 bilateral knees with the left knee severe varus deformity complete loss medial joint space marginal osteophytic changes large posterior osteophytes as well as anterior.  The right knee has also degenerative changes with varus deformity but not as severe as the left knee.  No fracture seen  There is x-ray from November 2023 lumbar spine showing 3 level fusion with hardware  X-ray 12/01/2022 of the pelvis showing right hip with loss of joint space on the lateral aspect with large osteophytes of the acetabulum and superiorly and inferiorly with a small osteophyte on the femoral heads bilaterally.  Same findings on the left hip   X-ray of both knees showing left knee with complete loss of medial joint space with marginal osteophytic changes and severe varus deformity.  The right knee has moderately severe varus deformity and loss of medial joint space and  marginal osteophytic changes  X-rays in the electronic records reviewed today in details  X-ray of the pelvis showing right hip with marginal osteophytic changes on the acetabulum with loss of joint space consistent with arthritis.  Left hip has some marginal osteophytes on the acetabulum and the hip joint also consistent with mild arthritis  X-ray of the left knee with complete loss of medial joint space.  Marginal osteophytic changes severe varus deformity.  Radiculopathy  EMG and nerve conduction study showing L2, L4, L5 and S1 radiculopathy      Above 100 CRP, sed rate in the 30s, with history of fever and cellulitis on antibiotics over the last couple weeks.  Assessment:     Encounter Diagnoses   Name Primary?    S/P revision of total knee, left Yes    Infection associated with internal right knee prosthesis, subsequent encounter         Plan:   S/P revision of total knee, left    Infection associated with internal right knee prosthesis, subsequent encounter         Patient Instructions   You have excellent range of motion   You are doing exercise on your own like treadmill walking swimming bicycling  Avoid doing jogging because that will increase the pressure on the prosthesis and will not last long  Continue with the Augmentin as prescribed by Dr. Ng Infectious Disease you wants you on it for 6 months  Return in 3 months    Disclaimer: This note was prepared using a voice recognition system and is likely to have sound alike errors within the text.

## 2024-07-26 NOTE — PATIENT INSTRUCTIONS
You have excellent range of motion   You are doing exercise on your own like treadmill walking swimming bicycling  Avoid doing jogging because that will increase the pressure on the prosthesis and will not last long  Continue with the Augmentin as prescribed by Dr. Ng Infectious Disease you wants you on it for 6 months  Return in 3 months

## 2024-09-13 ENCOUNTER — LAB VISIT (OUTPATIENT)
Dept: LAB | Facility: HOSPITAL | Age: 69
End: 2024-09-13
Attending: FAMILY MEDICINE
Payer: MEDICARE

## 2024-09-13 DIAGNOSIS — Z79.899 ENCOUNTER FOR LONG-TERM (CURRENT) USE OF MEDICATIONS: ICD-10-CM

## 2024-09-13 LAB
ALBUMIN SERPL BCP-MCNC: 4.1 G/DL (ref 3.5–5.2)
ALP SERPL-CCNC: 84 U/L (ref 55–135)
ALT SERPL W/O P-5'-P-CCNC: 23 U/L (ref 10–44)
ANION GAP SERPL CALC-SCNC: 7 MMOL/L (ref 8–16)
AST SERPL-CCNC: 30 U/L (ref 10–40)
BASOPHILS # BLD AUTO: 0.07 K/UL (ref 0–0.2)
BASOPHILS NFR BLD: 1 % (ref 0–1.9)
BILIRUB SERPL-MCNC: 0.8 MG/DL (ref 0.1–1)
BUN SERPL-MCNC: 17 MG/DL (ref 8–23)
CALCIUM SERPL-MCNC: 9.5 MG/DL (ref 8.7–10.5)
CHLORIDE SERPL-SCNC: 107 MMOL/L (ref 95–110)
CO2 SERPL-SCNC: 25 MMOL/L (ref 23–29)
CREAT SERPL-MCNC: 1.1 MG/DL (ref 0.5–1.4)
DIFFERENTIAL METHOD BLD: ABNORMAL
EOSINOPHIL # BLD AUTO: 0.2 K/UL (ref 0–0.5)
EOSINOPHIL NFR BLD: 2.9 % (ref 0–8)
ERYTHROCYTE [DISTWIDTH] IN BLOOD BY AUTOMATED COUNT: 14.8 % (ref 11.5–14.5)
EST. GFR  (NO RACE VARIABLE): >60 ML/MIN/1.73 M^2
ESTIMATED AVG GLUCOSE: 111 MG/DL (ref 68–131)
GLUCOSE SERPL-MCNC: 102 MG/DL (ref 70–110)
HBA1C MFR BLD: 5.5 % (ref 4–5.6)
HCT VFR BLD AUTO: 48.1 % (ref 40–54)
HGB BLD-MCNC: 15.2 G/DL (ref 14–18)
IMM GRANULOCYTES # BLD AUTO: 0.02 K/UL (ref 0–0.04)
IMM GRANULOCYTES NFR BLD AUTO: 0.3 % (ref 0–0.5)
LYMPHOCYTES # BLD AUTO: 3 K/UL (ref 1–4.8)
LYMPHOCYTES NFR BLD: 43.8 % (ref 18–48)
MCH RBC QN AUTO: 30.4 PG (ref 27–31)
MCHC RBC AUTO-ENTMCNC: 31.6 G/DL (ref 32–36)
MCV RBC AUTO: 96 FL (ref 82–98)
MONOCYTES # BLD AUTO: 0.5 K/UL (ref 0.3–1)
MONOCYTES NFR BLD: 7.2 % (ref 4–15)
NEUTROPHILS # BLD AUTO: 3.1 K/UL (ref 1.8–7.7)
NEUTROPHILS NFR BLD: 44.8 % (ref 38–73)
NRBC BLD-RTO: 0 /100 WBC
PLATELET # BLD AUTO: 210 K/UL (ref 150–450)
PMV BLD AUTO: 10.7 FL (ref 9.2–12.9)
POTASSIUM SERPL-SCNC: 4.5 MMOL/L (ref 3.5–5.1)
PROT SERPL-MCNC: 7 G/DL (ref 6–8.4)
RBC # BLD AUTO: 5 M/UL (ref 4.6–6.2)
SODIUM SERPL-SCNC: 139 MMOL/L (ref 136–145)
TSH SERPL DL<=0.005 MIU/L-ACNC: 2.01 UIU/ML (ref 0.4–4)
WBC # BLD AUTO: 6.94 K/UL (ref 3.9–12.7)

## 2024-09-13 PROCEDURE — 83036 HEMOGLOBIN GLYCOSYLATED A1C: CPT | Mod: HCNC | Performed by: FAMILY MEDICINE

## 2024-09-13 PROCEDURE — 85025 COMPLETE CBC W/AUTO DIFF WBC: CPT | Mod: HCNC | Performed by: FAMILY MEDICINE

## 2024-09-13 PROCEDURE — 84443 ASSAY THYROID STIM HORMONE: CPT | Mod: HCNC | Performed by: FAMILY MEDICINE

## 2024-09-13 PROCEDURE — 80053 COMPREHEN METABOLIC PANEL: CPT | Mod: HCNC | Performed by: FAMILY MEDICINE

## 2024-09-13 PROCEDURE — 36415 COLL VENOUS BLD VENIPUNCTURE: CPT | Mod: HCNC | Performed by: FAMILY MEDICINE

## 2024-10-09 ENCOUNTER — OFFICE VISIT (OUTPATIENT)
Dept: INTERNAL MEDICINE | Facility: CLINIC | Age: 69
End: 2024-10-09
Payer: MEDICARE

## 2024-10-09 VITALS
OXYGEN SATURATION: 98 % | SYSTOLIC BLOOD PRESSURE: 134 MMHG | TEMPERATURE: 97 F | WEIGHT: 244 LBS | HEART RATE: 70 BPM | RESPIRATION RATE: 18 BRPM | DIASTOLIC BLOOD PRESSURE: 86 MMHG | BODY MASS INDEX: 28.93 KG/M2

## 2024-10-09 DIAGNOSIS — E78.5 HYPERLIPIDEMIA, UNSPECIFIED HYPERLIPIDEMIA TYPE: ICD-10-CM

## 2024-10-09 DIAGNOSIS — B35.1 ONYCHOMYCOSIS: ICD-10-CM

## 2024-10-09 DIAGNOSIS — N40.0 BENIGN PROSTATIC HYPERPLASIA WITHOUT LOWER URINARY TRACT SYMPTOMS: ICD-10-CM

## 2024-10-09 DIAGNOSIS — Z79.899 ENCOUNTER FOR LONG-TERM (CURRENT) USE OF MEDICATIONS: ICD-10-CM

## 2024-10-09 DIAGNOSIS — M16.10 HIP ARTHRITIS: ICD-10-CM

## 2024-10-09 DIAGNOSIS — Z23 NEED FOR VACCINATION: Primary | ICD-10-CM

## 2024-10-09 DIAGNOSIS — N40.0 BENIGN PROSTATIC HYPERPLASIA, UNSPECIFIED WHETHER LOWER URINARY TRACT SYMPTOMS PRESENT: ICD-10-CM

## 2024-10-09 DIAGNOSIS — Z91.89 10 YEAR RISK OF MI OR STROKE 7.5% OR GREATER: ICD-10-CM

## 2024-10-09 DIAGNOSIS — Z12.11 SCREENING FOR COLON CANCER: ICD-10-CM

## 2024-10-09 DIAGNOSIS — Z12.5 SCREENING FOR PROSTATE CANCER: ICD-10-CM

## 2024-10-09 PROCEDURE — 1126F AMNT PAIN NOTED NONE PRSNT: CPT | Mod: HCNC,CPTII,S$GLB, | Performed by: FAMILY MEDICINE

## 2024-10-09 PROCEDURE — 99214 OFFICE O/P EST MOD 30 MIN: CPT | Mod: HCNC,S$GLB,, | Performed by: FAMILY MEDICINE

## 2024-10-09 PROCEDURE — 3075F SYST BP GE 130 - 139MM HG: CPT | Mod: HCNC,CPTII,S$GLB, | Performed by: FAMILY MEDICINE

## 2024-10-09 PROCEDURE — 99999 PR PBB SHADOW E&M-EST. PATIENT-LVL IV: CPT | Mod: PBBFAC,HCNC,, | Performed by: FAMILY MEDICINE

## 2024-10-09 PROCEDURE — 1101F PT FALLS ASSESS-DOCD LE1/YR: CPT | Mod: HCNC,CPTII,S$GLB, | Performed by: FAMILY MEDICINE

## 2024-10-09 PROCEDURE — 1159F MED LIST DOCD IN RCRD: CPT | Mod: HCNC,CPTII,S$GLB, | Performed by: FAMILY MEDICINE

## 2024-10-09 PROCEDURE — 3008F BODY MASS INDEX DOCD: CPT | Mod: HCNC,CPTII,S$GLB, | Performed by: FAMILY MEDICINE

## 2024-10-09 PROCEDURE — G2211 COMPLEX E/M VISIT ADD ON: HCPCS | Mod: HCNC,S$GLB,, | Performed by: FAMILY MEDICINE

## 2024-10-09 PROCEDURE — G0008 ADMIN INFLUENZA VIRUS VAC: HCPCS | Mod: HCNC,S$GLB,, | Performed by: FAMILY MEDICINE

## 2024-10-09 PROCEDURE — 90653 IIV ADJUVANT VACCINE IM: CPT | Mod: HCNC,S$GLB,, | Performed by: FAMILY MEDICINE

## 2024-10-09 PROCEDURE — 3079F DIAST BP 80-89 MM HG: CPT | Mod: HCNC,CPTII,S$GLB, | Performed by: FAMILY MEDICINE

## 2024-10-09 PROCEDURE — 3288F FALL RISK ASSESSMENT DOCD: CPT | Mod: HCNC,CPTII,S$GLB, | Performed by: FAMILY MEDICINE

## 2024-10-09 PROCEDURE — 3044F HG A1C LEVEL LT 7.0%: CPT | Mod: HCNC,CPTII,S$GLB, | Performed by: FAMILY MEDICINE

## 2024-10-09 NOTE — PROGRESS NOTES
Subjective:       Patient ID: Myles Pride is a 69 y.o. male.    Chief Complaint: Results (Here r/t lab results. )    HPI    Patient Active Problem List   Diagnosis    Bradycardia    Risk of myocardial infarction or stroke 7.5% or greater in next 10 years    Vitamin D deficiency    Colon cancer screening    Hyperlipidemia    Lumbar radiculopathy, chronic    Frequent PVCs    Lumbar stenosis with neurogenic claudication    ABLA (acute blood loss anemia)    S/P lumbar fusion    Decreased range of motion of lumbar spine    Decreased strength, endurance, and mobility    Arthritis of knee, left    Post-operative nausea and vomiting    Chronic pain of left knee    Knee stiffness, left    Weakness of left lower extremity    Effusion of left knee    BPH (benign prostatic hyperplasia)    Infection of total knee replacement       Past Medical History:   Diagnosis Date    Hyperlipidemia     PONV (postoperative nausea and vomiting) 1990    after knee arthroscopy    Vitamin D deficiency 12/18/2019       Past Surgical History:   Procedure Laterality Date    COLONOSCOPY      COLONOSCOPY N/A 12/19/2019    Procedure: COLONOSCOPY;  Surgeon: Bhupendra Wilkinson MD;  Location: [facility];  Service: Endoscopy;  Laterality: N/A;    EPIDURAL STEROID INJECTION N/A 12/09/2022    Procedure: L4-5 intralaminar epidural steroid injection with left paramedian approach;  Surgeon: Ben Eddy MD;  Location: [facility];  Service: Pain Management;  Laterality: N/A;    INCISION AND DRAINAGE OF KNEE Left 04/26/2024    Procedure: INCISION AND DRAINAGE, KNEE;  Surgeon: Ryan Woods MD;  Location: [facility];  Service: Orthopedics;  Laterality: Left;  poly exchange    KNEE ARTHROSCOPY Left     LUMBAR FUSION  09/2023    REPLACEMENT, POLYETHYLENE LINER Left 04/26/2024    Procedure: REPLACEMENT, POLYETHYLENE LINER;  Surgeon: Ryan Woods MD;  Location: [facility] OR;  Service: Orthopedics;  Laterality: Left;    SELECTIVE INJECTION OF  ANESTHETIC AGENT AROUND LUMBAR SPINAL NERVE ROOT BY TRANSFORAMINAL APPROACH Bilateral 01/21/2022    Procedure: Bilateral L4/5 TF LOVE with RN IV sedation;  Surgeon: Ben Eddy MD;  Location: [facility];  Service: Pain Management;  Laterality: Bilateral;    SELECTIVE INJECTION OF ANESTHETIC AGENT AROUND LUMBAR SPINAL NERVE ROOT BY TRANSFORAMINAL APPROACH Bilateral 05/06/2022    Procedure: Bilateral L4/5 TF LOVE with RN IV sedation;  Surgeon: Ben Eddy MD;  Location: [facility];  Service: Pain Management;  Laterality: Bilateral;    SELECTIVE INJECTION OF ANESTHETIC AGENT AROUND LUMBAR SPINAL NERVE ROOT BY TRANSFORAMINAL APPROACH Bilateral 10/25/2022    Procedure: Bilateral L4/5 TF LOVE with RN IV sedation;  Surgeon: Ben Eddy MD;  Location: [facility];  Service: Pain Management;  Laterality: Bilateral;    SPINE SURGERY  9.6.23    TLIF 3-4 & 4-5    SYNOVECTOMY OF KNEE Left 04/26/2024    Procedure: SYNOVECTOMY, KNEE;  Surgeon: Ryan Woods MD;  Location: [facility] OR;  Service: Orthopedics;  Laterality: Left;    TOTAL KNEE ARTHROPLASTY Left 02/20/2024    Procedure: ARTHROPLASTY, KNEE, TOTAL;  Surgeon: Ryan Woods MD;  Location: [facility] OR;  Service: Orthopedics;  Laterality: Left;    TRANSFORAMINAL LUMBAR INTERBODY FUSION (TLIF) USING COMPUTER-ASSISTED NAVIGATION Bilateral 09/06/2023    Procedure: FUSION, SPINE, LUMBAR, TLIF, USING COMPUTER-ASSISTED NAVIGATION;  Surgeon: Lyle Lobato MD;  Location: [facility] OR;  Service: Neurosurgery;  Laterality: Bilateral;  L3-5 TLIF    VASECTOMY  1995       Family History   Problem Relation Name Age of Onset    Cancer Mother Cynthia Pride         anal cancer    Diabetes Mother Cynthia Pride         PRE DIABETES    Arthritis Mother Cynthia Pride     Hearing loss Mother Cynthia Pride     No Known Problems Sister      Gallbladder disease Brother      Hepatitis Brother          Hep C       Social History     Tobacco Use   Smoking Status Never    Passive  exposure: Never   Smokeless Tobacco Never       Wt Readings from Last 5 Encounters:   10/09/24 110.7 kg (244 lb)   07/26/24 109 kg (240 lb 4.8 oz)   06/24/24 109 kg (240 lb 4.8 oz)   06/03/24 109 kg (240 lb 4.8 oz)   05/24/24 109.6 kg (241 lb 10 oz)     History of Present Illness    CHIEF COMPLAINT:  Patient presents today for follow up.    ORTHOPEDIC STATUS:  He reports doing well following total knee surgery revision. He experienced a post-surgical infection, which required additional intervention. He is currently taking augmentin for the infection and expects to continue for another month. He has scheduled follow-up visits with both orthopedics and infectious disease departments.    HIP ARTHRITIS:  He reports difficulty with hip arthritis, experiencing pain when getting out of a chair or lifting his leg, particularly when attempting to tie his shoes. He has limited flexibility and is unable to bend over to tie his shoes. He denies chronic pain but notes discomfort during specific movements. He mentions a previous recommendation for hip replacement by his ortho MD prior to his back surgery. Despite these issues, he reports improved walking ability and increased exercise tolerance, noting he can walk fine and ride a bike with only occasional twinges during the upswing. He acknowledges the arthritis is not currently debilitating enough to rush into surgery but is considering it for next year.    MEDICATIONS:  He reports discontinuing Flomax (tamsulosin) after a trial period off the medication, with no recurrence of symptoms. He confirms taking aspirin 81 mg daily, using Pinlac solution for toenail fungus treatment (limited benefit thus far), vitamin D supplementation, and pravastatin for cholesterol management.    RECENT LAB WORK:  Recent lab work shows normal results across multiple tests. Thyroid stimulating hormone, hemoglobin A1c, comprehensive metabolic panel (including liver, kidney, and electrolyte function),  and complete blood count were all within normal limits. PSA was normal at 1.8. Cholesterol levels were also reported to be within normal limits. Blood counts, which were previously low in May, have since normalized.    IMMUNIZATIONS AND INFECTIOUS DISEASE HISTORY:  He expresses interest in receiving the RSV vaccine. He reports having contracted COVID-19 in December 2023 and is considering getting the latest COVID-19 vaccine. He has a history of multiple COVID-19 vaccinations but is currently overdue for his next dose.         Objective:      Vitals:    10/09/24 0918   BP: 134/86   Pulse: 70   Resp: 18   Temp: 96.7 °F (35.9 °C)       Physical Exam  Constitutional:       General: He is not in acute distress.     Appearance: He is not ill-appearing.   Pulmonary:      Effort: Pulmonary effort is normal. No respiratory distress.   Neurological:      General: No focal deficit present.      Mental Status: He is alert.   Psychiatric:         Mood and Affect: Mood normal.         Behavior: Behavior normal.         Assessment:       1. Need for vaccination    2. Encounter for long-term (current) use of medications    3. Screening for prostate cancer    4. Benign prostatic hyperplasia without lower urinary tract symptoms    5. 10 year risk of MI or stroke 7.5% or greater    6. Hyperlipidemia, unspecified hyperlipidemia type    7. Screening for colon cancer    8. Onychomycosis    9. Hip arthritis    10. Benign prostatic hyperplasia, unspecified whether lower urinary tract symptoms present        Plan:     - Reviewed orthopedic status post knee revision surgery, noting ongoing antibiotic treatment for infection  - Assessed medication regimen, discontinuing Flomax due to patient's reported improvement without it  - Evaluated recent lab work,  - Considered hip arthritis symptoms, noting improved exercise capacity but persistent flexibility issues      OSTEOARTHRITIS:  - Explained that osteoarthritis is a degenerative condition  often associated with aging.  - Discussed the gradual wear and tear on joints over time, continue working w/ ortho and exercising    KNEE INFECTION:  - Continued amoxicillin for knee infection prevention for another month. F/u with ID as scheduled    TOENAIL FUNGUS:  - Continued penlac solution for toenail fungus.    HYPERLIPIDEMIA:  - Continued pravastatin for cholesterol management.  - Ordered lipid panel to be completed in April.    MEDICATIONS/SUPPLEMENTS:  - Continued aspirin 81 mg.  - Continued vitamin D.  - Discontinued Flomax (tamsulosin).    LABS:  - Ordered CMP, PSA to be completed in April as well    COLONOSCOPY:  - Ordered colonoscopy to be scheduled after December.      RSV VACCINATION:  - Ordered RSV vaccine to be administered by staff.  - Contact office to set up RSV vaccination with staff.    FOLLOW UP:  - Follow up after completing lab work in April.         This note was verbally dictated, please excuse any type errors.

## 2024-10-10 ENCOUNTER — HOSPITAL ENCOUNTER (OUTPATIENT)
Dept: PREADMISSION TESTING | Facility: HOSPITAL | Age: 69
Discharge: HOME | End: 2024-10-10
Attending: FAMILY MEDICINE
Payer: MEDICARE

## 2024-10-10 DIAGNOSIS — Z12.11 SCREENING FOR COLON CANCER: ICD-10-CM

## 2024-10-19 NOTE — PLAN OF CARE
Patient awake and alert in recovery. No distress noted.  
Patient prepared for procedure.   
30 year old male PMHx back surgery c/o back pain. PE unremarkable. no red flags. analgesics, PMD follow up

## 2024-11-21 ENCOUNTER — OFFICE VISIT (OUTPATIENT)
Dept: ORTHOPEDICS | Facility: CLINIC | Age: 69
End: 2024-11-21
Payer: MEDICARE

## 2024-11-21 ENCOUNTER — LAB VISIT (OUTPATIENT)
Dept: LAB | Facility: HOSPITAL | Age: 69
End: 2024-11-21
Attending: ORTHOPAEDIC SURGERY
Payer: MEDICARE

## 2024-11-21 VITALS — BODY MASS INDEX: 28.34 KG/M2 | HEIGHT: 77 IN | WEIGHT: 240 LBS

## 2024-11-21 DIAGNOSIS — M16.12 ARTHRITIS OF LEFT HIP: ICD-10-CM

## 2024-11-21 DIAGNOSIS — Z96.652 S/P REVISION OF TOTAL KNEE, LEFT: Primary | ICD-10-CM

## 2024-11-21 DIAGNOSIS — T84.53XD INFECTION ASSOCIATED WITH INTERNAL RIGHT KNEE PROSTHESIS, SUBSEQUENT ENCOUNTER: Primary | ICD-10-CM

## 2024-11-21 DIAGNOSIS — T84.53XD INFECTION ASSOCIATED WITH INTERNAL RIGHT KNEE PROSTHESIS, SUBSEQUENT ENCOUNTER: ICD-10-CM

## 2024-11-21 DIAGNOSIS — M16.11 ARTHRITIS OF RIGHT HIP: ICD-10-CM

## 2024-11-21 LAB
BASOPHILS # BLD AUTO: 0.06 K/UL (ref 0–0.2)
BASOPHILS NFR BLD: 1.1 % (ref 0–1.9)
CRP SERPL-MCNC: 1 MG/L (ref 0–8.2)
DIFFERENTIAL METHOD BLD: ABNORMAL
EOSINOPHIL # BLD AUTO: 0.3 K/UL (ref 0–0.5)
EOSINOPHIL NFR BLD: 5.7 % (ref 0–8)
ERYTHROCYTE [DISTWIDTH] IN BLOOD BY AUTOMATED COUNT: 13.9 % (ref 11.5–14.5)
ERYTHROCYTE [SEDIMENTATION RATE] IN BLOOD BY WESTERGREN METHOD: 2 MM/HR (ref 0–10)
HCT VFR BLD AUTO: 43.8 % (ref 40–54)
HGB BLD-MCNC: 14.5 G/DL (ref 14–18)
IMM GRANULOCYTES # BLD AUTO: 0.01 K/UL (ref 0–0.04)
IMM GRANULOCYTES NFR BLD AUTO: 0.2 % (ref 0–0.5)
LYMPHOCYTES # BLD AUTO: 1.8 K/UL (ref 1–4.8)
LYMPHOCYTES NFR BLD: 32.3 % (ref 18–48)
MCH RBC QN AUTO: 31.7 PG (ref 27–31)
MCHC RBC AUTO-ENTMCNC: 33.1 G/DL (ref 32–36)
MCV RBC AUTO: 96 FL (ref 82–98)
MONOCYTES # BLD AUTO: 0.5 K/UL (ref 0.3–1)
MONOCYTES NFR BLD: 9.1 % (ref 4–15)
NEUTROPHILS # BLD AUTO: 2.8 K/UL (ref 1.8–7.7)
NEUTROPHILS NFR BLD: 51.6 % (ref 38–73)
NRBC BLD-RTO: 0 /100 WBC
PLATELET # BLD AUTO: 188 K/UL (ref 150–450)
PMV BLD AUTO: 11.1 FL (ref 9.2–12.9)
RBC # BLD AUTO: 4.58 M/UL (ref 4.6–6.2)
WBC # BLD AUTO: 5.41 K/UL (ref 3.9–12.7)

## 2024-11-21 PROCEDURE — 86140 C-REACTIVE PROTEIN: CPT | Mod: HCNC | Performed by: ORTHOPAEDIC SURGERY

## 2024-11-21 PROCEDURE — 3288F FALL RISK ASSESSMENT DOCD: CPT | Mod: HCNC,CPTII,S$GLB, | Performed by: ORTHOPAEDIC SURGERY

## 2024-11-21 PROCEDURE — 1159F MED LIST DOCD IN RCRD: CPT | Mod: HCNC,CPTII,S$GLB, | Performed by: ORTHOPAEDIC SURGERY

## 2024-11-21 PROCEDURE — 3044F HG A1C LEVEL LT 7.0%: CPT | Mod: HCNC,CPTII,S$GLB, | Performed by: ORTHOPAEDIC SURGERY

## 2024-11-21 PROCEDURE — 85651 RBC SED RATE NONAUTOMATED: CPT | Mod: HCNC | Performed by: ORTHOPAEDIC SURGERY

## 2024-11-21 PROCEDURE — 36415 COLL VENOUS BLD VENIPUNCTURE: CPT | Mod: HCNC | Performed by: ORTHOPAEDIC SURGERY

## 2024-11-21 PROCEDURE — 85025 COMPLETE CBC W/AUTO DIFF WBC: CPT | Mod: HCNC | Performed by: ORTHOPAEDIC SURGERY

## 2024-11-21 PROCEDURE — 99999 PR PBB SHADOW E&M-EST. PATIENT-LVL III: CPT | Mod: PBBFAC,HCNC,, | Performed by: ORTHOPAEDIC SURGERY

## 2024-11-21 PROCEDURE — 3008F BODY MASS INDEX DOCD: CPT | Mod: HCNC,CPTII,S$GLB, | Performed by: ORTHOPAEDIC SURGERY

## 2024-11-21 PROCEDURE — 1101F PT FALLS ASSESS-DOCD LE1/YR: CPT | Mod: HCNC,CPTII,S$GLB, | Performed by: ORTHOPAEDIC SURGERY

## 2024-11-21 PROCEDURE — 99213 OFFICE O/P EST LOW 20 MIN: CPT | Mod: HCNC,S$GLB,, | Performed by: ORTHOPAEDIC SURGERY

## 2024-11-21 PROCEDURE — 1126F AMNT PAIN NOTED NONE PRSNT: CPT | Mod: HCNC,CPTII,S$GLB, | Performed by: ORTHOPAEDIC SURGERY

## 2024-11-21 NOTE — PROGRESS NOTES
Subjective:     Patient ID: Myles Pride is a 69 y.o. male.    Chief Complaint: Pain of the Left Knee      HPI:  06/02/2022  Patient with left knee severe pain today is total complaining also of the right hip.  He had previous x-rays in the electronic records.  He feels the right hip when he abducts his hip and try to get out of the car is seems to be very painful in the groin.  His knee on the left side does not bother him as much.  He does have severe back issues and we did order EMG and nerve conduction studies that show multilevel radiculopathy from L2, L4-L5 and S1 bilaterally.  He states he does get thigh anterior thigh pain.  I did tell him a could be from pinched nerves in his back.  He does see pain management for his back and injection seems to have helped a little bit.  He tries to maintain activities however now he said he cannot even hike 2 miles.  One mildly can walk but now is knee gives out on him on the left and his hip hurts on the right.  He did know if the injection given to him in February in the left knee helped or not because also received injections in his back.  No loss of bowel bladder control blurry vision double vision or loss sense smell or taste or fever or chills    As far as the gabapentin we discussed that in detail today he does not take it as much bit I did tell him for it to work he needs to be on it constantly.  He was prescribed that by pain management to take 300 mg at night for we can go up to 600 mg at night after that and a for a week and then go up to 900 at night.  I did tell him I will probably stop at 600 if you doing well with that.  Will take roughly 3 months for the gabapentin to work well and if he does not take it constantly there is no help for it.  If he decides to do surgery afterwards he needs to be on gabapentin to help with nerve pain    12/01/2022   X-rays obtained today of his hips and the knees.  He is scheduled to receive injections in his back in a  week with pain management Dr. Eddy  .  He said his right hip seems to be constantly aching him.  Last time we saw him we discussed that usually we do hip surgery 1st and then knee surgery after that in joint replacement.  However he does have quite a bit of range of motion left in his hips and the knee is the 1 that is concerning him on specially on the left side.  His pain level is 7/10 sometimes.  He is able to manage in trying to be very active he does water exercise programs.  He did have a lumbar injection around 5 weeks ago but now he is going for a 2nd injection.  He feels the right hip is the 1 that aches the most of both hips and his left knee.  He knows he has arthritis in both knees and both hips.  Also has issues in his back and stenosis.    No fever no chills no shortness of breath or difficulty with chewing swallowing loss of bowel bladder control blurry vision vision or double vision loss sense smell or taste    01/12/2024   Bilateral knee arthritis with the left knee severely deformed.  Also he had mild pain in both of his hips with the right a little bit worse than the left.  Last time seen he had severe spinal stenosis and multilevel radiculopathy.  Since then he had undergone lumbar decompression and fusion by Dr. Lobato  .  He is doing well with his back.  The numbness tingling down the legs subsided some.  He has not having much of any pain he says it is 0/10 when he sitting down is just his knees give out with gait in when he walks distances the pain goes up to around 5 out of 10.  The left knee is the 1 that bothers him the most the hips he can not live with the stated.  I did discuss that if he has loss of motion in the hip will vent him from doing well with the total knee.  He said he has good motion he ambulates without assistive devices.  He does have a brace that he places on the left knee to give him some stability.  He had been on different NSAIDs and gabapentin and multiple treatments  including injections in the back and in the knees by pain management.  You would like to have his left total knee replacement done  Spent a long time with him showed him model how it is performed went over the pros and cons and the instructions in details and reviewed his recent x-rays of the lumbar spine and hips as well as the knees    04/25/2024   Left TKA 02/20/2024 was doing well however apparently there was some sutures coming from underneath were pulled by physical therapy.  Did not go to do any dental work.  Eventually had erythema and inflammation and was treated with antibiotics by mouth.  He started to have some fever was taking Tylenol to it.  We obtained sed rate and CRP and the CRP was above 100 and the sed rate above 30 highly suspicious of infected knee.  His knee is swollen.  I had him come in today for aspiration.  Examination revealed large effusion and we aspirated 50 cc of blood tinged fluid today.  I did discuss with him DAIR (debridement antibiotics implant retention with poly exchange) versus 2 stage reimplantation.  You he is only 2 weeks into this and some people and studies have shown double DAIR  procedure sometimes work also  No fever no chills no shortness of breath.  I did tell him that we need to get on top of it quickly to help save the knee.  If the infection goes into the blood stream it becomes a problem.  He probably will be on IV antibiotics for 4-6 weeks and chronic suppressive antibiotics.  Since he has been on antibiotics we will send the aspirate to micro [facility] DX and I described that to him.    07/26/2024   Left TKA 02/20/2024   Left TKA DA IR 04/26/2024  Patient grew  finegoldia using micro Nelida DX.  He is under the care of Dr. Ng.  He received IV antibiotics and now he is on Augmentin 875 twice a day for 6 months as prophylactic antibiotics.  Patient stated he has 0 pain he is very pleased with the results.  He finished his physical therapy 3 weeks ago be now he is  doing walking treadmill swimming biking but no jogging and I told him I do not like him to do any jogging which will cause more stress on his total knee.  He is very pleased with his results.  He is actually gained leave town now to go visit his children out of state which I see no reason why not.  He has no fever no chills no shortness of breath or difficulty with chewing swallowing loss of bowel bladder control blurry vision double vision loss sense smell or taste    11/21/2024   Left TKA DA IR 04/26/2024   Left TKA 02/20/2024  He has 0/10 pain he is happy with the results.  He is taking Augmentin per Dr. Ng Infectious Disease.  He has 1 more month to go and you have an appointment with him.  I went over the CRP with the patient and how high it was above 100 when we 1st started and now it is normal over the last couple times last 1 in May.  I did tell him we need to obtain 1 more before he sees Dr. Ng hopefully that will be the end of the antibiotics   As far as both hips is concerned he does walk around 3 miles a day with his wife he does have limited motion difficulty putting shoes and socks on specially on the right side.  We went over the x-rays from 2022 that showed arthritis in both hips.  He said he is still lost some motion but not as much  No fever no chills no shortness breath or difficulty with chewing swallowing loss of bowel bladder control  Past Medical History:   Diagnosis Date    Hyperlipidemia     PONV (postoperative nausea and vomiting) 1990    after knee arthroscopy    Vitamin D deficiency 12/18/2019     Past Surgical History:   Procedure Laterality Date    COLONOSCOPY      COLONOSCOPY N/A 12/19/2019    Procedure: COLONOSCOPY;  Surgeon: Bhupendra Wilkinson MD;  Location: [facility];  Service: Endoscopy;  Laterality: N/A;    EPIDURAL STEROID INJECTION N/A 12/09/2022    Procedure: L4-5 intralaminar epidural steroid injection with left paramedian approach;  Surgeon: Ben Eddy MD;  Location:  [facility];  Service: Pain Management;  Laterality: N/A;    INCISION AND DRAINAGE OF KNEE Left 04/26/2024    Procedure: INCISION AND DRAINAGE, KNEE;  Surgeon: yRan Woods MD;  Location: [facility] OR;  Service: Orthopedics;  Laterality: Left;  poly exchange    KNEE ARTHROSCOPY Left     LUMBAR FUSION  09/2023    REPLACEMENT, POLYETHYLENE LINER Left 04/26/2024    Procedure: REPLACEMENT, POLYETHYLENE LINER;  Surgeon: Ryan Woods MD;  Location: [facility] OR;  Service: Orthopedics;  Laterality: Left;    SELECTIVE INJECTION OF ANESTHETIC AGENT AROUND LUMBAR SPINAL NERVE ROOT BY TRANSFORAMINAL APPROACH Bilateral 01/21/2022    Procedure: Bilateral L4/5 TF LOVE with RN IV sedation;  Surgeon: Ben Eddy MD;  Location: [facility];  Service: Pain Management;  Laterality: Bilateral;    SELECTIVE INJECTION OF ANESTHETIC AGENT AROUND LUMBAR SPINAL NERVE ROOT BY TRANSFORAMINAL APPROACH Bilateral 05/06/2022    Procedure: Bilateral L4/5 TF LOVE with RN IV sedation;  Surgeon: Ben Eddy MD;  Location: [facility];  Service: Pain Management;  Laterality: Bilateral;    SELECTIVE INJECTION OF ANESTHETIC AGENT AROUND LUMBAR SPINAL NERVE ROOT BY TRANSFORAMINAL APPROACH Bilateral 10/25/2022    Procedure: Bilateral L4/5 TF LOVE with RN IV sedation;  Surgeon: Ben Eddy MD;  Location: [facility];  Service: Pain Management;  Laterality: Bilateral;    SPINE SURGERY  9.6.23    TLIF 3-4 & 4-5    SYNOVECTOMY OF KNEE Left 04/26/2024    Procedure: SYNOVECTOMY, KNEE;  Surgeon: Ryan Woods MD;  Location: [facility] OR;  Service: Orthopedics;  Laterality: Left;    TOTAL KNEE ARTHROPLASTY Left 02/20/2024    Procedure: ARTHROPLASTY, KNEE, TOTAL;  Surgeon: Ryan Woods MD;  Location: [facility] OR;  Service: Orthopedics;  Laterality: Left;    TRANSFORAMINAL LUMBAR INTERBODY FUSION (TLIF) USING COMPUTER-ASSISTED NAVIGATION Bilateral 09/06/2023    Procedure: FUSION, SPINE, LUMBAR, TLIF, USING COMPUTER-ASSISTED NAVIGATION;   Surgeon: Lyle Lobato MD;  Location: [facility];  Service: Neurosurgery;  Laterality: Bilateral;  L3-5 TLIF    VASECTOMY  1995     Family History   Problem Relation Name Age of Onset    Cancer Mother Cynthia Pride         anal cancer    Diabetes Mother Cynthia Pride         PRE DIABETES    Arthritis Mother Cynthia Pride     Hearing loss Mother Cynthia Pride     No Known Problems Sister      Gallbladder disease Brother      Hepatitis Brother          Hep C     Social History     Socioeconomic History    Marital status:    Tobacco Use    Smoking status: Never     Passive exposure: Never    Smokeless tobacco: Never   Substance and Sexual Activity    Alcohol use: Yes     Alcohol/week: 3.0 standard drinks of alcohol     Types: 3 Glasses of wine per week     Comment: occ    Drug use: Never    Sexual activity: Yes     Partners: Female     Birth control/protection: Partner-Vasectomy, None     Social Drivers of Health     Financial Resource Strain: Low Risk  (4/27/2024)    Overall Financial Resource Strain (CARDIA)     Difficulty of Paying Living Expenses: Not hard at all   Food Insecurity: No Food Insecurity (4/27/2024)    Hunger Vital Sign     Worried About Running Out of Food in the Last Year: Never true     Ran Out of Food in the Last Year: Never true   Transportation Needs: No Transportation Needs (4/27/2024)    PRAPARE - Transportation     Lack of Transportation (Medical): No     Lack of Transportation (Non-Medical): No   Physical Activity: Sufficiently Active (1/29/2024)    Exercise Vital Sign     Days of Exercise per Week: 5 days     Minutes of Exercise per Session: 30 min   Recent Concern: Physical Activity - Insufficiently Active (1/29/2024)    Exercise Vital Sign     Days of Exercise per Week: 4 days     Minutes of Exercise per Session: 20 min   Stress: No Stress Concern Present (4/27/2024)    [employer] [employer] - Occupational Stress Questionnaire     Feeling of Stress : Not at all    Housing Stability: Low Risk  (4/27/2024)    Housing Stability Vital Sign     Unable to Pay for Housing in the Last Year: No     Homeless in the Last Year: No     Medication List with Changes/Refills   Current Medications    AMOXICILLIN-CLAVULANATE 875-125MG (AUGMENTIN) 875-125 MG PER TABLET    Take 1 tablet by mouth 2 (two) times daily.    ASPIRIN 81 MG CHEW    Take 81 mg by mouth once daily.    CICLOPIROX (PENLAC) 8 % SOLN    Apply topically nightly.    ERGOCALCIFEROL, VITAMIN D2, (VITAMIN D ORAL)    Take by mouth once daily.    PRAVASTATIN (PRAVACHOL) 40 MG TABLET    Take 1 tablet (40 mg total) by mouth once daily.    RSV, PREF A AND PREF B,PF, (ABRYSVO, PF,) 120 MCG/0.5 ML SOLR VACCINE    Inject 0.5 mLs (120 mcg total) into the muscle.     Review of patient's allergies indicates:  No Known Allergies  Review of Systems   Constitutional: Negative for decreased appetite.   HENT:  Negative for tinnitus.    Eyes:  Negative for double vision.   Cardiovascular:  Negative for chest pain.   Respiratory:  Negative for wheezing.    Hematologic/Lymphatic: Negative for bleeding problem.   Skin:  Negative for dry skin.   Musculoskeletal:  Positive for arthritis, back pain and stiffness. Negative for gout, joint pain, joint swelling and neck pain.   Gastrointestinal:  Negative for abdominal pain.   Genitourinary:  Negative for bladder incontinence.   Neurological:  Negative for numbness, paresthesias and sensory change.   Psychiatric/Behavioral:  Negative for altered mental status.        Objective:   Body mass index is 28.46 kg/m².  There were no vitals filed for this visit.       General    Constitutional: He is oriented to person, place, and time. He appears well-developed.   HENT:   Head: Atraumatic.   Eyes: EOM are normal.   Pulmonary/Chest: Effort normal.   Neurological: He is alert and oriented to person, place, and time.   Psychiatric: Judgment normal.           Ambulating without any assistive devices  Lumbar without  true deformity.  Negative straight leg raising bilaterally.  Pelvis is level  Right hip internal rotation 15° external rotation 20° at 90° of flexion.  Abduction of the hip to 30° and internal rotation yet severe pain in the groin.  No flexion contracture.  Right hip flexors and abductors are 5/5 however he has quad atrophy  Left hip internal rotation 15° external rotation 30° at 90° of flexion.  Abduction to 30° and internal rotation with very mild pain in the groin.  No flexion contractures.  Hip flexors and abductors are 5/5 however is also he has quad atrophy  Right knee range of motion 0-120 degrees.  Very mild crepitus to compression on the patella.  No swelling no defect in the patella tendon or quadriceps tendon.  There is definitely quadriceps atrophy  Left knee preop with severe varus deformity.  There is quite a bit of loosening lateral collateral.  Range of motion is 5-100°.  No defect in the patella or quadriceps tendon.  There is quad atrophy also.  There is crepitus to compression on the patella as well as pain medially.  Left knee postop TKA midline incision healed well.  No swelling, not warm to touch range of motion 0-130 degrees and stable in extension and flexion no defect in the patella or quadriceps tendon   Calves are soft nontender  Ankle motion intact  Skin is warm to touch no obvious lesions  Capillary refill less than 2 seconds    Relevant imaging results reviewed and interpreted by me, discussed with the patient and / or family today   X-ray left knee TKA in excellent alignment  X-ray 01/12/2024 bilateral knees with the left knee severe varus deformity complete loss medial joint space marginal osteophytic changes large posterior osteophytes as well as anterior.  The right knee has also degenerative changes with varus deformity but not as severe as the left knee.  No fracture seen  There is x-ray from November 2023 lumbar spine showing 3 level fusion with hardware  X-ray 12/01/2022 of the  pelvis showing right hip with loss of joint space on the lateral aspect with large osteophytes of the acetabulum and superiorly and inferiorly with a small osteophyte on the femoral heads bilaterally.  Same findings on the left hip   X-ray of both knees showing left knee with complete loss of medial joint space with marginal osteophytic changes and severe varus deformity.  The right knee has moderately severe varus deformity and loss of medial joint space and marginal osteophytic changes  X-rays in the electronic records reviewed today in details  X-ray of the pelvis showing right hip with marginal osteophytic changes on the acetabulum with loss of joint space consistent with arthritis.  Left hip has some marginal osteophytes on the acetabulum and the hip joint also consistent with mild arthritis  X-ray of the left knee with complete loss of medial joint space.  Marginal osteophytic changes severe varus deformity.  Radiculopathy  EMG and nerve conduction study showing L2, L4, L5 and S1 radiculopathy      Above 100 CRP, sed rate in the 30s, with history of fever and cellulitis on antibiotics over the last couple weeks.  Assessment:     Encounter Diagnoses   Name Primary?    S/P revision of total knee, left Yes    Infection associated with internal right knee prosthesis, subsequent encounter     Arthritis of right hip     Arthritis of left hip         Plan:   S/P revision of total knee, left    Infection associated with internal right knee prosthesis, subsequent encounter    Arthritis of right hip    Arthritis of left hip         Patient Instructions   Doing extremely good with the left total knee   You are still taking the Augmentin as prescribed by Dr. Ng and he wants you on it 1 more month   We went over your CRP which is the indicator of infection and you been running very low and normal at this time   I will have you obtain a repeat CRP on you way out today because you have an appointment with Dr. Ng  coming up in few weeks  You doing great I will see you at 1 year follow-up    Disclaimer: This note was prepared using a voice recognition system and is likely to have sound alike errors within the text.

## 2024-11-21 NOTE — PATIENT INSTRUCTIONS
Doing extremely good with the left total knee   You are still taking the Augmentin as prescribed by Dr. Ng and he wants you on it 1 more month   We went over your CRP which is the indicator of infection and you been running very low and normal at this time   I will have you obtain a repeat CRP on you way out today because you have an appointment with Dr. Ng coming up in few weeks  You doing great I will see you at 1 year follow-up

## 2024-12-02 ENCOUNTER — PATIENT MESSAGE (OUTPATIENT)
Dept: ADMINISTRATIVE | Facility: OTHER | Age: 69
End: 2024-12-02
Payer: MEDICARE

## 2024-12-03 ENCOUNTER — OFFICE VISIT (OUTPATIENT)
Dept: INFECTIOUS DISEASES | Facility: CLINIC | Age: 69
End: 2024-12-03
Payer: MEDICARE

## 2024-12-03 VITALS
HEIGHT: 77 IN | DIASTOLIC BLOOD PRESSURE: 72 MMHG | WEIGHT: 243.63 LBS | BODY MASS INDEX: 28.77 KG/M2 | OXYGEN SATURATION: 98 % | SYSTOLIC BLOOD PRESSURE: 143 MMHG | RESPIRATION RATE: 18 BRPM | HEART RATE: 70 BPM

## 2024-12-03 DIAGNOSIS — N40.0 BENIGN PROSTATIC HYPERPLASIA, UNSPECIFIED WHETHER LOWER URINARY TRACT SYMPTOMS PRESENT: ICD-10-CM

## 2024-12-03 DIAGNOSIS — E78.2 MIXED HYPERLIPIDEMIA: ICD-10-CM

## 2024-12-03 DIAGNOSIS — T84.59XD INFECTION OF TOTAL KNEE REPLACEMENT, SUBSEQUENT ENCOUNTER: Primary | ICD-10-CM

## 2024-12-03 DIAGNOSIS — Z96.659 INFECTION OF TOTAL KNEE REPLACEMENT, SUBSEQUENT ENCOUNTER: Primary | ICD-10-CM

## 2024-12-03 PROCEDURE — 1126F AMNT PAIN NOTED NONE PRSNT: CPT | Mod: CPTII,S$GLB,, | Performed by: STUDENT IN AN ORGANIZED HEALTH CARE EDUCATION/TRAINING PROGRAM

## 2024-12-03 PROCEDURE — 3044F HG A1C LEVEL LT 7.0%: CPT | Mod: CPTII,S$GLB,, | Performed by: STUDENT IN AN ORGANIZED HEALTH CARE EDUCATION/TRAINING PROGRAM

## 2024-12-03 PROCEDURE — 3077F SYST BP >= 140 MM HG: CPT | Mod: CPTII,S$GLB,, | Performed by: STUDENT IN AN ORGANIZED HEALTH CARE EDUCATION/TRAINING PROGRAM

## 2024-12-03 PROCEDURE — 1101F PT FALLS ASSESS-DOCD LE1/YR: CPT | Mod: CPTII,S$GLB,, | Performed by: STUDENT IN AN ORGANIZED HEALTH CARE EDUCATION/TRAINING PROGRAM

## 2024-12-03 PROCEDURE — 3288F FALL RISK ASSESSMENT DOCD: CPT | Mod: CPTII,S$GLB,, | Performed by: STUDENT IN AN ORGANIZED HEALTH CARE EDUCATION/TRAINING PROGRAM

## 2024-12-03 PROCEDURE — 3078F DIAST BP <80 MM HG: CPT | Mod: CPTII,S$GLB,, | Performed by: STUDENT IN AN ORGANIZED HEALTH CARE EDUCATION/TRAINING PROGRAM

## 2024-12-03 PROCEDURE — 99214 OFFICE O/P EST MOD 30 MIN: CPT | Mod: S$GLB,,, | Performed by: STUDENT IN AN ORGANIZED HEALTH CARE EDUCATION/TRAINING PROGRAM

## 2024-12-03 PROCEDURE — 99999 PR PBB SHADOW E&M-EST. PATIENT-LVL III: CPT | Mod: PBBFAC,,, | Performed by: STUDENT IN AN ORGANIZED HEALTH CARE EDUCATION/TRAINING PROGRAM

## 2024-12-03 PROCEDURE — 3008F BODY MASS INDEX DOCD: CPT | Mod: CPTII,S$GLB,, | Performed by: STUDENT IN AN ORGANIZED HEALTH CARE EDUCATION/TRAINING PROGRAM

## 2024-12-03 PROCEDURE — 1159F MED LIST DOCD IN RCRD: CPT | Mod: CPTII,S$GLB,, | Performed by: STUDENT IN AN ORGANIZED HEALTH CARE EDUCATION/TRAINING PROGRAM

## 2024-12-03 NOTE — PROGRESS NOTES
Infectious Disease Clinic Note    Patient Name: Myles Pride  YOB: 1955    PRESENTING HISTORY       History of Present Illness:  Mr. Myles Pride is a 69 y.o. male w/ significant PMHx of left TKA 2/20/24 found to have post op inflammation and expulsion of sutures. Began spiking fevers and was placed on PO antibiotics. CRP and ESR elevated. 50 cc were aspirated from left knee and showed 23K WBC while on antibiotics. 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 Patient now taken for debridement with implant retention and poly exchange on 4/26/24. Cultures in process and waiting for Microgen DX. 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 Cultures from OR with no growth. S/p arthrocentesis followed by revision with poly exchange and retention on 4/26/24 with Dr. Woods. For now recommend continuing empiric IV daptomycin and ceftriaxone along with PO metronidazole to target Finegoldia isolate.      5/24: OR cultures finalized no growth. MicroGen again with only Finegoldia. Surprising as this would not be expected as a virulent pathogen. Patient tolerating Flagyl and daptomycin. Labs have remained WNL aside from increasing CK. Denies tendonitis today. Discussed micro results. Continues home PT with success. More ROM and less pain. Steri strips in place. Will ensure he has antibiotic prophylaxis prior to dental procedures including cleaning. Next cleaning is 6/5. Will see him before that and send in chronic suppression with Augmentin. Will ask [facility] infusion to stop ceftriaxone.      6/3: Patient and wife concerned that sutures may be visible though op site again. Have fear from last episode. Photos taken for ortho review. Would like CK rechecked today. Should have resolved since stopping daptomycin. Will begin chronic suppression with Augmentin the next 6 months. Can reach out with any concerns before then. Cleared for dental cleaning 6/5.     12/3: CRP has remained WNL. Per  last ortho visit this is good indication infection is absent at this time. Has completed 6 months of Augmentin as chronic suppression. Will stop and monitor. Patient advised to ask dentist for pre cleaning Augmentin to prevent translocation of oral elzbieta. If he does not hear from them he can reach out to me. Voiced understanding.               Review of Systems:  Constitutional: no fever or chills  Eyes: no visual changes  ENT: no nasal congestion or sore throat  Respiratory: no cough or shortness of breath  Cardiovascular: no chest pain  Gastrointestinal: no nausea or vomiting, no abdominal pain, no constipation, no diarrhea  Genitourinary: no hematuria or dysuria  Musculoskeletal: no arthralgias or myalgias  Skin: no rash  Neurological: no headaches, numbness, or paresthesias    The following portions of the patient's history were reviewed and updated as appropriate: allergies, current medications, past family history, past medical history, past social history, past surgical history, and problem list.    PAST HISTORY:     Immunization History   Administered Date(s) Administered    COVID-19 MRNA, LN-S PF (MODERNA HALF 0.25 ML DOSE) 11/04/2021, 06/01/2022    COVID-19, MRNA, LN-S, PF (Pfizer) (Purple Cap) 10/07/2022    COVID-19, mRNA, LNP-S, bivalent booster, PF (Moderna Omicron)12 + YEARS 10/07/2022    COVID-19, vector-nr, rS-Ad26, PF (Maana Mobile) 03/10/2021    Influenza (FLUAD) - Quadrivalent - Adjuvanted - PF *Preferred* (65+) 09/27/2021, 10/03/2022    Influenza - Quadrivalent - PF *Preferred* (6 months and older) 10/09/2015, 11/10/2017, 05/06/2018, 11/28/2018, 02/28/2020, 09/23/2020    Influenza - Trivalent - Fluad - Adjuvanted - PF (65 years and older 10/09/2024    Pneumococcal Polysaccharide - 23 Valent 10/01/2021    Rsv, Bivalent, Rsvpref (Abrysvo) 10/31/2024    Tdap 08/10/2015, 04/15/2023    Zoster Recombinant 08/15/2018, 03/15/2019       Past Medical History:   Diagnosis Date    Hyperlipidemia     PONV  (postoperative nausea and vomiting) 1990    after knee arthroscopy    Vitamin D deficiency 12/18/2019       Past Surgical History:   Procedure Laterality Date    COLONOSCOPY      COLONOSCOPY N/A 12/19/2019    Procedure: COLONOSCOPY;  Surgeon: Bhupendra Wilkinson MD;  Location: [facility] ENDO;  Service: Endoscopy;  Laterality: N/A;    EPIDURAL STEROID INJECTION N/A 12/09/2022    Procedure: L4-5 intralaminar epidural steroid injection with left paramedian approach;  Surgeon: Ben Eddy MD;  Location: [facility] PAIN MGT;  Service: Pain Management;  Laterality: N/A;    INCISION AND DRAINAGE OF KNEE Left 04/26/2024    Procedure: INCISION AND DRAINAGE, KNEE;  Surgeon: Ryan Woods MD;  Location: [facility] OR;  Service: Orthopedics;  Laterality: Left;  poly exchange    KNEE ARTHROSCOPY Left     LUMBAR FUSION  09/2023    REPLACEMENT, POLYETHYLENE LINER Left 04/26/2024    Procedure: REPLACEMENT, POLYETHYLENE LINER;  Surgeon: Ryan Woods MD;  Location: [facility] OR;  Service: Orthopedics;  Laterality: Left;    SELECTIVE INJECTION OF ANESTHETIC AGENT AROUND LUMBAR SPINAL NERVE ROOT BY TRANSFORAMINAL APPROACH Bilateral 01/21/2022    Procedure: Bilateral L4/5 TF LOVE with RN IV sedation;  Surgeon: Ben Eddy MD;  Location: [facility] PAIN MGT;  Service: Pain Management;  Laterality: Bilateral;    SELECTIVE INJECTION OF ANESTHETIC AGENT AROUND LUMBAR SPINAL NERVE ROOT BY TRANSFORAMINAL APPROACH Bilateral 05/06/2022    Procedure: Bilateral L4/5 TF LOVE with RN IV sedation;  Surgeon: Ben Eddy MD;  Location: [facility] PAIN MGT;  Service: Pain Management;  Laterality: Bilateral;    SELECTIVE INJECTION OF ANESTHETIC AGENT AROUND LUMBAR SPINAL NERVE ROOT BY TRANSFORAMINAL APPROACH Bilateral 10/25/2022    Procedure: Bilateral L4/5 TF LOVE with RN IV sedation;  Surgeon: Ben Eddy MD;  Location: [facility] PAIN MGT;  Service: Pain Management;  Laterality: Bilateral;    SPINE SURGERY  9.6.23    TLIF 3-4 & 4-5    SYNOVECTOMY OF KNEE Left 04/26/2024     Procedure: SYNOVECTOMY, KNEE;  Surgeon: Ryan Woods MD;  Location: [facility] OR;  Service: Orthopedics;  Laterality: Left;    TOTAL KNEE ARTHROPLASTY Left 02/20/2024    Procedure: ARTHROPLASTY, KNEE, TOTAL;  Surgeon: Ryan Woods MD;  Location: [facility] OR;  Service: Orthopedics;  Laterality: Left;    TRANSFORAMINAL LUMBAR INTERBODY FUSION (TLIF) USING COMPUTER-ASSISTED NAVIGATION Bilateral 09/06/2023    Procedure: FUSION, SPINE, LUMBAR, TLIF, USING COMPUTER-ASSISTED NAVIGATION;  Surgeon: Lyle Lobato MD;  Location: [facility] OR;  Service: Neurosurgery;  Laterality: Bilateral;  L3-5 TLIF    VASECTOMY  1995       Family History   Problem Relation Name Age of Onset    Cancer Mother Cynthia Pride         anal cancer    Diabetes Mother Cynthia Pride         PRE DIABETES    Arthritis Mother Cynthia Pride     Hearing loss Mother Cynthia Pride     No Known Problems Sister      Gallbladder disease Brother      Hepatitis Brother          Hep C       Social History     Socioeconomic History    Marital status:    Tobacco Use    Smoking status: Never     Passive exposure: Never    Smokeless tobacco: Never   Substance and Sexual Activity    Alcohol use: Yes     Alcohol/week: 3.0 standard drinks of alcohol     Types: 3 Glasses of wine per week     Comment: occ    Drug use: Never    Sexual activity: Yes     Partners: Female     Birth control/protection: Partner-Vasectomy, None     Social Drivers of Health     Financial Resource Strain: Low Risk  (4/27/2024)    Overall Financial Resource Strain (CARDIA)     Difficulty of Paying Living Expenses: Not hard at all   Food Insecurity: No Food Insecurity (4/27/2024)    Hunger Vital Sign     Worried About Running Out of Food in the Last Year: Never true     Ran Out of Food in the Last Year: Never true   Transportation Needs: No Transportation Needs (4/27/2024)    PRAPARE - Transportation     Lack of Transportation (Medical): No     Lack of Transportation (Non-Medical): No  "  Physical Activity: Sufficiently Active (1/29/2024)    Exercise Vital Sign     Days of Exercise per Week: 5 days     Minutes of Exercise per Session: 30 min   Recent Concern: Physical Activity - Insufficiently Active (1/29/2024)    Exercise Vital Sign     Days of Exercise per Week: 4 days     Minutes of Exercise per Session: 20 min   Stress: No Stress Concern Present (4/27/2024)    [employer] - Occupational Stress Questionnaire     Feeling of Stress : Not at all   Housing Stability: Low Risk  (4/27/2024)    Housing Stability Vital Sign     Unable to Pay for Housing in the Last Year: No     Homeless in the Last Year: No       MEDICATIONS & ALLERGIES:     Current Outpatient Medications on File Prior to Visit   Medication Sig    aspirin 81 MG Chew Take 81 mg by mouth once daily.    ciclopirox (PENLAC) 8 % Soln Apply topically nightly.    ergocalciferol, vitamin D2, (VITAMIN D ORAL) Take by mouth once daily.    pravastatin (PRAVACHOL) 40 MG tablet Take 1 tablet (40 mg total) by mouth once daily.    RSV, preF A and preF B,PF, (ABRYSVO, PF,) 120 mcg/0.5 mL SolR vaccine Inject 0.5 mLs (120 mcg total) into the muscle.     No current facility-administered medications on file prior to visit.       Review of patient's allergies indicates:  No Known Allergies    OBJECTIVE:   Vital Signs:  Vitals:    12/03/24 0858   BP: (!) 143/72   Pulse: 70   Resp: 18   SpO2: 98%   Weight: 110.5 kg (243 lb 9.7 oz)   Height: 6' 5" (1.956 m)       No results found for this or any previous visit (from the past 24 hours).      Physical Exam:   General:  Well developed, well nourished, no acute distress  HEENT:  Normocephalic, atraumatic  CVS:  RRR, S1 and S2 normal, no murmurs, rubs, gallops  Resp:  Lungs clear to auscultation, no wheezes, rales, rhonchi  MSK:  No muscle atrophy, peripheral edema, full range of motion  Skin:  No rashes, ulcers, erythema  Psych:  Alert and oriented to person, place, and " time    ASSESSMENT:     Hyperlipidemia  Continue lipid control and follow up with PCP      BPH (benign prostatic hyperplasia)  Follow up with urology; continue indicated medications      Infection of total knee replacement  --Patient underwent initial left TKA in Feb 2024   --S/p arthrocentesis followed by revision with poly exchange and retention on 4/26/24 with Dr. 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    Diagnoses and all orders for this visit:    Infection of total knee replacement, subsequent encounter    Mixed hyperlipidemia    Benign prostatic hyperplasia, unspecified whether lower urinary tract symptoms present          The total time for evaluation and management services performed on 12/3/24 was greater than 30 minutes.        Chester Ng, DO   Infectious Diseases

## 2024-12-05 NOTE — PLAN OF CARE
Psychiatry Outpatient Follow Up Progress Note    Reason for Visit or Chief Complaint: Patient was last seen in March of 2021 in regards to attention deficit, major depression.     This visit was performed via live two-way video with patient's verbal consent.   Clinician Location: Clinic.  Patient Location: Home.    Kristel is in [place] and identity has been established.     She was informed that consent to treat includes permission to submit charges to the applicable insurance on file. Kristel was advised regarding the potential risk inherent in video visits, as the assessment may be limited due to what can be seen on the screen which potentially results in an incomplete assessment; as well as either of us may discontinue the video visit if it is.      I spent a total of 25 minutes on the day of the visit.  This includes chart review, documenting and medical and medication management..     History:     At the last visit the patient was kept on Vyvanse, lamotrigine was initiated, discontinued olanzapine, stressed the need for mental health counseling.    There were several calls since the last visit with the patient convince that the lamotrigine was causing appetite loss and dizziness.  I encouraged them to split up the administration of medications.  The patient historically has often had a number of different somatic complaints that lead to her discontinuing the medication.  She claims at this point that she still compliant on lamotrigine and appears to be taking it consistently and tolerating it reasonably well.  The patient unfortunately was a no-show for a counseling intake.  This is unfortunately a longstanding trend of relative treatment noncompliance weather is been with [facility] or the [facility] in the past.  I am concerned that there often is not a consistent commitment towards mental health treatment and a tendency towards looking at medications to solve issues.  She is tolerating the  DISCHARGE INSTRUCTIONS REVIEWED WITH PATIENT AND FAMILY AT BEDSIDE WITH VERBALIZATION OF UNDERSTANDING.  SALINE LOCK REMOVED FROM LEFT HAND WITH PRESSURE DRESSING APPLIED. MEDICATION PICKED UP FROM [facility] BY HIS SPOUSE. PATIENT TRANSPORTED TO VEHICLE PER W/C ACCOMPANIED BY STAFF.    lamotrigine thus far and has had no rash or other side effects.  She is still on the Depo-Provera shot and has questions in the past with this might be affecting her mood.  Given high reactivity of her mood I am still concerned that this is likely issues in regards to personality, temperament, immaturity, environment.  We did discuss the time scale to give lamotrigine a reasonable chance to treat her symptoms and the need to have titration of the medicine and at least a 6 month trial.  She is compliant on her Vyvanse still feels that this helps.  She is working full-time at [employer] and notes that her job is good.  She feels that the low moods come and go pretty quickly under never sustained.  I am still debating whether the diagnosis of major depression fits very well given her descriptions of fluctuating symptoms.  Patient denies consistent irritability although mother states that it is daily.  Absolutely no symptoms suggestive of hypomania, wanda, mixed states, or psychosis.  Sleep, energy, appetite are relatively intact.    Past Psychotropic Medication Trials    [x] Fluoxetine (Prozac)  [x] Sertraline (Zoloft)   [] Paroxetine (Paxil)  [] Citalopram (Celexa)  [] Escitalopram (Lexapro)  [] Fluvoxamine (Luvox)  [] Nefazodone (Serzone)  [] Vilazodone (Viibryd)    [] Venlafaxine (Effexor)  [] Desvenlafaxine (Pristiq)  [] Duloxetine (Cymbalta)  [] Milnacipran (Savella)  [] Levomilnacipran (Fetzima)    [] Mirtazapine (Remeron)  [x] Trazodone (Oleptro, Desyrel)  [] Bupropion (Wellbutrin, Aplenzin)  [] Vortioxetine (Brin/Trintellix)    [] Nortriptyline (Pamelor)  [] Amitriptyline (Elavil)  [] Imipramine (Tofranil)  [] Desimpramine (Norpramin)  [] Clomipramine (Anafranil)  [] Protriptyline  (Vivactil)   [] Doxepin (Silenor)    [] Phenelzine (Nardil)  [] Tranylcyp (Parnate)  [] Selegiline (EMSAM, Reminyl)    [] Disulfiram (Antabuse)  [] Naltrexone (Revia, Vivitrol)  [] Acamprosate (Campral)  [] Buprenor/Nalox (Suboxone)  []  Buprenorphine (Subutex)  [] Methadone  [] Varenicline (Chantix)  [] Nicotine patch/gum/inhaler    [] Valproic acid (Depakote)  [] LiCO3 (Lithium, Eskalith)  [] Carbamaze (Tegretol, Trileptal)  [x] Lamotrigine (Lamictal)  [] Topiramate (Topamax)  [] Gabapentin (Neurontin)  [] Pregabalin (Lyrica)    [x] Olanzapine (Zyprexa)  [] Risperidone (Risperdal)  [x] Quetiapine (Seroquel)  [] Aripiprazole (Abilify)  [] Ziprasidone (Geodon)  [] Clozapine (Clozaril)  [] Asenapine (Saphris)  [] Iloperidone (Fanapt)  [] Paliperidone (Invega)  [] Lurasidone (Latuda)  [] Brexpiprazole (Rexulti)  [] Cariprazine (Vraylar)  [] Pimavanserin (Nuplazid)   [] Lumateperone (Caplyta)    [] Haloperidol (Haldol)  [] Fluphenazine (Prolixin)  [] Trifluoperazine (Stelazine)  [] Chlorpromazine (Thorazine)  [] Thioridazine (Mellaril)  [] Thiothixine (Navane)  [] Loxitane (Loxapine)    [] Donepezil (Aricept)  [] Rivastigmine (Exelon)  [] Galantamine (Razadyne)  [] Memantine (Namenda)  [] Nuedexta    [] Benztropine (Cogentin)  [] Trihexyphenidyl (Artane)    [] ECT   [] Light Tx  [] VNS  [] TMS  [] Thyroid  [] Ketamine     [] Alprazolam (Xanax)  [] Lorazepam (Ativan)  [] Clonazepam (Klonopin)  [] Temazepam (Restoril)  [] Diazepam (Valium)  [] Buspirone (Buspar)  [] Propanolol  [] Prazosin  [] Cyproheptadine    [x] Zolpidem (Ambien)  [] Zaleplon (Sonata)  [] Eszopiclone (Lunesta)  [] Ramelteon (Rozerem)  [] Diphenhydramine (Benadryl)  [x] Hydroxyzine (Atarax, Vistaril)  [] Chloral Hydrate  [] Suvorexant (Belsomra)    [] Dexmethylphenidate (Focalin)  [] Methylphen (Ritalin, Metadate)  [] Concerta  [] Quillivant  [] Daytrana patch    [] Dextroamphet (Dexedrine)  [] Adderall (mixed amphet salts)  [] Adderall XR  [x] Vyvanse  [] Mydayis    [] Strattera  [] Modafanil (Provigil)  [] Armodafanil (Nuvigil)  [x] Clonidine (Catapres, Kapvay)  [] Guanfacine (Intuniv)        Substance use: Denies all  Occupational and social functioning:  fair  Sleep:adequate  Appetite:adequate    Allergy:  ALLERGIES:  No Known Allergies     Medications:  Outpatient Medications Prior to Visit   Medication Sig Dispense Refill   • lisdexamfetamine (VYVANSE) 40 MG capsule One by mouth each AM, DX F90.2 30 capsule 0   • lamoTRIgine (LaMICtal) 25 MG tablet One by mouth daily for 2 weeks then two by mouth daily 46 tablet 0   • famotidine (PEPCID) 20 MG tablet Take 1 tablet by mouth 2 times daily. 30 tablet 1   • melatonin 5 MG Take 5 mg by mouth nightly as needed.     • medroxyPROGESTERone Acetate (DEPO-PROVERA IM)      • ibuprofen (MOTRIN) 600 MG tablet Take 1 tablet by mouth every 6 hours as needed for Pain. 30 tablet 0     No facility-administered medications prior to visit.        Past Medical History:   Diagnosis Date   • Asthma    • Pneumonia    • Seasonal allergies    • Suicidal behavior 04/20/2021    admitted to [facility] after ingestion        Patient Active Problem List   Diagnosis   • Allergic rhinitis: trees, grass, weeds, dust mite,  dog, cat and Alternaria   • Other chronic allergic conjunctivitis   • Severe episode of recurrent major depressive disorder (CMS/[facility])        Laboratory Tests or other studies:   no further labwork indicated    No results found for: LITHIUM    Lab Results   Component Value Date    WBC 9.6 10/09/2018    RBC 4.24 10/09/2018    HGB 12.9 10/09/2018    HCT 39.1 10/09/2018     10/09/2018     01/27/2015    SEG 65.9 10/09/2018    SEG 88.3 01/27/2015    PMON 6.2 10/09/2018    PEOS 0.8 10/09/2018    PBASO 0.6 10/09/2018    PBASO 0.1 01/27/2015    ANEUT 6.3 10/09/2018    ANEUT 17.3 (A) 01/27/2015    ALYMS 2.5 10/09/2018    GRADY 0.6 10/09/2018    AEOS 0.1 10/09/2018    AEOS 0.0 01/27/2015    ABASO 0.1 10/09/2018    ABASO 0.0 01/27/2015     Lab Results   Component Value Date    SODIUM 136 05/14/2019    POTASSIUM 4.0 05/14/2019    CHLORIDE 103 05/14/2019    CO2 25 05/14/2019    GLUCOSE 91 05/14/2019    BUN 10  05/14/2019    CREATININE 0.87 05/14/2019    CALCIUM 9.3 05/14/2019    ALBUMIN 3.7 07/24/2018    BILIRUBIN 0.5 07/24/2018    ALKPT 61 07/24/2018    AST 13 07/24/2018    GPT 14 07/24/2018       TSH (mcUnits/mL)   Date Value   07/24/2018 0.649       No results found for: VALP      Review of Systems:  A complete review of systems was conducted and is negative apart from as follows or as mentioned above.    Negative    Mental Status Examination:    Appearance  [x] Unremarkable  []  Thin  [] Uncomfortable  [] Intoxicated  [] Other:    Hygiene  [x] Unremarkable  []  Marginal  []  Disheveled  []  Malodorous  []  Unkempt    []  Other:    Interpersonal behavior  [x]  Appropriate  [x]  Pleasant  [x]  Engaged  []  Guarded  []  Hostile  []  Withdrawn  [x]  Good eye contact  []  Avoidant eye contact  []  Other:    Speech Rate  [x] Normal  []   Fast  []   Slow  []   Pressured    Speech clarity  [x]   Clear  []   Dysarthric  []   Other:    Speech Volume  []   Soft  []   Loud  [x]   Normal    Speech Latency  [x]   Normal  []   Reduced  []   Prolonged    Mood  [x]   \"good\"  []  \"angry”  []   “sad”  [] \"depressed\"  []   \"anxious\"  []   \"worried\"  []   Not stated  []   Other:    Affect  [x]   Euthymic  []   Dysthymic  []   Anxious  []   Tense  []   Irritable  []   Sad    []   Tearful  []   Bright  []   Constricted  []   Blunted  []   Flat  []   Expansive  []   Euphoric  [x]   Congruent with stated mood  [] Not congruent with stated mood  []   Appropriate to situation and conversation  []   Inappropriate to situation or conversation  []   Stable  [] Labile  []   Fluid  []   Other:    Thought process  [x]   Linear  [x]   Logical  [x]   Well organized  []   Circumstantial  [] Tangential   []   Flight of ideas  []   [place]  []   Rigid  []   Difficult to follow   []   Disorganized  [] Other:    Thought Content  [x]  Unremarkable  []   Suspiciousness  []   Paranoia  []   Rumination   []  Self-critical  []   Catastrophizing  []   Grandiose   []   Delusional  []  Ideas of reference  []   Thought broadcasting or insertion  []  Obsessions or intrusive thoughts  []  Hopelessness  []  Somatic preoccupation  []  Other:    Perception/hallucinations  [x]  None reported or observed  []  Auditory hallucinations  [] Visual hallucinations  []  Second person  []  Command  []  Derogatory  []  Third person  []   Vague  []  Faint  []  Overpowering []  Grossly impaired or clouded sensorium    []  Other impairment:    Orientation, oriented to  [x]  Self  [x]  Place  [x]  Time  [x]  Situation  []  Other:  []  Not assessed    Attention and concentration  [] No impairment noted in course of interview  [x]  Distractible  []  Difficulty sustaining or shifting attention  [] Assessed/screened as follows:    Memory  [x]  No impairment noted in course of interview as evidenced by recall of recent and distant events  []  Some impairment suspected from gaps in interview as follows:  []  Assessed/screened as follows:    Insight  []  Good as evidenced by awareness of illness  [x]  Fair as evidenced by awareness of illness, with some limitations  []  Poor, with substantial limitations to awareness of or nature of illness  []  Other:    Judgment  []  Good as evidenced by reasonable decision making and interpersonal appropriateness  [x]  Fair, some limitations noted such as impulsivity or marked ambivalence  []  Poor, as follows:  []  Other:    Suicidal thoughts  [x]  Denies  []  Present, denies intent or plan  []  At baseline  []  Present, with some intent or plan  []  Other:    Homicidal/Assaultive Ideation   [x]  Denies  []  Other, specify:    Gait   []  Steady  []  Well-paced  []  Wide-space  []  Slow  []  Unsteady  []  Requires assistive device  [x]  Not assessed  []  Other:      Medical Decision Making    Diagnoses:  Major depressive disorder, recurrent episode, in full remission (CMS/[facility])  (primary encounter diagnosis)  ADHD (attention deficit hyperactivity disorder), combined  type     History of THC use.  R/O Borderline Personality traits     Narrative assessment and plan:   18-year-old  female with highly reactive mood and behavioral symptoms, history of alcohol and marijuana abuse, and longitudinal symptoms of ADD who presents after an 8 week treatment program at [facility] to help address issues with mood.  Formulation this time is still most consistent with adjustment disorder as her mood disturbances or brief, intensive, and always linked to specific stressors.  She does not appear to have daily disruptive mood dysregulation at this time.  Suspicion is held for early features consistent with borderline personality traits.    1. Stressed need for mental health counseling to help with insight, self regulation, self care.  Patient's mother will call to see if she can get back in to see Franny Welsh with whom she previously had good treatment relationship.  Discussed potential therapeutic goals such as DBT, self-regulation, improved coping and self-care.  2.  Encouraged medication compliance - lamotrigine in evening, Vyvanse in AM.   3. Increase in lamotrigine to 100 mg one daily for 2 weeks then one and one half by mouth daily.   4. PDMP profile was checked.  No conflicts were found.  Refills for Vyvanse placed into the chart.  Encouraged a small morning meal before Vyvanse administration.  5. Consider random drug screen given the stimulant and history of marijuana use.      Risk Assessment: no indication of acutely increased risk of harm to self or others above personal baseline    Follow up: 1 months                          No

## 2024-12-17 ENCOUNTER — DOCUMENTATION ONLY (OUTPATIENT)
Dept: REHABILITATION | Facility: HOSPITAL | Age: 69
End: 2024-12-17
Payer: MEDICARE

## 2025-02-24 ENCOUNTER — HOSPITAL ENCOUNTER (OUTPATIENT)
Dept: PREADMISSION TESTING | Facility: HOSPITAL | Age: 70
Discharge: HOME | End: 2025-02-24
Attending: INTERNAL MEDICINE
Payer: MEDICARE

## 2025-02-24 DIAGNOSIS — Z00.00 ENCOUNTER FOR MEDICARE ANNUAL WELLNESS EXAM: ICD-10-CM

## 2025-02-24 DIAGNOSIS — Z12.11 COLON CANCER SCREENING: Primary | ICD-10-CM

## 2025-02-24 RX ORDER — SODIUM, POTASSIUM,MAG SULFATES 17.5-3.13G
1 SOLUTION, RECONSTITUTED, ORAL ORAL DAILY
Qty: 1 KIT | Refills: 0 | Status: SHIPPED | OUTPATIENT
Start: 2025-02-24 | End: 2025-02-26

## 2025-04-09 ENCOUNTER — LAB VISIT (OUTPATIENT)
Dept: LAB | Facility: HOSPITAL | Age: 70
End: 2025-04-09
Attending: FAMILY MEDICINE
Payer: MEDICARE

## 2025-04-09 DIAGNOSIS — Z12.5 SCREENING FOR PROSTATE CANCER: ICD-10-CM

## 2025-04-09 DIAGNOSIS — E78.5 HYPERLIPIDEMIA, UNSPECIFIED HYPERLIPIDEMIA TYPE: ICD-10-CM

## 2025-04-09 DIAGNOSIS — Z91.89 10 YEAR RISK OF MI OR STROKE 7.5% OR GREATER: ICD-10-CM

## 2025-04-09 LAB
ALBUMIN SERPL BCP-MCNC: 3.8 G/DL (ref 3.5–5.2)
ALP SERPL-CCNC: 80 UNIT/L (ref 40–150)
ALT SERPL W/O P-5'-P-CCNC: 32 UNIT/L (ref 10–44)
ANION GAP (OHS): 9 MMOL/L (ref 8–16)
AST SERPL-CCNC: 30 UNIT/L (ref 11–45)
BILIRUB SERPL-MCNC: 0.6 MG/DL (ref 0.1–1)
BUN SERPL-MCNC: 14 MG/DL (ref 8–23)
CALCIUM SERPL-MCNC: 8.9 MG/DL (ref 8.7–10.5)
CHLORIDE SERPL-SCNC: 111 MMOL/L (ref 95–110)
CHOLEST SERPL-MCNC: 180 MG/DL (ref 120–199)
CHOLEST/HDLC SERPL: 4.3 {RATIO} (ref 2–5)
CO2 SERPL-SCNC: 23 MMOL/L (ref 23–29)
CREAT SERPL-MCNC: 1.1 MG/DL (ref 0.5–1.4)
GFR SERPLBLD CREATININE-BSD FMLA CKD-EPI: >60 ML/MIN/1.73/M2
GLUCOSE SERPL-MCNC: 92 MG/DL (ref 70–110)
HDLC SERPL-MCNC: 42 MG/DL (ref 40–75)
HDLC SERPL: 23.3 % (ref 20–50)
LDLC SERPL CALC-MCNC: 104.8 MG/DL (ref 63–159)
NONHDLC SERPL-MCNC: 138 MG/DL
POTASSIUM SERPL-SCNC: 4.2 MMOL/L (ref 3.5–5.1)
PROT SERPL-MCNC: 6.6 GM/DL (ref 6–8.4)
PSA SERPL-MCNC: 2.06 NG/ML
SODIUM SERPL-SCNC: 143 MMOL/L (ref 136–145)
TRIGL SERPL-MCNC: 166 MG/DL (ref 30–150)

## 2025-04-09 PROCEDURE — 84153 ASSAY OF PSA TOTAL: CPT | Mod: HCNC

## 2025-04-09 PROCEDURE — 80053 COMPREHEN METABOLIC PANEL: CPT | Mod: HCNC

## 2025-04-09 PROCEDURE — 36415 COLL VENOUS BLD VENIPUNCTURE: CPT | Mod: HCNC

## 2025-04-09 PROCEDURE — 80061 LIPID PANEL: CPT | Mod: HCNC

## 2025-04-10 ENCOUNTER — HOSPITAL ENCOUNTER (OUTPATIENT)
Dept: ENDOSCOPY | Facility: HOSPITAL | Age: 70
Discharge: HOME | End: 2025-04-10
Attending: FAMILY MEDICINE
Payer: MEDICARE

## 2025-04-10 ENCOUNTER — ANESTHESIA EVENT (OUTPATIENT)
Dept: ENDOSCOPY | Facility: HOSPITAL | Age: 70
End: 2025-04-10
Payer: MEDICARE

## 2025-04-10 ENCOUNTER — ANESTHESIA (OUTPATIENT)
Dept: ENDOSCOPY | Facility: HOSPITAL | Age: 70
End: 2025-04-10
Payer: MEDICARE

## 2025-04-10 VITALS
WEIGHT: 240 LBS | HEIGHT: 77 IN | OXYGEN SATURATION: 96 % | SYSTOLIC BLOOD PRESSURE: 108 MMHG | BODY MASS INDEX: 28.34 KG/M2 | TEMPERATURE: 97 F | HEART RATE: 71 BPM | RESPIRATION RATE: 16 BRPM | DIASTOLIC BLOOD PRESSURE: 73 MMHG

## 2025-04-10 DIAGNOSIS — Z12.11 COLON CANCER SCREENING: ICD-10-CM

## 2025-04-10 PROCEDURE — 37000009 HC ANESTHESIA EA ADD 15 MINS: Mod: HCNC

## 2025-04-10 PROCEDURE — 63600175 PHARM REV CODE 636 W HCPCS: Mod: HCNC

## 2025-04-10 PROCEDURE — 37000008 HC ANESTHESIA 1ST 15 MINUTES: Mod: HCNC

## 2025-04-10 RX ORDER — PROPOFOL 10 MG/ML
VIAL (ML) INTRAVENOUS
Status: DISCONTINUED | OUTPATIENT
Start: 2025-04-10 | End: 2025-04-10

## 2025-04-10 RX ADMIN — PROPOFOL 50 MG: 10 INJECTION, EMULSION INTRAVENOUS at 07:04

## 2025-04-10 RX ADMIN — PROPOFOL 100 MG: 10 INJECTION, EMULSION INTRAVENOUS at 06:04

## 2025-04-10 NOTE — H&P
Endoscopy H&P    Procedure : Colonoscopy      personal history of colon polyps and most recent endoscopic exam 5 years ago      Past Medical History:   Diagnosis Date    Hyperlipidemia     PONV (postoperative nausea and vomiting) 1990    after knee arthroscopy    Vitamin D deficiency 12/18/2019     Past Surgical History:   Procedure Laterality Date    COLONOSCOPY      COLONOSCOPY N/A 12/19/2019    Procedure: COLONOSCOPY;  Surgeon: Bhupendra Wilkinson MD;  Location: [facility];  Service: Endoscopy;  Laterality: N/A;    EPIDURAL STEROID INJECTION N/A 12/09/2022    Procedure: L4-5 intralaminar epidural steroid injection with left paramedian approach;  Surgeon: Ben Eddy MD;  Location: [facility];  Service: Pain Management;  Laterality: N/A;    INCISION AND DRAINAGE OF KNEE Left 04/26/2024    Procedure: INCISION AND DRAINAGE, KNEE;  Surgeon: Ryan Woods MD;  Location: [facility] OR;  Service: Orthopedics;  Laterality: Left;  poly exchange    KNEE ARTHROSCOPY Left     LUMBAR FUSION  09/2023    REPLACEMENT, POLYETHYLENE LINER Left 04/26/2024    Procedure: REPLACEMENT, POLYETHYLENE LINER;  Surgeon: Ryan Woods MD;  Location: [facility] OR;  Service: Orthopedics;  Laterality: Left;    SELECTIVE INJECTION OF ANESTHETIC AGENT AROUND LUMBAR SPINAL NERVE ROOT BY TRANSFORAMINAL APPROACH Bilateral 01/21/2022    Procedure: Bilateral L4/5 TF LOVE with RN IV sedation;  Surgeon: Ben Eddy MD;  Location: [facility];  Service: Pain Management;  Laterality: Bilateral;    SELECTIVE INJECTION OF ANESTHETIC AGENT AROUND LUMBAR SPINAL NERVE ROOT BY TRANSFORAMINAL APPROACH Bilateral 05/06/2022    Procedure: Bilateral L4/5 TF LOVE with RN IV sedation;  Surgeon: Ben Eddy MD;  Location: [facility];  Service: Pain Management;  Laterality: Bilateral;    SELECTIVE INJECTION OF ANESTHETIC AGENT AROUND LUMBAR SPINAL NERVE ROOT BY TRANSFORAMINAL APPROACH Bilateral 10/25/2022    Procedure: Bilateral L4/5 TF LOVE with RN IV  Subjective   Donna Emery is a 57 y.o. female who presents for tested positive for COVID.  HPI  Donna is 57 with a history of ADD familial hyperlipidemia severe dyslipidemia on multiple medications including Repatha ezetimibe statin induced myositis.  Patient was tested positive for COVID on December 18, 2023, she is a healthcare provider, denies fever chills mildly short of breath mild cough dry nonproductive, no diaphoresis.  Review of Systems  10 system review pertinent as above  Objective   Virtual  There were no vitals taken for this visit.   Physical Exam  Virtual    Assessment/Plan     Tested positive for COVID  On December 18, 2023  Out of work until December 23, 2023  Then use K N95 for 5 more days until December 2023  I am worried about the possibility of secondary bacterial infection  Possibly bronchitis  Will start on azithromycin as directed    In addition patient will be coming in for physical exam  In January 2024  Appropriate blood works were entered into the record  Will go to the lab to have blood works    Low-fat, low-cholesterol diet.  I recommended Mediterranean diet  Including fish, chicken, vegetables and olive oil  Exercise daily for 30 minutes  Continue medications as prescribed        Problem List Items Addressed This Visit    None        Sravan New MD      sedation;  Surgeon: Bne Eddy MD;  Location: [facility];  Service: Pain Management;  Laterality: Bilateral;    SPINE SURGERY  9.6.23    TLIF 3-4 & 4-5    SYNOVECTOMY OF KNEE Left 04/26/2024    Procedure: SYNOVECTOMY, KNEE;  Surgeon: Ryan Woods MD;  Location: [facility];  Service: Orthopedics;  Laterality: Left;    TOTAL KNEE ARTHROPLASTY Left 02/20/2024    Procedure: ARTHROPLASTY, KNEE, TOTAL;  Surgeon: Ryan Woods MD;  Location: [facility] OR;  Service: Orthopedics;  Laterality: Left;    TRANSFORAMINAL LUMBAR INTERBODY FUSION (TLIF) USING COMPUTER-ASSISTED NAVIGATION Bilateral 09/06/2023    Procedure: FUSION, SPINE, LUMBAR, TLIF, USING COMPUTER-ASSISTED NAVIGATION;  Surgeon: Lyle Lobato MD;  Location: [facility];  Service: Neurosurgery;  Laterality: Bilateral;  L3-5 TLIF    VASECTOMY  1995     Medications Ordered Prior to Encounter[1]  Review of patient's allergies indicates:  No Known Allergies          Review of Systems -ROS:  GENERAL: No fever, chills, fatigability or weight loss.  CHEST: Denies AVALOS, cyanosis, wheezing, cough and sputum production.  CARDIOVASCULAR: Denies chest pain, PND, orthopnea or reduced exercise tolerance.   Musculoskeletal ROS: negative for - gait disturbance or joint pain  Neurological ROS: negative for - confusion or memory loss        Physical Exam:  General: well developed, well nourished, no distress  Head: normocephalic  Neck: supple, symmetrical, trachea midline  Lungs:  clear to auscultation bilaterally and normal respiratory effort  Heart: regular rate and rhythm, S1, S2 normal, no murmur, rub or gallop and regular rate and rhythm  Abdomen: soft, non-tender non-distented; bowel sounds normal; no masses,  no organomegaly  Extremities: no cyanosis or edema, or clubbing       Moderate Sedation (choice): Mallampati Score 1    ASA : II    IMP: personal history of colon polyps and most recent endoscopic exam 5 years ago    Plan: Colonoscopy with Moderate  sedation.  I have explained the procedure including indications, alternatives, expected outcomes and potential complications. The patient appears to understand and gives informed consent. The patient is medically ready for surgery.              [1]   Current Outpatient Medications on File Prior to Encounter   Medication Sig Dispense Refill    ergocalciferol, vitamin D2, (VITAMIN D ORAL) Take by mouth once daily.      pravastatin (PRAVACHOL) 40 MG tablet Take 1 tablet (40 mg total) by mouth once daily. 90 tablet 2    aspirin 81 MG Chew Take 81 mg by mouth once daily. (Patient not taking: Reported on 4/3/2025)      ciclopirox (PENLAC) 8 % Soln Apply topically nightly. (Patient not taking: Reported on 4/3/2025)      RSV, preF A and preF B,PF, (ABRYSVO, PF,) 120 mcg/0.5 mL SolR vaccine Inject 0.5 mLs (120 mcg total) into the muscle. 0.5 mL 0     No current facility-administered medications on file prior to encounter.

## 2025-04-10 NOTE — ANESTHESIA PREPROCEDURE EVALUATION
04/10/2025  Myles Pride is a 69 y.o., male.      Past Medical History:   Diagnosis Date    Hyperlipidemia     PONV (postoperative nausea and vomiting) 1990    after knee arthroscopy    Vitamin D deficiency 12/18/2019     Past Surgical History:   Procedure Laterality Date    COLONOSCOPY      COLONOSCOPY N/A 12/19/2019    Procedure: COLONOSCOPY;  Surgeon: Bhupendra Wilkinson MD;  Location: [facility];  Service: Endoscopy;  Laterality: N/A;    EPIDURAL STEROID INJECTION N/A 12/09/2022    Procedure: L4-5 intralaminar epidural steroid injection with left paramedian approach;  Surgeon: Ben Eddy MD;  Location: [facility] PAIN MGT;  Service: Pain Management;  Laterality: N/A;    INCISION AND DRAINAGE OF KNEE Left 04/26/2024    Procedure: INCISION AND DRAINAGE, KNEE;  Surgeon: Ryan Woods MD;  Location: [facility] OR;  Service: Orthopedics;  Laterality: Left;  poly exchange    KNEE ARTHROSCOPY Left     LUMBAR FUSION  09/2023    REPLACEMENT, POLYETHYLENE LINER Left 04/26/2024    Procedure: REPLACEMENT, POLYETHYLENE LINER;  Surgeon: Ryan Woods MD;  Location: [facility] OR;  Service: Orthopedics;  Laterality: Left;    SELECTIVE INJECTION OF ANESTHETIC AGENT AROUND LUMBAR SPINAL NERVE ROOT BY TRANSFORAMINAL APPROACH Bilateral 01/21/2022    Procedure: Bilateral L4/5 TF LOVE with RN IV sedation;  Surgeon: Ben Eddy MD;  Location: [facility] PAIN MGT;  Service: Pain Management;  Laterality: Bilateral;    SELECTIVE INJECTION OF ANESTHETIC AGENT AROUND LUMBAR SPINAL NERVE ROOT BY TRANSFORAMINAL APPROACH Bilateral 05/06/2022    Procedure: Bilateral L4/5 TF LOVE with RN IV sedation;  Surgeon: Ben Eddy MD;  Location: [facility] PAIN [facility]GT;  Service: Pain Management;  Laterality: Bilateral;    SELECTIVE INJECTION OF ANESTHETIC AGENT AROUND LUMBAR SPINAL NERVE ROOT BY TRANSFORAMINAL APPROACH Bilateral 10/25/2022    Procedure:  Bilateral L4/5 TF LOVE with RN IV sedation;  Surgeon: Ben Eddy MD;  Location: [facility] MGT;  Service: Pain Management;  Laterality: Bilateral;    SPINE SURGERY  9.6.23    TLIF 3-4 & 4-5    SYNOVECTOMY OF KNEE Left 04/26/2024    Procedure: SYNOVECTOMY, KNEE;  Surgeon: Ryan Woods MD;  Location: [facility];  Service: Orthopedics;  Laterality: Left;    TOTAL KNEE ARTHROPLASTY Left 02/20/2024    Procedure: ARTHROPLASTY, KNEE, TOTAL;  Surgeon: Ryan Woods MD;  Location: [facility];  Service: Orthopedics;  Laterality: Left;    TRANSFORAMINAL LUMBAR INTERBODY FUSION (TLIF) USING COMPUTER-ASSISTED NAVIGATION Bilateral 09/06/2023    Procedure: FUSION, SPINE, LUMBAR, TLIF, USING COMPUTER-ASSISTED NAVIGATION;  Surgeon: Lyle Lobato MD;  Location: [facility];  Service: Neurosurgery;  Laterality: Bilateral;  L3-5 TLIF    VASECTOMY  1995       Pre-op Assessment    I have reviewed the Patient Summary Reports.     I have reviewed the Nursing Notes. I have reviewed the NPO Status.      Review of Systems  Anesthesia Hx:  No problems with previous Anesthesia             Denies Family Hx of Anesthesia complications.   Personal Hx of Anesthesia complications, Post-Operative Nausea/Vomiting                    Social:  Alcohol Use       Hematology/Oncology:  Hematology Normal   Oncology Normal                                   Cardiovascular:  Exercise tolerance: good              hyperlipidemia                               Pulmonary:  Pulmonary Normal                       Renal/:  Renal/ Normal                 Hepatic/GI:  Hepatic/GI Normal Bowel Prep.                   Neurological:    Neuromuscular Disease,                                   Endocrine:  Endocrine Normal            Dermatological:  Skin Normal    Psych:  Psychiatric Normal                    Physical Exam  General: Cooperative and Alert    Airway:  Mallampati: II   Mouth Opening: Normal  TM Distance: Normal  Tongue: Normal  Neck ROM: Normal  ROM    Dental:  Intact      Results for orders placed or performed during the hospital encounter of 09/09/23   EKG 12-lead    Collection Time: 09/09/23 12:20 PM    Narrative    Test Reason : R00.1,    Vent. Rate : 074 BPM     Atrial Rate : 039 BPM     P-R Int : 168 ms          QRS Dur : 128 ms      QT Int : 378 ms       P-R-T Axes : 063 029 009 degrees     QTc Int : 419 ms    Normal sinus rhythm , Premature ventricular and fusion complexes  Right bundle branch block  Possible Inferior infarct ,age undetermined  Abnormal ECG  When compared with ECG of 21-JUN-2023 08:13,  Current undetermined rhythm precludes rhythm comparison, needs review  Minimal criteria for Anterior infarct are no longer Present  Borderline criteria for Inferior infarct are now Present  T wave inversion no longer evident in Anterior leads  Confirmed by KAELA DALTON MD ([id number]) on 9/10/2023 2:42:28 PM    Referred By: [email or web address]   SELF           Confirmed By:KAELA DALTON MD     Results for orders placed during the hospital encounter of 09/29/20    Echo Color Flow Doppler? Yes    Interpretation Summary  · The left ventricle is normal in size with normal systolic function. The estimated ejection fraction is 60%.  · Normal left ventricular diastolic function.  · Normal right ventricular systolic function.        Anesthesia Plan  Type of Anesthesia, risks & benefits discussed:    Anesthesia Type: MAC, Gen Natural Airway  Intra-op Monitoring Plan: Standard ASA Monitors  Induction:  IV  Informed Consent: Informed consent signed with the Patient and all parties understand the risks and agree with anesthesia plan.  All questions answered.   ASA Score: 3  Day of Surgery Review of History & Physical: H&P Update referred to the surgeon/provider.    Ready For Surgery From Anesthesia Perspective.     .

## 2025-04-10 NOTE — TRANSFER OF CARE
Anesthesia Transfer of Care Note    Patient: Myles Pride    Procedure(s) Performed: * No procedures listed *    Patient location: PACU    Anesthesia Type: MAC    Transport from OR: Transported from OR on room air with adequate spontaneous ventilation    Post pain: adequate analgesia    Post assessment: no apparent anesthetic complications    Post vital signs: stable    Level of consciousness: awake    Nausea/Vomiting: no nausea/vomiting    Complications: none    Transfer of care protocol was followed

## 2025-04-10 NOTE — PLAN OF CARE
Dr Wilkinson at bedside to update patient and family on results and recs. AVS and post sedation/procedure precautions discussed. Verbalized understanding.

## 2025-04-10 NOTE — ANESTHESIA POSTPROCEDURE EVALUATION
Anesthesia Post Evaluation    Patient: Myles Pride    Procedure(s) Performed: * No procedures listed *    Final Anesthesia Type: MAC      Patient location during evaluation: GI PACU  Patient participation: Yes- Able to Participate  Level of consciousness: awake  Post-procedure vital signs: reviewed and stable  Pain management: adequate  Airway patency: patent    PONV status at discharge: No PONV  Anesthetic complications: no      Cardiovascular status: blood pressure returned to baseline and hemodynamically stable  Respiratory status: unassisted and spontaneous ventilation  Hydration status: euvolemic  Follow-up not needed.              Vitals Value Taken Time   /73 04/10/25 07:35   Temp 36.1 °C (97 °F) 04/10/25 07:35   Pulse 71 04/10/25 07:35   Resp 16 04/10/25 07:35   SpO2 96 % 04/10/25 07:35         No case tracking events are documented in the log.      Pain/Karey Score: Karey Score: 10 (4/10/2025  7:42 AM)

## 2025-04-10 NOTE — DISCHARGE SUMMARY
[facility] - Endoscopy (Hospital)  Discharge Note  Short Stay    Colonoscopy      OUTCOME: Patient tolerated treatment/procedure well without complication and is now ready for discharge.    DISPOSITION: Home or Self Care    FINAL DIAGNOSIS:  colon cancer screening    FOLLOWUP: None    DISCHARGE INSTRUCTIONS:    Discharge Procedure Orders   Activity as tolerated        TIME SPENT ON DISCHARGE: 30 minutes

## 2025-05-07 ENCOUNTER — OFFICE VISIT (OUTPATIENT)
Dept: INTERNAL MEDICINE | Facility: CLINIC | Age: 70
End: 2025-05-07
Payer: MEDICARE

## 2025-05-07 VITALS
RESPIRATION RATE: 18 BRPM | TEMPERATURE: 96 F | BODY MASS INDEX: 28.4 KG/M2 | HEART RATE: 60 BPM | DIASTOLIC BLOOD PRESSURE: 70 MMHG | SYSTOLIC BLOOD PRESSURE: 130 MMHG | OXYGEN SATURATION: 97 % | WEIGHT: 243 LBS

## 2025-05-07 VITALS
BODY MASS INDEX: 28.29 KG/M2 | WEIGHT: 244.5 LBS | OXYGEN SATURATION: 97 % | SYSTOLIC BLOOD PRESSURE: 150 MMHG | HEART RATE: 51 BPM | DIASTOLIC BLOOD PRESSURE: 78 MMHG | HEIGHT: 78 IN

## 2025-05-07 DIAGNOSIS — B35.1 TOENAIL FUNGUS: Primary | ICD-10-CM

## 2025-05-07 DIAGNOSIS — Z91.89 RISK OF MYOCARDIAL INFARCTION OR STROKE 7.5% OR GREATER IN NEXT 10 YEARS: ICD-10-CM

## 2025-05-07 DIAGNOSIS — M48.062 LUMBAR STENOSIS WITH NEUROGENIC CLAUDICATION: Chronic | ICD-10-CM

## 2025-05-07 DIAGNOSIS — Z79.899 ENCOUNTER FOR LONG-TERM (CURRENT) USE OF MEDICATIONS: ICD-10-CM

## 2025-05-07 DIAGNOSIS — E78.2 MIXED HYPERLIPIDEMIA: ICD-10-CM

## 2025-05-07 DIAGNOSIS — Z00.00 ENCOUNTER FOR MEDICARE ANNUAL WELLNESS EXAM: Primary | ICD-10-CM

## 2025-05-07 DIAGNOSIS — Z12.5 SCREENING FOR PROSTATE CANCER: ICD-10-CM

## 2025-05-07 PROCEDURE — 3008F BODY MASS INDEX DOCD: CPT | Mod: CPTII,HCNC,S$GLB, | Performed by: FAMILY MEDICINE

## 2025-05-07 PROCEDURE — 3075F SYST BP GE 130 - 139MM HG: CPT | Mod: CPTII,HCNC,S$GLB, | Performed by: FAMILY MEDICINE

## 2025-05-07 PROCEDURE — 99214 OFFICE O/P EST MOD 30 MIN: CPT | Mod: HCNC,S$GLB,, | Performed by: FAMILY MEDICINE

## 2025-05-07 PROCEDURE — 1126F AMNT PAIN NOTED NONE PRSNT: CPT | Mod: CPTII,HCNC,S$GLB, | Performed by: FAMILY MEDICINE

## 2025-05-07 PROCEDURE — 3288F FALL RISK ASSESSMENT DOCD: CPT | Mod: CPTII,HCNC,S$GLB, | Performed by: FAMILY MEDICINE

## 2025-05-07 PROCEDURE — G2211 COMPLEX E/M VISIT ADD ON: HCPCS | Mod: HCNC,S$GLB,, | Performed by: FAMILY MEDICINE

## 2025-05-07 PROCEDURE — 99999 PR PBB SHADOW E&M-EST. PATIENT-LVL III: CPT | Mod: PBBFAC,HCNC,, | Performed by: FAMILY MEDICINE

## 2025-05-07 PROCEDURE — 3078F DIAST BP <80 MM HG: CPT | Mod: CPTII,HCNC,S$GLB, | Performed by: FAMILY MEDICINE

## 2025-05-07 PROCEDURE — 1158F ADVNC CARE PLAN TLK DOCD: CPT | Mod: CPTII,HCNC,S$GLB, | Performed by: FAMILY MEDICINE

## 2025-05-07 PROCEDURE — 99999 PR PBB SHADOW E&M-EST. PATIENT-LVL IV: CPT | Mod: PBBFAC,HCNC,, | Performed by: NURSE PRACTITIONER

## 2025-05-07 PROCEDURE — 1159F MED LIST DOCD IN RCRD: CPT | Mod: CPTII,HCNC,S$GLB, | Performed by: FAMILY MEDICINE

## 2025-05-07 PROCEDURE — 1101F PT FALLS ASSESS-DOCD LE1/YR: CPT | Mod: CPTII,HCNC,S$GLB, | Performed by: FAMILY MEDICINE

## 2025-05-07 RX ORDER — PRAVASTATIN SODIUM 40 MG/1
40 TABLET ORAL DAILY
Qty: 90 TABLET | Refills: 3 | Status: SHIPPED | OUTPATIENT
Start: 2025-05-07

## 2025-05-07 NOTE — PATIENT INSTRUCTIONS
Counseling and Referral of Other Preventative  (Italic type indicates deductible and co-insurance are waived)    Patient Name: Myles Pride  Today's Date: 5/7/2025    Health Maintenance       Date Due Completion Date    Hemoglobin A1c (Diabetic Prevention Screening) 09/13/2027 9/13/2024    Lipid Panel 04/09/2030 4/9/2025    Colorectal Cancer Screening 04/10/2030 4/10/2025    TETANUS VACCINE 04/15/2033 4/15/2023    Override on 7/17/2013: Done        No orders of the defined types were placed in this encounter.      The following information is provided to all patients.  This information is to help you find resources for any of the problems found today that may be affecting your health:                  Living healthy guide: www.[facility].[place].gov      Understanding Diabetes: www.diabetes.org      Eating healthy: www.cdc.gov/healthyweight      [facility] home safety checklist: www.cdc.gov/steadi/patient.html      Agency on Aging: www.goea.[place].gov      Alcoholics anonymous (AA): www.aa.org      Physical Activity: www.david.nih.gov/yi7wlui      Tobacco use: www.quitwithusla.org

## 2025-05-07 NOTE — PROGRESS NOTES
Subjective:       Patient ID: Myles Pride is a 69 y.o. male.    Chief Complaint: follow up    HPI    Problem List[1]    Past Medical History:   Diagnosis Date    Hearing loss 2015    Wearing hearing sids snce 2017    Hyperlipidemia     Osteoarthritis 2021    Being treated    PONV (postoperative nausea and vomiting) 1990    after knee arthroscopy    Vitamin D deficiency 12/18/2019       Past Surgical History:   Procedure Laterality Date    COLONOSCOPY      COLONOSCOPY N/A 12/19/2019    Procedure: COLONOSCOPY;  Surgeon: Bhupendra Wilkinson MD;  Location: [facility];  Service: Endoscopy;  Laterality: N/A;    EPIDURAL STEROID INJECTION N/A 12/09/2022    Procedure: L4-5 intralaminar epidural steroid injection with left paramedian approach;  Surgeon: Ben Eddy MD;  Location: [facility]GT;  Service: Pain Management;  Laterality: N/A;    INCISION AND DRAINAGE OF KNEE Left 04/26/2024    Procedure: INCISION AND DRAINAGE, KNEE;  Surgeon: Ryan Woods MD;  Location: [facility] OR;  Service: Orthopedics;  Laterality: Left;  poly exchange    JOINT REPLACEMENT  2.20.24    Had to reenter 4.26.24    KNEE ARTHROSCOPY Left     LUMBAR FUSION  09/2023    REPLACEMENT, POLYETHYLENE LINER Left 04/26/2024    Procedure: REPLACEMENT, POLYETHYLENE LINER;  Surgeon: Ryan Woods MD;  Location: [facility] OR;  Service: Orthopedics;  Laterality: Left;    SELECTIVE INJECTION OF ANESTHETIC AGENT AROUND LUMBAR SPINAL NERVE ROOT BY TRANSFORAMINAL APPROACH Bilateral 01/21/2022    Procedure: Bilateral L4/5 TF LOVE with RN IV sedation;  Surgeon: Ben Eddy MD;  Location: [facility]IN [facility]GT;  Service: Pain Management;  Laterality: Bilateral;    SELECTIVE INJECTION OF ANESTHETIC AGENT AROUND LUMBAR SPINAL NERVE ROOT BY TRANSFORAMINAL APPROACH Bilateral 05/06/2022    Procedure: Bilateral L4/5 TF LOVE with RN IV sedation;  Surgeon: Ben Eddy MD;  Location: [facility]GT;  Service: Pain Management;  Laterality: Bilateral;    SELECTIVE INJECTION  OF ANESTHETIC AGENT AROUND LUMBAR SPINAL NERVE ROOT BY TRANSFORAMINAL APPROACH Bilateral 10/25/2022    Procedure: Bilateral L4/5 TF LOVE with RN IV sedation;  Surgeon: Ben Eddy MD;  Location: [facility];  Service: Pain Management;  Laterality: Bilateral;    SPINE SURGERY  9.6.23    TLIF 3-4 & 4-5    SYNOVECTOMY OF KNEE Left 04/26/2024    Procedure: SYNOVECTOMY, KNEE;  Surgeon: Ryan Woods MD;  Location: [facility] OR;  Service: Orthopedics;  Laterality: Left;    TOTAL KNEE ARTHROPLASTY Left 02/20/2024    Procedure: ARTHROPLASTY, KNEE, TOTAL;  Surgeon: Ryan Woods MD;  Location: [facility] OR;  Service: Orthopedics;  Laterality: Left;    TRANSFORAMINAL LUMBAR INTERBODY FUSION (TLIF) USING COMPUTER-ASSISTED NAVIGATION Bilateral 09/06/2023    Procedure: FUSION, SPINE, LUMBAR, TLIF, USING COMPUTER-ASSISTED NAVIGATION;  Surgeon: Lyle Lobato MD;  Location: [facility] OR;  Service: Neurosurgery;  Laterality: Bilateral;  L3-5 TLIF    VASECTOMY  1995       Family History   Problem Relation Name Age of Onset    Cancer Mother Cynthia Pride         anal cancer    Diabetes Mother Cynthia Pride         PRE DIABETES    Arthritis Mother Cynthia Pride     Hearing loss Mother Cynthia Pride     Osteoarthritis Mother Cynthia Pride         Knee replacement 2019    Early death Father Dread pride         Accidental    No Known Problems Sister      Gallbladder disease Brother      Hepatitis Brother          Hep C       Tobacco Use History[2]    Wt Readings from Last 5 Encounters:   05/07/25 110.2 kg (243 lb)   05/07/25 110.9 kg (244 lb 7.8 oz)   04/10/25 108.9 kg (240 lb)   12/03/24 110.5 kg (243 lb 9.7 oz)   11/21/24 108.9 kg (240 lb)       Patient is doing well.  He has no active concerns.  He is feeling good.  Tolerating his medications.  Toenail fungus uncontrolled did not respond to Penlac.    Review of Systems   Constitutional:  Negative for chills and fever.   Respiratory:  Negative for shortness of breath.     Cardiovascular:  Negative for chest pain.   Neurological:  Negative for dizziness.       Objective:      Vitals:    05/07/25 0944   BP: 130/70   Pulse: 60   Resp: 18   Temp: 96.3 °F (35.7 °C)       Physical Exam  Constitutional:       General: He is not in acute distress.     Appearance: He is not ill-appearing.   Pulmonary:      Effort: Pulmonary effort is normal. No respiratory distress.   Neurological:      General: No focal deficit present.      Mental Status: He is alert.   Psychiatric:         Mood and Affect: Mood normal.         Behavior: Behavior normal.         Assessment:       1. Toenail fungus    2. Risk of myocardial infarction or stroke 7.5% or greater in next 10 years    3. Mixed hyperlipidemia    4. Screening for prostate cancer    5. Encounter for long-term (current) use of medications        Plan:   Toenail fungus  -     Ambulatory referral/consult to Podiatry; Future; Expected date: 05/14/2025    Risk of myocardial infarction or stroke 7.5% or greater in next 10 years    Mixed hyperlipidemia    Screening for prostate cancer  -     PSA, Screening; Future; Expected date: 05/07/2026    Encounter for long-term (current) use of medications  -     CBC Auto Differential; Future; Expected date: 05/07/2026  -     Comprehensive Metabolic Panel; Future; Expected date: 05/07/2026  -     Hemoglobin A1C; Future; Expected date: 05/07/2026  -     Lipid Panel; Future; Expected date: 05/07/2026  -     TSH; Future; Expected date: 05/07/2026    Other orders  -     pravastatin (PRAVACHOL) 40 MG tablet; Take 1 tablet (40 mg total) by mouth once daily.  Dispense: 90 tablet; Refill: 3      Assessment & Plan    PLAN SUMMARY:  Labs before next visit  Continue aspirin 81 mg daily  Continue Pravastatin for cholesterol management  Referral to podiatry for evaluation and management of toenail fungus  Discontinue Penlac solution for toenail fungus due to inefficacy  Prescription refill provided as requested  Follow-up with  orthopedist Dr. Asencio for 1-year post-operative evaluation  Next appointment scheduled for May 2026 for annual follow-up unless needs to see me sooner    INFECTION AND INFLAMMATORY REACTION DUE TO LEFT KNEE PROSTHESIS:  Patient underwent left total knee replacement  developed subsequent infection, and had revision surgery  Currently doing well from both knee and infectious perspectives.  Completed  weeks of antibiotics post-surgery.  Follow-up with orthopedis arranged for 1 year post-operative evaluation.    History of lumbar fusion  Confirmed that leg pain was due to spinal stenosis, which has shown significant improvement following back surgery.  Patient has completed physical therapy.  Currently experiencing back tightness and mobility issues likely related to history of lumbar fusion.    HIP OSTEOARTHRITIS:  Patient experiences hip arthritis which contributes to back tightness and reduced flexibility.  This condition, in combination with lumbar fusion history, is affecting overall mobility.  Declines physical therapy presently    TOENAIL FUNGUS:  Fungal infection affecting all 10 toenails, initially starting with the right hallux.  Quite thick toenail fungus  Previous treatment with Penlac solution was unsuccessful and has been discontinued due to lack of efficacy.  Referred to podiatry for evaluation and management of significant toenail fungus.  Advised professional podiatric care for comprehensive management.    History of PVCs  Reviewed cardiac history, noting PVCs documented on Holter monitor approximately 2 years ago.  Patient reports no current chest pain, dyspnea, or palpitations.  Cardiac status assessed as stable with no active symptoms requiring intervention.    HYPERLIPIDEMIA:  Lipid panel shows mildly elevated triglycerides and LDL of 104.  Continuing Pravastatin for cholesterol management, which the patient tolerates well.  Lipid control assessed as adequate with current therapy.  Provided  prescription refill as requested.    LONG TERM USE OF ASPIRIN:  Continuing aspirin 81 mg daily as part of the patient's preventative medication regimen.    FOLLOW-UP PLAN:  Labs to be completed a few days before next visit.  Overall condition stable; annual follow-up appropriate.  Next appointment scheduled for May 2026.         This note was verbally dictated, please excuse any type errors.         [1]   Patient Active Problem List  Diagnosis    Bradycardia    Risk of myocardial infarction or stroke 7.5% or greater in next 10 years    Vitamin D deficiency    Colon cancer screening    Hyperlipidemia    Lumbar radiculopathy, chronic    Frequent PVCs    Lumbar stenosis with neurogenic claudication    ABLA (acute blood loss anemia)    S/P lumbar fusion    Decreased range of motion of lumbar spine    Decreased strength, endurance, and mobility    Arthritis of knee, left    Post-operative nausea and vomiting    Chronic pain of left knee    Knee stiffness, left    Weakness of left lower extremity    Effusion of left knee    BPH (benign prostatic hyperplasia)    Infection of total knee replacement   [2]   Social History  Tobacco Use   Smoking Status Never    Passive exposure: Never   Smokeless Tobacco Never

## 2025-05-07 NOTE — PROGRESS NOTES
"  Myles Pride presented for a  Medicare AWV and comprehensive Health Risk Assessment today. The following components were reviewed and updated:    Medical history  Family History  Social history  Allergies and Current Medications  Health Risk Assessment  Health Maintenance  Care Team         ** See Completed Assessments for Annual Wellness Visit within the encounter summary.**         The following assessments were completed:  Living Situation  CAGE  Depression Screening  Timed Get Up and Go  Whisper Test-na  Cognitive Function Screening  Nutrition Screening  ADL Screening  PAQ Screening      Opioid documentation:      Patient does not have a current opioid prescription.        Vitals:    05/07/25 0840   BP: (!) 150/78   Pulse: (!) 51   SpO2: 97%   Weight: 110.9 kg (244 lb 7.8 oz)   Height: 6' 5.56" (1.97 m)     Body mass index is 28.58 kg/m².  Physical Exam  Vitals and nursing note reviewed.   Constitutional:       Appearance: He is well-developed.   HENT:      Head: Normocephalic.   Cardiovascular:      Rate and Rhythm: Normal rate and regular rhythm.      Heart sounds: Normal heart sounds.   Pulmonary:      Effort: Pulmonary effort is normal. No respiratory distress.      Breath sounds: Normal breath sounds.   Abdominal:      Palpations: Abdomen is soft. There is no mass.      Tenderness: There is no abdominal tenderness.   Musculoskeletal:         General: Normal range of motion.   Skin:     General: Skin is warm and dry.   Neurological:      Mental Status: He is alert and oriented to person, place, and time.      Motor: No abnormal muscle tone.   Psychiatric:         Speech: Speech normal.         Behavior: Behavior normal.               Diagnoses and health risks identified today and associated recommendations/orders:    1. Encounter for Medicare annual wellness exam  - Referral to Enhanced Annual Wellness Visit (eAWV) W+1  BP elevated at today's visit. asymptomatic. Reports has been told has white coat " syndrome. Discussed s/s of MI and stroke (patient denies any s/s) and advised to go to the ER/911 if occur. Advised patient to monitor BP (keep a log) and if continues to stay elevated (greater than 140/90) to follow up with PCP for further evaluation and treatment. He expressed understanding.    Encouraged healthy diet and exercise as tolerated   Message sent to staff to please contact to schedule: optometry   Has urine leakage ever interrupted your daily activites or sleep? No  Do you think you could use some help to better manage urine leakage?No     2. Lumbar stenosis with neurogenic claudication  Denies pain today  Overdue for fu with neurosurgery  Message sent to staff to please contact to schedule: Neurosurgery    3. Mixed hyperlipidemia  Tri elevated  Continue current treatment plan as previously prescribed with your  pcp       Provided Myles with a 5-10 year written screening schedule and personal prevention plan. Recommendations were developed using the USPSTF age appropriate recommendations. Education, counseling, and referrals were provided as needed. After Visit Summary , available on [facility],  which includes a list of additional screenings\tests needed.    Follow up in about 1 year (around 5/7/2026) for awv.    Rosa Claudio NP  I offered to discuss advanced care planning, including how to pick a person who would make decisions for you if you were unable to make them for yourself, called a health care power of , and what kind of decisions you might make such as use of life sustaining treatments such as ventilators and tube feeding when faced with a life limiting illness recorded on a living will that they will need to know. (How you want to be cared for as you near the end of your natural life)     X  Patient has advanced directives written and agrees to provide copies to the institution.

## 2025-05-12 DIAGNOSIS — M48.062 LUMBAR STENOSIS WITH NEUROGENIC CLAUDICATION: Primary | ICD-10-CM

## 2025-05-14 DIAGNOSIS — Z98.1 S/P LUMBAR FUSION: Primary | ICD-10-CM

## 2025-05-15 ENCOUNTER — OFFICE VISIT (OUTPATIENT)
Dept: PODIATRY | Facility: CLINIC | Age: 70
End: 2025-05-15
Payer: MEDICARE

## 2025-05-15 VITALS — WEIGHT: 242.94 LBS | HEIGHT: 77 IN | BODY MASS INDEX: 28.69 KG/M2

## 2025-05-15 DIAGNOSIS — B35.1 TOENAIL FUNGUS: ICD-10-CM

## 2025-05-15 DIAGNOSIS — L60.3 ONYCHODYSTROPHY: Primary | ICD-10-CM

## 2025-05-15 PROCEDURE — 87101 SKIN FUNGI CULTURE: CPT | Performed by: PODIATRIST

## 2025-05-15 PROCEDURE — 99999 PR PBB SHADOW E&M-EST. PATIENT-LVL III: CPT | Mod: PBBFAC,,, | Performed by: PODIATRIST

## 2025-05-15 NOTE — PROGRESS NOTES
Subjective:       Patient ID: Myles Pride is a 69 y.o. male.    Chief Complaint: Fungus (Fungus all 10 toes, BLE. Non diabetic. 0/10 pain. Pt wears casual shoes and socks. Pt last PCP visit was 5/7/2025 with Dr Catalino Arias.)      HPI: Myles Pride presents to the office with complaints of abnormal appearance to multiple toes on the right and left foot due to dystrophic and thickened nail.  States that these have been in the current condition for many years.  Reports having dermatologist previously take sample and started him on topical antifungal paint medication.  He relates he has seen no improvement does have difficulties performing task.  Reports no signs of cellulitis, ingrowing, or drainage.  Rates pain as a 0/10.Patient's Primary Care Provider is Catalino Arias MD.     Review of patient's allergies indicates:  No Known Allergies    Past Medical History:   Diagnosis Date    Hearing loss 2015    Wearing hearing sids snce 2017    Hyperlipidemia     Osteoarthritis 2021    Being treated    PONV (postoperative nausea and vomiting) 1990    after knee arthroscopy    Vitamin D deficiency 12/18/2019       Family History   Problem Relation Name Age of Onset    Cancer Mother Cynthia Pride         anal cancer    Diabetes Mother Cynthia Pride         PRE DIABETES    Arthritis Mother Cynthia Pride     Hearing loss Mother Cynthia Pride     Osteoarthritis Mother Cynthia Pride         Knee replacement 2019    Early death Father Dread pride         Accidental    No Known Problems Sister      Gallbladder disease Brother      Hepatitis Brother          Hep C       Social History[1]    Past Surgical History:   Procedure Laterality Date    COLONOSCOPY      COLONOSCOPY N/A 12/19/2019    Procedure: COLONOSCOPY;  Surgeon: Bhupendra Wilkinson MD;  Location: [facility];  Service: Endoscopy;  Laterality: N/A;    EPIDURAL STEROID INJECTION N/A 12/09/2022    Procedure: L4-5 intralaminar epidural steroid injection with left  paramedian approach;  Surgeon: Ben Eddy MD;  Location: [facility];  Service: Pain Management;  Laterality: N/A;    INCISION AND DRAINAGE OF KNEE Left 04/26/2024    Procedure: INCISION AND DRAINAGE, KNEE;  Surgeon: Ryan Woods MD;  Location: [facility] OR;  Service: Orthopedics;  Laterality: Left;  poly exchange    JOINT REPLACEMENT  2.20.24    Had to reenter 4.26.24    KNEE ARTHROSCOPY Left     LUMBAR FUSION  09/2023    REPLACEMENT, POLYETHYLENE LINER Left 04/26/2024    Procedure: REPLACEMENT, POLYETHYLENE LINER;  Surgeon: Ryan Woods MD;  Location: [facility] OR;  Service: Orthopedics;  Laterality: Left;    SELECTIVE INJECTION OF ANESTHETIC AGENT AROUND LUMBAR SPINAL NERVE ROOT BY TRANSFORAMINAL APPROACH Bilateral 01/21/2022    Procedure: Bilateral L4/5 TF LOVE with RN IV sedation;  Surgeon: Ben Eddy MD;  Location: [facility];  Service: Pain Management;  Laterality: Bilateral;    SELECTIVE INJECTION OF ANESTHETIC AGENT AROUND LUMBAR SPINAL NERVE ROOT BY TRANSFORAMINAL APPROACH Bilateral 05/06/2022    Procedure: Bilateral L4/5 TF LOVE with RN IV sedation;  Surgeon: Ben Eddy MD;  Location: [facility];  Service: Pain Management;  Laterality: Bilateral;    SELECTIVE INJECTION OF ANESTHETIC AGENT AROUND LUMBAR SPINAL NERVE ROOT BY TRANSFORAMINAL APPROACH Bilateral 10/25/2022    Procedure: Bilateral L4/5 TF LOVE with RN IV sedation;  Surgeon: Ben Eddy MD;  Location: [facility];  Service: Pain Management;  Laterality: Bilateral;    SPINE SURGERY  9.6.23    TLIF 3-4 & 4-5    SYNOVECTOMY OF KNEE Left 04/26/2024    Procedure: SYNOVECTOMY, KNEE;  Surgeon: Ryan Woods MD;  Location: [facility] OR;  Service: Orthopedics;  Laterality: Left;    TOTAL KNEE ARTHROPLASTY Left 02/20/2024    Procedure: ARTHROPLASTY, KNEE, TOTAL;  Surgeon: Ryan Woods MD;  Location: [facility] OR;  Service: Orthopedics;  Laterality: Left;    TRANSFORAMINAL LUMBAR INTERBODY FUSION (TLIF) USING  "COMPUTER-ASSISTED NAVIGATION Bilateral 09/06/2023    Procedure: FUSION, SPINE, LUMBAR, TLIF, USING COMPUTER-ASSISTED NAVIGATION;  Surgeon: Lyle Lobato MD;  Location: [facility];  Service: Neurosurgery;  Laterality: Bilateral;  L3-5 TLIF    VASECTOMY  1995       Review of Systems      Objective:   Ht 6' 5" (1.956 m)   Wt 110.2 kg (242 lb 15.2 oz)   BMI 28.81 kg/m²     Physical Exam  LOWER EXTREMITY PHYSICAL EXAMINATION    NEUROLOGY: Sensation to light touch is intact. Proprioception is intact.  Vibratory sensation intact    VASCULAR:  The right dorsalis pedis pulse 2/4 and the right posterior tibial pulse 2/4.  The left dorsalis pedis pulse 2/4 and posterior tibial pulse on the left is 2/4.  Capillary refill is intact.  Pedal hair growth intact    DERMATOLOGY:  Thickened discoloration to multiple toenails of the right foot and left foot.  Nail plates her darkening brown in color.  There is some mild chalky subungual debris.  Portions of nail or lifted from the underlying nail bed.  No signs of ingrown.  No acute signs of infection      Assessment:     1. Onychodystrophy    2. Toenail fungus        Plan:     Onychodystrophy    Toenail fungus  -     Ambulatory referral/consult to Podiatry  -     CULTURE, FUNGUS - SKIN, HAIR, OR NAILS        Thorough discussion is had with the patient today, concerning the diagnosis, its etiology, and the treatment algorithm at present.    Discussed pathology in etiology associated with onychodystrophy as it relates to direct/indirect trauma, fungus/onychomycosis, vitamin insufficiencies, smoking, rheumatological pathologies, chemotherapy agents, peripheral vascular disease, diabetes mellitus,  history of nail loss or bleeding under the toenail, chemicals in nail polishes, etc...    We discuss assessment for possible fungus.  Will obtain nail sample to determine if cultures does show evidence of fungal elements.    Did discuss treatment options regarding management of " onychomycosis.  If positive, we will plan to call patient with the results and discuss treatment options once more.    Future Appointments   Date Time Provider Department Center   5/16/2025  9:45 AM [facility] XR1 [facility]   5/16/2025 10:00 AM Beatriz Leo PA-C [facility] NEUSUR [facility]   9/12/2025  8:10 AM French Castro OD [facility] LULA [facility]   5/4/2026  7:30 AM SPECIMEN, [facility]   5/8/2026  8:00 AM Catalino Arias MD [facility]           [1]   Social History  Socioeconomic History    Marital status:    Tobacco Use    Smoking status: Never     Passive exposure: Never    Smokeless tobacco: Never   Substance and Sexual Activity    Alcohol use: Yes     Alcohol/week: 3.0 standard drinks of alcohol     Types: 3 Glasses of wine per week     Comment: occ    Drug use: Never    Sexual activity: Yes     Partners: Female     Birth control/protection: Partner-Vasectomy, None     Social Drivers of Health     Financial Resource Strain: Low Risk  (4/30/2025)    Overall Financial Resource Strain (CARDIA)     Difficulty of Paying Living Expenses: Not hard at all   Food Insecurity: No Food Insecurity (4/30/2025)    Hunger Vital Sign     Worried About Running Out of Food in the Last Year: Never true     Ran Out of Food in the Last Year: Never true   Transportation Needs: No Transportation Needs (4/30/2025)    PRAPARE - Transportation     Lack of Transportation (Medical): No     Lack of Transportation (Non-Medical): No   Physical Activity: Insufficiently Active (4/30/2025)    Exercise Vital Sign     Days of Exercise per Week: 3 days     Minutes of Exercise per Session: 40 min   Stress: No Stress Concern Present (4/30/2025)    [employer] [place] [employer] - Occupational Stress Questionnaire     Feeling of Stress : Not at all   Housing Stability: Low Risk  (4/30/2025)    Housing Stability Vital Sign     Unable to Pay for Housing in the Last Year: No     Number of Times  Moved in the Last Year: 0     Homeless in the Last Year: No

## 2025-05-16 ENCOUNTER — HOSPITAL ENCOUNTER (OUTPATIENT)
Dept: RADIOLOGY | Facility: HOSPITAL | Age: 70
Discharge: HOME | End: 2025-05-16
Attending: NEUROLOGICAL SURGERY
Payer: MEDICARE

## 2025-05-16 ENCOUNTER — OFFICE VISIT (OUTPATIENT)
Dept: NEUROSURGERY | Facility: CLINIC | Age: 70
End: 2025-05-16
Payer: MEDICARE

## 2025-05-16 VITALS
DIASTOLIC BLOOD PRESSURE: 76 MMHG | WEIGHT: 241.38 LBS | HEART RATE: 44 BPM | SYSTOLIC BLOOD PRESSURE: 128 MMHG | BODY MASS INDEX: 28.63 KG/M2

## 2025-05-16 DIAGNOSIS — M48.062 LUMBAR STENOSIS WITH NEUROGENIC CLAUDICATION: ICD-10-CM

## 2025-05-16 DIAGNOSIS — Z98.1 S/P LUMBAR FUSION: Primary | ICD-10-CM

## 2025-05-16 PROCEDURE — 1126F AMNT PAIN NOTED NONE PRSNT: CPT | Mod: CPTII,S$GLB,, | Performed by: PHYSICIAN ASSISTANT

## 2025-05-16 PROCEDURE — 1101F PT FALLS ASSESS-DOCD LE1/YR: CPT | Mod: CPTII,S$GLB,, | Performed by: PHYSICIAN ASSISTANT

## 2025-05-16 PROCEDURE — 99999 PR PBB SHADOW E&M-EST. PATIENT-LVL III: CPT | Mod: PBBFAC,,, | Performed by: PHYSICIAN ASSISTANT

## 2025-05-16 PROCEDURE — 72100 X-RAY EXAM L-S SPINE 2/3 VWS: CPT | Mod: TC

## 2025-05-16 PROCEDURE — 72100 X-RAY EXAM L-S SPINE 2/3 VWS: CPT | Mod: 26,,, | Performed by: RADIOLOGY

## 2025-05-16 PROCEDURE — 3078F DIAST BP <80 MM HG: CPT | Mod: CPTII,S$GLB,, | Performed by: PHYSICIAN ASSISTANT

## 2025-05-16 PROCEDURE — 3288F FALL RISK ASSESSMENT DOCD: CPT | Mod: CPTII,S$GLB,, | Performed by: PHYSICIAN ASSISTANT

## 2025-05-16 PROCEDURE — 99213 OFFICE O/P EST LOW 20 MIN: CPT | Mod: S$GLB,,, | Performed by: PHYSICIAN ASSISTANT

## 2025-05-16 PROCEDURE — 3074F SYST BP LT 130 MM HG: CPT | Mod: CPTII,S$GLB,, | Performed by: PHYSICIAN ASSISTANT

## 2025-05-16 PROCEDURE — 1159F MED LIST DOCD IN RCRD: CPT | Mod: CPTII,S$GLB,, | Performed by: PHYSICIAN ASSISTANT

## 2025-05-16 PROCEDURE — 3008F BODY MASS INDEX DOCD: CPT | Mod: CPTII,S$GLB,, | Performed by: PHYSICIAN ASSISTANT

## 2025-05-16 RX ORDER — TIZANIDINE 4 MG/1
4 TABLET ORAL EVERY 6 HOURS PRN
Qty: 30 TABLET | Refills: 0 | Status: SHIPPED | OUTPATIENT
Start: 2025-05-16 | End: 2025-05-26

## 2025-05-16 NOTE — PROGRESS NOTES
Patient is a 67 yo M who presents to clinic today for follow up TLIF. He is about 19  months out s/p L3-5 TLIF. He is doing well overall. States he continues to note improvement. He does endorse stiffness in low back and reduced ROM. Had aching pain in low back after walking about a mile. States he does have some difficulty with putting on his socks etc. With some limited ROM. Patient endorses some tightness and spasming. He denies focal weakness or radicular pain.    Overall doing well       Pertinent positive and negative ROS documented above in HPI, all other systems reviewed and found to be negative.           Previous HPI:     Back pain has improved. Still remains stiff in the am. He does report leg pain has completely resolved.     Has a surgery coming up.      I would like to obtain CT L spine to eval for acute abnl and to assess bony fusion            Pertinent positive and negative ROS documented above in HPI, all other systems reviewed and found to be negative.      Narrative & Impression  EXAMINATION:  CT LUMBAR SPINE WITHOUT CONTRAST     CLINICAL HISTORY:  Spinal stenosis, lumbar;  Spinal stenosis, lumbosacral region     TECHNIQUE:  Low-dose CT images obtained throughout the region of the lumbar spine.  Axial, sagittal and coronal reformations were performed.  Contrast was not administered.     COMPARISON:  Multiple priors.     FINDINGS:  Patient is status post L3-L5 posterior instrumented fusion.  Laminectomy at L4.  Intervertebral disc replacement devices L3-4, L4-5.  New cystic lesions along the inferior endplate of L4 in comparison to the prior CT exam of 01/24/2023 but stable from the prior lumbar spine radiograph of 01/16/2024.  No hardware loosening or fracture is identified.     Normal sagittal alignment is preserved.  No spondylolisthesis.     T12-L1: No significant spinal canal stenosis or neural foraminal narrowing.     L1-2: Disc bulge and facet arthropathy similar to the prior exam produces  mild spinal canal stenosis and moderate left neural foraminal narrowing.  No right neural foraminal narrowing.     L2-3: Broad-based posterior disc bulge and ligamentum flavum buckling produce moderate spinal canal stenosis and mild bilateral neural foraminal narrowing.     L3-4: Significant improvement of spinal canal stenosis.  Streak metal artifact does somewhat degrade the evaluation.  No definite spinal canal stenosis identified.  Neural foraminal narrowing similar to the prior, moderate bilaterally.     L4-5: No spinal canal stenosis.  Neural foraminal narrowing similar to the prior, moderate to severe.     L5-S1: Broad-based disc bulge with stable mild spinal canal stenosis.  Neural foraminal narrowing similar to the prior likely moderate left and severe right..     Limited evaluation of the intraspinal contents demonstrates no hematoma or mass.     Paraspinal soft tissues exhibit no acute abnormalities.     Impression:     Significant improvement in spinal canal stenosis at the operative levels.  No definite progression at the non operative levels.  Complete findings as above.     All CT scans at this facility are performed  using dose modulation techniques as appropriate to performed exam including the following:  automated exposure control; adjustment of mA and/or kV according to the patients size (this includes techniques or standardized protocols for targeted exams where dose is matched to indication/reason for exam: i.e. extremities or head);  iterative reconstruction technique.        Electronically signed by:Dwight Mahoney  Date:                                            03/12/2024  Time:                                           14:40        Patient is post op TLIF with Dr. Lobato.  I reviewed his x rays which show instrumentation in appropriate position.     I prescribed prn zanaflex    Advised on abdominal binder, prn to support core when walking longer distances    Provided education on core  strengthening , ROM, stretching and aquatic therapy        Follow up PRN    Attestation:  Beatriz MONROY PA-C have obtained HPI, performed Physical Examination on the above patient, reviewed the pertinent labs, tests, imaging, other relevant data and recorded my findings in this clinic note.      This note was produced using dictation software of voice recognition technology, and some typographical errors may be present.         Attestation:  Beatriz MONROY PA-C have obtained HPI, performed Physical Examination on the above patient, reviewed the pertinent labs, tests, imaging, other relevant data and recorded my findings in this clinic note.

## 2025-05-22 ENCOUNTER — RESULTS FOLLOW-UP (OUTPATIENT)
Dept: PODIATRY | Facility: CLINIC | Age: 70
End: 2025-05-22

## 2025-06-04 ENCOUNTER — TELEPHONE (OUTPATIENT)
Dept: PODIATRY | Facility: HOSPITAL | Age: 70
End: 2025-06-04
Payer: MEDICARE

## 2025-06-10 ENCOUNTER — PATIENT MESSAGE (OUTPATIENT)
Dept: PODIATRY | Facility: CLINIC | Age: 70
End: 2025-06-10
Payer: MEDICARE

## 2025-06-23 ENCOUNTER — OFFICE VISIT (OUTPATIENT)
Dept: PODIATRY | Facility: CLINIC | Age: 70
End: 2025-06-23
Payer: MEDICARE

## 2025-06-23 DIAGNOSIS — B35.1 TOENAIL FUNGUS: ICD-10-CM

## 2025-06-23 DIAGNOSIS — B35.1 ONYCHOMYCOSIS DUE TO DERMATOPHYTE: Primary | ICD-10-CM

## 2025-06-23 PROCEDURE — 1159F MED LIST DOCD IN RCRD: CPT | Mod: CPTII,HCNC,S$GLB, | Performed by: PODIATRIST

## 2025-06-23 PROCEDURE — 3288F FALL RISK ASSESSMENT DOCD: CPT | Mod: CPTII,HCNC,S$GLB, | Performed by: PODIATRIST

## 2025-06-23 PROCEDURE — 1126F AMNT PAIN NOTED NONE PRSNT: CPT | Mod: CPTII,HCNC,S$GLB, | Performed by: PODIATRIST

## 2025-06-23 PROCEDURE — 99999 PR PBB SHADOW E&M-EST. PATIENT-LVL II: CPT | Mod: PBBFAC,HCNC,, | Performed by: PODIATRIST

## 2025-06-23 PROCEDURE — 1101F PT FALLS ASSESS-DOCD LE1/YR: CPT | Mod: CPTII,HCNC,S$GLB, | Performed by: PODIATRIST

## 2025-06-23 PROCEDURE — 99214 OFFICE O/P EST MOD 30 MIN: CPT | Mod: HCNC,S$GLB,, | Performed by: PODIATRIST

## 2025-06-23 PROCEDURE — 1160F RVW MEDS BY RX/DR IN RCRD: CPT | Mod: CPTII,HCNC,S$GLB, | Performed by: PODIATRIST

## 2025-06-23 RX ORDER — TERBINAFINE HYDROCHLORIDE 250 MG/1
250 TABLET ORAL DAILY
Qty: 30 TABLET | Refills: 2 | Status: SHIPPED | OUTPATIENT
Start: 2025-06-23 | End: 2025-09-21

## 2025-06-23 NOTE — PROGRESS NOTES
Subjective:       Patient ID: Myles Pride is a 69 y.o. male.    Chief Complaint: Fungus (Nail fungus f/u, pt rates pain 0/10, pt states he came in for more treatment options, pt is non-diabetic)      HPI:  Patient presents to the office to review previous nail sample which was positive for fungus.  Would like to treat with medication.  Patient states using over-the-counter topical medications which have not been helpful curative.  The patient states slight discomfort due to the nail thickening and/or elongation.  Patient is interested in the definitive medical diagnosis.     Review of patient's allergies indicates:  No Known Allergies    Past Medical History:   Diagnosis Date    Hearing loss 2015    Wearing hearing sids snce 2017    Hyperlipidemia     Osteoarthritis 2021    Being treated    PONV (postoperative nausea and vomiting) 1990    after knee arthroscopy    Vitamin D deficiency 12/18/2019       Family History   Problem Relation Name Age of Onset    Cancer Mother Cynthia Pride         anal cancer    Diabetes Mother Cynthia Pride         PRE DIABETES    Arthritis Mother Cynthia Pride     Hearing loss Mother Cynthia Pride     Osteoarthritis Mother Cynthia Pride         Knee replacement 2019    Early death Father Dread pride         Accidental    No Known Problems Sister      Gallbladder disease Brother      Hepatitis Brother          Hep C       Social History[1]    Past Surgical History:   Procedure Laterality Date    COLONOSCOPY      COLONOSCOPY N/A 12/19/2019    Procedure: COLONOSCOPY;  Surgeon: Bhupendra Wilkinson MD;  Location: [facility];  Service: Endoscopy;  Laterality: N/A;    EPIDURAL STEROID INJECTION N/A 12/09/2022    Procedure: L4-5 intralaminar epidural steroid injection with left paramedian approach;  Surgeon: Ben Eddy MD;  Location: [facility];  Service: Pain Management;  Laterality: N/A;    INCISION AND DRAINAGE OF KNEE Left 04/26/2024    Procedure: INCISION AND DRAINAGE, KNEE;   Surgeon: Ryan Woods MD;  Location: [facility] OR;  Service: Orthopedics;  Laterality: Left;  poly exchange    JOINT REPLACEMENT  2.20.24    Had to reenter 4.26.24    KNEE ARTHROSCOPY Left     LUMBAR FUSION  09/2023    REPLACEMENT, POLYETHYLENE LINER Left 04/26/2024    Procedure: REPLACEMENT, POLYETHYLENE LINER;  Surgeon: Ryan Woods MD;  Location: [facility] OR;  Service: Orthopedics;  Laterality: Left;    SELECTIVE INJECTION OF ANESTHETIC AGENT AROUND LUMBAR SPINAL NERVE ROOT BY TRANSFORAMINAL APPROACH Bilateral 01/21/2022    Procedure: Bilateral L4/5 TF LOVE with RN IV sedation;  Surgeon: Ben Eddy MD;  Location: [facility];  Service: Pain Management;  Laterality: Bilateral;    SELECTIVE INJECTION OF ANESTHETIC AGENT AROUND LUMBAR SPINAL NERVE ROOT BY TRANSFORAMINAL APPROACH Bilateral 05/06/2022    Procedure: Bilateral L4/5 TF LOVE with RN IV sedation;  Surgeon: Ben Eddy MD;  Location: [facility];  Service: Pain Management;  Laterality: Bilateral;    SELECTIVE INJECTION OF ANESTHETIC AGENT AROUND LUMBAR SPINAL NERVE ROOT BY TRANSFORAMINAL APPROACH Bilateral 10/25/2022    Procedure: Bilateral L4/5 TF LOVE with RN IV sedation;  Surgeon: Ben Eddy MD;  Location: [facility];  Service: Pain Management;  Laterality: Bilateral;    SPINE SURGERY  9.6.23    TLIF 3-4 & 4-5    SYNOVECTOMY OF KNEE Left 04/26/2024    Procedure: SYNOVECTOMY, KNEE;  Surgeon: Ryan Woods MD;  Location: [facility] OR;  Service: Orthopedics;  Laterality: Left;    TOTAL KNEE ARTHROPLASTY Left 02/20/2024    Procedure: ARTHROPLASTY, KNEE, TOTAL;  Surgeon: Ryan Woods MD;  Location: [facility] OR;  Service: Orthopedics;  Laterality: Left;    TRANSFORAMINAL LUMBAR INTERBODY FUSION (TLIF) USING COMPUTER-ASSISTED NAVIGATION Bilateral 09/06/2023    Procedure: FUSION, SPINE, LUMBAR, TLIF, USING COMPUTER-ASSISTED NAVIGATION;  Surgeon: Lyle Lobato MD;  Location: [facility] OR;  Service: Neurosurgery;  Laterality:  Bilateral;  L3-5 TLIF    VASECTOMY  1995       Review of Systems       Objective:   There were no vitals taken for this visit.    X-Ray Lumbar Spine AP And Lateral  Narrative: EXAM:XR LUMBAR SPINE AP AND LATERAL    INDICATION:  Low back pain    COMPARISON: 01/16/2024    TECHNIQUE: Lumbar spine 3 views    FINDINGS:  Posterior lumbar interbody fusion from L3 through L5 with laminectomy.  No hardware loosening or failure.  Mild degenerative disease throughout the lower thoracic and remaining lumbar segments.  No acute fractures.  Impression: Degenerative and postoperative changes as above.  No change compared to prior study.    Finalized on: 5/16/2025 8:30 PM By:  GABI Gomez MD, MD  [facility]# [id number]      2025-05-16 20:32:[id number]     [facility]       Physical Exam    LOWER EXTREMITY PHYSICAL EXAMINATION  NEUROLOGY: Sensation to light touch is intact. Proprioception is intact.     DERMATOLOGY:  Skin is supple, dry and intact. No ulcerations are noted. No hyperkeratosis or calluses are noted. No ecchymosis appreciated.  There are nail changes which are consistent with onychomycosis on the right and left foot    VASCULAR: The right DP pulse is 2/4 and the left DP is 2/4. The right PT pulse is 2/4 and the left PT pulse is 2/4. Proximal to distal, warm to warm. No dependent rubor or elevation palor is noted. Capillary refill time is less than 3 seconds. Hair growth is appreciated to the dorsal foot and digits.    Assessment:     1. Onychomycosis due to dermatophyte    2. Toenail fungus        Plan:     Onychomycosis due to dermatophyte  -     terbinafine HCL (LAMISIL) 250 mg tablet; Take 1 tablet (250 mg total) by mouth once daily.  Dispense: 30 tablet; Refill: 2  -     Hepatic Function Panel; Future; Expected date: 07/23/2025    Toenail fungus      We discussed both conservative and surgical treatment for onychomycosis.  Definitive antifungal treatment options were discussed and reviewed with the patient at initial visit. We  discussed oral medication, topical medication, and Vicks vapor rub with both risk and benefits of each of these options.  Also discussed temporary versus permanent removal of the entire nail plate to prevent recurrence.      For fungal toenails I prescribed topical medication to be used daily for up to a year.  We also discussed oral Lamisil but I did not recommend it as a first line of treatment since it is an internal medicine that may potentially have side effects, including liver problems. Patient elects for oral treatment.   We discussed using Lamisil for onychomycosis. This drug offers a fairly high but not universal cure rate. A 12 week treatment course is recommended. The patient is aware that rare cases of liver injury have been reported; and agrees to have a liver function tests performed. The symptoms of liver disease have been discussed; call if such occurs. In addition, some insurance plans do not cover the expense of this drug for treating a cosmetic condition, and the patient understands they may have to pay for the medication. Other side effects, such as headaches and rashes, have also been discussed.  Previous liver function studies reviewed showing normal levels.  Prescribed Lamisil 250mg to be taken once daily with food.     Future Appointments   Date Time Provider Department Center   7/23/2025 10:40 AM LABORATORY, [facility]   9/12/2025  8:10 AM French Castro OD [place]   5/4/2026  7:30 AM SPECIMEN, [facility]   5/8/2026  8:00 [facility]           [1]   Social History  Socioeconomic History    Marital status:    Tobacco Use    Smoking status: Never     Passive exposure: Never    Smokeless tobacco: Never   Substance and Sexual Activity    Alcohol use: Yes     Alcohol/week: 3.0 standard drinks of alcohol     Types: 3 Glasses of wine per week     Comment: occ    Drug use: Never    Sexual activity: Yes      Partners: Female     Birth control/protection: Partner-Vasectomy, None     Social Drivers of Health     Financial Resource Strain: Low Risk  (4/30/2025)    Overall Financial Resource Strain (CARDIA)     Difficulty of Paying Living Expenses: Not hard at all   Food Insecurity: No Food Insecurity (4/30/2025)    Hunger Vital Sign     Worried About Running Out of Food in the Last Year: Never true     Ran Out of Food in the Last Year: Never true   Transportation Needs: No Transportation Needs (4/30/2025)    PRAPARE - Transportation     Lack of Transportation (Medical): No     Lack of Transportation (Non-Medical): No   Physical Activity: Insufficiently Active (4/30/2025)    Exercise Vital Sign     Days of Exercise per Week: 3 days     Minutes of Exercise per Session: 40 min   Stress: No Stress Concern Present (4/30/2025)    [place] [employer] - Occupational Stress Questionnaire     Feeling of Stress : Not at all   Housing Stability: Low Risk  (4/30/2025)    Housing Stability Vital Sign     Unable to Pay for Housing in the Last Year: No     Number of Times Moved in the Last Year: 0     Homeless in the Last Year: No

## 2025-06-24 ENCOUNTER — PATIENT MESSAGE (OUTPATIENT)
Dept: PODIATRY | Facility: CLINIC | Age: 70
End: 2025-06-24
Payer: MEDICARE

## 2025-08-19 ENCOUNTER — LAB VISIT (OUTPATIENT)
Dept: LAB | Facility: HOSPITAL | Age: 70
End: 2025-08-19
Attending: PODIATRIST
Payer: MEDICARE

## 2025-08-19 DIAGNOSIS — B35.1 ONYCHOMYCOSIS DUE TO DERMATOPHYTE: ICD-10-CM

## 2025-08-19 LAB
ALBUMIN SERPL BCP-MCNC: 3.9 G/DL (ref 3.5–5.2)
ALP SERPL-CCNC: 80 UNIT/L (ref 40–150)
ALT SERPL W/O P-5'-P-CCNC: 37 UNIT/L (ref 0–55)
AST SERPL-CCNC: 46 UNIT/L (ref 0–50)
BILIRUB DIRECT SERPL-MCNC: 0.2 MG/DL (ref 0.1–0.3)
BILIRUB SERPL-MCNC: 0.7 MG/DL (ref 0.1–1)
PROT SERPL-MCNC: 6.4 GM/DL (ref 6–8.4)

## 2025-08-19 PROCEDURE — 82248 BILIRUBIN DIRECT: CPT | Mod: HCNC

## 2025-08-19 PROCEDURE — 36415 COLL VENOUS BLD VENIPUNCTURE: CPT | Mod: HCNC

## (undated) DEVICE — HOOD FLYTE PEELWY STERISHIELD

## (undated) DEVICE — GOWN POLY REINF BRTH SLV XL

## (undated) DEVICE — SYR ONLY LUER LOCK 20CC

## (undated) DEVICE — TAPE SILK 3IN

## (undated) DEVICE — NDL SPINAL 18GX3.5 SPINOCAN

## (undated) DEVICE — BNDG COFLEX FOAM LF2 ST 4X5YD

## (undated) DEVICE — YANKAUER FLEX NO VENT REG CAP

## (undated) DEVICE — BLADE EZ CLEAN 2 1/2

## (undated) DEVICE — MANIFOLD 4 PORT

## (undated) DEVICE — DRAPE THREE-QTR REINF 53X77IN

## (undated) DEVICE — TOWEL OR DISP STRL BLUE 4/PK

## (undated) DEVICE — POUCH INSTRUMENT 2 POCKET

## (undated) DEVICE — DRAPE FULL SHEET 70X100IN

## (undated) DEVICE — ELECTRODE REM PLYHSV RETURN 9

## (undated) DEVICE — MIXER BONE CEMENT

## (undated) DEVICE — SPONGE COTTON TRAY 4X4IN

## (undated) DEVICE — KIT SURGIFLO HEMOSTATIC MATRIX

## (undated) DEVICE — DRESSING ADH SQUARE 8X8CM

## (undated) DEVICE — ELECTRODE BLD EXT 6.50 ST MDFD

## (undated) DEVICE — DRAPE SURG W/TWL 17 5/8X23

## (undated) DEVICE — BUR LEGEND MIDAS REX 14CM 13MM

## (undated) DEVICE — GOWN NONREINF SET-IN SLV 2XL

## (undated) DEVICE — APPLICATOR CHLORAPREP ORN 26ML

## (undated) DEVICE — DRAPE OPMI STERILE

## (undated) DEVICE — NDL ECLIPSE SAFETY 18GX1-1/2IN

## (undated) DEVICE — Device

## (undated) DEVICE — DRAPE U SPLIT SHEET 54X76IN

## (undated) DEVICE — DRESSING PICO 7 TWO 10X30CM

## (undated) DEVICE — GLOVE SIGNATURE ESSNTL LTX 8

## (undated) DEVICE — SPHERE NDI PASSIVE

## (undated) DEVICE — CONTAINER SPECIMEN OR STER 4OZ

## (undated) DEVICE — TRAY CATH FOL SIL URIMTR 16FR

## (undated) DEVICE — DRAPE THREE-QUARTER 53X77IN

## (undated) DEVICE — PROBE BALL TIP 2.3MM

## (undated) DEVICE — COVER LIGHT HANDLE 80/CA

## (undated) DEVICE — DRAPE MOBILE C-ARM

## (undated) DEVICE — KIT IRR SUCTION HND PIECE

## (undated) DEVICE — STAPLER SKIN PROXIMATE WIDE

## (undated) DEVICE — PACK BASIC SETUP SC BR

## (undated) DEVICE — COVER PROXIMA MAYO STAND

## (undated) DEVICE — ADHESIVE MASTISOL VIAL 48/BX

## (undated) DEVICE — DRAPE T EXTRM SURG 121X128X90

## (undated) DEVICE — KIT COLL SWAB LIQUID AMIES MED

## (undated) DEVICE — SPONGE LAP 18X18 PREWASHED

## (undated) DEVICE — SYR 30CC LUER LOCK

## (undated) DEVICE — DRESSING MEPILEX BORD LITE 2X5

## (undated) DEVICE — BLADE SAG 18.0X1.27X100

## (undated) DEVICE — SUT 1 36IN PDS II VIO MONO

## (undated) DEVICE — PAD ABDOMINAL STERILE 8X10IN

## (undated) DEVICE — SOL NACL IRR 3000ML

## (undated) DEVICE — BANDAGE ACE DOUBLE STER 6IN

## (undated) DEVICE — DRESSING AQUACEL AG 3.5X10IN

## (undated) DEVICE — DRAPE C-ARMOR EQUIPMENT COVER

## (undated) DEVICE — GLOVE SURGICAL LATEX SZ 7

## (undated) DEVICE — DRESSING SURGICAL 1/2X1/2

## (undated) DEVICE — COVER TABLE HVY DTY 60X90IN

## (undated) DEVICE — DRAPE STERI INSTRUMENT 1018

## (undated) DEVICE — EVACUATOR PENCIL SMOKE NEPTUNE

## (undated) DEVICE — KIT DRAIN HEMOVAC 3/16IN 400ML

## (undated) DEVICE — DRAPE ORTH SPLIT 77X108IN

## (undated) DEVICE — SYR 10CC LUER LOCK

## (undated) DEVICE — DRAPE INCISE IOBAN 2 23X17IN

## (undated) DEVICE — WAX BONE STERILE 2.5G

## (undated) DEVICE — DRAPE LAP T SHT W/ INSTR PAD

## (undated) DEVICE — ALCOHOL 70% ANTISEPTIC ISO 4OZ

## (undated) DEVICE — LOTION DURA PREP REMOVER 40Z

## (undated) DEVICE — HEADREST ROUND DISP FOAM 9IN

## (undated) DEVICE — DRAPE INCISE IOBAN 2 23X33IN

## (undated) DEVICE — GLOVE SURG PLYSPHRN ORTH SZ7.5

## (undated) DEVICE — GOWN POLY REINF X-LONG XL

## (undated) DEVICE — DRAPE CORETEMP FLD WRM 56X62IN

## (undated) DEVICE — SUT VICRYL 1 OB 36 CTX

## (undated) DEVICE — UNDERGLOVE BIOGEL PI SZ 6.5 LF

## (undated) DEVICE — TUBING MEDI-VAC 20FT .25IN

## (undated) DEVICE — SUT STRATAFIX 1 SPIRAL .5

## (undated) DEVICE — SUT PDS II 0 CT-1 VIL MONO

## (undated) DEVICE — GLOVE SENSICARE PI GRN 8

## (undated) DEVICE — SOCKINETTE IMPERVIOUS 12X48IN

## (undated) DEVICE — SOL NACL 0.9% INJ 250ML BG

## (undated) DEVICE — DURAPREP SURG SCRUB 26ML

## (undated) DEVICE — INSERT CUSHIONPRONE VIEW LARGE

## (undated) DEVICE — DRAPE STERI U-SHAPED 47X51IN

## (undated) DEVICE — BLADE EZ CLEAN 2.5IN MODIFIED

## (undated) DEVICE — SUT VICRYL PLUS 0 CT1 18IN

## (undated) DEVICE — ALCOHOL 70% ISOP RUBBING 4OZ

## (undated) DEVICE — SUT STRATAFIX 1PDS CTX 18IN

## (undated) DEVICE — SPONGE PATTY SURGICAL .5X3IN

## (undated) DEVICE — CORD BIPOLAR 12 FOOT

## (undated) DEVICE — SKIN MARKER DEVON 160

## (undated) DEVICE — SEALER AQUAMANTYS 2.3 BIPOLAR